# Patient Record
Sex: MALE | Race: WHITE | NOT HISPANIC OR LATINO | Employment: OTHER | URBAN - METROPOLITAN AREA
[De-identification: names, ages, dates, MRNs, and addresses within clinical notes are randomized per-mention and may not be internally consistent; named-entity substitution may affect disease eponyms.]

---

## 2017-01-13 ENCOUNTER — ALLSCRIPTS OFFICE VISIT (OUTPATIENT)
Dept: OTHER | Facility: OTHER | Age: 77
End: 2017-01-13

## 2017-05-31 ENCOUNTER — ALLSCRIPTS OFFICE VISIT (OUTPATIENT)
Dept: OTHER | Facility: OTHER | Age: 77
End: 2017-05-31

## 2017-06-05 ENCOUNTER — ALLSCRIPTS OFFICE VISIT (OUTPATIENT)
Dept: OTHER | Facility: OTHER | Age: 77
End: 2017-06-05

## 2017-07-29 ENCOUNTER — GENERIC CONVERSION - ENCOUNTER (OUTPATIENT)
Dept: OTHER | Facility: OTHER | Age: 77
End: 2017-07-29

## 2017-08-10 ENCOUNTER — APPOINTMENT (OUTPATIENT)
Dept: LAB | Age: 77
End: 2017-08-10
Payer: COMMERCIAL

## 2017-08-10 ENCOUNTER — TRANSCRIBE ORDERS (OUTPATIENT)
Dept: ADMINISTRATIVE | Age: 77
End: 2017-08-10

## 2017-08-10 DIAGNOSIS — G60.9 IDIOPATHIC PERIPHERAL NEUROPATHY: Primary | ICD-10-CM

## 2017-08-10 DIAGNOSIS — G60.9 IDIOPATHIC PERIPHERAL NEUROPATHY: ICD-10-CM

## 2017-08-10 LAB
EST. AVERAGE GLUCOSE BLD GHB EST-MCNC: 105 MG/DL
HBA1C MFR BLD: 5.3 % (ref 4.2–6.3)
T4 SERPL-MCNC: 7.8 UG/DL (ref 4.7–13.3)
TSH SERPL DL<=0.05 MIU/L-ACNC: 1.49 UIU/ML (ref 0.36–3.74)
VIT B12 SERPL-MCNC: 349 PG/ML (ref 100–900)

## 2017-08-10 PROCEDURE — 36415 COLL VENOUS BLD VENIPUNCTURE: CPT

## 2017-08-10 PROCEDURE — 83036 HEMOGLOBIN GLYCOSYLATED A1C: CPT

## 2017-08-10 PROCEDURE — 84443 ASSAY THYROID STIM HORMONE: CPT

## 2017-08-10 PROCEDURE — 82175 ASSAY OF ARSENIC: CPT

## 2017-08-10 PROCEDURE — 84436 ASSAY OF TOTAL THYROXINE: CPT

## 2017-08-10 PROCEDURE — 82607 VITAMIN B-12: CPT

## 2017-08-10 PROCEDURE — 83825 ASSAY OF MERCURY: CPT

## 2017-08-10 PROCEDURE — 83655 ASSAY OF LEAD: CPT

## 2017-08-10 PROCEDURE — 84165 PROTEIN E-PHORESIS SERUM: CPT

## 2017-08-12 LAB
ALBUMIN SERPL ELPH-MCNC: 3.96 G/DL (ref 3.5–5)
ALBUMIN SERPL ELPH-MCNC: 62.8 % (ref 52–65)
ALPHA1 GLOB SERPL ELPH-MCNC: 0.26 G/DL (ref 0.1–0.4)
ALPHA1 GLOB SERPL ELPH-MCNC: 4.1 % (ref 2.5–5)
ALPHA2 GLOB SERPL ELPH-MCNC: 0.54 G/DL (ref 0.4–1.2)
ALPHA2 GLOB SERPL ELPH-MCNC: 8.5 % (ref 7–13)
BETA GLOB ABNORMAL SERPL ELPH-MCNC: 0.36 G/DL (ref 0.4–0.8)
BETA1 GLOB SERPL ELPH-MCNC: 5.7 % (ref 5–13)
BETA2 GLOB SERPL ELPH-MCNC: 5.3 % (ref 2–8)
BETA2+GAMMA GLOB SERPL ELPH-MCNC: 0.33 G/DL (ref 0.2–0.5)
GAMMA GLOB ABNORMAL SERPL ELPH-MCNC: 0.86 G/DL (ref 0.5–1.6)
GAMMA GLOB SERPL ELPH-MCNC: 13.6 % (ref 12–22)
IGG/ALB SER: 1.69 {RATIO} (ref 1.1–1.8)
PROT PATTERN SERPL ELPH-IMP: ABNORMAL
PROT SERPL-MCNC: 6.3 G/DL (ref 6.4–8.2)

## 2017-08-15 LAB
ARSENIC BLD-MCNC: 11 UG/L (ref 2–23)
LEAD BLD-MCNC: 2 UG/DL (ref 0–19)
MERCURY BLD-MCNC: NORMAL UG/L (ref 0–14.9)

## 2017-09-13 ENCOUNTER — GENERIC CONVERSION - ENCOUNTER (OUTPATIENT)
Dept: OTHER | Facility: OTHER | Age: 77
End: 2017-09-13

## 2017-09-13 ENCOUNTER — ALLSCRIPTS OFFICE VISIT (OUTPATIENT)
Dept: OTHER | Facility: OTHER | Age: 77
End: 2017-09-13

## 2017-09-13 LAB
BILIRUB UR QL STRIP: ABNORMAL
CLARITY UR: ABNORMAL
COLOR UR: ABNORMAL
GLUCOSE (HISTORICAL): ABNORMAL
HGB UR QL STRIP.AUTO: ABNORMAL
KETONES UR STRIP-MCNC: ABNORMAL MG/DL
LEUKOCYTE ESTERASE UR QL STRIP: ABNORMAL
NITRITE UR QL STRIP: ABNORMAL
PH UR STRIP.AUTO: 6 [PH]
PROT UR STRIP-MCNC: ABNORMAL MG/DL
SP GR UR STRIP.AUTO: 1.02
UROBILINOGEN UR QL STRIP.AUTO: 4

## 2017-09-20 ENCOUNTER — TRANSCRIBE ORDERS (OUTPATIENT)
Dept: ADMINISTRATIVE | Age: 77
End: 2017-09-20

## 2017-09-20 ENCOUNTER — APPOINTMENT (OUTPATIENT)
Dept: LAB | Age: 77
End: 2017-09-20
Payer: COMMERCIAL

## 2017-09-20 DIAGNOSIS — N13.8 ENLARGED PROSTATE WITH URINARY OBSTRUCTION: Primary | ICD-10-CM

## 2017-09-20 DIAGNOSIS — N40.1 ENLARGED PROSTATE WITH URINARY OBSTRUCTION: Primary | ICD-10-CM

## 2017-09-20 DIAGNOSIS — G60.9 IDIOPATHIC PERIPHERAL NEUROPATHY: ICD-10-CM

## 2017-09-20 DIAGNOSIS — N13.8 ENLARGED PROSTATE WITH URINARY OBSTRUCTION: ICD-10-CM

## 2017-09-20 DIAGNOSIS — N40.1 ENLARGED PROSTATE WITH URINARY OBSTRUCTION: ICD-10-CM

## 2017-09-20 DIAGNOSIS — G60.9 IDIOPATHIC PERIPHERAL NEUROPATHY: Primary | ICD-10-CM

## 2017-09-20 LAB
ANION GAP SERPL CALCULATED.3IONS-SCNC: 7 MMOL/L (ref 4–13)
BASOPHILS # BLD AUTO: 0.03 THOUSANDS/ΜL (ref 0–0.1)
BASOPHILS NFR BLD AUTO: 1 % (ref 0–1)
BUN SERPL-MCNC: 16 MG/DL (ref 5–25)
CALCIUM SERPL-MCNC: 9 MG/DL (ref 8.3–10.1)
CHLORIDE SERPL-SCNC: 106 MMOL/L (ref 100–108)
CO2 SERPL-SCNC: 27 MMOL/L (ref 21–32)
CREAT SERPL-MCNC: 1.03 MG/DL (ref 0.6–1.3)
EOSINOPHIL # BLD AUTO: 0.06 THOUSAND/ΜL (ref 0–0.61)
EOSINOPHIL NFR BLD AUTO: 1 % (ref 0–6)
ERYTHROCYTE [DISTWIDTH] IN BLOOD BY AUTOMATED COUNT: 13.3 % (ref 11.6–15.1)
GFR SERPL CREATININE-BSD FRML MDRD: 70 ML/MIN/1.73SQ M
GLUCOSE P FAST SERPL-MCNC: 83 MG/DL (ref 65–99)
HCT VFR BLD AUTO: 49 % (ref 36.5–49.3)
HGB BLD-MCNC: 16.5 G/DL (ref 12–17)
LYMPHOCYTES # BLD AUTO: 1.16 THOUSANDS/ΜL (ref 0.6–4.47)
LYMPHOCYTES NFR BLD AUTO: 27 % (ref 14–44)
MCH RBC QN AUTO: 31 PG (ref 26.8–34.3)
MCHC RBC AUTO-ENTMCNC: 33.7 G/DL (ref 31.4–37.4)
MCV RBC AUTO: 92 FL (ref 82–98)
MONOCYTES # BLD AUTO: 0.28 THOUSAND/ΜL (ref 0.17–1.22)
MONOCYTES NFR BLD AUTO: 6 % (ref 4–12)
NEUTROPHILS # BLD AUTO: 2.78 THOUSANDS/ΜL (ref 1.85–7.62)
NEUTS SEG NFR BLD AUTO: 65 % (ref 43–75)
NRBC BLD AUTO-RTO: 0 /100 WBCS
PLATELET # BLD AUTO: 190 THOUSANDS/UL (ref 149–390)
PMV BLD AUTO: 10.7 FL (ref 8.9–12.7)
POTASSIUM SERPL-SCNC: 4.1 MMOL/L (ref 3.5–5.3)
PSA SERPL-MCNC: 1.8 NG/ML (ref 0–4)
RBC # BLD AUTO: 5.33 MILLION/UL (ref 3.88–5.62)
SODIUM SERPL-SCNC: 140 MMOL/L (ref 136–145)
WBC # BLD AUTO: 4.38 THOUSAND/UL (ref 4.31–10.16)

## 2017-09-20 PROCEDURE — 85025 COMPLETE CBC W/AUTO DIFF WBC: CPT

## 2017-09-20 PROCEDURE — 80048 BASIC METABOLIC PNL TOTAL CA: CPT

## 2017-09-20 PROCEDURE — 84153 ASSAY OF PSA TOTAL: CPT

## 2017-09-20 PROCEDURE — 36415 COLL VENOUS BLD VENIPUNCTURE: CPT

## 2017-10-03 ENCOUNTER — ALLSCRIPTS OFFICE VISIT (OUTPATIENT)
Dept: OTHER | Facility: OTHER | Age: 77
End: 2017-10-03

## 2017-10-03 DIAGNOSIS — N40.3 NODULAR PROSTATE WITH LOWER URINARY TRACT SYMPTOMS: ICD-10-CM

## 2017-10-03 LAB
BILIRUB UR QL STRIP: NORMAL
CLARITY UR: NORMAL
COLOR UR: YELLOW
GLUCOSE (HISTORICAL): NORMAL
HGB UR QL STRIP.AUTO: NORMAL
KETONES UR STRIP-MCNC: NORMAL MG/DL
LEUKOCYTE ESTERASE UR QL STRIP: NORMAL
NITRITE UR QL STRIP: NORMAL
PH UR STRIP.AUTO: 5 [PH]
PROT UR STRIP-MCNC: NORMAL MG/DL
SP GR UR STRIP.AUTO: 1.02
UROBILINOGEN UR QL STRIP.AUTO: 1

## 2017-10-03 PROCEDURE — G0416 PROSTATE BIOPSY, ANY MTHD: HCPCS | Performed by: UROLOGY

## 2017-10-03 PROCEDURE — 88344 IMHCHEM/IMCYTCHM EA MLT ANTB: CPT | Performed by: UROLOGY

## 2017-10-03 PROCEDURE — 88342 IMHCHEM/IMCYTCHM 1ST ANTB: CPT | Performed by: UROLOGY

## 2017-10-04 ENCOUNTER — GENERIC CONVERSION - ENCOUNTER (OUTPATIENT)
Dept: OTHER | Facility: OTHER | Age: 77
End: 2017-10-04

## 2017-10-04 ENCOUNTER — LAB REQUISITION (OUTPATIENT)
Dept: LAB | Facility: HOSPITAL | Age: 77
End: 2017-10-04
Payer: COMMERCIAL

## 2017-10-04 DIAGNOSIS — N40.3 NODULAR PROSTATE WITH LOWER URINARY TRACT SYMPTOMS: ICD-10-CM

## 2017-10-19 ENCOUNTER — GENERIC CONVERSION - ENCOUNTER (OUTPATIENT)
Dept: OTHER | Facility: OTHER | Age: 77
End: 2017-10-19

## 2017-11-01 ENCOUNTER — GENERIC CONVERSION - ENCOUNTER (OUTPATIENT)
Dept: OTHER | Facility: OTHER | Age: 77
End: 2017-11-01

## 2018-01-12 VITALS
WEIGHT: 221.5 LBS | HEART RATE: 72 BPM | BODY MASS INDEX: 35.6 KG/M2 | OXYGEN SATURATION: 96 % | HEIGHT: 66 IN | SYSTOLIC BLOOD PRESSURE: 118 MMHG | DIASTOLIC BLOOD PRESSURE: 84 MMHG | TEMPERATURE: 97.7 F

## 2018-01-13 VITALS
DIASTOLIC BLOOD PRESSURE: 80 MMHG | SYSTOLIC BLOOD PRESSURE: 132 MMHG | OXYGEN SATURATION: 96 % | HEART RATE: 76 BPM | WEIGHT: 212.13 LBS | HEIGHT: 66 IN | BODY MASS INDEX: 34.09 KG/M2 | TEMPERATURE: 98.1 F

## 2018-01-13 VITALS — BODY MASS INDEX: 32.8 KG/M2 | WEIGHT: 209 LBS | HEIGHT: 67 IN

## 2018-01-16 NOTE — PROGRESS NOTES
Assessment    1  Cramp of both lower extremities (729 82) (R25 2)   2  Seasonal allergic rhinitis due to pollen (477 0) (J30 1)   3  Gastroesophageal reflux disease without esophagitis (530 81) (K21 9)   4  Benign essential hypertension (401 1) (I10)    Plan  Benign essential hypertension    · (1) MAGNESIUM; Status:Active; Requested for:31May2017;   Benign essential hypertension, BPH with obstruction/lower urinary tract symptoms    · (1) URINALYSIS (will reflex a microscopy if leukocytes, occult blood, protein or nitrites are not within  normal limits); Status:Active; Requested for:31May2017;   Benign essential hypertension, GERD without esophagitis    · (1) CBC/PLT/DIFF; Status:Active; Requested for:31May2017;    · (1) COMPREHENSIVE METABOLIC PANEL; Status:Active; Requested for:31May2017;    · (1) LIPID PANEL, FASTING; Status:Active; Requested for:31May2017;    · (1) TSH; Status:Active; Requested for:31May2017;   GERD without esophagitis    · Omeprazole 40 MG Oral Capsule Delayed Release (PriLOSEC); TAKE 1 CAPSULE Daily at  8am  PMH: Arthralgia, Benign essential hypertension    · Doxazosin Mesylate 4 MG Oral Tablet; TAKE 1 TABLET DAILY  Seasonal allergic rhinitis due to pollen    · ZyrTEC Allergy 10 MG Oral Tablet; TAKE 1 TABLET AT BEDTIME    Chief Complaint  Chief Complaint Free Text Note Form: pt here for f/u of HTN  pt c/o nose bothering him not sure if its allergies      History of Present Illness  Benign Prostatic Hyperplasia (Brief): The patient is being seen for a routine clinic follow-up of benign prostatic hyperplasia  The patient is currently asymptomatic  Associated symptoms: This patient was a started on finasteride for BPH symptoms he is doing much better than before he was having some side effects off the Flomax and he did not feel comfortable with taking the Flomax and finasteride was added to was given  No associated symptoms are reported  Gastroesophageal Reflux Disease (Brief):  The patient is being seen for an initial evaluation of gastroesophageal reflux disease  Symptoms:  heartburn and acid regurgitation, but no epigastric pain, no abdominal pain, no nausea, no vomiting, no sore throat and no dysphagia  Associated symptoms:  cough, but no hoarseness  Hypertension (Follow-Up): The patient presents for follow-up of primary hypertension  He has no comorbid illnesses  He has no significant interval events  Symptoms: The patient is currently asymptomatic  Associated symptoms include no headache  Review of Systems  Complete-Male:   Constitutional: No fever or chills, feels well, no tiredness, no recent weight gain or weight loss, no fever and no chills  Eyes: No complaints of eye pain, no red eyes, no discharge from eyes, no itchy eyes  ENT: nasal discharge, nasal dryness, sneezing and nasal itching  Cardiovascular: No complaints of slow heart rate, no fast heart rate, no chest pain, no palpitations, no leg claudication, no lower extremity, no chest pain and no palpitations  Respiratory: cough and Intermittent cough, worse with allergies  , but no shortness of breath and no shortness of breath during exertion  Gastrointestinal: Diarrhea improved  No longer getting heartburn  Feeling better  , but as noted in HPI, no abdominal pain, no nausea, no vomiting and no diarrhea  Genitourinary: no dysuria and no nocturia  Musculoskeletal: No complaints of arthralgia, no myalgias, no joint swelling or stiffness, no limb pain or swelling  Neurological: tingling and Numbness Both feet  Psychiatric: Is not suicidal, no sleep disturbances, no anxiety or depression, no change in personality, no emotional problems  Endocrine: No complaints of proptosis, no hot flashes, no muscle weakness, no erectile dysfunction, no deepening of the voice, no feelings of weakness  Preventive Quality 65 Older:   Preventive Quality 65 and Older: Falls Risk: The patient fell 0 times in the past 12 months   The patient is currently asymptomatic Symptoms Include: The patient is currently experiencing urinary symptoms  Urinary Incontinence Symptoms includes: urinary incontinence, nocturia, weak stream and intermittent stream    PHQ-9 Depression Scale: Over the past 2 weeks, how often have you been bothered by the following problems? 1 ) Little interest or pleasure in doing things? Not at all    2 ) Feeling down, depressed or hopeless? Not at all    3 ) Trouble falling asleep or sleeping too much? Not at all    4 ) Feeling tired or having little energy? Several days  5 ) Poor appetite or overeating? Not at all    6 ) Feeling bad about yourself, or that you are a failure, or have let yourself or your family down? Not at all    7 ) Trouble concentrating on things, such as reading a newspaper or watching television? Not at all    8 ) Moving or speaking so slowly that other people could have noticed, or the opposite, moving or speaking faster than usual? Not at all  How difficult have these problems made it for you to do your work, take care of things at home, or get along with people? Somewhat difficult  Score 1      Active Problems    1  Truncal obesity (278 1) (E65)    Past Medical History    1  History of Abnormal electrocardiogram (794 31) (R94 31)   2  History of Acute bacterial conjunctivitis of left eye (372 03) (H10 32)   3  History of Acute deep vein thrombosis of lower limb, unspecified laterality   4  History of Acute LUQ pain (789 02,338 19) (R10 12)   5  History of Acute upper respiratory infection (465 9) (J06 9)   6  History of Allergic rhinitis due to pollen (477 0) (J30 1)   7  History of Allergic sinusitis (477 9) (J30 9)   8  History of Ankle pain, unspecified laterality   9  History of Arthralgia (719 40) (M25 50)   10  History of Benign essential hypertension (401 1) (I10)   11  History of BPH with obstruction/lower urinary tract symptoms (600 01,599 69) (N40 1,N13 8)   12   History of Colitis (558  9) (K52 9)   13  History of Colonoscopy (Fiberoptic) Screening   14  History of Costochondritis (733 6) (M94 0)   15  History of Cough (786 2) (R05)   16  History of Cramps of lower extremity (729 82) (R25 2)   17  History of Encounter for PPD test (V74 1) (Z11 1)   18  History of Encounter for screening colonoscopy (V76 51) (Z12 11)   19  History of Encounter for screening examination for impaired glucose regulation and diabetes mellitus    (V77 1) (Z13 1)   20  History of Encounter for screening for lipid disorder (V77 91) (Z13 220)   21  History of Encounter for screening for malignant neoplasm of colon (V76 51) (Z12 11)   22  History of Epigastric pain (789 06) (R10 13)   23  History of Generalized osteoarthritis (715 00) (M15 9)   24  History of Generalized osteoarthritis (715 00) (M15 9)   25  History of GERD without esophagitis (530 81) (K21 9)   26  History of Gout (274 9) (M10 9)   27  History of Head or neck swelling, mass, or lump (784 2) (R22 0,R22 1)   28  History of Hearing Loss (389 9)   29  History of acute sinusitis (V12 69) (Z87 09)   30  History of allergic rhinitis (V12 69) (Z87 09)   31  History of allergy (V15 09) (Z88 9)   32  History of allergy (V15 09) (Z88 9)   33  History of chest pain (V13 89) (Z87 898)   34  History of gastric ulcer (V12 79) (Z87 19)   35  History of gastroesophageal reflux (GERD) (V12 79) (Z87 19)   36  History of hiatal hernia (V12 79) (Z87 19)   37  History of hypertension (V12 59) (Z86 79)   38  History of low back pain (V13 59) (Z87 39)   39  History of seasonal allergies (V15 09) (Z88 9)   40  History of shortness of breath (V13 89) (Z87 898)   41  History of stomach ulcers (V12 79) (Z87 19)   42  History of syncope (V15 89) (Z87 898)   43  History of viral gastroenteritis (V12 09) (Z86 19)   44  History of Need for influenza vaccination (V04 81) (Z23)   45  History of Need for prophylactic vaccination and inoculation against influenza (V04 81) (Z23)   46  History of Nephrolithiasis (V13 01)   47  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)   48  History of Otitis externa of left ear (380 10) (H60 92)   49  History of Overweight (278 02) (E66 3)   50  History of Pulmonary Embolism (V12 55)   51  History of Salivary Gland Disorder (527 9)   52  History of Screening PSA (prostate specific antigen) (V76 44) (Z12 5)   53  History of Thrombocytopenia, idiopathic (287 31) (D69 3)   54  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)    Surgical History    1  History of Arthroscopy Knee Left   2  History of Cholecystectomy   3  History of Hemorrhoidectomy   4  History of Hernia Repair   5  History of Nasal Septal Deviation Repair   6  History of Reported Hx Of Shoulder Joint Replacement   7  History of Tonsillectomy    Family History  Mother    1  Family history of Stroke  Father    2  Family history of HTN (hypertension)   3  Family history of Stomach cancer  Family History    4  Family history of Hypertension (V17 49)   5  Family history of Stroke Syndrome (V17 1)    Social History    · Being A Social Drinker   · Denied: History of Drug Use   · Former smoker (V15 82) (F72 205)   · Marital History - Currently    · Occupation: Retired    Current Meds   1  Doxazosin Mesylate 4 MG Oral Tablet; TAKE 1 TABLET DAILY  Requested for: 62VHC7177; Last   Rx:22Nov2016; Status: ACTIVE - Renewal Denied Ordered   2  Finasteride 5 MG Oral Tablet; TAKE 1 TABLET DAILY; Therapy: 87IBH5872 to (Luis Morales)  Requested for: 69VCL5414; Last VQ:06LTM6667   Ordered   3  Matzim  MG Oral Tablet Extended Release 24 Hour; TAKE 1 TABLET DAILY  Requested for:   77KXU1176; Last Rx:55Fhq1137 Ordered   4  Nasacort Allergy 24HR AERO; take as directed; Therapy: (Juve Davenport) to Recorded   5  Ocean Nasal Spray 0 65 % Nasal Solution; USE AS DIRECTED; Therapy: 36ZBP4550 to Recorded   6   Omeprazole 40 MG Oral Capsule Delayed Release; TAKE 1 CAPSULE Daily at 8am;   Therapy: 53DKZ4779 to (Evaluate:51Bng5978)  Requested for: 25UAG4002; Last Rx:22Nov2016;   Status: ACTIVE - Renewal Denied Ordered   7  Tramadol-Acetaminophen 37 5-325 MG Oral Tablet; TAKE 1 TO 2 TABLETS BY MOUTH EVERY 6   HOURS AS NEEDED  Requested for: 22YIB6406; Last XW:52ZEC5452 Ordered  Medication List Reviewed: The medication list was reviewed and updated today  Allergies    1  ACE Inhibitors    2  Dust   3  Grass   4  Mold   5  Ragweed   6  Trees    Vitals  Vital Signs    Recorded: 17TLI7388 11:43AM   Temperature 98 6 F, Tympanic   Heart Rate 67   Systolic 021, LUE, Sitting   Diastolic 86, LUE, Sitting   Height 5 ft 6 in   Weight 212 lb 4 oz   BMI Calculated 34 26   BSA Calculated 2 05   O2 Saturation 97, RA     Physical Exam    Constitutional   General appearance: No acute distress, well appearing and well nourished  Alert, pleasant, cooperative, seaed in NAD  Eyes   Conjunctiva and lids: No swelling, erythema, or discharge  Pupils and irises: Equal, round and reactive to light  Ears, Nose, Mouth, and Throat   External inspection of ears and nose: Normal   Hearing aides in place  Oropharynx: Normal with no erythema, edema, exudate or lesions  MMM  Pulmonary   Respiratory effort: No increased work of breathing or signs of respiratory distress  Good air exchange, no resp distress  Auscultation of lungs: Clear to auscultation, equal breath sounds bilaterally, no wheezes, no rales, no rhonci  Cardiovascular   Auscultation of heart: Normal rate and rhythm, normal S1 and S2, without murmurs  RRR  Examination of extremities for edema and/or varicosities: Normal   + bilateral varicosities  No LE edema  Carotid pulses: Normal     Abdomen   Abdomen: Non-tender, no masses  (+ BS x4, no epigastric TTP  No sternal TTP  ) The abdomen was flat  There was tenderness not in the epigastric area  The abdomen was not rigid  No rebound tenderness  No guarding     Liver and spleen: No hepatomegaly or splenomegaly  + BS x4    Lymphatic   Palpation of lymph nodes in neck: No lymphadenopathy  Musculoskeletal   Gait and station: Normal     Digits and nails: Normal without clubbing or cyanosis  Inspection/palpation of joints, bones, and muscles: Normal     Skin   Skin and subcutaneous tissue: Normal without rashes or lesions  Neurologic   Cranial nerves: Cranial nerves 2-12 intact  Reflexes: 2+ and symmetric  Sensation: No sensory loss  Psychiatric   Orientation to person, place and time: Normal     Mood and affect: Normal          Health Management  History of Depression screening   *VB-Depression Screening; every 1 year; Last 10IQE5156; Next Due: 63KUZ5870; Overdue  History of Encounter for screening for malignant neoplasm of colon   COLONOSCOPY; every 5 years; Last 30TAT7170; Next Due: 44Krx9987; Active  History of Screening for genitourinary condition   *VB - Urinary Incontinence Screen (Dx Z13 89 Screen for UI); every 1 year; Last 88KAU7906; Next  Due: 86ZAE9708; Overdue  History of Screening for neurological condition   *VB - Fall Risk Assessment  (Dx Z13 89 Screen for Neurologic Disorder); every 1 year; Last  88SDV0025; Next Due: 38NCL1615; Overdue  Health Maintenance   Medicare Annual Wellness Visit; every 1 year; Next Due: 97HWP5566;  Overdue    Signatures   Electronically signed by : Vianey Cadena MD; May 31 2017 12:15PM EST                       (Author)

## 2018-01-22 VITALS
DIASTOLIC BLOOD PRESSURE: 86 MMHG | HEIGHT: 66 IN | WEIGHT: 212.25 LBS | OXYGEN SATURATION: 97 % | SYSTOLIC BLOOD PRESSURE: 122 MMHG | HEART RATE: 67 BPM | BODY MASS INDEX: 34.11 KG/M2 | TEMPERATURE: 98.6 F

## 2018-01-22 VITALS
HEIGHT: 67 IN | BODY MASS INDEX: 32.8 KG/M2 | SYSTOLIC BLOOD PRESSURE: 120 MMHG | WEIGHT: 209 LBS | DIASTOLIC BLOOD PRESSURE: 82 MMHG

## 2018-01-22 VITALS — WEIGHT: 209 LBS | BODY MASS INDEX: 32.8 KG/M2 | HEIGHT: 67 IN

## 2018-01-30 DIAGNOSIS — K21.9 GERD WITHOUT ESOPHAGITIS: Primary | ICD-10-CM

## 2018-01-30 RX ORDER — OMEPRAZOLE 40 MG/1
40 CAPSULE, DELAYED RELEASE ORAL DAILY
Qty: 90 CAPSULE | Refills: 1 | Status: SHIPPED | OUTPATIENT
Start: 2018-01-30 | End: 2019-03-26

## 2018-01-30 RX ORDER — OMEPRAZOLE 40 MG/1
CAPSULE, DELAYED RELEASE ORAL DAILY
COMMUNITY
Start: 2015-04-10 | End: 2018-01-30 | Stop reason: SDUPTHER

## 2018-02-08 DIAGNOSIS — N40.1 BENIGN PROSTATIC HYPERPLASIA WITH NOCTURIA: Primary | ICD-10-CM

## 2018-02-08 DIAGNOSIS — R35.1 BENIGN PROSTATIC HYPERPLASIA WITH NOCTURIA: Primary | ICD-10-CM

## 2018-02-08 RX ORDER — FINASTERIDE 5 MG/1
TABLET, FILM COATED ORAL
Qty: 30 TABLET | Refills: 2 | Status: SHIPPED | OUTPATIENT
Start: 2018-02-08 | End: 2018-06-11 | Stop reason: SDUPTHER

## 2018-02-17 DIAGNOSIS — I10 ESSENTIAL HYPERTENSION: Primary | ICD-10-CM

## 2018-02-19 RX ORDER — DILTIAZEM HYDROCHLORIDE 300 MG/1
TABLET, EXTENDED RELEASE ORAL
Qty: 90 TABLET | Refills: 0 | Status: SHIPPED | OUTPATIENT
Start: 2018-02-19 | End: 2018-05-21 | Stop reason: SDUPTHER

## 2018-02-22 ENCOUNTER — OFFICE VISIT (OUTPATIENT)
Dept: INTERNAL MEDICINE CLINIC | Facility: CLINIC | Age: 78
End: 2018-02-22
Payer: COMMERCIAL

## 2018-02-22 VITALS
HEIGHT: 67 IN | OXYGEN SATURATION: 97 % | SYSTOLIC BLOOD PRESSURE: 124 MMHG | HEART RATE: 73 BPM | TEMPERATURE: 98.7 F | WEIGHT: 217.6 LBS | DIASTOLIC BLOOD PRESSURE: 86 MMHG | BODY MASS INDEX: 34.15 KG/M2

## 2018-02-22 DIAGNOSIS — I10 BENIGN ESSENTIAL HYPERTENSION: Primary | ICD-10-CM

## 2018-02-22 DIAGNOSIS — R51.9 ACUTE NONINTRACTABLE HEADACHE, UNSPECIFIED HEADACHE TYPE: ICD-10-CM

## 2018-02-22 PROBLEM — E65 TRUNCAL OBESITY: Status: ACTIVE | Noted: 2017-01-13

## 2018-02-22 PROBLEM — N13.8 PROSTATE NODULE WITH URINARY OBSTRUCTION: Status: ACTIVE | Noted: 2017-09-13

## 2018-02-22 PROBLEM — K21.9 GASTROESOPHAGEAL REFLUX DISEASE WITHOUT ESOPHAGITIS: Status: ACTIVE | Noted: 2017-05-31

## 2018-02-22 PROBLEM — R39.16 BENIGN PROSTATIC HYPERPLASIA (BPH) WITH STRAINING ON URINATION: Status: ACTIVE | Noted: 2017-09-13

## 2018-02-22 PROBLEM — N40.3 PROSTATE NODULE WITH URINARY OBSTRUCTION: Status: ACTIVE | Noted: 2017-09-13

## 2018-02-22 PROBLEM — N40.1 BENIGN PROSTATIC HYPERPLASIA (BPH) WITH STRAINING ON URINATION: Status: ACTIVE | Noted: 2017-09-13

## 2018-02-22 PROBLEM — J30.1 SEASONAL ALLERGIC RHINITIS DUE TO POLLEN: Status: ACTIVE | Noted: 2017-05-31

## 2018-02-22 PROBLEM — C61 PROSTATE CANCER (HCC): Status: ACTIVE | Noted: 2017-11-01

## 2018-02-22 PROCEDURE — 99213 OFFICE O/P EST LOW 20 MIN: CPT | Performed by: NURSE PRACTITIONER

## 2018-02-22 RX ORDER — TRIAMCINOLONE ACETONIDE 55 UG/1
2 SPRAY, METERED NASAL AS NEEDED
COMMUNITY
End: 2019-06-19

## 2018-02-22 NOTE — PROGRESS NOTES
Assessment/Plan:    No problem-specific Assessment & Plan notes found for this encounter  Diagnoses and all orders for this visit:    Benign essential hypertension    Acute nonintractable headache, unspecified headache type    Other orders  -     Triamcinolone Acetonide (NASACORT ALLERGY 24HR) 55 MCG/ACT AERO; 2 sprays into each nostril daily  -     traMADol-acetaminophen (ULTRACET) 37 5-325 mg per tablet; Take 2 tablets by mouth every 6 (six) hours as needed For pain      Patient advised to take BP twice daily for the next week and keep it in a log  Also, monitor headaches and dizziness and also write these on the log  Bring this log to next appointment  Patient advised that BP is stable today, but log will be more helpful in determining if medications need to be adjusted  Reviewed red flag signs to call the office with or go to the Emergency Department with  Patient verbalizes understanding  Take tylenol for headaches as needed  Return in 1 week for follow-up  Subjective:      Patient ID: Rony Vasquez  is a 69 y o  male  Patient presents for an acute visit  He reports that he has had headaches for about 3 months  He also reports that he has dizziness when he gets up in the morning initially  He reports that this is the only time he gets dizzy  He had his nephew take his BP yesterday and the two readings were 132/110 and 130/110  He is on finasteride for his prostate and was told by Dr Soares Overall to no longer take his other BP medication because of how the finasteride works on his B P    He reports that he has pressure behind his eyes and a dull headache when he does have his headaches  He reports that the headache is relieved when he takes tylenol  Dizziness   This is a new problem  The current episode started in the past 7 days  The problem occurs daily  The problem has been unchanged  Associated symptoms include headaches   Pertinent negatives include no abdominal pain, arthralgias, change in bowel habit, chest pain, chills, congestion, coughing, diaphoresis, fatigue, fever, myalgias, nausea, numbness, rash, sore throat, swollen glands, vertigo, vomiting or weakness  The symptoms are aggravated by bending  He has tried nothing for the symptoms  Hypertension   This is a chronic problem  The current episode started more than 1 year ago  The problem has been waxing and waning since onset  The problem is controlled  Associated symptoms include headaches  Pertinent negatives include no anxiety, chest pain, malaise/fatigue, palpitations, peripheral edema or shortness of breath  There are no associated agents to hypertension  Risk factors for coronary artery disease include male gender, obesity and smoking/tobacco exposure  Past treatments include alpha 1 blockers  The current treatment provides mild improvement  There are no compliance problems  The following portions of the patient's history were reviewed and updated as appropriate: allergies, current medications, past family history, past medical history, past social history, past surgical history and problem list     Review of Systems   Constitutional: Negative for chills, diaphoresis, fatigue, fever and malaise/fatigue  HENT: Negative for congestion and sore throat  Eyes: Negative for pain  Respiratory: Negative for cough, chest tightness and shortness of breath  Cardiovascular: Negative for chest pain and palpitations  Gastrointestinal: Negative for abdominal pain, change in bowel habit, diarrhea, nausea and vomiting  Musculoskeletal: Negative for arthralgias and myalgias  Skin: Negative for rash  Neurological: Positive for dizziness and headaches  Negative for vertigo, weakness and numbness  Psychiatric/Behavioral: The patient is not nervous/anxious            Past Medical History:   Diagnosis Date    BPH (benign prostatic hyperplasia)          Current Outpatient Prescriptions:     finasteride (PROSCAR) 5 mg tablet, TAKE 1 TABLET DAILY  , Disp: 30 tablet, Rfl: 2    MATZIM  MG 24 hr tablet, TAKE 1 TABLET EVERY DAY, Disp: 90 tablet, Rfl: 0    omeprazole (PriLOSEC) 40 MG capsule, Take 1 capsule (40 mg total) by mouth daily, Disp: 90 capsule, Rfl: 1    traMADol-acetaminophen (ULTRACET) 37 5-325 mg per tablet, Take 2 tablets by mouth every 6 (six) hours as needed For pain, Disp: , Rfl:     Triamcinolone Acetonide (NASACORT ALLERGY 24HR) 55 MCG/ACT AERO, 2 sprays into each nostril daily, Disp: , Rfl:     Allergies   Allergen Reactions    Ace Inhibitors     Molds & Smuts     Other     Pollen Extract     Short Ragweed Pollen Ext     Tree Extract        Social History   Past Surgical History:   Procedure Laterality Date    BLADDER SURGERY      HEMORRHOID SURGERY      HERNIA REPAIR      KNEE CARTILAGE SURGERY      NASAL SEPTUM SURGERY      STOMACH SURGERY      TONSILLECTOMY AND ADENOIDECTOMY      TOTAL SHOULDER ARTHROPLASTY       Family History   Problem Relation Age of Onset    Hypertension Mother     Stomach cancer Father        Objective:  /86 (BP Location: Left arm, Patient Position: Sitting, Cuff Size: Adult)   Pulse 73   Temp 98 7 °F (37 1 °C) (Oral)   Ht 5' 7" (1 702 m)   Wt 98 7 kg (217 lb 9 6 oz)   SpO2 97%   BMI 34 08 kg/m²        Physical Exam   Constitutional: He is oriented to person, place, and time  He appears well-developed and well-nourished  No distress  HENT:   Head: Normocephalic and atraumatic  Right Ear: External ear normal    Left Ear: External ear normal    Mouth/Throat: Oropharynx is clear and moist    Eyes: Conjunctivae and EOM are normal  Pupils are equal, round, and reactive to light  No scleral icterus  Neck: Normal range of motion  Neck supple  No thyromegaly present  Cardiovascular: Normal rate, regular rhythm and normal heart sounds  Pulmonary/Chest: Effort normal and breath sounds normal  No respiratory distress  Abdominal: Soft   Bowel sounds are normal  He exhibits no distension  Musculoskeletal: Normal range of motion  He exhibits no edema  Neurological: He is alert and oriented to person, place, and time  He displays normal reflexes  No cranial nerve deficit  He exhibits normal muscle tone  Coordination normal    Skin: Skin is warm and dry  Psychiatric: He has a normal mood and affect  His behavior is normal  Judgment and thought content normal    Vitals reviewed

## 2018-02-22 NOTE — PATIENT INSTRUCTIONS
Patient advised to take BP twice daily for the next week and keep it in a log  Also, monitor headaches and dizziness and also write these on the log  Bring this log to next appointment  Patient advised that BP is stable today, but log will be more helpful in determining if medications need to be adjusted  Reviewed red flag signs to call the office with or go to the Emergency Department with  Patient verbalizes understanding  Take tylenol for headaches as needed  Return in 1 week for follow-up

## 2018-02-28 ENCOUNTER — OFFICE VISIT (OUTPATIENT)
Dept: INTERNAL MEDICINE CLINIC | Facility: CLINIC | Age: 78
End: 2018-02-28
Payer: COMMERCIAL

## 2018-02-28 VITALS
DIASTOLIC BLOOD PRESSURE: 100 MMHG | TEMPERATURE: 98.6 F | SYSTOLIC BLOOD PRESSURE: 160 MMHG | WEIGHT: 215.4 LBS | OXYGEN SATURATION: 97 % | HEART RATE: 76 BPM | HEIGHT: 67 IN | BODY MASS INDEX: 33.81 KG/M2

## 2018-02-28 DIAGNOSIS — R42 LIGHTHEADEDNESS: ICD-10-CM

## 2018-02-28 DIAGNOSIS — H81.13 BENIGN PAROXYSMAL POSITIONAL VERTIGO DUE TO BILATERAL VESTIBULAR DISORDER: ICD-10-CM

## 2018-02-28 DIAGNOSIS — I10 BENIGN ESSENTIAL HYPERTENSION: Primary | ICD-10-CM

## 2018-02-28 DIAGNOSIS — E66.09 CLASS 1 OBESITY DUE TO EXCESS CALORIES WITHOUT SERIOUS COMORBIDITY WITH BODY MASS INDEX (BMI) OF 33.0 TO 33.9 IN ADULT: ICD-10-CM

## 2018-02-28 PROBLEM — E66.9 CLASS 1 OBESITY IN ADULT: Status: ACTIVE | Noted: 2018-02-28

## 2018-02-28 PROBLEM — E66.811 CLASS 1 OBESITY IN ADULT: Status: ACTIVE | Noted: 2018-02-28

## 2018-02-28 PROCEDURE — 99214 OFFICE O/P EST MOD 30 MIN: CPT | Performed by: NURSE PRACTITIONER

## 2018-02-28 RX ORDER — HYDROCHLOROTHIAZIDE 25 MG/1
25 TABLET ORAL DAILY
Qty: 30 TABLET | Refills: 0 | Status: SHIPPED | OUTPATIENT
Start: 2018-02-28 | End: 2018-03-13 | Stop reason: SDUPTHER

## 2018-02-28 RX ORDER — ECHINACEA PURPUREA EXTRACT 125 MG
2 TABLET ORAL AS NEEDED
COMMUNITY
End: 2019-09-24

## 2018-02-28 NOTE — PROGRESS NOTES
Assessment/Plan:       Diagnoses and all orders for this visit:    Benign essential hypertension  -     hydrochlorothiazide (HYDRODIURIL) 25 mg tablet; Take 1 tablet (25 mg total) by mouth daily  -     CBC and differential; Future  -     Basic metabolic panel; Future  -     TSH, 3rd generation with T4 reflex; Future        -      Will add hydrochlorothiazide 25 mg to daily regimen  Advised patient to check his blood pressure morning and  night as he has been doing and recorded a log to bring with him to his follow-up with me in 1-2 weeks  Renal  function reviewed on most recent BMP from September and kidney function was normal   Will have him go for  routine lab work prior to his follow-up  Class 1 obesity due to excess calories without serious comorbidity with body mass index (BMI) of 33 0 to 33 9 in adult  -     Lipid Panel with Direct LDL reflex; Future    Benign 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        -       With history I highly suspect there is also BPPV going on  Will control his blood pressure and at his follow-up we  will reassess the symptoms and if they are persistent we will of recommend him to physical therapy  Other orders  -     sodium chloride (OCEAN) 0 65 % nasal spray; 2 sprays into each nostril daily    Lightheadedness     Discussed with patient that some of his medications can causing side effects with quick positional changes  Instructed him that it is important for him to sit at the side of the bed and change positions slowly        Subjective:      Patient ID: Hamzah Carcaom  is a 69 y o  male  HPI     Patient is here today for 1 week follow-up of headache and high blood pressure   He states that he does not have a headache at this time but kind of feels that he is in a "fog" He notes that when he changes positions quickly, gets up in the middle the night to go to the bathroom or 1st thing when he gets up out of bed in the morning he feels a little bit lightheaded  He notes a different sensation which he describes as a spinning sensation when he turns his head quickly or when he rolls over in bed  He has been checking his blood pressures at home and has brought a recording of them with him today  On average his systolic numbers are upper 130s to 160s and on average his diastolic numbers range from 90s to low 100s  The following portions of the patient's history were reviewed and updated as appropriate: allergies, current medications, past family history, past medical history, past social history, past surgical history and problem list     Review of Systems   Constitutional: Negative for chills, diaphoresis, fatigue and fever  HENT: Negative for ear discharge, ear pain and tinnitus  Respiratory: Negative for cough, chest tightness, shortness of breath and wheezing  Cardiovascular: Negative for chest pain, palpitations and leg swelling  Neurological: Positive for dizziness ("spinning sensation") and light-headedness  Negative for syncope, facial asymmetry, weakness, numbness and headaches  Past Medical History:   Diagnosis Date    Abnormal electrocardiogram     Last assessed: Oct 11, 2013    Allergic rhinitis     last assessed: Oct 11, 2013    Allergic rhinitis due to pollen     last assessed: Oct 10, 2013    Allergic sinusitis     Last assessed: May 11, 2015    Allergy     resolved: July 22, 2015    Benign essential hypertension     Last assessed/resolved: May 31, 2017    BPH (benign prostatic hyperplasia)     Colitis     Last assessed: May 24, 2016    Generalized osteoarthritis     Last assessed: Oct 11, 2013    GERD without esophagitis     Last assessed/resolved:  May 31, 2017    Hearing loss     Hiatal hernia     resolved: July 22, 2015    History of gastroesophageal reflux (GERD)     History of stomach ulcers     last assessed: May 11, 2015    Hypertension     Incomplete bladder emptying     Nontoxic single thyroid nodule     last assessed: April 16, 2014    Overweight     last assessed: Oct 31, 2013    Pulmonary embolism [facility])     Salivary gland disorder     last assessed: April 16, 2014    Seasonal allergies     last assessed: May 15, 2015    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, 2014         Current Outpatient Prescriptions:     finasteride (PROSCAR) 5 mg tablet, TAKE 1 TABLET DAILY  , Disp: 30 tablet, Rfl: 2    MATZIM  MG 24 hr tablet, TAKE 1 TABLET EVERY DAY, Disp: 90 tablet, Rfl: 0    omeprazole (PriLOSEC) 40 MG capsule, Take 1 capsule (40 mg total) by mouth daily, Disp: 90 capsule, Rfl: 1    sodium chloride (OCEAN) 0 65 % nasal spray, 2 sprays into each nostril daily, Disp: , Rfl:     traMADol-acetaminophen (ULTRACET) 37 5-325 mg per tablet, Take 2 tablets by mouth every 6 (six) hours as needed For pain, Disp: , Rfl:     Triamcinolone Acetonide (NASACORT ALLERGY 24HR) 55 MCG/ACT AERO, 2 sprays into each nostril daily, Disp: , Rfl:     hydrochlorothiazide (HYDRODIURIL) 25 mg tablet, Take 1 tablet (25 mg total) by mouth daily, Disp: 30 tablet, Rfl: 0    Allergies   Allergen Reactions    Ace Inhibitors     Molds & Smuts     Other     Pollen Extract     Short Ragweed Pollen Ext     Tree Extract        Social History   Past Surgical History:   Procedure Laterality Date    BLADDER SURGERY      CHOLECYSTECTOMY  2000    laparoscopic     HEMORRHOID SURGERY      pilionidal cyst removal     HERNIA REPAIR Left 01/17/2008    inguinal     KNEE ARTHROSCOPY Left 1974    KNEE CARTILAGE SURGERY      NASAL SEPTUM SURGERY      Deviation repair     STOMACH SURGERY      TONSILLECTOMY AND ADENOIDECTOMY      at age 37   Abby Plasencia R[address]el [address] 89 - right 01/04 0 left 01/95    VASECTOMY       Family History   Problem Relation Age of Onset    Hypertension Mother     Stroke Mother     Stomach cancer Father     Hypertension Father     Hypertension Family     Stroke Family      syndrome        Objective:  /100 (BP Location: Left arm, Patient Position: Sitting, Cuff Size: Standard)   Pulse 76   Temp 98 6 °F (37 °C) (Oral)   Ht 5' 7" (1 702 m)   Wt 97 7 kg (215 lb 6 4 oz)   SpO2 97%   BMI 33 74 kg/m²      Physical Exam   Constitutional: He is oriented to person, place, and time  He appears well-developed and well-nourished  No distress  Obese   HENT:   Head: Normocephalic and atraumatic  Right Ear: Tympanic membrane and external ear normal    Left Ear: Tympanic membrane and external ear normal    Nose: Nose normal    Mouth/Throat: Oropharynx is clear and moist  No oropharyngeal exudate, posterior oropharyngeal edema or posterior oropharyngeal erythema  Bilateral hearing aids  Removed for ear exam    Eyes: Conjunctivae and EOM are normal  Pupils are equal, round, and reactive to light  Neck: Normal range of motion  Neck supple  Carotid bruit is not present  No thyromegaly present  Cardiovascular: Normal rate, regular rhythm and normal heart sounds  No murmur heard  Pulmonary/Chest: Effort normal and breath sounds normal  No respiratory distress  He has no decreased breath sounds  He has no wheezes  He has no rhonchi  Musculoskeletal: He exhibits no edema  Lymphadenopathy:     He has no cervical adenopathy  Neurological: He is alert and oriented to person, place, and time  No cranial nerve deficit  He exhibits normal muscle tone  Coordination normal    Romberg negative  Finger to nose intact  Anthony intact   Skin: Skin is warm and dry  He is not diaphoretic  Psychiatric: He has a normal mood and affect  His behavior is normal    Vitals reviewed

## 2018-02-28 NOTE — PATIENT INSTRUCTIONS
High blood pressure-  Start taking hydrochlorothiazide 25 mg daily  Checked her blood pressure daily  Bring log with the next appointment  Go for blood work tomorrow and follow up in 2 weeks  lightheadedness-  This can be caused by a some of the medications that you are taking and as we discussed she needs to sit at the side of the bed for a few seconds before changing positions  spinning sensation-  As we talked about her symptoms sound like you have vertigo    When she blood pressure is controlled and we see you back we will give you a referral for physical therapy if your symptoms continue

## 2018-02-28 NOTE — ASSESSMENT & PLAN NOTE
Discussed with patient that some of his medications can causing side effects with quick positional changes    Instructed him that it is important for him to sit at the side of the bed and change positions slowly

## 2018-02-28 NOTE — ASSESSMENT & PLAN NOTE
Will add hydrochlorothiazide 25 mg to daily regimen  Advised patient to check his blood pressure morning and night as he has been doing and recorded a log to bring with him to his follow-up with me in 1-2 weeks  Renal function reviewed on most recent BMP from September and kidney function was normal   Will have him go for routine lab work prior to his follow-up

## 2018-02-28 NOTE — ASSESSMENT & PLAN NOTE
With history I highly suspect there is also BPPV going on  Will control his blood pressure and at his follow-up we will reassess the symptoms and if they are persistent we will of recommend him to physical therapy

## 2018-03-01 ENCOUNTER — CLINICAL SUPPORT (OUTPATIENT)
Dept: INTERNAL MEDICINE CLINIC | Facility: CLINIC | Age: 78
End: 2018-03-01
Payer: COMMERCIAL

## 2018-03-01 DIAGNOSIS — E66.09 CLASS 1 OBESITY DUE TO EXCESS CALORIES WITHOUT SERIOUS COMORBIDITY WITH BODY MASS INDEX (BMI) OF 33.0 TO 33.9 IN ADULT: ICD-10-CM

## 2018-03-01 DIAGNOSIS — I10 BENIGN ESSENTIAL HTN: Primary | ICD-10-CM

## 2018-03-01 PROCEDURE — 36415 COLL VENOUS BLD VENIPUNCTURE: CPT

## 2018-03-02 LAB
BASOPHILS # BLD AUTO: 41 CELLS/UL (ref 0–200)
BASOPHILS NFR BLD AUTO: 0.9 %
BUN SERPL-MCNC: 20 MG/DL (ref 7–25)
BUN/CREAT SERPL: NORMAL (CALC) (ref 6–22)
CALCIUM SERPL-MCNC: 9.2 MG/DL (ref 8.6–10.3)
CHLORIDE SERPL-SCNC: 106 MMOL/L (ref 98–110)
CHOLEST SERPL-MCNC: 160 MG/DL
CHOLEST/HDLC SERPL: 2.9 (CALC)
CO2 SERPL-SCNC: 25 MMOL/L (ref 20–31)
CREAT SERPL-MCNC: 1.12 MG/DL (ref 0.7–1.18)
EOSINOPHIL # BLD AUTO: 41 CELLS/UL (ref 15–500)
EOSINOPHIL NFR BLD AUTO: 0.9 %
ERYTHROCYTE [DISTWIDTH] IN BLOOD BY AUTOMATED COUNT: 12.8 % (ref 11–15)
GLUCOSE SERPL-MCNC: 87 MG/DL (ref 65–99)
HCT VFR BLD AUTO: 51.7 % (ref 38.5–50)
HDLC SERPL-MCNC: 56 MG/DL
HGB BLD-MCNC: 17.7 G/DL (ref 13.2–17.1)
LDLC SERPL CALC-MCNC: 88 MG/DL (CALC)
LYMPHOCYTES # BLD AUTO: 1305 CELLS/UL (ref 850–3900)
LYMPHOCYTES NFR BLD AUTO: 29 %
MCH RBC QN AUTO: 31.3 PG (ref 27–33)
MCHC RBC AUTO-ENTMCNC: 34.2 G/DL (ref 32–36)
MCV RBC AUTO: 91.5 FL (ref 80–100)
MONOCYTES # BLD AUTO: 320 CELLS/UL (ref 200–950)
MONOCYTES NFR BLD AUTO: 7.1 %
NEUTROPHILS # BLD AUTO: 2795 CELLS/UL (ref 1500–7800)
NEUTROPHILS NFR BLD AUTO: 62.1 %
NONHDLC SERPL-MCNC: 104 MG/DL (CALC)
PLATELET # BLD AUTO: 194 THOUSAND/UL (ref 140–400)
PMV BLD REES-ECKER: 10.9 FL (ref 7.5–12.5)
POTASSIUM SERPL-SCNC: 4.3 MMOL/L (ref 3.5–5.3)
RBC # BLD AUTO: 5.65 MILLION/UL (ref 4.2–5.8)
SL AMB EGFR AFRICAN AMERICAN: 74 ML/MIN/1.73M2
SL AMB EGFR NON AFRICAN AMERICAN: 63 ML/MIN/1.73M2
SODIUM SERPL-SCNC: 141 MMOL/L (ref 135–146)
TRIGL SERPL-MCNC: 74 MG/DL
TSH SERPL-ACNC: 1.54 MIU/L (ref 0.4–4.5)
WBC # BLD AUTO: 4.5 THOUSAND/UL (ref 3.8–10.8)

## 2018-03-13 ENCOUNTER — OFFICE VISIT (OUTPATIENT)
Dept: INTERNAL MEDICINE CLINIC | Facility: CLINIC | Age: 78
End: 2018-03-13
Payer: COMMERCIAL

## 2018-03-13 VITALS
HEIGHT: 67 IN | HEART RATE: 68 BPM | WEIGHT: 216.4 LBS | OXYGEN SATURATION: 97 % | TEMPERATURE: 97.9 F | DIASTOLIC BLOOD PRESSURE: 90 MMHG | BODY MASS INDEX: 33.97 KG/M2 | SYSTOLIC BLOOD PRESSURE: 142 MMHG

## 2018-03-13 DIAGNOSIS — I10 BENIGN ESSENTIAL HYPERTENSION: Primary | ICD-10-CM

## 2018-03-13 DIAGNOSIS — H81.13 BENIGN PAROXYSMAL POSITIONAL VERTIGO DUE TO BILATERAL VESTIBULAR DISORDER: ICD-10-CM

## 2018-03-13 PROCEDURE — 1101F PT FALLS ASSESS-DOCD LE1/YR: CPT | Performed by: NURSE PRACTITIONER

## 2018-03-13 PROCEDURE — 99213 OFFICE O/P EST LOW 20 MIN: CPT | Performed by: NURSE PRACTITIONER

## 2018-03-13 RX ORDER — HYDROCHLOROTHIAZIDE 50 MG/1
50 TABLET ORAL DAILY
Qty: 90 TABLET | Refills: 0 | Status: SHIPPED | OUTPATIENT
Start: 2018-03-13 | End: 2018-06-20 | Stop reason: SDUPTHER

## 2018-03-13 NOTE — PROGRESS NOTES
Assessment/Plan:     Diagnoses and all orders for this visit:    Benign essential hypertension  -     hydrochlorothiazide (HYDRODIURIL) 50 mg tablet; Take 1 tablet (50 mg total) by mouth daily        -     Continue to check BP daily at home    Benign 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  -     Ambulatory referral to Physical Therapy; Future     Case reviewed with Dr Angela Jha  Follow up 1-2 months  Subjective:      Patient ID: Mae Etienne  is a 69 y o  male  HPI  Patient is here today for 2 week follow up HTN  AT his last appointment we added on HCTZ 25 mg daily to his current regimen  He states he thinks his blood pressure has been "irradic" He has been checking his blood pressure at home twice a day  In the morning his recordings are about 120s-150s/80s-90s  In the evening his recordings are 120s-150s/75s-90s  Two weeks ago he was also having sensations of the room spinning and feeling dizzy, especially with positional changes  He states these symptoms are overall improving, but he continues to have the sensation that the room is spinning when he turns over in bed  He also states if he turns his head quickly he feels a sense of dizziness or if he bends over and stands up quickly  The following portions of the patient's history were reviewed and updated as appropriate: allergies, current medications, past family history, past medical history, past social history, past surgical history and problem list     Review of Systems   Constitutional: Negative for activity change, chills, diaphoresis, fatigue and fever  Respiratory: Negative for cough, chest tightness, shortness of breath and wheezing  Cardiovascular: Negative for chest pain, palpitations and leg swelling  Neurological: Positive for dizziness (with position changes), light-headedness (with quick position changes) and headaches (intermittent dull headaches)   Negative for syncope, facial asymmetry, speech difficulty and weakness  Past Medical History:   Diagnosis Date    Abnormal electrocardiogram     Last assessed: Oct 11, 2013    Allergic rhinitis     last assessed: Oct 11, 2013    Allergic rhinitis due to pollen     last assessed: Oct 10, 2013    Allergic sinusitis     Last assessed: May 11, 2015    Allergy     resolved: July 22, 2015    Benign essential hypertension     Last assessed/resolved: May 31, 2017    BPH (benign prostatic hyperplasia)     Colitis     Last assessed: May 24, 2016    Generalized osteoarthritis     Last assessed: Oct 11, 2013    GERD without esophagitis     Last assessed/resolved: May 31, 2017    Hearing loss     Hiatal hernia     resolved: July 22, 2015    History of gastroesophageal reflux (GERD)     History of stomach ulcers     last assessed: May 11, 2015    Hypertension     Incomplete bladder emptying     Nontoxic single thyroid nodule     last assessed: April 16, 2014    Overweight     last assessed: Oct 31, 2013    Pulmonary embolism [facility])     Salivary gland disorder     last assessed: April 16, 2014    Seasonal allergies     last assessed: May 15, 2015    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, 2014         Current Outpatient Prescriptions:     finasteride (PROSCAR) 5 mg tablet, TAKE 1 TABLET DAILY  , Disp: 30 tablet, Rfl: 2    hydrochlorothiazide (HYDRODIURIL) 25 mg tablet, Take 1 tablet (25 mg total) by mouth daily, Disp: 30 tablet, Rfl: 0    MATZIM  MG 24 hr tablet, TAKE 1 TABLET EVERY DAY, Disp: 90 tablet, Rfl: 0    omeprazole (PriLOSEC) 40 MG capsule, Take 1 capsule (40 mg total) by mouth daily, Disp: 90 capsule, Rfl: 1    sodium chloride (OCEAN) 0 65 % nasal spray, 2 sprays into each nostril daily, Disp: , Rfl:     traMADol-acetaminophen (ULTRACET) 37 5-325 mg per tablet, Take 2 tablets by mouth every 6 (six) hours as needed For pain, Disp: , Rfl:     Triamcinolone Acetonide (NASACORT ALLERGY 24HR) 55 MCG/ACT AERO, 2 sprays into each nostril daily, Disp: , Rfl:     Allergies   Allergen Reactions    Ace Inhibitors     Molds & Smuts     Other     Pollen Extract     Short Ragweed Pollen Ext     Tree Extract        Social History   Past Surgical History:   Procedure Laterality Date    BLADDER SURGERY      CHOLECYSTECTOMY  2000    laparoscopic     HEMORRHOID SURGERY      pilionidal cyst removal     HERNIA REPAIR Left 01/17/2008    inguinal     KNEE ARTHROSCOPY Left 1974    KNEE CARTILAGE SURGERY      NASAL SEPTUM SURGERY      Deviation repair     STOMACH SURGERY      TONSILLECTOMY AND ADENOIDECTOMY      at age 37   [employer] [address] 89 - right 01/04 0 left 01/95    VASECTOMY       Family History   Problem Relation Age of Onset    Hypertension Mother     Stroke Mother     Stomach cancer Father     Hypertension Father     Hypertension Family     Stroke Family      syndrome        Objective:  /90 (BP Location: Left arm, Patient Position: Sitting, Cuff Size: Large)   Pulse 68   Temp 97 9 °F (36 6 °C) (Oral)   Ht 5' 7" (1 702 m)   Wt 98 2 kg (216 lb 6 4 oz)   SpO2 97%   BMI 33 89 kg/m²      Physical Exam   Constitutional: He is oriented to person, place, and time  He appears well-developed and well-nourished  No distress  obese   HENT:   Head: Normocephalic and atraumatic  Right Ear: Tympanic membrane and external ear normal    Left Ear: Tympanic membrane and external ear normal    Nose: Nose normal    Mouth/Throat: Oropharynx is clear and moist  No oropharyngeal exudate, posterior oropharyngeal edema or posterior oropharyngeal erythema  Bilateral hearing aids   Eyes: Conjunctivae and EOM are normal  Pupils are equal, round, and reactive to light  Neck: Normal range of motion  Neck supple  Carotid bruit is not present  Cardiovascular: Normal rate, regular rhythm and normal heart sounds  No murmur heard    Pulmonary/Chest: Effort normal and breath sounds normal  No respiratory distress  He has no decreased breath sounds  He has no wheezes  He has no rhonchi  Musculoskeletal: He exhibits no edema  Lymphadenopathy:     He has no cervical adenopathy  Neurological: He is alert and oriented to person, place, and time  Skin: Skin is warm and dry  He is not diaphoretic  Psychiatric: He has a normal mood and affect  His behavior is normal    Vitals reviewed

## 2018-03-13 NOTE — PATIENT INSTRUCTIONS
Blood pressure - Improving  We will increase the hydrochlorothiazide to 50mg daily  Take Matzim 300mg and Hydrochlorothiazide 50mg in the morning  Take Finasteride 5mg at night  Check BP once daily  Follow up in 1-2 months  Vertigo- Start physical therapy

## 2018-04-02 DIAGNOSIS — M25.50 ARTHRALGIA, UNSPECIFIED JOINT: Primary | ICD-10-CM

## 2018-04-13 ENCOUNTER — TELEPHONE (OUTPATIENT)
Dept: UROLOGY | Facility: MEDICAL CENTER | Age: 78
End: 2018-04-13

## 2018-04-13 NOTE — TELEPHONE ENCOUNTER
Patient needs pain and antibiotic ordered for his 5/8/18 biopsy,  sent to him please  He said he never got them

## 2018-04-16 DIAGNOSIS — R97.20 ELEVATED PSA: Primary | ICD-10-CM

## 2018-04-16 RX ORDER — OXYCODONE HYDROCHLORIDE 5 MG/1
5 TABLET ORAL ONCE
Qty: 1 TABLET | Refills: 0 | Status: SHIPPED | OUTPATIENT
Start: 2018-04-16 | End: 2018-04-16

## 2018-04-16 RX ORDER — ALPRAZOLAM 0.25 MG/1
0.5 TABLET ORAL ONCE
Qty: 1 TABLET | Refills: 0 | Status: SHIPPED | OUTPATIENT
Start: 2018-04-16 | End: 2018-09-06

## 2018-04-16 RX ORDER — CIPROFLOXACIN 500 MG/1
500 TABLET, FILM COATED ORAL 2 TIMES DAILY
Qty: 4 TABLET | Refills: 0 | Status: SHIPPED | OUTPATIENT
Start: 2018-04-16 | End: 2018-04-18

## 2018-04-16 RX ORDER — CEPHALEXIN 500 MG/1
500 CAPSULE ORAL 2 TIMES DAILY
Qty: 4 CAPSULE | Refills: 0 | Status: SHIPPED | OUTPATIENT
Start: 2018-04-16 | End: 2018-04-18 | Stop reason: ALTCHOICE

## 2018-04-16 NOTE — TELEPHONE ENCOUNTER
Dr Melissa Fernandez, Please order medications for prostate biopsy for patient  I can mail the scripts to patient and his instructions     Thank you

## 2018-04-17 ENCOUNTER — TELEPHONE (OUTPATIENT)
Dept: UROLOGY | Facility: MEDICAL CENTER | Age: 78
End: 2018-04-17

## 2018-04-18 ENCOUNTER — TELEPHONE (OUTPATIENT)
Dept: UROLOGY | Facility: AMBULATORY SURGERY CENTER | Age: 78
End: 2018-04-18

## 2018-04-18 NOTE — TELEPHONE ENCOUNTER
Spoke with wife, Please call after 12pm tomorrow, patient has questions about medication for prostate biopsy  Please call her back  [phone]    Thank you

## 2018-04-23 ENCOUNTER — CLINICAL SUPPORT (OUTPATIENT)
Dept: INTERNAL MEDICINE CLINIC | Facility: CLINIC | Age: 78
End: 2018-04-23
Payer: COMMERCIAL

## 2018-04-23 DIAGNOSIS — C61 MALIGNANT NEOPLASM OF PROSTATE (HCC): Primary | ICD-10-CM

## 2018-04-23 PROCEDURE — 36415 COLL VENOUS BLD VENIPUNCTURE: CPT

## 2018-05-06 DIAGNOSIS — N40.1 BENIGN PROSTATIC HYPERPLASIA WITH NOCTURIA: ICD-10-CM

## 2018-05-06 DIAGNOSIS — R35.1 BENIGN PROSTATIC HYPERPLASIA WITH NOCTURIA: ICD-10-CM

## 2018-05-07 RX ORDER — FINASTERIDE 5 MG/1
TABLET, FILM COATED ORAL
Qty: 30 TABLET | Refills: 2 | OUTPATIENT
Start: 2018-05-07

## 2018-05-08 ENCOUNTER — PROCEDURE VISIT (OUTPATIENT)
Dept: UROLOGY | Facility: MEDICAL CENTER | Age: 78
End: 2018-05-08
Payer: COMMERCIAL

## 2018-05-08 VITALS — WEIGHT: 216 LBS | BODY MASS INDEX: 33.9 KG/M2 | HEIGHT: 67 IN

## 2018-05-08 DIAGNOSIS — N40.1 BENIGN PROSTATIC HYPERPLASIA WITH LOWER URINARY TRACT SYMPTOMS, SYMPTOM DETAILS UNSPECIFIED: ICD-10-CM

## 2018-05-08 DIAGNOSIS — C61 MALIGNANT NEOPLASM OF PROSTATE (HCC): Primary | ICD-10-CM

## 2018-05-08 PROCEDURE — G0416 PROSTATE BIOPSY, ANY MTHD: HCPCS | Performed by: PATHOLOGY

## 2018-05-08 PROCEDURE — 55700 PR BIOPSY OF PROSTATE,NEEDLE/PUNCH: CPT | Performed by: UROLOGY

## 2018-05-08 PROCEDURE — 76942 ECHO GUIDE FOR BIOPSY: CPT | Performed by: UROLOGY

## 2018-05-08 PROCEDURE — 99214 OFFICE O/P EST MOD 30 MIN: CPT | Performed by: UROLOGY

## 2018-05-08 PROCEDURE — 88344 IMHCHEM/IMCYTCHM EA MLT ANTB: CPT | Performed by: PATHOLOGY

## 2018-05-08 PROCEDURE — 76872 US TRANSRECTAL: CPT | Performed by: UROLOGY

## 2018-05-08 PROCEDURE — 88342 IMHCHEM/IMCYTCHM 1ST ANTB: CPT | Performed by: PATHOLOGY

## 2018-05-08 RX ORDER — CIPROFLOXACIN 500 MG/1
TABLET, FILM COATED ORAL
Refills: 0 | COMMUNITY
Start: 2018-04-28 | End: 2018-07-18 | Stop reason: ALTCHOICE

## 2018-05-08 RX ORDER — CEPHALEXIN 500 MG/1
CAPSULE ORAL
Refills: 0 | COMMUNITY
Start: 2018-04-28 | End: 2018-07-18 | Stop reason: ALTCHOICE

## 2018-05-08 NOTE — PROGRESS NOTES
Biopsy prostate     Date/Time 5/8/2018 4:06 PM     Performed by  Emily Rodríguez     Authorized by Emily Rodríguez       Risks and benefits: risks, benefits and alternatives were discussed     Procedure Details   Patient Transportation: confirmed  Patient tolerance: Patient tolerated the procedure well with no immediate complications          The patient took the necessary preprocedure antibiotic course and the usual prep  He was placed in the left decubitus position  Following digital exam, an ultrasound probe was placed in the rectum  Axial sagittal views of the prostate were recorded, and the approximate prostate volume was 44 cc   1% Xylocaine was used to infiltrate the neurovascular bundles bilaterally  Under ultrasound guidance, an 18 gauge biopsy gun was then utilized to obtain 8 specimen cores from the right lateral mid region the prostate  The ultrasound probe was then removed, and the patient tolerated procedure well  He was then instructed on post biopsy measures in protocol

## 2018-05-08 NOTE — PROGRESS NOTES
Assessment/Plan:      Diagnoses and all orders for this visit:    Malignant neoplasm of prostate ([address] )    Benign prostatic hyperplasia with lower urinary tract symptoms, symptom details unspecified    Other orders  -     cephalexin (KEFLEX) 500 mg capsule; TAKE ONE CAPSULE BY MOUTH TWICE A DAY FOR 2 DAYS  -     ciprofloxacin (CIPRO) 500 mg tablet; TAKE 1 TABLET BY MOUTH TWICE A DAY FOR 2 DAYS          Subjective:  Surveillance follow-up and biopsy for diagnosed prostate cancer     Patient ID: Henrry Fuentes  is a 69 y o  male  HPI  He was recently diagnosed with 1 core [place] score 6 cancer, about 6 months ago, he is here today for for his active surveillance follow-up prostate biopsy  He reports symptoms related to his enlarged prostate or prostate cancer, other than some urinary urgency       Review of Systems   Constitutional: Negative  HENT: Negative  Eyes: Negative  Respiratory: Negative  Cardiovascular: Negative  Gastrointestinal: Negative  Endocrine: Negative  Genitourinary: Positive for urgency  Musculoskeletal: Negative  Skin: Negative  Allergic/Immunologic: Negative  Neurological: Negative  Hematological: Negative  Psychiatric/Behavioral: Negative  Objective:     Physical Exam   Constitutional: He is oriented to person, place, and time  He appears well-developed and well-nourished  HENT:   Head: Normocephalic and atraumatic  Eyes: Conjunctivae are normal    Neck: Normal range of motion  Neck supple  Pulmonary/Chest: Effort normal and breath sounds normal    Abdominal: Soft  Bowel sounds are normal    Musculoskeletal: Normal range of motion  Neurological: He is alert and oriented to person, place, and time  Skin: Skin is warm and dry  Psychiatric: He has a normal mood and affect   His behavior is normal  Judgment and thought content normal          Refer to procedure note    10/03/2017                    Pathologist:           Hao Silveira MD            Received:            10/05/2017 0917               Specimens:   A) - Prostate, Left lateral base                                                                     B) - Prostate, Left base                                                                             C) - Prostate, Right base                                                                            D) - Prostate, Right lateral base                                                                    E) - Prostate, Left lateral mid                                                                      F) - Prostate, Left mid                                                                              G) - Prostate, Right mid                                                                             H) - Prostate, Right lateral mid                                                                     I) - Prostate, Left lateral apex                                                                     J) - Prostate, Left apex                                                                             K) - Prostate, Right apex                                                                            L) - Prostate, Right lateral apex                                                          Final Diagnosis   A  Prostate, Left lateral base needle biopsy:      -Benign prostatic tissue with mild chronic inflammation        B  Prostate, Left base needle biopsy:    -Benign prostatic tissue with mild acute inflammation        C  Prostate, Right base needle biopsy:      -Benign prostatic tissue         D  Prostate, Right lateral base needle biopsy:    -Benign prostatic tissue         E  Prostate, Left lateral mid needle biopsy:    -Benign prostatic tissue with moderate acute & chronic inflammation         F  Prostate, Left mid needle biopsy:    -Benign prostatic tissue         G  Prostate, Right mid needle biopsy:    -Benign prostatic tissue         H  Prostate, Right lateral mid ,needle biopsy:   -Small focus of adenocarcinoma of the prostate, confirmed by triple stain  See note  - [place] grade 3+3 = 6   -Tumor extent: single focus in one of one submitted core  -Tumor volume: tumor represents approximately 5% of biopsy volume   -Perineural invasion: not identified  Note:  The diagnosis of carcinoma is supported by the failure of immunoperoxidase staining for high molecular weight cytokeratin and P63 to demonstrate basal cells in the atypical glands  RACEMASE stain is negative  Intradepartmental consultation with more than one staff pathologist is in agreement          Prostate, Left lateral apex, needle biopsy:    -Benign prostatic tissue      J  Prostate, Left apex , needle biopsy:    -Benign prostatic tissue         K  Prostate, Right apex, needle biopsy:    -Benign prostatic tissue         L   Prostate, Right lateral apex needle biopsy:    -Benign prostatic tissue

## 2018-05-11 ENCOUNTER — TELEPHONE (OUTPATIENT)
Dept: UROLOGY | Facility: MEDICAL CENTER | Age: 78
End: 2018-05-11

## 2018-05-16 ENCOUNTER — TELEPHONE (OUTPATIENT)
Dept: UROLOGY | Facility: MEDICAL CENTER | Age: 78
End: 2018-05-16

## 2018-05-18 DIAGNOSIS — I10 ESSENTIAL HYPERTENSION: ICD-10-CM

## 2018-05-18 RX ORDER — DILTIAZEM HYDROCHLORIDE 300 MG/1
TABLET, EXTENDED RELEASE ORAL
Qty: 90 TABLET | Refills: 0 | OUTPATIENT
Start: 2018-05-18

## 2018-05-21 ENCOUNTER — TELEPHONE (OUTPATIENT)
Dept: INTERNAL MEDICINE CLINIC | Facility: CLINIC | Age: 78
End: 2018-05-21

## 2018-05-21 DIAGNOSIS — I10 ESSENTIAL HYPERTENSION: ICD-10-CM

## 2018-05-21 RX ORDER — DILTIAZEM HYDROCHLORIDE EXTENDED-RELEASE TABLETS 300 MG/1
300 TABLET, EXTENDED RELEASE ORAL DAILY
Qty: 90 TABLET | Refills: 0 | Status: SHIPPED | OUTPATIENT
Start: 2018-05-21 | End: 2018-05-24

## 2018-05-21 NOTE — TELEPHONE ENCOUNTER
Last appt, 3/13/18    Next appt, none pending    Last OV note indicates pt is to f/u in 2M  I see he had 2 appts, but they were canceled  I can't tell if it was our office or the pt  Will call and have him schedule f/u appt  for additional refills

## 2018-05-21 NOTE — TELEPHONE ENCOUNTER
Patient needs refill on matzim la 300 mg once a day  Uses [facility] in [place]  Only has meds until Wednesday

## 2018-05-23 DIAGNOSIS — I10 ESSENTIAL HYPERTENSION: ICD-10-CM

## 2018-05-24 ENCOUNTER — OFFICE VISIT (OUTPATIENT)
Dept: UROLOGY | Facility: MEDICAL CENTER | Age: 78
End: 2018-05-24
Payer: COMMERCIAL

## 2018-05-24 DIAGNOSIS — C61 MALIGNANT NEOPLASM OF PROSTATE (HCC): Primary | ICD-10-CM

## 2018-05-24 DIAGNOSIS — N13.8 BPH WITH OBSTRUCTION/LOWER URINARY TRACT SYMPTOMS: ICD-10-CM

## 2018-05-24 DIAGNOSIS — N40.1 BPH WITH OBSTRUCTION/LOWER URINARY TRACT SYMPTOMS: ICD-10-CM

## 2018-05-24 PROCEDURE — 99215 OFFICE O/P EST HI 40 MIN: CPT | Performed by: UROLOGY

## 2018-05-24 RX ORDER — TAMSULOSIN HYDROCHLORIDE 0.4 MG/1
0.4 CAPSULE ORAL
Qty: 30 CAPSULE | Refills: 11 | Status: SHIPPED | OUTPATIENT
Start: 2018-05-24 | End: 2019-03-03 | Stop reason: SDUPTHER

## 2018-05-24 RX ORDER — DILTIAZEM HYDROCHLORIDE 300 MG/1
TABLET, EXTENDED RELEASE ORAL
Qty: 90 TABLET | Refills: 0 | Status: SHIPPED | OUTPATIENT
Start: 2018-05-24 | End: 2018-11-19 | Stop reason: SDUPTHER

## 2018-05-24 NOTE — PROGRESS NOTES
Assessment/Plan:      Diagnoses and all orders for this visit:    Malignant neoplasm of prostate ([facility] [address] )  -     PSA Total, Diagnostic; Future    BPH with obstruction/lower urinary tract symptoms  -     PSA Total, Diagnostic; Future  -     tamsulosin (FLOMAX) 0 4 mg; Take 1 capsule (0 4 mg total) by mouth daily with dinner        Plan:  Patient is interested in continue with the active surveillance protocol as management for his prostate cancer  We will repeat a PSA in 6 months when he will come back for status report of his voiding, while I added a 0 4 mg Flomax dose for him in the evening to see if it would improve his voiding status  Subjective: Follow-up for his recent biopsy, BPH     Patient ID: Andria Pack  is a 69 y o  male  HPI  The patient is initial prostate biopsy showed 1 core of Tremaine score 6 cancer  Continuing the protocol for active surveillance, he recently underwent a surveillance re-biopsy  The findings were notable in that, though the grade of the cancer appeared to stay the same, the volume at this interval biopsy increased to involving all 8 cores the now demonstrating the low-grade prostate cancer  These recent pathology findings were confirmed by the [facility] pathologists  The patient also is being treated for BPH with finasteride  He occasionally has obstructive voiding symptoms and he is inquiring about possibly adding either a medication or possibly some nutritional supplement    Review of Systems   Constitutional: Negative  HENT: Negative  Eyes: Negative  Respiratory: Negative  Cardiovascular: Negative  Gastrointestinal: Negative  Endocrine: Negative  Genitourinary: Positive for difficulty urinating  Musculoskeletal: Negative  Skin: Negative  Allergic/Immunologic: Negative  Neurological: Negative  Hematological: Negative  Psychiatric/Behavioral: Negative            Objective:     Physical Exam   Constitutional: He is oriented to person, place, and time  He appears well-developed and well-nourished  No distress  HENT:   Head: Normocephalic and atraumatic  Nose: Nose normal    Mouth/Throat: Oropharynx is clear and moist    Eyes: Conjunctivae and EOM are normal  Pupils are equal, round, and reactive to light  No scleral icterus  Neck: Normal range of motion  Neck supple  Cardiovascular: Normal rate, regular rhythm, normal heart sounds and intact distal pulses  No murmur heard  Pulmonary/Chest: Effort normal and breath sounds normal  No respiratory distress  He has no wheezes  He has no rales  Abdominal: Soft  Bowel sounds are normal  He exhibits no distension and no mass  There is no tenderness  Musculoskeletal: Normal range of motion  He exhibits no edema or tenderness  Lymphadenopathy:     He has no cervical adenopathy  Neurological: He is alert and oriented to person, place, and time  No cranial nerve deficit  Skin: Skin is warm and dry  No rash noted  No erythema  No pallor  Psychiatric: He has a normal mood and affect  His behavior is normal  Judgment and thought content normal    Nursing note and vitals reviewed  05/08/2018 1615                                      [facility] [place]                                                             Pathologist:           Jesus Ahuja MD                                                               Specimens:   A) - Prostate, r lat base x4                                                                         B) - Prostate, r lat mid x4                                                                Final Diagnosis   A  Prostate, r lat base x4 needle biopsy:    - Prostate tissue with small focus of atypical glands, Highly suspicious for low grade adenocarcinoma  Due to atrophic features, A definitive diagnosis cannot be made  See note         B   Prostate, r lat mid x4 needle biopsy:    -Prostate tissue with small focus of atypical glands, suspicious for low grade adenocarcinoma  Findings are highly suspicious for carcinoma  Due to atrophic features, A definitive diagnosis cannot be made  See note      Electronically signed by Nenita Garcia MD on 5/15/2018 at  3:57 PM   Preliminary result electronically signed by Nenita Garcia MD on 5/11/2018 at 10:18 AM   Note   [address] [facility]  Interpretation and Diagnosis     Prostate:    - A  Prostate tissue with small focus of atypical glands, Highly suspicious for low grade adenocarcinoma  Due to atrophic features, A definitive diagnosis cannot be made  See note  Note: Although these findings are highly atypical and suspicious for adenocarcinoma, there is insufficient cytologic and/or architectural atypia to establish a definitive diagnosis  Follow up is warranted with serum or urine tests, imaging and, in some cases, repeat biopsy with relative increased sampling of the atypical site may be recommended  By itself, negative staining for high molecular weight cytokeratin and p63 in a small focus of glands, as seen in this case, is not diagnostic of cancer         - B  Prostate tissue with small focus of atypical glands, suspicious for low grade adenocarcinoma  Findings are highly suspicious for carcinoma  Due to atrophic features, A definitive diagnosis cannot be made  See note  Note: By itself, negative staining for high molecular weight cytokeratin and p63 in a small focus of glands, as seen in this case, is not diagnostic of cancer       Alvis Eisenmenger I Robyne Roman, MD

## 2018-05-24 NOTE — LETTER
May 24, 2018     [facility]aias P[facility], [address] [place] [address]    Patient: Karen James  YOB: 1940   Date of Visit: 5/24/2018       Dear Dr Alayna Nicole:    Thank you for referring Mayela Montes to me for evaluation  Below are my notes for this consultation  If you have questions, please do not hesitate to call me  I look forward to following your patient along with you  Sincerely,        Alfredo Guardado MD        CC: No Recipients  Alfredo Guardado MD  5/24/2018  4:27 PM  Sign at close encounter  Assessment/Plan:      Diagnoses and all orders for this visit:    Malignant neoplasm of prostate ([facility] [address] )  -     PSA Total, Diagnostic; Future    BPH with obstruction/lower urinary tract symptoms  -     PSA Total, Diagnostic; Future  -     tamsulosin (FLOMAX) 0 4 mg; Take 1 capsule (0 4 mg total) by mouth daily with dinner        Plan:  Patient is interested in continue with the active surveillance protocol as management for his prostate cancer  We will repeat a PSA in 6 months when he will come back for status report of his voiding, while I added a 0 4 mg Flomax dose for him in the evening to see if it would improve his voiding status  Subjective: Follow-up for his recent biopsy, BPH     Patient ID: Karen James  is a 69 y o  male  HPI  The patient is initial prostate biopsy showed 1 core of Tremaine score 6 cancer  Continuing the protocol for active surveillance, he recently underwent a surveillance re-biopsy  The findings were notable in that, though the grade of the cancer appeared to stay the same, the volume at this interval biopsy increased to involving all 8 cores the now demonstrating the low-grade prostate cancer  These recent pathology findings were confirmed by the [facility] pathologists  The patient also is being treated for BPH with finasteride    He occasionally has obstructive voiding symptoms and he is inquiring about possibly adding either a medication or possibly some nutritional supplement    Review of Systems   Constitutional: Negative  HENT: Negative  Eyes: Negative  Respiratory: Negative  Cardiovascular: Negative  Gastrointestinal: Negative  Endocrine: Negative  Genitourinary: Positive for difficulty urinating  Musculoskeletal: Negative  Skin: Negative  Allergic/Immunologic: Negative  Neurological: Negative  Hematological: Negative  Psychiatric/Behavioral: Negative  Objective:     Physical Exam   Constitutional: He is oriented to person, place, and time  He appears well-developed and well-nourished  No distress  HENT:   Head: Normocephalic and atraumatic  Nose: Nose normal    Mouth/Throat: Oropharynx is clear and moist    Eyes: Conjunctivae and EOM are normal  Pupils are equal, round, and reactive to light  No scleral icterus  Neck: Normal range of motion  Neck supple  Cardiovascular: Normal rate, regular rhythm, normal heart sounds and intact distal pulses  No murmur heard  Pulmonary/Chest: Effort normal and breath sounds normal  No respiratory distress  He has no wheezes  He has no rales  Abdominal: Soft  Bowel sounds are normal  He exhibits no distension and no mass  There is no tenderness  Musculoskeletal: Normal range of motion  He exhibits no edema or tenderness  Lymphadenopathy:     He has no cervical adenopathy  Neurological: He is alert and oriented to person, place, and time  No cranial nerve deficit  Skin: Skin is warm and dry  No rash noted  No erythema  No pallor  Psychiatric: He has a normal mood and affect  His behavior is normal  Judgment and thought content normal    Nursing note and vitals reviewed          05/08/2018 1615                                      [facility] [place]                                                             Pathologist:           Hao Silveira MD                                                               Specimens:   A) - Prostate, r lat base x4                                                                         B) - Prostate, r lat mid x4                                                                Final Diagnosis   A  Prostate, r lat base x4 needle biopsy:    - Prostate tissue with small focus of atypical glands, Highly suspicious for low grade adenocarcinoma  Due to atrophic features, A definitive diagnosis cannot be made  See note         B  Prostate, r lat mid x4 needle biopsy:    -Prostate tissue with small focus of atypical glands, suspicious for low grade adenocarcinoma  Findings are highly suspicious for carcinoma  Due to atrophic features, A definitive diagnosis cannot be made  See note      Electronically signed by Mark Mann MD on 5/15/2018 at  3:57 PM   Preliminary result electronically signed by Mark Mann MD on 5/11/2018 at 10:18 AM   Note   [facility]  Interpretation and Diagnosis     Prostate:    - A  Prostate tissue with small focus of atypical glands, Highly suspicious for low grade adenocarcinoma  Due to atrophic features, A definitive diagnosis cannot be made  See note  Note: Although these findings are highly atypical and suspicious for adenocarcinoma, there is insufficient cytologic and/or architectural atypia to establish a definitive diagnosis  Follow up is warranted with serum or urine tests, imaging and, in some cases, repeat biopsy with relative increased sampling of the atypical site may be recommended  By itself, negative staining for high molecular weight cytokeratin and p63 in a small focus of glands, as seen in this case, is not diagnostic of cancer         - B  Prostate tissue with small focus of atypical glands, suspicious for low grade adenocarcinoma  Findings are highly suspicious for carcinoma  Due to atrophic features, A definitive diagnosis cannot be made   See note  Note: By itself, negative staining for high molecular weight cytokeratin and p63 in a small focus of glands, as seen in this case, is not diagnostic of cancer       Marval [place] I   Madyson De Leon MD

## 2018-06-04 ENCOUNTER — TELEPHONE (OUTPATIENT)
Dept: UROLOGY | Facility: MEDICAL CENTER | Age: 78
End: 2018-06-04

## 2018-06-04 NOTE — TELEPHONE ENCOUNTER
Spoke with wife who has numerous questions  She would like to know if the pt's repeat prostate bx showed 8 positive cores  Also, she's wondering if Dr Kirstie Mansfield would recommend IMRT  If so, they would like to consult with Dr Bailee Silva at [facility] office

## 2018-06-05 NOTE — TELEPHONE ENCOUNTER
Tell her the pathology show small amount in two cores  He does not need treatment for this, and should follow up and see me as per previous scheduled

## 2018-06-11 DIAGNOSIS — N40.1 BENIGN PROSTATIC HYPERPLASIA WITH NOCTURIA: ICD-10-CM

## 2018-06-11 DIAGNOSIS — R35.1 BENIGN PROSTATIC HYPERPLASIA WITH NOCTURIA: ICD-10-CM

## 2018-06-11 DIAGNOSIS — I10 BENIGN ESSENTIAL HYPERTENSION: ICD-10-CM

## 2018-06-11 RX ORDER — HYDROCHLOROTHIAZIDE 50 MG/1
TABLET ORAL
Qty: 90 TABLET | Refills: 0 | OUTPATIENT
Start: 2018-06-11

## 2018-06-11 RX ORDER — FINASTERIDE 5 MG/1
TABLET, FILM COATED ORAL
Qty: 30 TABLET | Refills: 2 | Status: SHIPPED | OUTPATIENT
Start: 2018-06-11 | End: 2018-09-02 | Stop reason: SDUPTHER

## 2018-06-19 DIAGNOSIS — M25.50 ARTHRALGIA, UNSPECIFIED JOINT: ICD-10-CM

## 2018-06-19 NOTE — TELEPHONE ENCOUNTER
Med was filled and sent to the pharmacy  Please call pt and have him schedule f/u for additional refills  Thank you!

## 2018-06-19 NOTE — TELEPHONE ENCOUNTER
lmom for patient to call back to make an appointment for follow up on further refills on medications

## 2018-06-19 NOTE — TELEPHONE ENCOUNTER
Ok to fill, verified PDMP site    Last appt, 3/13/18    Next appt, none pending    Issue for why med is being prescribed hasn't been addressed since 10/28/13  Will have pt schedule f/u appt to address med refill

## 2018-06-19 NOTE — TELEPHONE ENCOUNTER
PT NEEDS REFILL ON TRAMADOL ACETOMINOPHINE 37 5-325 TAKE 2 TAB EVERY 6 HOUR AS NEEDED FOR PAIN   [facility]

## 2018-06-20 DIAGNOSIS — I10 BENIGN ESSENTIAL HYPERTENSION: ICD-10-CM

## 2018-06-20 RX ORDER — HYDROCHLOROTHIAZIDE 50 MG/1
50 TABLET ORAL DAILY
Qty: 90 TABLET | Refills: 1 | Status: SHIPPED | OUTPATIENT
Start: 2018-06-20 | End: 2018-09-06 | Stop reason: SDUPTHER

## 2018-07-18 ENCOUNTER — OFFICE VISIT (OUTPATIENT)
Dept: INTERNAL MEDICINE CLINIC | Facility: CLINIC | Age: 78
End: 2018-07-18
Payer: COMMERCIAL

## 2018-07-18 VITALS
OXYGEN SATURATION: 97 % | SYSTOLIC BLOOD PRESSURE: 120 MMHG | BODY MASS INDEX: 34.31 KG/M2 | HEART RATE: 73 BPM | HEIGHT: 67 IN | DIASTOLIC BLOOD PRESSURE: 90 MMHG | RESPIRATION RATE: 20 BRPM | WEIGHT: 218.6 LBS | TEMPERATURE: 97.7 F

## 2018-07-18 DIAGNOSIS — I10 BENIGN ESSENTIAL HYPERTENSION: Primary | ICD-10-CM

## 2018-07-18 PROCEDURE — 99213 OFFICE O/P EST LOW 20 MIN: CPT | Performed by: NURSE PRACTITIONER

## 2018-07-18 PROCEDURE — 3725F SCREEN DEPRESSION PERFORMED: CPT | Performed by: NURSE PRACTITIONER

## 2018-07-18 NOTE — PROGRESS NOTES
Assessment/Plan:     Diagnoses and all orders for this visit:    Benign essential hypertension  -     Lipid Panel with Direct LDL reflex; Future  -     Basic metabolic panel; Future        -      Blood pressure much improved  Continue same medications and continue to monitor blood pressure at home  Follow-up in 4 months with labs prior to appointment  Subjective:      Patient ID: Juan Francisco Villavicencio  is a 69 y o  male  HPI    Hypertension  Patient is here for follow-up of elevated blood pressure  He is exercising by doing yard work and being active all day and is adherent to a low-salt diet  He doeschecks his blood pressure at home  Blood pressure is well controlled  Improved  BP at home 120/80 or lower  Current cardiac symptoms: none  Patient denies chest pain, chest pressure/discomfort, dyspnea, exertional chest pressure/discomfort, fatigue, irregular heart beat, lower extremity edema, near-syncope, orthopnea, palpitations and syncope  Cardiovascular risk factors: advanced age (older than 54 for men, 72 for women), hypertension, male gender and obesity (BMI >= 30 kg/m2)  History of target organ damage: none  He is currently taking HCTZ 50mg daily and matzim 300mg daily   He is compliant with medication use  Dizziness  Resolved  No ongoing symptoms        The following portions of the patient's history were reviewed and updated as appropriate: allergies, current medications, past family history, past medical history, past social history, past surgical history and problem list     Review of Systems   Constitutional: Negative for chills and fever  Respiratory: Positive for cough (green phlegm)  Negative for chest tightness, shortness of breath and wheezing  Cardiovascular: Negative for chest pain, palpitations and leg swelling  Neurological: Negative for dizziness, syncope, light-headedness and headaches           Past Medical History:   Diagnosis Date    Abnormal electrocardiogram     Last assessed: Oct 11, 2013    Allergic rhinitis     last assessed: Oct 11, 2013    Allergic rhinitis due to pollen     last assessed: Oct 10, 2013    Allergic sinusitis     Last assessed: May 11, 2015    Allergy     resolved: July 22, 2015    Benign essential hypertension     Last assessed/resolved: May 31, 2017    BPH (benign prostatic hyperplasia)     Colitis     Last assessed: May 24, 2016    Generalized osteoarthritis     Last assessed: Oct 11, 2013    GERD without esophagitis     Last assessed/resolved: May 31, 2017    Hearing loss     Hiatal hernia     resolved: July 22, 2015    History of gastroesophageal reflux (GERD)     History of stomach ulcers     last assessed: May 11, 2015    Hypertension     Incomplete bladder emptying     Kidney stone     Nodular prostate with lower urinary tract symptoms     Nontoxic single thyroid nodule     last assessed: April 16, 2014    Orchalgia     Overweight     last assessed: Oct 31, 2013    Poor urinary stream     [facility])     Salivary gland disorder     last assessed: April 16, 2014    Seasonal allergies     last assessed: May 15, 2015    Spermatocele     Straining to void     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, 2014         Current Outpatient Prescriptions:     finasteride (PROSCAR) 5 mg tablet, TAKE 1 TABLET DAILY  , Disp: 30 tablet, Rfl: 2    hydrochlorothiazide (HYDRODIURIL) 50 mg tablet, Take 1 tablet (50 mg total) by mouth daily, Disp: 90 tablet, Rfl: 1    MATZIM  MG 24 hr tablet, TAKE 1 TABLET EVERY DAY, Disp: 90 tablet, Rfl: 0    omeprazole (PriLOSEC) 40 MG capsule, Take 1 capsule (40 mg total) by mouth daily, Disp: 90 capsule, Rfl: 1    sodium chloride (OCEAN) 0 65 % nasal spray, 2 sprays into each nostril as needed  , Disp: , Rfl:     tamsulosin (FLOMAX) 0 4 mg, Take 1 capsule (0 4 mg total) by mouth daily with dinner, Disp: 30 capsule, Rfl: 11    traMADol-acetaminophen (ULTRACET) 37 5-325 mg per tablet, Take 2 tablets by mouth every 6 (six) hours as needed for mild pain, Disp: 60 tablet, Rfl: 0    Triamcinolone Acetonide (NASACORT ALLERGY 24HR) 55 MCG/ACT AERO, 2 sprays into each nostril as needed  , Disp: , Rfl:     ALPRAZolam (XANAX) 0 25 mg tablet, Take 2 tablets (0 5 mg total) by mouth once for 1 dose, Disp: 1 tablet, Rfl: 0    Allergies   Allergen Reactions    Ace Inhibitors     Molds & Smuts     Other     Pollen Extract     Short Ragweed Pollen Ext     Tree Extract        Social History   Past Surgical History:   Procedure Laterality Date    BLADDER SURGERY      CHOLECYSTECTOMY  2000    laparoscopic     HEMORRHOID SURGERY      pilionidal cyst removal     HERNIA REPAIR Left 01/17/2008    inguinal     KNEE ARTHROSCOPY Left 1974    KNEE CARTILAGE SURGERY      NASAL SEPTUM SURGERY      Deviation repair     PROSTATE BIOPSY  2017    STOMACH SURGERY      TONSILLECTOMY AND ADENOIDECTOMY      at age 37   [address] [address] [address] - right 01/04 0 left 01/95    VASECTOMY       Family History   Problem Relation Age of Onset    Hypertension Mother     Stroke Mother     Stomach cancer Father     Hypertension Father     Hypertension Family     Stroke Family         syndrome        Objective:  /90 (BP Location: Left arm, Patient Position: Sitting, Cuff Size: Large)   Pulse 73   Temp 97 7 °F (36 5 °C) (Oral)   Resp 20   Ht 5' 7" (1 702 m) Comment: with shoes on  Wt 99 2 kg (218 lb 9 6 oz) Comment: with shoes on  SpO2 97% Comment: room air  BMI 34 24 kg/m²      Physical Exam   Constitutional: He is oriented to person, place, and time  He appears well-developed and well-nourished  No distress  obese   HENT:   Head: Normocephalic and atraumatic     Right Ear: Tympanic membrane and external ear normal    Left Ear: Tympanic membrane and external ear normal    Nose: Nose normal    Mouth/Throat: Oropharynx is clear and moist  No oropharyngeal exudate, posterior oropharyngeal edema or posterior oropharyngeal erythema  Bilateral hearing aids   Eyes: Conjunctivae and EOM are normal  Pupils are equal, round, and reactive to light  Neck: Normal range of motion  Neck supple  No thyromegaly present  Cardiovascular: Normal rate, regular rhythm and normal heart sounds  No murmur heard  LE varicose veins    Pulmonary/Chest: Effort normal and breath sounds normal  No respiratory distress  He has no decreased breath sounds  He has no wheezes  He has no rhonchi  Musculoskeletal: He exhibits no edema  Lymphadenopathy:     He has no cervical adenopathy  Neurological: He is alert and oriented to person, place, and time  Skin: Skin is warm and dry  He is not diaphoretic  Psychiatric: He has a normal mood and affect  His behavior is normal    Vitals reviewed

## 2018-07-25 DIAGNOSIS — K21.9 GERD WITHOUT ESOPHAGITIS: ICD-10-CM

## 2018-07-25 RX ORDER — OMEPRAZOLE 40 MG/1
CAPSULE, DELAYED RELEASE ORAL
Qty: 90 CAPSULE | Refills: 1 | OUTPATIENT
Start: 2018-07-25

## 2018-09-02 DIAGNOSIS — N40.1 BENIGN PROSTATIC HYPERPLASIA WITH NOCTURIA: ICD-10-CM

## 2018-09-02 DIAGNOSIS — R35.1 BENIGN PROSTATIC HYPERPLASIA WITH NOCTURIA: ICD-10-CM

## 2018-09-06 ENCOUNTER — OFFICE VISIT (OUTPATIENT)
Dept: INTERNAL MEDICINE CLINIC | Facility: CLINIC | Age: 78
End: 2018-09-06
Payer: COMMERCIAL

## 2018-09-06 VITALS
DIASTOLIC BLOOD PRESSURE: 78 MMHG | SYSTOLIC BLOOD PRESSURE: 112 MMHG | WEIGHT: 216 LBS | TEMPERATURE: 98 F | HEIGHT: 67 IN | OXYGEN SATURATION: 96 % | BODY MASS INDEX: 33.9 KG/M2 | HEART RATE: 66 BPM

## 2018-09-06 DIAGNOSIS — Z13.6 SCREENING FOR AAA (ABDOMINAL AORTIC ANEURYSM): ICD-10-CM

## 2018-09-06 DIAGNOSIS — Z23 NEED FOR VACCINATION AGAINST STREPTOCOCCUS PNEUMONIAE USING PNEUMOCOCCAL CONJUGATE VACCINE 13: Primary | ICD-10-CM

## 2018-09-06 DIAGNOSIS — I10 BENIGN ESSENTIAL HYPERTENSION: ICD-10-CM

## 2018-09-06 DIAGNOSIS — Z13.5 GLAUCOMA SCREENING: ICD-10-CM

## 2018-09-06 DIAGNOSIS — Z23 NEED FOR SHINGLES VACCINE: ICD-10-CM

## 2018-09-06 DIAGNOSIS — Z13.820 SCREENING FOR OSTEOPOROSIS: ICD-10-CM

## 2018-09-06 PROCEDURE — 1170F FXNL STATUS ASSESSED: CPT | Performed by: NURSE PRACTITIONER

## 2018-09-06 PROCEDURE — 1125F AMNT PAIN NOTED PAIN PRSNT: CPT | Performed by: NURSE PRACTITIONER

## 2018-09-06 PROCEDURE — 90670 PCV13 VACCINE IM: CPT

## 2018-09-06 PROCEDURE — G0009 ADMIN PNEUMOCOCCAL VACCINE: HCPCS | Performed by: NURSE PRACTITIONER

## 2018-09-06 PROCEDURE — G0438 PPPS, INITIAL VISIT: HCPCS | Performed by: NURSE PRACTITIONER

## 2018-09-06 RX ORDER — HYDROCHLOROTHIAZIDE 50 MG/1
50 TABLET ORAL DAILY
Qty: 90 TABLET | Refills: 1 | Status: SHIPPED | OUTPATIENT
Start: 2018-09-06 | End: 2019-03-26

## 2018-09-06 NOTE — PROGRESS NOTES
Assessment and Plan:    Problem List Items Addressed This Visit     Benign essential hypertension    Relevant Medications    hydrochlorothiazide (HYDRODIURIL) 50 mg tablet    Need for vaccination against Streptococcus pneumoniae using pneumococcal conjugate vaccine 13 - Primary    Relevant Orders    PNEUMOCOCCAL CONJUGATE VACCINE 13-VALENT GREATER THAN 6 MONTHS (Completed)    Need for shingles vaccine    Relevant Orders    Zoster Vaccine Recombinant IM    Glaucoma screening    Relevant Orders    Ambulatory referral to Ophthalmology    Screening for osteoporosis    Relevant Orders    DXA bone density spine hip and pelvis    Screening for AAA (abdominal aortic aneurysm)    Relevant Orders    US abdominal aorta screening aaa        Health Maintenance Due   Topic Date Due    SLP PLAN OF CARE  1940    DTaP,Tdap,and Td Vaccines (1 - Tdap) 04/27/1962    Pneumococcal PPSV23/PCV13 65+ Years / High and Highest Risk (2 of 2 - PCV13) 11/01/2007    INFLUENZA VACCINE  09/01/2018     Pt given prevnar 15 in office today  Script given for Shingrix  Will get influenza vaccine at next visit in October     HPI:  Cedrick Gonzalez  is a 69 y o  male here for his Initial Wellness Visit      Patient Active Problem List   Diagnosis    Benign essential hypertension    Benign prostatic hyperplasia (BPH) with straining on urination    Gastroesophageal reflux disease without esophagitis    Prostate cancer (HCC)    Prostate nodule with urinary obstruction    Seasonal allergic rhinitis due to pollen    Truncal obesity    KAYLI (acute kidney injury) ([facility] [address] )    Luetscher's syndrome    Benign 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    Class 1 obesity in adult    Lightheadedness    Need for vaccination against Streptococcus pneumoniae using pneumococcal conjugate vaccine 13    Need for shingles vaccine    Glaucoma screening    Screening for osteoporosis    Screening for AAA (abdominal aortic aneurysm) Past Medical History:   Diagnosis Date    Abnormal electrocardiogram     Last assessed: Oct 11, 2013    Allergic rhinitis     last assessed: Oct 11, 2013    Allergic rhinitis due to pollen     last assessed: Oct 10, 2013    Allergic sinusitis     Last assessed: May 11, 2015    Allergy     resolved: July 22, 2015    Benign essential hypertension     Last assessed/resolved: May 31, 2017    BPH (benign prostatic hyperplasia)     Colitis     Last assessed: May 24, 2016    Generalized osteoarthritis     Last assessed: Oct 11, 2013    GERD without esophagitis     Last assessed/resolved: May 31, 2017    Hearing loss     Hiatal hernia     resolved: July 22, 2015    History of gastroesophageal reflux (GERD)     History of stomach ulcers     last assessed: May 11, 2015    Hypertension     Incomplete bladder emptying     Kidney stone     Nodular prostate with lower urinary tract symptoms     Nontoxic single thyroid nodule     last assessed: April 16, 2014    Orchalgia     Overweight     last assessed: Oct 31, 2013    Poor urinary stream     Pulmonary embolism [facility])     Salivary gland disorder     last assessed: April 16, 2014    Seasonal allergies     last assessed: May 15, 2015    Spermatocele     Straining to void     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:  May 7, 2014     Past Surgical History:   Procedure Laterality Date    BLADDER SURGERY      CHOLECYSTECTOMY  2000    laparoscopic     HEMORRHOID SURGERY      pilionidal cyst removal     HERNIA REPAIR Left 01/17/2008    inguinal     KNEE ARTHROSCOPY Left 1974    KNEE CARTILAGE SURGERY      NASAL SEPTUM SURGERY      Deviation repair     PROSTATE BIOPSY  2017    STOMACH SURGERY      TONSILLECTOMY AND ADENOIDECTOMY      at age 37   [address] [id number] [address] - right 01/04 0 left 01/95    VASECTOMY       Family History   Problem Relation Age of Onset    Hypertension Mother     Stroke Mother     Stomach cancer Father     Hypertension Father     Hypertension Family     Stroke Family         syndrome      History   Smoking Status    Former Smoker    Packs/day: 1 00    Years: 25 00    Quit date: 1983   Smokeless Tobacco    Never Used     History   Alcohol Use    Yes     Comment: occasional use      History   Drug Use No       Current Outpatient Prescriptions   Medication Sig Dispense Refill    finasteride (PROSCAR) 5 mg tablet TAKE 1 TABLET DAILY  30 tablet 2    hydrochlorothiazide (HYDRODIURIL) 50 mg tablet Take 1 tablet (50 mg total) by mouth daily 90 tablet 1    MATZIM  MG 24 hr tablet TAKE 1 TABLET EVERY DAY 90 tablet 0    omeprazole (PriLOSEC) 40 MG capsule Take 1 capsule (40 mg total) by mouth daily 90 capsule 1    sodium chloride (OCEAN) 0 65 % nasal spray 2 sprays into each nostril as needed        tamsulosin (FLOMAX) 0 4 mg Take 1 capsule (0 4 mg total) by mouth daily with dinner 30 capsule 11    Triamcinolone Acetonide (NASACORT ALLERGY 24HR) 55 MCG/ACT AERO 2 sprays into each nostril as needed         No current facility-administered medications for this visit        Allergies   Allergen Reactions    Ace Inhibitors     Molds & Smuts     Other     Pollen Extract     Short Ragweed Pollen Ext     Tree Extract      Immunization History   Administered Date(s) Administered    Influenza 12/12/2005, 10/20/2006, 11/02/2007, 10/17/2008, 09/17/2009, 11/13/2012, 10/08/2014, 10/23/2015, 10/25/2016, 10/17/2017    Influenza Split High Dose Preservative Free IM 10/23/2015, 10/25/2016    Influenza TIV (IM) 09/27/2010, 10/05/2011, 10/01/2013, 10/08/2014    Pneumococcal Conjugate 13-Valent 09/06/2018    Pneumococcal Polysaccharide PPV23 11/01/2006    Tuberculin Skin Test-PPD Intradermal 07/28/2008, 06/03/2015    Zoster 10/07/2013       Patient Care Team:  Fransisco Rodríguez MD as PCP - General  Tanna Cockayne, MD    Medicare Screening Tests and Risk Assessments:  Sangeetha Watts is here for his Initial Wellness visit  Health Risk Assessment:  Patient rates overall health as good  Patient feels that their physical health rating is Same  Eyesight was rated as Same  Hearing was rated as Slightly worse  Patient feels that their emotional and mental health rating is Same  Pain experienced by patient in the last 7 days has been Some  Patient's pain rating has been 5/10  Patient states that he has experienced no weight loss or gain in last 6 months  Emotional/Mental Health:  Patient has been feeling nervous/anxious  PHQ-9 Depression Screening:    Frequency of the following problems over the past two weeks:      1  Little interest or pleasure in doing things: 0 - not at all      2  Feeling down, depressed, or hopeless: 0 - not at all  PHQ-2 Score: 0          Broken Bones/Falls: Fall Risk Assessment:    In the past year, patient has experienced: History of falling in past year     Number of falls: 1  Patient feels unsteady standing  Patient is not taking medication that can cause feelings of lightheadedness or tiredness  Patient often has no need to rush to the toilet  Bladder/Bowel:  Patient has not leaked urine accidently in the last six months  Patient reports no loss of bowel control  Immunizations:  Patient has had a flu vaccination within the last year  Patient has received a pneumonia shot  Patient has received a shingles shot  Home Safety:  Patient does not have trouble with stairs inside or outside of their home  Patient currently reports that there are no safety hazards present in home, working smoke alarms, no working carbon monoxide detectors  Preventative Screenings:   prostate cancer screen performed, no colon cancer screen completed, no cholesterol screen completed, no glaucoma eye exam completed    Nutrition:  Current diet: Regular and Limited junk food with servings of the following:    Medications:  Patient is currently taking over-the-counter supplements  Patient is able to manage medications  Lifestyle Choices:  Patient reports no tobacco use  Patient has smoked or used tobacco in the past   Patient has stopped his tobacco use  Tobacco use quit date: 35 years ago   Patient reports alcohol use  Alcohol use per week: 3 beers per week, part of the year when he plays darts   Patient drives a vehicle  Patient wears seat belt  Current level of exercise of physical activity described by patient as: Cut my grass, Trim shrubs, push mower, weed wack   Activities of Daily Living:  Can get out of bed by his or her self, able to dress self, able to make own meals, able to do own shopping, able to bathe self, can do own laundry/housekeeping, can manage own money, pay bills and track expenses    Previous Hospitalizations:  No hospitalization or ED visit in past 12 months        Advanced Directives:  Patient has decided on a power of   Patient has spoken to designated power of   Patient has completed advanced directive  Additional Comments: Patient thinks he has one, not in medical record  He is meeting with his  to discuss     Preventative Screening/Counseling:      Cardiovascular:      General: Risks and Benefits Discussed and Screening Current      Counseling: Healthy Diet and Healthy Weight          Diabetes:      General: Risks and Benefits Discussed and Screening Current      Counseling: Healthy Diet and Healthy Weight          Colorectal Cancer:      General: Risks and Benefits Discussed and Screening Current      Comments: Patient states his GI doctor advised him that he does not need another coloscopy at his age         Prostate Cancer:      General: Risks and Benefits Discussed and Screening Current      Comments: Patient follows with urology   Will have PSA checked prior to his next appointment         Osteoporosis:      General: Risks and Benefits Discussed      Counseling: Regular Weightbearing Exercise      Due for studies: DXA Axial          AAA:  Male patient with age over [de-identified] years  Patient has history of tobacco use  General: Risks and Benefits Discussed      Study: Screening US          Glaucoma:      General: Risks and Benefits Discussed      Referrals: Ophthalmology      Comments: Last eye exam 5 years ago         HIV:      General: Screening Not Indicated          Hepatitis C:      General: Screening Not Indicated        Advanced Directives:   Patient has living will for healthcare, Information on ACP and/or AD provided  End of life assessment reviewed with patient  Additional Comments: Patient needs to bring copy to office to scan into chart     Immunizations:      Influenza: Risks & Benefits Discussed      Pneumococcal: Pneumococcal Due Today and Risks & Benefits Discussed      Shingrix: Shingrix Vaccine Needed Today and Risks & Benefits Discussed      Zostavax: Zostavax Vaccine UTD      TDAP: Vaccine Status Unknown  Additional Comments: Pt had PPSV23 but needs Prevnar 13  He is due for flu this year  He is due for shingrix    Other Preventative Counseling (Non-Medicare): Fall Prevention, Nutrition Counseling and Car/seat belt/driving safety reviewed      Physical Exam   Constitutional: He is oriented to person, place, and time  He appears well-developed and well-nourished  No distress  HENT:   Head: Normocephalic and atraumatic  Right Ear: External ear normal    Left Ear: External ear normal    Nose: Nose normal    Mouth/Throat: Oropharynx is clear and moist    Bilateral hearing aids   Eyes: Conjunctivae and EOM are normal  Pupils are equal, round, and reactive to light  Right eye exhibits no discharge  Left eye exhibits no discharge  Neck: Normal range of motion  Neck supple  No thyromegaly present  Cardiovascular: Normal rate and regular rhythm  Pulmonary/Chest: Effort normal and breath sounds normal  No respiratory distress  He has no wheezes  Abdominal: Soft   Bowel sounds are normal  He exhibits no distension and no mass  There is no tenderness  Musculoskeletal: Normal range of motion  He exhibits no edema  Lymphadenopathy:     He has no cervical adenopathy  Neurological: He is alert and oriented to person, place, and time  He exhibits normal muscle tone  Coordination normal    Skin: Skin is warm and dry  No rash noted  He is not diaphoretic  Psychiatric: He has a normal mood and affect   His behavior is normal  Judgment and thought content normal      Review of Systems - General ROS: negative  Psychological ROS: negative  Ophthalmic ROS: negative - hasnt seen eye dr in over 5 years   ENT ROS: negative  Respiratory ROS: no cough, shortness of breath, or wheezing  Cardiovascular ROS: no chest pain or dyspnea on exertion  Gastrointestinal ROS: no abdominal pain, change in bowel habits, or black or bloody stools  Musculoskeletal ROS: negative  Neurological ROS: positive for - burning, tingling in feet and LE

## 2018-09-06 NOTE — PATIENT INSTRUCTIONS
Will give Prevnar 13 today  Will give flu shot at next visit  Go for Shingrix at pharmacy  Schedule eye  appt  Schedule ultrasound of abdomen  Schedule DEXA scan - may want to check with insurance first

## 2018-09-10 RX ORDER — FINASTERIDE 5 MG/1
TABLET, FILM COATED ORAL
Qty: 30 TABLET | Refills: 2 | Status: SHIPPED | OUTPATIENT
Start: 2018-09-10 | End: 2018-11-19 | Stop reason: SDUPTHER

## 2018-09-20 LAB
LEFT EYE DIABETIC RETINOPATHY: NORMAL
RIGHT EYE DIABETIC RETINOPATHY: NORMAL

## 2018-09-27 ENCOUNTER — TRANSCRIBE ORDERS (OUTPATIENT)
Dept: LAB | Facility: CLINIC | Age: 78
End: 2018-09-27

## 2018-09-28 ENCOUNTER — APPOINTMENT (OUTPATIENT)
Dept: LAB | Age: 78
End: 2018-09-28
Payer: COMMERCIAL

## 2018-09-28 ENCOUNTER — HOSPITAL ENCOUNTER (OUTPATIENT)
Dept: RADIOLOGY | Age: 78
Discharge: HOME | End: 2018-09-28
Payer: COMMERCIAL

## 2018-09-28 ENCOUNTER — CLINICAL SUPPORT (OUTPATIENT)
Dept: INTERNAL MEDICINE CLINIC | Facility: CLINIC | Age: 78
End: 2018-09-28
Payer: COMMERCIAL

## 2018-09-28 DIAGNOSIS — Z13.6 SCREENING FOR AAA (ABDOMINAL AORTIC ANEURYSM): ICD-10-CM

## 2018-09-28 DIAGNOSIS — Z23 NEEDS FLU SHOT: Primary | ICD-10-CM

## 2018-09-28 DIAGNOSIS — Z13.820 SCREENING FOR OSTEOPOROSIS: ICD-10-CM

## 2018-09-28 DIAGNOSIS — I10 BENIGN ESSENTIAL HYPERTENSION: ICD-10-CM

## 2018-09-28 LAB
ANION GAP SERPL CALCULATED.3IONS-SCNC: 5 MMOL/L (ref 4–13)
BUN SERPL-MCNC: 19 MG/DL (ref 5–25)
CALCIUM SERPL-MCNC: 8.7 MG/DL (ref 8.3–10.1)
CHLORIDE SERPL-SCNC: 103 MMOL/L (ref 100–108)
CHOLEST SERPL-MCNC: 163 MG/DL (ref 50–200)
CO2 SERPL-SCNC: 30 MMOL/L (ref 21–32)
CREAT SERPL-MCNC: 1.08 MG/DL (ref 0.6–1.3)
GFR SERPL CREATININE-BSD FRML MDRD: 66 ML/MIN/1.73SQ M
GLUCOSE P FAST SERPL-MCNC: 85 MG/DL (ref 65–99)
HDLC SERPL-MCNC: 55 MG/DL (ref 40–60)
LDLC SERPL CALC-MCNC: 92 MG/DL (ref 0–100)
POTASSIUM SERPL-SCNC: 3.6 MMOL/L (ref 3.5–5.3)
SODIUM SERPL-SCNC: 138 MMOL/L (ref 136–145)
TRIGL SERPL-MCNC: 80 MG/DL

## 2018-09-28 PROCEDURE — 77080 DXA BONE DENSITY AXIAL: CPT

## 2018-09-28 PROCEDURE — G0008 ADMIN INFLUENZA VIRUS VAC: HCPCS

## 2018-09-28 PROCEDURE — 80048 BASIC METABOLIC PNL TOTAL CA: CPT

## 2018-09-28 PROCEDURE — 36415 COLL VENOUS BLD VENIPUNCTURE: CPT

## 2018-09-28 PROCEDURE — 76706 US ABDL AORTA SCREEN AAA: CPT

## 2018-09-28 PROCEDURE — 90662 IIV NO PRSV INCREASED AG IM: CPT

## 2018-09-28 PROCEDURE — 80061 LIPID PANEL: CPT

## 2018-10-08 ENCOUNTER — OFFICE VISIT (OUTPATIENT)
Dept: INTERNAL MEDICINE CLINIC | Facility: CLINIC | Age: 78
End: 2018-10-08
Payer: COMMERCIAL

## 2018-10-08 VITALS
HEIGHT: 67 IN | SYSTOLIC BLOOD PRESSURE: 122 MMHG | BODY MASS INDEX: 34.15 KG/M2 | HEART RATE: 64 BPM | TEMPERATURE: 98.4 F | DIASTOLIC BLOOD PRESSURE: 82 MMHG | WEIGHT: 217.6 LBS | OXYGEN SATURATION: 97 %

## 2018-10-08 DIAGNOSIS — N64.4 NIPPLE TENDERNESS: Primary | ICD-10-CM

## 2018-10-08 DIAGNOSIS — K21.9 GASTROESOPHAGEAL REFLUX DISEASE WITHOUT ESOPHAGITIS: ICD-10-CM

## 2018-10-08 DIAGNOSIS — I10 BENIGN ESSENTIAL HYPERTENSION: ICD-10-CM

## 2018-10-08 PROCEDURE — 3074F SYST BP LT 130 MM HG: CPT | Performed by: NURSE PRACTITIONER

## 2018-10-08 PROCEDURE — 99214 OFFICE O/P EST MOD 30 MIN: CPT | Performed by: NURSE PRACTITIONER

## 2018-10-08 PROCEDURE — 3079F DIAST BP 80-89 MM HG: CPT | Performed by: NURSE PRACTITIONER

## 2018-10-08 RX ORDER — LIDOCAINE 40 MG/G
CREAM TOPICAL AS NEEDED
Qty: 30 G | Refills: 0 | Status: SHIPPED | OUTPATIENT
Start: 2018-10-08 | End: 2019-09-24

## 2018-10-08 NOTE — PATIENT INSTRUCTIONS
Start lidocaine cream and warm compresses on right nipple  If pain is not improved in 1 month, follow up for mammogram  Otherwise continue same medications  Follow up in 3 months  No labs

## 2018-10-08 NOTE — PROGRESS NOTES
Assessment/Plan:     Diagnoses and all orders for this visit:    Nipple tenderness  -     lidocaine (LMX) 4 % cream; Apply topically as needed for mild pain        -      Discussed with Dr Pedro Colindres  Will trial lidocaine 4% cream to the affected area as well as warm compresses about 3 to 4 times a day to the affected area  If patient has no improvement in the next month we will have him follow up and would recommend a mammogram at that time  Gastroesophageal reflux disease without esophagitis         -     Okay to switch to Nexium  Benign essential hypertension          -      Well controlled and asymptomatic  Continue current medications  Reviewed abdominal aortic aneurysm screening which showed no aneurysm but did show some dilation  Recommended repeat imaging in 4 years  DEXA scan was normal   Advised multivitamin with vitamin-D and calcium  Subjective:     Patient ID: Justyn Lovett  is a 69 y o  male  HPI     Hypertension  Patient is here for follow-up of elevated blood pressure  He is exercising by doing his yard work and walking and is adherent to a low-salt diet  He does notchecks his blood pressure at home  Blood pressure is well controlled  Current cardiac symptoms: fatigue  Patient denies chest pain, chest pressure/discomfort, exertional chest pressure/discomfort, irregular heart beat and lower extremity edema  Cardiovascular risk factors: advanced age (older than 54 for men, 72 for women), hypertension, male gender and obesity (BMI >= 30 kg/m2)  Current meds include Matzim LA 300mg and HCTZ 50mg    GERD  Patient is currently well controlled on Prilosec 40mg daily  He states he has been on this medication for many years  He has a PMH significant for ulcers for which he reports having surgery for  He states he was initially started on the PPI by our office  He would like to switch to nexium as they have more coupons for nexium and that is what his wife takes       Right Nipple tenderness   patient is complaining today of right nipple tenderness  Onset of symptoms was a few months ago  He states that the nipple always feels "hard"  It is significantly tender for him to touch  He states that when he moves to get up in the morning he feels a pulling through the right breast   He reports the symptoms have been unchanged over about the last 4 months  The following portions of the patient's history were reviewed and updated as appropriate: allergies, current medications, past family history, past medical history, past social history, past surgical history and problem list     Review of Systems   Constitutional: Negative for activity change, appetite change, chills, fever and unexpected weight change  Eyes: Negative for visual disturbance  Respiratory: Negative for cough, chest tightness, shortness of breath and wheezing  Cardiovascular: Negative for chest pain, palpitations and leg swelling  Gastrointestinal: Negative for diarrhea, nausea and vomiting  Neurological: Negative for dizziness, light-headedness and headaches  Past Medical History:   Diagnosis Date    Abnormal electrocardiogram     Last assessed: Oct 11, 2013    Allergic rhinitis     last assessed: Oct 11, 2013    Allergic rhinitis due to pollen     last assessed: Oct 10, 2013    Allergic sinusitis     Last assessed: May 11, 2015    Allergy     resolved: July 22, 2015    Benign essential hypertension     Last assessed/resolved: May 31, 2017    BPH (benign prostatic hyperplasia)     Colitis     Last assessed: May 24, 2016    Generalized osteoarthritis     Last assessed: Oct 11, 2013    GERD without esophagitis     Last assessed/resolved:  May 31, 2017    Hearing loss     Hiatal hernia     resolved: July 22, 2015    History of gastroesophageal reflux (GERD)     History of stomach ulcers     last assessed: May 11, 2015    Hypertension     Incomplete bladder emptying     Kidney stone     Nodular prostate with lower urinary tract symptoms     Nontoxic single thyroid nodule     last assessed: April 16, 2014    Orchalgia     Overweight     last assessed: Oct 31, 2013    Poor urinary stream     Pulmonary embolism [facility])     Salivary gland disorder     last assessed: April 16, 2014    Seasonal allergies     last assessed: May 15, 2015    Spermatocele     Straining to void     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, 2014         Current Outpatient Prescriptions:     finasteride (PROSCAR) 5 mg tablet, TAKE 1 TABLET DAILY  , Disp: 30 tablet, Rfl: 2    hydrochlorothiazide (HYDRODIURIL) 50 mg tablet, Take 1 tablet (50 mg total) by mouth daily, Disp: 90 tablet, Rfl: 1    MATZIM  MG 24 hr tablet, TAKE 1 TABLET EVERY DAY, Disp: 90 tablet, Rfl: 0    omeprazole (PriLOSEC) 40 MG capsule, Take 1 capsule (40 mg total) by mouth daily, Disp: 90 capsule, Rfl: 1    sodium chloride (OCEAN) 0 65 % nasal spray, 2 sprays into each nostril as needed  , Disp: , Rfl:     tamsulosin (FLOMAX) 0 4 mg, Take 1 capsule (0 4 mg total) by mouth daily with dinner, Disp: 30 capsule, Rfl: 11    Triamcinolone Acetonide (NASACORT ALLERGY 24HR) 55 MCG/ACT AERO, 2 sprays into each nostril as needed  , Disp: , Rfl:     Allergies   Allergen Reactions    Ace Inhibitors     Molds & Smuts     Other     Pollen Extract     Short Ragweed Pollen Ext     Tree Extract        Social History   Past Surgical History:   Procedure Laterality Date    BLADDER SURGERY      CHOLECYSTECTOMY  2000    laparoscopic     HEMORRHOID SURGERY      pilionidal cyst removal     HERNIA REPAIR Left 01/17/2008    inguinal     KNEE ARTHROSCOPY Left 1974    KNEE CARTILAGE SURGERY      NASAL SEPTUM SURGERY      Deviation repair     PROSTATE BIOPSY  2017    STOMACH SURGERY      TONSILLECTOMY AND ADENOIDECTOMY      at age 37   [address] [address] [address] - right 01/04 0 left 01/95    VASECTOMY       Family History Problem Relation Age of Onset    Hypertension Mother     Stroke Mother     Stomach cancer Father     Hypertension Father     Hypertension Family     Stroke Family         syndrome        Objective:  /82 (BP Location: Left arm, Patient Position: Sitting, Cuff Size: Large)   Pulse 64   Temp 98 4 °F (36 9 °C) (Oral)   Ht 5' 7 25" (1 708 m)   Wt 98 7 kg (217 lb 9 6 oz)   SpO2 97% Comment: room air  BMI 33 83 kg/m²      Physical Exam   Constitutional: He is oriented to person, place, and time  He appears well-developed and well-nourished  No distress  obese   HENT:   Head: Normocephalic and atraumatic  Right Ear: Tympanic membrane and external ear normal    Left Ear: Tympanic membrane and external ear normal    Nose: Nose normal    Mouth/Throat: Oropharynx is clear and moist  No oropharyngeal exudate, posterior oropharyngeal edema or posterior oropharyngeal erythema  Eyes: Pupils are equal, round, and reactive to light  Conjunctivae and EOM are normal    Neck: Normal range of motion  Neck supple  No thyromegaly present  Cardiovascular: Normal rate, regular rhythm and normal heart sounds  No murmur heard  Bilateral LE varicose veins  Pulmonary/Chest: Effort normal and breath sounds normal  No respiratory distress  He has no decreased breath sounds  He has no wheezes  He has no rhonchi  He exhibits no mass  Right breast exhibits tenderness  Right breast exhibits no inverted nipple, no mass, no nipple discharge and no skin change  Left breast exhibits no inverted nipple, no mass, no nipple discharge, no skin change and no tenderness  Musculoskeletal: He exhibits no edema  Lymphadenopathy:     He has no cervical adenopathy  Neurological: He is alert and oriented to person, place, and time  Skin: Skin is warm and dry  He is not diaphoretic  Psychiatric: He has a normal mood and affect  His behavior is normal    Vitals reviewed

## 2018-10-23 ENCOUNTER — TELEPHONE (OUTPATIENT)
Dept: UROLOGY | Facility: AMBULATORY SURGERY CENTER | Age: 78
End: 2018-10-23

## 2018-10-23 NOTE — TELEPHONE ENCOUNTER
His wife was informed that the Order is good for 1 year from 5/24/2018  She is going to a [facility] lab,so she does not need the paper work  done

## 2018-10-23 NOTE — TELEPHONE ENCOUNTER
Spoke with wife, she said that the date on the PSA is wrong and it's not expected until 11/24/18 and their appointment is before that  Can you please put a new  Script in and mail it to them? Please confirm with patient's wife  Thank you

## 2018-10-26 DIAGNOSIS — M25.50 ARTHRALGIA, UNSPECIFIED JOINT: ICD-10-CM

## 2018-10-26 NOTE — TELEPHONE ENCOUNTER
Patient needs the following medication refilled at [facility] office   Last filled by [facility] 06/19/18    Tramadol-37 5/325 Take 2 tablets every 6 hours for mild pain     Lst appt  10/8/18  Next appt          01/8/19    Thank you

## 2018-10-26 NOTE — TELEPHONE ENCOUNTER
PMED checked, no red flags identified; safe to proceed with prescription refills     Last appt           10/8/18  Next appt          01/8/19     Will need a controlled sub   Contract at next appointment

## 2018-11-05 ENCOUNTER — APPOINTMENT (OUTPATIENT)
Dept: LAB | Age: 78
End: 2018-11-05
Payer: COMMERCIAL

## 2018-11-05 DIAGNOSIS — N40.1 BPH WITH OBSTRUCTION/LOWER URINARY TRACT SYMPTOMS: ICD-10-CM

## 2018-11-05 DIAGNOSIS — N13.8 BPH WITH OBSTRUCTION/LOWER URINARY TRACT SYMPTOMS: ICD-10-CM

## 2018-11-05 DIAGNOSIS — C61 MALIGNANT NEOPLASM OF PROSTATE (HCC): ICD-10-CM

## 2018-11-05 LAB — PSA SERPL-MCNC: 0.6 NG/ML (ref 0–4)

## 2018-11-05 PROCEDURE — 84153 ASSAY OF PSA TOTAL: CPT

## 2018-11-08 ENCOUNTER — OFFICE VISIT (OUTPATIENT)
Dept: INTERNAL MEDICINE CLINIC | Age: 78
End: 2018-11-08

## 2018-11-08 VITALS
DIASTOLIC BLOOD PRESSURE: 64 MMHG | SYSTOLIC BLOOD PRESSURE: 106 MMHG | TEMPERATURE: 97.9 F | BODY MASS INDEX: 34.36 KG/M2 | WEIGHT: 221 LBS | OXYGEN SATURATION: 97 % | HEART RATE: 74 BPM

## 2018-11-08 DIAGNOSIS — L81.9 ATYPICAL PIGMENTED SKIN LESION: Primary | ICD-10-CM

## 2018-11-08 DIAGNOSIS — B07.8 OTHER VIRAL WARTS: ICD-10-CM

## 2018-11-08 PROCEDURE — 88305 TISSUE EXAM BY PATHOLOGIST: CPT | Performed by: PATHOLOGY

## 2018-11-08 NOTE — PROGRESS NOTES
Shave lesion  Date/Time: 11/8/2018 12:03 PM  Performed by: Marcell Eisenmenger  Authorized by: Marcell Eisenmenger     Number of Lesions: 1  Lesion 1:     Body area: lower extremity    Lower extremity location: L knee    Initial size (mm): 5    Final defect size (mm): 6    Malignancy: benign lesion      Destruction method: shave removal      Comments:  Cautery used  No complications, post care instructions given today   Sent for bx

## 2018-11-09 ENCOUNTER — TELEPHONE (OUTPATIENT)
Dept: INTERNAL MEDICINE CLINIC | Facility: CLINIC | Age: 78
End: 2018-11-09

## 2018-11-09 DIAGNOSIS — T75.3XXA MOTION SICKNESS, INITIAL ENCOUNTER: Primary | ICD-10-CM

## 2018-11-09 RX ORDER — SCOLOPAMINE TRANSDERMAL SYSTEM 1 MG/1
1 PATCH, EXTENDED RELEASE TRANSDERMAL
Qty: 4 PATCH | Refills: 0 | Status: SHIPPED | OUTPATIENT
Start: 2018-11-09 | End: 2019-06-19 | Stop reason: ALTCHOICE

## 2018-11-09 NOTE — TELEPHONE ENCOUNTER
I discussed with patient and he has been on the scopolamine patch in the past with no issues  I sent her prescription to pharmacy for him and advised him on how to use  I also advised him that if he has any dizziness or trouble passing his urine he does need to remove the patch immediately

## 2018-11-09 NOTE — TELEPHONE ENCOUNTER
Patient is going on a cruise (leaving tomorrow) and would like to know if he could have a script for a "sea sickness patch" and they are not sure if it is over the counter or it can be prescribed

## 2018-11-13 PROBLEM — B07.8 OTHER VIRAL WARTS: Status: ACTIVE | Noted: 2018-11-13

## 2018-11-16 ENCOUNTER — OFFICE VISIT (OUTPATIENT)
Dept: UROLOGY | Facility: MEDICAL CENTER | Age: 78
End: 2018-11-16

## 2018-11-16 VITALS
HEIGHT: 67 IN | DIASTOLIC BLOOD PRESSURE: 80 MMHG | WEIGHT: 225 LBS | BODY MASS INDEX: 35.31 KG/M2 | SYSTOLIC BLOOD PRESSURE: 126 MMHG

## 2018-11-16 DIAGNOSIS — C61 PROSTATE CANCER (HCC): Primary | ICD-10-CM

## 2018-11-16 LAB
SL AMB  POCT GLUCOSE, UA: NORMAL
SL AMB LEUKOCYTE ESTERASE,UA: NORMAL
SL AMB POCT BILIRUBIN,UA: NORMAL
SL AMB POCT BLOOD,UA: NORMAL
SL AMB POCT CLARITY,UA: CLEAR
SL AMB POCT COLOR,UA: YELLOW
SL AMB POCT KETONES,UA: NORMAL
SL AMB POCT NITRITE,UA: NORMAL
SL AMB POCT PH,UA: 6.5
SL AMB POCT SPECIFIC GRAVITY,UA: 1.01
SL AMB POCT URINE PROTEIN: NORMAL
SL AMB POCT UROBILINOGEN: 1

## 2018-11-16 PROCEDURE — 99214 OFFICE O/P EST MOD 30 MIN: CPT | Performed by: UROLOGY

## 2018-11-16 PROCEDURE — 81003 URINALYSIS AUTO W/O SCOPE: CPT | Performed by: UROLOGY

## 2018-11-16 PROCEDURE — 4040F PNEUMOC VAC/ADMIN/RCVD: CPT | Performed by: UROLOGY

## 2018-11-16 RX ORDER — CEPHALEXIN 500 MG/1
500 CAPSULE ORAL 2 TIMES DAILY
Qty: 4 CAPSULE | Refills: 0 | Status: SHIPPED | OUTPATIENT
Start: 2018-11-16 | End: 2018-11-18 | Stop reason: ALTCHOICE

## 2018-11-16 RX ORDER — AMOXICILLIN 500 MG/1
CAPSULE ORAL
Refills: 0 | COMMUNITY
Start: 2018-09-20 | End: 2019-03-26

## 2018-11-16 RX ORDER — OXYCODONE HYDROCHLORIDE 5 MG/1
5 TABLET ORAL ONCE
Qty: 1 TABLET | Refills: 0 | Status: SHIPPED | OUTPATIENT
Start: 2018-11-16 | End: 2018-11-16

## 2018-11-16 RX ORDER — ALPRAZOLAM 0.25 MG/1
0.5 TABLET ORAL ONCE
Qty: 1 TABLET | Refills: 0 | Status: SHIPPED | OUTPATIENT
Start: 2018-11-16 | End: 2019-06-19 | Stop reason: ALTCHOICE

## 2018-11-16 RX ORDER — CIPROFLOXACIN 500 MG/1
500 TABLET, FILM COATED ORAL 2 TIMES DAILY
Qty: 4 TABLET | Refills: 0 | Status: SHIPPED | OUTPATIENT
Start: 2018-11-16 | End: 2018-11-18

## 2018-11-16 NOTE — LETTER
November 16, 2018     Sisi Danielvandana, [address] [place] [address]    Patient: Viridiana Shane  YOB: 1940   Date of Visit: 11/16/2018       Dear Dr Maria Alejandra Martínez:    Thank you for referring Tomas Mary to me for evaluation  Below are my notes for this consultation  If you have questions, please do not hesitate to call me  I look forward to following your patient along with you  Sincerely,        Cliff Garcia MD        CC: No Recipients  Cliff Garcia MD  11/16/2018 11:54 AM  Sign at close encounter  Assessment/Plan:      Diagnoses and all orders for this visit:    Prostate cancer ([facility] [address] )  -     POCT urine dip auto non-scope  -     PSA Total, Diagnostic; Future  -     Biopsy prostate; Future  -     cephalexin (KEFLEX) 500 mg capsule; Take 1 capsule (500 mg total) by mouth 2 (two) times a day for 2 days  -     ciprofloxacin (CIPRO) 500 mg tablet; Take 1 tablet (500 mg total) by mouth 2 (two) times a day for 2 days  -     ALPRAZolam (XANAX) 0 25 mg tablet; Take 2 tablets (0 5 mg total) by mouth once for 1 dose  -     oxyCODONE (ROXICODONE) 5 mg immediate release tablet; Take 1 tablet (5 mg total) by mouth once for 1 dose Max Daily Amount: 5 mg    Other orders  -     amoxicillin (AMOXIL) 500 mg capsule; TAKE ONE CAPSULE BY MOUTH 4 TIMES A DAY UNTIL FINISHED        Plan:  Patient id beers to be clinically stable on active surveillance  Will schedule for annual surveillance biopsy as per routine, in 6 months  Subjective:  No complaints     Patient ID: Viridiana Shane  is a 69 y o  male  HPI  The patient is initial prostate biopsy showed 1 core of Tremaine score 6 cancer    Continuing the protocol for active surveillance, he underwent a confirmatory surveillance re-biopsy on 05/08/2018, and does findings were notable in that, though the grade of the cancer appeared to stay the same, the volume at this interval biopsy increased to involving all 8 cores the now demonstrating the low-grade prostate cancer  These recent pathology findings were confirmed by the [facility] pathologists      The patient also is being treated for BPH with finasteride  He occasionally has obstructive voiding symptoms and he is inquiring about possibly adding either a medication or possibly some nutritional supplement  Review of Systems   Constitutional: Negative  HENT: Negative  Eyes: Negative  Respiratory: Negative  Cardiovascular: Negative  Gastrointestinal: Negative  Endocrine: Negative  Genitourinary: Negative  Musculoskeletal: Positive for back pain  Skin: Negative  Allergic/Immunologic: Negative  Neurological: Negative  Hematological: Negative  Psychiatric/Behavioral: Negative  Objective:     Physical Exam   Constitutional: He is oriented to person, place, and time  He appears well-developed and well-nourished  No distress  HENT:   Head: Normocephalic and atraumatic  Nose: Nose normal    Mouth/Throat: Oropharynx is clear and moist    Eyes: Pupils are equal, round, and reactive to light  Conjunctivae and EOM are normal  No scleral icterus  Neck: Normal range of motion  Neck supple  Cardiovascular: Normal rate, regular rhythm, normal heart sounds and intact distal pulses  No murmur heard  Pulmonary/Chest: Effort normal and breath sounds normal  No respiratory distress  He has no wheezes  He has no rales  Abdominal: Soft  Bowel sounds are normal  He exhibits no distension and no mass  There is no tenderness  Musculoskeletal: Normal range of motion  He exhibits no edema or tenderness  Lymphadenopathy:     He has no cervical adenopathy  Neurological: He is alert and oriented to person, place, and time  No cranial nerve deficit  Skin: Skin is warm and dry  No rash noted  No erythema  No pallor  Psychiatric: He has a normal mood and affect   His behavior is normal  Judgment and thought content normal    Nursing note and vitals reviewed        PSA from 11/05/2018 is 0 6

## 2018-11-16 NOTE — PROGRESS NOTES
Assessment/Plan:      Diagnoses and all orders for this visit:    Prostate cancer ([facility] [address] )  -     POCT urine dip auto non-scope  -     PSA Total, Diagnostic; Future  -     Biopsy prostate; Future  -     cephalexin (KEFLEX) 500 mg capsule; Take 1 capsule (500 mg total) by mouth 2 (two) times a day for 2 days  -     ciprofloxacin (CIPRO) 500 mg tablet; Take 1 tablet (500 mg total) by mouth 2 (two) times a day for 2 days  -     ALPRAZolam (XANAX) 0 25 mg tablet; Take 2 tablets (0 5 mg total) by mouth once for 1 dose  -     oxyCODONE (ROXICODONE) 5 mg immediate release tablet; Take 1 tablet (5 mg total) by mouth once for 1 dose Max Daily Amount: 5 mg    Other orders  -     amoxicillin (AMOXIL) 500 mg capsule; TAKE ONE CAPSULE BY MOUTH 4 TIMES A DAY UNTIL FINISHED        Plan:  Patient id marivel to be clinically stable on active surveillance  Will schedule for annual surveillance biopsy as per routine, in 6 months  Subjective:  No complaints     Patient ID: Hola Deleon  is a 69 y o  male  HPI  The patient is initial prostate biopsy showed 1 core of Tremaine score 6 cancer  Continuing the protocol for active surveillance, he underwent a confirmatory surveillance re-biopsy on 05/08/2018, and does findings were notable in that, though the grade of the cancer appeared to stay the same, the volume at this interval biopsy increased to involving all 8 cores the now demonstrating the low-grade prostate cancer  These recent pathology findings were confirmed by the [facility] pathologists      The patient also is being treated for BPH with finasteride  He occasionally has obstructive voiding symptoms and he is inquiring about possibly adding either a medication or possibly some nutritional supplement  Review of Systems   Constitutional: Negative  HENT: Negative  Eyes: Negative  Respiratory: Negative  Cardiovascular: Negative  Gastrointestinal: Negative  Endocrine: Negative      Genitourinary: Negative  Musculoskeletal: Positive for back pain  Skin: Negative  Allergic/Immunologic: Negative  Neurological: Negative  Hematological: Negative  Psychiatric/Behavioral: Negative  Objective:     Physical Exam   Constitutional: He is oriented to person, place, and time  He appears well-developed and well-nourished  No distress  HENT:   Head: Normocephalic and atraumatic  Nose: Nose normal    Mouth/Throat: Oropharynx is clear and moist    Eyes: Pupils are equal, round, and reactive to light  Conjunctivae and EOM are normal  No scleral icterus  Neck: Normal range of motion  Neck supple  Cardiovascular: Normal rate, regular rhythm, normal heart sounds and intact distal pulses  No murmur heard  Pulmonary/Chest: Effort normal and breath sounds normal  No respiratory distress  He has no wheezes  He has no rales  Abdominal: Soft  Bowel sounds are normal  He exhibits no distension and no mass  There is no tenderness  Musculoskeletal: Normal range of motion  He exhibits no edema or tenderness  Lymphadenopathy:     He has no cervical adenopathy  Neurological: He is alert and oriented to person, place, and time  No cranial nerve deficit  Skin: Skin is warm and dry  No rash noted  No erythema  No pallor  Psychiatric: He has a normal mood and affect  His behavior is normal  Judgment and thought content normal    Nursing note and vitals reviewed        PSA from 11/05/2018 is 0 6

## 2018-11-19 DIAGNOSIS — I10 ESSENTIAL HYPERTENSION: ICD-10-CM

## 2018-11-19 DIAGNOSIS — R35.1 BENIGN PROSTATIC HYPERPLASIA WITH NOCTURIA: ICD-10-CM

## 2018-11-19 DIAGNOSIS — N40.1 BENIGN PROSTATIC HYPERPLASIA WITH NOCTURIA: ICD-10-CM

## 2018-11-19 RX ORDER — FINASTERIDE 5 MG/1
5 TABLET, FILM COATED ORAL DAILY
Qty: 90 TABLET | Refills: 1 | Status: SHIPPED | OUTPATIENT
Start: 2018-11-19 | End: 2019-05-20 | Stop reason: SDUPTHER

## 2018-11-19 RX ORDER — DILTIAZEM HYDROCHLORIDE EXTENDED-RELEASE TABLETS 300 MG/1
300 TABLET, EXTENDED RELEASE ORAL DAILY
Qty: 90 TABLET | Refills: 1 | Status: SHIPPED | OUTPATIENT
Start: 2018-11-19 | End: 2019-05-20 | Stop reason: SDUPTHER

## 2018-11-19 NOTE — TELEPHONE ENCOUNTER
Patient needs refill on Matzim  mg and Finasteride 5 mg, uses [facility]    Patient is completely out of the Matzim and needs that refilled today if possible

## 2019-01-08 ENCOUNTER — OFFICE VISIT (OUTPATIENT)
Dept: INTERNAL MEDICINE CLINIC | Facility: CLINIC | Age: 79
End: 2019-01-08
Payer: COMMERCIAL

## 2019-01-08 VITALS
SYSTOLIC BLOOD PRESSURE: 100 MMHG | HEIGHT: 67 IN | WEIGHT: 218 LBS | DIASTOLIC BLOOD PRESSURE: 80 MMHG | BODY MASS INDEX: 34.21 KG/M2 | TEMPERATURE: 98.5 F | HEART RATE: 82 BPM

## 2019-01-08 DIAGNOSIS — R25.2 LEG CRAMPS: Primary | ICD-10-CM

## 2019-01-08 PROCEDURE — 99214 OFFICE O/P EST MOD 30 MIN: CPT | Performed by: INTERNAL MEDICINE

## 2019-01-08 PROCEDURE — 36415 COLL VENOUS BLD VENIPUNCTURE: CPT

## 2019-01-08 RX ORDER — MONTELUKAST SODIUM 10 MG/1
10 TABLET ORAL
COMMUNITY
End: 2020-10-15 | Stop reason: ALTCHOICE

## 2019-01-08 NOTE — PROGRESS NOTES
Assessment/Plan:    No problem-specific Assessment & Plan notes found for this encounter  Diagnoses and all orders for this visit:    Leg cramps  Patient is complaining of increasing leg cramps sometimes in the fingers sometimes in the thigh sometime in the calf they are bothering him to the point that the when he wake up in the morning he feels very sore in his legs mostly the cramps are at night but it can happen during the daytime too no weakness of the leg, pain or shortness of breath no swelling of the leg he is on hydrochlorothiazide for hypertension, also he is taking diltiazem for blood pressure  I will get the  Complete metabolic profile  Serum magnesium  Serum phosphorus  I will stop the hydrochlorothiazide  Follow-up in 4 weeks for blood pressure checkup of the hydrochlorothiazide  Also am recommending him to take tonic water at night to help him with the leg cramps  Other orders  -     montelukast (SINGULAIR) 10 mg tablet; Take 10 mg by mouth daily at bedtime        Subjective:   Complaining of the leg cramps as given above   Patient ID: Alfred Becker  is a 69 y o  male  HPI    The following portions of the patient's history were reviewed and updated as appropriate: allergies, current medications, past family history, past medical history, past social history, past surgical history and problem list     Review of Systems   Constitutional: Negative for activity change, appetite change, chills, fever and unexpected weight change  Eyes: Negative for visual disturbance  Respiratory: Negative for cough, chest tightness, shortness of breath and wheezing  Cardiovascular: Negative for chest pain, palpitations and leg swelling  Gastrointestinal: Negative for diarrhea, nausea and vomiting  Neurological: Negative for dizziness, light-headedness and headaches  Past Medical History:   Diagnosis Date    Abnormal electrocardiogram     Last assessed:  Oct 11, 2013    Allergic rhinitis last assessed: Oct 11, 2013    Allergic rhinitis due to pollen     last assessed: Oct 10, 2013    Allergic sinusitis     Last assessed: May 11, 2015    Allergy     resolved: July 22, 2015    Benign essential hypertension     Last assessed/resolved: May 31, 2017    BPH (benign prostatic hyperplasia)     Colitis     Last assessed: May 24, 2016    Generalized osteoarthritis     Last assessed: Oct 11, 2013    GERD without esophagitis     Last assessed/resolved: May 31, 2017    Hearing loss     Hiatal hernia     resolved: July 22, 2015    History of gastroesophageal reflux (GERD)     History of stomach ulcers     last assessed: May 11, 2015    Hypertension     Incomplete bladder emptying     Kidney stone     Nodular prostate with lower urinary tract symptoms     Nontoxic single thyroid nodule     last assessed: April 16, 2014    Orchalgia     Overweight     last assessed: Oct 31, 2013    Poor urinary stream     Pulmonary embolism [facility])     Salivary gland disorder     last assessed: April 16, 2014    Seasonal allergies     last assessed: May 15, 2015    Spermatocele     Straining to void     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:  May 7, 2014         Current Outpatient Prescriptions:     diltiazem (MATZIM LA) 300 MG 24 hr tablet, Take 1 tablet (300 mg total) by mouth daily, Disp: 90 tablet, Rfl: 1    finasteride (PROSCAR) 5 mg tablet, Take 1 tablet (5 mg total) by mouth daily, Disp: 90 tablet, Rfl: 1    hydrochlorothiazide (HYDRODIURIL) 50 mg tablet, Take 1 tablet (50 mg total) by mouth daily, Disp: 90 tablet, Rfl: 1    montelukast (SINGULAIR) 10 mg tablet, Take 10 mg by mouth daily at bedtime, Disp: , Rfl:     omeprazole (PriLOSEC) 40 MG capsule, Take 1 capsule (40 mg total) by mouth daily, Disp: 90 capsule, Rfl: 1    sodium chloride (OCEAN) 0 65 % nasal spray, 2 sprays into each nostril as needed  , Disp: , Rfl:     Triamcinolone Acetonide (NASACORT ALLERGY 24HR) 55 MCG/ACT AERO, 2 sprays into each nostril as needed  , Disp: , Rfl:     ALPRAZolam (XANAX) 0 25 mg tablet, Take 2 tablets (0 5 mg total) by mouth once for 1 dose (Patient not taking: Reported on 1/8/2019 ), Disp: 1 tablet, Rfl: 0    amoxicillin (AMOXIL) 500 mg capsule, TAKE ONE CAPSULE BY MOUTH 4 TIMES A DAY UNTIL FINISHED, Disp: , Rfl: 0    lidocaine (LMX) 4 % cream, Apply topically as needed for mild pain (Patient not taking: Reported on 11/16/2018 ), Disp: 30 g, Rfl: 0    scopolamine (TRANSDERM-SCOP) 1 5 mg/3 days TD 72 hr patch, Place 1 patch on the skin every third day Place behind the ear every 3rd day (Patient not taking: Reported on 11/16/2018 ), Disp: 4 patch, Rfl: 0    tamsulosin (FLOMAX) 0 4 mg, Take 1 capsule (0 4 mg total) by mouth daily with dinner (Patient not taking: Reported on 1/8/2019 ), Disp: 30 capsule, Rfl: 11    Allergies   Allergen Reactions    Ace Inhibitors     Molds & Smuts     Other     Pollen Extract     Short Ragweed Pollen Ext     Tree Extract        Social History   Past Surgical History:   Procedure Laterality Date    BLADDER SURGERY      CHOLECYSTECTOMY  2000    laparoscopic     HEMORRHOID SURGERY      pilionidal cyst removal     HERNIA REPAIR Left 01/17/2008    inguinal     KNEE ARTHROSCOPY Left 1974    KNEE CARTILAGE SURGERY      NASAL SEPTUM SURGERY      Deviation repair     PROSTATE BIOPSY  2017    STOMACH SURGERY      TONSILLECTOMY AND ADENOIDECTOMY      at age 37   [facility] TOTAL SHOULDER ARTHROPLASTY      SHOULDER JOINT REPLACEMENT - right 01/04 0 left 01/95    VASECTOMY       Family History   Problem Relation Age of Onset    Hypertension Mother     Stroke Mother     Stomach cancer Father     Hypertension Father     Hypertension Family     Stroke Family         syndrome        Objective:  /80 (BP Location: Right arm, Patient Position: Sitting, Cuff Size: Large)   Pulse 82   Temp 98 5 °F (36 9 °C) (Oral)   Ht 5' 7" (1 702 m) Comment: shoes on  Wt 98 9 kg (218 lb) Comment: shoes on  BMI 34 14 kg/m²        Physical Exam   Constitutional: He is oriented to person, place, and time  He appears well-developed and well-nourished  No distress  obese   HENT:   Head: Normocephalic and atraumatic  Right Ear: Tympanic membrane and external ear normal    Left Ear: Tympanic membrane and external ear normal    Nose: Nose normal    Mouth/Throat: Oropharynx is clear and moist  No oropharyngeal exudate, posterior oropharyngeal edema or posterior oropharyngeal erythema  Eyes: Pupils are equal, round, and reactive to light  Conjunctivae and EOM are normal    Neck: Normal range of motion  Neck supple  No thyromegaly present  Cardiovascular: Normal rate, regular rhythm and normal heart sounds  No murmur heard  Bilateral LE varicose veins  Pulmonary/Chest: Effort normal and breath sounds normal  No respiratory distress  He has no decreased breath sounds  He has no wheezes  He has no rhonchi  He exhibits no mass  Right breast exhibits tenderness  Right breast exhibits no inverted nipple, no mass, no nipple discharge and no skin change  Left breast exhibits no inverted nipple, no mass, no nipple discharge, no skin change and no tenderness  Musculoskeletal: He exhibits no edema  Lymphadenopathy:     He has no cervical adenopathy  Neurological: He is alert and oriented to person, place, and time  Skin: Skin is warm and dry  He is not diaphoretic  Psychiatric: He has a normal mood and affect  His behavior is normal    Nursing note and vitals reviewed  No results found for this or any previous visit (from the past 672 hour(s))

## 2019-01-09 LAB
BUN SERPL-MCNC: 20 MG/DL (ref 7–25)
BUN/CREAT SERPL: ABNORMAL (CALC) (ref 6–22)
CALCIUM SERPL-MCNC: 9.1 MG/DL (ref 8.6–10.3)
CHLORIDE SERPL-SCNC: 100 MMOL/L (ref 98–110)
CO2 SERPL-SCNC: 26 MMOL/L (ref 20–32)
CREAT SERPL-MCNC: 1.18 MG/DL (ref 0.7–1.18)
FERRITIN SERPL-MCNC: 90 NG/ML (ref 20–380)
GLUCOSE SERPL-MCNC: 83 MG/DL (ref 65–99)
MAGNESIUM SERPL-MCNC: 2.1 MG/DL (ref 1.5–2.5)
PHOSPHATE SERPL-MCNC: 3.5 MG/DL (ref 2.1–4.3)
POTASSIUM SERPL-SCNC: 3.7 MMOL/L (ref 3.5–5.3)
SL AMB EGFR AFRICAN AMERICAN: 69 ML/MIN/1.73M2
SL AMB EGFR NON AFRICAN AMERICAN: 59 ML/MIN/1.73M2
SODIUM SERPL-SCNC: 139 MMOL/L (ref 135–146)

## 2019-02-04 DIAGNOSIS — M25.50 ARTHRALGIA, UNSPECIFIED JOINT: ICD-10-CM

## 2019-02-04 NOTE — TELEPHONE ENCOUNTER
Last O/V: 9/6/18  Next O/V:  4/3/19    Please advise tramadol was D/c'ed 11/8/18 is medication ok to be refilled?

## 2019-03-03 DIAGNOSIS — N40.1 BPH WITH OBSTRUCTION/LOWER URINARY TRACT SYMPTOMS: ICD-10-CM

## 2019-03-03 DIAGNOSIS — N13.8 BPH WITH OBSTRUCTION/LOWER URINARY TRACT SYMPTOMS: ICD-10-CM

## 2019-03-03 RX ORDER — TAMSULOSIN HYDROCHLORIDE 0.4 MG/1
CAPSULE ORAL
Qty: 30 CAPSULE | Refills: 10 | Status: SHIPPED | OUTPATIENT
Start: 2019-03-03 | End: 2019-03-04 | Stop reason: SDUPTHER

## 2019-03-04 DIAGNOSIS — N13.8 BPH WITH OBSTRUCTION/LOWER URINARY TRACT SYMPTOMS: ICD-10-CM

## 2019-03-04 DIAGNOSIS — N40.1 BPH WITH OBSTRUCTION/LOWER URINARY TRACT SYMPTOMS: ICD-10-CM

## 2019-03-04 RX ORDER — GABAPENTIN 100 MG/1
CAPSULE ORAL
COMMUNITY
End: 2019-03-26

## 2019-03-04 RX ORDER — AZELASTINE HCL 205.5 UG/1
2 SPRAY NASAL
COMMUNITY
Start: 2018-12-10 | End: 2019-09-24

## 2019-03-04 RX ORDER — HYDROCODONE BITARTRATE AND ACETAMINOPHEN 5; 325 MG/1; MG/1
1 TABLET ORAL EVERY 6 HOURS PRN
Refills: 0 | COMMUNITY
Start: 2019-01-07 | End: 2019-03-26

## 2019-03-04 RX ORDER — CIPROFLOXACIN 500 MG/1
TABLET, FILM COATED ORAL
COMMUNITY
End: 2019-03-26

## 2019-03-04 RX ORDER — FLUTICASONE PROPIONATE 50 MCG
SPRAY, SUSPENSION (ML) NASAL
COMMUNITY
End: 2019-09-24

## 2019-03-04 RX ORDER — DILTIAZEM HYDROCHLORIDE EXTENDED-RELEASE TABLETS 180 MG/1
300 TABLET, EXTENDED RELEASE ORAL
COMMUNITY
End: 2019-03-26

## 2019-03-04 RX ORDER — ECHINACEA PURPUREA EXTRACT 125 MG
TABLET ORAL
COMMUNITY
End: 2019-06-03 | Stop reason: SDUPTHER

## 2019-03-04 RX ORDER — CEPHALEXIN 500 MG/1
CAPSULE ORAL
COMMUNITY
End: 2019-03-26

## 2019-03-04 RX ORDER — HYDROCHLOROTHIAZIDE 25 MG/1
50 TABLET ORAL
COMMUNITY
End: 2019-03-26

## 2019-03-04 RX ORDER — METRONIDAZOLE 250 MG/1
TABLET ORAL
Refills: 0 | COMMUNITY
Start: 2019-01-07 | End: 2019-03-26

## 2019-03-04 RX ORDER — TAMSULOSIN HYDROCHLORIDE 0.4 MG/1
0.4 CAPSULE ORAL
Qty: 90 CAPSULE | Refills: 3 | Status: SHIPPED | OUTPATIENT
Start: 2019-03-04 | End: 2020-10-13 | Stop reason: SDUPTHER

## 2019-03-04 RX ORDER — OXYCODONE HYDROCHLORIDE AND ACETAMINOPHEN 5; 325 MG/1; MG/1
TABLET ORAL
COMMUNITY
End: 2019-03-26

## 2019-03-04 RX ORDER — PANTOPRAZOLE SODIUM 40 MG/1
TABLET, DELAYED RELEASE ORAL DAILY
COMMUNITY
End: 2019-09-24

## 2019-03-04 NOTE — TELEPHONE ENCOUNTER
An Auto-fax Refill Request for Tamsulosin 0 4mg was received from [facility]/pharmacy #[id number] Patient was last seen in November, 2018; continuation of the medication was approved at that time    Script for same, 90 day supply with 3 refills was queued and forwarded to CARLOS Aden for approval

## 2019-03-05 ENCOUNTER — OFFICE VISIT (OUTPATIENT)
Dept: INTERNAL MEDICINE CLINIC | Age: 79
End: 2019-03-05
Payer: COMMERCIAL

## 2019-03-05 ENCOUNTER — APPOINTMENT (OUTPATIENT)
Dept: RADIOLOGY | Age: 79
End: 2019-03-05
Payer: COMMERCIAL

## 2019-03-05 ENCOUNTER — TRANSCRIBE ORDERS (OUTPATIENT)
Dept: ADMINISTRATIVE | Age: 79
End: 2019-03-05

## 2019-03-05 VITALS
TEMPERATURE: 96.9 F | OXYGEN SATURATION: 97 % | WEIGHT: 223 LBS | SYSTOLIC BLOOD PRESSURE: 126 MMHG | DIASTOLIC BLOOD PRESSURE: 70 MMHG | HEART RATE: 60 BPM | BODY MASS INDEX: 34.93 KG/M2

## 2019-03-05 DIAGNOSIS — J30.1 SEASONAL ALLERGIC RHINITIS DUE TO POLLEN: ICD-10-CM

## 2019-03-05 DIAGNOSIS — I10 BENIGN ESSENTIAL HYPERTENSION: Primary | ICD-10-CM

## 2019-03-05 DIAGNOSIS — R25.2 LEG CRAMPS: ICD-10-CM

## 2019-03-05 DIAGNOSIS — R04.2 COUGHING UP BLOOD: Primary | ICD-10-CM

## 2019-03-05 DIAGNOSIS — R04.2 SPUTUM BLOODY: ICD-10-CM

## 2019-03-05 DIAGNOSIS — R04.2 COUGHING UP BLOOD: ICD-10-CM

## 2019-03-05 DIAGNOSIS — K21.9 GASTROESOPHAGEAL REFLUX DISEASE WITHOUT ESOPHAGITIS: ICD-10-CM

## 2019-03-05 PROCEDURE — 99214 OFFICE O/P EST MOD 30 MIN: CPT | Performed by: INTERNAL MEDICINE

## 2019-03-05 PROCEDURE — 3074F SYST BP LT 130 MM HG: CPT | Performed by: INTERNAL MEDICINE

## 2019-03-05 PROCEDURE — 1036F TOBACCO NON-USER: CPT | Performed by: INTERNAL MEDICINE

## 2019-03-05 PROCEDURE — 3078F DIAST BP <80 MM HG: CPT | Performed by: INTERNAL MEDICINE

## 2019-03-05 PROCEDURE — 71046 X-RAY EXAM CHEST 2 VIEWS: CPT

## 2019-03-05 PROCEDURE — 1160F RVW MEDS BY RX/DR IN RCRD: CPT | Performed by: INTERNAL MEDICINE

## 2019-03-05 NOTE — PROGRESS NOTES
Assessment/Plan:  Patient is complaining about the early morning when he tried to clear his throat he gets the sputum which is brownish in sometimes some blood in it he was seen by allergist who recommended a chest x-ray for further evaluation chest x-ray is normal patient is about 40 years smoking history he quit smoking about 35 years ago I think it is all postnasal drip and dry area of the mucosa which is causing some bloody postnasal drip but since he has a smoking history I would like to get a CT scan of the chest for further evaluation to rule out any lesion in the lung he really does not have any other symptoms he is not coughing regularly he has a problem with the allergies and sinus problems  No problem-specific Assessment & Plan notes found for this encounter  Diagnoses and all orders for this visit:    Benign essential hypertension  Hypertension is controlled  Gastroesophageal reflux disease without esophagitis  No symptoms of gastroesophageal reflux disease  Other orders  -     Influenza Virus Vaccine Split (AFLURIA IM); Inject 45 mcg into a muscle once  -     influenza vaccine, high-dose (FLUZONE HIGH-DOSE) 0 5 ML ARGELIA; Fluzone High-Dose 9189-0379 (PF) 180 mcg/0 5 mL intramuscular syringe  -     diltiazem (CARDIZEM LA) 180 MG 24 hr tablet; Take 300 mg by mouth  -     Azelastine HCl 0 15 % SOLN; 2 sprays into each nostril  -     ciprofloxacin (CIPRO) 500 mg tablet; ciprofloxacin 500 mg tablet   TAKE 1 TABLET TWICE A DAY  -     cephalexin (KEFLEX) 500 mg capsule; cephalexin 500 mg capsule   TAKE 1 CAPSULE TWICE A DAY  -     fluticasone (FLONASE) 50 mcg/act nasal spray; fluticasone propionate 50 mcg/actuation nasal spray,suspension  -     gabapentin (NEURONTIN) 100 mg capsule; Neurontin 100 mg capsule   Take 1 tablet at bedtime  -     HYDROcodone-acetaminophen (NORCO) 5-325 mg per tablet;  Take 1 tablet by mouth every 6 (six) hours as needed  -     metroNIDAZOLE (FLAGYL) 250 mg tablet; TAKE 1 TABLET BY MOUTH FOUR TIMES A DAY UNTIL FINISHED  -     oxyCODONE-acetaminophen (PERCOCET) 5-325 mg per tablet; oxycodone-acetaminophen 5 mg-325 mg tablet   TAKE 1 TABLET PRIOR TO PROCEDURE  -     pantoprazole (PROTONIX) 40 mg tablet; Daily  -     hydrochlorothiazide (HYDRODIURIL) 25 mg tablet; Take 50 mg by mouth  -     sodium chloride (AYR) 0 65 % nasal spray; Ayr Saline 0 65 % nasal spray aerosol   Take by nasal route  Subjective:   Patient is complaining of the bloody sputum   Patient ID: Carmenza Felder  is a 69 y o  male  Cough   Associated symptoms include postnasal drip  Pertinent negatives include no chest pain, chills, fever, headaches, shortness of breath or wheezing  The patient has been spitting up blood for the last 2-3 weeks  He gags and spits every morning for years, but has had blood in his sputum more recently  Normally the sputum is clear or green  Patient denies recent respiratory illness  He does not have pain with these episodes  He says this cough is unrelated to eating  The patient quit smoking 35-40 years ago  He smoked less than a pack a day for 25-30 years  He had a stomach ulcer about 40 years ago that was surgically corrected  He had massive hemoptysis during that time, but says this is much less severe and he does not have pain associated with those episodes  The following portions of the patient's history were reviewed and updated as appropriate: allergies, current medications, past family history, past medical history, past social history, past surgical history and problem list     Review of Systems   Constitutional: Negative for activity change, appetite change, chills, fatigue, fever and unexpected weight change  HENT: Positive for congestion, hearing loss and postnasal drip  Eyes: Negative for pain, discharge and visual disturbance  Respiratory: Positive for cough  Negative for choking, chest tightness, shortness of breath and wheezing      Cardiovascular: Negative for chest pain, palpitations and leg swelling  Gastrointestinal: Negative for diarrhea, nausea and vomiting  Neurological: Negative for dizziness, light-headedness and headaches  Objective:      /70 (BP Location: Left arm, Patient Position: Sitting, Cuff Size: Standard)   Pulse 60   Temp (!) 96 9 °F (36 1 °C) (Tympanic)   Wt 101 kg (223 lb) Comment: shoes on  SpO2 97%   BMI 34 93 kg/m²          Physical Exam   Constitutional: He is oriented to person, place, and time  He appears well-developed and well-nourished  No distress  obese   HENT:   Head: Normocephalic and atraumatic  Right Ear: Tympanic membrane and external ear normal    Left Ear: Tympanic membrane and external ear normal    Nose: Nose normal    Mouth/Throat: Oropharynx is clear and moist  No oropharyngeal exudate, posterior oropharyngeal edema or posterior oropharyngeal erythema  Eyes: Pupils are equal, round, and reactive to light  Conjunctivae and EOM are normal    Neck: Normal range of motion  Neck supple  No thyromegaly present  Cardiovascular: Normal rate, regular rhythm and normal heart sounds  No murmur heard  Bilateral LE varicose veins  Pulmonary/Chest: Effort normal and breath sounds normal  No respiratory distress  He has no decreased breath sounds  He has no wheezes  He has no rhonchi  He exhibits no mass  Right breast exhibits tenderness  Right breast exhibits no inverted nipple, no mass, no nipple discharge and no skin change  Left breast exhibits no inverted nipple, no mass, no nipple discharge, no skin change and no tenderness  Musculoskeletal: He exhibits no edema  Lymphadenopathy:     He has no cervical adenopathy  Neurological: He is alert and oriented to person, place, and time  Skin: Skin is warm and dry  He is not diaphoretic  Psychiatric: He has a normal mood and affect  His behavior is normal    Nursing note and vitals reviewed

## 2019-03-15 ENCOUNTER — HOSPITAL ENCOUNTER (OUTPATIENT)
Dept: RADIOLOGY | Age: 79
Discharge: HOME | End: 2019-03-15
Payer: COMMERCIAL

## 2019-03-15 DIAGNOSIS — R04.2 SPUTUM BLOODY: ICD-10-CM

## 2019-03-15 PROCEDURE — 71250 CT THORAX DX C-: CPT

## 2019-03-20 ENCOUNTER — TELEPHONE (OUTPATIENT)
Dept: INTERNAL MEDICINE CLINIC | Age: 79
End: 2019-03-20

## 2019-03-20 NOTE — TELEPHONE ENCOUNTER
Dr Fanny Solano with Esme Heller from [facility] Radiology department regarding the CT Scan of the chest he had done on 03/15/2019  There are significant findings on the report    Is this ok for you to discuss on his follow up appointment with him on 04/03/2019 or bring him in sooner

## 2019-03-21 NOTE — TELEPHONE ENCOUNTER
LMOM for patient to call back to schedule an appointment with Dr Navya Navarrete next week regarding the ct scan results

## 2019-03-26 ENCOUNTER — OFFICE VISIT (OUTPATIENT)
Dept: INTERNAL MEDICINE CLINIC | Facility: CLINIC | Age: 79
End: 2019-03-26
Payer: COMMERCIAL

## 2019-03-26 VITALS
BODY MASS INDEX: 33.8 KG/M2 | DIASTOLIC BLOOD PRESSURE: 80 MMHG | HEIGHT: 68 IN | OXYGEN SATURATION: 96 % | HEART RATE: 67 BPM | WEIGHT: 223 LBS | SYSTOLIC BLOOD PRESSURE: 126 MMHG | TEMPERATURE: 98 F

## 2019-03-26 DIAGNOSIS — R04.2 SPUTUM BLOODY: Primary | ICD-10-CM

## 2019-03-26 DIAGNOSIS — J47.9 BRONCHIECTASIS WITHOUT COMPLICATION (HCC): ICD-10-CM

## 2019-03-26 DIAGNOSIS — J84.9 INTERSTITIAL LUNG DISEASE (HCC): ICD-10-CM

## 2019-03-26 PROCEDURE — 1160F RVW MEDS BY RX/DR IN RCRD: CPT | Performed by: INTERNAL MEDICINE

## 2019-03-26 PROCEDURE — 99213 OFFICE O/P EST LOW 20 MIN: CPT | Performed by: INTERNAL MEDICINE

## 2019-03-26 PROCEDURE — 1036F TOBACCO NON-USER: CPT | Performed by: INTERNAL MEDICINE

## 2019-03-26 NOTE — PROGRESS NOTES
Assessment/Plan:    Sputum bloody  Patient is complaining about the early morning when he tried to clear his throat he gets the sputum which is brownish in sometimes some blood in it he was seen by allergist who recommended a chest x-ray for further evaluation chest x-ray is normal patient is about 40 years smoking history he quit smoking about 35 years ago  CT scan of the chest was done which shows bilateral basilar bronchiectasis and interstitial infiltrate infiltrate suggestive of interstitial lung disease  Patient is a chronic cough and was recently noticing some blood in it he does not have any fever or chills he does not have any signs of pneumonia or infection  Was treated before with antibiotics  I will the refer him to the Pulmonary for further evaluation  Patient get tired but really not complaining of increasing shortness of breath           Diagnoses and all orders for this visit:    Sputum bloody        Subjective:      Patient ID: Cally Ellsworth  is a 69 y o  male  HPI    The following portions of the patient's history were reviewed and updated as appropriate: allergies, current medications, past family history, past medical history, past social history, past surgical history and problem list     Review of Systems   Constitutional: Negative for activity change, appetite change, chills, fatigue, fever and unexpected weight change  HENT: Positive for congestion, hearing loss and postnasal drip  Eyes: Negative for pain, discharge and visual disturbance  Respiratory: Positive for cough  Negative for choking, chest tightness, shortness of breath and wheezing  Cardiovascular: Negative for chest pain, palpitations and leg swelling  Gastrointestinal: Negative for diarrhea, nausea and vomiting  Neurological: Negative for dizziness, light-headedness and headaches  Past Medical History:   Diagnosis Date    Abnormal electrocardiogram     Last assessed:  Oct 11, 2013    Allergic rhinitis     last assessed: Oct 11, 2013    Allergic rhinitis due to pollen     last assessed: Oct 10, 2013    Allergic sinusitis     Last assessed: May 11, 2015    Allergy     resolved: July 22, 2015    Benign essential hypertension     Last assessed/resolved: May 31, 2017    BPH (benign prostatic hyperplasia)     Colitis     Last assessed: May 24, 2016    Generalized osteoarthritis     Last assessed: Oct 11, 2013    GERD without esophagitis     Last assessed/resolved: May 31, 2017    Hearing loss     Hiatal hernia     resolved: July 22, 2015    History of gastroesophageal reflux (GERD)     History of stomach ulcers     last assessed: May 11, 2015    Hypertension     Incomplete bladder emptying     Kidney stone     Nodular prostate with lower urinary tract symptoms     Nontoxic single thyroid nodule     last assessed: April 16, 2014    Orchalgia     Overweight     last assessed: Oct 31, 2013    Poor urinary stream     Pulmonary embolism [facility])     Salivary gland disorder     last assessed: April 16, 2014    Seasonal allergies     last assessed: May 15, 2015    Spermatocele     Straining to void     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:  May 7, 2014         Current Outpatient Medications:     amoxicillin (AMOXIL) 500 mg capsule, TAKE ONE CAPSULE BY MOUTH 4 TIMES A DAY UNTIL FINISHED, Disp: , Rfl: 0    diltiazem (MATZIM LA) 300 MG 24 hr tablet, Take 1 tablet (300 mg total) by mouth daily, Disp: 90 tablet, Rfl: 1    finasteride (PROSCAR) 5 mg tablet, Take 1 tablet (5 mg total) by mouth daily, Disp: 90 tablet, Rfl: 1    fluticasone (FLONASE) 50 mcg/act nasal spray, fluticasone propionate 50 mcg/actuation nasal spray,suspension, Disp: , Rfl:     hydrochlorothiazide (HYDRODIURIL) 25 mg tablet, Take 50 mg by mouth, Disp: , Rfl:     montelukast (SINGULAIR) 10 mg tablet, Take 10 mg by mouth daily at bedtime, Disp: , Rfl:     omeprazole (PriLOSEC) 40 MG capsule, Take 1 capsule (40 mg total) by mouth daily, Disp: 90 capsule, Rfl: 1    tamsulosin (FLOMAX) 0 4 mg, Take 1 capsule (0 4 mg total) by mouth daily with dinner, Disp: 90 capsule, Rfl: 3    traMADol-acetaminophen (ULTRACET) 37 5-325 mg per tablet, Take 2 tablets by mouth every 6 (six) hours as needed for mild pain, Disp: 60 tablet, Rfl: 0    Triamcinolone Acetonide (NASACORT ALLERGY 24HR) 55 MCG/ACT AERO, 2 sprays into each nostril as needed  , Disp: , Rfl:     ALPRAZolam (XANAX) 0 25 mg tablet, Take 2 tablets (0 5 mg total) by mouth once for 1 dose (Patient not taking: Reported on 1/8/2019 ), Disp: 1 tablet, Rfl: 0    Azelastine HCl 0 15 % SOLN, 2 sprays into each nostril, Disp: , Rfl:     cephalexin (KEFLEX) 500 mg capsule, cephalexin 500 mg capsule  TAKE 1 CAPSULE TWICE A DAY, Disp: , Rfl:     ciprofloxacin (CIPRO) 500 mg tablet, ciprofloxacin 500 mg tablet  TAKE 1 TABLET TWICE A DAY, Disp: , Rfl:     diltiazem (CARDIZEM LA) 180 MG 24 hr tablet, Take 300 mg by mouth, Disp: , Rfl:     gabapentin (NEURONTIN) 100 mg capsule, Neurontin 100 mg capsule  Take 1 tablet at bedtime, Disp: , Rfl:     hydrochlorothiazide (HYDRODIURIL) 50 mg tablet, Take 1 tablet (50 mg total) by mouth daily (Patient not taking: Reported on 3/5/2019), Disp: 90 tablet, Rfl: 1    HYDROcodone-acetaminophen (NORCO) 5-325 mg per tablet, Take 1 tablet by mouth every 6 (six) hours as needed, Disp: , Rfl: 0    influenza vaccine, high-dose (FLUZONE HIGH-DOSE) 0 5 ML ARGELIA Fluzone High-Dose [id number] (PF) 180 mcg/0 5 mL intramuscular syringe, Disp: , Rfl:     Influenza Virus Vaccine Split (AFLURIA IM), Inject 45 mcg into a muscle once, Disp: , Rfl:     lidocaine (LMX) 4 % cream, Apply topically as needed for mild pain (Patient not taking: Reported on 11/16/2018 ), Disp: 30 g, Rfl: 0    metroNIDAZOLE (FLAGYL) 250 mg tablet, TAKE 1 TABLET BY MOUTH FOUR TIMES A DAY UNTIL FINISHED, Disp: , Rfl: 0    oxyCODONE-acetaminophen (PERCOCET) 5-325 mg per tablet, oxycodone-acetaminophen 5 mg-325 mg tablet  TAKE 1 TABLET PRIOR TO PROCEDURE , Disp: , Rfl:     pantoprazole (PROTONIX) 40 mg tablet, Daily, Disp: , Rfl:     scopolamine (TRANSDERM-SCOP) 1 5 mg/3 days TD 72 hr patch, Place 1 patch on the skin every third day Place behind the ear every 3rd day (Patient not taking: Reported on 11/16/2018 ), Disp: 4 patch, Rfl: 0    sodium chloride (AYR) 0 65 % nasal spray, [place] Saline 0 65 % nasal spray aerosol  Take by nasal route , Disp: , Rfl:     sodium chloride (OCEAN) 0 65 % nasal spray, 2 sprays into each nostril as needed  , Disp: , Rfl:     Allergies   Allergen Reactions    Ace Inhibitors     Molds & Smuts     Other     Pollen Extract     Short Ragweed Pollen Ext     Tree Extract        Social History   Past Surgical History:   Procedure Laterality Date    BLADDER SURGERY      CHOLECYSTECTOMY  2000    laparoscopic     HEMORRHOID SURGERY      pilionidal cyst removal     HERNIA REPAIR Left 01/17/2008    inguinal     KNEE ARTHROSCOPY Left 1974    KNEE CARTILAGE SURGERY      NASAL SEPTUM SURGERY      Deviation repair     PROSTATE BIOPSY  2017    STOMACH SURGERY      TONSILLECTOMY AND ADENOIDECTOMY      at age 37   [facility]OTAL SHOULDER [facility]ROPLASTY      SHOULDER JOINT REPLACEMENT - right 01/04 0 left 01/95    VASECTOMY       Family History   Problem Relation Age of Onset    Hypertension Mother     Stroke Mother     Stomach cancer Father     Hypertension Father     Hypertension Family     Stroke Family         syndrome        Objective:  /80 (BP Location: Left arm, Patient Position: Sitting, Cuff Size: Adult)   Pulse 67   Temp 98 °F (36 7 °C) (Oral)   Ht 5' 8" (1 727 m)   Wt 101 kg (223 lb)   SpO2 96%   BMI 33 91 kg/m²        Physical Exam   Constitutional: He is oriented to person, place, and time  He appears well-developed and well-nourished  No distress  obese   HENT:   Head: Normocephalic and atraumatic     Right Ear: Tympanic membrane and external ear normal    Left Ear: Tympanic membrane and external ear normal    Nose: Nose normal    Mouth/Throat: Oropharynx is clear and moist  No oropharyngeal exudate, posterior oropharyngeal edema or posterior oropharyngeal erythema  Eyes: Pupils are equal, round, and reactive to light  Conjunctivae and EOM are normal    Neck: Normal range of motion  Neck supple  No thyromegaly present  Cardiovascular: Normal rate, regular rhythm and normal heart sounds  No murmur heard  Bilateral LE varicose veins  Pulmonary/Chest: Effort normal and breath sounds normal  No respiratory distress  He has no decreased breath sounds  He has no wheezes  He has no rhonchi  He exhibits no mass  Right breast exhibits tenderness  Right breast exhibits no inverted nipple, no mass, no nipple discharge and no skin change  Left breast exhibits no inverted nipple, no mass, no nipple discharge, no skin change and no tenderness  Musculoskeletal: He exhibits no edema  Lymphadenopathy:     He has no cervical adenopathy  Neurological: He is alert and oriented to person, place, and time  Skin: Skin is warm and dry  He is not diaphoretic  Psychiatric: He has a normal mood and affect  His behavior is normal    Nursing note and vitals reviewed  No results found for this or any previous visit (from the past 672 hour(s))

## 2019-03-26 NOTE — ASSESSMENT & PLAN NOTE
Patient is complaining about the early morning when he tried to clear his throat he gets the sputum which is brownish in sometimes some blood in it he was seen by allergist who recommended a chest x-ray for further evaluation chest x-ray is normal patient is about 40 years smoking history he quit smoking about 35 years ago  CT scan of the chest was done which shows bilateral basilar bronchiectasis and interstitial infiltrate infiltrate suggestive of interstitial lung disease  Patient is a chronic cough and was recently noticing some blood in it he does not have any fever or chills he does not have any signs of pneumonia or infection  Was treated before with antibiotics  I will the refer him to the Pulmonary for further evaluation    Patient get tired but really not complaining of increasing shortness of breath

## 2019-04-11 ENCOUNTER — HOSPITAL ENCOUNTER (OUTPATIENT)
Dept: PULMONOLOGY | Facility: HOSPITAL | Age: 79
Discharge: HOME | End: 2019-04-11
Payer: COMMERCIAL

## 2019-04-11 DIAGNOSIS — J84.9 INTERSTITIAL LUNG DISEASE (HCC): ICD-10-CM

## 2019-04-11 PROCEDURE — 94010 BREATHING CAPACITY TEST: CPT

## 2019-04-11 PROCEDURE — 94010 BREATHING CAPACITY TEST: CPT | Performed by: INTERNAL MEDICINE

## 2019-04-11 PROCEDURE — 94760 N-INVAS EAR/PLS OXIMETRY 1: CPT

## 2019-05-15 DIAGNOSIS — I10 ESSENTIAL HYPERTENSION: ICD-10-CM

## 2019-05-15 RX ORDER — DILTIAZEM HYDROCHLORIDE 300 MG/1
TABLET, EXTENDED RELEASE ORAL
Qty: 90 TABLET | Refills: 1 | OUTPATIENT
Start: 2019-05-15

## 2019-05-16 DIAGNOSIS — M25.50 ARTHRALGIA, UNSPECIFIED JOINT: ICD-10-CM

## 2019-05-20 DIAGNOSIS — I10 ESSENTIAL HYPERTENSION: ICD-10-CM

## 2019-05-20 DIAGNOSIS — N40.1 BENIGN PROSTATIC HYPERPLASIA WITH NOCTURIA: ICD-10-CM

## 2019-05-20 DIAGNOSIS — R35.1 BENIGN PROSTATIC HYPERPLASIA WITH NOCTURIA: ICD-10-CM

## 2019-05-20 RX ORDER — FINASTERIDE 5 MG/1
5 TABLET, FILM COATED ORAL DAILY
Qty: 90 TABLET | Refills: 2 | Status: SHIPPED | OUTPATIENT
Start: 2019-05-20 | End: 2020-02-10

## 2019-05-20 RX ORDER — DILTIAZEM HYDROCHLORIDE EXTENDED-RELEASE TABLETS 300 MG/1
300 TABLET, EXTENDED RELEASE ORAL DAILY
Qty: 90 TABLET | Refills: 1 | Status: SHIPPED | OUTPATIENT
Start: 2019-05-20 | End: 2019-08-08

## 2019-06-03 ENCOUNTER — OFFICE VISIT (OUTPATIENT)
Dept: PULMONOLOGY | Facility: CLINIC | Age: 79
End: 2019-06-03
Payer: COMMERCIAL

## 2019-06-03 VITALS
HEART RATE: 67 BPM | HEIGHT: 68 IN | BODY MASS INDEX: 33.13 KG/M2 | TEMPERATURE: 97.1 F | SYSTOLIC BLOOD PRESSURE: 142 MMHG | DIASTOLIC BLOOD PRESSURE: 90 MMHG | WEIGHT: 218.6 LBS | OXYGEN SATURATION: 96 %

## 2019-06-03 DIAGNOSIS — J47.9 BRONCHIECTASIS WITHOUT COMPLICATION (HCC): ICD-10-CM

## 2019-06-03 DIAGNOSIS — J84.9 INTERSTITIAL LUNG DISEASE (HCC): ICD-10-CM

## 2019-06-03 DIAGNOSIS — R04.2 SPUTUM BLOODY: Primary | ICD-10-CM

## 2019-06-03 DIAGNOSIS — J30.1 SEASONAL ALLERGIC RHINITIS DUE TO POLLEN: ICD-10-CM

## 2019-06-03 PROBLEM — M70.60 TROCHANTERIC BURSITIS: Status: ACTIVE | Noted: 2019-06-03

## 2019-06-03 PROBLEM — IMO0002 CURRENT TEAR OF MEDIAL CARTILAGE OR MENISCUS OF KNEE: Status: ACTIVE | Noted: 2019-06-03

## 2019-06-03 PROBLEM — M94.20 CHONDROMALACIA: Status: ACTIVE | Noted: 2019-06-03

## 2019-06-03 PROBLEM — M19.039 LOCALIZED PRIMARY OSTEOARTHRITIS OF WRIST: Status: ACTIVE | Noted: 2019-06-03

## 2019-06-03 PROBLEM — M25.569 KNEE PAIN: Status: ACTIVE | Noted: 2019-06-03

## 2019-06-03 PROCEDURE — 99205 OFFICE O/P NEW HI 60 MIN: CPT | Performed by: INTERNAL MEDICINE

## 2019-06-07 ENCOUNTER — DOCUMENTATION (OUTPATIENT)
Dept: PULMONOLOGY | Facility: CLINIC | Age: 79
End: 2019-06-07

## 2019-06-17 ENCOUNTER — HOSPITAL ENCOUNTER (OUTPATIENT)
Dept: RADIOLOGY | Age: 79
Discharge: HOME | End: 2019-06-17
Payer: COMMERCIAL

## 2019-06-17 DIAGNOSIS — J47.9 BRONCHIECTASIS WITHOUT COMPLICATION (HCC): ICD-10-CM

## 2019-06-17 DIAGNOSIS — J84.9 INTERSTITIAL LUNG DISEASE (HCC): ICD-10-CM

## 2019-06-17 PROCEDURE — 71250 CT THORAX DX C-: CPT

## 2019-06-18 ENCOUNTER — APPOINTMENT (OUTPATIENT)
Dept: LAB | Age: 79
End: 2019-06-18
Payer: COMMERCIAL

## 2019-06-18 DIAGNOSIS — R97.20 ELEVATED PSA: Primary | ICD-10-CM

## 2019-06-18 DIAGNOSIS — C61 PROSTATE CANCER (HCC): ICD-10-CM

## 2019-06-18 LAB — PSA SERPL-MCNC: 0.5 NG/ML (ref 0–4)

## 2019-06-18 PROCEDURE — 84153 ASSAY OF PSA TOTAL: CPT

## 2019-06-19 ENCOUNTER — OFFICE VISIT (OUTPATIENT)
Dept: INTERNAL MEDICINE CLINIC | Facility: CLINIC | Age: 79
End: 2019-06-19
Payer: COMMERCIAL

## 2019-06-19 VITALS
OXYGEN SATURATION: 97 % | SYSTOLIC BLOOD PRESSURE: 136 MMHG | DIASTOLIC BLOOD PRESSURE: 80 MMHG | HEART RATE: 62 BPM | HEIGHT: 67 IN | BODY MASS INDEX: 34 KG/M2 | WEIGHT: 216.6 LBS | TEMPERATURE: 98.2 F

## 2019-06-19 DIAGNOSIS — I10 BENIGN ESSENTIAL HYPERTENSION: ICD-10-CM

## 2019-06-19 DIAGNOSIS — J84.9 INTERSTITIAL LUNG DISEASE (HCC): Primary | ICD-10-CM

## 2019-06-19 PROCEDURE — 99214 OFFICE O/P EST MOD 30 MIN: CPT | Performed by: INTERNAL MEDICINE

## 2019-06-19 PROCEDURE — 3079F DIAST BP 80-89 MM HG: CPT | Performed by: INTERNAL MEDICINE

## 2019-06-19 PROCEDURE — 3075F SYST BP GE 130 - 139MM HG: CPT | Performed by: INTERNAL MEDICINE

## 2019-06-19 PROCEDURE — 1160F RVW MEDS BY RX/DR IN RCRD: CPT | Performed by: INTERNAL MEDICINE

## 2019-06-27 ENCOUNTER — TELEPHONE (OUTPATIENT)
Dept: PULMONOLOGY | Facility: CLINIC | Age: 79
End: 2019-06-27

## 2019-06-28 RX ORDER — CEPHALEXIN 500 MG/1
500 CAPSULE ORAL EVERY 12 HOURS SCHEDULED
Qty: 4 CAPSULE | Refills: 0 | Status: SHIPPED | OUTPATIENT
Start: 2019-07-02 | End: 2019-09-24

## 2019-06-28 RX ORDER — ALPRAZOLAM 0.5 MG/1
TABLET ORAL
Qty: 1 TABLET | Refills: 0 | Status: SHIPPED | OUTPATIENT
Start: 2019-06-28 | End: 2019-07-02 | Stop reason: ALTCHOICE

## 2019-06-28 RX ORDER — OXYCODONE HYDROCHLORIDE 5 MG/1
TABLET ORAL
Qty: 1 TABLET | Refills: 0 | Status: SHIPPED | OUTPATIENT
Start: 2019-06-28 | End: 2019-07-02 | Stop reason: ALTCHOICE

## 2019-06-28 RX ORDER — CIPROFLOXACIN 500 MG/1
500 TABLET, FILM COATED ORAL EVERY 12 HOURS SCHEDULED
Qty: 4 TABLET | Refills: 0 | Status: SHIPPED | OUTPATIENT
Start: 2019-07-02 | End: 2019-07-04

## 2019-07-02 ENCOUNTER — PROCEDURE VISIT (OUTPATIENT)
Dept: UROLOGY | Facility: MEDICAL CENTER | Age: 79
End: 2019-07-02
Payer: COMMERCIAL

## 2019-07-02 VITALS — BODY MASS INDEX: 33.74 KG/M2 | WEIGHT: 215 LBS | HEIGHT: 67 IN

## 2019-07-02 DIAGNOSIS — C61 PROSTATE CANCER (HCC): Primary | ICD-10-CM

## 2019-07-02 DIAGNOSIS — N13.8 BPH WITH OBSTRUCTION/LOWER URINARY TRACT SYMPTOMS: ICD-10-CM

## 2019-07-02 DIAGNOSIS — Z08 ENCOUNTER FOR FOLLOW-UP SURVEILLANCE OF PROSTATE CANCER: ICD-10-CM

## 2019-07-02 DIAGNOSIS — N40.1 BPH WITH OBSTRUCTION/LOWER URINARY TRACT SYMPTOMS: ICD-10-CM

## 2019-07-02 DIAGNOSIS — Z85.46 ENCOUNTER FOR FOLLOW-UP SURVEILLANCE OF PROSTATE CANCER: ICD-10-CM

## 2019-07-02 PROCEDURE — 99214 OFFICE O/P EST MOD 30 MIN: CPT | Performed by: UROLOGY

## 2019-07-02 PROCEDURE — 55700 PR BIOPSY OF PROSTATE,NEEDLE/PUNCH: CPT | Performed by: UROLOGY

## 2019-07-02 PROCEDURE — 76942 ECHO GUIDE FOR BIOPSY: CPT | Performed by: UROLOGY

## 2019-07-02 PROCEDURE — 88344 IMHCHEM/IMCYTCHM EA MLT ANTB: CPT | Performed by: PATHOLOGY

## 2019-07-02 PROCEDURE — G0416 PROSTATE BIOPSY, ANY MTHD: HCPCS | Performed by: PATHOLOGY

## 2019-07-02 NOTE — PROGRESS NOTES
Assessment/Plan:      Diagnoses and all orders for this visit:    Prostate cancer [facility])  -     Biopsy prostate    BPH with obstruction/lower urinary tract symptoms    Encounter for follow-up surveillance of prostate cancer    Other orders  -     Cancel: Cystoscopy          Subjective:  No complaints     Patient ID: Inocencio Nissen  is a 67 y o  male  HPI  The patient is initial prostate biopsy showed 1 core of [place] score 6 cancer   Continuing the protocol for active surveillance, he underwent a confirmatory surveillance re-biopsy on 05/08/2018, and does findings were notable in that, though the grade of the cancer appeared to stay the same, the volume at this interval biopsy increased to involving all 8 cores demonstrating the low-grade prostate cancer  The pathology findings were confirmed by the [facility] pathologists  He is here today for his annual surveillance prostate biopsy     The patient also is being treated for BPH with finasteride  Debo Skinner occasionally has obstructive voiding symptoms and he is inquiring about possibly adding either a medication or possibly some nutritional supplement  Review of Systems   Constitutional: Negative  HENT: Negative  Eyes: Negative  Respiratory: Negative  Cardiovascular: Negative  Gastrointestinal: Negative  Endocrine: Negative  Genitourinary: Negative  Musculoskeletal: Positive for back pain  Skin: Negative  Allergic/Immunologic: Negative  Neurological: Negative  Hematological: Negative  Psychiatric/Behavioral: Negative  Objective:     Physical Exam   Constitutional: He is oriented to person, place, and time  He appears well-developed and well-nourished  No distress  HENT:   Head: Normocephalic and atraumatic  Nose: Nose normal    Mouth/Throat: Oropharynx is clear and moist    Eyes: Pupils are equal, round, and reactive to light  Conjunctivae and EOM are normal  No scleral icterus  Neck: Normal range of motion  Neck supple  Cardiovascular: Normal rate, regular rhythm, normal heart sounds and intact distal pulses  No murmur heard  Pulmonary/Chest: Effort normal and breath sounds normal  No respiratory distress  He has no wheezes  He has no rales  Abdominal: Soft  Bowel sounds are normal  He exhibits no distension and no mass  There is no tenderness  Musculoskeletal: Normal range of motion  He exhibits no edema or tenderness  Lymphadenopathy:     He has no cervical adenopathy  Neurological: He is alert and oriented to person, place, and time  No cranial nerve deficit  Skin: Skin is warm and dry  No rash noted  No erythema  No pallor  Psychiatric: He has a normal mood and affect  His behavior is normal  Judgment and thought content normal    Nursing note and vitals reviewed  Refer to prostate biopsy procedure note    05/08/2018 1615                                      Urology [place]                                                             Pathologist:           Dhara Taveras MD                                                               Specimens:   A) - Prostate, r lat base x4                                                                         B) - Prostate, r lat mid x4                                                                Final Diagnosis   A  Prostate, r lat base x4 needle biopsy:    - Prostate tissue with small focus of atypical glands, Highly suspicious for low grade adenocarcinoma  Due to atrophic features, A definitive diagnosis cannot be made  See note         B  Prostate, r lat mid x4 needle biopsy:    -Prostate tissue with small focus of atypical glands, suspicious for low grade adenocarcinoma  Findings are highly suspicious for carcinoma  Due to atrophic features, A definitive diagnosis cannot be made  See note        Comment:   This case  was sent out for confirmation to Dr: AIDA Ventura , an expert in prostate pathology at [facility] consultation Service  The above mentioned diagnosis is exactly his consultation report diagnosis  A copy of this consultation report is on file at [facility] department of pathology  Please see his full consultation report in the notes section  Dr: AIDA Calderon agree with our original diagnosis  Electronically signed by Jelly Orantes MD on 5/15/2018 at  3:57 PM   Preliminary result electronically signed by Jelly Orantes MD on 5/11/2018 at 10:18 AM   Note    [facility]  Interpretation and Diagnosis     Prostate:    - A  Prostate tissue with small focus of atypical glands, Highly suspicious for low grade adenocarcinoma  Due to atrophic features, A definitive diagnosis cannot be made  See note  Note: Although these findings are highly atypical and suspicious for adenocarcinoma, there is insufficient cytologic and/or architectural atypia to establish a definitive diagnosis  Follow up is warranted with serum or urine tests, imaging and, in some cases, repeat biopsy with relative increased sampling of the atypical site may be recommended  By itself, negative staining for high molecular weight cytokeratin and p63 in a small focus of glands, as seen in this case, is not diagnostic of cancer         - B  Prostate tissue with small focus of atypical glands, suspicious for low grade adenocarcinoma  Findings are highly suspicious for carcinoma  Due to atrophic features, A definitive diagnosis cannot be made  See note  Note: By itself, negative staining for high molecular weight cytokeratin and p63 in a small focus of glands, as seen in this case, is not diagnostic of cancer        PSA from 06/08/2019 was 0 5 (on finasteride)

## 2019-07-02 NOTE — PROGRESS NOTES
Biopsy prostate     Date/Time 7/2/2019 10:25 AM     Performed by  Panda Muñoz MD     Authorized by Panda Muñoz MD       Local anesthesia used: yes      Anesthesia: local infiltration     Anesthesia   Local anesthesia used: yes  Local Anesthetic: lidocaine 1% with epinephrine     Procedure Details   Patient Transportation: confirmed  Patient tolerance: Patient tolerated the procedure well with no immediate complications            The patient took the necessary preprocedure antibiotic course and the usual prep  He was placed in the left decubitus position  Following digital exam, an ultrasound probe was placed in the rectum  Axial sagittal views of the prostate were recorded, and the approximate prostate volume was 40 cc   1% Xylocaine was used to infiltrate the neurovascular bundles bilaterally  Under ultrasound guidance, a spring-loaded biopsy gun was then utilized to obtain 8 specimen cores from the right lateral mid region the prostate  Additional biopsies were taken from the right lobe, and 3 separate biopsies were taken from the left lobe  The ultrasound probe was then removed, and the patient tolerated procedure well  Specimens were sent for pathologic analysis  He was then instructed on post biopsy measures in protocol

## 2019-07-08 ENCOUNTER — TELEPHONE (OUTPATIENT)
Dept: UROLOGY | Facility: MEDICAL CENTER | Age: 79
End: 2019-07-08

## 2019-07-18 ENCOUNTER — OFFICE VISIT (OUTPATIENT)
Dept: UROLOGY | Facility: MEDICAL CENTER | Age: 79
End: 2019-07-18
Payer: COMMERCIAL

## 2019-07-18 VITALS — BODY MASS INDEX: 33.74 KG/M2 | HEIGHT: 67 IN | WEIGHT: 215 LBS

## 2019-07-18 DIAGNOSIS — Z08 ENCOUNTER FOR FOLLOW-UP SURVEILLANCE OF PROSTATE CANCER: ICD-10-CM

## 2019-07-18 DIAGNOSIS — N40.1 BPH WITH OBSTRUCTION/LOWER URINARY TRACT SYMPTOMS: ICD-10-CM

## 2019-07-18 DIAGNOSIS — Z85.46 ENCOUNTER FOR FOLLOW-UP SURVEILLANCE OF PROSTATE CANCER: ICD-10-CM

## 2019-07-18 DIAGNOSIS — C61 MALIGNANT NEOPLASM OF PROSTATE (HCC): Primary | ICD-10-CM

## 2019-07-18 DIAGNOSIS — N13.8 BPH WITH OBSTRUCTION/LOWER URINARY TRACT SYMPTOMS: ICD-10-CM

## 2019-07-18 PROCEDURE — 99214 OFFICE O/P EST MOD 30 MIN: CPT | Performed by: UROLOGY

## 2019-07-18 NOTE — PROGRESS NOTES
Assessment/Plan:      Diagnoses and all orders for this visit:    Malignant neoplasm of prostate ([facility] [address] )  -     PSA Total, Diagnostic; Future    Encounter for follow-up surveillance of prostate cancer  -     PSA Total, Diagnostic; Future    BPH with obstruction/lower urinary tract symptoms        Assessment/Plan:  Continue surveillance  Subjective:  No complaints     Patient ID: Karen James  is a 67 y o  male  HPI  The patient is initial prostate biopsy showed 1 core of [place] score 6 cancer   Continuing the protocol for active surveillance, he underwent his 1st confirmatory surveillance re-biopsy on 05/08/2018, with findings were notable in that, though the grade of the cancer appeared to stay the same, the volume at this interval biopsy increased to involving all 8 cores demonstrating the low-grade prostate cancer  And an additional biopsy 07/09/2019 which was better and showed again low volume Tremaine 6 disease  He is here today for his follow-up discussion after this recent re-biopsy       The patient also is being treated for BPH with finasteride  Ernestina Orona occasionally has obstructive voiding symptoms and he is inquiring about possibly adding either a medication or possibly some nutritional supplement      Review of Systems   Constitutional: Negative  HENT: Negative  Eyes: Negative  Respiratory: Negative  Cardiovascular: Negative  Gastrointestinal: Negative  Endocrine: Negative  Genitourinary: Negative  Musculoskeletal: Positive for back pain  Skin: Negative  Allergic/Immunologic: Negative  Neurological: Negative  Hematological: Negative  Psychiatric/Behavioral: Negative  Objective:     Physical Exam   Constitutional: He is oriented to person, place, and time  He appears well-developed and well-nourished  No distress  HENT:   Head: Normocephalic and atraumatic     Nose: Nose normal    Mouth/Throat: Oropharynx is clear and moist    Eyes: Pupils are equal, round, and reactive to light  Conjunctivae and EOM are normal  No scleral icterus  Neck: Normal range of motion  Neck supple  Cardiovascular: Normal rate, regular rhythm, normal heart sounds and intact distal pulses  No murmur heard  Pulmonary/Chest: Effort normal and breath sounds normal  No respiratory distress  He has no wheezes  He has no rales  Abdominal: Soft  Bowel sounds are normal  He exhibits no distension and no mass  There is no tenderness  Musculoskeletal: Normal range of motion  He exhibits no edema or tenderness  Lymphadenopathy:     He has no cervical adenopathy  Neurological: He is alert and oriented to person, place, and time  No cranial nerve deficit  Skin: Skin is warm and dry  No rash noted  No erythema  No pallor  Psychiatric: He has a normal mood and affect  His behavior is normal  Judgment and thought content normal    Nursing note and vitals reviewed  Linda Alyssa  [phone]  Page: 1 of 3 Printed: 7/5/2019 1:14 PM  D  Prostate right lateral base x 2:  - Benign prostate tissue  E  Prostate right lateral mid:  - Prostatic adenocarcinoma, [place] score 3 + 3, Prognostic Grade Group 1, continuously involving < 5% of one of two cores:  * periprostatic fat invasion: not identified  * lymph-vascular invasion: not identified  * perineural invasion: not identified   - Additional pathologic findings: N/A  Note: This specimens contains less than 0 5 mm of tumor; as such it would not suffice for additional studies (at least 0 5 mm of tumor  for Prolaris)  F  Prostate right lateral apex:  - Benign smooth muscle & fibrovascular tissue; no prostatic glandular tissue seen  G  Prostate right base x 2:  - Benign prostate tissue  H  Prostate, right mid x 2:  - Benign prostate tissue  I  Prostate right apex:  - Benign prostate tissue        PSA Total, Diagnostic   Order: [id number]   Status:  Final result   Visible to patient:  No (Inaccessible in MyChart) Next appt: 10/30/2019 at 11:00 AM in Internal Medicine Azalia Hartley MD) Dx:  Prostate cancer [facility])    Ref Range & Units 6/18/19  2:12 PM 11/5/18  1:28 PM   PSA, Diagnostic 0 0 - 4 0 ng/mL 0 5  0 6 CM

## 2019-07-23 ENCOUNTER — OFFICE VISIT (OUTPATIENT)
Dept: URGENT CARE | Age: 79
End: 2019-07-23
Payer: COMMERCIAL

## 2019-07-23 ENCOUNTER — APPOINTMENT (OUTPATIENT)
Dept: RADIOLOGY | Age: 79
End: 2019-07-23
Payer: COMMERCIAL

## 2019-07-23 VITALS
TEMPERATURE: 98.8 F | SYSTOLIC BLOOD PRESSURE: 151 MMHG | WEIGHT: 219 LBS | OXYGEN SATURATION: 96 % | RESPIRATION RATE: 18 BRPM | DIASTOLIC BLOOD PRESSURE: 79 MMHG | HEIGHT: 68 IN | HEART RATE: 60 BPM | BODY MASS INDEX: 33.19 KG/M2

## 2019-07-23 DIAGNOSIS — S69.91XA INJURY OF RIGHT HAND, INITIAL ENCOUNTER: ICD-10-CM

## 2019-07-23 DIAGNOSIS — S69.91XA INJURY OF RIGHT HAND, INITIAL ENCOUNTER: Primary | ICD-10-CM

## 2019-07-23 PROCEDURE — S9083 URGENT CARE CENTER GLOBAL: HCPCS | Performed by: PHYSICIAN ASSISTANT

## 2019-07-23 PROCEDURE — 73110 X-RAY EXAM OF WRIST: CPT

## 2019-07-23 PROCEDURE — 99203 OFFICE O/P NEW LOW 30 MIN: CPT | Performed by: PHYSICIAN ASSISTANT

## 2019-07-23 PROCEDURE — 73130 X-RAY EXAM OF HAND: CPT

## 2019-07-23 NOTE — PROGRESS NOTES
[facility]        NAME: Anthony Frias  is a 67 y o  male  : 1940    MRN: [id number]  DATE: 2019  TIME: 3:29 PM    Assessment and Plan   Injury of right hand, initial encounter [S69 [id number]]  1  Injury of right hand, initial encounter  XR hand 3+ vw right    XR wrist 3+ vw right     X-ray provider read  Significant chronic changes  No acute abnormality seen  Due to tenderness at base of thumb, patient placed in thumb spica and referred to Orthopedics  Patient states he will use away and does not want to go to [facility]  Patient understands and agrees to plan  Patient Instructions       Rest, ice, elevate the affected limb  Take over-the-counter pain medication for symptom relief  Follow up with Orthopedics this week  Call Bella Diaz to schedule an appointment: [phone]  Go to ER if new or worsening symptoms occur  Chief Complaint     Chief Complaint   Patient presents with    Hand Pain     Pt reports tripping and falling onto his walkway around 8:30am today, injuring his rigtht thumb, hand, and wrist  Pt iced it  No OTC meds taken  History of Present Illness       Hand Pain    Pertinent negatives include no chest pain or numbness  67 y o male presenting for right thumb/wrist pain since this morning  He states that he was out in his garden when he backed up and stepped onto a solar light that caused him to become off balance and fall forward with his arms extended into a brick wall  He denies any head injury or LOC  States he has a minor abrasion at the base of the R thumb  He has a throbbing 8/10 pain that is worsening and complains of decreased ROM and strength  He has iced the hand x 3 today without relief  No previous hx of injury or surgery  Review of Systems   Review of Systems   Constitutional: Negative  HENT: Negative  Eyes: Negative  Respiratory: Negative  Negative for chest tightness, shortness of breath and wheezing  Cardiovascular: Negative  Negative for chest pain and palpitations  Gastrointestinal: Negative  Negative for abdominal pain, diarrhea, nausea and vomiting  Endocrine: Negative  Genitourinary: Negative  Musculoskeletal: Positive for joint swelling  Negative for back pain, gait problem and neck pain  Skin: Negative for color change, rash and wound  Neurological: Negative for dizziness, weakness, light-headedness, numbness and headaches  Hematological: Negative  Psychiatric/Behavioral: Negative            Current Medications       Current Outpatient Medications:     Azelastine HCl 0 15 % SOLN, 2 sprays into each nostril, Disp: , Rfl:     cephalexin (KEFLEX) 500 mg capsule, Take 1 capsule (500 mg total) by mouth every 12 (twelve) hours for 2 days Begin taking the first dose the day of the procedure , Disp: 4 capsule, Rfl: 0    diltiazem (MATZIM LA) 300 MG 24 hr tablet, Take 1 tablet (300 mg total) by mouth daily, Disp: 90 tablet, Rfl: 1    finasteride (PROSCAR) 5 mg tablet, Take 1 tablet (5 mg total) by mouth daily, Disp: 90 tablet, Rfl: 2    fluticasone (FLONASE) 50 mcg/act nasal spray, fluticasone propionate 50 mcg/actuation nasal spray,suspension, Disp: , Rfl:     lidocaine (LMX) 4 % cream, Apply topically as needed for mild pain (Patient not taking: Reported on 7/2/2019), Disp: 30 g, Rfl: 0    montelukast (SINGULAIR) 10 mg tablet, Take 10 mg by mouth daily at bedtime, Disp: , Rfl:     pantoprazole (PROTONIX) 40 mg tablet, Daily, Disp: , Rfl:     sodium chloride (OCEAN) 0 65 % nasal spray, 2 sprays into each nostril as needed  , Disp: , Rfl:     tamsulosin (FLOMAX) 0 4 mg, Take 1 capsule (0 4 mg total) by mouth daily with dinner, Disp: 90 capsule, Rfl: 3    traMADol-acetaminophen (ULTRACET) 37 5-325 mg per tablet, Take 2 tablets by mouth every 6 (six) hours as needed for mild pain, Disp: 60 tablet, Rfl: 0    Current Allergies     Allergies as of 07/23/2019 - Reviewed 07/23/2019 Allergen Reaction Noted    Ace inhibitors  10/11/2013    Molds & smuts  04/10/2014    Other  04/10/2014    Pollen extract  04/10/2014    Short ragweed pollen ext  04/10/2014    Tree extract  04/10/2014            The following portions of the patient's history were reviewed and updated as appropriate: allergies, current medications, past family history, past medical history, past social history, past surgical history and problem list      Past Medical History:   Diagnosis Date    Abnormal electrocardiogram     Last assessed: Oct 11, 2013    Allergic rhinitis     last assessed: Oct 11, 2013    Allergic rhinitis due to pollen     last assessed: Oct 10, 2013    Allergic sinusitis     Last assessed: May 11, 2015    Allergy     resolved: July 22, 2015    Benign essential hypertension     Last assessed/resolved: May 31, 2017    BPH (benign prostatic hyperplasia)     Colitis     Last assessed: May 24, 2016    Generalized osteoarthritis     Last assessed: Oct 11, 2013    GERD without esophagitis     Last assessed/resolved: May 31, 2017    Hearing loss     Hiatal hernia     resolved: July 22, 2015    History of gastroesophageal reflux (GERD)     History of stomach ulcers     last assessed: May 11, 2015    Hypertension     Incomplete bladder emptying     Kidney stone     Nodular prostate with lower urinary tract symptoms     Nontoxic single thyroid nodule     last assessed: April 16, 2014    Orchalgia     Overweight     last assessed: Oct 31, 2013    Poor urinary stream     Pulmonary embolism [facility])     Salivary gland disorder     last assessed: April 16, 2014    Seasonal allergies     last assessed: May 15, 2015    Spermatocele     Straining to void     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:  May 7, 2014       Past Surgical History:   Procedure Laterality Date    BLADDER SURGERY      CHOLECYSTECTOMY  2000    laparoscopic     HEMORRHOID SURGERY      pilionidal cyst removal     HERNIA REPAIR Left 01/17/2008    inguinal     KNEE ARTHROSCOPY Left 1974    KNEE CARTILAGE SURGERY      NASAL SEPTUM SURGERY      Deviation repair     PROSTATE BIOPSY  2017    STOMACH SURGERY      TONSILLECTOMY AND ADENOIDECTOMY      at age 37   [address] - right 01/04 0 left 01/95    VASECTOMY         Family History   Problem Relation Age of Onset    Hypertension Mother     Stroke Mother     Stomach cancer Father     Hypertension Father     Hypertension Family     Stroke Family         syndrome          Medications have been verified  Objective   /79   Pulse 60   Temp 98 8 °F (37 1 °C)   Resp 18   Ht 5' 8" (1 727 m)   Wt 99 3 kg (219 lb)   SpO2 96%   BMI 33 30 kg/m²        Physical Exam     Physical Exam   Constitutional: He appears well-developed and well-nourished  No distress  HENT:   Head: Normocephalic and atraumatic  Cardiovascular: Normal rate, regular rhythm, normal heart sounds and intact distal pulses  Pulmonary/Chest: Effort normal and breath sounds normal  No respiratory distress  He has no wheezes  He has no rales  Musculoskeletal:        Right hand: He exhibits tenderness (Tenderness of base of right thumb)  He exhibits normal range of motion, normal capillary refill, no deformity and no swelling  Normal sensation noted  Normal strength noted  Hands:  Skin: Skin is warm  No rash noted  He is not diaphoretic  Nursing note and vitals reviewed

## 2019-07-23 NOTE — PATIENT INSTRUCTIONS
Rest, ice, elevate the affected limb  Take over-the-counter pain medication for symptom relief  Follow up with Orthopedics this week  Call Mireya Riches to schedule an appointment: [phone]  Go to ER if new or worsening symptoms occur  Hand Sprain, Ambulatory Care   GENERAL INFORMATION:   A hand sprain  is when a ligament in your hand is stretched or torn  Ligaments are the strong tissues that connect bones  A hand sprain is usually caused by a fall onto your outstretched arm  You may have bruising, pain, and swelling of your injured hand  Seek immediate care for the following symptoms:   · Cold or numbness below the injury, such as in your fingers    · Increased pain, even after taking pain medicine    · New or increased trouble moving and using your hand, fingers, or wrist  Treatment for a hand sprain  may include a support device, such as a brace or splint  These devices limit movement and protect your joint  Treatment may also include pain medicine  Care for a hand sprain:   · Rest  your hand for 1 to 2 days after your injury  This will help decrease the risk of more damage to your hand  Avoid activities that cause pain  Return to normal activities as directed  · Apply ice  on your hand for 15 to 20 minutes every hour or as directed  Use an ice pack, or put crushed ice in a plastic bag  Cover it with a towel  Ice helps prevent tissue damage and decreases swelling and pain  · Use an elastic bandage as directed  An elastic bandage supports your hand and decreases swelling so it can heal  The elastic bandage should be snug but not tight  · Elevate  your hand above the level of your heart as often as you can  This will help decrease swelling and pain  Prop your hand on pillows or blankets to keep it elevated comfortably  · Exercise  your hand as directed to decrease stiffness and improve strength   You may be directed to exercise once you are able to move your hand without pain   Follow up with your healthcare provider as directed:  Write down your questions so you remember to ask them during your visits  CARE AGREEMENT:   You have the right to help plan your care  Learn about your health condition and how it may be treated  Discuss treatment options with your caregivers to decide what care you want to receive  You always have the right to refuse treatment  The above information is an  only  It is not intended as medical advice for individual conditions or treatments  Talk to your doctor, nurse or pharmacist before following any medical regimen to see if it is safe and effective for you  © 2014 [address] is for End User's use only and may not be sold, redistributed or otherwise used for commercial purposes  All illustrations and images included in CareNotes® are the copyrighted property of A D A M , Inc  or [employer]

## 2019-08-08 ENCOUNTER — OFFICE VISIT (OUTPATIENT)
Dept: INTERNAL MEDICINE CLINIC | Facility: CLINIC | Age: 79
End: 2019-08-08
Payer: COMMERCIAL

## 2019-08-08 VITALS
SYSTOLIC BLOOD PRESSURE: 140 MMHG | WEIGHT: 220.2 LBS | DIASTOLIC BLOOD PRESSURE: 80 MMHG | HEART RATE: 50 BPM | HEIGHT: 68 IN | BODY MASS INDEX: 33.37 KG/M2 | TEMPERATURE: 97.5 F | OXYGEN SATURATION: 96 %

## 2019-08-08 DIAGNOSIS — R42 LIGHTHEADEDNESS: Primary | ICD-10-CM

## 2019-08-08 DIAGNOSIS — M79.641 PAIN OF RIGHT HAND: ICD-10-CM

## 2019-08-08 DIAGNOSIS — I10 ESSENTIAL HYPERTENSION: ICD-10-CM

## 2019-08-08 DIAGNOSIS — I95.1 ORTHOSTASIS: ICD-10-CM

## 2019-08-08 PROCEDURE — 99214 OFFICE O/P EST MOD 30 MIN: CPT | Performed by: INTERNAL MEDICINE

## 2019-08-08 RX ORDER — DILTIAZEM HYDROCHLORIDE 180 MG/1
180 CAPSULE, COATED, EXTENDED RELEASE ORAL DAILY
Qty: 30 CAPSULE | Refills: 3 | Status: SHIPPED | OUTPATIENT
Start: 2019-08-08 | End: 2019-10-30 | Stop reason: SDUPTHER

## 2019-08-08 NOTE — PROGRESS NOTES
Assessment/Plan:    Pain of right hand  Patient fell and hurt his hand right side, was seen in the urgent care, urgent cares told him to make an appointment with Orthopedics because there was no fracture but there was a big bruise  Patient the have seen the [facility] the vent to see the Orthopedics they put him on cast, subsequently he developed a significant swelling of his hand and also fingers so they removed the cast and put him on brace, and also put him on prednisone I do not know the reason but most likely the thought it was acute gout  While he was taking prednisone he felt better but soon after he finished the prednisone he got the symptoms again and he went to see the Orthopedics again the Orthopedics call me that patient is most likely having problems with the gout and I should see him and follow him up patient is here for the same reason    Lightheaded  Patient is complaining of lightheadedness almost passed out he has a history of dizziness before he does not have any chest pain or shortness of breath he does not have any nausea and vomiting he is basically lightheaded his pulse was in 50s today, when he felt lightheaded he was the sweating he was like frozen, with cloudiness of his mind  He is significantly orthostatic supine blood pressure was the 681 systolic and diastolic was 80, on standing his the systolic blood pressure was 364 and diastolic was 75 I think he is the lightheaded because of the orthostasis and bradycardia I will also decrease his Cardizem to 180       Diagnoses and all orders for this visit:    Lightheadedness  -     diltiazem (CARDIZEM CD) 180 mg 24 hr capsule; Take 1 capsule (180 mg total) by mouth daily    Pain of right hand  -     Uric acid; Future  -     Lyme Antibody Profile with reflex to WB; Future    Essential hypertension  -     diltiazem (CARDIZEM CD) 180 mg 24 hr capsule;  Take 1 capsule (180 mg total) by mouth daily        Subjective:      Patient ID: Anthoyn Frias  is a 67 y o  male  HPI    The following portions of the patient's history were reviewed and updated as appropriate: allergies, current medications, past family history, past medical history, past social history, past surgical history and problem list     Review of Systems   Constitutional: Negative for activity change, appetite change, chills, fatigue, fever and unexpected weight change  HENT: Positive for hearing loss  Negative for congestion and postnasal drip  Eyes: Negative for pain, discharge and visual disturbance  Respiratory: Negative for cough, choking, chest tightness, shortness of breath and wheezing  Cardiovascular: Negative for chest pain, palpitations and leg swelling  Gastrointestinal: Negative for diarrhea, nausea and vomiting  Musculoskeletal:        Pain in the left hand and the swelling as given above   Neurological: Positive for dizziness and headaches  Negative for light-headedness  Past Medical History:   Diagnosis Date    Abnormal electrocardiogram     Last assessed: Oct 11, 2013    Allergic rhinitis     last assessed: Oct 11, 2013    Allergic rhinitis due to pollen     last assessed: Oct 10, 2013    Allergic sinusitis     Last assessed: May 11, 2015    Allergy     resolved: July 22, 2015    Benign essential hypertension     Last assessed/resolved: May 31, 2017    BPH (benign prostatic hyperplasia)     Colitis     Last assessed: May 24, 2016    Generalized osteoarthritis     Last assessed: Oct 11, 2013    GERD without esophagitis     Last assessed/resolved:  May 31, 2017    Hearing loss     Hiatal hernia     resolved: July 22, 2015    History of gastroesophageal reflux (GERD)     History of stomach ulcers     last assessed: May 11, 2015    Hypertension     Incomplete bladder emptying     Kidney stone     Nodular prostate with lower urinary tract symptoms     Nontoxic single thyroid nodule     last assessed: April 16, 2014    Orchalgia     Overweight last assessed: Oct 31, 2013    Poor urinary stream     Pulmonary embolism [facility])     Salivary gland disorder     last assessed: April 16, 2014    Seasonal allergies     last assessed: May 15, 2015    Spermatocele     Straining to void     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:  May 7, 2014         Current Outpatient Medications:     Azelastine HCl 0 15 % SOLN, 2 sprays into each nostril, Disp: , Rfl:     finasteride (PROSCAR) 5 mg tablet, Take 1 tablet (5 mg total) by mouth daily, Disp: 90 tablet, Rfl: 2    montelukast (SINGULAIR) 10 mg tablet, Take 10 mg by mouth daily at bedtime, Disp: , Rfl:     pantoprazole (PROTONIX) 40 mg tablet, Daily, Disp: , Rfl:     sodium chloride (OCEAN) 0 65 % nasal spray, 2 sprays into each nostril as needed  , Disp: , Rfl:     tamsulosin (FLOMAX) 0 4 mg, Take 1 capsule (0 4 mg total) by mouth daily with dinner, Disp: 90 capsule, Rfl: 3    traMADol-acetaminophen (ULTRACET) 37 5-325 mg per tablet, Take 2 tablets by mouth every 6 (six) hours as needed for mild pain, Disp: 60 tablet, Rfl: 0    cephalexin (KEFLEX) 500 mg capsule, Take 1 capsule (500 mg total) by mouth every 12 (twelve) hours for 2 days Begin taking the first dose the day of the procedure , Disp: 4 capsule, Rfl: 0    diltiazem (CARDIZEM CD) 180 mg 24 hr capsule, Take 1 capsule (180 mg total) by mouth daily, Disp: 30 capsule, Rfl: 3    fluticasone (FLONASE) 50 mcg/act nasal spray, fluticasone propionate 50 mcg/actuation nasal spray,suspension, Disp: , Rfl:     lidocaine (LMX) 4 % cream, Apply topically as needed for mild pain (Patient not taking: Reported on 8/8/2019), Disp: 30 g, Rfl: 0    Allergies   Allergen Reactions    Ace Inhibitors     Molds & Smuts     Other     Pollen Extract     Short Ragweed Pollen Ext     Tree Extract        Social History   Past Surgical History:   Procedure Laterality Date    BLADDER SURGERY      CHOLECYSTECTOMY  2000    laparoscopic     HEMORRHOID SURGERY      pilionidal cyst removal     HERNIA REPAIR Left 01/17/2008    inguinal     KNEE ARTHROSCOPY Left 1974    KNEE CARTILAGE SURGERY      NASAL SEPTUM SURGERY      Deviation repair     PROSTATE BIOPSY  2017    STOMACH SURGERY      TONSILLECTOMY AND ADENOIDECTOMY      at age 37   [address] - right 01/04 0 left 01/95    VASECTOMY       Family History   Problem Relation Age of Onset    Hypertension Mother     Stroke Mother     Stomach cancer Father     Hypertension Father     Hypertension Family     Stroke Family         syndrome        Objective:  /80 (BP Location: Left arm, Patient Position: Sitting, Cuff Size: Large)   Pulse (!) 50   Temp 97 5 °F (36 4 °C) (Oral)   Ht 5' 8" (1 727 m)   Wt 99 9 kg (220 lb 3 2 oz)   SpO2 96%   BMI 33 48 kg/m²        Physical Exam   Constitutional: He is oriented to person, place, and time  He appears well-developed and well-nourished  No distress  obese   HENT:   Head: Normocephalic and atraumatic  Right Ear: Tympanic membrane and external ear normal    Left Ear: Tympanic membrane and external ear normal    Nose: Nose normal    Mouth/Throat: Oropharynx is clear and moist  No oropharyngeal exudate, posterior oropharyngeal edema or posterior oropharyngeal erythema  Eyes: Pupils are equal, round, and reactive to light  Conjunctivae and EOM are normal    Neck: Normal range of motion  Neck supple  No thyromegaly present  Cardiovascular: Normal rate, regular rhythm and normal heart sounds  No murmur heard  Bilateral LE varicose veins  Pulmonary/Chest: Effort normal and breath sounds normal  No respiratory distress  He has no decreased breath sounds  He has no wheezes  He has no rhonchi  He exhibits no mass  Right breast exhibits tenderness  Right breast exhibits no inverted nipple, no mass, no nipple discharge and no skin change   Left breast exhibits no inverted nipple, no mass, no nipple discharge, no skin change and no tenderness  Musculoskeletal: He exhibits no edema  Hand swelling specially the wrist also the left the base of the thumb tender concerned about the gout versus Lyme /osteoarthritis   Lymphadenopathy:     He has no cervical adenopathy  Neurological: He is alert and oriented to person, place, and time  Skin: Skin is warm and dry  He is not diaphoretic  Psychiatric: He has a normal mood and affect  His behavior is normal    Nursing note and vitals reviewed  No results found for this or any previous visit (from the past 672 hour(s))

## 2019-08-09 ENCOUNTER — CLINICAL SUPPORT (OUTPATIENT)
Dept: INTERNAL MEDICINE CLINIC | Facility: CLINIC | Age: 79
End: 2019-08-09
Payer: COMMERCIAL

## 2019-08-09 DIAGNOSIS — M79.641 PAIN OF RIGHT HAND: ICD-10-CM

## 2019-08-09 DIAGNOSIS — J84.9 INTERSTITIAL LUNG DISEASE (HCC): Primary | ICD-10-CM

## 2019-08-09 DIAGNOSIS — R42 LIGHTHEADED: ICD-10-CM

## 2019-08-09 DIAGNOSIS — I95.1 ORTHOSTASIS: ICD-10-CM

## 2019-08-09 DIAGNOSIS — I10 BENIGN ESSENTIAL HYPERTENSION: ICD-10-CM

## 2019-08-09 PROCEDURE — 36415 COLL VENOUS BLD VENIPUNCTURE: CPT

## 2019-08-12 LAB
B BURGDOR AB SER IA-ACNC: <0.9 INDEX
BASOPHILS # BLD AUTO: 51 CELLS/UL (ref 0–200)
BASOPHILS NFR BLD AUTO: 0.5 %
BUN SERPL-MCNC: 20 MG/DL (ref 7–25)
BUN/CREAT SERPL: NORMAL (CALC) (ref 6–22)
CALCIUM SERPL-MCNC: 8.7 MG/DL (ref 8.6–10.3)
CHLORIDE SERPL-SCNC: 104 MMOL/L (ref 98–110)
CO2 SERPL-SCNC: 26 MMOL/L (ref 20–32)
CREAT SERPL-MCNC: 1.02 MG/DL (ref 0.7–1.18)
EOSINOPHIL # BLD AUTO: 20 CELLS/UL (ref 15–500)
EOSINOPHIL NFR BLD AUTO: 0.2 %
ERYTHROCYTE [DISTWIDTH] IN BLOOD BY AUTOMATED COUNT: 13.4 % (ref 11–15)
GLUCOSE SERPL-MCNC: 97 MG/DL (ref 65–99)
HCT VFR BLD AUTO: 49.6 % (ref 38.5–50)
HGB BLD-MCNC: 16.4 G/DL (ref 13.2–17.1)
LYMPHOCYTES # BLD AUTO: 1418 CELLS/UL (ref 850–3900)
LYMPHOCYTES NFR BLD AUTO: 13.9 %
MCH RBC QN AUTO: 31.4 PG (ref 27–33)
MCHC RBC AUTO-ENTMCNC: 33.1 G/DL (ref 32–36)
MCV RBC AUTO: 94.8 FL (ref 80–100)
MONOCYTES # BLD AUTO: 561 CELLS/UL (ref 200–950)
MONOCYTES NFR BLD AUTO: 5.5 %
NEUTROPHILS # BLD AUTO: 8150 CELLS/UL (ref 1500–7800)
NEUTROPHILS NFR BLD AUTO: 79.9 %
PLATELET # BLD AUTO: 158 THOUSAND/UL (ref 140–400)
PMV BLD REES-ECKER: 10.5 FL (ref 7.5–12.5)
POTASSIUM SERPL-SCNC: 4.3 MMOL/L (ref 3.5–5.3)
RBC # BLD AUTO: 5.23 MILLION/UL (ref 4.2–5.8)
SL AMB EGFR AFRICAN AMERICAN: 81 ML/MIN/1.73M2
SL AMB EGFR NON AFRICAN AMERICAN: 70 ML/MIN/1.73M2
SODIUM SERPL-SCNC: 141 MMOL/L (ref 135–146)
URATE SERPL-MCNC: 5.3 MG/DL (ref 4–8)
WBC # BLD AUTO: 10.2 THOUSAND/UL (ref 3.8–10.8)

## 2019-08-15 ENCOUNTER — OFFICE VISIT (OUTPATIENT)
Dept: INTERNAL MEDICINE CLINIC | Facility: CLINIC | Age: 79
End: 2019-08-15
Payer: COMMERCIAL

## 2019-08-15 VITALS
TEMPERATURE: 98.3 F | OXYGEN SATURATION: 96 % | DIASTOLIC BLOOD PRESSURE: 80 MMHG | HEIGHT: 68 IN | BODY MASS INDEX: 33.28 KG/M2 | SYSTOLIC BLOOD PRESSURE: 112 MMHG | HEART RATE: 64 BPM | WEIGHT: 219.6 LBS

## 2019-08-15 DIAGNOSIS — R42 ORTHOSTATIC DIZZINESS: ICD-10-CM

## 2019-08-15 DIAGNOSIS — R42 LIGHTHEADED: Primary | ICD-10-CM

## 2019-08-15 DIAGNOSIS — R42 DIZZINESS: ICD-10-CM

## 2019-08-15 PROBLEM — J47.9 BRONCHIECTASIS WITHOUT COMPLICATION (HCC): Status: RESOLVED | Noted: 2019-03-26 | Resolved: 2019-08-15

## 2019-08-15 PROCEDURE — 99213 OFFICE O/P EST LOW 20 MIN: CPT | Performed by: INTERNAL MEDICINE

## 2019-08-15 RX ORDER — PREDNISONE 10 MG/1
TABLET ORAL
Refills: 0 | COMMUNITY
Start: 2019-08-08 | End: 2019-09-24

## 2019-08-15 NOTE — ASSESSMENT & PLAN NOTE
Pain in R hand is doing much better  [facility] gave him a cortisone injection and is now on prednisone at home  The pain is much improved

## 2019-08-15 NOTE — PROGRESS NOTES
Assessment/Plan:    Lightheaded  Patient is still complaining of lightheadedness, now has been occurring for a couple of months at least  Wakes up feeling like he is "hung over" but does not drink  He describes it as being in a fog  Patient describes dizziness as being off balance, especially when standing up from a bent over position, when making a sudden move, when getting out of bed or out of a chair  Not associate with any nausea or vomiting  Patient describes the dizziness as being constantly possible of bringing on an episode instead of being a constant dizziness  Pain of right hand  Pain in R hand is doing much better  [facility] gave him a cortisone injection and is now on prednisone at home  The pain is much improved  Problem List Items Addressed This Visit     None            Subjective:      Patient ID: Guido Schneider  is a 67 y o  male  HPI    The following portions of the patient's history were reviewed and updated as appropriate: allergies, current medications, past family history, past medical history, past social history, past surgical history and problem list     Review of Systems   Constitutional: Negative for chills, diaphoresis, fatigue and fever  HENT: Negative for congestion, ear pain, postnasal drip and sore throat  Eyes: Positive for pain (Patient says that he does feel like he has pressure building up behind his eyes  )  Negative for visual disturbance  Respiratory: Negative for cough, chest tightness and shortness of breath  Cardiovascular: Negative for chest pain, palpitations and leg swelling  Gastrointestinal: Negative for abdominal pain, constipation, diarrhea, nausea and vomiting  Genitourinary: Positive for difficulty urinating (Patient is on flomax due to difficulty urinating attributed to his prostate cancer  )  Negative for hematuria and urgency     Musculoskeletal: Positive for arthralgias (Patient has osteoarthritis  ) and joint swelling (R wrist and R ankle  )  Negative for myalgias  Skin: Negative for rash and wound  Neurological: Positive for dizziness, light-headedness and numbness (Patient states that his toes go numb  According to patient, [facility] said he might have peripheral neuropathy  )  Negative for weakness and headaches  Psychiatric/Behavioral: Negative for behavioral problems and confusion  Objective:      /80 (BP Location: Left arm, Patient Position: Sitting, Cuff Size: Adult)   Pulse 64   Temp 98 3 °F (36 8 °C) (Oral)   Ht 5' 8" (1 727 m)   Wt 99 6 kg (219 lb 9 6 oz)   SpO2 96%   BMI 33 39 kg/m²     Again in the luna is the there is some orthostasis he is lying down because blood pressure was 170 and standing systolic was 503 and apparently when he had a blood pressure done by the girls when he was in sitting position it was 112/80     Physical Exam   Constitutional: He is oriented to person, place, and time  He appears well-developed and well-nourished  HENT:   Head: Normocephalic and atraumatic  Eyes: Pupils are equal, round, and reactive to light  Conjunctivae are normal  Right eye exhibits nystagmus (Horizontal nystagmus at the limits of vision  )  Left eye exhibits nystagmus  Neck: Normal range of motion  Neck supple  Cardiovascular: Normal rate, regular rhythm, normal heart sounds and intact distal pulses  Pulmonary/Chest: Effort normal and breath sounds normal    Abdominal: Soft  Bowel sounds are normal    Musculoskeletal: Normal range of motion  Neurological: He is alert and oriented to person, place, and time  Skin: Skin is warm and dry  Psychiatric: He has a normal mood and affect  His behavior is normal  Judgment and thought content normal    Vitals reviewed

## 2019-08-15 NOTE — ASSESSMENT & PLAN NOTE
Patient is still complaining of lightheadedness, now has been occurring for a couple of months at least  Wakes up feeling like he is "hung over" but does not drink  He describes it as being in a fog  Patient describes dizziness as being off balance, especially when standing up from a bent over position, when making a sudden move, when getting out of bed or out of a chair  Not associate with any nausea or vomiting  Patient describes the dizziness as being constantly possible of bringing on an episode instead of being a constant dizziness

## 2019-08-20 ENCOUNTER — TELEPHONE (OUTPATIENT)
Dept: INTERNAL MEDICINE CLINIC | Facility: CLINIC | Age: 79
End: 2019-08-20

## 2019-08-20 NOTE — TELEPHONE ENCOUNTER
Mimi Toussaint was seen in the office on 8/15 by Dr Anat Castro and ordered a CT head  He is scheduled at [place] [address] for tomorrow 8/21  A prior authorization was submitted along with additional clinical information through [id number] and the case is still pending under clinical reviewer  A peer to peer would need to be completed for a response of approval or denial  Tracking #[id number]  The phone number to call for the peer to peer is [phone]  Please let me know the outcome of this, thank you!

## 2019-08-22 NOTE — TELEPHONE ENCOUNTER
Dr Angelique Espino did the peer to peer and got it approved      Authorization # [id number]    Will look onto the web site later to find out the approval dates are    Marked it already in the appt for tomorrow

## 2019-08-23 ENCOUNTER — HOSPITAL ENCOUNTER (OUTPATIENT)
Dept: RADIOLOGY | Age: 79
Discharge: HOME | End: 2019-08-23
Payer: COMMERCIAL

## 2019-08-23 DIAGNOSIS — R42 DIZZINESS: ICD-10-CM

## 2019-08-23 DIAGNOSIS — R42 LIGHTHEADED: ICD-10-CM

## 2019-08-23 PROCEDURE — 70470 CT HEAD/BRAIN W/O & W/DYE: CPT

## 2019-08-23 RX ADMIN — IOHEXOL 85 ML: 350 INJECTION, SOLUTION INTRAVENOUS at 12:42

## 2019-08-26 ENCOUNTER — OFFICE VISIT (OUTPATIENT)
Dept: INTERNAL MEDICINE CLINIC | Age: 79
End: 2019-08-26
Payer: COMMERCIAL

## 2019-08-26 VITALS
DIASTOLIC BLOOD PRESSURE: 84 MMHG | TEMPERATURE: 99.8 F | SYSTOLIC BLOOD PRESSURE: 140 MMHG | HEIGHT: 67 IN | OXYGEN SATURATION: 94 % | BODY MASS INDEX: 34.53 KG/M2 | WEIGHT: 220 LBS | HEART RATE: 76 BPM

## 2019-08-26 DIAGNOSIS — J84.9 INTERSTITIAL LUNG DISEASE (HCC): Primary | ICD-10-CM

## 2019-08-26 DIAGNOSIS — R42 LIGHTHEADED: ICD-10-CM

## 2019-08-26 DIAGNOSIS — H81.13 BENIGN PAROXYSMAL POSITIONAL VERTIGO DUE TO BILATERAL VESTIBULAR DISORDER: ICD-10-CM

## 2019-08-26 DIAGNOSIS — K11.8 PAROTID MASS: ICD-10-CM

## 2019-08-26 DIAGNOSIS — R42 ORTHOSTATIC DIZZINESS: ICD-10-CM

## 2019-08-26 PROBLEM — L81.9 ATYPICAL PIGMENTED SKIN LESION: Status: RESOLVED | Noted: 2018-11-08 | Resolved: 2019-08-26

## 2019-08-26 PROBLEM — M70.60 TROCHANTERIC BURSITIS: Status: RESOLVED | Noted: 2019-06-03 | Resolved: 2019-08-26

## 2019-08-26 PROCEDURE — 99213 OFFICE O/P EST LOW 20 MIN: CPT | Performed by: INTERNAL MEDICINE

## 2019-08-26 PROCEDURE — 1160F RVW MEDS BY RX/DR IN RCRD: CPT | Performed by: INTERNAL MEDICINE

## 2019-08-26 PROCEDURE — 1036F TOBACCO NON-USER: CPT | Performed by: INTERNAL MEDICINE

## 2019-08-26 NOTE — PROGRESS NOTES
Assessment/Plan:    Interstitial lung disease ([facility] [address] )  Is repeat CT scan with high-resolution was positive for interstitial lung disease he will be followed up by the Pulmonary as of right now he is not short of breath he does not have any chest pain and cough is better    Benign 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  Most likely a benign positional vertigo CT scan of the head was done with contrast which was negative will continue to observe    Lightheaded  Orthostatic hypotension, which is better will continue with the same medication no changes no episodes of fall, still lightheaded when he tried to get up from the bed or from the bending position  Diagnoses and all orders for this visit:    Interstitial lung disease ([facility] [address] )        Subjective:      Patient ID: Jennie Harman  is a 67 y o  male  HPI    The following portions of the patient's history were reviewed and updated as appropriate: allergies, current medications, past family history, past medical history, past social history, past surgical history and problem list   [de-identified] years young gentleman with history of hypertension BPH hypercholesterolemia presented initially with dizziness and lightheadedness history of combination of benign positional vertigo and orthostatic hypotension  He is doing well right now still having the symptoms of lightheadedness  His blood pressure is 140/84 which is sitting standing blood pressure last time was almost around 30 point slow  He will continue with his Cardizem lower does which I decreased his last time    Review of Systems   Constitutional: Positive for fatigue  Negative for appetite change and fever  HENT: Negative for congestion, ear pain, hearing loss, nosebleeds, sneezing, tinnitus and voice change  Eyes: Negative for pain, discharge and redness  Respiratory: Negative for cough, chest tightness and wheezing      Cardiovascular: Negative for chest pain, palpitations and leg swelling  Gastrointestinal: Negative for abdominal pain, blood in stool, constipation, diarrhea, nausea and vomiting  Genitourinary: Negative for difficulty urinating, dysuria, hematuria and urgency  Musculoskeletal: Negative for arthralgias, back pain, gait problem and joint swelling  Skin: Negative for rash and wound  Allergic/Immunologic: Negative for environmental allergies  Neurological: Positive for dizziness and light-headedness  Negative for tremors, seizures, weakness and numbness  Hematological: Negative for adenopathy  Does not bruise/bleed easily  Psychiatric/Behavioral: Negative for behavioral problems and confusion  The patient is not nervous/anxious  Past Medical History:   Diagnosis Date    Abnormal electrocardiogram     Last assessed: Oct 11, 2013    Allergic rhinitis     last assessed: Oct 11, 2013    Allergic rhinitis due to pollen     last assessed: Oct 10, 2013    Allergic sinusitis     Last assessed: May 11, 2015    Allergy     resolved: July 22, 2015    Benign essential hypertension     Last assessed/resolved: May 31, 2017    BPH (benign prostatic hyperplasia)     Colitis     Last assessed: May 24, 2016    Generalized osteoarthritis     Last assessed: Oct 11, 2013    GERD without esophagitis     Last assessed/resolved: May 31, 2017    Hearing loss     Hiatal hernia     resolved: July 22, 2015    History of gastroesophageal reflux (GERD)     History of stomach ulcers     last assessed: May 11, 2015    Hypertension     Incomplete bladder emptying     Kidney stone     Nodular prostate with lower urinary tract symptoms     Nontoxic single thyroid nodule     last assessed: April 16, 2014    Orchalgia     Overweight     last assessed:  Oct 31, 2013    Poor urinary stream     Pulmonary embolism [facility])     Salivary gland disorder     last assessed: April 16, 2014    Seasonal allergies     last assessed: May 15, 2015   Zo Adamson Spermatocele  Straining to void     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:  May 7, 2014         Current Outpatient Medications:     Azelastine HCl 0 15 % SOLN, 2 sprays into each nostril, Disp: , Rfl:     diltiazem (CARDIZEM CD) 180 mg 24 hr capsule, Take 1 capsule (180 mg total) by mouth daily, Disp: 30 capsule, Rfl: 3    finasteride (PROSCAR) 5 mg tablet, Take 1 tablet (5 mg total) by mouth daily, Disp: 90 tablet, Rfl: 2    fluticasone (FLONASE) 50 mcg/act nasal spray, fluticasone propionate 50 mcg/actuation nasal spray,suspension, Disp: , Rfl:     lidocaine (LMX) 4 % cream, Apply topically as needed for mild pain, Disp: 30 g, Rfl: 0    montelukast (SINGULAIR) 10 mg tablet, Take 10 mg by mouth daily at bedtime, Disp: , Rfl:     pantoprazole (PROTONIX) 40 mg tablet, Daily, Disp: , Rfl:     predniSONE 10 mg tablet, 8 TABS DAILY DAY 1, DECREASE BY 1 TABLET DAILY UNTIL FINISHED, Disp: , Rfl: 0    sodium chloride (OCEAN) 0 65 % nasal spray, 2 sprays into each nostril as needed  , Disp: , Rfl:     tamsulosin (FLOMAX) 0 4 mg, Take 1 capsule (0 4 mg total) by mouth daily with dinner, Disp: 90 capsule, Rfl: 3    traMADol-acetaminophen (ULTRACET) 37 5-325 mg per tablet, Take 2 tablets by mouth every 6 (six) hours as needed for mild pain, Disp: 60 tablet, Rfl: 0    cephalexin (KEFLEX) 500 mg capsule, Take 1 capsule (500 mg total) by mouth every 12 (twelve) hours for 2 days Begin taking the first dose the day of the procedure , Disp: 4 capsule, Rfl: 0    Allergies   Allergen Reactions    Ace Inhibitors     Molds & Smuts     Other     Pollen Extract     Short Ragweed Pollen Ext     Tree Extract        Social History   Past Surgical History:   Procedure Laterality Date    BLADDER SURGERY      CHOLECYSTECTOMY  2000    laparoscopic     HEMORRHOID SURGERY      pilionidal cyst removal     HERNIA REPAIR Left 01/17/2008    inguinal     KNEE ARTHROSCOPY Left 1974    KNEE CARTILAGE SURGERY      NASAL SEPTUM SURGERY Deviation repair     PROSTATE BIOPSY  2017    STOMACH SURGERY      TONSILLECTOMY AND ADENOIDECTOMY      at age 37   N[facility] TOTAL SHOULDER ARTHROPLASTY      SHOULDER JOINT REPLACEMENT - right 01/04 0 left 01/95    VASECTOMY       Family History   Problem Relation Age of Onset    Hypertension Mother     Stroke Mother     Stomach cancer Father     Hypertension Father     Hypertension Family     Stroke Family         syndrome        Objective:  /84 (BP Location: Left arm, Patient Position: Sitting, Cuff Size: Adult)   Pulse 76   Temp 99 8 °F (37 7 °C) (Tympanic)   Ht 5' 6 77" (1 696 m)   Wt 99 8 kg (220 lb)   SpO2 94%   BMI 34 69 kg/m²        Physical Exam   Constitutional: He is oriented to person, place, and time  He appears well-developed and well-nourished  HENT:   Right Ear: External ear normal    Mouth/Throat: Oropharynx is clear and moist    Eyes: Pupils are equal, round, and reactive to light  Conjunctivae and EOM are normal    Neck: Normal range of motion  No JVD present  No thyromegaly present  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  It is slightly tender form easily movable   Cardiovascular: Normal rate, regular rhythm, normal heart sounds and intact distal pulses  Varicose veins   Pulmonary/Chest: Breath sounds normal    Abdominal: Soft  Bowel sounds are normal    Musculoskeletal: Normal range of motion  Lymphadenopathy:     He has no cervical adenopathy  Neurological: He is alert and oriented to person, place, and time  He has normal reflexes  Skin: Skin is dry  Large lipoma on the left side of the lower back   Psychiatric: He has a normal mood and affect   His behavior is normal  Judgment and thought content normal          Recent Results (from the past 672 hour(s))   Basic metabolic panel    Collection Time: 08/09/19 10:48 AM   Result Value Ref Range    Glucose, Random 97 65 - 99 mg/dL    BUN 20 7 - 25 mg/dL    Creatinine 1 02 0 70 - 1 18 mg/dL    eGFR Non  70 > OR = 60 mL/min/1 73m2    eGFR  81 > OR = 60 mL/min/1 73m2    SL AMB BUN/CREATININE RATIO NOT APPLICABLE 6 - 22 (calc)    Sodium 141 135 - 146 mmol/L    Potassium 4 3 3 5 - 5 3 mmol/L    Chloride 104 98 - 110 mmol/L    CO2 26 20 - 32 mmol/L    SL AMB CALCIUM 8 7 8 6 - 10 3 mg/dL   Lyme Antibody Profile with reflex to WB    Collection Time: 08/09/19 10:48 AM   Result Value Ref Range    SL AMB LYME AB SCREEN <0 90 index   Uric acid    Collection Time: 08/09/19 10:48 AM   Result Value Ref Range    Uric Acid 5 3 4 0 - 8 0 mg/dL   CBC and differential    Collection Time: 08/09/19 10:48 AM   Result Value Ref Range    White Blood Cell Count 10 2 3 8 - 10 8 Thousand/uL    Red Blood Cell Count 5 23 4 20 - 5 80 Million/uL    Hemoglobin 16 4 13 2 - 17 1 g/dL    HCT 49 6 38 5 - 50 0 %    MCV 94 8 80 0 - 100 0 fL    MCH 31 4 27 0 - 33 0 pg    MCHC 33 1 32 0 - 36 0 g/dL    RDW 13 4 11 0 - 15 0 %    Platelet Count 214 296 - 400 Thousand/uL    SL AMB MPV 10 5 7 5 - 12 5 fL    Neutrophils (Absolute) 8,150 (H) 1,500 - 7,800 cells/uL    Lymphocytes (Absolute) 1,418 850 - 3,900 cells/uL    Monocytes (Absolute) 561 200 - 950 cells/uL    Eosinophils (Absolute) 20 15 - 500 cells/uL    Basophils ABS 51 0 - 200 cells/uL    Neutrophils 79 9 %    Lymphocytes 13 9 %    Monocytes 5 5 %    Eosinophils 0 2 %    Basophils PCT 0 5 %    RBC Morphology  NORMAL

## 2019-08-26 NOTE — ASSESSMENT & PLAN NOTE
Is repeat CT scan with high-resolution was positive for interstitial lung disease he will be followed up by the Pulmonary as of right now he is not short of breath he does not have any chest pain and cough is better

## 2019-08-26 NOTE — ASSESSMENT & PLAN NOTE
Most likely a benign positional vertigo CT scan of the head was done with contrast which was negative will continue to observe

## 2019-08-26 NOTE — ASSESSMENT & PLAN NOTE
Orthostatic hypotension, which is better will continue with the same medication no changes no episodes of fall, still lightheaded when he tried to get up from the bed or from the bending position

## 2019-08-29 ENCOUNTER — HOSPITAL ENCOUNTER (OUTPATIENT)
Dept: RADIOLOGY | Age: 79
Discharge: HOME | End: 2019-08-29
Payer: COMMERCIAL

## 2019-08-29 DIAGNOSIS — K11.8 PAROTID MASS: ICD-10-CM

## 2019-08-29 PROCEDURE — 76536 US EXAM OF HEAD AND NECK: CPT

## 2019-08-30 ENCOUNTER — TELEPHONE (OUTPATIENT)
Dept: INTERNAL MEDICINE CLINIC | Facility: CLINIC | Age: 79
End: 2019-08-30

## 2019-08-30 NOTE — TELEPHONE ENCOUNTER
I called and spoke with patient's wife and patient and explained the result of the ultrasound of the neck to them  I told them to come in and see Dr Elmer Saldaña as soon as possible so that he can put in an order for a biopsy of the parotid mass  They expressed their understanding and agreement with the plan    They will call and make an appointment as soon as possible

## 2019-09-03 ENCOUNTER — OFFICE VISIT (OUTPATIENT)
Dept: INTERNAL MEDICINE CLINIC | Age: 79
End: 2019-09-03
Payer: COMMERCIAL

## 2019-09-03 VITALS
TEMPERATURE: 99.7 F | SYSTOLIC BLOOD PRESSURE: 134 MMHG | OXYGEN SATURATION: 96 % | WEIGHT: 217.4 LBS | DIASTOLIC BLOOD PRESSURE: 84 MMHG | HEIGHT: 66 IN | HEART RATE: 86 BPM | BODY MASS INDEX: 34.94 KG/M2

## 2019-09-03 DIAGNOSIS — I83.812 VARICOSE VEINS OF LEFT LOWER EXTREMITY WITH PAIN: ICD-10-CM

## 2019-09-03 DIAGNOSIS — M79.89 LEFT LEG SWELLING: ICD-10-CM

## 2019-09-03 DIAGNOSIS — K11.8 PAROTID MASS: Primary | ICD-10-CM

## 2019-09-03 PROCEDURE — 99214 OFFICE O/P EST MOD 30 MIN: CPT | Performed by: INTERNAL MEDICINE

## 2019-09-03 NOTE — ASSESSMENT & PLAN NOTE
Bilateral parotid enlargement right parotid gland have mass which need to be biopsied I am little bit concerned and confuse because if it is a vascular mass it may increase the risk of bleeding but I will leave it on the radiologist to make the decision

## 2019-09-03 NOTE — PROGRESS NOTES
Assessment/Plan:    Parotid mass  Bilateral parotid enlargement right parotid gland have mass which need to be biopsied I am little bit concerned and confuse because if it is a vascular mass it may increase the risk of bleeding but I will leave it on the radiologist to make the decision       Diagnoses and all orders for this visit:    Parotid mass  -     IR consult; Future    Other orders  -     Triamcinolone Acetonide (NASACORT AQ NA); into each nostril 2 (two) times a day as needed        Subjective:   Parotid mass   Patient ID: Farnaz Mathew  is a 67 y o  male  HPI  Patient is here for the parotid mass and for the consultation by the interventional radiology he had a right parotid mass which is looks like vascular also he had left leg swelling and the pain with varicose vein suggestive of thrombophlebitis he will get the venous Doppler  The following portions of the patient's history were reviewed and updated as appropriate: allergies, current medications, past family history, past medical history, past social history, past surgical history and problem list     Review of Systems   Constitutional: Negative for appetite change, fatigue and fever  HENT: Negative for congestion, ear pain, hearing loss, nosebleeds, sneezing, tinnitus and voice change  Eyes: Negative for pain, discharge and redness  Respiratory: Negative for cough, chest tightness and wheezing  Cardiovascular: Negative for chest pain, palpitations and leg swelling  Swollen left leg with the varicose veins tender when suggestive of phlebitis we will need to do the vascular Doppler to rule out DVT patient has history of DVT many years   Gastrointestinal: Negative for abdominal pain, blood in stool, constipation, diarrhea, nausea and vomiting  Genitourinary: Negative for difficulty urinating, dysuria, hematuria and urgency  Musculoskeletal: Negative for arthralgias, back pain, gait problem and joint swelling     Skin: Negative for rash and wound  Left leg swelling with the chronic venous insufficiency with varicose veins   Allergic/Immunologic: Negative for environmental allergies  Neurological: Positive for dizziness  Negative for tremors, seizures, weakness, light-headedness and numbness  Hematological: Negative for adenopathy  Does not bruise/bleed easily  Psychiatric/Behavioral: Negative for behavioral problems and confusion  The patient is not nervous/anxious  Past Medical History:   Diagnosis Date    Abnormal electrocardiogram     Last assessed: Oct 11, 2013    Allergic rhinitis     last assessed: Oct 11, 2013    Allergic rhinitis due to pollen     last assessed: Oct 10, 2013    Allergic sinusitis     Last assessed: May 11, 2015    Allergy     resolved: July 22, 2015    Benign essential hypertension     Last assessed/resolved: May 31, 2017    BPH (benign prostatic hyperplasia)     Colitis     Last assessed: May 24, 2016    Generalized osteoarthritis     Last assessed: Oct 11, 2013    GERD without esophagitis     Last assessed/resolved: May 31, 2017    Hearing loss     Hiatal hernia     resolved: July 22, 2015    History of gastroesophageal reflux (GERD)     History of stomach ulcers     last assessed: May 11, 2015    Hypertension     Incomplete bladder emptying     Kidney stone     Nodular prostate with lower urinary tract symptoms     Nontoxic single thyroid nodule     last assessed: April 16, 2014    Orchalgia     Overweight     last assessed: Oct 31, 2013    Poor urinary stream     Pulmonary embolism [facility])     Salivary gland disorder     last assessed: April 16, 2014    Seasonal allergies     last assessed: May 15, 2015    Spermatocele     Straining to void     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:  May 7, 2014         Current Outpatient Medications:     diltiazem (CARDIZEM CD) 180 mg 24 hr capsule, Take 1 capsule (180 mg total) by mouth daily, Disp: 30 capsule, Rfl: 3    finasteride (PROSCAR) 5 mg tablet, Take 1 tablet (5 mg total) by mouth daily, Disp: 90 tablet, Rfl: 2    montelukast (SINGULAIR) 10 mg tablet, Take 10 mg by mouth daily at bedtime, Disp: , Rfl:     pantoprazole (PROTONIX) 40 mg tablet, Daily, Disp: , Rfl:     tamsulosin (FLOMAX) 0 4 mg, Take 1 capsule (0 4 mg total) by mouth daily with dinner, Disp: 90 capsule, Rfl: 3    traMADol-acetaminophen (ULTRACET) 37 5-325 mg per tablet, Take 2 tablets by mouth every 6 (six) hours as needed for mild pain, Disp: 60 tablet, Rfl: 0    Triamcinolone Acetonide (NASACORT AQ NA), into each nostril 2 (two) times a day as needed, Disp: , Rfl:     Azelastine HCl 0 15 % SOLN, 2 sprays into each nostril, Disp: , Rfl:     cephalexin (KEFLEX) 500 mg capsule, Take 1 capsule (500 mg total) by mouth every 12 (twelve) hours for 2 days Begin taking the first dose the day of the procedure , Disp: 4 capsule, Rfl: 0    fluticasone (FLONASE) 50 mcg/act nasal spray, fluticasone propionate 50 mcg/actuation nasal spray,suspension, Disp: , Rfl:     lidocaine (LMX) 4 % cream, Apply topically as needed for mild pain (Patient not taking: Reported on 9/3/2019), Disp: 30 g, Rfl: 0    predniSONE 10 mg tablet, 8 TABS DAILY DAY 1, DECREASE BY 1 TABLET DAILY UNTIL FINISHED, Disp: , Rfl: 0    sodium chloride (OCEAN) 0 65 % nasal spray, 2 sprays into each nostril as needed  , Disp: , Rfl:     Allergies   Allergen Reactions    Ace Inhibitors     Molds & Smuts     Other     Pollen Extract     Short Ragweed Pollen Ext     Tree Extract        Social History   Past Surgical History:   Procedure Laterality Date    BLADDER SURGERY      CHOLECYSTECTOMY  2000    laparoscopic     HEMORRHOID SURGERY      pilionidal cyst removal     HERNIA REPAIR Left 01/17/2008    inguinal     KNEE ARTHROSCOPY Left 1974    KNEE CARTILAGE SURGERY      NASAL SEPTUM SURGERY      Deviation repair     PROSTATE BIOPSY  2017    STOMACH SURGERY      TONSILLECTOMY AND ADENOIDECTOMY at age 37   [address] [id number] [address] - right 01/04 0 left 01/95    VASECTOMY       Family History   Problem Relation Age of Onset    Hypertension Mother     Stroke Mother     Stomach cancer Father     Hypertension Father     Hypertension Family     Stroke Family         syndrome        Objective:  /84 (BP Location: Left arm, Patient Position: Sitting, Cuff Size: Standard)   Pulse 86   Temp 99 7 °F (37 6 °C) (Tympanic)   Ht 5' 6 25" (1 683 m) Comment: shoes off  Wt 98 6 kg (217 lb 6 4 oz) Comment: shoes off  SpO2 96%   BMI 34 82 kg/m²        Physical Exam   Constitutional: He is oriented to person, place, and time  He appears well-developed and well-nourished  HENT:   Right Ear: External ear normal    Mouth/Throat: Oropharynx is clear and moist    Eyes: Pupils are equal, round, and reactive to light  Conjunctivae and EOM are normal    Neck: Normal range of motion  No JVD present  No thyromegaly present  Bilateral parotid enlargement right greater than left   Cardiovascular: Normal rate, regular rhythm, normal heart sounds and intact distal pulses  Tender varicosities near the popliteal area and surrounding no warmth no redness suggestive of thrombophlebitis we will get the venous Doppler to rule out DVT   Pulmonary/Chest: Breath sounds normal    Abdominal: Soft  Bowel sounds are normal    Musculoskeletal: Normal range of motion  Lymphadenopathy:     He has no cervical adenopathy  Neurological: He is alert and oriented to person, place, and time  He has normal reflexes  Skin: Skin is dry  Varicose vein and chronic venous insufficiency and hyperpigmentation   Psychiatric: He has a normal mood and affect   His behavior is normal  Judgment and thought content normal          Recent Results (from the past 672 hour(s))   Basic metabolic panel    Collection Time: 08/09/19 10:48 AM   Result Value Ref Range    Glucose, Random 97 65 - 99 mg/dL    BUN 20 7 - 25 mg/dL    Creatinine 1 02 0 70 - 1 18 mg/dL    eGFR Non  70 > OR = 60 mL/min/1 73m2    eGFR  81 > OR = 60 mL/min/1 73m2    SL AMB BUN/CREATININE RATIO NOT APPLICABLE 6 - 22 (calc)    Sodium 141 135 - 146 mmol/L    Potassium 4 3 3 5 - 5 3 mmol/L    Chloride 104 98 - 110 mmol/L    CO2 26 20 - 32 mmol/L    SL AMB CALCIUM 8 7 8 6 - 10 3 mg/dL   Lyme Antibody Profile with reflex to WB    Collection Time: 08/09/19 10:48 AM   Result Value Ref Range    SL AMB LYME AB SCREEN <0 90 index   Uric acid    Collection Time: 08/09/19 10:48 AM   Result Value Ref Range    Uric Acid 5 3 4 0 - 8 0 mg/dL   CBC and differential    Collection Time: 08/09/19 10:48 AM   Result Value Ref Range    White Blood Cell Count 10 2 3 8 - 10 8 Thousand/uL    Red Blood Cell Count 5 23 4 20 - 5 80 Million/uL    Hemoglobin 16 4 13 2 - 17 1 g/dL    HCT 49 6 38 5 - 50 0 %    MCV 94 8 80 0 - 100 0 fL    MCH 31 4 27 0 - 33 0 pg    MCHC 33 1 32 0 - 36 0 g/dL    RDW 13 4 11 0 - 15 0 %    Platelet Count 824 327 - 400 Thousand/uL    SL AMB MPV 10 5 7 5 - 12 5 fL    Neutrophils (Absolute) 8,150 (H) 1,500 - 7,800 cells/uL    Lymphocytes (Absolute) 1,418 850 - 3,900 cells/uL    Monocytes (Absolute) 561 200 - 950 cells/uL    Eosinophils (Absolute) 20 15 - 500 cells/uL    Basophils ABS 51 0 - 200 cells/uL    Neutrophils 79 9 %    Lymphocytes 13 9 %    Monocytes 5 5 %    Eosinophils 0 2 %    Basophils PCT 0 5 %    RBC Morphology  NORMAL

## 2019-09-04 ENCOUNTER — TELEPHONE (OUTPATIENT)
Dept: INTERNAL MEDICINE CLINIC | Facility: CLINIC | Age: 79
End: 2019-09-04

## 2019-09-04 ENCOUNTER — HOSPITAL ENCOUNTER (OUTPATIENT)
Dept: NON INVASIVE DIAGNOSTICS | Facility: CLINIC | Age: 79
Discharge: HOME | End: 2019-09-04
Payer: COMMERCIAL

## 2019-09-04 DIAGNOSIS — I83.812 VARICOSE VEINS OF LEFT LOWER EXTREMITY WITH PAIN: ICD-10-CM

## 2019-09-04 PROCEDURE — 93971 EXTREMITY STUDY: CPT

## 2019-09-04 PROCEDURE — 93971 EXTREMITY STUDY: CPT | Performed by: SURGERY

## 2019-09-04 NOTE — TELEPHONE ENCOUNTER
Patients wife called stating she is expecting a call from you  Its regarding his doppler,  she was told he did have a blood clot so now she  Is worried  Please call asap

## 2019-09-23 DIAGNOSIS — M25.50 ARTHRALGIA, UNSPECIFIED JOINT: ICD-10-CM

## 2019-09-24 ENCOUNTER — APPOINTMENT (OUTPATIENT)
Dept: LAB | Facility: HOSPITAL | Age: 79
End: 2019-09-24
Payer: COMMERCIAL

## 2019-09-24 PROCEDURE — G0416 PROSTATE BIOPSY, ANY MTHD: HCPCS

## 2019-09-24 PROCEDURE — 88173 CYTOPATH EVAL FNA REPORT: CPT

## 2019-09-25 ENCOUNTER — HOSPITAL ENCOUNTER (OUTPATIENT)
Dept: CT IMAGING | Facility: HOSPITAL | Age: 79
Discharge: HOME | End: 2019-09-25
Attending: INTERNAL MEDICINE
Payer: COMMERCIAL

## 2019-09-25 ENCOUNTER — OFFICE VISIT (OUTPATIENT)
Dept: INTERNAL MEDICINE CLINIC | Facility: CLINIC | Age: 79
End: 2019-09-25
Payer: COMMERCIAL

## 2019-09-25 ENCOUNTER — TELEPHONE (OUTPATIENT)
Dept: OTHER | Facility: OTHER | Age: 79
End: 2019-09-25

## 2019-09-25 VITALS
DIASTOLIC BLOOD PRESSURE: 92 MMHG | BODY MASS INDEX: 34.37 KG/M2 | SYSTOLIC BLOOD PRESSURE: 130 MMHG | HEIGHT: 67 IN | TEMPERATURE: 98.2 F | HEART RATE: 74 BPM | WEIGHT: 219 LBS | OXYGEN SATURATION: 97 %

## 2019-09-25 DIAGNOSIS — R10.32 LEFT LOWER QUADRANT PAIN: Primary | ICD-10-CM

## 2019-09-25 DIAGNOSIS — R10.32 LEFT LOWER QUADRANT PAIN: ICD-10-CM

## 2019-09-25 PROCEDURE — 3288F FALL RISK ASSESSMENT DOCD: CPT | Performed by: INTERNAL MEDICINE

## 2019-09-25 PROCEDURE — 1100F PTFALLS ASSESS-DOCD GE2>/YR: CPT | Performed by: INTERNAL MEDICINE

## 2019-09-25 PROCEDURE — 99214 OFFICE O/P EST MOD 30 MIN: CPT | Performed by: INTERNAL MEDICINE

## 2019-09-25 PROCEDURE — 74177 CT ABD & PELVIS W/CONTRAST: CPT

## 2019-09-25 RX ADMIN — IOHEXOL 100 ML: 350 INJECTION, SOLUTION INTRAVENOUS at 19:59

## 2019-09-25 RX ADMIN — IOHEXOL 50 ML: 240 INJECTION, SOLUTION INTRATHECAL; INTRAVASCULAR; INTRAVENOUS; ORAL at 19:59

## 2019-09-25 NOTE — PROGRESS NOTES
Assessment/Plan:  BMI Counseling: Body mass index is 34 82 kg/m²  The BMI is above normal  Nutrition recommendations include reducing portion sizes, decreasing overall calorie intake, 3-5 servings of fruits/vegetables daily, reducing fast food intake, consuming healthier snacks, decreasing soda and/or juice intake and moderation in carbohydrate intake  Exercise recommendations include exercising 3-5 times per week  Pharmacotherapy was ordered for patient to aid in weight loss  Patient referred to nutritionist due to patient being overweight  Patient referred to weight management due to patient being obese  No problem-specific Assessment & Plan notes found for this encounter  Diagnoses and all orders for this visit:    Left lower quadrant pain  -     CT abdomen pelvis w contrast; Future stat        Subjective:   Left lower quadrant and suprapubic abdominal pain of sudden onset   Patient ID: Diamante Adrian  is a 67 y o  male  HPI  PRESENTED WITH THE SUDDEN ONSET of the left lower quadrant pain last night when he was turning in the bed and he had a sudden onset of the left lower quadrant sharp pain he went to sleep but when he woke up he had a pain slowly the pain got worse during the day course of the day he was thinking that the pain will get better he presented with pain he is having lot of pain when he tried to get up from the bed from the chair on lying down and even having little bit of movement on that side he denying any fever or chills  He did not take any pain medications    The following portions of the patient's history were reviewed and updated as appropriate: allergies, current medications, past family history, past medical history, past social history, past surgical history and problem list     Review of Systems   Gastrointestinal: Positive for abdominal distention          Left lower quadrant pain         Past Medical History:   Diagnosis Date    Abnormal electrocardiogram     Last assessed: Oct 11, 2013    Allergic rhinitis     last assessed: Oct 11, 2013    Allergic rhinitis due to pollen     last assessed: Oct 10, 2013    Allergic sinusitis     Last assessed: May 11, 2015    Allergy     resolved: July 22, 2015    Benign essential hypertension     Last assessed/resolved: May 31, 2017    BPH (benign prostatic hyperplasia)     Colitis     Last assessed: May 24, 2016    Generalized osteoarthritis     Last assessed: Oct 11, 2013    GERD without esophagitis     Last assessed/resolved: May 31, 2017    Hearing loss     Hiatal hernia     resolved: July 22, 2015    History of gastroesophageal reflux (GERD)     History of stomach ulcers     last assessed: May 11, 2015    Hypertension     Incomplete bladder emptying     Kidney stone     Nodular prostate with lower urinary tract symptoms     Nontoxic single thyroid nodule     last assessed: April 16, 2014    Orchalgia     Overweight     last assessed: Oct 31, 2013    Poor urinary stream     [facility]monary [facility]ism [facility])     Salivary gland disorder     last assessed: April 16, 2014    Seasonal allergies     last assessed: May 15, 2015    Spermatocele     Straining to void     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:  May 7, 2014         Current Outpatient Medications:     aspirin 81 mg chewable tablet, Chew 81 mg daily, Disp: , Rfl:     diltiazem (CARDIZEM CD) 180 mg 24 hr capsule, Take 1 capsule (180 mg total) by mouth daily, Disp: 30 capsule, Rfl: 3    fexofenadine (ALLEGRA) 180 MG tablet, Take 180 mg by mouth daily, Disp: , Rfl:     finasteride (PROSCAR) 5 mg tablet, Take 1 tablet (5 mg total) by mouth daily, Disp: 90 tablet, Rfl: 2    montelukast (SINGULAIR) 10 mg tablet, Take 10 mg by mouth daily at bedtime, Disp: , Rfl:     tamsulosin (FLOMAX) 0 4 mg, Take 1 capsule (0 4 mg total) by mouth daily with dinner, Disp: 90 capsule, Rfl: 3    Triamcinolone Acetonide (NASACORT AQ NA), into each nostril 2 (two) times a day as needed, Disp: , Rfl:     Allergies   Allergen Reactions    Ace Inhibitors     Molds & Smuts     Other     Pollen Extract     Short Ragweed Pollen Ext     Tree Extract        Social History   Past Surgical History:   Procedure Laterality Date    BLADDER SURGERY      CHOLECYSTECTOMY  2000    laparoscopic     HEMORRHOID SURGERY      pilionidal cyst removal     HERNIA REPAIR Left 01/17/2008    inguinal     KNEE ARTHROSCOPY Left 1974    KNEE CARTILAGE SURGERY      NASAL SEPTUM SURGERY      Deviation repair     PROSTATE BIOPSY  2017    STOMACH SURGERY      TONSILLECTOMY AND ADENOIDECTOMY      at age 37   [facility] TOTAL SHOULDER ARTHROPLASTY      SHOULDER JOINT REPLACEMENT - right 01/04 0 left 01/95    VASECTOMY       Family History   Problem Relation Age of Onset    Hypertension Mother     Stroke Mother     Stomach cancer Father     Hypertension Father     Hypertension Family     Stroke Family         syndrome        Objective:  /92 (BP Location: Left arm, Patient Position: Sitting, Cuff Size: Adult)   Pulse 74   Temp 98 2 °F (36 8 °C) (Oral)   Ht 5' 6 5" (1 689 m)   Wt 99 3 kg (219 lb)   SpO2 97%   BMI 34 82 kg/m²        Physical Exam   Constitutional: He is oriented to person, place, and time  He appears well-developed and well-nourished  HENT:   Right Ear: External ear normal    Mouth/Throat: Oropharynx is clear and moist    Eyes: Pupils are equal, round, and reactive to light  Conjunctivae and EOM are normal    Neck: Normal range of motion  No JVD present  No thyromegaly present  Cardiovascular: Normal rate, regular rhythm, normal heart sounds and intact distal pulses  Pulmonary/Chest: Breath sounds normal    Abdominal: Soft   Bowel sounds are normal    Extremely tender left lower quadrant and suprapubic area he had an incisional hernia in the midline when he had a surgery for his the stomach ulcer many years ago bowel sounds are very sluggish I do not see any signs of strangulation but he also have rectus hernia  Also positive for rebound   Musculoskeletal: Normal range of motion  Lymphadenopathy:     He has no cervical adenopathy  Neurological: He is alert and oriented to person, place, and time  He has normal reflexes  Skin: Skin is dry  Psychiatric: He has a normal mood and affect  His behavior is normal  Judgment and thought content normal          No results found for this or any previous visit (from the past 672 hour(s))

## 2019-09-25 NOTE — ASSESSMENT & PLAN NOTE
PRESENTED WITH THE SUDDEN ONSET of the left lower quadrant pain last night when he was turning in the bed and he had a sudden onset of the left lower quadrant sharp pain he went to sleep but when he woke up he had a pain slowly the pain got worse during the day course of the day he was thinking that the pain will get better he presented with pain he is having lot of pain when he tried to get up from the bed from the chair on lying down and even having little bit of movement on that side he denying any fever or chills

## 2019-09-26 DIAGNOSIS — K57.92 ACUTE DIVERTICULITIS: Primary | ICD-10-CM

## 2019-09-26 RX ORDER — METRONIDAZOLE 500 MG/1
500 TABLET ORAL EVERY 8 HOURS SCHEDULED
Qty: 30 TABLET | Refills: 0 | Status: SHIPPED | OUTPATIENT
Start: 2019-09-26 | End: 2019-10-06

## 2019-09-26 RX ORDER — CIPROFLOXACIN 500 MG/1
500 TABLET, FILM COATED ORAL EVERY 12 HOURS SCHEDULED
Qty: 20 TABLET | Refills: 0 | Status: SHIPPED | OUTPATIENT
Start: 2019-09-26 | End: 2019-10-06

## 2019-10-22 ENCOUNTER — OFFICE VISIT (OUTPATIENT)
Dept: INTERNAL MEDICINE CLINIC | Facility: CLINIC | Age: 79
End: 2019-10-22
Payer: COMMERCIAL

## 2019-10-22 VITALS
HEIGHT: 67 IN | DIASTOLIC BLOOD PRESSURE: 88 MMHG | SYSTOLIC BLOOD PRESSURE: 136 MMHG | HEART RATE: 70 BPM | OXYGEN SATURATION: 97 % | WEIGHT: 220 LBS | BODY MASS INDEX: 34.53 KG/M2 | TEMPERATURE: 98.4 F

## 2019-10-22 DIAGNOSIS — G60.9 IDIOPATHIC PERIPHERAL NEUROPATHY: ICD-10-CM

## 2019-10-22 DIAGNOSIS — K21.9 GASTROESOPHAGEAL REFLUX DISEASE WITHOUT ESOPHAGITIS: Primary | ICD-10-CM

## 2019-10-22 DIAGNOSIS — I10 BENIGN ESSENTIAL HYPERTENSION: ICD-10-CM

## 2019-10-22 DIAGNOSIS — J84.9 INTERSTITIAL LUNG DISEASE (HCC): ICD-10-CM

## 2019-10-22 PROBLEM — R25.2 LEG CRAMPS: Status: RESOLVED | Noted: 2019-01-08 | Resolved: 2019-10-22

## 2019-10-22 PROBLEM — M25.569 KNEE PAIN: Status: RESOLVED | Noted: 2019-06-03 | Resolved: 2019-10-22

## 2019-10-22 PROBLEM — C61 PROSTATE CANCER (HCC): Status: RESOLVED | Noted: 2017-11-01 | Resolved: 2019-10-22

## 2019-10-22 PROBLEM — B07.8 OTHER VIRAL WARTS: Status: RESOLVED | Noted: 2018-11-13 | Resolved: 2019-10-22

## 2019-10-22 PROBLEM — M94.20 CHONDROMALACIA: Status: RESOLVED | Noted: 2019-06-03 | Resolved: 2019-10-22

## 2019-10-22 PROBLEM — K11.8 PAROTID MASS: Status: RESOLVED | Noted: 2019-09-03 | Resolved: 2019-10-22

## 2019-10-22 PROBLEM — N64.4 NIPPLE TENDERNESS: Status: RESOLVED | Noted: 2018-10-08 | Resolved: 2019-10-22

## 2019-10-22 PROBLEM — R04.2: Status: RESOLVED | Noted: 2019-03-05 | Resolved: 2019-10-22

## 2019-10-22 PROBLEM — T75.3XXA MOTION SICKNESS: Status: RESOLVED | Noted: 2018-11-09 | Resolved: 2019-10-22

## 2019-10-22 PROCEDURE — 1125F AMNT PAIN NOTED PAIN PRSNT: CPT | Performed by: INTERNAL MEDICINE

## 2019-10-22 PROCEDURE — 1170F FXNL STATUS ASSESSED: CPT | Performed by: INTERNAL MEDICINE

## 2019-10-22 PROCEDURE — 3075F SYST BP GE 130 - 139MM HG: CPT | Performed by: INTERNAL MEDICINE

## 2019-10-22 PROCEDURE — 99214 OFFICE O/P EST MOD 30 MIN: CPT | Performed by: INTERNAL MEDICINE

## 2019-10-22 PROCEDURE — 3079F DIAST BP 80-89 MM HG: CPT | Performed by: INTERNAL MEDICINE

## 2019-10-22 PROCEDURE — G0439 PPPS, SUBSEQ VISIT: HCPCS | Performed by: INTERNAL MEDICINE

## 2019-10-22 RX ORDER — GABAPENTIN 100 MG/1
100 CAPSULE ORAL 2 TIMES DAILY
Qty: 60 CAPSULE | Refills: 3 | Status: SHIPPED | OUTPATIENT
Start: 2019-10-22 | End: 2020-01-14

## 2019-10-22 NOTE — PROGRESS NOTES
Assessment and Plan:     Problem List Items Addressed This Visit     None           Preventive health issues were discussed with patient, and age appropriate screening tests were ordered as noted in patient's After Visit Summary  Personalized health advice and appropriate referrals for health education or preventive services given if needed, as noted in patient's After Visit Summary  History of Present Illness:     Patient presents for Medicare Annual Wellness visit    Patient Care Team:  Flori Blackmon MD as PCP - General  MD Jake Smith MD as Consulting Physician (Allergy)  Sarah Hahn MD as Consulting Physician (Pulmonary Disease)     Problem List:     Patient Active Problem List   Diagnosis    Benign essential hypertension    Benign prostatic hyperplasia (BPH) with straining on urination    Gastroesophageal reflux disease without esophagitis    Prostate cancer ([address] )    Prostate nodule with urinary obstruction    Seasonal allergic rhinitis due to pollen    Truncal obesity    Luetscher's syndrome    Benign 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    Class 1 obesity in adult    Lightheaded    Need for vaccination against Streptococcus pneumoniae using pneumococcal conjugate vaccine 13    Need for shingles vaccine    Glaucoma screening    Screening for osteoporosis    Screening for AAA (abdominal aortic aneurysm)    Nipple tenderness    Motion sickness    Other viral warts    Leg cramps    Sputum bloody    Interstitial lung disease ([address] )    Localized primary osteoarthritis of wrist    Knee pain    Current tear of medial cartilage or meniscus of knee    Chondromalacia    Pain of right hand    Orthostatic dizziness    Parotid mass    Left lower quadrant pain      Past Medical and Surgical History:     Past Medical History:   Diagnosis Date    Abnormal electrocardiogram     Last assessed:  Oct 11, 2013    Allergic rhinitis last assessed: Oct 11, 2013    Allergic rhinitis due to pollen     last assessed: Oct 10, 2013    Allergic sinusitis     Last assessed: May 11, 2015    Allergy     resolved: July 22, 2015    Benign essential hypertension     Last assessed/resolved: May 31, 2017    BPH (benign prostatic hyperplasia)     Colitis     Last assessed: May 24, 2016    Generalized osteoarthritis     Last assessed: Oct 11, 2013    GERD without esophagitis     Last assessed/resolved: May 31, 2017    Hearing loss     Hiatal hernia     resolved: July 22, 2015    History of gastroesophageal reflux (GERD)     History of stomach ulcers     last assessed: May 11, 2015    Hypertension     Incomplete bladder emptying     Kidney stone     Nodular prostate with lower urinary tract symptoms     Nontoxic single thyroid nodule     last assessed: April 16, 2014    Orchalgia     Overweight     last assessed: Oct 31, 2013    Poor urinary stream     Pulmonary embolism [facility])     Salivary gland disorder     last assessed: April 16, 2014    Seasonal allergies     last assessed: May 15, 2015    Spermatocele     Straining to void     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:  May 7, 2014     Past Surgical History:   Procedure Laterality Date    BLADDER SURGERY      CHOLECYSTECTOMY  2000    laparoscopic     HEMORRHOID SURGERY      pilionidal cyst removal     HERNIA REPAIR Left 01/17/2008    inguinal     KNEE ARTHROSCOPY Left 1974    KNEE CARTILAGE SURGERY      NASAL SEPTUM SURGERY      Deviation repair     PROSTATE BIOPSY  2017    STOMACH SURGERY      TONSILLECTOMY AND ADENOIDECTOMY      at age 37   [facility] TOTAL SHOULDER ARTHROPLASTY      SHOULDER JOINT REPLACEMENT - right 01/04 0 left 01/95    VASECTOMY        Family History:     Family History   Problem Relation Age of Onset    Hypertension Mother     Stroke Mother     Stomach cancer Father     Hypertension Father     Hypertension Family     Stroke Family         syndrome       Social History:     Social History     Socioeconomic History    Marital status: /Civil Union     Spouse name: None    Number of children: None    Years of education: None    Highest education level: None   Occupational History    Occupation: Retired    Social Needs    Financial resource strain: None    Food insecurity:     Worry: None     Inability: None    Transportation needs:     Medical: None     Non-medical: None   Tobacco Use    Smoking status: Former Smoker     Packs/day: 1 00     Years: 25 00     Pack years: 25 00     Types: Cigarettes     Last attempt to quit:      Years since quittin 8    Smokeless tobacco: Never Used   Substance and Sexual Activity    Alcohol use: Yes     Comment: rare    Drug use: No    Sexual activity: None   Lifestyle    Physical activity:     Days per week: None     Minutes per session: None    Stress: None   Relationships    Social connections:     Talks on phone: None     Gets together: None     Attends Islam service: None     Active member of club or organization: None     Attends meetings of clubs or organizations: None     Relationship status: None    Intimate partner violence:     Fear of current or ex partner: None     Emotionally abused: None     Physically abused: None     Forced sexual activity: None   Other Topics Concern    None   Social History Narrative    None       Medications and Allergies:     Current Outpatient Medications   Medication Sig Dispense Refill    aspirin 81 mg chewable tablet Chew 81 mg daily      diltiazem (CARDIZEM CD) 180 mg 24 hr capsule Take 1 capsule (180 mg total) by mouth daily 30 capsule 3    fexofenadine (ALLEGRA) 180 MG tablet Take 180 mg by mouth daily      finasteride (PROSCAR) 5 mg tablet Take 1 tablet (5 mg total) by mouth daily 90 tablet 2    montelukast (SINGULAIR) 10 mg tablet Take 10 mg by mouth daily at bedtime      tamsulosin (FLOMAX) 0 4 mg Take 1 capsule (0 4 mg total) by mouth daily with dinner 90 capsule 3    Triamcinolone Acetonide (NASACORT AQ NA) into each nostril 2 (two) times a day as needed       No current facility-administered medications for this visit  Allergies   Allergen Reactions    Ace Inhibitors     Molds & Smuts     Other     Pollen Extract     Short Ragweed Pollen Ext     Tree Extract       Immunizations:     Immunization History   Administered Date(s) Administered    INFLUENZA 12/12/2005, 10/20/2006, 11/02/2007, 10/17/2008, 09/17/2009, 11/13/2012, 10/08/2014, 10/23/2015, 10/25/2016, 10/17/2017    Influenza Split High Dose Preservative Free IM 10/23/2015, 10/25/2016    Influenza TIV (IM) 09/27/2010, 10/05/2011, 10/01/2013, 10/08/2014    Influenza, high dose seasonal 0 5 mL 09/28/2018    Pneumococcal Conjugate 13-Valent 09/06/2018    Pneumococcal Polysaccharide PPV23 11/01/2006    Tuberculin Skin Test-PPD Intradermal 07/28/2008, 06/03/2015    Zoster 10/07/2013      Health Maintenance:         Topic Date Due    CRC Screening: Colonoscopy  07/14/2020     There are no preventive care reminders to display for this patient  Medicare Health Risk Assessment:     /88 (BP Location: Left arm, Patient Position: Sitting, Cuff Size: Large)   Pulse 70   Temp 98 4 °F (36 9 °C) (Oral)   Ht 5' 6 5" (1 689 m)   Wt 99 8 kg (220 lb)   SpO2 97%   BMI 34 98 kg/m²      Fabiola Gray is here for his Subsequent Wellness visit  Health Risk Assessment:   Patient rates overall health as good  Patient feels that their physical health rating is same  Eyesight was rated as slightly worse  Hearing was rated as slightly worse  Pain experienced in the last 7 days has been some  Patient's pain rating has been 4/10  Patient states that he has experienced no weight loss or gain in last 6 months  Depression Screening:   PHQ-2 Score: 0      Fall Risk Screening:    In the past year, patient has experienced: history of falling in past year    Number of falls: 1  Injured during fall?: No Feels unsteady when standing or walking?: Yes    Worried about falling?: Yes      Home Safety:  Patient does not have trouble with stairs inside or outside of their home  Patient has working smoke alarms and has working carbon monoxide detector  Home safety hazards include: none  Nutrition:   Current diet is Regular  Medications:   Patient is not currently taking any over-the-counter supplements  Patient is able to manage medications  Activities of Daily Living (ADLs)/Instrumental Activities of Daily Living (IADLs):   Walk and transfer into and out of bed and chair?: Yes  Dress and groom yourself?: Yes    Bathe or shower yourself?: Yes    Feed yourself?  Yes  Do your laundry/housekeeping?: Yes  Manage your money, pay your bills and track your expenses?: Yes  Make your own meals?: Yes    Do your own shopping?: Yes    Previous Hospitalizations:   Any hospitalizations or ED visits within the last 12 months?: No      Advance Care Planning:   Living will: No    Durable POA for healthcare: No    Advanced directive: No    Advanced directive counseling given: Yes    End of Life Decisions reviewed with patient: Yes      Cognitive Screening:   Provider or family/friend/caregiver concerned regarding cognition?: No    PREVENTIVE SCREENINGS      Cardiovascular Screening:    General: Screening Current      Diabetes Screening:     General: Screening Current      Colorectal Cancer Screening:     General: Screening Current      Prostate Cancer Screening:    General: History Prostate Cancer and Screening Not Indicated      Abdominal Aortic Aneurysm (AAA) Screening:    Risk factors include: tobacco use        Lung Cancer Screening:     General: Screening Not Indicated      Hepatitis C Screening:    General: Screening Not Indicated      Jack Saucedo MD

## 2019-10-22 NOTE — PROGRESS NOTES
Assessment/Plan:  No problem-specific Assessment & Plan notes found for this encounter  Diagnoses and all orders for this visit:    Falls Plan of Care: balance, strength, and gait training instructions were provided  Medications that increase falls were reviewed  Home safety education provided  Gastroesophageal reflux disease without esophagitis  No gastroesophageal reflux problem right  Interstitial lung disease (HCC)  Shortness of breath is stable  Benign essential hypertension  Hypertension is controlled  Idiopathic peripheral neuropathy    neuropathies no change    Subjective:   Chief Complaint   Patient presents with    Medicare Wellness Visit    Follow-up    Numbness     in feet and toes, feels off balance    Hypertension        Patient ID: Dick Hinojosa  is a 67 y o  male  HPI  This is a very pleasant 67 years young gentleman with history of hypertension, DVT on the left leg chronic venous insufficiency secondary to varicose veins and swelling of both legs presented for the Medicare wellness visit and complaining of leg and the ankle swelling he denying any chest pain or shortness of breath his abdomen is little bit uncomfortable since he has the diverticulitis and also problems with the bowel movement more on the constipation than diarrhea side he had a colonoscopy done in 2015  Blood workup was reviewed also complaining of numbness and tingling in both legs I will give him some Neurontin 100 mg p o  B i d  And see how he does with that and follow up in 3 months  The following portions of the patient's history were reviewed and updated as appropriate: allergies, current medications, past family history, past medical history, past social history, past surgical history and problem list     Review of Systems   Constitutional: Negative for activity change, fatigue and fever  HENT: Negative for congestion, sinus pain and sore throat  Eyes: Negative for discharge     Respiratory: Negative for cough and shortness of breath  Cardiovascular: Positive for leg swelling  Negative for chest pain and palpitations  Edema of the both legs, left is greater than right and edema of the ankle, patient is a bilateral varicose vein left greater than right history of left DVT   Gastrointestinal: Positive for abdominal pain  Negative for constipation, diarrhea and nausea  Genitourinary: Negative for hematuria and urgency  Musculoskeletal: Negative for back pain and gait problem  Ankle pain   Neurological: Negative for dizziness, weakness and light-headedness  Psychiatric/Behavioral: Positive for sleep disturbance  The patient is not nervous/anxious  Past Medical History:   Diagnosis Date    Abnormal electrocardiogram     Last assessed: Oct 11, 2013    Allergic rhinitis     last assessed: Oct 11, 2013    Allergic rhinitis due to pollen     last assessed: Oct 10, 2013    Allergic sinusitis     Last assessed: May 11, 2015    Allergy     resolved: July 22, 2015    Benign essential hypertension     Last assessed/resolved: May 31, 2017    BPH (benign prostatic hyperplasia)     Colitis     Last assessed: May 24, 2016    Generalized osteoarthritis     Last assessed: Oct 11, 2013    GERD without esophagitis     Last assessed/resolved: May 31, 2017    Hearing loss     Hiatal hernia     resolved: July 22, 2015    History of gastroesophageal reflux (GERD)     History of stomach ulcers     last assessed: May 11, 2015    Hypertension     Incomplete bladder emptying     Kidney stone     Nodular prostate with lower urinary tract symptoms     Nontoxic single thyroid nodule     last assessed: April 16, 2014    Orchalgia     Overweight     last assessed:  Oct 31, 2013    Poor urinary stream     Pulmonary embolism [facility])     Salivary gland disorder     last assessed: April 16, 2014    Seasonal allergies     last assessed: May 15, 2015    Spermatocele     Straining to void    [facility] 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:  May 7, 2014         Current Outpatient Medications:     aspirin 81 mg chewable tablet, Chew 81 mg daily, Disp: , Rfl:     diltiazem (CARDIZEM CD) 180 mg 24 hr capsule, Take 1 capsule (180 mg total) by mouth daily, Disp: 30 capsule, Rfl: 3    fexofenadine (ALLEGRA) 180 MG tablet, Take 180 mg by mouth daily, Disp: , Rfl:     finasteride (PROSCAR) 5 mg tablet, Take 1 tablet (5 mg total) by mouth daily, Disp: 90 tablet, Rfl: 2    montelukast (SINGULAIR) 10 mg tablet, Take 10 mg by mouth daily at bedtime, Disp: , Rfl:     tamsulosin (FLOMAX) 0 4 mg, Take 1 capsule (0 4 mg total) by mouth daily with dinner, Disp: 90 capsule, Rfl: 3    Triamcinolone Acetonide (NASACORT AQ NA), into each nostril 2 (two) times a day as needed, Disp: , Rfl:     Allergies   Allergen Reactions    Ace Inhibitors     Molds & Smuts     Other     Pollen Extract     Short Ragweed Pollen Ext     Tree Extract        Social History   Past Surgical History:   Procedure Laterality Date    BLADDER SURGERY      CHOLECYSTECTOMY  2000    laparoscopic     HEMORRHOID SURGERY      pilionidal cyst removal     HERNIA REPAIR Left 01/17/2008    inguinal     KNEE ARTHROSCOPY Left 1974    KNEE CARTILAGE SURGERY      NASAL SEPTUM SURGERY      Deviation repair     PROSTATE BIOPSY  2017    STOMACH SURGERY      TONSILLECTOMY AND ADENOIDECTOMY      at age 37   [facility] TOTAL SHOULDER ARTHROPLASTY      SHOULDER JOINT REPLACEMENT - right 01/04 0 left 01/95    VASECTOMY       Family History   Problem Relation Age of Onset    Hypertension Mother     Stroke Mother     Stomach cancer Father     Hypertension Father     Hypertension Family     Stroke Family         syndrome        Objective:  /88 (BP Location: Left arm, Patient Position: Sitting, Cuff Size: Large)   Pulse 70   Temp 98 4 °F (36 9 °C) (Oral)   Ht 5' 6 5" (1 689 m)   Wt 99 8 kg (220 lb)   SpO2 97%   BMI 34 98 kg/m²        Physical Exam Constitutional: He is oriented to person, place, and time  He appears well-developed and well-nourished  HENT:   Right Ear: External ear normal    Mouth/Throat: Oropharynx is clear and moist    Eyes: Pupils are equal, round, and reactive to light  Conjunctivae and EOM are normal    Neck: Normal range of motion  No JVD present  No thyromegaly present  Cardiovascular: Normal rate, regular rhythm, normal heart sounds and intact distal pulses  Pulmonary/Chest: Breath sounds normal    Abdominal: Soft  Bowel sounds are normal    Musculoskeletal: Normal range of motion  He exhibits edema  Bilateral varicose veins and edema of the left leg greater than right   Lymphadenopathy:     He has no cervical adenopathy  Neurological: He is alert and oriented to person, place, and time  He has normal reflexes  Skin: Skin is dry  Psychiatric: He has a normal mood and affect  His behavior is normal  Judgment and thought content normal          No results found for this or any previous visit (from the past 672 hour(s))

## 2019-10-30 DIAGNOSIS — I10 ESSENTIAL HYPERTENSION: ICD-10-CM

## 2019-10-30 DIAGNOSIS — R42 LIGHTHEADEDNESS: ICD-10-CM

## 2019-10-31 RX ORDER — DILTIAZEM HYDROCHLORIDE 180 MG/1
CAPSULE, COATED, EXTENDED RELEASE ORAL
Qty: 90 CAPSULE | Refills: 1 | Status: SHIPPED | OUTPATIENT
Start: 2019-10-31 | End: 2020-04-27

## 2019-11-25 ENCOUNTER — OFFICE VISIT (OUTPATIENT)
Dept: INTERNAL MEDICINE CLINIC | Facility: CLINIC | Age: 79
End: 2019-11-25
Payer: COMMERCIAL

## 2019-11-25 VITALS
WEIGHT: 222.2 LBS | HEIGHT: 67 IN | DIASTOLIC BLOOD PRESSURE: 88 MMHG | TEMPERATURE: 98.6 F | BODY MASS INDEX: 34.88 KG/M2 | HEART RATE: 77 BPM | SYSTOLIC BLOOD PRESSURE: 122 MMHG | OXYGEN SATURATION: 96 %

## 2019-11-25 DIAGNOSIS — R05.8 PRODUCTIVE COUGH: Primary | ICD-10-CM

## 2019-11-25 PROCEDURE — 1160F RVW MEDS BY RX/DR IN RCRD: CPT | Performed by: INTERNAL MEDICINE

## 2019-11-25 PROCEDURE — 1036F TOBACCO NON-USER: CPT | Performed by: INTERNAL MEDICINE

## 2019-11-25 PROCEDURE — 99213 OFFICE O/P EST LOW 20 MIN: CPT | Performed by: INTERNAL MEDICINE

## 2019-11-25 RX ORDER — AZITHROMYCIN 250 MG/1
TABLET, FILM COATED ORAL
Qty: 6 TABLET | Refills: 0 | Status: SHIPPED | OUTPATIENT
Start: 2019-11-25 | End: 2019-11-29

## 2019-11-25 NOTE — PROGRESS NOTES
Assessment/Plan:    Productive cough  Continue Nasacort and Allegra  Recommend taking guaifenesin and will prescribe azithromycin  Tylenol as needed for costochondritis  He is to contact office for persistent or worsening symptoms  Diagnoses and all orders for this visit:    Productive cough  -     azithromycin (ZITHROMAX) 250 mg tablet; Take 2 tablets today then 1 tablet daily x 4 days                Subjective:      Patient ID: Will Hahn  is a 67 y o  male  67year old male is seen today for 2 weeks of productive cough  He has been having excess mucus (brown) production that is affecting his sleep and breathing  He has been experiencing SOB with coughing  Cough   This is a new problem  The current episode started 1 to 4 weeks ago  The problem has been unchanged  The problem occurs constantly  The cough is productive of brown sputum  Associated symptoms include shortness of breath  Pertinent negatives include no chest pain, chills, ear congestion, ear pain, fever, headaches, heartburn, hemoptysis, myalgias, nasal congestion, postnasal drip, rash, rhinorrhea, sore throat, sweats, weight loss or wheezing  Nothing aggravates the symptoms  Treatments tried: Yolette Ann  Chief Complaint   Patient presents with    Cough     Patient is here c/o cough with discharge for 2 weeks and its getting worse  The following portions of the patient's history were reviewed and updated as appropriate: allergies, current medications, past family history, past medical history, past social history, past surgical history and problem list     Review of Systems   Constitutional: Negative for activity change, appetite change, chills, diaphoresis, fatigue, fever and weight loss  HENT: Negative for congestion, ear pain, postnasal drip, rhinorrhea, sinus pressure, sinus pain, sneezing and sore throat  Eyes: Negative for visual disturbance  Respiratory: Positive for shortness of breath   Negative for apnea, cough, hemoptysis, choking, chest tightness and wheezing  Cardiovascular: Negative for chest pain, palpitations and leg swelling  Gastrointestinal: Negative for abdominal distention, abdominal pain, anal bleeding, blood in stool, constipation, diarrhea, heartburn, nausea and vomiting  Endocrine: Negative for cold intolerance and heat intolerance  Genitourinary: Negative for difficulty urinating, dysuria and hematuria  Musculoskeletal: Negative  Negative for myalgias  Skin: Negative  Negative for rash  Neurological: Negative for dizziness, weakness, light-headedness, numbness and headaches  Hematological: Negative for adenopathy  Psychiatric/Behavioral: Negative for agitation, sleep disturbance and suicidal ideas  All other systems reviewed and are negative  Past Medical History:   Diagnosis Date    Abnormal electrocardiogram     Last assessed: Oct 11, 2013    Allergic rhinitis     last assessed: Oct 11, 2013    Allergic rhinitis due to pollen     last assessed: Oct 10, 2013    Allergic sinusitis     Last assessed: May 11, 2015    Allergy     resolved: July 22, 2015    Benign essential hypertension     Last assessed/resolved: May 31, 2017    BPH (benign prostatic hyperplasia)     Colitis     Last assessed: May 24, 2016    Generalized osteoarthritis     Last assessed: Oct 11, 2013    GERD without esophagitis     Last assessed/resolved: May 31, 2017    Hearing loss     Hiatal hernia     resolved: July 22, 2015    History of gastroesophageal reflux (GERD)     History of stomach ulcers     last assessed: May 11, 2015    Hypertension     Incomplete bladder emptying     Kidney stone     Nodular prostate with lower urinary tract symptoms     Nontoxic single thyroid nodule     last assessed: April 16, 2014    Orchalgia     Overweight     last assessed:  Oct 31, 2013    Poor urinary stream     Pulmonary embolism (HCC)     Salivary gland disorder     last assessed: April 16, 2014    Seasonal allergies     last assessed: May 15, 2015    Spermatocele     Straining to void     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:  May 7, 2014         Current Outpatient Medications:     aspirin 81 mg chewable tablet, Chew 81 mg daily, Disp: , Rfl:     diltiazem (CARDIZEM CD) 180 mg 24 hr capsule, TAKE 1 CAPSULE BY MOUTH EVERY DAY, Disp: 90 capsule, Rfl: 1    fexofenadine (ALLEGRA) 180 MG tablet, Take 180 mg by mouth daily, Disp: , Rfl:     finasteride (PROSCAR) 5 mg tablet, Take 1 tablet (5 mg total) by mouth daily, Disp: 90 tablet, Rfl: 2    gabapentin (NEURONTIN) 100 mg capsule, Take 1 capsule (100 mg total) by mouth 2 (two) times a day, Disp: 60 capsule, Rfl: 3    montelukast (SINGULAIR) 10 mg tablet, Take 10 mg by mouth daily at bedtime, Disp: , Rfl:     tamsulosin (FLOMAX) 0 4 mg, Take 1 capsule (0 4 mg total) by mouth daily with dinner, Disp: 90 capsule, Rfl: 3    Triamcinolone Acetonide (NASACORT AQ NA), into each nostril 2 (two) times a day as needed, Disp: , Rfl:     azithromycin (ZITHROMAX) 250 mg tablet, Take 2 tablets today then 1 tablet daily x 4 days, Disp: 6 tablet, Rfl: 0    Allergies   Allergen Reactions    Ace Inhibitors     Molds & Smuts     Other     Pollen Extract     Short Ragweed Pollen Ext     Tree Extract        Social History   Past Surgical History:   Procedure Laterality Date    BLADDER SURGERY      CHOLECYSTECTOMY  2000    laparoscopic     HEMORRHOID SURGERY      pilionidal cyst removal     HERNIA REPAIR Left 01/17/2008    inguinal     KNEE ARTHROSCOPY Left 1974    KNEE CARTILAGE SURGERY      NASAL SEPTUM SURGERY      Deviation repair     PROSTATE BIOPSY  2017    STOMACH SURGERY      TONSILLECTOMY AND ADENOIDECTOMY      at age 37   [address] [address] [address] - right 01/04 0 left 01/95    VASECTOMY       Family History   Problem Relation Age of Onset    Hypertension Mother     Stroke Mother     Stomach cancer Father     Hypertension Father     Hypertension Family     Stroke Family         syndrome        Objective:  /88 (BP Location: Left arm, Patient Position: Sitting, Cuff Size: Large)   Pulse 77   Temp 98 6 °F (37 °C) (Oral)   Ht 5' 6 5" (1 689 m)   Wt 101 kg (222 lb 3 2 oz)   SpO2 96%   BMI 35 33 kg/m²     No results found for this or any previous visit (from the past 1344 hour(s))  Physical Exam   Constitutional: He is oriented to person, place, and time  He appears well-developed and well-nourished  No distress  HENT:   Head: Normocephalic and atraumatic  Eyes: Pupils are equal, round, and reactive to light  Conjunctivae and EOM are normal  Right eye exhibits no discharge  Left eye exhibits no discharge  No scleral icterus  Neck: Normal range of motion  Neck supple  No JVD present  No thyromegaly present  Cardiovascular: Normal rate, regular rhythm, normal heart sounds and intact distal pulses  Exam reveals no gallop and no friction rub  No murmur heard  Pulmonary/Chest: Effort normal and breath sounds normal  No respiratory distress  He has no wheezes  He has no rales  He exhibits no tenderness  Abdominal: Soft  Bowel sounds are normal  He exhibits no distension and no mass  There is no tenderness  There is no rebound and no guarding  Musculoskeletal: Normal range of motion  He exhibits no edema, tenderness or deformity  Lymphadenopathy:     He has no cervical adenopathy  Neurological: He is alert and oriented to person, place, and time  He has normal reflexes  No cranial nerve deficit  Coordination normal    Skin: Skin is warm and dry  No rash noted  He is not diaphoretic  No erythema  No pallor  Psychiatric: He has a normal mood and affect  His behavior is normal  Judgment and thought content normal    Nursing note and vitals reviewed

## 2019-12-04 ENCOUNTER — OFFICE VISIT (OUTPATIENT)
Dept: INTERNAL MEDICINE CLINIC | Age: 79
End: 2019-12-04
Payer: COMMERCIAL

## 2019-12-04 ENCOUNTER — APPOINTMENT (OUTPATIENT)
Dept: LAB | Age: 79
End: 2019-12-04
Payer: COMMERCIAL

## 2019-12-04 ENCOUNTER — APPOINTMENT (OUTPATIENT)
Dept: RADIOLOGY | Age: 79
End: 2019-12-04
Payer: COMMERCIAL

## 2019-12-04 VITALS
HEART RATE: 83 BPM | DIASTOLIC BLOOD PRESSURE: 70 MMHG | WEIGHT: 218 LBS | HEIGHT: 67 IN | BODY MASS INDEX: 34.21 KG/M2 | TEMPERATURE: 99.5 F | SYSTOLIC BLOOD PRESSURE: 110 MMHG | OXYGEN SATURATION: 91 %

## 2019-12-04 DIAGNOSIS — M25.471 PAIN AND SWELLING OF RIGHT ANKLE: ICD-10-CM

## 2019-12-04 DIAGNOSIS — M25.471 EDEMA OF RIGHT ANKLE: Primary | ICD-10-CM

## 2019-12-04 DIAGNOSIS — M25.571 PAIN AND SWELLING OF RIGHT ANKLE: ICD-10-CM

## 2019-12-04 DIAGNOSIS — M25.471 EDEMA OF RIGHT ANKLE: ICD-10-CM

## 2019-12-04 DIAGNOSIS — M25.50 POLYARTHRALGIA: ICD-10-CM

## 2019-12-04 LAB
BASOPHILS # BLD AUTO: 0.05 THOUSANDS/ΜL (ref 0–0.1)
BASOPHILS NFR BLD AUTO: 1 % (ref 0–1)
CREAT SERPL-MCNC: 1.21 MG/DL (ref 0.6–1.3)
EOSINOPHIL # BLD AUTO: 0.04 THOUSAND/ΜL (ref 0–0.61)
EOSINOPHIL NFR BLD AUTO: 1 % (ref 0–6)
ERYTHROCYTE [DISTWIDTH] IN BLOOD BY AUTOMATED COUNT: 13 % (ref 11.6–15.1)
ERYTHROCYTE [SEDIMENTATION RATE] IN BLOOD: 4 MM/HOUR (ref 0–10)
GFR SERPL CREATININE-BSD FRML MDRD: 57 ML/MIN/1.73SQ M
HCT VFR BLD AUTO: 50.8 % (ref 36.5–49.3)
HGB BLD-MCNC: 16.7 G/DL (ref 12–17)
IMM GRANULOCYTES # BLD AUTO: 0.09 THOUSAND/UL (ref 0–0.2)
IMM GRANULOCYTES NFR BLD AUTO: 1 % (ref 0–2)
LYMPHOCYTES # BLD AUTO: 1.23 THOUSANDS/ΜL (ref 0.6–4.47)
LYMPHOCYTES NFR BLD AUTO: 16 % (ref 14–44)
MCH RBC QN AUTO: 30.3 PG (ref 26.8–34.3)
MCHC RBC AUTO-ENTMCNC: 32.9 G/DL (ref 31.4–37.4)
MCV RBC AUTO: 92 FL (ref 82–98)
MONOCYTES # BLD AUTO: 0.61 THOUSAND/ΜL (ref 0.17–1.22)
MONOCYTES NFR BLD AUTO: 8 % (ref 4–12)
NEUTROPHILS # BLD AUTO: 5.62 THOUSANDS/ΜL (ref 1.85–7.62)
NEUTS SEG NFR BLD AUTO: 73 % (ref 43–75)
NRBC BLD AUTO-RTO: 0 /100 WBCS
PLATELET # BLD AUTO: 203 THOUSANDS/UL (ref 149–390)
PMV BLD AUTO: 10.2 FL (ref 8.9–12.7)
RBC # BLD AUTO: 5.51 MILLION/UL (ref 3.88–5.62)
URATE SERPL-MCNC: 5.8 MG/DL (ref 4.2–8)
WBC # BLD AUTO: 7.64 THOUSAND/UL (ref 4.31–10.16)

## 2019-12-04 PROCEDURE — 85652 RBC SED RATE AUTOMATED: CPT

## 2019-12-04 PROCEDURE — 99214 OFFICE O/P EST MOD 30 MIN: CPT | Performed by: PHYSICIAN ASSISTANT

## 2019-12-04 PROCEDURE — 36415 COLL VENOUS BLD VENIPUNCTURE: CPT

## 2019-12-04 PROCEDURE — 84550 ASSAY OF BLOOD/URIC ACID: CPT

## 2019-12-04 PROCEDURE — 85025 COMPLETE CBC W/AUTO DIFF WBC: CPT

## 2019-12-04 PROCEDURE — 73610 X-RAY EXAM OF ANKLE: CPT

## 2019-12-04 PROCEDURE — 82565 ASSAY OF CREATININE: CPT

## 2019-12-04 RX ORDER — PREDNISONE 10 MG/1
TABLET ORAL
Qty: 30 TABLET | Refills: 0 | Status: SHIPPED | OUTPATIENT
Start: 2019-12-04 | End: 2019-12-27

## 2019-12-04 NOTE — PROGRESS NOTES
Assessment/Plan:         Diagnoses and all orders for this visit:    Edema of right ankle  Comments:  ice to affected area - 10 min intervals   keep elevated as possible  go for xray and labs now   Orders:  -     XR ankle 3+ vw right; Future  -     CBC and differential; Future  -     Uric acid; Future  -     Creatine, Serum; Future  -     Sedimentation rate, automated; Future    Pain and swelling of right ankle  -     XR ankle 3+ vw right; Future  -     CBC and differential; Future  -     Uric acid; Future  -     Creatine, Serum; Future  -     Sedimentation rate, automated; Future  -     predniSONE 10 mg tablet; 4 tabs daily for 3 days then 3 tabs daily x 3 days then 2 tabs daily x 3 days then 1 tab daily for 3 days  Polyarthralgia  -     XR ankle 3+ vw right; Future  -     CBC and differential; Future  -     Uric acid; Future  -     Creatine, Serum; Future  -     Sedimentation rate, automated; Future          Subjective:      Patient ID: Una White  is a 67 y o  male  66 y/o male presents c/o R ankle pain and swelling x 2 days, progressively worsening   Pt states it painful to walk, using walker to help with ambulation   Feels warm to touch + edema present  Pt reports hx of possible gout, had trauma to the R wrist in august, saw ortho for this but developed more swelling and was followed up here to r/o gout, at that time pt had already been treated with pred taper and uric acid was 5 3  We discussed potential risks for gout, pt denies eating foods high in purine recently  He denies etoh use     Pt denies injury or recent overuse         The following portions of the patient's history were reviewed and updated as appropriate: allergies, current medications, past family history, past medical history, past social history, past surgical history and problem list     Review of Systems   Constitutional: Positive for activity change  Negative for appetite change, chills, fatigue and fever     HENT: Negative for congestion, ear pain and postnasal drip  Respiratory: Negative for cough and shortness of breath  Cardiovascular: Positive for leg swelling (ankle edema R)  Negative for chest pain  Gastrointestinal: Negative for abdominal pain, diarrhea, nausea and vomiting  Genitourinary: Negative for dysuria  Musculoskeletal: Positive for arthralgias, gait problem and joint swelling  Skin: Negative for rash and wound  Neurological: Negative for dizziness  Psychiatric/Behavioral: Negative for sleep disturbance  The patient is not nervous/anxious  Past Medical History:   Diagnosis Date    Abnormal electrocardiogram     Last assessed: Oct 11, 2013    Allergic rhinitis     last assessed: Oct 11, 2013    Allergic rhinitis due to pollen     last assessed: Oct 10, 2013    Allergic sinusitis     Last assessed: May 11, 2015    Allergy     resolved: July 22, 2015    Benign essential hypertension     Last assessed/resolved: May 31, 2017    BPH (benign prostatic hyperplasia)     Colitis     Last assessed: May 24, 2016    Generalized osteoarthritis     Last assessed: Oct 11, 2013    GERD without esophagitis     Last assessed/resolved: May 31, 2017    Hearing loss     Hiatal hernia     resolved: July 22, 2015    History of gastroesophageal reflux (GERD)     History of stomach ulcers     last assessed: May 11, 2015    Hypertension     Incomplete bladder emptying     Kidney stone     Nodular prostate with lower urinary tract symptoms     Nontoxic single thyroid nodule     last assessed: April 16, 2014    Orchalgia     Overweight     last assessed: Oct 31, 2013    Poor urinary stream     Pulmonary embolism [facility])     Salivary gland disorder     last assessed: April 16, 2014    Seasonal allergies     last assessed: May 15, 2015    Spermatocele     Straining to void     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:  May 7, 2014         Current Outpatient Medications:     aspirin 81 mg chewable tablet, Chew 81 mg daily, Disp: , Rfl:     diltiazem (CARDIZEM CD) 180 mg 24 hr capsule, TAKE 1 CAPSULE BY MOUTH EVERY DAY, Disp: 90 capsule, Rfl: 1    finasteride (PROSCAR) 5 mg tablet, Take 1 tablet (5 mg total) by mouth daily, Disp: 90 tablet, Rfl: 2    gabapentin (NEURONTIN) 100 mg capsule, Take 1 capsule (100 mg total) by mouth 2 (two) times a day, Disp: 60 capsule, Rfl: 3    montelukast (SINGULAIR) 10 mg tablet, Take 10 mg by mouth daily at bedtime, Disp: , Rfl:     tamsulosin (FLOMAX) 0 4 mg, Take 1 capsule (0 4 mg total) by mouth daily with dinner, Disp: 90 capsule, Rfl: 3    Triamcinolone Acetonide (NASACORT AQ NA), into each nostril 2 (two) times a day as needed, Disp: , Rfl:     predniSONE 10 mg tablet, 4 tabs daily for 3 days then 3 tabs daily x 3 days then 2 tabs daily x 3 days then 1 tab daily for 3 days  , Disp: 30 tablet, Rfl: 0    Allergies   Allergen Reactions    Ace Inhibitors     Molds & Smuts     Other     Pollen Extract     Short Ragweed Pollen Ext     Tree Extract        Social History   Past Surgical History:   Procedure Laterality Date    BLADDER SURGERY      CHOLECYSTECTOMY  2000    laparoscopic     HEMORRHOID SURGERY      pilionidal cyst removal     HERNIA REPAIR Left 01/17/2008    inguinal     KNEE ARTHROSCOPY Left 1974    KNEE CARTILAGE SURGERY      NASAL SEPTUM SURGERY      Deviation repair     PROSTATE BIOPSY  2017    STOMACH SURGERY      TONSILLECTOMY AND ADENOIDECTOMY      at age 37   [facility] TOTAL SHOULDER ARTHROPLASTY      SHOULDER JOINT REPLACEMENT - right 01/04 0 left 01/95    VASECTOMY       Family History   Problem Relation Age of Onset    Hypertension Mother     Stroke Mother     Stomach cancer Father     Hypertension Father     Hypertension Family     Stroke Family         syndrome        Objective:  /70 (BP Location: Left arm, Patient Position: Sitting, Cuff Size: Large)   Pulse 83   Temp 99 5 °F (37 5 °C) (Tympanic)   Ht 5' 6 5" (1 689 m)   Wt 98 9 kg (218 lb)   SpO2 91%   BMI 34 66 kg/m²        Physical Exam   Constitutional: He is oriented to person, place, and time  He appears well-developed and well-nourished  HENT:   Head: Normocephalic and atraumatic  Right Ear: External ear normal    Left Ear: External ear normal    Mouth/Throat: Oropharynx is clear and moist    Eyes: Pupils are equal, round, and reactive to light  Conjunctivae and EOM are normal  Right eye exhibits no discharge  Left eye exhibits no discharge  Neck: Normal range of motion  Neck supple  Cardiovascular: Normal rate and regular rhythm  Pulmonary/Chest: Effort normal and breath sounds normal  No respiratory distress  He has no wheezes  Musculoskeletal: He exhibits edema, tenderness and deformity  R ankle with edema overlying medial and lateral malleolus   No skin changes or tenderness  Mild erythema  No cellulitis or wound  Pain with dorsiflexion and plantar flexion of R ankle      Neurological: He is alert and oriented to person, place, and time  Skin: Skin is warm and dry  He is not diaphoretic  No erythema  Psychiatric: He has a normal mood and affect  His behavior is normal  Judgment and thought content normal    Vitals reviewed

## 2019-12-06 ENCOUNTER — OFFICE VISIT (OUTPATIENT)
Dept: INTERNAL MEDICINE CLINIC | Facility: CLINIC | Age: 79
End: 2019-12-06
Payer: COMMERCIAL

## 2019-12-06 VITALS
SYSTOLIC BLOOD PRESSURE: 140 MMHG | HEIGHT: 67 IN | BODY MASS INDEX: 33.43 KG/M2 | WEIGHT: 213 LBS | TEMPERATURE: 99 F | DIASTOLIC BLOOD PRESSURE: 100 MMHG | HEART RATE: 92 BPM

## 2019-12-06 DIAGNOSIS — R10.31 RIGHT LOWER QUADRANT ABDOMINAL PAIN: ICD-10-CM

## 2019-12-06 DIAGNOSIS — J02.9 PHARYNGITIS, UNSPECIFIED ETIOLOGY: Primary | ICD-10-CM

## 2019-12-06 PROCEDURE — 99213 OFFICE O/P EST LOW 20 MIN: CPT | Performed by: INTERNAL MEDICINE

## 2019-12-06 PROCEDURE — 1036F TOBACCO NON-USER: CPT | Performed by: INTERNAL MEDICINE

## 2019-12-06 PROCEDURE — 1160F RVW MEDS BY RX/DR IN RCRD: CPT | Performed by: INTERNAL MEDICINE

## 2019-12-06 RX ORDER — PROMETHAZINE HYDROCHLORIDE AND CODEINE PHOSPHATE 6.25; 1 MG/5ML; MG/5ML
5 SYRUP ORAL
Qty: 120 ML | Refills: 0 | Status: SHIPPED | OUTPATIENT
Start: 2019-12-06 | End: 2020-01-09 | Stop reason: ALTCHOICE

## 2019-12-06 RX ORDER — AMOXICILLIN AND CLAVULANATE POTASSIUM 875; 125 MG/1; MG/1
1 TABLET, FILM COATED ORAL EVERY 12 HOURS SCHEDULED
Qty: 14 TABLET | Refills: 0 | Status: SHIPPED | OUTPATIENT
Start: 2019-12-06 | End: 2019-12-13

## 2019-12-06 NOTE — PATIENT INSTRUCTIONS
- Drink plenty of fluids  - Get plenty of rest  - You may use salt water gargles for your sore throat  - You may use over the counter tylenol or Ibuprofen (motrin or Advil) for any headache, muscle aches and fever  - Call the office if your symptoms persist beyond a total of 7-10 days        Upper Respiratory Infection   AMBULATORY CARE:   An upper respiratory infection  is also called a common cold  It can affect your nose, throat, ears, and sinuses  Common signs and symptoms include the following:  Cold symptoms are usually worst for the first 3 to 5 days  You may have any of the following:  · Runny or stuffy nose    · Sneezing and coughing    · Sore throat or hoarseness    · Red, watery, and sore eyes    · Fatigue     · Chills and fever    · Headache, body aches, or sore muscles  Seek care immediately if:   · You have chest pain or trouble breathing  Contact your healthcare provider if:   · You have a fever over 102ºF (39°C)  · Your sore throat gets worse or you see white or yellow spots in your throat  · Your symptoms get worse after 3 to 5 days or your cold is not better in 14 days  · You have a rash anywhere on your skin  · You have large, tender lumps in your neck  · You have thick, green or yellow drainage from your nose  · You cough up thick yellow, green, or bloody mucus  · You have vomiting for more than 24 hours and cannot keep fluids down  · You have a bad earache  · You have questions or concerns about your condition or care  Treatment for a cold: There is no cure for the common cold  Colds are caused by viruses and do not get better with antibiotics  Most people get better in 7 to 14 days  You may continue to cough for 2 to 3 weeks  The following may help decrease your symptoms:  · Decongestants  help reduce nasal congestion and help you breathe more easily  If you take decongestant pills, they may make you feel restless or not able to sleep   Do not use decongestant sprays for more than a few days  · Cough suppressants  help reduce coughing  Ask your healthcare provider which type of cough medicine is best for you  · NSAIDs , such as ibuprofen, help decrease swelling, pain, and fever  NSAIDs can cause stomach bleeding or kidney problems in certain people  If you take blood thinner medicine, always ask your healthcare provider if NSAIDs are safe for you  Always read the medicine label and follow directions  · Acetaminophen  decreases pain and fever  It is available without a doctor's order  Ask how much to take and how often to take it  Follow directions  Read the labels of all other medicines you are using to see if they also contain acetaminophen, or ask your doctor or pharmacist  Acetaminophen can cause liver damage if not taken correctly  Do not use more than 4 grams (4,000 milligrams) total of acetaminophen in one day  Manage your cold:   · Rest as much as possible  Slowly start to do more each day  · Drink more liquids as directed  Liquids will help thin and loosen mucus so you can cough it up  Liquids will also help prevent dehydration  Liquids that help prevent dehydration include water, fruit juice, and broth  Do not drink liquids that contain caffeine  Caffeine can increase your risk for dehydration  Ask your healthcare provider how much liquid to drink each day  · Soothe a sore throat  Gargle with warm salt water  This helps your sore throat feel better  Make salt water by dissolving ¼ teaspoon salt in 1 cup warm water  You may also suck on hard candy or throat lozenges  You may use a sore throat spray  · Use a humidifier or vaporizer  Use a cool mist humidifier or a vaporizer to increase air moisture in your home  This may make it easier for you to breathe and help decrease your cough  · Use saline nasal drops as directed  These help relieve congestion  · Apply petroleum-based jelly around the outside of your nostrils    This can decrease irritation from blowing your nose  · Do not smoke  Nicotine and other chemicals in cigarettes and cigars can make your symptoms worse  They can also cause infections such as bronchitis or pneumonia  Ask your healthcare provider for information if you currently smoke and need help to quit  E-cigarettes or smokeless tobacco still contain nicotine  Talk to your healthcare provider before you use these products  Prevent spreading your cold to others:   · Try to stay away from other people during the first 2 to 3 days of your cold when it is more easily spread  · Do not share food or drinks  · Do not share hand towels with household members  · Wash your hands often, especially after you blow your nose  Turn away from other people and cover your mouth and nose with a tissue when you sneeze or cough  Follow up with your healthcare provider as directed:  Write down your questions so you remember to ask them during your visits  © 2017 [address] [employer] Information is for End User's use only and may not be sold, redistributed or otherwise used for commercial purposes  All illustrations and images included in CareNotes® are the copyrighted property of [employer]  or [employer]  The above information is an  only  It is not intended as medical advice for individual conditions or treatments  Talk to your doctor, nurse or pharmacist before following any medical regimen to see if it is safe and effective for you

## 2019-12-06 NOTE — PROGRESS NOTES
Assessment/Plan:    Upper respiratory tract infection with Pharyngitis  - will start patient on Augmentin 875-125 mg twice daily for 7 days  -he has been counseled to continue with Mucinex for his cough and we will start him on promethazine with codeine for insomnia secondary to cough  - the [facility] web site was interrogated and did not show misuse  -patient has been counseled to continue to use over-the-counter Flonase nasal spray for his rhinitis  -he should continue with Tylenol or ibuprofen with meals any aches and pains  -he was counseled to continue to wash his hands liberally, drink lots of fluids and get plenty of rest  - he should return to the office or go to the emergency department if his symptoms worsen or do not improve    Musculoskeletal right quadrant abdominal pain  - very likely musculoskeletal  -patient should use Tylenol as needed     Diagnoses and all orders for this visit:    Pharyngitis, unspecified etiology  -     amoxicillin-clavulanate (AUGMENTIN) 875-125 mg per tablet; Take 1 tablet by mouth every 12 (twelve) hours for 7 days  -     promethazine-codeine (PHENERGAN WITH CODEINE) 6 25-10 mg/5 mL syrup; Take 5 mL by mouth daily at bedtime as needed for cough    Right lower quadrant abdominal pain          Subjective:      Patient ID: Zaid Castaneda  is a 67 y o  male  HPI  Patient presents with complaints of cough which has been persistent for the past 2-3 weeks  He was seen in our office about 2 weeks ago and given some Mucinex with azithromycin and states that he has not improved at all  His cough used to be productive of brownish phlegm but the phlegm is now clear colored  He admits to shortness of breath when he coughs for long time as well as a small area of right lower abdominal pain that started after he coughed a lot time during the night  He admits to insomnia secondary to cough, chills last night and fatigue    Denies fever, night sweats, nausea, vomiting, diarrhea, but admits to constipation since he had a bout of diverticulitis some months ago  He does not smoke, he quit about over 30 years ago  The following portions of the patient's history were reviewed and updated as appropriate:   He  has a past medical history of Abnormal electrocardiogram, Allergic rhinitis, Allergic rhinitis due to pollen, Allergic sinusitis, Allergy, Benign essential hypertension, BPH (benign prostatic hyperplasia), Colitis, Generalized osteoarthritis, GERD without esophagitis, Hearing loss, Hiatal hernia, History of gastroesophageal reflux (GERD), History of stomach ulcers, Hypertension, Incomplete bladder emptying, Kidney stone, Nodular prostate with lower urinary tract symptoms, Nontoxic single thyroid nodule, Orchalgia, Overweight, Poor urinary stream, Pulmonary embolism ([address] ), Salivary gland disorder, Seasonal allergies, Spermatocele, Straining to void, and Warthin tumor    He   Patient Active Problem List    Diagnosis Date Noted    Pharyngitis 12/06/2019    Right lower quadrant abdominal pain 12/06/2019    Productive cough 11/25/2019    Idiopathic peripheral neuropathy 10/22/2019    Left lower quadrant pain 09/25/2019    Orthostatic dizziness 08/15/2019    Pain of right hand 08/08/2019    Localized primary osteoarthritis of wrist 06/03/2019    Current tear of medial cartilage or meniscus of knee 06/03/2019    Interstitial lung disease ([address] ) 03/26/2019    Need for vaccination against Streptococcus pneumoniae using pneumococcal conjugate vaccine 13 09/06/2018    Need for shingles vaccine 09/06/2018    Glaucoma screening 09/06/2018    Screening for osteoporosis 09/06/2018    Screening for AAA (abdominal aortic aneurysm) 09/06/2018    Benign 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 02/28/2018    Class 1 obesity in adult 02/28/2018    Lightheaded 02/28/2018    Benign prostatic hyperplasia (BPH) with straining on urination 09/13/2017    Benign essential hypertension 05/31/2017    Gastroesophageal reflux disease without esophagitis 05/31/2017    Seasonal allergic rhinitis due to pollen 05/31/2017    Truncal obesity 01/13/2017    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     He  has a past surgical history that includes Knee cartilage surgery; Hemorrhoid surgery; Hernia repair (Left, 01/17/2008); Bladder surgery; Stomach surgery; Tonsillectomy and adenoidectomy; Nasal septum surgery; Total shoulder arthroplasty; Knee arthroscopy (Left, 1974); Cholecystectomy (2000); Vasectomy; and Prostate biopsy (2017)  His family history includes Hypertension in his family, father, and mother; Stomach cancer in his father; Stroke in his family and mother  He  reports that he quit smoking about 36 years ago  His smoking use included cigarettes  He has a 25 00 pack-year smoking history  He has never used smokeless tobacco  He reports that he drinks alcohol  He reports that he does not use drugs  Current Outpatient Medications   Medication Sig Dispense Refill    aspirin 81 mg chewable tablet Chew 81 mg daily      diltiazem (CARDIZEM CD) 180 mg 24 hr capsule TAKE 1 CAPSULE BY MOUTH EVERY DAY 90 capsule 1    finasteride (PROSCAR) 5 mg tablet Take 1 tablet (5 mg total) by mouth daily 90 tablet 2    gabapentin (NEURONTIN) 100 mg capsule Take 1 capsule (100 mg total) by mouth 2 (two) times a day 60 capsule 3    montelukast (SINGULAIR) 10 mg tablet Take 10 mg by mouth daily at bedtime      predniSONE 10 mg tablet 4 tabs daily for 3 days then 3 tabs daily x 3 days then 2 tabs daily x 3 days then 1 tab daily for 3 days   30 tablet 0    tamsulosin (FLOMAX) 0 4 mg Take 1 capsule (0 4 mg total) by mouth daily with dinner 90 capsule 3    Triamcinolone Acetonide (NASACORT AQ NA) into each nostril 2 (two) times a day as needed      amoxicillin-clavulanate (AUGMENTIN) 875-125 mg per tablet Take 1 tablet by mouth every 12 (twelve) hours for 7 days 14 tablet 0    promethazine-codeine (PHENERGAN WITH CODEINE) 6 25-10 mg/5 mL syrup Take 5 mL by mouth daily at bedtime as needed for cough 120 mL 0     No current facility-administered medications for this visit  Current Outpatient Medications on File Prior to Visit   Medication Sig    aspirin 81 mg chewable tablet Chew 81 mg daily    diltiazem (CARDIZEM CD) 180 mg 24 hr capsule TAKE 1 CAPSULE BY MOUTH EVERY DAY    finasteride (PROSCAR) 5 mg tablet Take 1 tablet (5 mg total) by mouth daily    gabapentin (NEURONTIN) 100 mg capsule Take 1 capsule (100 mg total) by mouth 2 (two) times a day    montelukast (SINGULAIR) 10 mg tablet Take 10 mg by mouth daily at bedtime    predniSONE 10 mg tablet 4 tabs daily for 3 days then 3 tabs daily x 3 days then 2 tabs daily x 3 days then 1 tab daily for 3 days   tamsulosin (FLOMAX) 0 4 mg Take 1 capsule (0 4 mg total) by mouth daily with dinner    Triamcinolone Acetonide (NASACORT AQ NA) into each nostril 2 (two) times a day as needed     No current facility-administered medications on file prior to visit  He is allergic to ace inhibitors; molds & smuts; other; pollen extract; short ragweed pollen ext; and tree extract       Review of Systems   Constitutional: Positive for chills and fatigue  Negative for activity change, fever and unexpected weight change  HENT: Negative for ear pain, postnasal drip, rhinorrhea, sinus pressure and sore throat  Eyes: Negative for pain  Respiratory: Positive for cough (productive of clear colored phlegm iitially was brown) and shortness of breath  Negative for choking, chest tightness and wheezing  Cardiovascular: Negative for chest pain, palpitations and leg swelling  Gastrointestinal: Positive for abdominal pain  Negative for constipation, diarrhea, nausea and vomiting  Genitourinary: Negative for dysuria and hematuria  Musculoskeletal: Negative for arthralgias, back pain, gait problem, joint swelling, myalgias and neck stiffness     Skin: Negative for pallor and rash  Neurological: Negative for dizziness, tremors, seizures, syncope, light-headedness and headaches  Hematological: Negative for adenopathy  Psychiatric/Behavioral: Positive for sleep disturbance (secondary to coughing)  Negative for behavioral problems  Objective:      /100 (BP Location: Left arm, Patient Position: Sitting, Cuff Size: Large)   Pulse 92   Temp 99 °F (37 2 °C) (Oral)   Ht 5' 6 5" (1 689 m)   Wt 96 6 kg (213 lb)   BMI 33 86 kg/m²          Physical Exam   Constitutional: He is oriented to person, place, and time  He appears well-developed and well-nourished  No distress  HENT:   Head: Normocephalic and atraumatic  Right Ear: External ear normal  Decreased hearing ( patient has bilateral hearing aids with cerumen and some erythema in external auditory canal bilaterally) is noted  Left Ear: External ear normal  Decreased hearing is noted  Nose: Mucosal edema (Nasal mucosa erythema and edema) present  Mouth/Throat: Posterior oropharyngeal edema and posterior oropharyngeal erythema present  No oropharyngeal exudate  Eyes: Pupils are equal, round, and reactive to light  Conjunctivae and EOM are normal  Right eye exhibits no discharge  Left eye exhibits no discharge  No scleral icterus  Neck: Normal range of motion  Neck supple  No JVD present  No tracheal deviation present  No thyromegaly present  Cardiovascular: Normal rate, regular rhythm, normal heart sounds and intact distal pulses  Exam reveals no gallop and no friction rub  No murmur heard  Pulmonary/Chest: Effort normal and breath sounds normal  No respiratory distress  He has no wheezes  He has no rales  He exhibits no tenderness  Abdominal: Soft  Bowel sounds are normal  He exhibits no distension and no mass  There is no tenderness  There is no rebound and no guarding  Musculoskeletal: Normal range of motion   He exhibits edema ( 1+ pitting pedal edema in bilateral lower extremities with chronic venous stasis changes)  He exhibits no tenderness or deformity  Lymphadenopathy:     He has no cervical adenopathy  Neurological: He is alert and oriented to person, place, and time  He has normal reflexes  No cranial nerve deficit  He exhibits normal muscle tone  Coordination normal    Skin: Skin is warm and dry  No rash noted  He is not diaphoretic  No erythema  No pallor  Psychiatric: He has a normal mood and affect   His behavior is normal          Appointment on 12/04/2019   Component Date Value Ref Range Status    WBC 12/04/2019 7 64  4 31 - 10 16 Thousand/uL Final    RBC 12/04/2019 5 51  3 88 - 5 62 Million/uL Final    Hemoglobin 12/04/2019 16 7  12 0 - 17 0 g/dL Final    Hematocrit 12/04/2019 50 8* 36 5 - 49 3 % Final    MCV 12/04/2019 92  82 - 98 fL Final    MCH 12/04/2019 30 3  26 8 - 34 3 pg Final    MCHC 12/04/2019 32 9  31 4 - 37 4 g/dL Final    RDW 12/04/2019 13 0  11 6 - 15 1 % Final    MPV 12/04/2019 10 2  8 9 - 12 7 fL Final    Platelets 16/51/4823 203  149 - 390 Thousands/uL Final    nRBC 12/04/2019 0  /100 WBCs Final    Neutrophils Relative 12/04/2019 73  43 - 75 % Final    Immat GRANS % 12/04/2019 1  0 - 2 % Final    Lymphocytes Relative 12/04/2019 16  14 - 44 % Final    Monocytes Relative 12/04/2019 8  4 - 12 % Final    Eosinophils Relative 12/04/2019 1  0 - 6 % Final    Basophils Relative 12/04/2019 1  0 - 1 % Final    Neutrophils Absolute 12/04/2019 5 62  1 85 - 7 62 Thousands/µL Final    Immature Grans Absolute 12/04/2019 0 09  0 00 - 0 20 Thousand/uL Final    Lymphocytes Absolute 12/04/2019 1 23  0 60 - 4 47 Thousands/µL Final    Monocytes Absolute 12/04/2019 0 61  0 17 - 1 22 Thousand/µL Final    Eosinophils Absolute 12/04/2019 0 04  0 00 - 0 61 Thousand/µL Final    Basophils Absolute 12/04/2019 0 05  0 00 - 0 10 Thousands/µL Final    Uric Acid 12/04/2019 5 8  4 2 - 8 0 mg/dL Final    Specimen collection should occur prior to Metamizole administration due to the potential for falsely depressed results   Sed Rate 12/04/2019 4  0 - 10 mm/hour Final    Creatinine 12/04/2019 1 21  0 60 - 1 30 mg/dL Final    Standardized to IDMS reference method    eGFR 12/04/2019 57  ml/min/1 73sq m Final   Office Visit on 09/24/2019   Component Date Value Ref Range Status    Case Report 09/24/2019    Final                    Value:Non-gynecologic Cytology                          Case: [id number]                                  Authorizing Provider:  Gallo Heredia MD          Collected:           09/24/2019 1521              Ordering Location:     [address] [place] [address] Dr      Received:            09/24/2019 1521                                     Throat                                                                       Pathologist:           Saad Licona MD                                                         Specimens:   A) - Parotid gland, Right                                                                           B) - Parotid gland, Right                                                                           C) - Parotid gland, Right, cell block                                                      Final Diagnosis 09/24/2019    Final                    Value: This result contains rich text formatting which cannot be displayed here   Note 09/24/2019    Final                    Value: This result contains rich text formatting which cannot be displayed here  Parker Lawson Description 09/24/2019    Final                    Value: This result contains rich text formatting which cannot be displayed here   Additional Information 09/24/2019    Final                    Value: This result contains rich text formatting which cannot be displayed here     Clinical Support on 08/09/2019   Component Date Value Ref Range Status    Glucose, Random 08/09/2019 97  65 - 99 mg/dL Final    Comment:               Fasting reference interval         BUN 08/09/2019 20  7 - 25 mg/dL Final    Creatinine 08/09/2019 1 02  0 70 - 1 18 mg/dL Final    Comment: For patients >52years of age, the reference limit  for Creatinine is approximately 13% higher for people  identified as -American   eGFR Non  08/09/2019 70  > OR = 60 mL/min/1 73m2 Final    eGFR  08/09/2019 81  > OR = 60 mL/min/1 73m2 Final    SL AMB BUN/CREATININE RATIO 37/47/0722 NOT APPLICABLE  6 - 22 (calc) Final    Sodium 08/09/2019 141  135 - 146 mmol/L Final    Potassium 08/09/2019 4 3  3 5 - 5 3 mmol/L Final    Chloride 08/09/2019 104  98 - 110 mmol/L Final    CO2 08/09/2019 26  20 - 32 mmol/L Final    SL AMB CALCIUM 08/09/2019 8 7  8 6 - 10 3 mg/dL Final    SL AMB LYME AB SCREEN 08/09/2019 <0 90  index Final    Comment:                    Index                Interpretation                     -----                --------------                     < 0 90               Negative                     0  90-1 09            Equivocal                     > 1 09               Positive      As recommended by the Food and Drug Administration   (FDA), all samples with positive or equivocal   results in a Borrelia burgdorferi antibody screen  will be tested using a blot method  Positive or   equivocal screening test results should not be   interpreted as truly positive until verified as such   using a supplemental assay (e g , B  burgdorferi blot)  The screening test and/or blot for B  burgdorferi   antibodies may be falsely negative in early stages  of Lyme disease, including the period when erythema   migrans is apparent           Uric Acid 08/09/2019 5 3  4 0 - 8 0 mg/dL Final    Comment: Therapeutic target for gout patients: <6 0 mg/dL          White Blood Cell Count 08/09/2019 10 2  3 8 - 10 8 Thousand/uL Final    Red Blood Cell Count 08/09/2019 5 23  4 20 - 5 80 Million/uL Final    Hemoglobin 08/09/2019 16 4  13 2 - 17 1 g/dL Final    HCT 08/09/2019 49 6  38 5 - 50 0 % Final    MCV 08/09/2019 94 8  80 0 - 100 0 fL Final    MCH 08/09/2019 31 4  27 0 - 33 0 pg Final    MCHC 08/09/2019 33 1  32 0 - 36 0 g/dL Final    RDW 08/09/2019 13 4  11 0 - 15 0 % Final    Platelet Count 94/96/1119 158  140 - 400 Thousand/uL Final    SL AMB MPV 08/09/2019 10 5  7 5 - 12 5 fL Final    Neutrophils (Absolute) 08/09/2019 8,150* 1,500 - 7,800 cells/uL Final    Lymphocytes (Absolute) 08/09/2019 1,418  850 - 3,900 cells/uL Final    Monocytes (Absolute) 08/09/2019 561  200 - 950 cells/uL Final    Eosinophils (Absolute) 08/09/2019 20  15 - 500 cells/uL Final    Basophils ABS 08/09/2019 51  0 - 200 cells/uL Final    Neutrophils 08/09/2019 79 9  % Final    Lymphocytes 08/09/2019 13 9  % Final    Monocytes 08/09/2019 5 5  % Final    Eosinophils 08/09/2019 0 2  % Final    Basophils PCT 08/09/2019 0 5  % Final    RBC Morphology 08/09/2019   NORMAL Final    Comment:    Red cell morphology appears unremarkable        Procedure visit on 07/02/2019   Component Date Value Ref Range Status    Case Report 07/02/2019    Final                    Value:Surgical Pathology Report                         Case: [id number]                                   Authorizing Provider:  Razia Mazariegos MD        Collected:           07/02/2019 1031              Ordering Location:     [address] [place] [address] For        Received:            07/02/2019 1031                                     [facility]                                                            Pathologist:           Marisela Iglesias MD                                                         Specimens:   A) - Prostate, l lat base                                                                           B) - Prostate, l lat mid                                                                            C) - Prostate, l lat apex                                                                           D) - Prostate, r lat base x2 E) - Prostate, r lat mid x2                                                                                                   F) - Prostate, r lat apex                                                                           G) - Prostate, r base x2                                                                            H) - Prostate, r mid x2                                                                             I) - Prostate, r apex                                                                      Final Diagnosis 07/02/2019    Final                    Value: This result contains rich text formatting which cannot be displayed here   Note 07/02/2019    Final                    Value: This result contains rich text formatting which cannot be displayed here   Additional Information 07/02/2019    Final                    Value: This result contains rich text formatting which cannot be displayed here  Bryon Castellano Description 07/02/2019    Final                    Value: This result contains rich text formatting which cannot be displayed here  Appointment on 06/18/2019   Component Date Value Ref Range Status    PSA, Diagnostic 06/18/2019 0 5  0 0 - 4 0 ng/mL Final      American Urological Association Guidelines define biochemical recurrence of prostate cancer as a detectable or rising PSA value post-radical prostatectomy that is greater than or equal to 0 2 ng/mL with a second confirmatory level of greater than or equal to 0 2 ng/mL     Office Visit on 01/08/2019   Component Date Value Ref Range Status    Glucose, Random 01/08/2019 83  65 - 99 mg/dL Final    Comment:               Fasting reference interval         BUN 01/08/2019 20  7 - 25 mg/dL Final    Creatinine 01/08/2019 1 18  0 70 - 1 18 mg/dL Final    Comment: For patients >52years of age, the reference limit  for Creatinine is approximately 13% higher for people  identified as -American           eGFR Non  01/08/2019 59* > OR = 60 mL/min/1 73m2 Final    eGFR  01/08/2019 69  > OR = 60 mL/min/1 73m2 Final    SL AMB BUN/CREATININE RATIO 49/59/5063 NOT APPLICABLE  6 - 22 (calc) Final    Sodium 01/08/2019 139  135 - 146 mmol/L Final    Potassium 01/08/2019 3 7  3 5 - 5 3 mmol/L Final    Chloride 01/08/2019 100  98 - 110 mmol/L Final    CO2 01/08/2019 26  20 - 32 mmol/L Final    SL AMB CALCIUM 01/08/2019 9 1  8 6 - 10 3 mg/dL Final    Magnesium, Serum 01/08/2019 2 1  1 5 - 2 5 mg/dL Final    Phosphorus, Serum 01/08/2019 3 5  2 1 - 4 3 mg/dL Final    Ferritin 01/08/2019 90  20 - 380 ng/mL Final

## 2019-12-12 ENCOUNTER — TELEPHONE (OUTPATIENT)
Dept: INTERNAL MEDICINE CLINIC | Facility: CLINIC | Age: 79
End: 2019-12-12

## 2019-12-12 NOTE — TELEPHONE ENCOUNTER
Patient's wife called stating that when they were in to see Dr Ute Steven last week she prescribed a cough medicine that is on back order and the pharmacy hasn't called to let them know its available  She states when they called the office to let us know the doctor stated that there was nothing else that could be used in its place  Pts wife informed me that she found a cough medicine at home that Dr Job Barrera prescribed for her in August and the medication is still "good"  She was inquiring if it was the same one ordered for her  because if it was she was going to give him her's to take  After further inquiry the patient was unable to give me the strength of the Phenergan with Codiene  I instructed the patient that she should not give her  the medication if it was prescribed for her and especially if she was not able to confirm the specific dose  She then stated "well he needs something that's why we came in to the office  So cant you just look in my chart and tell me if it is the same medication"  Wife stated her  and when I informed her that it was the same medication she very quickly said "ok, thanks that's all I wanted to know " and disconnected the call before I could again advise that she still should not give her  the medication that she was prescribed

## 2019-12-27 ENCOUNTER — OFFICE VISIT (OUTPATIENT)
Dept: INTERNAL MEDICINE CLINIC | Age: 79
End: 2019-12-27
Payer: COMMERCIAL

## 2019-12-27 VITALS
HEIGHT: 67 IN | HEART RATE: 64 BPM | DIASTOLIC BLOOD PRESSURE: 84 MMHG | TEMPERATURE: 98.2 F | OXYGEN SATURATION: 97 % | SYSTOLIC BLOOD PRESSURE: 150 MMHG | WEIGHT: 211 LBS | BODY MASS INDEX: 33.12 KG/M2

## 2019-12-27 DIAGNOSIS — M79.89 SWELLING OF RIGHT THUMB: Primary | ICD-10-CM

## 2019-12-27 PROBLEM — M25.521 RIGHT ELBOW PAIN: Status: ACTIVE | Noted: 2019-12-27

## 2019-12-27 PROCEDURE — 1036F TOBACCO NON-USER: CPT | Performed by: INTERNAL MEDICINE

## 2019-12-27 PROCEDURE — 1160F RVW MEDS BY RX/DR IN RCRD: CPT | Performed by: INTERNAL MEDICINE

## 2019-12-27 PROCEDURE — 99214 OFFICE O/P EST MOD 30 MIN: CPT | Performed by: INTERNAL MEDICINE

## 2019-12-27 RX ORDER — MELOXICAM 15 MG/1
15 TABLET ORAL DAILY
Qty: 15 TABLET | Refills: 0 | Status: SHIPPED | OUTPATIENT
Start: 2019-12-27 | End: 2020-02-12 | Stop reason: ALTCHOICE

## 2019-12-27 NOTE — PROGRESS NOTES
Assessment/Plan:     Diagnoses and all orders for this visit:    Swelling of right thumb  -     Lyme Antibody Profile with reflex to WB; Future  -     CT hand right wo contrast; Future  -     meloxicam (MOBIC) 15 mg tablet; Take 1 tablet (15 mg total) by mouth daily  -     Sedimentation rate, automated; Future  -     C-reactive protein; Future  -     Sjogren's Antibodies; Future  -     ANDRAE Screen w/ Reflex to Titer/Pattern; Future  -     RF Screen w/ Reflex to Titer; Future             Subjective:   Chief Complaint   Patient presents with    Hand Swelling     Pt reports beginning Wednesday he woke up with pain and swelling in his right thumb that would shoot up his arm to his elbow  Pt states he tried to ice it several times, but it did not help  Pt reports a history of gout in his right thumb in the past        Patient ID: Dick Hinojosa  is a 67 y o  male  HPI  This is a very pleasant 67 years young gentleman who is here today for the pain in his right elbow and right the thumb at the base of the thumb he has also chronic deformity of the right wrist x-ray was done which shows the deformity and chronic changes and recommendations were MRI or the CT scan I will go for the CT scan 1st before I go for the MRI    He has so much pain in the arm that he have to hold the arm he is not on any an anti-inflammatory medications he was given about the 3 weeks ago prednisone for similar kind of problem in his wrist and was diagnosed with possible gout although his uric acid levels are normal uric acid level could be normal in acute gout but at this point I think I will try to find it out if there is any other reason beside gout so will get the CT scan and the blood workup I will see him back in next week after the CT scan and the blood workup and will go from there  The following portions of the patient's history were reviewed and updated as appropriate: allergies, current medications, past family history, past medical history, past social history, past surgical history and problem list     Review of Systems   Constitutional: Negative for appetite change, fatigue and fever  HENT: Negative for congestion, ear pain, hearing loss, nosebleeds, sneezing, tinnitus and voice change  Eyes: Negative for pain, discharge and redness  Respiratory: Negative for cough, chest tightness and wheezing  Cardiovascular: Negative for chest pain, palpitations and leg swelling  Gastrointestinal: Negative for abdominal pain, blood in stool, constipation, diarrhea, nausea and vomiting  Genitourinary: Negative for difficulty urinating, dysuria, hematuria and urgency  Musculoskeletal: Negative for arthralgias, back pain, gait problem and joint swelling  Please see HPI for detailed right thumb and right elbow pain   Skin: Negative for rash and wound  Allergic/Immunologic: Negative for environmental allergies  Neurological: Negative for dizziness, tremors, seizures, weakness, light-headedness and numbness  Hematological: Negative for adenopathy  Does not bruise/bleed easily  Psychiatric/Behavioral: Negative for behavioral problems and confusion  The patient is not nervous/anxious  Past Medical History:   Diagnosis Date    Abnormal electrocardiogram     Last assessed: Oct 11, 2013    Allergic rhinitis     last assessed: Oct 11, 2013    Allergic rhinitis due to pollen     last assessed: Oct 10, 2013    Allergic sinusitis     Last assessed: May 11, 2015    Allergy     resolved: July 22, 2015    Benign essential hypertension     Last assessed/resolved: May 31, 2017    BPH (benign prostatic hyperplasia)     Colitis     Last assessed: May 24, 2016    Generalized osteoarthritis     Last assessed: Oct 11, 2013    GERD without esophagitis     Last assessed/resolved:  May 31, 2017    Hearing loss     Hiatal hernia     resolved: July 22, 2015    History of gastroesophageal reflux (GERD)     History of stomach ulcers last assessed: May 11, 2015    Hypertension     Incomplete bladder emptying     Kidney stone     Nodular prostate with lower urinary tract symptoms     Nontoxic single thyroid nodule     last assessed: April 16, 2014    Orchalgia     Overweight     last assessed: Oct 31, 2013    Poor urinary stream     [facility])     Salivary gland disorder     last assessed: April 16, 2014    Seasonal allergies     last assessed: May 15, 2015    Spermatocele     Straining to void     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:  May 7, 2014         Current Outpatient Medications:     aspirin 81 mg chewable tablet, Chew 81 mg daily, Disp: , Rfl:     diltiazem (CARDIZEM CD) 180 mg 24 hr capsule, TAKE 1 CAPSULE BY MOUTH EVERY DAY, Disp: 90 capsule, Rfl: 1    finasteride (PROSCAR) 5 mg tablet, Take 1 tablet (5 mg total) by mouth daily, Disp: 90 tablet, Rfl: 2    gabapentin (NEURONTIN) 100 mg capsule, Take 1 capsule (100 mg total) by mouth 2 (two) times a day, Disp: 60 capsule, Rfl: 3    montelukast (SINGULAIR) 10 mg tablet, Take 10 mg by mouth daily at bedtime, Disp: , Rfl:     promethazine-codeine (PHENERGAN WITH CODEINE) 6 25-10 mg/5 mL syrup, Take 5 mL by mouth daily at bedtime as needed for cough, Disp: 120 mL, Rfl: 0    tamsulosin (FLOMAX) 0 4 mg, Take 1 capsule (0 4 mg total) by mouth daily with dinner, Disp: 90 capsule, Rfl: 3    Triamcinolone Acetonide (NASACORT AQ NA), into each nostril 2 (two) times a day as needed, Disp: , Rfl:     meloxicam (MOBIC) 15 mg tablet, Take 1 tablet (15 mg total) by mouth daily, Disp: 15 tablet, Rfl: 0    Allergies   Allergen Reactions    Ace Inhibitors     Molds & Smuts     Other     Pollen Extract     Short Ragweed Pollen Ext     Tree Extract        Social History   Past Surgical History:   Procedure Laterality Date    BLADDER SURGERY      CHOLECYSTECTOMY  2000    laparoscopic     HEMORRHOID SURGERY      pilionidal cyst removal     HERNIA REPAIR Left 01/17/2008    inguinal     KNEE ARTHROSCOPY Left 1974    KNEE CARTILAGE SURGERY      NASAL SEPTUM SURGERY      Deviation repair     PROSTATE BIOPSY  2017    STOMACH SURGERY      TONSILLECTOMY AND ADENOIDECTOMY      at age 37   [address] - right 01/04 0 left 01/95    VASECTOMY       Family History   Problem Relation Age of Onset    Hypertension Mother     Stroke Mother     Stomach cancer Father     Hypertension Father     Hypertension Family     Stroke Family         syndrome        Objective:  /84 (BP Location: Left arm, Patient Position: Sitting, Cuff Size: Standard)   Pulse 64   Temp 98 2 °F (36 8 °C) (Tympanic)   Ht 5' 6 5" (1 689 m)   Wt 95 7 kg (211 lb)   SpO2 97%   BMI 33 55 kg/m²        Physical Exam   Constitutional: He is oriented to person, place, and time  He appears well-developed and well-nourished  HENT:   Right Ear: External ear normal    Mouth/Throat: Oropharynx is clear and moist    Eyes: Pupils are equal, round, and reactive to light  Conjunctivae and EOM are normal    Neck: Normal range of motion  No JVD present  No thyromegaly present  Cardiovascular: Normal rate, regular rhythm, normal heart sounds and intact distal pulses  Pulmonary/Chest: Breath sounds normal    Abdominal: Soft  Bowel sounds are normal    Musculoskeletal: Normal range of motion  Right thumb at the bases swollen warm and tender and limited and painful movement same as on the elbow although the thumb is much more than the elbow   Lymphadenopathy:     He has no cervical adenopathy  Neurological: He is alert and oriented to person, place, and time  He has normal reflexes  Skin: Skin is dry  Psychiatric: He has a normal mood and affect   His behavior is normal  Judgment and thought content normal

## 2020-01-02 ENCOUNTER — OFFICE VISIT (OUTPATIENT)
Dept: INTERNAL MEDICINE CLINIC | Age: 80
End: 2020-01-02

## 2020-01-02 ENCOUNTER — OFFICE VISIT (OUTPATIENT)
Dept: INTERNAL MEDICINE CLINIC | Age: 80
End: 2020-01-02
Payer: COMMERCIAL

## 2020-01-02 VITALS — BODY MASS INDEX: 35.23 KG/M2 | WEIGHT: 221.6 LBS | DIASTOLIC BLOOD PRESSURE: 70 MMHG | SYSTOLIC BLOOD PRESSURE: 140 MMHG

## 2020-01-02 DIAGNOSIS — J84.9 INTERSTITIAL LUNG DISEASE (HCC): Primary | ICD-10-CM

## 2020-01-02 DIAGNOSIS — J84.9 INTERSTITIAL LUNG DISEASE (HCC): ICD-10-CM

## 2020-01-02 DIAGNOSIS — J30.1 SEASONAL ALLERGIC RHINITIS DUE TO POLLEN: ICD-10-CM

## 2020-01-02 DIAGNOSIS — J00 COMMON COLD: Primary | ICD-10-CM

## 2020-01-02 DIAGNOSIS — J00 COMMON COLD: ICD-10-CM

## 2020-01-02 DIAGNOSIS — R05.8 PRODUCTIVE COUGH: ICD-10-CM

## 2020-01-02 PROBLEM — R42 LIGHTHEADED: Status: RESOLVED | Noted: 2018-02-28 | Resolved: 2020-01-02

## 2020-01-02 PROBLEM — R42 ORTHOSTATIC DIZZINESS: Status: RESOLVED | Noted: 2019-08-15 | Resolved: 2020-01-02

## 2020-01-02 PROBLEM — R10.31 RIGHT LOWER QUADRANT ABDOMINAL PAIN: Status: RESOLVED | Noted: 2019-12-06 | Resolved: 2020-01-02

## 2020-01-02 PROBLEM — J02.9 PHARYNGITIS: Status: RESOLVED | Noted: 2019-12-06 | Resolved: 2020-01-02

## 2020-01-02 PROBLEM — R10.32 LEFT LOWER QUADRANT PAIN: Status: RESOLVED | Noted: 2019-09-25 | Resolved: 2020-01-02

## 2020-01-02 PROBLEM — M79.641 PAIN OF RIGHT HAND: Status: RESOLVED | Noted: 2019-08-08 | Resolved: 2020-01-02

## 2020-01-02 PROCEDURE — 99213 OFFICE O/P EST LOW 20 MIN: CPT | Performed by: INTERNAL MEDICINE

## 2020-01-02 RX ORDER — FLUTICASONE FUROATE AND VILANTEROL 200; 25 UG/1; UG/1
1 POWDER RESPIRATORY (INHALATION) DAILY
Qty: 1 INHALER | Refills: 5 | Status: SHIPPED | OUTPATIENT
Start: 2020-01-02 | End: 2020-02-12 | Stop reason: ALTCHOICE

## 2020-01-02 NOTE — PROGRESS NOTES
Assessment/Plan:    1  Common cold    2  Interstitial lung disease ([address] )    3  Seasonal allergic rhinitis due to pollen    4  Productive cough    call if not better, no need for anbx today  Will RX if symptoms do not improve  Ok to take OTC antihistamine for the next few days  Cont with singulair  Check labs and KNA  I sent a prescription for Breo to the pharmacy      BMI Counseling: There is no height or weight on file to calculate BMI  The BMI is above normal  Nutrition recommendations include moderation in carbohydrate intake  Exercise recommendations include exercising 3-5 times per week  No pharmacotherapy was ordered  There are no Patient Instructions on file for this visit  Return for Next scheduled follow up  Subjective:      Patient ID: Mer Lincoln  is a 67 y o  male  No chief complaint on file  HPI       started a few days ago, no flu contacts, wife is sick  Hx of ILD, saw pulm  A few years ago  Hx of allergies with injections in he past  Was on breo and it helped a lot  Was not able to f/up with pulm bc their schedule  Feeling well now  Not using any meds OTC for symptoms  Does not feel short of breath and is not wheezing          The following portions of the patient's history were reviewed and updated as appropriate: allergies, current medications, past family history, past medical history, past social history, past surgical history and problem list     Review of Systems        Constitutional:  Denies fever or chills   Eyes:  Denies double or blurry vision  HENT:  Denies nasal congestion or sore throat   Respiratory:  Denies shortness of breath or wheezing, + cough  Cardiovascular:  Denies palpitations or chest pain  GI:  Denies abdominal pain, nausea, or vomiting  Integument:  Denies rash , no open areas  Neurologic:  Denies headache or focal weakness        Current Outpatient Medications   Medication Sig Dispense Refill    aspirin 81 mg chewable tablet Chew 81 mg daily  diltiazem (CARDIZEM CD) 180 mg 24 hr capsule TAKE 1 CAPSULE BY MOUTH EVERY DAY 90 capsule 1    finasteride (PROSCAR) 5 mg tablet Take 1 tablet (5 mg total) by mouth daily 90 tablet 2    fluticasone-vilanterol (BREO ELLIPTA) 200-25 MCG/INH inhaler Inhale 1 puff daily Rinse mouth after use  1 Inhaler 5    gabapentin (NEURONTIN) 100 mg capsule Take 1 capsule (100 mg total) by mouth 2 (two) times a day 60 capsule 3    meloxicam (MOBIC) 15 mg tablet Take 1 tablet (15 mg total) by mouth daily 15 tablet 0    montelukast (SINGULAIR) 10 mg tablet Take 10 mg by mouth daily at bedtime      promethazine-codeine (PHENERGAN WITH CODEINE) 6 25-10 mg/5 mL syrup Take 5 mL by mouth daily at bedtime as needed for cough 120 mL 0    tamsulosin (FLOMAX) 0 4 mg Take 1 capsule (0 4 mg total) by mouth daily with dinner 90 capsule 3    Triamcinolone Acetonide (NASACORT AQ NA) into each nostril 2 (two) times a day as needed       No current facility-administered medications for this visit  Objective: There were no vitals taken for this visit  Physical Exam       Constitutional:  Well developed, well nourished, no acute distress, non-toxic appearance   Eyes:  PERRL, conjunctiva normal , non icteric sclera  HENT:  Atraumatic, oropharynx moist  Erythematous posterior pharynx , Neck-  supple , no sinus TTP, ears without air fluid levels, + PND    Respiratory:  CTA b/l, normal breath sounds, no rales, no wheezing   Cardiovascular:  RRR, no murmurs, no LE edema b/l  GI:  Soft, nondistended, normal bowel sounds x 4, nontender, no organomegaly, no mass, no rebound, no guarding   Neurologic:  no focal deficits noted   Psychiatric:  Speech and behavior appropriate , AAO x 3          Rey Rico, DO

## 2020-01-02 NOTE — PROGRESS NOTES
Assessment/Plan:    1  Interstitial lung disease ([address] )  -     [facility] Allergy Panel, Adult; Future  -     fluticasone-vilanterol (BREO ELLIPTA) 200-25 MCG/INH inhaler; Inhale 1 puff daily Rinse mouth after use  2  Seasonal allergic rhinitis due to pollen  [facility] Allergy Panel, Adult; Future    3  Productive cough  -     [facility] Allergy Panel, Adult; Future    4  Common cold    call if not better, no need for anbx today  Will RX if symptoms do not improve  Ok to take OTC antihistamine for the next few days  Cont with singulair  Check labs and KNA  I sent a prescription for Breo to the pharmacy      BMI Counseling: Body mass index is 35 23 kg/m²  The BMI is above normal  Nutrition recommendations include moderation in carbohydrate intake  Exercise recommendations include exercising 3-5 times per week  No pharmacotherapy was ordered  There are no Patient Instructions on file for this visit  No follow-ups on file  Subjective:      Patient ID: Massimo Watkins  is a 67 y o  male  Chief Complaint   Patient presents with    Cough       HPI       started a few days ago, no flu contacts, wife is sick  Hx of ILD, saw pulm  A few years ago  Hx of allergies with injections in he past  Was on breo and it helped a lot  Was not able to f/up with pulm bc their schedule  Feeling well now  Not using any meds OTC for symptoms  Does not feel short of breath and is not wheezing          The following portions of the patient's history were reviewed and updated as appropriate: allergies, current medications, past family history, past medical history, past social history, past surgical history and problem list     Review of Systems        Constitutional:  Denies fever or chills   Eyes:  Denies double or blurry vision  HENT:  Denies nasal congestion or sore throat   Respiratory:  Denies shortness of breath or wheezing, + cough  Cardiovascular:  Denies palpitations or chest pain  GI:  Denies abdominal pain, nausea, or vomiting  Integument:  Denies rash , no open areas  Neurologic:  Denies headache or focal weakness        Current Outpatient Medications   Medication Sig Dispense Refill    aspirin 81 mg chewable tablet Chew 81 mg daily      diltiazem (CARDIZEM CD) 180 mg 24 hr capsule TAKE 1 CAPSULE BY MOUTH EVERY DAY 90 capsule 1    finasteride (PROSCAR) 5 mg tablet Take 1 tablet (5 mg total) by mouth daily 90 tablet 2    fluticasone-vilanterol (BREO ELLIPTA) 200-25 MCG/INH inhaler Inhale 1 puff daily Rinse mouth after use  1 Inhaler 5    gabapentin (NEURONTIN) 100 mg capsule Take 1 capsule (100 mg total) by mouth 2 (two) times a day 60 capsule 3    meloxicam (MOBIC) 15 mg tablet Take 1 tablet (15 mg total) by mouth daily 15 tablet 0    montelukast (SINGULAIR) 10 mg tablet Take 10 mg by mouth daily at bedtime      promethazine-codeine (PHENERGAN WITH CODEINE) 6 25-10 mg/5 mL syrup Take 5 mL by mouth daily at bedtime as needed for cough 120 mL 0    tamsulosin (FLOMAX) 0 4 mg Take 1 capsule (0 4 mg total) by mouth daily with dinner 90 capsule 3    Triamcinolone Acetonide (NASACORT AQ NA) into each nostril 2 (two) times a day as needed       No current facility-administered medications for this visit  Objective:    /70   Wt 101 kg (221 lb 9 6 oz)   BMI 35 23 kg/m²        Physical Exam       Constitutional:  Well developed, well nourished, no acute distress, non-toxic appearance   Eyes:  PERRL, conjunctiva normal , non icteric sclera  HENT:  Atraumatic, oropharynx moist  Erythematous posterior pharynx , Neck-  supple , no sinus TTP, ears without air fluid levels, + PND    Respiratory:  CTA b/l, normal breath sounds, no rales, no wheezing   Cardiovascular:  RRR, no murmurs, no LE edema b/l  GI:  Soft, nondistended, normal bowel sounds x 4, nontender, no organomegaly, no mass, no rebound, no guarding   Neurologic:  no focal deficits noted   Psychiatric:  Speech and behavior appropriate , AAO x 1100 [facility], DO

## 2020-01-03 ENCOUNTER — HOSPITAL ENCOUNTER (OUTPATIENT)
Dept: RADIOLOGY | Age: 80
End: 2020-01-03
Payer: COMMERCIAL

## 2020-01-03 ENCOUNTER — APPOINTMENT (OUTPATIENT)
Dept: LAB | Age: 80
End: 2020-01-03
Payer: COMMERCIAL

## 2020-01-03 ENCOUNTER — TRANSCRIBE ORDERS (OUTPATIENT)
Dept: ADMINISTRATIVE | Age: 80
End: 2020-01-03

## 2020-01-03 DIAGNOSIS — J84.9 INTERSTITIAL LUNG DISEASE (HCC): ICD-10-CM

## 2020-01-03 DIAGNOSIS — R05.8 PRODUCTIVE COUGH: ICD-10-CM

## 2020-01-03 DIAGNOSIS — J30.1 SEASONAL ALLERGIC RHINITIS DUE TO POLLEN: ICD-10-CM

## 2020-01-03 DIAGNOSIS — M79.89 SWELLING OF RIGHT THUMB: ICD-10-CM

## 2020-01-03 LAB
CRP SERPL QL: 10.1 MG/L
ERYTHROCYTE [SEDIMENTATION RATE] IN BLOOD: 4 MM/HOUR (ref 0–10)

## 2020-01-03 PROCEDURE — 36415 COLL VENOUS BLD VENIPUNCTURE: CPT

## 2020-01-03 PROCEDURE — 86618 LYME DISEASE ANTIBODY: CPT

## 2020-01-03 PROCEDURE — 82785 ASSAY OF IGE: CPT

## 2020-01-03 PROCEDURE — 86038 ANTINUCLEAR ANTIBODIES: CPT

## 2020-01-03 PROCEDURE — 86430 RHEUMATOID FACTOR TEST QUAL: CPT

## 2020-01-03 PROCEDURE — 86235 NUCLEAR ANTIGEN ANTIBODY: CPT

## 2020-01-03 PROCEDURE — 86003 ALLG SPEC IGE CRUDE XTRC EA: CPT

## 2020-01-03 PROCEDURE — 86140 C-REACTIVE PROTEIN: CPT

## 2020-01-03 PROCEDURE — 85652 RBC SED RATE AUTOMATED: CPT

## 2020-01-04 LAB
B BURGDOR IGG+IGM SER-ACNC: <0.91 ISR (ref 0–0.9)
ENA SS-A AB SER-ACNC: <0.2 AI (ref 0–0.9)
ENA SS-B AB SER-ACNC: <0.2 AI (ref 0–0.9)

## 2020-01-06 LAB
RHEUMATOID FACT SER QL LA: NEGATIVE
RYE IGE QN: NEGATIVE

## 2020-01-07 DIAGNOSIS — J84.9 INTERSTITIAL LUNG DISEASE (HCC): Primary | ICD-10-CM

## 2020-01-07 PROCEDURE — 87185 SC STD ENZYME DETCJ PER NZM: CPT | Performed by: FAMILY MEDICINE

## 2020-01-07 PROCEDURE — 87184 SC STD DISK METHOD PER PLATE: CPT | Performed by: FAMILY MEDICINE

## 2020-01-07 PROCEDURE — 87205 SMEAR GRAM STAIN: CPT | Performed by: FAMILY MEDICINE

## 2020-01-07 PROCEDURE — 87077 CULTURE AEROBIC IDENTIFY: CPT | Performed by: FAMILY MEDICINE

## 2020-01-07 PROCEDURE — 87070 CULTURE OTHR SPECIMN AEROBIC: CPT | Performed by: FAMILY MEDICINE

## 2020-01-08 ENCOUNTER — HOSPITAL ENCOUNTER (OUTPATIENT)
Dept: RADIOLOGY | Age: 80
Discharge: HOME | End: 2020-01-08
Payer: COMMERCIAL

## 2020-01-08 DIAGNOSIS — M79.89 SWELLING OF RIGHT THUMB: ICD-10-CM

## 2020-01-08 LAB
A ALTERNATA IGE QN: <0.1 KUA/I
A FUMIGATUS IGE QN: <0.1 KUA/I
ALLERGEN COMMENT: ABNORMAL
BERMUDA GRASS IGE QN: <0.1 KUA/I
BOXELDER IGE QN: <0.1 KUA/I
C HERBARUM IGE QN: <0.1 KUA/I
CAT DANDER IGE QN: <0.1 KUA/I
CMN PIGWEED IGE QN: <0.1 KUA/I
COMMON RAGWEED IGE QN: 0.1 KUA/I
COTTONWOOD IGE QN: <0.1 KUA/I
D FARINAE IGE QN: <0.1 KUA/I
D PTERONYSS IGE QN: <0.1 KUA/I
DOG DANDER IGE QN: <0.1 KUA/I
LONDON PLANE IGE QN: <0.1 KUA/I
MOUSE URINE PROT IGE QN: <0.1 KUA/I
MT JUNIPER IGE QN: <0.1 KUA/I
MUGWORT IGE QN: <0.1 KUA/I
P NOTATUM IGE QN: <0.1 KUA/I
ROACH IGE QN: <0.1 KUA/I
SHEEP SORREL IGE QN: <0.1 KUA/I
SILVER BIRCH IGE QN: <0.1 KUA/I
TIMOTHY IGE QN: <0.1 KUA/I
TOTAL IGE SMQN RAST: 11.9 KU/L (ref 0–113)
WALNUT IGE QN: <0.1 KUA/I
WHITE ASH IGE QN: 0.2 KUA/I
WHITE ELM IGE QN: <0.1 KUA/I
WHITE MULBERRY IGE QN: <0.1 KUA/I
WHITE OAK IGE QN: <0.1 KUA/I

## 2020-01-08 PROCEDURE — 73200 CT UPPER EXTREMITY W/O DYE: CPT

## 2020-01-09 ENCOUNTER — TELEPHONE (OUTPATIENT)
Dept: INTERNAL MEDICINE CLINIC | Age: 80
End: 2020-01-09

## 2020-01-09 ENCOUNTER — OFFICE VISIT (OUTPATIENT)
Dept: INTERNAL MEDICINE CLINIC | Age: 80
End: 2020-01-09
Payer: COMMERCIAL

## 2020-01-09 VITALS
DIASTOLIC BLOOD PRESSURE: 76 MMHG | TEMPERATURE: 98.5 F | BODY MASS INDEX: 34.25 KG/M2 | HEART RATE: 75 BPM | SYSTOLIC BLOOD PRESSURE: 138 MMHG | OXYGEN SATURATION: 96 % | HEIGHT: 67 IN | WEIGHT: 218.2 LBS

## 2020-01-09 DIAGNOSIS — M25.439 WRIST SWELLING: ICD-10-CM

## 2020-01-09 DIAGNOSIS — M25.521 RIGHT ELBOW PAIN: Primary | ICD-10-CM

## 2020-01-09 PROCEDURE — 99214 OFFICE O/P EST MOD 30 MIN: CPT | Performed by: INTERNAL MEDICINE

## 2020-01-09 PROCEDURE — 1160F RVW MEDS BY RX/DR IN RCRD: CPT | Performed by: INTERNAL MEDICINE

## 2020-01-09 NOTE — PROGRESS NOTES
Assessment/Plan:    No problem-specific Assessment & Plan notes found for this encounter  Diagnoses and all orders for this visit:    Right elbow pain    Wrist swelling  -     Ambulatory referral to Rheumatology; Future        Subjective:   Chief Complaint   Patient presents with    Follow-up     Interstitial lung disease and hand pain- Sputum test results in process, lab results, and CT completed on 1/8/2020        Patient ID: Sherie Davis  is a 67 y o  male  Mr Priya Marinelli presents today for follow up in regards to his hands  He complains of cough, nose running, sore throat / hoarseness  He has been doing salt water gargles and throat losenges, with some minor relief  He has been taking Zyrtec for 3 days but feels it did not help  He still complains of the postnasal drip as well  He complained of producing dark brown sputum which was subsequently sent for culture which is currently pending  He denied R wrist pain after taking a steroid for 3 days, and has not had any complaints since  He had a CT scan recently performed yesterday 1/8/2020  Patient continued to have this upper respiratory tract symptoms specially sore throat and cough he was recommended to take the Zyrtec also a sputum for Gram stain and CNS was the recommended he was treated with antibiotic before I will wait for the results of the sputum for Gram stain and C/S and then will decide at the appropriate antibiotic for him  I reviewed the CT scan of the wrist with the radiologist there is some fluid although there is no signs or symptoms of osteomyelitis or wrist infection    The workup for rheumatoid factor lupus Sjogren sed rate and Lyme serology was completely negative, uric acid levels were normal   But he responded very well to the steroids I will recommend a rheumatology consultation for further evaluation of his the joint pain especially in the wrist       The following portions of the patient's history were reviewed and updated as appropriate: allergies, current medications, past family history, past medical history, past social history, past surgical history and problem list     Review of Systems   Constitutional: Negative for chills, fatigue, fever and unexpected weight change  HENT: Positive for congestion, postnasal drip, sinus pressure, sore throat and voice change  Eyes: Negative for pain  Respiratory: Positive for cough and shortness of breath  Negative for wheezing  Cardiovascular: Negative for chest pain and palpitations  Gastrointestinal: Negative  Genitourinary: Negative  Neurological: Negative for light-headedness and numbness  Objective:      /76 (BP Location: Left arm, Patient Position: Sitting, Cuff Size: Standard)   Pulse 75   Temp 98 5 °F (36 9 °C) (Tympanic)   Ht 5' 6 5" (1 689 m)   Wt 99 kg (218 lb 3 2 oz)   SpO2 96%   BMI 34 69 kg/m²          Physical Exam   Constitutional: He is oriented to person, place, and time  He appears well-developed and well-nourished  HENT:   Head: Normocephalic and atraumatic  Right Ear: External ear normal    Left Ear: External ear normal    Rhinorrhea present, Nasal turbinates erythematous bilaterally  Oropharynx erythematous  Eyes: Pupils are equal, round, and reactive to light  EOM are normal    Neck: Normal range of motion  Neck supple  Cardiovascular: Normal rate, regular rhythm, normal heart sounds and intact distal pulses  Pulmonary/Chest: Effort normal  He has wheezes  Diffuse expiratory wheezing bilaterally   Abdominal: Soft  Bowel sounds are normal    Musculoskeletal: Normal range of motion  Neurological: He is alert and oriented to person, place, and time  Skin: Skin is warm and dry  Capillary refill takes less than 2 seconds

## 2020-01-10 ENCOUNTER — TELEPHONE (OUTPATIENT)
Dept: INTERNAL MEDICINE CLINIC | Age: 80
End: 2020-01-10

## 2020-01-10 DIAGNOSIS — J00 COMMON COLD: Primary | ICD-10-CM

## 2020-01-10 LAB
BACTERIA SPT RESP CULT: ABNORMAL
BACTERIA SPT RESP CULT: ABNORMAL
GRAM STN SPEC: ABNORMAL

## 2020-01-10 RX ORDER — CEPHALEXIN 500 MG/1
500 CAPSULE ORAL
Qty: 21 CAPSULE | Refills: 0 | Status: SHIPPED | OUTPATIENT
Start: 2020-01-10 | End: 2020-01-17

## 2020-01-10 NOTE — TELEPHONE ENCOUNTER
Per Dr Adams Listen to call the patient and lmom stating that we got the results back of the sputum and that she sent over medication for him to take for 7 days and 3 times a day  I lmom with the information that Dr Adams Listen wanted me to say for the patient

## 2020-01-13 ENCOUNTER — TELEPHONE (OUTPATIENT)
Dept: INTERNAL MEDICINE CLINIC | Age: 80
End: 2020-01-13

## 2020-01-13 NOTE — TELEPHONE ENCOUNTER
Rheumatology  called to say that the first appointment is not until April 16, 2020  She spoke to Dr Morgan Thomas regarding the possible fluid in patient's wrist and the doctor advised the patient be referred to Orthopedics as they will be able to evaluate him much sooner  Please place an order for Ortho  Thank you

## 2020-01-14 DIAGNOSIS — G60.9 IDIOPATHIC PERIPHERAL NEUROPATHY: ICD-10-CM

## 2020-01-14 RX ORDER — GABAPENTIN 100 MG/1
CAPSULE ORAL
Qty: 180 CAPSULE | Refills: 0 | Status: SHIPPED | OUTPATIENT
Start: 2020-01-14 | End: 2020-02-12

## 2020-01-15 ENCOUNTER — APPOINTMENT (OUTPATIENT)
Dept: LAB | Age: 80
End: 2020-01-15
Payer: COMMERCIAL

## 2020-01-15 DIAGNOSIS — Z85.46 ENCOUNTER FOR FOLLOW-UP SURVEILLANCE OF PROSTATE CANCER: ICD-10-CM

## 2020-01-15 DIAGNOSIS — Z08 ENCOUNTER FOR FOLLOW-UP SURVEILLANCE OF PROSTATE CANCER: ICD-10-CM

## 2020-01-15 DIAGNOSIS — C61 MALIGNANT NEOPLASM OF PROSTATE (HCC): ICD-10-CM

## 2020-01-15 LAB — PSA SERPL-MCNC: 0.4 NG/ML (ref 0–4)

## 2020-01-15 PROCEDURE — 84153 ASSAY OF PSA TOTAL: CPT

## 2020-01-16 DIAGNOSIS — M25.439 WRIST SWELLING: Primary | ICD-10-CM

## 2020-01-16 NOTE — PROGRESS NOTES
Hand Surgery referral placed, since they can possibly see patient sooner than Rheumatology to evaluate for synovial fluid aspiration and send for synovial fluid analysis

## 2020-01-20 DIAGNOSIS — G60.9 IDIOPATHIC PERIPHERAL NEUROPATHY: Primary | ICD-10-CM

## 2020-01-22 ENCOUNTER — CLINICAL SUPPORT (OUTPATIENT)
Dept: INTERNAL MEDICINE CLINIC | Facility: CLINIC | Age: 80
End: 2020-01-22

## 2020-01-22 ENCOUNTER — TELEPHONE (OUTPATIENT)
Dept: INTERNAL MEDICINE CLINIC | Facility: CLINIC | Age: 80
End: 2020-01-22

## 2020-01-22 DIAGNOSIS — R05.8 PRODUCTIVE COUGH: Primary | ICD-10-CM

## 2020-01-22 DIAGNOSIS — A49.2 HAEMOPHILUS INFLUENZAE INFECTION: ICD-10-CM

## 2020-01-22 DIAGNOSIS — J84.9 INTERSTITIAL LUNG DISEASE (HCC): Primary | ICD-10-CM

## 2020-01-22 NOTE — TELEPHONE ENCOUNTER
Patient has an koko Next Wednesday! The patient only wants to see Dr Sudhakar Abdi or Dr Enzo Perez so he will call if any symptoms develop or if he feels he needs to be sooner

## 2020-01-22 NOTE — TELEPHONE ENCOUNTER
LMOM for patient to let him now I d/w Dr Yamile Hong about the continued mucus productive even after abx treatment  Per Dr Yamile Hong it is best for patient to come in and be re-evaluated and make sure lungs are clear and for poss  Imaging of chest to be ordered

## 2020-01-24 ENCOUNTER — OFFICE VISIT (OUTPATIENT)
Dept: UROLOGY | Facility: MEDICAL CENTER | Age: 80
End: 2020-01-24
Payer: COMMERCIAL

## 2020-01-24 VITALS
DIASTOLIC BLOOD PRESSURE: 90 MMHG | WEIGHT: 218 LBS | HEART RATE: 79 BPM | HEIGHT: 67 IN | SYSTOLIC BLOOD PRESSURE: 170 MMHG | BODY MASS INDEX: 34.21 KG/M2

## 2020-01-24 DIAGNOSIS — C61 MALIGNANT NEOPLASM OF PROSTATE (HCC): Primary | ICD-10-CM

## 2020-01-24 LAB
SL AMB  POCT GLUCOSE, UA: NORMAL
SL AMB LEUKOCYTE ESTERASE,UA: NORMAL
SL AMB POCT BILIRUBIN,UA: NORMAL
SL AMB POCT BLOOD,UA: NORMAL
SL AMB POCT CLARITY,UA: CLEAR
SL AMB POCT COLOR,UA: YELLOW
SL AMB POCT KETONES,UA: NORMAL
SL AMB POCT NITRITE,UA: NORMAL
SL AMB POCT PH,UA: 6
SL AMB POCT SPECIFIC GRAVITY,UA: 1.02
SL AMB POCT URINE PROTEIN: NORMAL
SL AMB POCT UROBILINOGEN: 1

## 2020-01-24 PROCEDURE — 1160F RVW MEDS BY RX/DR IN RCRD: CPT | Performed by: UROLOGY

## 2020-01-24 PROCEDURE — 99214 OFFICE O/P EST MOD 30 MIN: CPT | Performed by: UROLOGY

## 2020-01-24 PROCEDURE — 81003 URINALYSIS AUTO W/O SCOPE: CPT | Performed by: UROLOGY

## 2020-01-24 RX ORDER — OMEPRAZOLE 40 MG/1
40 CAPSULE, DELAYED RELEASE ORAL DAILY
COMMUNITY
End: 2020-03-24 | Stop reason: ALTCHOICE

## 2020-01-24 NOTE — PROGRESS NOTES
Assessment/Plan:      Diagnoses and all orders for this visit:    Malignant neoplasm of prostate ([facility] [address] )  -     POCT urine dip auto non-scope  -     PSA, total and free; Future    Other orders  -     HYDROCHLOROTHIAZIDE PO; Take by mouth  -     omeprazole (PriLOSEC) 40 MG capsule; Take 40 mg by mouth daily         Encounter for follow-up surveillance of prostate cancer  -     PSA Total, Diagnostic; Future     BPH with obstruction/lower urinary tract symptoms       Assessment/Plan:  Continue surveillance          Subjective:  No complaints     Patient ID: Dick Hinojosa  is a 67 y o  male  HPI  The patient is initial prostate biopsy showed 1 core of [place] score 6 cancer   Continuing the protocol for active surveillance, he underwent his 1st confirmatory surveillance re-biopsy on 05/08/2018, with findings were notable in that, though the grade of the cancer appeared to stay the same, the volume at this interval biopsy increased to involving all 8 cores demonstrating the low-grade prostate cancer  And an additional biopsy 07/09/2019 which was better and showed again low volume [place] 6 disease   He is here today for his follow-up discussion after this recent re-biopsy       The patient also is being treated for BPH with finasteride  Anthony Azevedo occasionally has obstructive voiding symptoms and he is inquiring about possibly adding either a medication or possibly some nutritional supplement  Review of Systems   Constitutional: Negative  HENT: Negative  Eyes: Negative  Respiratory: Negative  Cardiovascular: Negative  Gastrointestinal: Negative  Endocrine: Negative  Genitourinary: Negative  Musculoskeletal: Positive for back pain  Skin: Negative  Allergic/Immunologic: Negative  Neurological: Negative  Hematological: Negative  Psychiatric/Behavioral: Negative  Objective:     Physical Exam   Constitutional: He is oriented to person, place, and time   He appears well-developed and well-nourished  No distress  HENT:   Head: Normocephalic and atraumatic  Nose: Nose normal    Mouth/Throat: Oropharynx is clear and moist    Eyes: Pupils are equal, round, and reactive to light  Conjunctivae and EOM are normal  No scleral icterus  Neck: Normal range of motion  Neck supple  Cardiovascular: Normal rate, regular rhythm, normal heart sounds and intact distal pulses  No murmur heard  Pulmonary/Chest: Effort normal and breath sounds normal  No respiratory distress  He has no wheezes  He has no rales  Abdominal: Soft  Bowel sounds are normal  He exhibits no distension and no mass  There is no tenderness  Musculoskeletal: Normal range of motion  He exhibits no edema or tenderness  Lymphadenopathy:     He has no cervical adenopathy  Neurological: He is alert and oriented to person, place, and time  No cranial nerve deficit  Skin: Skin is warm and dry  No rash noted  No erythema  No pallor  Psychiatric: He has a normal mood and affect  His behavior is normal  Judgment and thought content normal    Nursing note and vitals reviewed            PSA Total, Diagnostic (on finasteride)   Ref Range & Units 1/15/20 11:39 AM 6/18/19  2:12 PM 11/5/18  1:28 PM   PSA, Diagnostic 0 0 - 4 0 ng/mL 0 4  0 5 CM 0 6 CM

## 2020-02-09 DIAGNOSIS — R35.1 BENIGN PROSTATIC HYPERPLASIA WITH NOCTURIA: ICD-10-CM

## 2020-02-09 DIAGNOSIS — N40.1 BENIGN PROSTATIC HYPERPLASIA WITH NOCTURIA: ICD-10-CM

## 2020-02-10 RX ORDER — FINASTERIDE 5 MG/1
TABLET, FILM COATED ORAL
Qty: 90 TABLET | Refills: 2 | Status: SHIPPED | OUTPATIENT
Start: 2020-02-10 | End: 2020-10-12

## 2020-02-12 ENCOUNTER — TELEPHONE (OUTPATIENT)
Dept: PULMONOLOGY | Facility: CLINIC | Age: 80
End: 2020-02-12

## 2020-02-12 ENCOUNTER — OFFICE VISIT (OUTPATIENT)
Dept: INTERNAL MEDICINE CLINIC | Age: 80
End: 2020-02-12
Payer: COMMERCIAL

## 2020-02-12 VITALS
OXYGEN SATURATION: 96 % | TEMPERATURE: 98 F | WEIGHT: 219 LBS | BODY MASS INDEX: 34.37 KG/M2 | HEIGHT: 67 IN | HEART RATE: 90 BPM | DIASTOLIC BLOOD PRESSURE: 74 MMHG | SYSTOLIC BLOOD PRESSURE: 128 MMHG

## 2020-02-12 DIAGNOSIS — R04.2 HEMOPTYSIS: ICD-10-CM

## 2020-02-12 DIAGNOSIS — G60.9 IDIOPATHIC PERIPHERAL NEUROPATHY: ICD-10-CM

## 2020-02-12 DIAGNOSIS — J84.9 INTERSTITIAL LUNG DISEASE (HCC): Primary | ICD-10-CM

## 2020-02-12 PROCEDURE — 3008F BODY MASS INDEX DOCD: CPT | Performed by: INTERNAL MEDICINE

## 2020-02-12 PROCEDURE — 1036F TOBACCO NON-USER: CPT | Performed by: INTERNAL MEDICINE

## 2020-02-12 PROCEDURE — 1160F RVW MEDS BY RX/DR IN RCRD: CPT | Performed by: INTERNAL MEDICINE

## 2020-02-12 PROCEDURE — 3074F SYST BP LT 130 MM HG: CPT | Performed by: INTERNAL MEDICINE

## 2020-02-12 PROCEDURE — 99214 OFFICE O/P EST MOD 30 MIN: CPT | Performed by: INTERNAL MEDICINE

## 2020-02-12 PROCEDURE — 4040F PNEUMOC VAC/ADMIN/RCVD: CPT | Performed by: INTERNAL MEDICINE

## 2020-02-12 PROCEDURE — 3078F DIAST BP <80 MM HG: CPT | Performed by: INTERNAL MEDICINE

## 2020-02-12 RX ORDER — GABAPENTIN 300 MG/1
300 CAPSULE ORAL 2 TIMES DAILY
Qty: 60 CAPSULE | Refills: 3 | Status: ON HOLD | OUTPATIENT
Start: 2020-02-12 | End: 2020-03-14 | Stop reason: SDUPTHER

## 2020-02-12 NOTE — PROGRESS NOTES
Assessment/Plan:     Diagnoses and all orders for this visit:    Interstitial lung disease ([address] )  -     CT chest high resolution; Future  -     Ambulatory referral to Pulmonology; Future    Hemoptysis  -     CT chest high resolution; Future  -     Sputum culture and Gram stain; Future  -     Ambulatory referral to Pulmonology; Future    Idiopathic peripheral neuropathy  -     gabapentin (NEURONTIN) 300 mg capsule; Take 1 capsule (300 mg total) by mouth 2 (two) times a day          Falls Plan of Care: balance, strength, and gait training instructions were provided  Recommended assistive device to help with gait and balance  Medications that increase falls were reviewed  Vitamin D supplementation was recommended  Home safety education provided  Subjective:   Chief Complaint   Patient presents with    Follow-up     Elbow pain- Pt reports significant improvement of his elbow pain  Patient ID: Ovidio Kramer  is a 67 y o  male  HPI  This is a very pleasant 67 years young gentleman who was seen by the Pulmonary before for shortness of breath and hemoptysis CT scan shows bronchiectasis he continued to have the cough and also continued to have the hemoptysis he does not have any fever or chills his sputum was checked last time which was the basically contaminant  He was also diagnosed with interstitial lung disease he need to be followed up by the Pulmonary  Swelling of his the wrist is much better pain is much better no new recommendations  Also complaining of numbness and tingling and burning in the leg sometimes he does not feel his feet at all  The following portions of the patient's history were reviewed and updated as appropriate: allergies, current medications, past family history, past medical history, past social history, past surgical history and problem list     Review of Systems   Constitutional: Positive for fatigue  Negative for appetite change and fever     HENT: Negative for congestion, ear pain, hearing loss, nosebleeds, sneezing, tinnitus and voice change  Eyes: Negative for pain, discharge and redness  Respiratory: Positive for cough and shortness of breath  Negative for chest tightness and wheezing  Hemoptysis   Cardiovascular: Negative for chest pain, palpitations and leg swelling  Gastrointestinal: Negative for abdominal pain, blood in stool, constipation, diarrhea, nausea and vomiting  Genitourinary: Negative for difficulty urinating, dysuria, hematuria and urgency  Musculoskeletal: Negative for arthralgias, back pain, gait problem and joint swelling  Skin: Negative for rash and wound  Allergic/Immunologic: Negative for environmental allergies  Neurological: Negative for dizziness, tremors, seizures, weakness, light-headedness and numbness  Numbness and tingling and burning in the leg during the day and nighttime did not respond at all to the gabapentin 100 mg 2 times a day I will increase it to 300 mg 2 times a day and see how he does and I will see him back in about 4-6 week   Hematological: Negative for adenopathy  Does not bruise/bleed easily  Psychiatric/Behavioral: Negative for behavioral problems and confusion  The patient is not nervous/anxious  Past Medical History:   Diagnosis Date    Abnormal electrocardiogram     Last assessed: Oct 11, 2013    Allergic rhinitis     last assessed: Oct 11, 2013    Allergic rhinitis due to pollen     last assessed: Oct 10, 2013    Allergic sinusitis     Last assessed: May 11, 2015    Allergy     resolved: July 22, 2015    Benign essential hypertension     Last assessed/resolved: May 31, 2017    BPH (benign prostatic hyperplasia)     Colitis     Last assessed: May 24, 2016    Generalized osteoarthritis     Last assessed: Oct 11, 2013    GERD without esophagitis     Last assessed/resolved:  May 31, 2017    Hearing loss     Hiatal hernia     resolved: July 22, 2015    History of gastroesophageal reflux (GERD)     History of stomach ulcers     last assessed: May 11, 2015    Hypertension     Incomplete bladder emptying     Kidney stone     Nodular prostate with lower urinary tract symptoms     Nontoxic single thyroid nodule     last assessed: April 16, 2014    Orchalgia     Overweight     last assessed: Oct 31, 2013    Poor urinary stream     Pulmonary embolism [facility])     Salivary gland disorder     last assessed: April 16, 2014    Seasonal allergies     last assessed: May 15, 2015    Spermatocele     Straining to void     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:  May 7, 2014         Current Outpatient Medications:     aspirin 81 mg chewable tablet, Chew 81 mg daily, Disp: , Rfl:     diltiazem (CARDIZEM CD) 180 mg 24 hr capsule, TAKE 1 CAPSULE BY MOUTH EVERY DAY, Disp: 90 capsule, Rfl: 1    finasteride (PROSCAR) 5 mg tablet, TAKE 1 TABLET BY MOUTH EVERY DAY, Disp: 90 tablet, Rfl: 2    gabapentin (NEURONTIN) 100 mg capsule, TAKE 1 CAPSULE BY MOUTH TWICE A DAY, Disp: 180 capsule, Rfl: 0    HYDROCHLOROTHIAZIDE PO, Take by mouth, Disp: , Rfl:     montelukast (SINGULAIR) 10 mg tablet, Take 10 mg by mouth daily at bedtime, Disp: , Rfl:     omeprazole (PriLOSEC) 40 MG capsule, Take 40 mg by mouth daily, Disp: , Rfl:     tamsulosin (FLOMAX) 0 4 mg, Take 1 capsule (0 4 mg total) by mouth daily with dinner, Disp: 90 capsule, Rfl: 3    Triamcinolone Acetonide (NASACORT AQ NA), into each nostril 2 (two) times a day as needed, Disp: , Rfl:     Allergies   Allergen Reactions    Ace Inhibitors     Molds & Smuts     Other     Pollen Extract     Short Ragweed Pollen Ext     Tree Extract        Social History   Past Surgical History:   Procedure Laterality Date    BLADDER SURGERY      CHOLECYSTECTOMY  2000    laparoscopic     HEMORRHOID SURGERY      pilionidal cyst removal     HERNIA REPAIR Left 01/17/2008    inguinal     KNEE ARTHROSCOPY Left 1974    KNEE CARTILAGE SURGERY      NASAL SEPTUM SURGERY      Deviation repair     PROSTATE BIOPSY  2017    STOMACH SURGERY      TONSILLECTOMY AND ADENOIDECTOMY      at age 37   [address] - right 01/04 0 left 01/95    VASECTOMY       Family History   Problem Relation Age of Onset    Hypertension Mother     Stroke Mother     Stomach cancer Father     Hypertension Father     Hypertension Family     Stroke Family         syndrome        Objective:  /74 (BP Location: Left arm, Patient Position: Sitting, Cuff Size: Standard)   Pulse 90   Temp 98 °F (36 7 °C) (Tympanic)   Ht 5' 6 5" (1 689 m)   Wt 99 3 kg (219 lb)   SpO2 96%   BMI 34 82 kg/m²        Physical Exam   Constitutional: He is oriented to person, place, and time  He appears well-developed and well-nourished  HENT:   Right Ear: External ear normal    Mouth/Throat: Oropharynx is clear and moist    Eyes: Pupils are equal, round, and reactive to light  Conjunctivae and EOM are normal    Neck: Normal range of motion  No JVD present  No thyromegaly present  Cardiovascular: Normal rate, regular rhythm, normal heart sounds and intact distal pulses  Pulmonary/Chest: Breath sounds normal    Crackles at the right base expiratory rhonchi decreased breath sounds   Abdominal: Soft  Bowel sounds are normal    Musculoskeletal: Normal range of motion  Abnormal wrist with swelling but no redness no tenderness   Lymphadenopathy:     He has no cervical adenopathy  Neurological: He is alert and oriented to person, place, and time  He has normal reflexes  Skin: Skin is dry  Psychiatric: He has a normal mood and affect   His behavior is normal  Judgment and thought content normal

## 2020-02-12 NOTE — TELEPHONE ENCOUNTER
PCP's office LM to get patient an appt  We have seen him before  Called to schedule a follow up with Dr Jordon Mena  LM for patient to call us back

## 2020-02-18 ENCOUNTER — TELEPHONE (OUTPATIENT)
Dept: INTERNAL MEDICINE CLINIC | Facility: CLINIC | Age: 80
End: 2020-02-18

## 2020-02-18 NOTE — TELEPHONE ENCOUNTER
Received a call from Becky Schreiber 15 from [place] on behalf of [facility] is pending Denial and wanted to see if a peer to peer should be scheduled - REF# [id number] // [phone] is the call back number

## 2020-02-18 NOTE — TELEPHONE ENCOUNTER
Can someone look into doing a peer to peer Dr Shanell Veras not available  Patient is scheduled tomorrow for a CT of chest it was denied: base on medicare national coverage determination   The reason this test cannot be approved is because 1  Guidelines support chest ct with or without contrast or chest CTA for the evaluation of hemoptysis after one or more of the following  An abnormal chest xray, massive hemoptysis of greater than or equal to 30cc per episode or unable to protect airway-High risk for malignancy with a patietn greater than 36 yeas of age and a greater than 30 pack history-yearr smoking history  Persistant/recurrent hemoptysis with greater than 36years of age or greater than 30 pack year smoking history  The clinical information provided does not meet these criteria and therefore the request is not indicated at this time       informatino needs to be provided by 2/21/20 please call [phone] select option 4 And reference # [id number]    TEST is scheduled for tomorrow 2/19/20

## 2020-02-18 NOTE — TELEPHONE ENCOUNTER
Patient has persistent hemoptysis and is over age 37 ( Dr Hattie Carnes notes "continued hemoptysis"  This meets criteria, right?

## 2020-02-22 ENCOUNTER — APPOINTMENT (OUTPATIENT)
Dept: LAB | Age: 80
End: 2020-02-22
Payer: COMMERCIAL

## 2020-02-22 DIAGNOSIS — R04.2 HEMOPTYSIS: ICD-10-CM

## 2020-02-22 PROCEDURE — 87205 SMEAR GRAM STAIN: CPT

## 2020-02-22 PROCEDURE — 87070 CULTURE OTHR SPECIMN AEROBIC: CPT

## 2020-02-26 LAB
BACTERIA SPT RESP CULT: ABNORMAL
BACTERIA SPT RESP CULT: ABNORMAL
GRAM STN SPEC: ABNORMAL

## 2020-03-02 ENCOUNTER — HOSPITAL ENCOUNTER (EMERGENCY)
Facility: HOSPITAL | Age: 80
Discharge: HOME | End: 2020-03-02
Attending: EMERGENCY MEDICINE | Admitting: EMERGENCY MEDICINE
Payer: COMMERCIAL

## 2020-03-02 ENCOUNTER — TELEPHONE (OUTPATIENT)
Dept: INTERNAL MEDICINE CLINIC | Facility: CLINIC | Age: 80
End: 2020-03-02

## 2020-03-02 VITALS
TEMPERATURE: 98.7 F | SYSTOLIC BLOOD PRESSURE: 170 MMHG | OXYGEN SATURATION: 96 % | RESPIRATION RATE: 18 BRPM | HEART RATE: 87 BPM | DIASTOLIC BLOOD PRESSURE: 86 MMHG

## 2020-03-02 DIAGNOSIS — R04.0 EPISTAXIS: Primary | ICD-10-CM

## 2020-03-02 PROCEDURE — 99283 EMERGENCY DEPT VISIT LOW MDM: CPT

## 2020-03-02 PROCEDURE — 99284 EMERGENCY DEPT VISIT MOD MDM: CPT | Performed by: PHYSICIAN ASSISTANT

## 2020-03-02 RX ORDER — TRANEXAMIC ACID 100 MG/ML
500 INJECTION, SOLUTION INTRAVENOUS ONCE
Status: COMPLETED | OUTPATIENT
Start: 2020-03-02 | End: 2020-03-02

## 2020-03-02 RX ORDER — OXYMETAZOLINE HYDROCHLORIDE 0.05 G/100ML
2 SPRAY NASAL ONCE
Status: COMPLETED | OUTPATIENT
Start: 2020-03-02 | End: 2020-03-02

## 2020-03-02 RX ORDER — AMOXICILLIN AND CLAVULANATE POTASSIUM 875; 125 MG/1; MG/1
1 TABLET, FILM COATED ORAL EVERY 12 HOURS
Qty: 10 TABLET | Refills: 0 | Status: SHIPPED | OUTPATIENT
Start: 2020-03-02 | End: 2020-03-08 | Stop reason: HOSPADM

## 2020-03-02 RX ORDER — LIDOCAINE HYDROCHLORIDE AND EPINEPHRINE 10; 10 MG/ML; UG/ML
10 INJECTION, SOLUTION INFILTRATION; PERINEURAL ONCE
Status: COMPLETED | OUTPATIENT
Start: 2020-03-02 | End: 2020-03-02

## 2020-03-02 RX ADMIN — TRANEXAMIC ACID 500 MG: 1 INJECTION, SOLUTION INTRAVENOUS at 01:13

## 2020-03-02 RX ADMIN — SILVER NITRATE APPLICATORS 1 APPLICATOR: 25; 75 STICK TOPICAL at 01:14

## 2020-03-02 RX ADMIN — OXYMETAZOLINE HYDROCHLORIDE 2 SPRAY: 0.05 SPRAY NASAL at 01:14

## 2020-03-02 RX ADMIN — LIDOCAINE HYDROCHLORIDE,EPINEPHRINE BITARTRATE 10 ML: 10; .01 INJECTION, SOLUTION INFILTRATION; PERINEURAL at 01:14

## 2020-03-02 NOTE — TELEPHONE ENCOUNTER
Pt was in er his septum from nose was bleeding from both sides it was pouring out  They did call ambulance, and took him and he has it packed  He was told to see ENT and told to contact primary  he will be seeing ENT on wednseday  I offered koko with Luis Enrique Sour he refused he only wanted to wait for sorathia  I advised him sorathia was not in office  His question was should he continue aspirin since he was having the bleeding  Right now its not bleeding but if it stats he was instructed to call ENT right away

## 2020-03-02 NOTE — TELEPHONE ENCOUNTER
Spoke to [place]  [facility] patients wife and made koko for Monday 3/9/20 and told her to hold his aspirin till them

## 2020-03-06 ENCOUNTER — NURSE TRIAGE (OUTPATIENT)
Dept: OTHER | Facility: OTHER | Age: 80
End: 2020-03-06

## 2020-03-07 ENCOUNTER — APPOINTMENT (EMERGENCY)
Dept: CT IMAGING | Facility: HOSPITAL | Age: 80
DRG: 551 | End: 2020-03-07
Attending: EMERGENCY MEDICINE
Payer: COMMERCIAL

## 2020-03-07 ENCOUNTER — APPOINTMENT (EMERGENCY)
Dept: RADIOLOGY | Facility: HOSPITAL | Age: 80
DRG: 551 | End: 2020-03-07
Payer: COMMERCIAL

## 2020-03-07 ENCOUNTER — HOSPITAL ENCOUNTER (INPATIENT)
Facility: HOSPITAL | Age: 80
LOS: 1 days | Discharge: HOME | DRG: 551 | End: 2020-03-08
Attending: EMERGENCY MEDICINE | Admitting: INTERNAL MEDICINE
Payer: COMMERCIAL

## 2020-03-07 DIAGNOSIS — J18.9 BILATERAL PNEUMONIA: Primary | ICD-10-CM

## 2020-03-07 DIAGNOSIS — K57.90 DIVERTICULOSIS: ICD-10-CM

## 2020-03-07 DIAGNOSIS — M54.50 LOW BACK PAIN: ICD-10-CM

## 2020-03-07 DIAGNOSIS — R07.9 CHEST PAIN: ICD-10-CM

## 2020-03-07 DIAGNOSIS — R10.9 ABDOMINAL PAIN: ICD-10-CM

## 2020-03-07 PROBLEM — R07.81 PLEURITIC PAIN: Status: ACTIVE | Noted: 2020-03-07

## 2020-03-07 PROBLEM — M54.9 BACK PAIN: Status: ACTIVE | Noted: 2020-03-07

## 2020-03-07 LAB
ALBUMIN SERPL BCP-MCNC: 3.3 G/DL (ref 3.5–5)
ALBUMIN SERPL BCP-MCNC: 3.4 G/DL (ref 3.5–5)
ALP SERPL-CCNC: 72 U/L (ref 46–116)
ALP SERPL-CCNC: 76 U/L (ref 46–116)
ALT SERPL W P-5'-P-CCNC: 14 U/L (ref 12–78)
ALT SERPL W P-5'-P-CCNC: 15 U/L (ref 12–78)
ANION GAP SERPL CALCULATED.3IONS-SCNC: 10 MMOL/L (ref 4–13)
ANION GAP SERPL CALCULATED.3IONS-SCNC: 8 MMOL/L (ref 4–13)
APTT PPP: 28 SECONDS (ref 23–37)
AST SERPL W P-5'-P-CCNC: 20 U/L (ref 5–45)
AST SERPL W P-5'-P-CCNC: 22 U/L (ref 5–45)
BASOPHILS # BLD AUTO: 0.03 THOUSANDS/ΜL (ref 0–0.1)
BASOPHILS # BLD AUTO: 0.03 THOUSANDS/ΜL (ref 0–0.1)
BASOPHILS NFR BLD AUTO: 1 % (ref 0–1)
BASOPHILS NFR BLD AUTO: 1 % (ref 0–1)
BILIRUB SERPL-MCNC: 1.03 MG/DL (ref 0.2–1)
BILIRUB SERPL-MCNC: 1.11 MG/DL (ref 0.2–1)
BUN SERPL-MCNC: 20 MG/DL (ref 5–25)
BUN SERPL-MCNC: 22 MG/DL (ref 5–25)
CALCIUM SERPL-MCNC: 8.7 MG/DL (ref 8.3–10.1)
CALCIUM SERPL-MCNC: 8.7 MG/DL (ref 8.3–10.1)
CHLORIDE SERPL-SCNC: 101 MMOL/L (ref 100–108)
CHLORIDE SERPL-SCNC: 102 MMOL/L (ref 100–108)
CK SERPL-CCNC: 106 U/L (ref 39–308)
CO2 SERPL-SCNC: 26 MMOL/L (ref 21–32)
CO2 SERPL-SCNC: 27 MMOL/L (ref 21–32)
CREAT SERPL-MCNC: 1.13 MG/DL (ref 0.6–1.3)
CREAT SERPL-MCNC: 1.14 MG/DL (ref 0.6–1.3)
CRP SERPL QL: >90 MG/L
D DIMER PPP FEU-MCNC: 0.63 UG/ML FEU
EOSINOPHIL # BLD AUTO: 0.02 THOUSAND/ΜL (ref 0–0.61)
EOSINOPHIL # BLD AUTO: 0.02 THOUSAND/ΜL (ref 0–0.61)
EOSINOPHIL NFR BLD AUTO: 0 % (ref 0–6)
EOSINOPHIL NFR BLD AUTO: 0 % (ref 0–6)
ERYTHROCYTE [DISTWIDTH] IN BLOOD BY AUTOMATED COUNT: 13.5 % (ref 11.6–15.1)
ERYTHROCYTE [DISTWIDTH] IN BLOOD BY AUTOMATED COUNT: 13.6 % (ref 11.6–15.1)
ERYTHROCYTE [SEDIMENTATION RATE] IN BLOOD: 8 MM/HOUR (ref 0–10)
FLUAV RNA NPH QL NAA+PROBE: NORMAL
FLUBV RNA NPH QL NAA+PROBE: NORMAL
GFR SERPL CREATININE-BSD FRML MDRD: 61 ML/MIN/1.73SQ M
GFR SERPL CREATININE-BSD FRML MDRD: 62 ML/MIN/1.73SQ M
GLUCOSE SERPL-MCNC: 111 MG/DL (ref 65–140)
GLUCOSE SERPL-MCNC: 96 MG/DL (ref 65–140)
HCT VFR BLD AUTO: 45.8 % (ref 36.5–49.3)
HCT VFR BLD AUTO: 47.9 % (ref 36.5–49.3)
HGB BLD-MCNC: 15.4 G/DL (ref 12–17)
HGB BLD-MCNC: 16 G/DL (ref 12–17)
IMM GRANULOCYTES # BLD AUTO: 0.09 THOUSAND/UL (ref 0–0.2)
IMM GRANULOCYTES # BLD AUTO: 0.12 THOUSAND/UL (ref 0–0.2)
IMM GRANULOCYTES NFR BLD AUTO: 2 % (ref 0–2)
IMM GRANULOCYTES NFR BLD AUTO: 2 % (ref 0–2)
INR PPP: 1.15 (ref 0.84–1.19)
L PNEUMO1 AG UR QL IA.RAPID: NEGATIVE
LACTATE SERPL-SCNC: 1 MMOL/L (ref 0.5–2)
LIPASE SERPL-CCNC: 87 U/L (ref 73–393)
LYMPHOCYTES # BLD AUTO: 0.87 THOUSANDS/ΜL (ref 0.6–4.47)
LYMPHOCYTES # BLD AUTO: 1.12 THOUSANDS/ΜL (ref 0.6–4.47)
LYMPHOCYTES NFR BLD AUTO: 14 % (ref 14–44)
LYMPHOCYTES NFR BLD AUTO: 20 % (ref 14–44)
MCH RBC QN AUTO: 30.6 PG (ref 26.8–34.3)
MCH RBC QN AUTO: 30.7 PG (ref 26.8–34.3)
MCHC RBC AUTO-ENTMCNC: 33.4 G/DL (ref 31.4–37.4)
MCHC RBC AUTO-ENTMCNC: 33.6 G/DL (ref 31.4–37.4)
MCV RBC AUTO: 91 FL (ref 82–98)
MCV RBC AUTO: 92 FL (ref 82–98)
MONOCYTES # BLD AUTO: 0.49 THOUSAND/ΜL (ref 0.17–1.22)
MONOCYTES # BLD AUTO: 0.5 THOUSAND/ΜL (ref 0.17–1.22)
MONOCYTES NFR BLD AUTO: 8 % (ref 4–12)
MONOCYTES NFR BLD AUTO: 9 % (ref 4–12)
NEUTROPHILS # BLD AUTO: 3.9 THOUSANDS/ΜL (ref 1.85–7.62)
NEUTROPHILS # BLD AUTO: 4.69 THOUSANDS/ΜL (ref 1.85–7.62)
NEUTS SEG NFR BLD AUTO: 68 % (ref 43–75)
NEUTS SEG NFR BLD AUTO: 75 % (ref 43–75)
NRBC BLD AUTO-RTO: 0 /100 WBCS
NRBC BLD AUTO-RTO: 0 /100 WBCS
NT-PROBNP SERPL-MCNC: 161 PG/ML
PLATELET # BLD AUTO: 151 THOUSANDS/UL (ref 149–390)
PLATELET # BLD AUTO: 174 THOUSANDS/UL (ref 149–390)
PMV BLD AUTO: 10.5 FL (ref 8.9–12.7)
PMV BLD AUTO: 10.7 FL (ref 8.9–12.7)
POTASSIUM SERPL-SCNC: 4.2 MMOL/L (ref 3.5–5.3)
POTASSIUM SERPL-SCNC: 4.3 MMOL/L (ref 3.5–5.3)
PROCALCITONIN SERPL-MCNC: <0.05 NG/ML
PROT SERPL-MCNC: 6.8 G/DL (ref 6.4–8.2)
PROT SERPL-MCNC: 6.9 G/DL (ref 6.4–8.2)
PROTHROMBIN TIME: 14.1 SECONDS (ref 11.6–14.5)
RBC # BLD AUTO: 5.01 MILLION/UL (ref 3.88–5.62)
RBC # BLD AUTO: 5.23 MILLION/UL (ref 3.88–5.62)
RSV RNA NPH QL NAA+PROBE: NORMAL
S PNEUM AG UR QL: NEGATIVE
SODIUM SERPL-SCNC: 136 MMOL/L (ref 136–145)
SODIUM SERPL-SCNC: 138 MMOL/L (ref 136–145)
TROPONIN I SERPL-MCNC: <0.02 NG/ML
WBC # BLD AUTO: 5.66 THOUSAND/UL (ref 4.31–10.16)
WBC # BLD AUTO: 6.22 THOUSAND/UL (ref 4.31–10.16)

## 2020-03-07 PROCEDURE — 36415 COLL VENOUS BLD VENIPUNCTURE: CPT | Performed by: EMERGENCY MEDICINE

## 2020-03-07 PROCEDURE — 85025 COMPLETE CBC W/AUTO DIFF WBC: CPT | Performed by: EMERGENCY MEDICINE

## 2020-03-07 PROCEDURE — 96376 TX/PRO/DX INJ SAME DRUG ADON: CPT

## 2020-03-07 PROCEDURE — 96375 TX/PRO/DX INJ NEW DRUG ADDON: CPT

## 2020-03-07 PROCEDURE — 82550 ASSAY OF CK (CPK): CPT | Performed by: EMERGENCY MEDICINE

## 2020-03-07 PROCEDURE — 84484 ASSAY OF TROPONIN QUANT: CPT | Performed by: EMERGENCY MEDICINE

## 2020-03-07 PROCEDURE — 74177 CT ABD & PELVIS W/CONTRAST: CPT

## 2020-03-07 PROCEDURE — 83605 ASSAY OF LACTIC ACID: CPT | Performed by: EMERGENCY MEDICINE

## 2020-03-07 PROCEDURE — 94760 N-INVAS EAR/PLS OXIMETRY 1: CPT

## 2020-03-07 PROCEDURE — 96374 THER/PROPH/DIAG INJ IV PUSH: CPT

## 2020-03-07 PROCEDURE — 93005 ELECTROCARDIOGRAM TRACING: CPT

## 2020-03-07 PROCEDURE — 85652 RBC SED RATE AUTOMATED: CPT | Performed by: NURSE PRACTITIONER

## 2020-03-07 PROCEDURE — 99285 EMERGENCY DEPT VISIT HI MDM: CPT

## 2020-03-07 PROCEDURE — 83690 ASSAY OF LIPASE: CPT | Performed by: EMERGENCY MEDICINE

## 2020-03-07 PROCEDURE — 87631 RESP VIRUS 3-5 TARGETS: CPT | Performed by: NURSE PRACTITIONER

## 2020-03-07 PROCEDURE — 84145 PROCALCITONIN (PCT): CPT | Performed by: NURSE PRACTITIONER

## 2020-03-07 PROCEDURE — 94664 DEMO&/EVAL PT USE INHALER: CPT

## 2020-03-07 PROCEDURE — 99285 EMERGENCY DEPT VISIT HI MDM: CPT | Performed by: EMERGENCY MEDICINE

## 2020-03-07 PROCEDURE — 71045 X-RAY EXAM CHEST 1 VIEW: CPT

## 2020-03-07 PROCEDURE — 80053 COMPREHEN METABOLIC PANEL: CPT | Performed by: EMERGENCY MEDICINE

## 2020-03-07 PROCEDURE — 86140 C-REACTIVE PROTEIN: CPT | Performed by: NURSE PRACTITIONER

## 2020-03-07 PROCEDURE — 80053 COMPREHEN METABOLIC PANEL: CPT | Performed by: NURSE PRACTITIONER

## 2020-03-07 PROCEDURE — 87040 BLOOD CULTURE FOR BACTERIA: CPT | Performed by: EMERGENCY MEDICINE

## 2020-03-07 PROCEDURE — 85025 COMPLETE CBC W/AUTO DIFF WBC: CPT | Performed by: NURSE PRACTITIONER

## 2020-03-07 PROCEDURE — 71275 CT ANGIOGRAPHY CHEST: CPT

## 2020-03-07 PROCEDURE — 85610 PROTHROMBIN TIME: CPT | Performed by: EMERGENCY MEDICINE

## 2020-03-07 PROCEDURE — 99223 1ST HOSP IP/OBS HIGH 75: CPT | Performed by: INTERNAL MEDICINE

## 2020-03-07 PROCEDURE — 87449 NOS EACH ORGANISM AG IA: CPT | Performed by: NURSE PRACTITIONER

## 2020-03-07 PROCEDURE — 85730 THROMBOPLASTIN TIME PARTIAL: CPT | Performed by: EMERGENCY MEDICINE

## 2020-03-07 PROCEDURE — 85379 FIBRIN DEGRADATION QUANT: CPT | Performed by: EMERGENCY MEDICINE

## 2020-03-07 PROCEDURE — 83880 ASSAY OF NATRIURETIC PEPTIDE: CPT | Performed by: EMERGENCY MEDICINE

## 2020-03-07 RX ORDER — GABAPENTIN 300 MG/1
300 CAPSULE ORAL 2 TIMES DAILY
Status: DISCONTINUED | OUTPATIENT
Start: 2020-03-07 | End: 2020-03-08 | Stop reason: HOSPADM

## 2020-03-07 RX ORDER — METRONIDAZOLE 500 MG/1
500 TABLET ORAL EVERY 8 HOURS SCHEDULED
Status: DISCONTINUED | OUTPATIENT
Start: 2020-03-07 | End: 2020-03-07

## 2020-03-07 RX ORDER — AZITHROMYCIN 250 MG/1
500 TABLET, FILM COATED ORAL ONCE
Status: COMPLETED | OUTPATIENT
Start: 2020-03-07 | End: 2020-03-07

## 2020-03-07 RX ORDER — BACITRACIN, NEOMYCIN, POLYMYXIN B 400; 3.5; 5 [USP'U]/G; MG/G; [USP'U]/G
1 OINTMENT TOPICAL 2 TIMES DAILY
COMMUNITY
End: 2020-03-14 | Stop reason: HOSPADM

## 2020-03-07 RX ORDER — TAMSULOSIN HYDROCHLORIDE 0.4 MG/1
0.4 CAPSULE ORAL
Status: DISCONTINUED | OUTPATIENT
Start: 2020-03-07 | End: 2020-03-08 | Stop reason: HOSPADM

## 2020-03-07 RX ORDER — ONDANSETRON 2 MG/ML
4 INJECTION INTRAMUSCULAR; INTRAVENOUS EVERY 6 HOURS PRN
Status: DISCONTINUED | OUTPATIENT
Start: 2020-03-07 | End: 2020-03-08 | Stop reason: HOSPADM

## 2020-03-07 RX ORDER — KETOROLAC TROMETHAMINE 30 MG/ML
15 INJECTION, SOLUTION INTRAMUSCULAR; INTRAVENOUS ONCE
Status: DISCONTINUED | OUTPATIENT
Start: 2020-03-07 | End: 2020-03-07

## 2020-03-07 RX ORDER — LIDOCAINE 50 MG/G
2 PATCH TOPICAL DAILY
Status: DISCONTINUED | OUTPATIENT
Start: 2020-03-07 | End: 2020-03-08 | Stop reason: HOSPADM

## 2020-03-07 RX ORDER — AZITHROMYCIN 250 MG/1
500 TABLET, FILM COATED ORAL EVERY 24 HOURS
Status: DISCONTINUED | OUTPATIENT
Start: 2020-03-08 | End: 2020-03-08 | Stop reason: HOSPADM

## 2020-03-07 RX ORDER — TRIAMCINOLONE ACETONIDE 0.1 %
PASTE (GRAM) DENTAL 2 TIMES DAILY
Status: DISCONTINUED | OUTPATIENT
Start: 2020-03-07 | End: 2020-03-08 | Stop reason: HOSPADM

## 2020-03-07 RX ORDER — FLUTICASONE PROPIONATE 50 MCG
1 SPRAY, SUSPENSION (ML) NASAL DAILY PRN
Status: DISCONTINUED | OUTPATIENT
Start: 2020-03-07 | End: 2020-03-08 | Stop reason: HOSPADM

## 2020-03-07 RX ORDER — BACITRACIN, NEOMYCIN, POLYMYXIN B 400; 3.5; 5 [USP'U]/G; MG/G; [USP'U]/G
1 OINTMENT TOPICAL 2 TIMES DAILY
Status: DISCONTINUED | OUTPATIENT
Start: 2020-03-07 | End: 2020-03-08 | Stop reason: HOSPADM

## 2020-03-07 RX ORDER — POLYETHYLENE GLYCOL 3350 17 G/17G
17 POWDER, FOR SOLUTION ORAL DAILY PRN
Status: DISCONTINUED | OUTPATIENT
Start: 2020-03-07 | End: 2020-03-08 | Stop reason: HOSPADM

## 2020-03-07 RX ORDER — ACETAMINOPHEN 325 MG/1
975 TABLET ORAL EVERY 8 HOURS SCHEDULED
Status: DISCONTINUED | OUTPATIENT
Start: 2020-03-07 | End: 2020-03-08 | Stop reason: HOSPADM

## 2020-03-07 RX ORDER — FINASTERIDE 5 MG/1
5 TABLET, FILM COATED ORAL DAILY
Status: DISCONTINUED | OUTPATIENT
Start: 2020-03-07 | End: 2020-03-08 | Stop reason: HOSPADM

## 2020-03-07 RX ORDER — ASPIRIN 81 MG/1
81 TABLET, CHEWABLE ORAL DAILY
Status: DISCONTINUED | OUTPATIENT
Start: 2020-03-07 | End: 2020-03-08 | Stop reason: HOSPADM

## 2020-03-07 RX ORDER — OXYCODONE HYDROCHLORIDE 5 MG/1
2.5 TABLET ORAL EVERY 4 HOURS PRN
Status: DISCONTINUED | OUTPATIENT
Start: 2020-03-07 | End: 2020-03-08 | Stop reason: HOSPADM

## 2020-03-07 RX ORDER — DILTIAZEM HYDROCHLORIDE 180 MG/1
180 CAPSULE, COATED, EXTENDED RELEASE ORAL DAILY
Status: DISCONTINUED | OUTPATIENT
Start: 2020-03-07 | End: 2020-03-08 | Stop reason: HOSPADM

## 2020-03-07 RX ORDER — METHOCARBAMOL 500 MG/1
500 TABLET, FILM COATED ORAL EVERY 6 HOURS PRN
Status: DISCONTINUED | OUTPATIENT
Start: 2020-03-07 | End: 2020-03-08 | Stop reason: HOSPADM

## 2020-03-07 RX ORDER — BENZONATATE 100 MG/1
100 CAPSULE ORAL 3 TIMES DAILY PRN
Status: DISCONTINUED | OUTPATIENT
Start: 2020-03-07 | End: 2020-03-08 | Stop reason: HOSPADM

## 2020-03-07 RX ORDER — MONTELUKAST SODIUM 10 MG/1
10 TABLET ORAL
Status: DISCONTINUED | OUTPATIENT
Start: 2020-03-07 | End: 2020-03-08 | Stop reason: HOSPADM

## 2020-03-07 RX ORDER — HYDROMORPHONE HCL/PF 1 MG/ML
0.2 SYRINGE (ML) INJECTION ONCE
Status: COMPLETED | OUTPATIENT
Start: 2020-03-07 | End: 2020-03-07

## 2020-03-07 RX ORDER — BACITRACIN ZINC, NEOMYCIN SULFATE, AND POLYMYXIN B SULFATE 400; 3.5; 5 [IU]/G; MG/G; [IU]/G
1 OINTMENT TOPICAL 2 TIMES DAILY
Status: DISCONTINUED | OUTPATIENT
Start: 2020-03-07 | End: 2020-03-07

## 2020-03-07 RX ORDER — GUAIFENESIN 600 MG
1200 TABLET, EXTENDED RELEASE 12 HR ORAL EVERY 12 HOURS SCHEDULED
Status: DISCONTINUED | OUTPATIENT
Start: 2020-03-07 | End: 2020-03-08 | Stop reason: HOSPADM

## 2020-03-07 RX ORDER — ONDANSETRON 2 MG/ML
4 INJECTION INTRAMUSCULAR; INTRAVENOUS ONCE
Status: COMPLETED | OUTPATIENT
Start: 2020-03-07 | End: 2020-03-07

## 2020-03-07 RX ORDER — PANTOPRAZOLE SODIUM 40 MG/1
40 TABLET, DELAYED RELEASE ORAL
Status: DISCONTINUED | OUTPATIENT
Start: 2020-03-07 | End: 2020-03-08 | Stop reason: HOSPADM

## 2020-03-07 RX ORDER — HYDROMORPHONE HCL/PF 1 MG/ML
0.5 SYRINGE (ML) INJECTION ONCE
Status: COMPLETED | OUTPATIENT
Start: 2020-03-07 | End: 2020-03-07

## 2020-03-07 RX ORDER — ACETAMINOPHEN 325 MG/1
650 TABLET ORAL EVERY 6 HOURS PRN
Status: DISCONTINUED | OUTPATIENT
Start: 2020-03-07 | End: 2020-03-07

## 2020-03-07 RX ADMIN — METRONIDAZOLE 500 MG: 500 TABLET ORAL at 06:26

## 2020-03-07 RX ADMIN — OXYCODONE HYDROCHLORIDE 2.5 MG: 5 TABLET ORAL at 17:28

## 2020-03-07 RX ADMIN — TAMSULOSIN HYDROCHLORIDE 0.4 MG: 0.4 CAPSULE ORAL at 17:27

## 2020-03-07 RX ADMIN — DICLOFENAC SODIUM 2 G: 10 GEL TOPICAL at 17:27

## 2020-03-07 RX ADMIN — OXYCODONE HYDROCHLORIDE 2.5 MG: 5 TABLET ORAL at 08:12

## 2020-03-07 RX ADMIN — BACITRACIN, NEOMYCIN, POLYMYXIN B 1 SMALL APPLICATION: 400; 3.5; 5 OINTMENT TOPICAL at 17:27

## 2020-03-07 RX ADMIN — CEFTRIAXONE SODIUM 1000 MG: 10 INJECTION, POWDER, FOR SOLUTION INTRAVENOUS at 08:13

## 2020-03-07 RX ADMIN — DICLOFENAC SODIUM 2 G: 10 GEL TOPICAL at 21:36

## 2020-03-07 RX ADMIN — BACITRACIN, NEOMYCIN, POLYMYXIN B 1 SMALL APPLICATION: 400; 3.5; 5 OINTMENT TOPICAL at 08:13

## 2020-03-07 RX ADMIN — FINASTERIDE 5 MG: 5 TABLET, FILM COATED ORAL at 08:12

## 2020-03-07 RX ADMIN — MONTELUKAST SODIUM 10 MG: 10 TABLET, FILM COATED ORAL at 21:36

## 2020-03-07 RX ADMIN — TRIAMCINOLONE ACETONIDE: 1 PASTE DENTAL at 08:24

## 2020-03-07 RX ADMIN — ASPIRIN 81 MG 81 MG: 81 TABLET ORAL at 08:12

## 2020-03-07 RX ADMIN — GABAPENTIN 300 MG: 300 CAPSULE ORAL at 17:27

## 2020-03-07 RX ADMIN — GUAIFENESIN 1200 MG: 600 TABLET, EXTENDED RELEASE ORAL at 21:36

## 2020-03-07 RX ADMIN — HYDROMORPHONE HYDROCHLORIDE 0.2 MG: 1 INJECTION, SOLUTION INTRAMUSCULAR; INTRAVENOUS; SUBCUTANEOUS at 02:31

## 2020-03-07 RX ADMIN — ACETAMINOPHEN 975 MG: 325 TABLET, FILM COATED ORAL at 21:36

## 2020-03-07 RX ADMIN — AZITHROMYCIN 500 MG: 250 TABLET, FILM COATED ORAL at 06:26

## 2020-03-07 RX ADMIN — ACETAMINOPHEN 975 MG: 325 TABLET, FILM COATED ORAL at 06:26

## 2020-03-07 RX ADMIN — IOHEXOL 100 ML: 350 INJECTION, SOLUTION INTRAVENOUS at 03:28

## 2020-03-07 RX ADMIN — PANTOPRAZOLE SODIUM 40 MG: 40 TABLET, DELAYED RELEASE ORAL at 06:34

## 2020-03-07 RX ADMIN — TRIAMCINOLONE ACETONIDE 1 APPLICATION: 1 PASTE DENTAL at 17:27

## 2020-03-07 RX ADMIN — ENOXAPARIN SODIUM 40 MG: 40 INJECTION SUBCUTANEOUS at 08:12

## 2020-03-07 RX ADMIN — GUAIFENESIN 1200 MG: 600 TABLET, EXTENDED RELEASE ORAL at 08:12

## 2020-03-07 RX ADMIN — GABAPENTIN 300 MG: 300 CAPSULE ORAL at 08:12

## 2020-03-07 RX ADMIN — ONDANSETRON 4 MG: 2 INJECTION INTRAMUSCULAR; INTRAVENOUS at 02:31

## 2020-03-07 RX ADMIN — DILTIAZEM HYDROCHLORIDE 180 MG: 180 CAPSULE, COATED, EXTENDED RELEASE ORAL at 08:12

## 2020-03-07 RX ADMIN — LIDOCAINE 2 PATCH: 50 PATCH TOPICAL at 08:13

## 2020-03-07 RX ADMIN — HYDROMORPHONE HYDROCHLORIDE 0.5 MG: 1 INJECTION, SOLUTION INTRAMUSCULAR; INTRAVENOUS; SUBCUTANEOUS at 03:49

## 2020-03-07 NOTE — TELEPHONE ENCOUNTER
Regarding:  Body Aches/Pain when taking deep breaths   ----- Message from Divina Mayer sent at 3/6/2020 11:01 PM EST -----  "My  is having severe body aches,Hurts to take a deep breaths "

## 2020-03-07 NOTE — TELEPHONE ENCOUNTER
Reason for Disposition   [1] SEVERE back pain (e g , excruciating, unable to do any normal activities) AND [2] not improved 2 hours after pain medicine    Answer Assessment - Initial Assessment Questions  1  ONSET: Wednesday pm  2  LOCATION: Mid back and it hurts his chest to take a deep breath  3  SEVERITY: 8-9 / 10  4  PATTERN: The pain is constant  It hurts with almost all movement  5  RADIATION: The pain goes from his back into the chest when breathing  6  CAUSE: Unknown  7  BACK OVERUSE: Unsure  8  MEDICATIONS:Discussed taking Ibuprofen 400 mg every 6 hours  9  NEUROLOGIC SYMPTOMS: There are no signs of deficit  10  OTHER SYMPTOMS: Denies any respiratory symptoms      Protocols used: BACK PAIN-ADULT-AH

## 2020-03-08 VITALS
DIASTOLIC BLOOD PRESSURE: 86 MMHG | OXYGEN SATURATION: 94 % | BODY MASS INDEX: 33.4 KG/M2 | HEART RATE: 97 BPM | TEMPERATURE: 98.6 F | WEIGHT: 210.1 LBS | SYSTOLIC BLOOD PRESSURE: 153 MMHG | RESPIRATION RATE: 19 BRPM

## 2020-03-08 LAB
ANION GAP SERPL CALCULATED.3IONS-SCNC: 9 MMOL/L (ref 4–13)
BASOPHILS # BLD MANUAL: 0 THOUSAND/UL (ref 0–0.1)
BASOPHILS NFR MAR MANUAL: 0 % (ref 0–1)
BUN SERPL-MCNC: 18 MG/DL (ref 5–25)
CALCIUM SERPL-MCNC: 8.5 MG/DL (ref 8.3–10.1)
CHLORIDE SERPL-SCNC: 103 MMOL/L (ref 100–108)
CO2 SERPL-SCNC: 27 MMOL/L (ref 21–32)
CREAT SERPL-MCNC: 1.26 MG/DL (ref 0.6–1.3)
EOSINOPHIL # BLD MANUAL: 0 THOUSAND/UL (ref 0–0.4)
EOSINOPHIL NFR BLD MANUAL: 0 % (ref 0–6)
ERYTHROCYTE [DISTWIDTH] IN BLOOD BY AUTOMATED COUNT: 13.2 % (ref 11.6–15.1)
GFR SERPL CREATININE-BSD FRML MDRD: 54 ML/MIN/1.73SQ M
GLUCOSE SERPL-MCNC: 100 MG/DL (ref 65–140)
HCT VFR BLD AUTO: 43.6 % (ref 36.5–49.3)
HGB BLD-MCNC: 14.7 G/DL (ref 12–17)
LYMPHOCYTES # BLD AUTO: 0.49 THOUSAND/UL (ref 0.6–4.47)
LYMPHOCYTES # BLD AUTO: 8 % (ref 14–44)
MCH RBC QN AUTO: 30.6 PG (ref 26.8–34.3)
MCHC RBC AUTO-ENTMCNC: 33.7 G/DL (ref 31.4–37.4)
MCV RBC AUTO: 91 FL (ref 82–98)
MONOCYTES # BLD AUTO: 0.43 THOUSAND/UL (ref 0–1.22)
MONOCYTES NFR BLD: 7 % (ref 4–12)
NEUTROPHILS # BLD MANUAL: 5.23 THOUSAND/UL (ref 1.85–7.62)
NEUTS BAND NFR BLD MANUAL: 1 % (ref 0–8)
NEUTS SEG NFR BLD AUTO: 84 % (ref 43–75)
NRBC BLD AUTO-RTO: 0 /100 WBCS
PLATELET # BLD AUTO: 137 THOUSANDS/UL (ref 149–390)
PLATELET BLD QL SMEAR: ABNORMAL
PMV BLD AUTO: 10.2 FL (ref 8.9–12.7)
POTASSIUM SERPL-SCNC: 4 MMOL/L (ref 3.5–5.3)
PROCALCITONIN SERPL-MCNC: 0.05 NG/ML
RBC # BLD AUTO: 4.8 MILLION/UL (ref 3.88–5.62)
RBC MORPH BLD: NORMAL
SODIUM SERPL-SCNC: 139 MMOL/L (ref 136–145)
TOTAL CELLS COUNTED SPEC: 100
WBC # BLD AUTO: 6.15 THOUSAND/UL (ref 4.31–10.16)

## 2020-03-08 PROCEDURE — 85007 BL SMEAR W/DIFF WBC COUNT: CPT | Performed by: INTERNAL MEDICINE

## 2020-03-08 PROCEDURE — 85027 COMPLETE CBC AUTOMATED: CPT | Performed by: INTERNAL MEDICINE

## 2020-03-08 PROCEDURE — 80048 BASIC METABOLIC PNL TOTAL CA: CPT | Performed by: INTERNAL MEDICINE

## 2020-03-08 PROCEDURE — 84145 PROCALCITONIN (PCT): CPT | Performed by: NURSE PRACTITIONER

## 2020-03-08 PROCEDURE — 99239 HOSP IP/OBS DSCHRG MGMT >30: CPT | Performed by: INTERNAL MEDICINE

## 2020-03-08 RX ORDER — BENZONATATE 100 MG/1
100 CAPSULE ORAL 3 TIMES DAILY PRN
Qty: 9 CAPSULE | Refills: 0 | Status: SHIPPED | OUTPATIENT
Start: 2020-03-08 | End: 2020-03-14 | Stop reason: HOSPADM

## 2020-03-08 RX ORDER — AZITHROMYCIN 500 MG/1
500 TABLET, FILM COATED ORAL EVERY 24 HOURS
Qty: 3 TABLET | Refills: 0 | Status: SHIPPED | OUTPATIENT
Start: 2020-03-09 | End: 2020-03-14 | Stop reason: HOSPADM

## 2020-03-08 RX ORDER — FLUTICASONE PROPIONATE 50 MCG
1 SPRAY, SUSPENSION (ML) NASAL DAILY PRN
Qty: 1 BOTTLE | Refills: 0 | Status: SHIPPED | OUTPATIENT
Start: 2020-03-08 | End: 2020-03-25 | Stop reason: ALTCHOICE

## 2020-03-08 RX ORDER — AMOXICILLIN 500 MG/1
1000 CAPSULE ORAL EVERY 8 HOURS SCHEDULED
Qty: 18 CAPSULE | Refills: 0 | Status: SHIPPED | OUTPATIENT
Start: 2020-03-09 | End: 2020-03-14 | Stop reason: HOSPADM

## 2020-03-08 RX ADMIN — DICLOFENAC SODIUM 2 G: 10 GEL TOPICAL at 08:22

## 2020-03-08 RX ADMIN — ENOXAPARIN SODIUM 40 MG: 40 INJECTION SUBCUTANEOUS at 08:21

## 2020-03-08 RX ADMIN — ASPIRIN 81 MG 81 MG: 81 TABLET ORAL at 08:20

## 2020-03-08 RX ADMIN — ACETAMINOPHEN 975 MG: 325 TABLET, FILM COATED ORAL at 05:31

## 2020-03-08 RX ADMIN — PANTOPRAZOLE SODIUM 40 MG: 40 TABLET, DELAYED RELEASE ORAL at 05:31

## 2020-03-08 RX ADMIN — BACITRACIN, NEOMYCIN, POLYMYXIN B 1 SMALL APPLICATION: 400; 3.5; 5 OINTMENT TOPICAL at 08:21

## 2020-03-08 RX ADMIN — ONDANSETRON 4 MG: 2 INJECTION INTRAMUSCULAR; INTRAVENOUS at 07:13

## 2020-03-08 RX ADMIN — DILTIAZEM HYDROCHLORIDE 180 MG: 180 CAPSULE, COATED, EXTENDED RELEASE ORAL at 08:20

## 2020-03-08 RX ADMIN — AZITHROMYCIN 500 MG: 250 TABLET, FILM COATED ORAL at 05:31

## 2020-03-08 RX ADMIN — FINASTERIDE 5 MG: 5 TABLET, FILM COATED ORAL at 08:21

## 2020-03-08 RX ADMIN — GUAIFENESIN 1200 MG: 600 TABLET, EXTENDED RELEASE ORAL at 08:20

## 2020-03-08 RX ADMIN — GABAPENTIN 300 MG: 300 CAPSULE ORAL at 08:19

## 2020-03-08 RX ADMIN — CEFTRIAXONE SODIUM 1000 MG: 10 INJECTION, POWDER, FOR SOLUTION INTRAVENOUS at 08:22

## 2020-03-08 RX ADMIN — LIDOCAINE 2 PATCH: 50 PATCH TOPICAL at 08:21

## 2020-03-08 RX ADMIN — DICLOFENAC SODIUM 2 G: 10 GEL TOPICAL at 12:05

## 2020-03-08 RX ADMIN — TRIAMCINOLONE ACETONIDE: 1 PASTE DENTAL at 08:22

## 2020-03-09 ENCOUNTER — APPOINTMENT (INPATIENT)
Dept: ULTRASOUND IMAGING | Facility: HOSPITAL | Age: 80
DRG: 871 | End: 2020-03-09
Payer: COMMERCIAL

## 2020-03-09 ENCOUNTER — APPOINTMENT (EMERGENCY)
Dept: RADIOLOGY | Facility: HOSPITAL | Age: 80
DRG: 871 | End: 2020-03-09
Payer: COMMERCIAL

## 2020-03-09 ENCOUNTER — APPOINTMENT (INPATIENT)
Dept: MRI IMAGING | Facility: HOSPITAL | Age: 80
DRG: 871 | End: 2020-03-09
Payer: COMMERCIAL

## 2020-03-09 ENCOUNTER — HOSPITAL ENCOUNTER (INPATIENT)
Facility: HOSPITAL | Age: 80
LOS: 5 days | Discharge: NURSING, REHAB OR LONG-TERM CARE | DRG: 871 | End: 2020-03-14
Attending: EMERGENCY MEDICINE | Admitting: INTERNAL MEDICINE
Payer: COMMERCIAL

## 2020-03-09 DIAGNOSIS — A41.9 SEPSIS WITHOUT ACUTE ORGAN DYSFUNCTION, DUE TO UNSPECIFIED ORGANISM (HCC): ICD-10-CM

## 2020-03-09 DIAGNOSIS — J18.9 PNEUMONIA: Primary | ICD-10-CM

## 2020-03-09 DIAGNOSIS — J96.01 ACUTE RESPIRATORY FAILURE WITH HYPOXIA (HCC): ICD-10-CM

## 2020-03-09 DIAGNOSIS — G60.9 IDIOPATHIC PERIPHERAL NEUROPATHY: ICD-10-CM

## 2020-03-09 DIAGNOSIS — Z78.9 FAILURE OF OUTPATIENT TREATMENT: ICD-10-CM

## 2020-03-09 DIAGNOSIS — R68.89 RIGORS: ICD-10-CM

## 2020-03-09 DIAGNOSIS — J18.9 PNEUMONIA OF BOTH LUNGS DUE TO INFECTIOUS ORGANISM, UNSPECIFIED PART OF LUNG: ICD-10-CM

## 2020-03-09 DIAGNOSIS — R09.02 HYPOXIA: ICD-10-CM

## 2020-03-09 DIAGNOSIS — J30.1 SEASONAL ALLERGIC RHINITIS DUE TO POLLEN: ICD-10-CM

## 2020-03-09 PROBLEM — M79.604 PAIN IN BOTH LOWER EXTREMITIES: Status: ACTIVE | Noted: 2020-03-09

## 2020-03-09 PROBLEM — M79.605 PAIN IN BOTH LOWER EXTREMITIES: Status: ACTIVE | Noted: 2020-03-09

## 2020-03-09 LAB
ALBUMIN SERPL BCP-MCNC: 2.9 G/DL (ref 3.5–5)
ALP SERPL-CCNC: 61 U/L (ref 46–116)
ALT SERPL W P-5'-P-CCNC: 16 U/L (ref 12–78)
ANION GAP SERPL CALCULATED.3IONS-SCNC: 6 MMOL/L (ref 4–13)
ANION GAP SERPL CALCULATED.3IONS-SCNC: 7 MMOL/L (ref 4–13)
APTT PPP: 33 SECONDS (ref 23–37)
AST SERPL W P-5'-P-CCNC: 21 U/L (ref 5–45)
ATRIAL RATE: 81 BPM
ATRIAL RATE: 99 BPM
BACTERIA UR QL AUTO: ABNORMAL /HPF
BASOPHILS # BLD AUTO: 0.02 THOUSANDS/ΜL (ref 0–0.1)
BASOPHILS NFR BLD AUTO: 0 % (ref 0–1)
BILIRUB SERPL-MCNC: 0.64 MG/DL (ref 0.2–1)
BILIRUB UR QL STRIP: NEGATIVE
BUN SERPL-MCNC: 23 MG/DL (ref 5–25)
BUN SERPL-MCNC: 24 MG/DL (ref 5–25)
CALCIUM SERPL-MCNC: 8.3 MG/DL (ref 8.3–10.1)
CALCIUM SERPL-MCNC: 8.5 MG/DL (ref 8.3–10.1)
CHLORIDE SERPL-SCNC: 101 MMOL/L (ref 100–108)
CHLORIDE SERPL-SCNC: 104 MMOL/L (ref 100–108)
CHOLEST SERPL-MCNC: 117 MG/DL (ref 50–200)
CLARITY UR: CLEAR
CO2 SERPL-SCNC: 27 MMOL/L (ref 21–32)
CO2 SERPL-SCNC: 29 MMOL/L (ref 21–32)
COLOR UR: YELLOW
CREAT SERPL-MCNC: 1.35 MG/DL (ref 0.6–1.3)
CREAT SERPL-MCNC: 1.45 MG/DL (ref 0.6–1.3)
EOSINOPHIL # BLD AUTO: 0 THOUSAND/ΜL (ref 0–0.61)
EOSINOPHIL NFR BLD AUTO: 0 % (ref 0–6)
ERYTHROCYTE [DISTWIDTH] IN BLOOD BY AUTOMATED COUNT: 13.6 % (ref 11.6–15.1)
GFR SERPL CREATININE-BSD FRML MDRD: 46 ML/MIN/1.73SQ M
GFR SERPL CREATININE-BSD FRML MDRD: 50 ML/MIN/1.73SQ M
GLUCOSE SERPL-MCNC: 116 MG/DL (ref 65–140)
GLUCOSE SERPL-MCNC: 119 MG/DL (ref 65–140)
GLUCOSE UR STRIP-MCNC: NEGATIVE MG/DL
HCT VFR BLD AUTO: 43.6 % (ref 36.5–49.3)
HDLC SERPL-MCNC: 48 MG/DL
HGB BLD-MCNC: 14.8 G/DL (ref 12–17)
HGB UR QL STRIP.AUTO: ABNORMAL
IMM GRANULOCYTES # BLD AUTO: 0.12 THOUSAND/UL (ref 0–0.2)
IMM GRANULOCYTES NFR BLD AUTO: 2 % (ref 0–2)
INR PPP: 1.19 (ref 0.84–1.19)
KETONES UR STRIP-MCNC: NEGATIVE MG/DL
LACTATE SERPL-SCNC: 1.1 MMOL/L (ref 0.5–2)
LDLC SERPL CALC-MCNC: 62 MG/DL (ref 0–100)
LEUKOCYTE ESTERASE UR QL STRIP: NEGATIVE
LYMPHOCYTES # BLD AUTO: 0.22 THOUSANDS/ΜL (ref 0.6–4.47)
LYMPHOCYTES NFR BLD AUTO: 4 % (ref 14–44)
MCH RBC QN AUTO: 30.7 PG (ref 26.8–34.3)
MCHC RBC AUTO-ENTMCNC: 33.9 G/DL (ref 31.4–37.4)
MCV RBC AUTO: 91 FL (ref 82–98)
MONOCYTES # BLD AUTO: 0.26 THOUSAND/ΜL (ref 0.17–1.22)
MONOCYTES NFR BLD AUTO: 4 % (ref 4–12)
NEUTROPHILS # BLD AUTO: 5.53 THOUSANDS/ΜL (ref 1.85–7.62)
NEUTS SEG NFR BLD AUTO: 90 % (ref 43–75)
NITRITE UR QL STRIP: NEGATIVE
NON-SQ EPI CELLS URNS QL MICRO: ABNORMAL /HPF
NONHDLC SERPL-MCNC: 69 MG/DL
NRBC BLD AUTO-RTO: 0 /100 WBCS
NT-PROBNP SERPL-MCNC: 610 PG/ML
P AXIS: 29 DEGREES
P AXIS: 60 DEGREES
PH UR STRIP.AUTO: 5.5 [PH]
PLATELET # BLD AUTO: 140 THOUSANDS/UL (ref 149–390)
PMV BLD AUTO: 10.6 FL (ref 8.9–12.7)
POTASSIUM SERPL-SCNC: 3.9 MMOL/L (ref 3.5–5.3)
POTASSIUM SERPL-SCNC: 4.1 MMOL/L (ref 3.5–5.3)
PR INTERVAL: 140 MS
PR INTERVAL: 156 MS
PROCALCITONIN SERPL-MCNC: 1.03 NG/ML
PROT SERPL-MCNC: 6.4 G/DL (ref 6.4–8.2)
PROT UR STRIP-MCNC: NEGATIVE MG/DL
PROTHROMBIN TIME: 14.5 SECONDS (ref 11.6–14.5)
QRS AXIS: -3 DEGREES
QRS AXIS: -4 DEGREES
QRSD INTERVAL: 76 MS
QRSD INTERVAL: 88 MS
QT INTERVAL: 314 MS
QT INTERVAL: 346 MS
QTC INTERVAL: 401 MS
QTC INTERVAL: 402 MS
RBC # BLD AUTO: 4.82 MILLION/UL (ref 3.88–5.62)
RBC #/AREA URNS AUTO: ABNORMAL /HPF
SODIUM SERPL-SCNC: 137 MMOL/L (ref 136–145)
SODIUM SERPL-SCNC: 137 MMOL/L (ref 136–145)
SP GR UR STRIP.AUTO: 1.01 (ref 1–1.03)
T WAVE AXIS: 18 DEGREES
T WAVE AXIS: 21 DEGREES
TRIGL SERPL-MCNC: 35 MG/DL
UROBILINOGEN UR QL STRIP.AUTO: 0.2 E.U./DL
VENTRICULAR RATE: 81 BPM
VENTRICULAR RATE: 99 BPM
WBC # BLD AUTO: 6.15 THOUSAND/UL (ref 4.31–10.16)
WBC #/AREA URNS AUTO: ABNORMAL /HPF

## 2020-03-09 PROCEDURE — 93971 EXTREMITY STUDY: CPT

## 2020-03-09 PROCEDURE — 99223 1ST HOSP IP/OBS HIGH 75: CPT | Performed by: INTERNAL MEDICINE

## 2020-03-09 PROCEDURE — 81001 URINALYSIS AUTO W/SCOPE: CPT | Performed by: NURSE PRACTITIONER

## 2020-03-09 PROCEDURE — 94760 N-INVAS EAR/PLS OXIMETRY 1: CPT

## 2020-03-09 PROCEDURE — 93005 ELECTROCARDIOGRAM TRACING: CPT

## 2020-03-09 PROCEDURE — 72158 MRI LUMBAR SPINE W/O & W/DYE: CPT

## 2020-03-09 PROCEDURE — 87040 BLOOD CULTURE FOR BACTERIA: CPT | Performed by: EMERGENCY MEDICINE

## 2020-03-09 PROCEDURE — 99222 1ST HOSP IP/OBS MODERATE 55: CPT | Performed by: INTERNAL MEDICINE

## 2020-03-09 PROCEDURE — 85610 PROTHROMBIN TIME: CPT | Performed by: EMERGENCY MEDICINE

## 2020-03-09 PROCEDURE — 36415 COLL VENOUS BLD VENIPUNCTURE: CPT | Performed by: EMERGENCY MEDICINE

## 2020-03-09 PROCEDURE — 87081 CULTURE SCREEN ONLY: CPT | Performed by: NURSE PRACTITIONER

## 2020-03-09 PROCEDURE — 83880 ASSAY OF NATRIURETIC PEPTIDE: CPT | Performed by: NURSE PRACTITIONER

## 2020-03-09 PROCEDURE — 94640 AIRWAY INHALATION TREATMENT: CPT

## 2020-03-09 PROCEDURE — 85730 THROMBOPLASTIN TIME PARTIAL: CPT | Performed by: EMERGENCY MEDICINE

## 2020-03-09 PROCEDURE — 83605 ASSAY OF LACTIC ACID: CPT | Performed by: EMERGENCY MEDICINE

## 2020-03-09 PROCEDURE — 87486 CHLMYD PNEUM DNA AMP PROBE: CPT | Performed by: INTERNAL MEDICINE

## 2020-03-09 PROCEDURE — 99223 1ST HOSP IP/OBS HIGH 75: CPT | Performed by: NURSE PRACTITIONER

## 2020-03-09 PROCEDURE — 93010 ELECTROCARDIOGRAM REPORT: CPT | Performed by: INTERNAL MEDICINE

## 2020-03-09 PROCEDURE — 87798 DETECT AGENT NOS DNA AMP: CPT | Performed by: INTERNAL MEDICINE

## 2020-03-09 PROCEDURE — 87633 RESP VIRUS 12-25 TARGETS: CPT | Performed by: INTERNAL MEDICINE

## 2020-03-09 PROCEDURE — 80048 BASIC METABOLIC PNL TOTAL CA: CPT | Performed by: NURSE PRACTITIONER

## 2020-03-09 PROCEDURE — 80053 COMPREHEN METABOLIC PANEL: CPT | Performed by: EMERGENCY MEDICINE

## 2020-03-09 PROCEDURE — 85025 COMPLETE CBC W/AUTO DIFF WBC: CPT | Performed by: EMERGENCY MEDICINE

## 2020-03-09 PROCEDURE — 80061 LIPID PANEL: CPT | Performed by: NURSE PRACTITIONER

## 2020-03-09 PROCEDURE — 99285 EMERGENCY DEPT VISIT HI MDM: CPT | Performed by: EMERGENCY MEDICINE

## 2020-03-09 PROCEDURE — 99285 EMERGENCY DEPT VISIT HI MDM: CPT

## 2020-03-09 PROCEDURE — A9585 GADOBUTROL INJECTION: HCPCS | Performed by: INTERNAL MEDICINE

## 2020-03-09 PROCEDURE — 71045 X-RAY EXAM CHEST 1 VIEW: CPT

## 2020-03-09 PROCEDURE — 84145 PROCALCITONIN (PCT): CPT | Performed by: NURSE PRACTITIONER

## 2020-03-09 PROCEDURE — 87581 M.PNEUMON DNA AMP PROBE: CPT | Performed by: INTERNAL MEDICINE

## 2020-03-09 PROCEDURE — 96374 THER/PROPH/DIAG INJ IV PUSH: CPT

## 2020-03-09 RX ORDER — PANTOPRAZOLE SODIUM 40 MG/1
40 TABLET, DELAYED RELEASE ORAL
Status: DISCONTINUED | OUTPATIENT
Start: 2020-03-09 | End: 2020-03-14 | Stop reason: HOSPADM

## 2020-03-09 RX ORDER — ACETAMINOPHEN 325 MG/1
975 TABLET ORAL EVERY 8 HOURS SCHEDULED
Status: DISCONTINUED | OUTPATIENT
Start: 2020-03-09 | End: 2020-03-14 | Stop reason: HOSPADM

## 2020-03-09 RX ORDER — FINASTERIDE 5 MG/1
5 TABLET, FILM COATED ORAL DAILY
Status: DISCONTINUED | OUTPATIENT
Start: 2020-03-09 | End: 2020-03-14 | Stop reason: HOSPADM

## 2020-03-09 RX ORDER — GUAIFENESIN 600 MG
1200 TABLET, EXTENDED RELEASE 12 HR ORAL 2 TIMES DAILY
Status: DISCONTINUED | OUTPATIENT
Start: 2020-03-09 | End: 2020-03-14 | Stop reason: HOSPADM

## 2020-03-09 RX ORDER — TAMSULOSIN HYDROCHLORIDE 0.4 MG/1
0.4 CAPSULE ORAL
Status: DISCONTINUED | OUTPATIENT
Start: 2020-03-09 | End: 2020-03-14 | Stop reason: HOSPADM

## 2020-03-09 RX ORDER — MONTELUKAST SODIUM 10 MG/1
10 TABLET ORAL
Status: DISCONTINUED | OUTPATIENT
Start: 2020-03-09 | End: 2020-03-14 | Stop reason: HOSPADM

## 2020-03-09 RX ORDER — ONDANSETRON 2 MG/ML
4 INJECTION INTRAMUSCULAR; INTRAVENOUS EVERY 6 HOURS PRN
Status: DISCONTINUED | OUTPATIENT
Start: 2020-03-09 | End: 2020-03-14 | Stop reason: HOSPADM

## 2020-03-09 RX ORDER — LEVALBUTEROL 1.25 MG/.5ML
1.25 SOLUTION, CONCENTRATE RESPIRATORY (INHALATION)
Status: DISCONTINUED | OUTPATIENT
Start: 2020-03-09 | End: 2020-03-14 | Stop reason: HOSPADM

## 2020-03-09 RX ORDER — OXYCODONE HYDROCHLORIDE 5 MG/1
2.5 TABLET ORAL ONCE
Status: COMPLETED | OUTPATIENT
Start: 2020-03-09 | End: 2020-03-10

## 2020-03-09 RX ORDER — DOCUSATE SODIUM 100 MG/1
100 CAPSULE, LIQUID FILLED ORAL 2 TIMES DAILY PRN
Status: DISCONTINUED | OUTPATIENT
Start: 2020-03-09 | End: 2020-03-11

## 2020-03-09 RX ORDER — LIDOCAINE 50 MG/G
2 PATCH TOPICAL DAILY
Status: DISCONTINUED | OUTPATIENT
Start: 2020-03-09 | End: 2020-03-14 | Stop reason: HOSPADM

## 2020-03-09 RX ORDER — ACETAMINOPHEN 325 MG/1
650 TABLET ORAL ONCE
Status: COMPLETED | OUTPATIENT
Start: 2020-03-09 | End: 2020-03-09

## 2020-03-09 RX ORDER — MAGNESIUM HYDROXIDE/ALUMINUM HYDROXICE/SIMETHICONE 120; 1200; 1200 MG/30ML; MG/30ML; MG/30ML
30 SUSPENSION ORAL EVERY 6 HOURS PRN
Status: DISCONTINUED | OUTPATIENT
Start: 2020-03-09 | End: 2020-03-14 | Stop reason: HOSPADM

## 2020-03-09 RX ORDER — BACITRACIN, NEOMYCIN, POLYMYXIN B 400; 3.5; 5 [USP'U]/G; MG/G; [USP'U]/G
1 OINTMENT TOPICAL 2 TIMES DAILY
Status: DISCONTINUED | OUTPATIENT
Start: 2020-03-09 | End: 2020-03-09

## 2020-03-09 RX ORDER — DILTIAZEM HYDROCHLORIDE 180 MG/1
180 CAPSULE, COATED, EXTENDED RELEASE ORAL DAILY
Status: DISCONTINUED | OUTPATIENT
Start: 2020-03-09 | End: 2020-03-14 | Stop reason: HOSPADM

## 2020-03-09 RX ORDER — FLUTICASONE PROPIONATE 50 MCG
1 SPRAY, SUSPENSION (ML) NASAL DAILY PRN
Status: DISCONTINUED | OUTPATIENT
Start: 2020-03-09 | End: 2020-03-14 | Stop reason: HOSPADM

## 2020-03-09 RX ORDER — ACETAMINOPHEN 325 MG/1
650 TABLET ORAL EVERY 6 HOURS PRN
Status: DISCONTINUED | OUTPATIENT
Start: 2020-03-09 | End: 2020-03-09

## 2020-03-09 RX ORDER — TRIAMCINOLONE ACETONIDE 1 MG/G
CREAM TOPICAL 2 TIMES DAILY
Status: DISCONTINUED | OUTPATIENT
Start: 2020-03-09 | End: 2020-03-14 | Stop reason: HOSPADM

## 2020-03-09 RX ORDER — HEPARIN SODIUM 5000 [USP'U]/ML
5000 INJECTION, SOLUTION INTRAVENOUS; SUBCUTANEOUS EVERY 8 HOURS SCHEDULED
Status: DISCONTINUED | OUTPATIENT
Start: 2020-03-09 | End: 2020-03-14 | Stop reason: HOSPADM

## 2020-03-09 RX ORDER — ASPIRIN 81 MG/1
81 TABLET, CHEWABLE ORAL DAILY
Status: DISCONTINUED | OUTPATIENT
Start: 2020-03-09 | End: 2020-03-14 | Stop reason: HOSPADM

## 2020-03-09 RX ORDER — SODIUM CHLORIDE, SODIUM LACTATE, POTASSIUM CHLORIDE, AND CALCIUM CHLORIDE .6; .31; .03; .02 G/100ML; G/100ML; G/100ML; G/100ML
125 INJECTION, SOLUTION INTRAVENOUS ONCE
Status: COMPLETED | OUTPATIENT
Start: 2020-03-09 | End: 2020-03-09

## 2020-03-09 RX ORDER — GABAPENTIN 100 MG/1
200 CAPSULE ORAL 2 TIMES DAILY
Status: DISCONTINUED | OUTPATIENT
Start: 2020-03-09 | End: 2020-03-10

## 2020-03-09 RX ORDER — BENZONATATE 100 MG/1
100 CAPSULE ORAL 3 TIMES DAILY PRN
Status: ACTIVE | OUTPATIENT
Start: 2020-03-09 | End: 2020-03-12

## 2020-03-09 RX ADMIN — ASPIRIN 81 MG 81 MG: 81 TABLET ORAL at 08:22

## 2020-03-09 RX ADMIN — GABAPENTIN 200 MG: 100 CAPSULE ORAL at 18:48

## 2020-03-09 RX ADMIN — LEVALBUTEROL HYDROCHLORIDE 1.25 MG: 1.25 SOLUTION, CONCENTRATE RESPIRATORY (INHALATION) at 15:05

## 2020-03-09 RX ADMIN — AZITHROMYCIN MONOHYDRATE 500 MG: 500 INJECTION, POWDER, LYOPHILIZED, FOR SOLUTION INTRAVENOUS at 03:14

## 2020-03-09 RX ADMIN — CEFEPIME HYDROCHLORIDE 2000 MG: 2 INJECTION, POWDER, FOR SOLUTION INTRAVENOUS at 02:50

## 2020-03-09 RX ADMIN — GUAIFENESIN 1200 MG: 600 TABLET, EXTENDED RELEASE ORAL at 08:22

## 2020-03-09 RX ADMIN — FINASTERIDE 5 MG: 5 TABLET, FILM COATED ORAL at 08:22

## 2020-03-09 RX ADMIN — ACETAMINOPHEN 975 MG: 325 TABLET, FILM COATED ORAL at 21:51

## 2020-03-09 RX ADMIN — ACETAMINOPHEN 975 MG: 325 TABLET, FILM COATED ORAL at 06:18

## 2020-03-09 RX ADMIN — LEVALBUTEROL HYDROCHLORIDE 1.25 MG: 1.25 SOLUTION, CONCENTRATE RESPIRATORY (INHALATION) at 19:45

## 2020-03-09 RX ADMIN — IPRATROPIUM BROMIDE 0.5 MG: 0.5 SOLUTION RESPIRATORY (INHALATION) at 19:45

## 2020-03-09 RX ADMIN — PANTOPRAZOLE SODIUM 40 MG: 40 TABLET, DELAYED RELEASE ORAL at 06:18

## 2020-03-09 RX ADMIN — SODIUM CHLORIDE, SODIUM LACTATE, POTASSIUM CHLORIDE, AND CALCIUM CHLORIDE 125 ML/HR: .6; .31; .03; .02 INJECTION, SOLUTION INTRAVENOUS at 04:19

## 2020-03-09 RX ADMIN — SODIUM CHLORIDE 1000 ML: 0.9 INJECTION, SOLUTION INTRAVENOUS at 02:45

## 2020-03-09 RX ADMIN — ACETAMINOPHEN 650 MG: 325 TABLET, FILM COATED ORAL at 02:42

## 2020-03-09 RX ADMIN — CEFEPIME HYDROCHLORIDE 2000 MG: 2 INJECTION, POWDER, FOR SOLUTION INTRAVENOUS at 14:02

## 2020-03-09 RX ADMIN — VANCOMYCIN HYDROCHLORIDE 1500 MG: 1 INJECTION, POWDER, LYOPHILIZED, FOR SOLUTION INTRAVENOUS at 04:20

## 2020-03-09 RX ADMIN — TRIAMCINOLONE ACETONIDE: 1 CREAM TOPICAL at 18:50

## 2020-03-09 RX ADMIN — GABAPENTIN 200 MG: 100 CAPSULE ORAL at 08:22

## 2020-03-09 RX ADMIN — HEPARIN SODIUM 5000 UNITS: 5000 INJECTION INTRAVENOUS; SUBCUTANEOUS at 21:51

## 2020-03-09 RX ADMIN — GADOBUTROL 9 ML: 604.72 INJECTION INTRAVENOUS at 17:30

## 2020-03-09 RX ADMIN — HEPARIN SODIUM 5000 UNITS: 5000 INJECTION INTRAVENOUS; SUBCUTANEOUS at 06:17

## 2020-03-09 RX ADMIN — ACETAMINOPHEN 975 MG: 325 TABLET, FILM COATED ORAL at 14:03

## 2020-03-09 RX ADMIN — GUAIFENESIN 1200 MG: 600 TABLET, EXTENDED RELEASE ORAL at 18:47

## 2020-03-09 RX ADMIN — MONTELUKAST 10 MG: 10 TABLET, FILM COATED ORAL at 21:51

## 2020-03-09 RX ADMIN — LIDOCAINE 2 PATCH: 50 PATCH TOPICAL at 08:22

## 2020-03-09 RX ADMIN — VANCOMYCIN HYDROCHLORIDE 750 MG: 750 INJECTION, SOLUTION INTRAVENOUS at 15:08

## 2020-03-09 RX ADMIN — HEPARIN SODIUM 5000 UNITS: 5000 INJECTION INTRAVENOUS; SUBCUTANEOUS at 14:02

## 2020-03-09 RX ADMIN — TAMSULOSIN HYDROCHLORIDE 0.4 MG: 0.4 CAPSULE ORAL at 18:48

## 2020-03-09 RX ADMIN — IPRATROPIUM BROMIDE 0.5 MG: 0.5 SOLUTION RESPIRATORY (INHALATION) at 15:05

## 2020-03-09 NOTE — PROGRESS NOTES
Vancomycin Assessment    Teofilo Machado  is a 67 y o  male who is currently receiving vancomycin vancomycon 1500mg q24h for Pneumonia     Relevant clinical data and objective history reviewed:  Creatinine   Date Value Ref Range Status   03/09/2020 1 45 (H) 0 60 - 1 30 mg/dL Final     Comment:     Standardized to IDMS reference method   03/08/2020 1 26 0 60 - 1 30 mg/dL Final     Comment:     Standardized to IDMS reference method   03/07/2020 1 14 0 60 - 1 30 mg/dL Final     Comment:     Standardized to IDMS reference method     /70 (BP Location: Right arm)   Pulse 82   Temp 99 °F (37 2 °C) (Oral)   Resp 20   SpO2 93%   No intake/output data recorded  Lab Results   Component Value Date/Time    BUN 24 03/09/2020 01:46 AM    BUN 20 08/09/2019 10:48 AM    WBC 6 15 03/09/2020 01:46 AM    HGB 14 8 03/09/2020 01:46 AM    HCT 43 6 03/09/2020 01:46 AM    MCV 91 03/09/2020 01:46 AM     (L) 03/09/2020 01:46 AM     Temp Readings from Last 3 Encounters:   03/09/20 99 °F (37 2 °C) (Oral)   03/08/20 98 6 °F (37 °C)   03/02/20 98 7 °F (37 1 °C) (Oral)     Vancomycin Days of Therapy: 1    Assessment/Plan  The patient is currently on vancomycin utilizing scheduled dosing based on adjusted body weight (due to obesity)  Baseline risks associated with therapy include: pre-existing renal impairment, advanced age and dehydration  The patient is currently receiving vancomycon 1500mg q24h and after clinical evaluation will be changed to vancomycin 750mg q12h  Pharmacy will also follow closely for s/sx of nephrotoxicity, infusion reactions and appropriateness of therapy  BMP and CBC will be ordered per protocol  Plan for trough as patient approaches steady state, prior to the 4th  dose at approximately 1430 3/10  Due to infection severity, will target a trough of 15-20 (appropriate for most indications)   Pharmacy will continue to follow the patients culture results and clinical progress daily      Yonas Matson Naomie Moser, Pharmacist

## 2020-03-09 NOTE — H&P
H&P- Everardo Flores  1940, 67 y o  male MRN: [id number]    Unit/Bed#: S -01 Encounter: [id number]    Primary Care Provider: Marlin Fleischer, MD   Date and time admitted to hospital: 3/9/2020  1:28 AM        Bilateral pneumonia  Assessment & Plan  · Patients wife called ED due to rigors and "heavy breathing"   · Presents to ED with fever 102, oxygen saturation 70% at home when EMS arrived  · Was discharged from hospital yesterday on amoxicillin and zithromax to treat for community acquired pneumonia  · Currently requiring 2 liters nasal cannula   · Received cefepime, vanco, zithro in ED - will continue with cefepime and vanco  Obtain MRSA swab  · (3/7/2020) BC NGTD  · (3/7/2020) Urine antigen negative  · (3/7/2020) Flu RSV negative  · Repeat BC pending  · Monitor procalcitonins   · Check sputum     Sepsis ([facility] [address] )  Assessment & Plan  Present on admission as evidenced by tachycardia and fever with B/l pneumonia on CXR   BC pending   Lactic acid normal   UA TBC   MRSA culture pending       KAYLI (acute kidney injury) ([facility] [address] )  Assessment & Plan   Creatinine upon admission: 1 45  o Baseline: 1 14 (48 hours ago)    Secondary to decreased oral intake   Treatment: IV fluids    Monitor BMP  Back pain  Assessment & Plan  · Improving   · Lidocaine patches  · Tylenol     Pain in both lower extremities  Assessment & Plan  · Reports bilateral leg pain that has been ongoing for many months  · Reports pain is worse while lying flat  · Improves with standing   · Denies intermittent claudication or wounds  · +1 pedal pulses noted  Varicosities noted   Skin is shiny with decreased hair growth  · Lower extremities are warm    · Check lipid panel   · Continue ASA   · Consider imaging to evaluate for PAD --inpatient vs outpatient    Benign prostatic hyperplasia (BPH) with straining on urination  Assessment & Plan  Continue proscar and finasteride  Check PVR given KAYLI   Reports no urinary retention or symptoms    Benign essential hypertension  Assessment & Plan  Continue diltiazem  Parameter set for SBP >130 given KAYLI   BP currently stable      VTE Prophylaxis: Heparin  / sequential compression device   Code Status: Level 1  POLST: POLST is not applicable to this patient  Discussion with family: patient and wife at bedside     Anticipated Length of Stay:  Patient will be admitted on an Inpatient basis with an anticipated length of stay of  Greater than 2 midnights  Justification for Hospital Stay: IV abx     Total Time for Visit, including Counseling / Coordination of Care: 45 minutes  Greater than 50% of this total time spent on direct patient counseling and coordination of care  Chief Complaint:   Shortness of breath, fever, hypoxia     History of Present Illness:    Star Gu  is a 67 y o  male who presents with shortness of breath, hypoxia, fever  Patient was discharged from hospital on 3/8  He was discharged with augmentin and zithromax for 3 days to treat for community acquired pneumonia  On day of discharge, patient was stable, afebrile, and well oxygenated on room air with no respiratory distress  His wife called ER earlier this evening when she reported him having "shakes" and "breathing loud "  The ED advised to call EMS  EMS noted saturations in the 70s on room air  Also reported temperature of 102  Past medical history is HTN, GERD, BPH, vertigo, remote hx of DVT - one after orthopedic surgery and one unprovoked in 2013 for which he was anticoagulated briefly on coumadin  Patient met sepsis criteria as evidenced by fever, tachycardia  He was started on cefepime, vanco, zithromax in the ED  Will admit as inpatient for IV abx  Review of Systems:    Review of Systems   Constitutional: Positive for chills and fever  HENT: Negative  Eyes: Negative  Respiratory: Positive for chest tightness and shortness of breath  Negative for cough and wheezing  Cardiovascular: Negative  Gastrointestinal: Negative  Genitourinary: Negative  Musculoskeletal: Negative  Neurological: Negative  Hematological: Negative  Psychiatric/Behavioral: Negative  Past Medical and Surgical History:     Past Medical History:   Diagnosis Date    Abnormal electrocardiogram     Last assessed: Oct 11, 2013    Allergic rhinitis     last assessed: Oct 11, 2013    Allergic rhinitis due to pollen     last assessed: Oct 10, 2013    Allergic sinusitis     Last assessed: May 11, 2015    Allergy     resolved: July 22, 2015    Benign essential hypertension     Last assessed/resolved: May 31, 2017    BPH (benign prostatic hyperplasia)     Colitis     Last assessed: May 24, 2016    Generalized osteoarthritis     Last assessed: Oct 11, 2013    GERD without esophagitis     Last assessed/resolved: May 31, 2017    Hearing loss     Hiatal hernia     resolved: July 22, 2015    History of gastroesophageal reflux (GERD)     History of stomach ulcers     last assessed: May 11, 2015    Hypertension     Incomplete bladder emptying     Kidney stone     Nodular prostate with lower urinary tract symptoms     Nontoxic single thyroid nodule     last assessed: April 16, 2014    Orchalgia     Overweight     last assessed: Oct 31, 2013    Poor urinary stream     [facility])     Salivary gland disorder     last assessed: April 16, 2014    Seasonal allergies     last assessed: May 15, 2015    Spermatocele     Straining to void     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:  May 7, 2014       Past Surgical History:   Procedure Laterality Date    BLADDER SURGERY      CHOLECYSTECTOMY  2000    laparoscopic     HEMORRHOID SURGERY      pilionidal cyst removal     HERNIA REPAIR Left 01/17/2008    inguinal     KNEE ARTHROSCOPY Left 1974    KNEE CARTILAGE SURGERY      NASAL SEPTUM SURGERY      Deviation repair     PROSTATE BIOPSY  2017    STOMACH SURGERY      TONSILLECTOMY AND ADENOIDECTOMY      at age 37   [address] [address] [address] - right 01/04 0 left 01/95    VASECTOMY         Meds/Allergies:    Prior to Admission medications    Medication Sig Start Date End Date Taking?  Authorizing Provider   amoxicillin (AMOXIL) 500 mg capsule Take 2 capsules (1,000 mg total) by mouth every 8 (eight) hours for 3 days 3/9/20 3/12/20  Selena Rubio MD   aspirin 81 mg chewable tablet Chew 81 mg daily    Historical Provider, MD   azithromycin (ZITHROMAX) 500 MG tablet Take 1 tablet (500 mg total) by mouth every 24 hours for 3 days 3/9/20 3/12/20  Selena Rubio MD   benzonatate (TESSALON PERLES) 100 mg capsule Take 1 capsule (100 mg total) by mouth 3 (three) times a day as needed for cough for up to 3 days 3/8/20 3/11/20  Selena Rubio MD   diltiazem (CARDIZEM CD) 180 mg 24 hr capsule TAKE 1 CAPSULE BY MOUTH EVERY DAY 10/31/19   Jessica Chris MD   finasteride (PROSCAR) 5 mg tablet TAKE 1 TABLET BY MOUTH EVERY DAY 2/10/20   CARLOS Traylor   fluticasone (FLONASE) 50 mcg/act nasal spray 1 spray into each nostril daily as needed for rhinitis or allergies 3/8/20   Selena Rubio MD   gabapentin (NEURONTIN) 300 mg capsule Take 1 capsule (300 mg total) by mouth 2 (two) times a day  Patient taking differently: Take 200 mg by mouth 2 (two) times a day  2/12/20   Jessica Chris MD   HYDROCHLOROTHIAZIDE PO Take by mouth    Historical Provider, MD   montelukast (SINGULAIR) 10 mg tablet Take 10 mg by mouth daily at bedtime    Historical Provider, MD   neomycin-bacitracin-polymyxin b (NEOSPORIN) ointment Apply 1 application topically 2 (two) times a day    Historical Provider, MD   omeprazole (PriLOSEC) 40 MG capsule Take 40 mg by mouth daily    Historical Provider, MD   tamsulosin (FLOMAX) 0 4 mg Take 1 capsule (0 4 mg total) by mouth daily with dinner 3/4/19   CARLOS Traylor   triamcinolone (KENALOG) 0 1 % oral topical paste 1 application to area on lip bid x 7 days 3/4/20   Delano Fengal VINAY Ballard     I have reviewed home medications with patient personally  Allergies: Allergies   Allergen Reactions    Ace Inhibitors     Molds & Smuts     Other     Pollen Extract     Short Ragweed Pollen Ext     Tree Extract        Social History:     Marital Status: /Civil Union     Substance Use History:   Social History     Substance and Sexual Activity   Alcohol Use Yes    Comment: rare     Social History     Tobacco Use   Smoking Status Former Smoker    Packs/day: 1 00    Years: 25 00    Pack years: 25 00    Types: Cigarettes    Last attempt to quit: Satnam Mratin Years since quittin 2   Smokeless Tobacco Never Used     Social History     Substance and Sexual Activity   Drug Use No       Family History:    non-contributory    Physical Exam:     Vitals:   Blood Pressure: 116/70 (20)  Pulse: 82 (20)  Temperature: 99 °F (37 2 °C) (20)  Temp Source: Oral (20)  Respirations: 20 (20)  SpO2: 93 % (20)    Physical Exam   Constitutional: He is oriented to person, place, and time  He appears well-developed and well-nourished  No distress  HENT:   Head: Normocephalic and atraumatic  Eyes: Pupils are equal, round, and reactive to light  EOM are normal  No scleral icterus  Neck: Normal range of motion  Neck supple  Cardiovascular: Normal rate, regular rhythm, normal heart sounds and intact distal pulses  Exam reveals no friction rub  No murmur heard  Pulmonary/Chest: Effort normal and breath sounds normal  No respiratory distress  He has no wheezes  Rales RLL  On 2 liters nasal cannula   Abdominal: Soft  Bowel sounds are normal  He exhibits no distension  There is no tenderness  Musculoskeletal: Normal range of motion  He exhibits no edema or tenderness  Lower extremitys +1 pedal pulses, extremities warm  Skin shiny  Decreased hair production  Varicosities noted      Neurological: He is alert and oriented to person, place, and time  Skin: Skin is warm  Capillary refill takes less than 2 seconds  Psychiatric: He has a normal mood and affect  Additional Data:     Lab Results: I have personally reviewed pertinent reports  Results from last 7 days   Lab Units 03/09/20  0146 03/08/20  0454   WBC Thousand/uL 6 15 6 15   HEMOGLOBIN g/dL 14 8 14 7   HEMATOCRIT % 43 6 43 6   PLATELETS Thousands/uL 140* 137*   BANDS PCT %  --  1   NEUTROS PCT % 90*  --    LYMPHS PCT % 4*  --    LYMPHO PCT %  --  8*   MONOS PCT % 4  --    MONO PCT %  --  7   EOS PCT % 0 0     Results from last 7 days   Lab Units 03/09/20  0146   SODIUM mmol/L 137   POTASSIUM mmol/L 4 1   CHLORIDE mmol/L 101   CO2 mmol/L 29   BUN mg/dL 24   CREATININE mg/dL 1 45*   ANION GAP mmol/L 7   CALCIUM mg/dL 8 5   ALBUMIN g/dL 2 9*   TOTAL BILIRUBIN mg/dL 0 64   ALK PHOS U/L 61   ALT U/L 16   AST U/L 21   GLUCOSE RANDOM mg/dL 116     Results from last 7 days   Lab Units 03/09/20  0146   INR  1 19             Results from last 7 days   Lab Units 03/09/20  0146 03/08/20  0454 03/07/20  0658 03/07/20  0228   LACTIC ACID mmol/L 1 1  --   --  1 0   PROCALCITONIN ng/ml  --  0 05 <0 05  --        Imaging: I have personally reviewed pertinent reports  XR chest 1 view portable   ED Interpretation by Flaquito Willis MD (03/09 3737)   Unchanged from 3/7/20  EKG, Pathology, and Other Studies Reviewed on Admission:   · EKG: NSR HR 99    Allscripts / Epic Records Reviewed: Yes     ** Please Note: This note has been constructed using a voice recognition system   **

## 2020-03-09 NOTE — ASSESSMENT & PLAN NOTE
 Creatinine upon admission: 1 45  o Baseline: 1 14 (48 hours ago)    Secondary to decreased oral intake   Treatment: IV fluids    Monitor BMP

## 2020-03-09 NOTE — CONSULTS
Consultation - Pulmonary Medicine   Dick Hinojosa  67 y o  male MRN: [id number]  Unit/Bed#: S -01 Encounter: [id number]      Assessment/Plan:    1  Acute hypoxic respiratory failure likely multifaceted as listed below        -    POA-  70s-  Currently on room air 93%-  Patient does not wear home O2        -   Will continue to maintain saturations greater than 89%        -     Initiate pulmonary toileting:  Deep breathing cough, OOB as tolerate, IS Q 1 hr    2  Abnormal chest x-ray        -  R > L lower lobe  Opacity,  Given recent hospitalization concerning for HCAP        -   T-max 102 7 degree F,  procalcitonin 1 03,  WBC unremarkable        -   Will need to continued coverage with hospital-acquired:  Cefepime,  Vancomycin,  Azithromycin until further microbiology is resulted        -   Continue to trend fever curve and procalcitonin        -   Please collect sputum        -  ID following        -  noted some bilateral rhonchi and rales will order Xopenex/Atrovent t i d     3    ILD without flare        -  patient or trialed on maintenance inhalers in which he stated he felt no benefit        -  no indication to initiate any inhalers at this time        -  will continue surveillance therapy per outpatient imaging        -  patient denies any exposures to: birds, open fires, coal, or family history of rheumatoid arthritis or lupus        -  patient does report being diagnosed with osteoarthritis    4  Suspected volume overload       -  patients abdomen significantly distended along with will some noted lower extremity       -  Will order proBNP with possible echo to follow    4    KAYLI       -   Baseline 1 14       -   Creatinine currently 1 35,   Managed per primary team    - outpatient follow-up per discharge instructions    History of Present Illness   Physician Requesting Consult: Neela Quinterso MD  Reason for Consult / Principal Problem:  Acute hypoxia  Hx and PE limited by:  Nothing  Chief Complaint: "I feel little bit better since I came in"  HPI: Florinda Grijalva  is a 67 y o   male who presented to [place] [address] with complaints of  Shortness of breath  Patient has past medical history allergic rhinitis, ILD, BPH, GERD, hypertension, and PE currently not on anticoagulation  Patient was recently discharged on 3/8/2020 for community-acquired pneumonia in which he had 3 days left on his Augmentin and Zithromax  Upon arriving back to their house his wife called the emergency room reporting that he was shaking and breathing loud and she was advised to call EMS  Upon EMS arrival patient was noted to be hypoxic in the 70s with a T-max of a 102 7  Degree F degree fever  Patient was administered cefepime and azithromycin with is 1 L fluid bolus  Pulmonary was consulted for acute hypoxia  Patient reports that he overall feels mildly improved since admission however he does report occasional chills and sweats  Patient does report some shortness of breath especially upon exertion  Patient did report no benefit from maintenance therapy  Will initiate nebulizer treatments given acute illness in the setting of chronic lung disease  From a pulmonary standpoint,  Patient follows with Dr Pierre Moore    For his Suspected ILD  Patient has been following with serial imaging  Patient reports an approximate 1-2 pack a day 25 year smoking history  Patient does report he quit smoking in 1983  Patient reports he was trialed on Breo however he had no significant benefit from maintenance therapy and never renewed his prescription  Patient reports significant allergic rhinitis in which he is maintained on singular and follows with Dr Kerry Nuñez  Patient had previously been on allergy injections however,  He has been off shots for approximately 1 year in which his symptoms have been managed with oral medications  He also reports a long-standing history of GERD in which he is maintained on Prilosec  Patient does report significant occupational exposures as he worked as a  at [employer]  Patient denies having any animals  Patient reports exposures to eather and paint thinners  Patient denies having any birds  Patient denies having any issues sleeping which include snoring or daytime sleepiness  Patient denies any symptoms of dysphagia or postnasal drip  Patient denies any acute exposures to dust, mold, asbestos, or silica  Inpatient consult to Pulmonology  Consult performed by: CARLOS Jefferson  Consult ordered by: Marquis Eid MD          Review of Systems   Constitutional: Negative for activity change and appetite change  HENT: Negative for congestion and postnasal drip  Respiratory: Positive for shortness of breath  Negative for apnea, cough, choking, chest tightness, wheezing and stridor  Cardiovascular: Negative for chest pain and leg swelling  Gastrointestinal: Negative for abdominal distention and abdominal pain  Genitourinary: Negative for difficulty urinating and dysuria  Musculoskeletal: Negative for arthralgias and back pain  Skin: Negative for color change and pallor  Neurological: Negative for dizziness and facial asymmetry  Psychiatric/Behavioral: Negative for agitation and behavioral problems  Historical Information   Past Medical History:   Diagnosis Date    Abnormal electrocardiogram     Last assessed: Oct 11, 2013    Allergic rhinitis     last assessed: Oct 11, 2013    Allergic rhinitis due to pollen     last assessed: Oct 10, 2013    Allergic sinusitis     Last assessed: May 11, 2015    Allergy     resolved: July 22, 2015    Benign essential hypertension     Last assessed/resolved: May 31, 2017    BPH (benign prostatic hyperplasia)     Colitis     Last assessed: May 24, 2016    Generalized osteoarthritis     Last assessed: Oct 11, 2013    GERD without esophagitis     Last assessed/resolved:  May 31, 2017    Hearing loss     Hiatal hernia     resolved: 2015    History of gastroesophageal reflux (GERD)     History of stomach ulcers     last assessed: May 11, 2015    Hypertension     Incomplete bladder emptying     Kidney stone     Nodular prostate with lower urinary tract symptoms     Nontoxic single thyroid nodule     last assessed: 2014    Orchalgia     Overweight     last assessed: Oct 31, 2013    Poor urinary stream     Pulmonary embolism [facility])     Salivary gland disorder     last assessed: 2014    Seasonal allergies     last assessed: May 15, 2015    Spermatocele     Straining to void     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:  May 7, 2014     Past Surgical History:   Procedure Laterality Date    BLADDER SURGERY      CHOLECYSTECTOMY      laparoscopic     HEMORRHOID SURGERY      pilionidal cyst removal     HERNIA REPAIR Left 2008    inguinal     KNEE ARTHROSCOPY Left     KNEE CARTILAGE SURGERY      NASAL SEPTUM SURGERY      Deviation repair     PROSTATE BIOPSY  2017    STOMACH SURGERY      TONSILLECTOMY AND ADENOIDECTOMY      at age 37   [address] - [address]  0 left     VASECTOMY       Social History   Social History     Substance and Sexual Activity   Alcohol Use Yes    Comment: rare     Social History     Substance and Sexual Activity   Drug Use No     Social History     Tobacco Use   Smoking Status Former Smoker    Packs/day: 1 00    Years: 25 00    Pack years: 25 00    Types: Cigarettes    Last attempt to quit:     Years since quittin 2   Smokeless Tobacco Never Used     E-Cigarette/Vaping     E-Cigarette/Vaping Substances     Occupational History:  Retired     Family History:   Family History   Problem Relation Age of Onset    Hypertension Mother     Stroke Mother     Stomach cancer Father     Hypertension Father     Hypertension Family     Stroke Family         syndrome Meds/Allergies   pertinent pulmonary meds have been reviewed    Allergies   Allergen Reactions    Ace Inhibitors     Molds & Smuts     Other     Pollen Extract     Short Ragweed Pollen Ext     Tree Extract        Objective   Vitals: Blood pressure 109/71, pulse 73, temperature 98 °F (36 7 °C), temperature source Oral, resp  rate 18, weight 92 kg (202 lb 13 2 oz), SpO2 93 %  RA,Body mass index is 32 25 kg/m²  Intake/Output Summary (Last 24 hours) at 3/9/2020 1325  Last data filed at 3/9/2020 1101  Gross per 24 hour   Intake    Output 250 ml   Net -250 ml     Invasive Devices     Peripheral Intravenous Line            Peripheral IV 03/09/20 Left Wrist less than 1 day    Peripheral IV 03/09/20 Right Forearm less than 1 day                Physical Exam   Constitutional: He is oriented to person, place, and time  He appears well-developed and well-nourished  No distress  HENT:   Head: Normocephalic and atraumatic  Neck: Normal range of motion  Neck supple  No tracheal deviation present  No thyromegaly present  Cardiovascular: Normal rate, regular rhythm and normal heart sounds  Exam reveals no friction rub  No murmur heard  Pulmonary/Chest: No accessory muscle usage or stridor  No tachypnea  No respiratory distress  He has decreased breath sounds  He has no wheezes  He has no rhonchi  He has rales  He exhibits no tenderness  Bilateral crackles   Abdominal: Soft  Bowel sounds are normal  He exhibits distension  There is no tenderness  Musculoskeletal: Normal range of motion  He exhibits no edema or deformity  Neurological: He is alert and oriented to person, place, and time  Skin: Skin is warm and dry  He is not diaphoretic  Psychiatric: He has a normal mood and affect   His behavior is normal        Lab Results:   I have personally reviewed pertinent lab results CBC:   Lab Results   Component Value Date    WBC 6 15 03/09/2020    HGB 14 8 03/09/2020    HCT 43 6 03/09/2020    MCV 91 03/09/2020     (L) 03/09/2020    MCH 30 7 03/09/2020    MCHC 33 9 03/09/2020    RDW 13 6 03/09/2020    MPV 10 6 03/09/2020    NRBC 0 03/09/2020   , CMP:   Lab Results   Component Value Date    SODIUM 137 03/09/2020    K 3 9 03/09/2020     03/09/2020    CO2 27 03/09/2020    BUN 23 03/09/2020    CREATININE 1 35 (H) 03/09/2020    CALCIUM 8 3 03/09/2020    AST 21 03/09/2020    ALT 16 03/09/2020    ALKPHOS 61 03/09/2020    EGFR 50 03/09/2020   , PT/INR:   Lab Results   Component Value Date    INR 1 19 03/09/2020     Imaging Studies: I have personally reviewed pertinent films in PACS      CTA chest 03/07/2020 patchy opacity R > L  lobes     EKG, Pathology, and Other Studies: I have personally reviewed pertinent films in PACS      EKG-  Normal sinus rhythm     Pulmonary Results (PFTs, PSG): I have personally reviewed pertinent films in PACS     Results: 3/6/2019  FEV1/FVC Ratio: 76 %  Forced Vital Capacity: 3 21 L    84 % predicted  FEV1: 2 44 L     89 % predicted     Interpretation:     · No obstructive airflow defect      · Normal Spirometry     VTE Prophylaxis: Sequential compression device (Venodyne)     Code Status: Level 1 - Full Code    None    Portions of the record may have been created with voice recognition software  Occasional wrong word or "sound a like" substitutions may have occurred due to the inherent limitations of voice recognition software  Read the chart carefully and recognize, using context, where substitutions have occurred

## 2020-03-09 NOTE — PLAN OF CARE
Problem: Potential for Falls  Goal: Patient will remain free of falls  Description  INTERVENTIONS:  - Assess patient frequently for physical needs  -  Identify cognitive and physical deficits and behaviors that affect risk of falls    -  [place] fall precautions as indicated by assessment   - Educate patient/family on patient safety including physical limitations  - Instruct patient to call for assistance with activity based on assessment  - Modify environment to reduce risk of injury  - Consider OT/PT consult to assist with strengthening/mobility  Outcome: Progressing     Problem: PAIN - ADULT  Goal: Verbalizes/displays adequate comfort level or baseline comfort level  Description  Interventions:  - Encourage patient to monitor pain and request assistance  - Assess pain using appropriate pain scale  - Administer analgesics based on type and severity of pain and evaluate response  - Implement non-pharmacological measures as appropriate and evaluate response  - Consider cultural and social influences on pain and pain management  - Notify physician/advanced practitioner if interventions unsuccessful or patient reports new pain  Outcome: Progressing     Problem: INFECTION - ADULT  Goal: Absence or prevention of progression during hospitalization  Description  INTERVENTIONS:  - Assess and monitor for signs and symptoms of infection  - Monitor lab/diagnostic results  - Monitor all insertion sites, i e  indwelling lines, tubes, and drains  - Monitor endotracheal if appropriate and nasal secretions for changes in amount and color  - [place] appropriate cooling/warming therapies per order  - Administer medications as ordered  - Instruct and encourage patient and family to use good hand hygiene technique  - Identify and instruct in appropriate isolation precautions for identified infection/condition  Outcome: Progressing     Problem: SAFETY ADULT  Goal: Patient will remain free of falls  Description  INTERVENTIONS:  - Assess patient frequently for physical needs  -  Identify cognitive and physical deficits and behaviors that affect risk of falls    -  [place] fall precautions as indicated by assessment   - Educate patient/family on patient safety including physical limitations  - Instruct patient to call for assistance with activity based on assessment  - Modify environment to reduce risk of injury  - Consider OT/PT consult to assist with strengthening/mobility  Outcome: Progressing  Goal: Maintain or return to baseline ADL function  Description  INTERVENTIONS:  -  Assess patient's ability to carry out ADLs; assess patient's baseline for ADL function and identify physical deficits which impact ability to perform ADLs (bathing, care of mouth/teeth, toileting, grooming, dressing, etc )  - Assess/evaluate cause of self-care deficits   - Assess range of motion  - Assess patient's mobility; develop plan if impaired  - Assess patient's need for assistive devices and provide as appropriate  - Encourage maximum independence but intervene and supervise when necessary  - Involve family in performance of ADLs  - Assess for home care needs following discharge   - Consider OT consult to assist with ADL evaluation and planning for discharge  - Provide patient education as appropriate  Outcome: Progressing  Goal: Maintain or return mobility status to optimal level  Description  INTERVENTIONS:  - Assess patient's baseline mobility status (ambulation, transfers, stairs, etc )    - Identify cognitive and physical deficits and behaviors that affect mobility  - Identify mobility aids required to assist with transfers and/or ambulation (gait belt, sit-to-stand, lift, walker, cane, etc )  - [place] fall precautions as indicated by assessment  - Record patient progress and toleration of activity level on Mobility SBAR; progress patient to next Phase/Stage  - Instruct patient to call for assistance with activity based on assessment  - Consider rehabilitation consult to assist with strengthening/weightbearing, etc   Outcome: Progressing     Problem: DISCHARGE PLANNING  Goal: Discharge to home or other facility with appropriate resources  Description  INTERVENTIONS:  - Identify barriers to discharge w/patient and caregiver  - Arrange for needed discharge resources and transportation as appropriate  - Identify discharge learning needs (meds, wound care, etc )  - Arrange for interpretive services to assist at discharge as needed  - Refer to Case Management Department for coordinating discharge planning if the patient needs post-hospital services based on physician/advanced practitioner order or complex needs related to functional status, cognitive ability, or social support system  Outcome: Progressing     Problem: Knowledge Deficit  Goal: Patient/family/caregiver demonstrates understanding of disease process, treatment plan, medications, and discharge instructions  Description  Complete learning assessment and assess knowledge base    Interventions:  - Provide teaching at level of understanding  - Provide teaching via preferred learning methods  Outcome: Progressing     Problem: RESPIRATORY - ADULT  Goal: Achieves optimal ventilation and oxygenation  Description  INTERVENTIONS:  - Assess for changes in respiratory status  - Assess for changes in mentation and behavior  - Position to facilitate oxygenation and minimize respiratory effort  - Oxygen administered by appropriate delivery if ordered  - Initiate smoking cessation education as indicated  - Encourage broncho-pulmonary hygiene including cough, deep breathe, Incentive Spirometry  - Assess the need for suctioning and aspirate as needed  - Assess and instruct to report SOB or any respiratory difficulty  - Respiratory Therapy support as indicated  Outcome: Progressing

## 2020-03-09 NOTE — CONSULTS
Consultation - Infectious Disease   Zeynep Whyte  67 y o  male MRN: [id number]  Unit/Bed#: S -01 Encounter: [id number]      IMPRESSION & RECOMMENDATIONS:     1  Sepsis  POA  Fever and tachycardia  Unclear source  CT scan showing infiltrate in lower lobes and portable chest x-ray concerning for possible right multifocal pneumonia  Patient also status post severe epistaxis with packing placement and removal on 03/04  Consider sinus source  Patient now status post 7 days of antibiotic including Augmentin ceftriaxone and azithromycin  Rec:  · Continue vancomycin and cefepime empirically  · Monitor temperature and hemodynamics  · Pharmacy on consult for guidance with Vancomycin dosing  · Serial exam  · Repeat CBC in a m  · Repeat procalcitonin level in am  · Consider this CT scan of sinuses  · Consider ENT consult    2  Multifocal pneumonia  In patient with hypoxia, CT scan showing bilateral infiltrate and serial x-ray concerning for multifocal pneumonia  Patient now status post 7 days of antibiotic including Augmentin ceftriaxone and azithromycin with continued rigors and fever  Rec:  · Continue vancomycin and cefepime as above  · Pulmonary evaluation appreciated  · Serial exam  · Monitor respiratory symptoms  · Repeat procalcitonin level in a m  3   Recent epistaxis requiring packing  Patient with continual fever despite 1 week of antibiotics  Consider drug fever at this point  Consider evaluation of sinuses  Rec:  · Continue antibiotics as above   · Consider CT scan of sinuses  · Consider ENT consultation    4  Acute kidney injury  Creatinine 1 45 up from 1 1  Possibly secondary to sepsis/pre renal  Rec:  · Continue antibiotics as above  · Renal dose adjust antibiotics as needed  · Monitor creatinine    5  Chronic back pain with suspected sciatica    MRI of spine showing severe degenerative joint disease L4-S1 without osteomyelitis or diskitis evident    Supportive care per primary care team      Antibiotics:  Vancomycin/Cefepime D1  Total IV antibiotic D3    Detailed review of prior medical records  Above impression and plan discussed in detail with patient, RN, Daniel Lima NP, and Dr Leeanne Brittle  Thank you for this consultation  We will follow along with you  HISTORY OF PRESENT ILLNESS:  Reason for Consult:  1  Pneumonia 2  Failure of outpatient treatment 3  Hypoxia 4  Rigors    HPI: Juli Reyes  is a 67 y o  male with chronic low back pain, hypertension, GERD, BPH, remote DVT history not on anticoagulant currently and status post recent severe epistaxis requiring packing placement in the ER on 03/02/2020 and removal of packing in ENT office on 03/04/2020  Patient had been prescribed a 5 day course of Augmentin on 03/02 with packing in place  Patient then presented to the ER on 03/06/2020 with severe back pain when patient was taking deep breaths  Chest x-ray showed patchy opacities in the right lower and left lower lung zones  Patient was started on ceftriaxone and azithromycin  and discharged from hospital on 3/8  He was discharged with augmentin and zithromax for 3 days to treat for community acquired pneumonia  On day of discharge, patient was stable, afebrile, and well oxygenated on room air with no respiratory distress  However, his wife called ER yesterday evening when she found him having "shakes" and "breathing loud "  The ED advised to call EMS  EMS noted saturations in the 70s on room air and temperature of 102  Patient arrived to the ER with a fever of 102 7° F and tachycardia  Blood cultures were obtained again, patient admitted and the patient antibiotics were broadened to vancomycin and cefepime  Infectious Disease now being consulted regarding pneumonia in a patient status post CAP treatment  with hypoxia and active rigors    Patient is having rigors at time of interview and exam now and subsequent dyspnea on exertion with short statements    He denies any cough or sputum production  He denies any runny nose since the epistaxis episode as above  He denies any nasal pain, headache, neck stiffness, nausea, vomiting, diarrhea, dysuria  He has known cysts in his groin area right worse than left and has spasmodic type pain down his legs for the last 6-8 months  He has chronic low back pain as well and does not feel this is new or worsened  He has little appetite  He denies any mouth sores  REVIEW OF SYSTEMS:  As above in HPI as well as  A complete system-based review of systems is otherwise negative  PAST MEDICAL HISTORY:  Past Medical History:   Diagnosis Date    Abnormal electrocardiogram     Last assessed: Oct 11, 2013    Allergic rhinitis     last assessed: Oct 11, 2013    Allergic rhinitis due to pollen     last assessed: Oct 10, 2013    Allergic sinusitis     Last assessed: May 11, 2015    Allergy     resolved: July 22, 2015    Benign essential hypertension     Last assessed/resolved: May 31, 2017    BPH (benign prostatic hyperplasia)     Colitis     Last assessed: May 24, 2016    Generalized osteoarthritis     Last assessed: Oct 11, 2013    GERD without esophagitis     Last assessed/resolved: May 31, 2017    Hearing loss     Hiatal hernia     resolved: July 22, 2015    History of gastroesophageal reflux (GERD)     History of stomach ulcers     last assessed: May 11, 2015    Hypertension     Incomplete bladder emptying     Kidney stone     Nodular prostate with lower urinary tract symptoms     Nontoxic single thyroid nodule     last assessed: April 16, 2014    Orchalgia     Overweight     last assessed: Oct 31, 2013    Poor urinary stream     Pulmonary embolism [facility])     Salivary gland disorder     last assessed: April 16, 2014    Seasonal allergies     last assessed: May 15, 2015    Spermatocele     Straining to void     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:  May 7, 2014     Past Surgical History:   Procedure Laterality Date    BLADDER SURGERY      CHOLECYSTECTOMY      laparoscopic     HEMORRHOID SURGERY      pilionidal cyst removal     HERNIA REPAIR Left 2008    inguinal     KNEE ARTHROSCOPY Left     KNEE CARTILAGE SURGERY      NASAL SEPTUM SURGERY      Deviation repair     PROSTATE BIOPSY  2017    STOMACH SURGERY      TONSILLECTOMY AND ADENOIDECTOMY      at age 37   [address] - right  0 left     VASECTOMY         FAMILY HISTORY:  Non-contributory    SOCIAL HISTORY:  Social History     Substance and Sexual Activity   Alcohol Use Yes    Comment: rare     Social History     Substance and Sexual Activity   Drug Use No     Social History     Tobacco Use   Smoking Status Former Smoker    Packs/day: 1     Years: 25     Pack years: 25     Types: Cigarettes    Last attempt to quit:     Years since quittin 2   Smokeless Tobacco Never Used       ALLERGIES:  Allergies   Allergen Reactions    Ace Inhibitors     Molds & Smuts     Other     Pollen Extract     Short Ragweed Pollen Ext     Tree Extract        MEDICATIONS:  All current active medications have been reviewed  PHYSICAL EXAM:  Vitals:  Temp:  [98 °F (36 7 °C)-102 9 °F (39 4 °C)] 102 9 °F (39 4 °C)  HR:  [] 90  Resp:  [18-20] 20  BP: (109-130)/(69-77) 130/69  SpO2:  [91 %-96 %] 92 %  Temp (24hrs), Av 7 °F (38 2 °C), Min:98 °F (36 7 °C), Max:102 9 °F (39 4 °C)  Current: Temperature: (!) 102 9 °F (39 4 °C)     Physical Exam:  General:  76-year-old man appearing acutely ill with active rigors and conversational dyspnea during rigors  No active cough on exam and nasal cannula in place  Eyes:  Conjunctive clear with no hemorrhages or effusions  Nose:  No active bleeding, no obvious discomfort on palpation or malodor    Nasal cannula O2 in place without discomfort  Oropharynx:  No ulcers, no lesions visible  Neck:  Supple, no lymphadenopathy  Lungs:  Decreased breath sounds posterior, positive work of breathing with rigors, no active cough  Cardiac:  Regular rate and rhythm, no murmurs  Abdomen:  Soft, non-tender, protuberant, large pannus, positive bowel sounds  Extremities:  No peripheral cyanosis, clubbing, or edema, positive lower extremity venous staining of skin, no active ulcers, mild shotty lymphadenopathy of right groin greater than left without erythema warmth or point tenderness  :  No Coates catheter in place, no suprapubic tenderness, no balanitis, no urethral drainage  Skin:  No rashes, no ulcers  Neurological:  Alert and oriented x3, Moves all four extremities with rigors in bed, sensation grossly intact      LABS, IMAGING, & OTHER STUDIES:  Lab Results:  I have personally reviewed pertinent labs  Results from last 7 days   Lab Units 03/09/20  0605 03/09/20  0146 03/08/20  0454 03/07/20  0658 03/07/20  0228   POTASSIUM mmol/L 3 9 4 1 4 0 4 2 4 3   CHLORIDE mmol/L 104 101 103 101 102   CO2 mmol/L 27 29 27 27 26   BUN mg/dL 23 24 18 20 22   CREATININE mg/dL 1 35* 1 45* 1 26 1 14 1 13   EGFR ml/min/1 73sq m 50 46 54 61 62   CALCIUM mg/dL 8 3 8 5 8 5 8 7 8 7   AST U/L  --  21  --  20 22   ALT U/L  --  16  --  15 14   ALK PHOS U/L  --  61  --  76 72     Results from last 7 days   Lab Units 03/09/20  0146 03/08/20  0454 03/07/20  0658   WBC Thousand/uL 6 15 6 15 5 66   HEMOGLOBIN g/dL 14 8 14 7 16 0   PLATELETS Thousands/uL 140* 137* 174     Results from last 7 days   Lab Units 03/09/20  0158 03/09/20  0146 03/07/20  1028 03/07/20  0659   BLOOD CULTURE  Received in Microbiology Lab  Culture in Progress  Received in Microbiology Lab  Culture in Progress  --  No Growth at 48 hrs  No Growth at 48 hrs     LEGIONELLA URINARY ANTIGEN   --   --  Negative  --      Results from last 7 days   Lab Units 03/09/20  0605 03/08/20  0454 03/07/20  0658   PROCALCITONIN ng/ml 1 03* 0 05 <0 05       Imaging Studies:   I have personally reviewed pertinent imaging study reports and images in PACS   03/09/2020 MRI spine without diskitis or osteomyelitis  Multiple levels of degenerative changes of the lumbar spine noted  03/07/2020 PE study with CT scan abdomen pelvis patchy opacities in the right greater than left lower lobes  EKG, Pathology, and Other Studies:   I have personally reviewed pertinent reports

## 2020-03-09 NOTE — ASSESSMENT & PLAN NOTE
· Reports bilateral leg pain that has been ongoing for many months  · Reports pain is worse while lying flat  · Improves with standing   · Denies intermittent claudication or wounds  · +1 pedal pulses noted  Varicosities noted   Skin is shiny with decreased hair growth  · Lower extremities are warm    · Check lipid panel   · Continue ASA   · Consider imaging to evaluate for PAD --inpatient vs outpatient

## 2020-03-09 NOTE — CONSULTS
Consultation - [address] [place] [address] Endocrinology    Patient Information: Uday Martinez  67 y o  male MRN: [id number]  Unit/Bed#: S -01 Encounter: [id number]  Admitting Physician: Adán Tovar MD  PCP: Travis Linda MD  Date of Consultation:  03/09/20    ASSESSMENT:  25-year-old male admitted with suspected pneumonia was found to have a right-sided adrenal incidentaloma  PLAN:    1  Right adrenal incidentaloma  He does not have any signs or symptoms suggestive of a functional nodule  He will need to follow-up with endocrinology as outpatient for his workup which would include, a 24 hr urinary free cortisol, serum renin and aldosterone as well as 24 hour urinary fractionated catecholamines and metanephrines  Presently, his blood pressure is stable  His potassium is 4 1 without supplementation  In the hospital however, we recommend to check orthostatic blood pressures  Will also request plasma free metanephrines  If they are unremarkable, patient can follow with endocrinology or PCP once he is baseline  Will check thyroid functions for completion  2  Fever with chills and right-sided patchy infiltrates on CT chest   Primary service and Pulmonary are managing  Reason for consultation:   Right-sided incidental adrenal nodule    History of Present Illness:    Uday Martinez  is a 67 y o  male who presents with fever with chills and rigors  He was recently seen in the ER with epistaxis a few days ago, he was then admitted with some back pain and suspected pneumonia, was discharged on Sunday and then readmitted the same evening with fevers and chills  Endocrinology is consulted for a right-sided 1 1 cm adrenal incidentaloma  Patient reports no palpitations, no presyncope, no hyperhidrosis and no headaches  He is not aware of any symptoms of suggestive of orthostasis  He denies any significant weight changes in the recent past   He denies any symptoms of proximal myopathy    He denies any striae on his torso  He has known hypertension and has a blood pressure monitor at home  He denies any persistent high blood pressures  He also denies any known low potassium levels  He reports a known history of prostate cancer for which she is under followed by urology and on Proscar and finasteride  Review of Systems:    Review of Systems   Constitutional: Positive for activity change, appetite change and fatigue  HENT: Negative  Eyes: Negative  Respiratory: Negative  Cardiovascular: Negative for chest pain  Gastrointestinal: Negative  Endocrine: Negative  Genitourinary: Negative  Musculoskeletal: Negative  Skin: Negative  Allergic/Immunologic: Negative  Neurological: Negative  Hematological: Negative  Psychiatric/Behavioral: Negative  All other systems reviewed and are negative  Past Medical and Surgical History:     Past Medical History:   Diagnosis Date    Abnormal electrocardiogram     Last assessed: Oct 11, 2013    Allergic rhinitis     last assessed: Oct 11, 2013    Allergic rhinitis due to pollen     last assessed: Oct 10, 2013    Allergic sinusitis     Last assessed: May 11, 2015    Allergy     resolved: July 22, 2015    Benign essential hypertension     Last assessed/resolved: May 31, 2017    BPH (benign prostatic hyperplasia)     Colitis     Last assessed: May 24, 2016    Generalized osteoarthritis     Last assessed: Oct 11, 2013    GERD without esophagitis     Last assessed/resolved: May 31, 2017    Hearing loss     Hiatal hernia     resolved: July 22, 2015    History of gastroesophageal reflux (GERD)     History of stomach ulcers     last assessed: May 11, 2015    Hypertension     Incomplete bladder emptying     Kidney stone     Nodular prostate with lower urinary tract symptoms     Nontoxic single thyroid nodule     last assessed: April 16, 2014    Orchalgia     Overweight     last assessed:  Oct 31, 2013    Poor urinary stream     Pulmonary embolism (HCC)     Salivary gland disorder     last assessed: 2014    Seasonal allergies     last assessed: May 15, 2015    Spermatocele     Straining to void     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, 2014       Past Surgical History:   Procedure Laterality Date    BLADDER SURGERY      CHOLECYSTECTOMY      laparoscopic     HEMORRHOID SURGERY      pilionidal cyst removal     HERNIA REPAIR Left 2008    inguinal     KNEE ARTHROSCOPY Left 1974    KNEE CARTILAGE SURGERY      NASAL SEPTUM SURGERY      Deviation repair     PROSTATE BIOPSY  2017    STOMACH SURGERY      TONSILLECTOMY AND ADENOIDECTOMY      at age 37   [address] [facility] - right  0 left     VASECTOMY         Meds/Allergies:    PTA meds:   Prior to Admission Medications   Prescriptions Last Dose Informant Patient Reported? Taking?    HYDROCHLOROTHIAZIDE PO  Self Yes No   Sig: Take by mouth   amoxicillin (AMOXIL) 500 mg capsule   No No   Sig: Take 2 capsules (1,000 mg total) by mouth every 8 (eight) hours for 3 days   aspirin 81 mg chewable tablet  Self Yes No   Sig: Chew 81 mg daily   azithromycin (ZITHROMAX) 500 MG tablet   No No   Sig: Take 1 tablet (500 mg total) by mouth every 24 hours for 3 days   benzonatate (TESSALON PERLES) 100 mg capsule   No No   Sig: Take 1 capsule (100 mg total) by mouth 3 (three) times a day as needed for cough for up to 3 days   diltiazem (CARDIZEM CD) 180 mg 24 hr capsule  Self No No   Sig: TAKE 1 CAPSULE BY MOUTH EVERY DAY   finasteride (PROSCAR) 5 mg tablet   No No   Sig: TAKE 1 TABLET BY MOUTH EVERY DAY   fluticasone (FLONASE) 50 mcg/act nasal spray   No No   Si spray into each nostril daily as needed for rhinitis or allergies   gabapentin (NEURONTIN) 300 mg capsule   No No   Sig: Take 1 capsule (300 mg total) by mouth 2 (two) times a day   Patient taking differently: Take 200 mg by mouth 2 (two) times a day montelukast (SINGULAIR) 10 mg tablet  Self Yes No   Sig: Take 10 mg by mouth daily at bedtime   neomycin-bacitracin-polymyxin b (NEOSPORIN) ointment   Yes No   Sig: Apply 1 application topically 2 (two) times a day   omeprazole (PriLOSEC) 40 MG capsule  Self Yes No   Sig: Take 40 mg by mouth daily   tamsulosin (FLOMAX) 0 4 mg  Self No No   Sig: Take 1 capsule (0 4 mg total) by mouth daily with dinner   triamcinolone (KENALOG) 0 1 % oral topical paste   No No   Si application to area on lip bid x 7 days      Facility-Administered Medications: None       Allergies: Allergies   Allergen Reactions    Ace Inhibitors     Molds & Smuts     Other     Pollen Extract     Short Ragweed Pollen Ext     Tree Extract      History:     Marital Status: /Civil Union   Occupation:   Substance Use History:   Social History     Substance and Sexual Activity   Alcohol Use Yes    Comment: rare     Social History     Tobacco Use   Smoking Status Former Smoker    Packs/day: 1 00    Years: 25 00    Pack years: 25 00    Types: Cigarettes    Last attempt to quit:     Years since quittin 2   Smokeless Tobacco Never Used     Social History     Substance and Sexual Activity   Drug Use No       Family History:    Family History   Problem Relation Age of Onset    Hypertension Mother     Stroke Mother     Stomach cancer Father     Hypertension Father     Hypertension Family     Stroke Family         syndrome    Reports no known family history of adrenal cancer or other cancers  Physical Exam:     Vitals:   Blood Pressure: 130/69 (20 1514)  Pulse: 90 (20 1514)  Temperature: (!) 102 9 °F (39 4 °C) (20 1603)  Temp Source: Oral (20 1603)  Respirations: 20 (20 1514)  Weight - Scale: 92 kg (202 lb 13 2 oz) (20 0813)  SpO2: 92 % (20 1514)    Physical Exam   Constitutional: He is oriented to person, place, and time  He appears well-developed     Central obesity   HENT: Head: Normocephalic  Mouth/Throat: Oropharynx is clear and moist    Eyes: Pupils are equal, round, and reactive to light  Neck: Normal range of motion  No thyromegaly present  Short thick neck   Cardiovascular: Normal rate and normal heart sounds  Pulmonary/Chest: Effort normal  He has rales  Abdominal: Soft  Bowel sounds are normal    Musculoskeletal: He exhibits no edema or deformity  Neurological: He is alert and oriented to person, place, and time  Skin: Capillary refill takes less than 2 seconds  No rash noted  No pallor  Psychiatric: He has a normal mood and affect  Vitals reviewed  Lab and Imaging Results: I have personally reviewed pertinent films in PACS    Results from last 7 days   Lab Units 03/09/20  0146   WBC Thousand/uL 6 15   HEMOGLOBIN g/dL 14 8   HEMATOCRIT % 43 6   PLATELETS Thousands/uL 140*   NEUTROS PCT % 90*   LYMPHS PCT % 4*   MONOS PCT % 4   EOS PCT % 0     Results from last 7 days   Lab Units 03/09/20  0605 03/09/20  0146   POTASSIUM mmol/L 3 9 4 1   CHLORIDE mmol/L 104 101   CO2 mmol/L 27 29   BUN mg/dL 23 24   CREATININE mg/dL 1 35* 1 45*   CALCIUM mg/dL 8 3 8 5   ALK PHOS U/L  --  61   ALT U/L  --  16   AST U/L  --  21     Results from last 7 days   Lab Units 03/09/20  0146   INR  1 19     No results found for: POCGLU      ** Please Note: Dragon 360 Dictation voice to text software may have been used in the creation of this document   **

## 2020-03-09 NOTE — ASSESSMENT & PLAN NOTE
· Patients wife called ED due to rigors and "heavy breathing"   · Presents to ED with fever 102, oxygen saturation 70% at home when EMS arrived  · Was discharged from hospital yesterday on amoxicillin and zithromax to treat for community acquired pneumonia  · Currently requiring 2 liters nasal cannula   · Received cefepime, vanco, zithro in ED - will continue with cefepime and vanco  Obtain MRSA swab     · (3/7/2020) BC NGTD  · (3/7/2020) Urine antigen negative  · (3/7/2020) Flu RSV negative  · Repeat BC pending  · Monitor procalcitonins   · Check sputum

## 2020-03-09 NOTE — ASSESSMENT & PLAN NOTE
Present on admission as evidenced by tachycardia and fever with B/l pneumonia on CXR   BC pending   Lactic acid normal   UA TBC   MRSA culture pending

## 2020-03-09 NOTE — ED PROVIDER NOTES
History  Chief Complaint   Patient presents with    Shortness of Breath     Pt presents to the ED with c/o SOB that started earlier today  Pt was discharged from here on saturday after being treated for pneumonia       History provided by:  Patient, spouse and medical records   used: No    78-year-old male discharged from hospital yesterday with bilateral pneumonia on oral antibiotic returned short of breath, hypoxic  Improved on 4 liters nasal cannula with EMS  Some coarse breath sounds on exam   No peripheral edema  Febrile on arrival   He reports having shaking chills at home since discharge  Will repeat x-ray, check EKG, labs and plan readmission  Prior to Admission Medications   Prescriptions Last Dose Informant Patient Reported? Taking?    HYDROCHLOROTHIAZIDE PO  Self Yes No   Sig: Take by mouth   amoxicillin (AMOXIL) 500 mg capsule   No No   Sig: Take 2 capsules (1,000 mg total) by mouth every 8 (eight) hours for 3 days   aspirin 81 mg chewable tablet  Self Yes No   Sig: Chew 81 mg daily   azithromycin (ZITHROMAX) 500 MG tablet   No No   Sig: Take 1 tablet (500 mg total) by mouth every 24 hours for 3 days   benzonatate (TESSALON PERLES) 100 mg capsule   No No   Sig: Take 1 capsule (100 mg total) by mouth 3 (three) times a day as needed for cough for up to 3 days   diltiazem (CARDIZEM CD) 180 mg 24 hr capsule  Self No No   Sig: TAKE 1 CAPSULE BY MOUTH EVERY DAY   finasteride (PROSCAR) 5 mg tablet   No No   Sig: TAKE 1 TABLET BY MOUTH EVERY DAY   fluticasone (FLONASE) 50 mcg/act nasal spray   No No   Si spray into each nostril daily as needed for rhinitis or allergies   gabapentin (NEURONTIN) 300 mg capsule   No No   Sig: Take 1 capsule (300 mg total) by mouth 2 (two) times a day   Patient taking differently: Take 200 mg by mouth 2 (two) times a day    montelukast (SINGULAIR) 10 mg tablet  Self Yes No   Sig: Take 10 mg by mouth daily at bedtime   neomycin-bacitracin-polymyxin b (NEOSPORIN) ointment   Yes No   Sig: Apply 1 application topically 2 (two) times a day   omeprazole (PriLOSEC) 40 MG capsule  Self Yes No   Sig: Take 40 mg by mouth daily   tamsulosin (FLOMAX) 0 4 mg  Self No No   Sig: Take 1 capsule (0 4 mg total) by mouth daily with dinner   triamcinolone (KENALOG) 0 1 % oral topical paste   No No   Si application to area on lip bid x 7 days      Facility-Administered Medications: None       Past Medical History:   Diagnosis Date    Abnormal electrocardiogram     Last assessed: Oct 11, 2013    Allergic rhinitis     last assessed: Oct 11, 2013    Allergic rhinitis due to pollen     last assessed: Oct 10, 2013    Allergic sinusitis     Last assessed: May 11, 2015    Allergy     resolved: 2015    Benign essential hypertension     Last assessed/resolved: May 31, 2017    BPH (benign prostatic hyperplasia)     Colitis     Last assessed: May 24, 2016    Generalized osteoarthritis     Last assessed: Oct 11, 2013    GERD without esophagitis     Last assessed/resolved: May 31, 2017    Hearing loss     Hiatal hernia     resolved: 2015    History of gastroesophageal reflux (GERD)     History of stomach ulcers     last assessed: May 11, 2015    Hypertension     Incomplete bladder emptying     Kidney stone     Nodular prostate with lower urinary tract symptoms     Nontoxic single thyroid nodule     last assessed: 2014    Orchalgia     Overweight     last assessed: Oct 31, 2013    Poor urinary stream     [facility]monary [facility]ism [facility])     Salivary gland disorder     last assessed: 2014    Seasonal allergies     last assessed: May 15, 2015    Spermatocele     Straining to void     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:  May 7, 2014       Past Surgical History:   Procedure Laterality Date    BLADDER SURGERY      CHOLECYSTECTOMY  2000    laparoscopic     HEMORRHOID SURGERY      pilionidal cyst removal     HERNIA REPAIR Left 2008 inguinal     KNEE ARTHROSCOPY Left     KNEE CARTILAGE SURGERY      NASAL SEPTUM SURGERY      Deviation repair     PROSTATE BIOPSY  2017    STOMACH SURGERY      TONSILLECTOMY AND ADENOIDECTOMY      at age 37   [address] - right  0 left     VASECTOMY         Family History   Problem Relation Age of Onset    Hypertension Mother     Stroke Mother     Stomach cancer Father     Hypertension Father     Hypertension Family     Stroke Family         syndrome      I have reviewed and agree with the history as documented  E-Cigarette/Vaping     E-Cigarette/Vaping Substances     Social History     Tobacco Use    Smoking status: Former Smoker     Packs/day:      Years:      Pack years:      Types: Cigarettes     Last attempt to quit:      Years since quittin 2    Smokeless tobacco: Never Used   Substance Use Topics    Alcohol use: Yes     Comment: rare    Drug use: No       Review of Systems   Constitutional: Positive for chills, diaphoresis and fever  Negative for activity change and appetite change  Respiratory: Positive for cough and shortness of breath  Negative for chest tightness  Gastrointestinal: Negative for abdominal pain, nausea and vomiting  Musculoskeletal: Negative for back pain and neck pain  Skin: Negative for color change and rash  Neurological: Negative for dizziness and weakness  All other systems reviewed and are negative  Physical Exam  Physical Exam   Constitutional: He is oriented to person, place, and time  He appears well-developed and well-nourished  He does not appear ill  HENT:   Head: Normocephalic  Neck: Normal range of motion  No JVD present  Cardiovascular: Normal rate and normal heart sounds  Pulmonary/Chest:   Mild tachypnea  Coarse breath sounds  Musculoskeletal:        Right lower leg: Normal  He exhibits no edema          Left lower leg: Normal  He exhibits no edema  Neurological: He is alert and oriented to person, place, and time  Skin: Skin is warm and dry  No rash noted  Psychiatric: He has a normal mood and affect  His behavior is normal    Nursing note and vitals reviewed        Vital Signs  ED Triage Vitals   Temperature Pulse Respirations Blood Pressure SpO2   03/09/20 0134 03/09/20 0133 03/09/20 0133 03/09/20 0133 03/09/20 0133   (!) 102 7 °F (39 3 °C) 103 20 127/73 93 %      Temp Source Heart Rate Source Patient Position - Orthostatic VS BP Location FiO2 (%)   03/09/20 0134 03/09/20 0133 03/09/20 0133 03/09/20 0133 --   Oral Monitor Sitting Right arm       Pain Score       03/09/20 0133       5           Vitals:    03/09/20 0133 03/09/20 0250 03/09/20 0403 03/09/20 0717   BP: 127/73 126/77 116/70 109/71   Pulse: 103 87 82 73   Patient Position - Orthostatic VS: Sitting Sitting Sitting Lying         Visual Acuity      ED Medications  Medications   aspirin chewable tablet 81 mg (has no administration in time range)   benzonatate (TESSALON PERLES) capsule 100 mg (has no administration in time range)   diltiazem (CARDIZEM CD) 24 hr capsule 180 mg (has no administration in time range)   finasteride (PROSCAR) tablet 5 mg (has no administration in time range)   fluticasone (FLONASE) 50 mcg/act nasal spray 1 spray (has no administration in time range)   gabapentin (NEURONTIN) capsule 200 mg (has no administration in time range)   montelukast (SINGULAIR) tablet 10 mg (has no administration in time range)   neomycin-bacitracin-polymyxin b (NEOSPORIN) ointment 1 small application (has no administration in time range)   pantoprazole (PROTONIX) EC tablet 40 mg (40 mg Oral Given 3/9/20 0618)   tamsulosin (FLOMAX) capsule 0 4 mg (has no administration in time range)   triamcinolone (KENALOG) 0 1 % cream (has no administration in time range)   docusate sodium (COLACE) capsule 100 mg (has no administration in time range)   ondansetron (ZOFRAN) injection 4 mg (has no administration in time range)   aluminum-magnesium hydroxide-simethicone (MYLANTA) 200-200-20 mg/5 mL oral suspension 30 mL (has no administration in time range)   guaiFENesin (MUCINEX) 12 hr tablet 1,200 mg (has no administration in time range)   heparin (porcine) subcutaneous injection 5,000 Units (5,000 Units Subcutaneous Given 3/9/20 0617)   cefepime (MAXIPIME) 2,000 mg in dextrose 5 % 50 mL IVPB (has no administration in time range)   acetaminophen (TYLENOL) tablet 975 mg (975 mg Oral Given 3/9/20 0618)   lidocaine (LIDODERM) 5 % patch 2 patch (has no administration in time range)   vancomycin (VANCOCIN) IVPB (premix) 750 mg (has no administration in time range)   acetaminophen (TYLENOL) tablet 650 mg (650 mg Oral Given 3/9/20 0242)   cefepime (MAXIPIME) 2 g/50 mL dextrose IVPB (0 mg Intravenous Stopped 3/9/20 0406)   azithromycin (ZITHROMAX) 500 mg in sodium chloride 0 9% 250mL IVPB 500 mg (500 mg Intravenous New Bag 3/9/20 0314)   vancomycin (VANCOCIN) 1,500 mg in sodium chloride 0 9 % 250 mL IVPB (1,500 mg Intravenous New Bag 3/9/20 0420)   sodium chloride 0 9 % bolus 1,000 mL (1,000 mL Intravenous New Bag 3/9/20 0245)   lactated ringers infusion (125 mL/hr Intravenous Given 3/9/20 0419)       Diagnostic Studies  Results Reviewed     Procedure Component Value Units Date/Time    BMP AM Draw X 3 days starting day 2 [[id number]]  (Abnormal) Collected:  03/09/20 0605    Lab Status:  Final result Specimen:  Blood from Hand, Left Updated:  03/09/20 0648     Sodium 137 mmol/L      Potassium 3 9 mmol/L      Chloride 104 mmol/L      CO2 27 mmol/L      ANION GAP 6 mmol/L      BUN 23 mg/dL      Creatinine 1 35 mg/dL      Glucose 119 mg/dL      Calcium 8 3 mg/dL      eGFR 50 ml/min/1 73sq m     Narrative:       [place] guidelines for Chronic Kidney Disease (CKD):     Stage 1 with normal or high GFR (GFR > 90 mL/min/1 73 square meters)    Stage 2 Mild CKD (GFR = 60-89 mL/min/1 73 square meters)    Stage 3A Moderate CKD (GFR = 45-59 mL/min/1 73 square meters)    Stage 3B Moderate CKD (GFR = 30-44 mL/min/1 73 square meters)    Stage 4 Severe CKD (GFR = 15-29 mL/min/1 73 square meters)    Stage 5 End Stage CKD (GFR <15 mL/min/1 73 square meters)  Note: GFR calculation is accurate only with a steady state creatinine    Procalcitonin [[id number]] Collected:  03/09/20 0605    Lab Status: In process Specimen:  Blood from Hand, Left Updated:  03/09/20 0610    Lipid panel [[id number]] Collected:  03/09/20 0146    Lab Status:  Final result Specimen:  Blood from Arm, Left Updated:  03/09/20 0520     Cholesterol 117 mg/dL      Triglycerides 35 mg/dL      HDL, Direct 48 mg/dL      LDL Calculated 62 mg/dL      Non-HDL-Chol (CHOL-HDL) 69 mg/dl     Urinalysis with microscopic [[id number]]     Lab Status:  No result Specimen:  Urine, Clean Catch     Lactic acid, plasma x2 [[id number]]  (Normal) Collected:  03/09/20 0146    Lab Status:  Final result Specimen:  Blood from Arm, Left Updated:  03/09/20 0220     LACTIC ACID 1 1 mmol/L     Narrative:       Result may be elevated if tourniquet was used during collection      CBC and differential [[id number]]  (Abnormal) Collected:  03/09/20 0146    Lab Status:  Final result Specimen:  Blood from Arm, Left Updated:  03/09/20 0219     WBC 6 15 Thousand/uL      RBC 4 82 Million/uL      Hemoglobin 14 8 g/dL      Hematocrit 43 6 %      MCV 91 fL      MCH 30 7 pg      MCHC 33 9 g/dL      RDW 13 6 %      MPV 10 6 fL      Platelets 655 Thousands/uL      nRBC 0 /100 WBCs      Neutrophils Relative 90 %      Immat GRANS % 2 %      Lymphocytes Relative 4 %      Monocytes Relative 4 %      Eosinophils Relative 0 %      Basophils Relative 0 %      Neutrophils Absolute 5 53 Thousands/µL      Immature Grans Absolute 0 12 Thousand/uL      Lymphocytes Absolute 0 22 Thousands/µL      Monocytes Absolute 0 26 Thousand/µL      Eosinophils Absolute 0 00 Thousand/µL      Basophils Absolute 0 02 Thousands/µL     Comprehensive metabolic panel [[id number]]  (Abnormal) Collected:  03/09/20 0146    Lab Status:  Final result Specimen:  Blood from Arm, Left Updated:  03/09/20 0210     Sodium 137 mmol/L      Potassium 4 1 mmol/L      Chloride 101 mmol/L      CO2 29 mmol/L      ANION GAP 7 mmol/L      BUN 24 mg/dL      Creatinine 1 45 mg/dL      Glucose 116 mg/dL      Calcium 8 5 mg/dL      AST 21 U/L      ALT 16 U/L      Alkaline Phosphatase 61 U/L      Total Protein 6 4 g/dL      Albumin 2 9 g/dL      Total Bilirubin 0 64 mg/dL      eGFR 46 ml/min/1 73sq m     Narrative:       [place] guidelines for Chronic Kidney Disease (CKD):     Stage 1 with normal or high GFR (GFR > 90 mL/min/1 73 square meters)    Stage 2 Mild CKD (GFR = 60-89 mL/min/1 73 square meters)    Stage 3A Moderate CKD (GFR = 45-59 mL/min/1 73 square meters)    Stage 3B Moderate CKD (GFR = 30-44 mL/min/1 73 square meters)    Stage 4 Severe CKD (GFR = 15-29 mL/min/1 73 square meters)    Stage 5 End Stage CKD (GFR <15 mL/min/1 73 square meters)  Note: GFR calculation is accurate only with a steady state creatinine    Protime-INR [[id number]]  (Normal) Collected:  03/09/20 0146    Lab Status:  Final result Specimen:  Blood from Arm, Left Updated:  03/09/20 0205     Protime 14 5 seconds      INR 1 19    APTT [[id number]]  (Normal) Collected:  03/09/20 0146    Lab Status:  Final result Specimen:  Blood from Arm, Left Updated:  03/09/20 0205     PTT 33 seconds     Blood culture #1 [[id number]] Collected:  03/09/20 0158    Lab Status: In process Specimen:  Blood from Arm, Right Updated:  03/09/20 0205    Blood culture #2 [[id number]] Collected:  03/09/20 0146    Lab Status:   In process Specimen:  Blood from Arm, Left Updated:  03/09/20 0153    Lactic acid, plasma x2 [[id number]]     Lab Status:  No result Specimen:  Blood                  XR chest 1 view portable   ED Interpretation by Zurdo Hannon MD (03/09 6924) Unchanged from 3/7/20  Procedures  ECG 12 Lead Documentation Only  Date/Time: 3/9/2020 2:38 AM  Performed by: Herman Reyes MD  Authorized by: Herman Reyes MD     Indications / Diagnosis:  Dyspnea  ECG reviewed by me, the ED Provider: yes    Patient location:  ED  Previous ECG:     Previous ECG:  Compared to current    Comparison ECG info:  3/7/20    Similarity:  No change  Rate:     ECG rate:  99  Rhythm:     Rhythm: sinus rhythm    Ectopy:     Ectopy: none    QRS:     QRS axis:  Left  Conduction:     Conduction: normal    ST segments:     ST segments:  Normal  T waves:     T waves: normal               ED Course                   Initial Sepsis Screening     Row Name 03/09/20 0250                Is the patient's history suggestive of a new or worsening infection? (!) Yes (Proceed)  -BT        Suspected source of infection  pneumonia  -BT        Are two or more of the following signs & symptoms of infection both present and new to the patient? No  -BT        Indicate SIRS criteria  Hyperthemia > 38 3C (100 9F)  -BT        If the answer is yes to both questions, suspicion of sepsis is present          If severe sepsis is present AND tissue hypoperfusion perists in the hour after fluid resuscitation or lactate > 4, the patient meets criteria for SEPTIC SHOCK          Are any of the following organ dysfunction criteria present within 6 hours of suspected infection and SIRS criteria that are NOT considered to be chronic conditions?         Organ dysfunction          Date of presentation of severe sepsis          Time of presentation of severe sepsis          Tissue hypoperfusion persists in the hour after crystalloid fluid administration, evidenced, by either:          Was hypotension present within one hour of the conclusion of crystalloid fluid administration?           Date of presentation of septic shock          Time of presentation of septic shock            User Key  (r) = Recorded By, (t) = Taken By, (c) = Cosigned By    [address] Name Provider Giuliana Peck MD Physician                  MDM  Number of Diagnoses or Management Options  Failure of outpatient treatment: new and requires workup  Hypoxia: new and requires workup  Pneumonia: new and requires workup  Diagnosis management comments: 50-year-old male discharged yesterday for pneumonia re-presented with worsening shortness of breath, hypoxia  No changes on chest x-ray but requiring nasal cannula O2  Admitted for continued IV antibiotics and supplemental oxygen  Amount and/or Complexity of Data Reviewed  Clinical lab tests: ordered and reviewed  Tests in the radiology section of CPT®: ordered and reviewed  Independent visualization of images, tracings, or specimens: yes    Patient Progress  Patient progress: improved        Disposition  Final diagnoses:   Pneumonia   Failure of outpatient treatment   Hypoxia     Time reflects when diagnosis was documented in both MDM as applicable and the Disposition within this note     Time User Action Codes Description Comment    3/9/2020  2:53 AM Frances [place] Add [J18 9] Pneumonia     3/9/2020  2:53 AM Ridge Reed Add [Z78 9] Failure of outpatient treatment     3/9/2020  2:53 AM Altorrey Sine Add [R09 02] Hypoxia       ED Disposition     ED Disposition Condition Date/Time Comment    Admit Stable Mon Mar 9, 2020  2:55 AM Case was discussed with Carlyn Duron and the patient's admission status was agreed to be Admission Status: inpatient status to the service of Dr Cielo Nunez          Follow-up Information    None         Current Discharge Medication List      CONTINUE these medications which have NOT CHANGED    Details   amoxicillin (AMOXIL) 500 mg capsule Take 2 capsules (1,000 mg total) by mouth every 8 (eight) hours for 3 days  Qty: 18 capsule, Refills: 0    Associated Diagnoses: Bilateral pneumonia      aspirin 81 mg chewable tablet Chew 81 mg daily azithromycin (ZITHROMAX) 500 MG tablet Take 1 tablet (500 mg total) by mouth every 24 hours for 3 days  Qty: 3 tablet, Refills: 0    Associated Diagnoses: Bilateral pneumonia      benzonatate (TESSALON PERLES) 100 mg capsule Take 1 capsule (100 mg total) by mouth 3 (three) times a day as needed for cough for up to 3 days  Qty: 9 capsule, Refills: 0    Associated Diagnoses: Bilateral pneumonia      diltiazem (CARDIZEM CD) 180 mg 24 hr capsule TAKE 1 CAPSULE BY MOUTH EVERY DAY  Qty: 90 capsule, Refills: 1    Associated Diagnoses: Lightheadedness; Essential hypertension      finasteride (PROSCAR) 5 mg tablet TAKE 1 TABLET BY MOUTH EVERY DAY  Qty: 90 tablet, Refills: 2    Associated Diagnoses: Benign prostatic hyperplasia with nocturia      fluticasone (FLONASE) 50 mcg/act nasal spray 1 spray into each nostril daily as needed for rhinitis or allergies  Qty: 1 Bottle, Refills: 0    Associated Diagnoses: Bilateral pneumonia      gabapentin (NEURONTIN) 300 mg capsule Take 1 capsule (300 mg total) by mouth 2 (two) times a day  Qty: 60 capsule, Refills: 3    Associated Diagnoses: Idiopathic peripheral neuropathy      HYDROCHLOROTHIAZIDE PO Take by mouth      montelukast (SINGULAIR) 10 mg tablet Take 10 mg by mouth daily at bedtime      neomycin-bacitracin-polymyxin b (NEOSPORIN) ointment Apply 1 application topically 2 (two) times a day      omeprazole (PriLOSEC) 40 MG capsule Take 40 mg by mouth daily      tamsulosin (FLOMAX) 0 4 mg Take 1 capsule (0 4 mg total) by mouth daily with dinner  Qty: 90 capsule, Refills: 3    Associated Diagnoses: BPH with obstruction/lower urinary tract symptoms      triamcinolone (KENALOG) 0 1 % oral topical paste 1 application to area on lip bid x 7 days  Qty: 5 g, Refills: 0    Associated Diagnoses: Lip ulcer           No discharge procedures on file      PDMP Review       Value Time User    PDMP Reviewed  Yes 3/7/2020  1:43 AM Allyson Sylvester MD          ED Provider  Electronically Signed by           Herman Reyes MD  03/09/20 1670

## 2020-03-10 ENCOUNTER — APPOINTMENT (INPATIENT)
Dept: CT IMAGING | Facility: HOSPITAL | Age: 80
DRG: 871 | End: 2020-03-10
Payer: COMMERCIAL

## 2020-03-10 PROBLEM — M54.10 RADICULOPATHY OF LEG: Status: ACTIVE | Noted: 2020-03-10

## 2020-03-10 LAB
ALBUMIN SERPL BCP-MCNC: 2.4 G/DL (ref 3.5–5)
ALP SERPL-CCNC: 43 U/L (ref 46–116)
ALT SERPL W P-5'-P-CCNC: 22 U/L (ref 12–78)
ANION GAP SERPL CALCULATED.3IONS-SCNC: 7 MMOL/L (ref 4–13)
AST SERPL W P-5'-P-CCNC: 54 U/L (ref 5–45)
B PARAP IS1001 DNA NPH QL NAA+NON-PROBE: NOT DETECTED
B PERT.PT PRMT NPH QL NAA+NON-PROBE: NOT DETECTED
BASOPHILS # BLD AUTO: 0.01 THOUSANDS/ΜL (ref 0–0.1)
BASOPHILS NFR BLD AUTO: 0 % (ref 0–1)
BILIRUB SERPL-MCNC: 0.63 MG/DL (ref 0.2–1)
BUN SERPL-MCNC: 18 MG/DL (ref 5–25)
C PNEUM DNA NPH QL NAA+NON-PROBE: NOT DETECTED
CALCIUM SERPL-MCNC: 8.3 MG/DL (ref 8.3–10.1)
CHLORIDE SERPL-SCNC: 104 MMOL/L (ref 100–108)
CO2 SERPL-SCNC: 26 MMOL/L (ref 21–32)
CREAT SERPL-MCNC: 1.16 MG/DL (ref 0.6–1.3)
EOSINOPHIL # BLD AUTO: 0 THOUSAND/ΜL (ref 0–0.61)
EOSINOPHIL NFR BLD AUTO: 0 % (ref 0–6)
ERYTHROCYTE [DISTWIDTH] IN BLOOD BY AUTOMATED COUNT: 13.9 % (ref 11.6–15.1)
FLUAV RNA NPH QL NAA+NON-PROBE: NOT DETECTED
FLUBV RNA NPH QL NAA+NON-PROBE: NOT DETECTED
GFR SERPL CREATININE-BSD FRML MDRD: 60 ML/MIN/1.73SQ M
GLUCOSE SERPL-MCNC: 113 MG/DL (ref 65–140)
HADV DNA NPH QL NAA+NON-PROBE: NOT DETECTED
HCT VFR BLD AUTO: 38.1 % (ref 36.5–49.3)
HGB BLD-MCNC: 12.6 G/DL (ref 12–17)
HMPV RNA NPH QL NAA+NON-PROBE: NOT DETECTED
HPIV 1+2+3+4 RNA NPH QL NAA+NON-PROBE: NOT DETECTED
HPIV 1+2+3+4 RNA NPH QL NAA+NON-PROBE: NOT DETECTED
IMM GRANULOCYTES # BLD AUTO: 0.06 THOUSAND/UL (ref 0–0.2)
IMM GRANULOCYTES NFR BLD AUTO: 2 % (ref 0–2)
LYMPHOCYTES # BLD AUTO: 0.37 THOUSANDS/ΜL (ref 0.6–4.47)
LYMPHOCYTES NFR BLD AUTO: 10 % (ref 14–44)
M PNEUMO DNA NPH QL NAA+NON-PROBE: NOT DETECTED
MCH RBC QN AUTO: 30.3 PG (ref 26.8–34.3)
MCHC RBC AUTO-ENTMCNC: 33.1 G/DL (ref 31.4–37.4)
MCV RBC AUTO: 92 FL (ref 82–98)
MONOCYTES # BLD AUTO: 0.14 THOUSAND/ΜL (ref 0.17–1.22)
MONOCYTES NFR BLD AUTO: 4 % (ref 4–12)
MRSA NOSE QL CULT: NORMAL
NEUTROPHILS # BLD AUTO: 3.03 THOUSANDS/ΜL (ref 1.85–7.62)
NEUTS SEG NFR BLD AUTO: 84 % (ref 43–75)
NRBC BLD AUTO-RTO: 0 /100 WBCS
PLATELET # BLD AUTO: 129 THOUSANDS/UL (ref 149–390)
PMV BLD AUTO: 10.7 FL (ref 8.9–12.7)
POTASSIUM SERPL-SCNC: 3.8 MMOL/L (ref 3.5–5.3)
PROCALCITONIN SERPL-MCNC: 1.21 NG/ML
PROT SERPL-MCNC: 5.6 G/DL (ref 6.4–8.2)
RBC # BLD AUTO: 4.16 MILLION/UL (ref 3.88–5.62)
RSV RNA NPH QL NAA+NON-PROBE: NOT DETECTED
RV+EV RNA NPH QL NAA+NON-PROBE: NOT DETECTED
SODIUM SERPL-SCNC: 137 MMOL/L (ref 136–145)
T4 FREE SERPL-MCNC: 1.18 NG/DL (ref 0.76–1.46)
TSH SERPL DL<=0.05 MIU/L-ACNC: 1.34 UIU/ML (ref 0.36–3.74)
WBC # BLD AUTO: 3.61 THOUSAND/UL (ref 4.31–10.16)

## 2020-03-10 PROCEDURE — 87070 CULTURE OTHR SPECIMN AEROBIC: CPT | Performed by: NURSE PRACTITIONER

## 2020-03-10 PROCEDURE — 80053 COMPREHEN METABOLIC PANEL: CPT | Performed by: INTERNAL MEDICINE

## 2020-03-10 PROCEDURE — 87205 SMEAR GRAM STAIN: CPT | Performed by: NURSE PRACTITIONER

## 2020-03-10 PROCEDURE — 85025 COMPLETE CBC W/AUTO DIFF WBC: CPT | Performed by: NURSE PRACTITIONER

## 2020-03-10 PROCEDURE — 0CJS8ZZ INSPECTION OF LARYNX, VIA NATURAL OR ARTIFICIAL OPENING ENDOSCOPIC: ICD-10-PCS | Performed by: OTOLARYNGOLOGY

## 2020-03-10 PROCEDURE — 84145 PROCALCITONIN (PCT): CPT | Performed by: NURSE PRACTITIONER

## 2020-03-10 PROCEDURE — 94664 DEMO&/EVAL PT USE INHALER: CPT

## 2020-03-10 PROCEDURE — 84443 ASSAY THYROID STIM HORMONE: CPT | Performed by: INTERNAL MEDICINE

## 2020-03-10 PROCEDURE — 99233 SBSQ HOSP IP/OBS HIGH 50: CPT | Performed by: INTERNAL MEDICINE

## 2020-03-10 PROCEDURE — 94640 AIRWAY INHALATION TREATMENT: CPT

## 2020-03-10 PROCEDURE — 99232 SBSQ HOSP IP/OBS MODERATE 35: CPT | Performed by: PHYSICIAN ASSISTANT

## 2020-03-10 PROCEDURE — 97116 GAIT TRAINING THERAPY: CPT

## 2020-03-10 PROCEDURE — 97163 PT EVAL HIGH COMPLEX 45 MIN: CPT

## 2020-03-10 PROCEDURE — 99232 SBSQ HOSP IP/OBS MODERATE 35: CPT | Performed by: INTERNAL MEDICINE

## 2020-03-10 PROCEDURE — 99221 1ST HOSP IP/OBS SF/LOW 40: CPT | Performed by: PHYSICIAN ASSISTANT

## 2020-03-10 PROCEDURE — 84439 ASSAY OF FREE THYROXINE: CPT | Performed by: INTERNAL MEDICINE

## 2020-03-10 PROCEDURE — 83835 ASSAY OF METANEPHRINES: CPT | Performed by: INTERNAL MEDICINE

## 2020-03-10 PROCEDURE — 70486 CT MAXILLOFACIAL W/O DYE: CPT

## 2020-03-10 PROCEDURE — 94760 N-INVAS EAR/PLS OXIMETRY 1: CPT

## 2020-03-10 PROCEDURE — 93971 EXTREMITY STUDY: CPT | Performed by: SURGERY

## 2020-03-10 PROCEDURE — 82384 ASSAY THREE CATECHOLAMINES: CPT | Performed by: INTERNAL MEDICINE

## 2020-03-10 RX ORDER — HYDROMORPHONE HCL/PF 1 MG/ML
0.5 SYRINGE (ML) INJECTION EVERY 4 HOURS PRN
Status: DISCONTINUED | OUTPATIENT
Start: 2020-03-10 | End: 2020-03-14 | Stop reason: HOSPADM

## 2020-03-10 RX ORDER — MUSCLE RUB CREAM 100; 150 MG/G; MG/G
CREAM TOPICAL 3 TIMES DAILY
Status: DISCONTINUED | OUTPATIENT
Start: 2020-03-10 | End: 2020-03-14 | Stop reason: HOSPADM

## 2020-03-10 RX ORDER — TRAMADOL HYDROCHLORIDE 50 MG/1
50 TABLET ORAL EVERY 6 HOURS PRN
Status: DISCONTINUED | OUTPATIENT
Start: 2020-03-10 | End: 2020-03-14 | Stop reason: HOSPADM

## 2020-03-10 RX ORDER — GABAPENTIN 300 MG/1
300 CAPSULE ORAL 2 TIMES DAILY
Status: DISCONTINUED | OUTPATIENT
Start: 2020-03-10 | End: 2020-03-14 | Stop reason: HOSPADM

## 2020-03-10 RX ORDER — VANCOMYCIN HYDROCHLORIDE 1 G/200ML
12.5 INJECTION, SOLUTION INTRAVENOUS EVERY 12 HOURS
Status: DISCONTINUED | OUTPATIENT
Start: 2020-03-10 | End: 2020-03-10

## 2020-03-10 RX ORDER — LANOLIN ALCOHOL/MO/W.PET/CERES
3 CREAM (GRAM) TOPICAL ONCE
Status: COMPLETED | OUTPATIENT
Start: 2020-03-11 | End: 2020-03-11

## 2020-03-10 RX ORDER — FUROSEMIDE 10 MG/ML
20 INJECTION INTRAMUSCULAR; INTRAVENOUS ONCE
Status: COMPLETED | OUTPATIENT
Start: 2020-03-10 | End: 2020-03-10

## 2020-03-10 RX ORDER — METHYLPREDNISOLONE SODIUM SUCCINATE 40 MG/ML
40 INJECTION, POWDER, LYOPHILIZED, FOR SOLUTION INTRAMUSCULAR; INTRAVENOUS DAILY
Status: DISCONTINUED | OUTPATIENT
Start: 2020-03-10 | End: 2020-03-12

## 2020-03-10 RX ADMIN — IPRATROPIUM BROMIDE 0.5 MG: 0.5 SOLUTION RESPIRATORY (INHALATION) at 07:56

## 2020-03-10 RX ADMIN — HEPARIN SODIUM 5000 UNITS: 5000 INJECTION INTRAVENOUS; SUBCUTANEOUS at 05:15

## 2020-03-10 RX ADMIN — ACETAMINOPHEN 975 MG: 325 TABLET, FILM COATED ORAL at 21:53

## 2020-03-10 RX ADMIN — CEFEPIME HYDROCHLORIDE 2000 MG: 2 INJECTION, POWDER, FOR SOLUTION INTRAVENOUS at 14:29

## 2020-03-10 RX ADMIN — LIDOCAINE 2 PATCH: 50 PATCH TOPICAL at 09:04

## 2020-03-10 RX ADMIN — TRAMADOL HYDROCHLORIDE 50 MG: 50 TABLET, FILM COATED ORAL at 21:53

## 2020-03-10 RX ADMIN — HEPARIN SODIUM 5000 UNITS: 5000 INJECTION INTRAVENOUS; SUBCUTANEOUS at 21:53

## 2020-03-10 RX ADMIN — GUAIFENESIN 1200 MG: 600 TABLET, EXTENDED RELEASE ORAL at 17:20

## 2020-03-10 RX ADMIN — GUAIFENESIN 1200 MG: 600 TABLET, EXTENDED RELEASE ORAL at 09:03

## 2020-03-10 RX ADMIN — FUROSEMIDE 20 MG: 10 INJECTION, SOLUTION INTRAMUSCULAR; INTRAVENOUS at 13:32

## 2020-03-10 RX ADMIN — LEVALBUTEROL HYDROCHLORIDE 1.25 MG: 1.25 SOLUTION, CONCENTRATE RESPIRATORY (INHALATION) at 13:57

## 2020-03-10 RX ADMIN — HEPARIN SODIUM 5000 UNITS: 5000 INJECTION INTRAVENOUS; SUBCUTANEOUS at 13:32

## 2020-03-10 RX ADMIN — METHYLPREDNISOLONE SODIUM SUCCINATE 40 MG: 40 INJECTION, POWDER, FOR SOLUTION INTRAMUSCULAR; INTRAVENOUS at 13:31

## 2020-03-10 RX ADMIN — GABAPENTIN 300 MG: 300 CAPSULE ORAL at 17:20

## 2020-03-10 RX ADMIN — LEVALBUTEROL HYDROCHLORIDE 1.25 MG: 1.25 SOLUTION, CONCENTRATE RESPIRATORY (INHALATION) at 19:48

## 2020-03-10 RX ADMIN — LEVALBUTEROL HYDROCHLORIDE 1.25 MG: 1.25 SOLUTION, CONCENTRATE RESPIRATORY (INHALATION) at 07:56

## 2020-03-10 RX ADMIN — HYDROMORPHONE HYDROCHLORIDE 0.5 MG: 1 INJECTION, SOLUTION INTRAMUSCULAR; INTRAVENOUS; SUBCUTANEOUS at 13:32

## 2020-03-10 RX ADMIN — DILTIAZEM HYDROCHLORIDE 180 MG: 180 CAPSULE, COATED, EXTENDED RELEASE ORAL at 09:03

## 2020-03-10 RX ADMIN — TAMSULOSIN HYDROCHLORIDE 0.4 MG: 0.4 CAPSULE ORAL at 16:29

## 2020-03-10 RX ADMIN — IPRATROPIUM BROMIDE 0.5 MG: 0.5 SOLUTION RESPIRATORY (INHALATION) at 13:57

## 2020-03-10 RX ADMIN — ACETAMINOPHEN 975 MG: 325 TABLET, FILM COATED ORAL at 05:15

## 2020-03-10 RX ADMIN — IPRATROPIUM BROMIDE 0.5 MG: 0.5 SOLUTION RESPIRATORY (INHALATION) at 19:48

## 2020-03-10 RX ADMIN — VANCOMYCIN HYDROCHLORIDE 750 MG: 750 INJECTION, SOLUTION INTRAVENOUS at 03:08

## 2020-03-10 RX ADMIN — PANTOPRAZOLE SODIUM 40 MG: 40 TABLET, DELAYED RELEASE ORAL at 05:15

## 2020-03-10 RX ADMIN — MONTELUKAST 10 MG: 10 TABLET, FILM COATED ORAL at 21:53

## 2020-03-10 RX ADMIN — MENTHOL, METHYL SALICYLATE: 10; 15 CREAM TOPICAL at 17:20

## 2020-03-10 RX ADMIN — CEFEPIME HYDROCHLORIDE 2000 MG: 2 INJECTION, POWDER, FOR SOLUTION INTRAVENOUS at 02:07

## 2020-03-10 RX ADMIN — ASPIRIN 81 MG 81 MG: 81 TABLET ORAL at 09:05

## 2020-03-10 RX ADMIN — GABAPENTIN 200 MG: 100 CAPSULE ORAL at 09:03

## 2020-03-10 RX ADMIN — OXYCODONE HYDROCHLORIDE 2.5 MG: 5 TABLET ORAL at 00:25

## 2020-03-10 RX ADMIN — FINASTERIDE 5 MG: 5 TABLET, FILM COATED ORAL at 09:04

## 2020-03-10 NOTE — ASSESSMENT & PLAN NOTE
· Patients wife called ED due to rigors and "heavy breathing"   · Presents to ED with fever 102, oxygen saturation 70% at home when EMS arrived  · Was discharged from hospital the day prior to admission on amoxicillin and zithromax to treat for community acquired pneumonia  · Currently requiring 2 liters nasal cannula, not on any oxygen prior to arrival   · Appreciate input from Infectious Disease for antibiotic management  · Appreciate input from pulmonology given underlying interstitial lung disease

## 2020-03-10 NOTE — PROGRESS NOTES
Progress Note - Harmony Reyez 1940, 67 y o  male MRN: [id number]    Unit/Bed#: S -01 Encounter: [id number]    Primary Care Provider: Anamaria Kline MD   Date and time admitted to hospital: 3/9/2020  1:28 AM  * Sepsis [facility])  Assessment & Plan  · Present on admission as evidenced by tachycardia and fever, source is pneumonia, also with hypoxia--clinically improving since yesterday  · BCx in process, negative thus far   · Procalcitonin >1 and rising, continue to trend  Lactic acid negative  · CT chest with bilateral infiltrates  · CT sinus pending  · Patient was just on a course of antibiotics prior to arrival   Upon admission he was seen by infectious disease and is on broad-spectrum antibiotics with IV cefepime and vancomycin  · Respiratory 2 panel also pending, concern for possible viral process; patient reports he has not left the [facility] in the past 2 weeks and denies any ill contacts  Bilateral pneumonia  Assessment & Plan  · Patients wife called ED due to rigors and "heavy breathing"   · Presents to ED with fever 102, oxygen saturation 70% at home when EMS arrived  · Was discharged from hospital the day prior to admission on amoxicillin and zithromax to treat for community acquired pneumonia  · Currently requiring 2 liters nasal cannula, not on any oxygen prior to arrival   · Appreciate input from Infectious Disease for antibiotic management  · Appreciate input from pulmonology given underlying interstitial lung disease    Radiculopathy of leg  Assessment & Plan  · Patient reports severe left proximal lower extremity pain, associated with ambulatory dysfunction  · MRI of the lumbar sacral spine "No definite MR evidence for discitis osteomyelitis as clinically questioned  Multilevel degenerative changes of the lumbar spine, as described above  Severe left foraminal narrowing at L4-L5 and L5-S1 results in impingement of the exiting nerve roots    Left lateral recess stenosis with contact of the respective descending of L5 and S1 nerve roots is also present at these levels "  · Strongly suspect radiculopathy is the cause for patient's pain  Will treat with the following measures for now:  Physical therapy, Around the clock Tylenol, topical menthol, increased dose of gabapentin, p r n  Tramadol for moderate pain, p r n  IV Dilaudid for severe pain  Will also provide steroids, start with IV Solu-Medrol 40 mg daily for now and taper  Recommend consultation to spine surgery if no improvement with conservative measures    KAYLI (acute kidney injury) ([facility] [address] )  Assessment & Plan   Creatinine upon admission: 1 45  o Improved to 1 16   Secondary to decreased oral intake   Treatment: IV fluids    Monitor BMP  Interstitial lung disease ([facility] [address] )  Assessment & Plan  · Appreciate pulmonology involvement  Continue respiratory protocol and nebulizer regimen    VTE Pharmacologic Prophylaxis:   Pharmacologic: Heparin  Mechanical VTE Prophylaxis in Place: No    Patient Centered Rounds: I have performed bedside rounds with nursing staff today  Discussions with Specialists or Other Care Team Provider:  Infectious Disease, my attending    Education and Discussions with Family / Patient:  1:51 p m  addendum called patient's wife and left a voicemail    Time Spent for Care: 30 minutes  More than 50% of total time spent on counseling and coordination of care as described above  Current Length of Stay: 1 day(s)    Current Patient Status: Inpatient   Certification Statement: The patient will continue to require additional inpatient hospital stay due to Severe uncontrolled back pain  Sepsis with workup in progress    Discharge Plan:  Not medically stable  Also requested physical therapy to see the patient    Code Status: Level 1 - Full Code    Subjective:   Patient reports he had a horrible night last night because of severe pain in his left low back into his upper leg to the knee    He states he did not get any relief with the low-dose oxycodone and it did not sleep at all  He reports pain is okay if he does not move at all but is significantly worse with any movement including walking  He reports that his breathing is not an issue to him at all  He had very minimal cough last night but even that slight amount of cough did trigger worsening of his pain  He is not on any oxygen at home and denies any shortness of breath  Objective:     Vitals:   Temp (24hrs), Av °F (37 8 °C), Min:98 °F (36 7 °C), Max:102 9 °F (39 4 °C)    Temp:  [98 °F (36 7 °C)-102 9 °F (39 4 °C)] 98 °F (36 7 °C)  HR:  [75-91] 75  Resp:  [18-20] 18  BP: (130-168)/(69-92) 168/83  SpO2:  [90 %-93 %] 92 %  Body mass index is 35 51 kg/m²  Input and Output Summary (last 24 hours): Intake/Output Summary (Last 24 hours) at 3/10/2020 1319  Last data filed at 3/10/2020 0530  Gross per 24 hour   Intake    Output 350 ml   Net -350 ml       Physical Exam:     Physical Exam   Constitutional: He appears well-developed and well-nourished  No distress  HENT:   Head: Normocephalic and atraumatic  Eyes: Conjunctivae are normal  Right eye exhibits no discharge  Left eye exhibits no discharge  No scleral icterus  Cardiovascular: Normal rate, regular rhythm and normal heart sounds  No murmur heard  Pulmonary/Chest: Effort normal  No stridor  No respiratory distress  He has no wheezes  No dyspnea or distress noted  No cough or wheezing  Patient does have some coarse rhonchi   Abdominal: Soft  Bowel sounds are normal  He exhibits no distension  There is no guarding  Soft obese   Musculoskeletal: He exhibits no edema  Neurological: He is alert  Awake alert interactive; pain with elevated left leg   Skin: Skin is warm and dry  No rash noted  He is not diaphoretic  No erythema  No pallor  Psychiatric: He has a normal mood and affect  His behavior is normal    Vitals reviewed      Additional Data:     Labs:    Results from last 7 days   Lab Units 03/10/20  0519  03/08/20  0454   WBC Thousand/uL 3 61*   < > 6 15   HEMOGLOBIN g/dL 12 6   < > 14 7   HEMATOCRIT % 38 1   < > 43 6   PLATELETS Thousands/uL 129*   < > 137*   BANDS PCT %  --   --  1   NEUTROS PCT % 84*   < >  --    LYMPHS PCT % 10*   < >  --    LYMPHO PCT %  --   --  8*   MONOS PCT % 4   < >  --    MONO PCT %  --   --  7   EOS PCT % 0   < > 0    < > = values in this interval not displayed  Results from last 7 days   Lab Units 03/10/20  0519   SODIUM mmol/L 137   POTASSIUM mmol/L 3 8   CHLORIDE mmol/L 104   CO2 mmol/L 26   BUN mg/dL 18   CREATININE mg/dL 1 16   ANION GAP mmol/L 7   CALCIUM mg/dL 8 3   ALBUMIN g/dL 2 4*   TOTAL BILIRUBIN mg/dL 0 63   ALK PHOS U/L 43*   ALT U/L 22   AST U/L 54*   GLUCOSE RANDOM mg/dL 113     Results from last 7 days   Lab Units 03/09/20  0146   INR  1 19             Results from last 7 days   Lab Units 03/10/20  0519 03/09/20  0605 03/09/20  0146 03/08/20  0454 03/07/20  0658 03/07/20  0228   LACTIC ACID mmol/L  --   --  1 1  --   --  1 0   PROCALCITONIN ng/ml 1 21* 1 03*  --  0 05 <0 05  --        * I Have Reviewed All Lab Data Listed Above  * Additional Pertinent Lab Tests Reviewed: [facility]    Imaging:    Imaging Reports Reviewed Today Include:  MRI, CT scan  Imaging Personally Reviewed by Myself Includes:      Recent Cultures (last 7 days):     Results from last 7 days   Lab Units 03/09/20  0158 03/09/20  0146 03/07/20  1028 03/07/20  0659   BLOOD CULTURE  No Growth at 24 hrs  No Growth at 24 hrs   --  No Growth at 48 hrs  No Growth at 48 hrs     LEGIONELLA URINARY ANTIGEN   --   --  Negative  --        Last 24 Hours Medication List:     Current Facility-Administered Medications:  acetaminophen 975 mg Oral [facility] CARLOS Vila    aluminum-magnesium hydroxide-simethicone 30 mL Oral Q6H PRN CARLOS Vila    aspirin 81 mg Oral Daily CARLOS Vila    benzonatate 100 mg Oral TID PRN Matthew Longoria PA-C cefepime 2,000 mg Intravenous Q12H Alli Tovar, CRNP Last Rate: 2,000 mg (03/10/20 0207)   diltiazem 180 mg Oral Daily Alli Tovar, CRANAYELI    docusate sodium 100 mg Oral BID PRN Alli Tovar, CRNP    finasteride 5 mg Oral Daily Alli Tovar, CRNP    fluticasone 1 spray Nasal Daily PRN Alli Tovar, CRNP    furosemide 20 mg Intravenous Once Jaciel Ruby, CARLOS    gabapentin 300 mg Oral BID Stacy Rios PA-C    guaiFENesin 1,200 mg Oral BID Alli Tovar, CRNP    heparin (porcine) 5,000 Units Subcutaneous [facility] Alli Tovar, CRANAYELI    HYDROmorphone 0 5 mg Intravenous Q4H PRN Stacy Rios PA-C    ipratropium 0 5 mg Nebulization TID Jaciel Ruby, CARLOS    levalbuterol 1 25 mg Nebulization TID Jaciel Ruby, CARLOS    lidocaine 2 patch Topical Daily Alli Tovar, CRNP    menthol-methyl salicylate  Apply externally TID Lia Saenz PA-C    methylPREDNISolone sodium succinate 40 mg Intravenous Daily Stacy Rios PA-C    montelukast 10 mg Oral HS Alli Tovar, CARLOS    ondansetron 4 mg Intravenous Q6H PRN Alli Tovar, CRANAYELI    pantoprazole 40 mg Oral Early Morning Alli Tovar, CRNP    tamsulosin 0 4 mg Oral Daily With Altavozels, CRNP    traMADol 50 mg Oral Q6H PRN Lia Saenz PA-C    triamcinolone  Topical BID Alli Tovar, ACRLOS    vancomycin 12 5 mg/kg (Adjusted) Intravenous Q12H Wade Cheema MD         Today, Patient Was Seen By: Lia Saenz PA-C    ** Please Note: Dictation voice to text software may have been used in the creation of this document   **

## 2020-03-10 NOTE — UTILIZATION REVIEW
Initial Clinical Review    Admission: Date/Time/Statement: Admission Orders (From admission, onward)     Ordered        03/09/20 0255  Inpatient Admission (expected length of stay for this patient Order details is greater than two midnights)  Once                   Orders Placed This Encounter   Procedures    Inpatient Admission (expected length of stay for this patient Order details is greater than two midnights)     Standing Status:   Standing     Number of Occurrences:   1     Order Specific Question:   Admitting Physician     Answer:   Harman Lazo [[id number]]     Order Specific Question:   Level of Care     Answer:   Med Surg [[id number]]     Order Specific Question:   Estimated length of stay     Answer:   More than 2 Midnights     Order Specific Question:   Certification     Answer:   I certify that inpatient services are medically necessary for this patient for a duration of greater than two midnights  See H&P and MD Progress Notes for additional information about the patient's course of treatment  ED Arrival Information     Expected Arrival Acuity Means of Arrival Escorted By Service Admission Type    - 3/9/2020 01:27 [facility]gent [facility] EMS Hospitalist Emergency    Arrival Complaint    -        Chief Complaint   Patient presents with    Shortness of Breath     Pt presents to the ED with c/o SOB that started earlier today  Pt was discharged from here on saturday after being treated for pneumonia     Assessment/Plan: 66 yo male to ED by EMS admitted as Inpatient due to Sepsis with bilateral Pneumonia with Hypoxia  Recent discharge from hospital on 3/8/2020  Patient was [address] with Augmentin and Zithromax for 3 days to treat Community acq PNA  On day of DC he was stable, afebrile, & well oxygenated  Wife called ED with symptoms of pt having "shakes" with loud breathing  ED advised EMS assistance  EMS noted saturations 70s in room air- begin 4 L NC (@ baseline no O2 use)    Temp reported 102  IN ED: obtained blood cultures, CXR with bilateral Pneumonia  Patient received IV antibiotics, with IV fluids, cont IV antibiotics  3/7 Blood culture=NGTD, urine antigen negative, FLU RSV negative  Follow blood culture, monitor pro calcitonin & check sputum, MRSA culture  Inpatient labs with KAYLI, received fluids & monitor labs  Complains of pain in bilateral lower extremities- worse with lying flat & impr with standing, cont ASA, consider imaging for PAD-obtain MRI      3/9 Pulmonology:  Acute Hypoxia requiring supplemental O2- no O2 use at baseline- cont to maintain sats>89%  Abn CXR: R>L lower lobe, opacity  Given recent hospitalization concern for HCAP  Will need continued coverage with Cef & Vanco  ID following, on exam bilateral rhonchi & rales begin NEBS  Hx of ILD without flare- cotn surveillance ; suspected volume overload: follow pro BNP, monitor renal function  3/9 Infectious Disease:  Sepsis presenting on admission & unclear etiology  Multifocal PNA: with Hypoxia, serial pro calcitonin normal now 1  Follow FLU PCR, pneumococcal, Legionella antigens NEGATIVE> Recent Epistaxis: packing removed, no symptoms  Cont Vancomycin & Cefepime for now  Recheck labs, follow temps  3/9 Endocrinology:  Incidental right adrenal nodule: indeterminate on CT 9/2019- unlikely cause current symptoms  Check plasma catecholamines, metanephrines while at the hospital, if within normal limits, does not need repeat testing as an outpatient in the short-term     ED Triage Vitals   Temperature Pulse Respirations Blood Pressure SpO2   03/09/20 0134 03/09/20 0133 03/09/20 0133 03/09/20 0133 03/09/20 0133   (!) 102 7 °F (39 3 °C) 103 20 127/73 93 %      Temp Source Heart Rate Source Patient Position - Orthostatic VS BP Location FiO2 (%)   03/09/20 0134 03/09/20 0133 03/09/20 0133 03/09/20 0133 --   Oral Monitor Sitting Right arm       Pain Score       03/09/20 0133       5        Wt Readings from Last 1 Encounters:   03/10/20 101 kg (223 lb 5 2 oz)     Additional Vital Signs:   Date/Time  Temp  Pulse  Resp  BP  MAP (mmHg)  SpO2  O2 Device  Patient Position - Orthostatic VS   03/10/20 0700  98 °F (36 7 °C)  75  18  168/83    92 %  None (Room air)  Lying   03/09/20 2230  98 5 °F (36 9 °C)  91  19  155/92  117  90 %  Nasal cannula  Lying   03/09/20 2014  99 6 °F (37 6 °C)                 03/09/20 1948            93 %       03/09/20 1715  99 8 °F (37 7 °C)                 03/09/20 1603  102 9 °F (39 4 °C)Abnormal                  03/09/20 1514  101 °F (38 3 °C)Abnormal   90  20  130/69  97  92 %  None (Room air)  Lying   03/09/20 1505            91 %  None (Room air)     03/09/20 0834            93 %  None (Room air)     03/09/20 0717  98 °F (36 7 °C)  73  18  109/71  82  96 %  Nasal cannula  Lying   03/09/20 0500              Nasal cannula     03/09/20 0403  99 °F (37 2 °C)  82  20  116/70    93 %  Nasal cannula  Sitting   03/09/20 0250    87  20  126/77    94 %  Nasal cannula  Sitting   03/09/20 0213              Nasal cannula     03/09/20 0134  102 7 °F (39 3 °C)Abnormal                  03/09/20 0133    103  20  127/73    93 %  Nasal cannula  Sitting      Weights (last 14 days)     Date/Time  Weight  Weight Method   03/10/20 0533  101 kg (223 lb 5 2 oz)   Bed scale   Weight: pt refused to stand at 03/10/20 0533   03/09/20 0813  92 kg (202 lb 13 2 oz)  Standing scale       Pertinent Labs/Diagnostic Test Results:   Results from last 7 days   Lab Units 03/10/20  0519 03/09/20  0146 03/08/20  0454 03/07/20  0658 03/07/20  0228   WBC Thousand/uL 3 61* 6 15 6 15 5 66 6 22   HEMOGLOBIN g/dL 12 6 14 8 14 7 16 0 15 4   HEMATOCRIT % 38 1 43 6 43 6 47 9 45 8   PLATELETS Thousands/uL 129* 140* 137* 174 151   NEUTROS ABS Thousands/µL 3 03 5 53  --  3 90 4 69   BANDS PCT %  --   --  1  --   --          Results from last 7 days   Lab Units 03/10/20  0519 03/09/20  0605 03/09/20  0146 03/08/20  0454 03/07/20  0658   SODIUM mmol/L 137 137 137 139 136   POTASSIUM mmol/L 3 8 3 9 4 1 4 0 4 2   CHLORIDE mmol/L 104 104 101 103 101   CO2 mmol/L 26 27 29 27 27   ANION GAP mmol/L 7 6 7 9 8   BUN mg/dL 18 23 24 18 20   CREATININE mg/dL 1 16 1 35* 1 45* 1 26 1 14   EGFR ml/min/1 73sq m 60 50 46 54 61   CALCIUM mg/dL 8 3 8 3 8 5 8 5 8 7     Results from last 7 days   Lab Units 03/10/20  0519 03/09/20  0146 03/07/20  0658 03/07/20  0228   AST U/L 54* 21 20 22   ALT U/L 22 16 15 14   ALK PHOS U/L 43* 61 76 72   TOTAL PROTEIN g/dL 5 6* 6 4 6 9 6 8   ALBUMIN g/dL 2 4* 2 9* 3 4* 3 3*   TOTAL BILIRUBIN mg/dL 0 63 0 64 1 11* 1 03*         Results from last 7 days   Lab Units 03/10/20  0519 03/09/20  0605 03/09/20  0146 03/08/20  0454 03/07/20  0658 03/07/20  0228   GLUCOSE RANDOM mg/dL 113 119 116 100 96 111             No results found for: BETA-HYDROXYBUTYRATE               Results from last 7 days   Lab Units 03/07/20  0228   CK TOTAL U/L 106     Results from last 7 days   Lab Units 03/07/20  0228   TROPONIN I ng/mL <0 02     Results from last 7 days   Lab Units 03/07/20  0228   D-DIMER QUANTITATIVE ug/ml FEU 0 63*     Results from last 7 days   Lab Units 03/09/20  0146 03/07/20  0228   PROTIME seconds 14 5 14 1   INR  1 19 1 15   PTT seconds 33 28     Results from last 7 days   Lab Units 03/10/20  0519   TSH 3RD GENERATON uIU/mL 1 345     Results from last 7 days   Lab Units 03/09/20  0605 03/08/20  0454 03/07/20  0658   PROCALCITONIN ng/ml 1 03* 0 05 <0 05     Results from last 7 days   Lab Units 03/09/20  0146 03/07/20  0228   LACTIC ACID mmol/L 1 1 1 0             Results from last 7 days   Lab Units 03/09/20  0605 03/07/20  0228   NT-PRO BNP pg/mL 610* 161             Results from last 7 days   Lab Units 03/07/20  0228   LIPASE u/L 87     Results from last 7 days   Lab Units 03/07/20  0658   CRP mg/L >90 0*   SED RATE mm/hour 8         Results from last 7 days   Lab Units 03/09/20  1108   CLARITY UA  Clear COLOR UA  Yellow   SPEC GRAV UA  1 015   PH UA  5 5   GLUCOSE UA mg/dl Negative   KETONES UA mg/dl Negative   BLOOD UA  Trace-Intact*   PROTEIN UA mg/dl Negative   NITRITE UA  Negative   BILIRUBIN UA  Negative   UROBILINOGEN UA E U /dl 0 2   LEUKOCYTES UA  Negative   WBC UA /hpf None Seen   RBC UA /hpf None Seen   BACTERIA UA /hpf None Seen   EPITHELIAL CELLS WET PREP /hpf Occasional     Results from last 7 days   Lab Units 03/07/20  1028 03/07/20  0825   STREP PNEUMONIAE ANTIGEN, URINE  Negative  --    LEGIONELLA URINARY ANTIGEN  Negative  --    INFLUENZA A PCR   --  None Detected   INFLUENZA B PCR   --  None Detected   RSV PCR   --  None Detected       Results from last 7 days   Lab Units 03/09/20  0158 03/09/20  0146 03/07/20  0659   BLOOD CULTURE  No Growth at 24 hrs  No Growth at 24 hrs  No Growth at 48 hrs  No Growth at 48 hrs  Results from last 7 days   Lab Units 03/08/20  0454   TOTAL COUNTED  100     3/9  · EKG: NSR   3/9 XR chest:  Faint right lung opacities may represent multifocal pneumonia  3/9  MRI w wo  No definite MR evidence for discitis osteomyelitis as clinically questioned  Multilevel degenerative changes of the lumbar spine, as described above  Severe left foraminal narrowing at L4-L5 and L5-S1 results in impingement of the exiting nerve roots   Left lateral recess stenosis with contact of the respective descending of L5 and S1 nerve roots is also present at these levels      ED Treatment:   Medication Administration from 03/09/2020 0127 to 03/09/2020 0340       Date/Time Order Dose Route Action     03/09/2020 0242 acetaminophen (TYLENOL) tablet 650 mg 650 mg Oral Given     03/09/2020 0250 cefepime (MAXIPIME) 2 g/50 mL dextrose IVPB 2,000 mg Intravenous New Bag     03/09/2020 0314 azithromycin (ZITHROMAX) 500 mg in sodium chloride 0 9% 250mL IVPB 500 mg 500 mg Intravenous New Bag     03/09/2020 0245 sodium chloride 0 9 % bolus 1,000 mL 1,000 mL Intravenous New Bag        Past Medical History:   Diagnosis Date    Abnormal electrocardiogram     Last assessed: Oct 11, 2013    Allergic rhinitis     last assessed: Oct 11, 2013    Allergic rhinitis due to pollen     last assessed: Oct 10, 2013    Allergic sinusitis     Last assessed: May 11, 2015    Allergy     resolved: July 22, 2015    Benign essential hypertension     Last assessed/resolved: May 31, 2017    BPH (benign prostatic hyperplasia)     Colitis     Last assessed: May 24, 2016    Generalized osteoarthritis     Last assessed: Oct 11, 2013    GERD without esophagitis     Last assessed/resolved: May 31, 2017    Hearing loss     Hiatal hernia     resolved: July 22, 2015    History of gastroesophageal reflux (GERD)     History of stomach ulcers     last assessed: May 11, 2015    Hypertension     Incomplete bladder emptying     Kidney stone     Nodular prostate with lower urinary tract symptoms     Nontoxic single thyroid nodule     last assessed: April 16, 2014    Orchalgia     Overweight     last assessed: Oct 31, 2013    Poor urinary stream     Pulmonary [facility]ism [facility])     Salivary gland disorder     last assessed: April 16, 2014    Seasonal allergies     last assessed: May 15, 2015    Spermatocele     Straining to void     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:  May 7, 2014     Present on Admission:   Bilateral pneumonia   Benign essential hypertension   Benign prostatic hyperplasia (BPH) with straining on urination   Back pain      Admitting Diagnosis: Pneumonia [J18 9]  Respiratory distress [R06 03]  Hypoxia [R09 02]  Failure of outpatient treatment [Z78 9]  Age/Sex: 67 y o  male  Admission Orders:  Scheduled Medications:  Medications:  acetaminophen 975 mg Oral Q8H [address]   aspirin 81 mg Oral Daily   cefepime 2,000 mg Intravenous Q12H   diltiazem 180 mg Oral Daily   finasteride 5 mg Oral Daily   gabapentin 200 mg Oral BID   guaiFENesin 1,200 mg Oral BID   heparin (porcine) 5,000 Units Subcutaneous [facility] [address] ipratropium 0 5 mg Nebulization TID   levalbuterol 1 25 mg Nebulization TID   lidocaine 2 patch Topical Daily   montelukast 10 mg Oral HS   pantoprazole 40 mg Oral Early Morning   tamsulosin 0 4 mg Oral Daily With Dinner   triamcinolone  Topical BID   vancomycin 12 5 mg/kg (Adjusted) Intravenous Q12H     Continuous IV Infusions:     PRN Meds:    aluminum-magnesium hydroxide-simethicone 30 mL Oral Q6H PRN   benzonatate 100 mg Oral TID PRN   docusate sodium 100 mg Oral BID PRN   fluticasone 1 spray Nasal Daily PRN   ondansetron 4 mg Intravenous Q6H PRN     IP CONSULT TO PHARMACY  IP CONSULT TO INFECTIOUS DISEASES  IP CONSULT TO PULMONOLOGY  IP CONSULT TO ENDOCRINOLOGY  IP CONSULT TO ENT  Daily wt  I&O  Up w assist  scd    Network Utilization Review Department  [email or web address]  ATTENTION: Please call with any questions or concerns to [phone] and carefully listen to the prompts so that you are directed to the right person  All voicemails are confidential   Lencho Aden all requests for admission clinical reviews, approved or denied determinations and any other requests to dedicated fax number below belonging to the campus where the patient is receiving treatment   List of dedicated fax numbers for the Facilities:  [address] [place] [address] DENIALS (Administrative/Medical Necessity) [phone]   [address] (Maternity/NICU/Pediatrics) [phone]   Jose Manuel Joshi [phone]   [facility] [phone]   Hernando Banda [phone]   Tiarra Augustine [phone]   [address] [facility] [address] [place] [address] [phone]   [facility]  [phone]   [address] [facility]  [address] [facility] [address] [facility] [phone]

## 2020-03-10 NOTE — CONSULTS
Consultation - ENT   Brandi Robert  67 y o  male MRN: [id number]  Unit/Bed#: S -01 Encounter: [id number]      Assessment/Plan     Assessment:  Unremarkable CT scan of sinuses  No apparent nasal bleeding  However, the patient has evidence of LPR on flexible fiberoptic exam, as well as trace amount of blood in larynx in conjunction with cough  Wears hearing aids  ENT exam otherwise grossly normal       Plan:  Continue to humidify supplemental oxygen  Already on Protonix 40mg q  AM   As per other services  Will see PRN  History of Present Illness   Physician Requesting Consult: Pearl Gutierrez MD  Reason for Consult / Principal Problem: Request for ENT evaluation  HPI: Brandi Robert  is a 67y o  year old male admitted yesterday for SOB, hypoxia, pneumonia, sepsis  He admits to an intermittent productive cough, occasionally with streaks of blood  He also has a significant history of HTN and GERD, with remote history of DVT  Has bilateral SNHL, wears hearing aids  Additionally, the patient had recent epistaxis, initially sought care at [place] [address] on 3/02, with follow-up at [facility] on 3/04  He denies nasal bleeding since that time  ID recommended CT scan of sinuses, with consideration for ENT evaluation  CT scan of sinuses was performed this morning, and is unremarkable  Consults    Review of Systems    Historical Information   Past Medical History:   Diagnosis Date    Abnormal electrocardiogram     Last assessed: Oct 11, 2013    Allergic rhinitis     last assessed: Oct 11, 2013    Allergic rhinitis due to pollen     last assessed: Oct 10, 2013    Allergic sinusitis     Last assessed: May 11, 2015    Allergy     resolved: July 22, 2015    Benign essential hypertension     Last assessed/resolved: May 31, 2017    BPH (benign prostatic hyperplasia)     Colitis     Last assessed: May 24, 2016    Generalized osteoarthritis     Last assessed:  Oct 11, 2013    GERD without esophagitis     Last assessed/resolved: May 31, 2017    Hearing loss     Hiatal hernia     resolved: 2015    History of gastroesophageal reflux (GERD)     History of stomach ulcers     last assessed: May 11, 2015    Hypertension     Incomplete bladder emptying     Kidney stone     Nodular prostate with lower urinary tract symptoms     Nontoxic single thyroid nodule     last assessed: 2014    Orchalgia     Overweight     last assessed: Oct 31, 2013    Poor urinary stream     Pulmonary embolism [facility])     Salivary gland disorder     last assessed: 2014    Seasonal allergies     last assessed: May 15, 2015    Spermatocele     Straining to void     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:  May 7, 2014     Past Surgical History:   Procedure Laterality Date    BLADDER SURGERY      CHOLECYSTECTOMY      laparoscopic     HEMORRHOID SURGERY      pilionidal cyst removal     HERNIA REPAIR Left 2008    inguinal     KNEE ARTHROSCOPY Left     KNEE CARTILAGE SURGERY      NASAL SEPTUM SURGERY      Deviation repair     PROSTATE BIOPSY  2017    STOMACH SURGERY      TONSILLECTOMY AND ADENOIDECTOMY      at age 37   [address] [address] [address] - right  0 left     VASECTOMY       Social History   Social History     Substance and Sexual Activity   Alcohol Use Yes    Comment: rare     Social History     Substance and Sexual Activity   Drug Use No     Social History     Tobacco Use   Smoking Status Former Smoker    Packs/day:     Years: 25 00    Pack years: 25 00    Types: Cigarettes    Last attempt to quit:     Years since quittin 2   Smokeless Tobacco Never Used     Family History: non-contributory    Meds/Allergies   all current active meds have been reviewed    Allergies   Allergen Reactions    Ace Inhibitors     Molds & Smuts     Other     Pollen Extract     Short Ragweed Pollen Ext     Tree Extract Objective       Intake/Output Summary (Last 24 hours) at 3/10/2020 1228  Last data filed at 3/10/2020 0530  Gross per 24 hour   Intake    Output 350 ml   Net -350 ml       Invasive Devices     Peripheral Intravenous Line            Peripheral IV 03/09/20 Left Wrist 1 day    Peripheral IV 03/09/20 Right Forearm 1 day                Physical Exam     OOB in chair, in NAD  Appears tired  No stridor, but has occasional cough  Humidified supplemental oxygen via nasal cannula  The patient wears hearing aids  EAC's patent, TM's intact without apparent middle ear effusions  No mastoid edema, erythema, or tenderness to palpation  Nares patent bilaterally  No apparent nasal bleeding  Oropharynx clear, tonsils absent  Neck supple  Trachea grossly midline  Flexible Fiberoptic Nasolaryngoscopy:  Verbal consent was obtained from the patient  The patient was placed in the upright seated position, a few drops of 50/50 blend of oxymetazoline and lidocaine 4% plain were placed into each nasal cavity  A flexible 0-degree endoscope was then passed through the nasal cavity, into the larynx  Findings: No active nasal bleeding, or dried blood within nasal cavity  No purulence, polyps, or intranasal lesions or masses  Nasopharynx unremarkable  [place] tonsils absent  Normal lingual tonsils  Epiglottis normal   TVC's mobile and symmetric  A trace amount of blood is noted posterior to the arytenoids, following cough  No apparent laryngeal lesions or masses  Mild post-cricoid edema  The patient tolerated this well  No complications  Lab Results: I have personally reviewed pertinent lab results      Imaging Studies: I have personally reviewed pertinent films in PACS  EKG, Pathology, and Other Studies:

## 2020-03-10 NOTE — PHYSICAL THERAPY NOTE
Physical Therapy Evaluation and Treatment    Patient's Name: Star Gu  Admitting Diagnosis  Pneumonia [J18 9]  Respiratory distress [R06 03]  Hypoxia [R09 02]  Failure of outpatient treatment [Z78 9]    Problem List  Patient Active Problem List   Diagnosis    Benign essential hypertension    Benign prostatic hyperplasia (BPH) with straining on urination    Gastroesophageal reflux disease without esophagitis    Seasonal allergic rhinitis due to pollen    Truncal obesity    [facility]) [facility] [address] [facility]    Luetscher's syndrome    Benign 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    Class 1 obesity in adult    Need for vaccination against Streptococcus pneumoniae using pneumococcal conjugate vaccine 13    Need for shingles vaccine    Glaucoma screening    Screening for osteoporosis    Screening for AAA (abdominal aortic aneurysm)    Interstitial lung disease ([facility] [address] [facility]    Localized primary osteoarthritis of wrist    Current tear of medial cartilage or meniscus of knee    Idiopathic peripheral neuropathy    Productive cough    Swelling of right thumb    Right elbow pain    Common cold    Bilateral pneumonia    Pleuritic pain    Back pain    Pain in both lower extremities    Sepsis ([facility] [address] )    Radiculopathy of leg       Past Medical History  Past Medical History:   Diagnosis Date    Abnormal electrocardiogram     Last assessed: Oct 11, 2013    Allergic rhinitis     last assessed: Oct 11, 2013    Allergic rhinitis due to pollen     last assessed: Oct 10, 2013    Allergic sinusitis     Last assessed: May 11, 2015    Allergy     resolved: July 22, 2015    Benign essential hypertension     Last assessed/resolved: May 31, 2017    BPH (benign prostatic hyperplasia)     Colitis     Last assessed: May 24, 2016    Generalized osteoarthritis     Last assessed: Oct 11, 2013    GERD without esophagitis     Last assessed/resolved:  May 31, 2017    Hearing loss    [employer] Hiatal hernia     resolved: July 22, 2015    History of gastroesophageal reflux (GERD)     History of stomach ulcers     last assessed: May 11, 2015    Hypertension     Incomplete bladder emptying     Kidney stone     Nodular prostate with lower urinary tract symptoms     Nontoxic single thyroid nodule     last assessed: April 16, 2014    Orchalgia     Overweight     last assessed: Oct 31, 2013    Poor urinary stream     Pulmonary embolism [facility])     Salivary gland disorder     last assessed: April 16, 2014    Seasonal allergies     last assessed: May 15, 2015    Spermatocele     Straining to void     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, 2014       Past Surgical History  Past Surgical History:   Procedure Laterality Date    BLADDER SURGERY      CHOLECYSTECTOMY  2000    laparoscopic     HEMORRHOID SURGERY      pilionidal cyst removal     HERNIA REPAIR Left 01/17/2008    inguinal     KNEE ARTHROSCOPY Left 1974    KNEE CARTILAGE SURGERY      NASAL SEPTUM SURGERY      Deviation repair     PROSTATE BIOPSY  2017    STOMACH SURGERY      TONSILLECTOMY AND ADENOIDECTOMY      at age 37   [address] [address] [address] - right 01/04 0 left 01/95    VASECTOMY          03/10/20 1356   Note Type   Note type Eval/Treat   Pain Assessment   Pain Assessment 0-10   Pain Score 8   Pain Type Acute pain   Pain Location Back;Leg   Pain Orientation Left  (LEG)   Effect of Pain on Daily Activities REDUCED PARTICIPATION IN MOBILITY    Hospital Pain Intervention(s) Ambulation/increased activity;Repositioned;Medication (See MAR)  (COORDINATED MOBILITY WITH ADMINSTRATION OF PAIN MEDS BY RN )   Response to Interventions IMPROVED WITH FLEXION; WORSE WITH STANDING/EXTENSION    Home Living   Type of [address] [place] [address] One level; Work area in basement;Stairs to enter with rails  (2 VICENTE)   Home Equipment Cane; Other (Comment)  (ROLLATOR)   Additional Comments  PER PATIENT, PRIOR TO THIS ADMISSION HE RESIDED WITH HIS SPOUSE (WORKS FULL TIME) IN A RANCH HOME (2 VICENTE, 15 STEPS DOWN TO BASEMENT WHICH HE ACCESSES FOR 2ND FRIDGE)  AT BASELINE HE IS VERY INDEPENDENT (NO AD FOR MOBILITY) AND CONTINUES TO DRIVE AND WORK  Prior Function   Level of [place] Independent with ADLs and functional mobility   Lives With Spouse   Receives Help From Family   ADL Assistance Independent   IADLs Independent   Falls in the last 6 months 0   Vocational Part time employment   Restrictions/Precautions   Weight Bearing Precautions Per Order No   Braces or Orthoses   (NONE)   Other Precautions Pain; Fall Risk;O2;Telemetry;Multiple lines; Chair Alarm; Bed Alarm  (CHAIR ALARM ACTIVE POST PT EVAL/TREAT)   General   Family/Caregiver Present No   Cognition   Overall Cognitive Status WFL   Orientation Level Oriented X4   RUE Assessment   RUE Assessment WFL  (R FULL TSA)   LUE Assessment   LUE Assessment WFL  (L PARTIAL TSA)   RLE Assessment   RLE Assessment X  (KNEE EXTENSION/HIP FLEXION LIMITED BY PAIN)   LLE Assessment   LLE Assessment X  (KNEE EXTENSION/HIP FLEXION LIMITED BY PAIN)   Bed Mobility   Supine to Sit Unable to assess   Sit to Supine Unable to assess   Transfers   Sit to Stand 4  Minimal assistance   Additional items Assist x 1; Increased time required;Verbal cues   Stand to Sit 4  Minimal assistance   Additional items Assist x 1; Increased time required;Verbal cues   Ambulation/Elevation   Gait pattern Excessively slow; Step to;Short stride; Foward flexed;Decreased foot clearance   Gait Assistance 4  Minimal assist   Additional items Assist x 1   Assistive Device Rolling walker   Distance 5 FEET   Stair Management Assistance Not tested   Balance   Static Sitting Good   Static Standing Fair   Ambulatory Fair -   Endurance Deficit   Endurance Deficit Yes   Endurance Deficit Description ACUTE BACK PAIN; USE OF SUPPLEMENTAL O2 (2 L) --> O2 SATS 93% PRE MOBILITY    Activity Tolerance   Activity Tolerance Patient limited by pain   Nurse Made Aware CORRIE TO SEE PER RN RADHA    Assessment   Prognosis Good   Problem List Decreased strength; Impaired balance;Decreased mobility;Pain   Assessment PT COMPLETED EVALUATION OF 67YEAR OLD MALE (SELF REPORTED PREFERRED NAME "JULIO CÉSAR") ADMITTED TO [place] [address] ON 3/9/2020 WITH SOB, FEVER, AND HYPOXIA  OF NOTE, PATIENT WAS D/C FROM HOSPITAL ON 3/8/2020  CURRENT DIAGNOSES INCLUDE SEPSIS, B/L PNA, RADICULOPATHY OF LEFT LE (MRI IDENTIFIED "SEVERE LEFT FORAMINAL NARROWING AT L4-L5 AND L5-S1 RESULTS IN IMPINGEMENT OF THE EXITING NERVE ROOTS"; PER PHYSICIAN "RECOMMEND CONSULTATION TO SPINE SURGERY IF NO IMPROVEMENT WITH CONSERVATIVE MEASURES")  CURRENT STATUS INSTABILITIES INCLUDE PAIN, USE OF SUPPLEMENTAL O2, FALLS RISK, BED/CHAIR ALARMS, AND A REGRESSION IN FUNCTIONAL STATUS FROM BASELINE  PMH IS SIGNIFICANT FOR HTN, VERTIGO, HEARING LOSS, PE, B/L SHOULDER SURGERY, AND H/O DVT  PER PATIENT, PRIOR TO THIS ADMISSION HE RESIDED WITH HIS SPOUSE (WORKS FULL TIME) IN A RANCH HOME (2 VICENTE, 15 STEPS DOWN TO BASEMENT WHICH HE ACCESSES FOR 2ND FRIDGE)  AT BASELINE HE IS VERY INDEPENDENT (NO AD FOR MOBILITY) AND CONTINUES TO DRIVE AND WORK  CURRENT IMPAIRMENTS INCLUDE PAIN, DECREASED LE STRENGTH (LIMITED BY PAIN), DECREASED ACTIVITY TOLERANCE, AND GAIT DEVIATIONS  DURING PT EVALUATION, PATIENT REPORTING THAT THIS BACK PAIN BEGAN YESTERDAY FOR HIM WHEN HE COUGHED AND FELT LIKE HE "TWEAKED" HIS BACK  HE REPORTS THE PAIN TO BE 0 WHEN HE IS SITTING AT REST  REPORTS PAIN IS WORSE WHEN STANDING/AMBULATING  DENIES NUMBNESS IN B/L LE  B/L DF/PF NORMAL  KNEE EXTENSION AND HIP FLEXION LIMITED SECONDARY TO PAIN IN B/L ANTERIOR THIGHS  PATIENT REQUIRED MIN-AX1 FOR SIT-->STAND TRANSFER AND FOR AMBULATION  HE AMBULATED 5 FEET WITH USE OF RW PRESENTING WITH SIGNIFICANTLY REDUCED LE CLEARANCE (L>R) AND GAIT SPEED      BEING THAT THIS PATIENT IS A RECENT RE-ADMISSION AND NOW PRESENTING WITH ACUTE BACK PAIN THAT LIMITS BOTH HIS ABILITY TO PERFORM SELF CARE AND TO MOBILIZE, PT D/C RECOMMENDATION AT THIS POINT IN TIME IS FOR SHORT TERM REHAB  PATIENT REPORTS THE INABILITY TO GET OUT OF BED OR BATH/DRESS B/L LE WITHOUT ASSISTANCE  HIS WIFE IS IN GOOD HEALTH HOWEVER WORKS FULL TIME  HE IS NORMALLY VERY INDEPENDENT  HE DOES NOT USE DME AND CONTINUES TO BE EMPLOYED ( AT [employer] WHERE HE IS ON HIS FEET OFTEN)  HE IS VERY DISCOURAGED/EMOTIONAL ABOUT HIS CURRENT STATE EXPRESSES DESIRE TO HAVE HELP AND GET BETTER  PT WILL CONTINUE TO PROVIDE SERVICES AND MONITOR FOR PROGRESS TO D/C HOME WITH EITHER HHPT OR OUTPATIENT PT PENDING PATIENT'S PROGRESS  PATIENT WILL BENEFIT FROM CONTINUED SKILLED PT THIS ADMISSION TO ACHIEVE MAXIMAL FUNCTION AND SAFETY  Goals   Patient Goals "TO DO THINGS WITHOUT MY WIFE  HELPING ME"   LTG Expiration Date 03/24/20   Long Term Goal #1 1) PERFORM BED MOBILITY MOD-I TO PARTICIPATE IN FREQUENT REPOSITIONING AND IMPROVE SKIN INTEGRITY; 2) PERFORM SIT<-->STAND TRANSFER MOD-I TO PROMOTE I WITH TOILETING AND OOB MOBILITY; 3) AMBULATE 200 FEET MOD-I W/ LEAST RESTRICTIVE DEVICE TO PARTICIPATE IN HOUSEHOLD AND COMMUNITY LEVEL AMBULATION; 4) IMPROVE B/L LE STRENGTH BY 1/2 GRADE TO IMPROVE EFFICIENCY OF TRANSFERS; 5) IMPROVE BALANCE BY 1 GRADE TO REDUCE RISK FOR FALLS; 6) NAVIGATE 15 STEPS MOD-I IN ORDER TO SAFELY NAVIGATE MULTIPLE FLOORS AT HOME   PT Treatment Day 0   Plan   Treatment/Interventions Functional transfer training;LE strengthening/ROM; Therapeutic exercise; Endurance training;Cognitive reorientation;Equipment eval/education; Bed mobility;Gait training;OT;Spoke to nursing; Patient/family training;Elevations   PT Frequency 5x/wk   Recommendation   Recommendation Short-term skilled PT  (AT THIS TIME; WILL CONT   TO MONITOR FOR PROGRESS)   Equipment Recommended Walker  (RW)   PT - OK to Discharge No  (YES TO STR; NO TO HOME (IMPROVE MOBILITY) )   Barthel Index   Feeding 10   Bathing 0   Grooming Score 5 Dressing Score 5   Bladder Score 10   Bowels Score 10   Toilet Use Score 5   Transfers (Bed/Chair) Score 10   Mobility (Level Surface) Score 0   Stairs Score 0   Barthel Index Score 55     ADDITIONAL TREATMENT SESSION PERFORMED TODAY BELOW:    SUBJECTIVE: "JUST THAT ONE LITTLE TIP HELPS A LOT"    OBJECTIVE: DURING GAIT TRAINING, PT RE-SIZED RW TO BE SLIGHTLY SMALLER THAN PATIENT'S HEIGHT IN ORDER TO FOR  HIM TO MAINTAIN SLIGHTLY FLEXED POSTURE TO IMPROVE PAIN  HE UTILIZED RW TO AMBULATE 5 FEET X 2 WITH MIN-AX1  GAIT SPEED CONTINUES TO BE REDUCED WITH DECREASED LE CLEARANCE  UNABLE TO TOLERATE FURTHER MOBILITY SECONDARY TO PAIN  PATIENT WAS EDUCATED TO PERFORM LOG ROLL TECHNIQUE SUPINE<-->SIT TO ASSIST WITH REDUCING PAIN DURING BED MOBILITY (AS HE WAS EXPRESSING CONCERNS ABOUT GETTING BACK INTO BED AND LAYING FLAT)  ALSO EDUCATED ON SLIGHTLY FLEXED KNEES WILL PILLOW WHEN SUPINE IN BED  ASSESSMENT: THIS PATIENT'S FUNCTIONAL MOBILITY CONTINUES TO BE LIMITED BY DECREASED ACTIVITY TOLERANCE (USE OF SUPPLEMENTAL O2) AND ONGOING ACUTE BACK PAIN  HE CONTINUES TO REQUIRE ASSISTANCE TO PARTICIPATE IN STANDING AND SHORT DISTANCE AMBULATION (10 FEET)  THIS PATIENT CONTINUES TO FUNCTION BELOW THEIR BASELINE AND REMAINS APPROPRIATE FOR PT D/C RECOMMENDATION FOR SHORT TERM REHAB  HE WILL BENEFIT FROM CONTINUED SKILLED PT THIS ADMISSION TO ACHIEVE MAXIMAL FUNCTION AND SAFETY  PLAN: NEXT SESSION PLAN TO PROGRESS AMBULATORY DISTANCE AND INDEPENDENCE OF TRANSFERS AND OVERALL FUNCTIONAL MOBILITY  AS TOLERATED AND APPROPRIATE               Shivani Jha, PT, DPT

## 2020-03-10 NOTE — ASSESSMENT & PLAN NOTE
· Patient reports severe left proximal lower extremity pain, associated with ambulatory dysfunction  · MRI of the lumbar sacral spine "No definite MR evidence for discitis osteomyelitis as clinically questioned  Multilevel degenerative changes of the lumbar spine, as described above  Severe left foraminal narrowing at L4-L5 and L5-S1 results in impingement of the exiting nerve roots  Left lateral recess stenosis with contact of the respective descending of L5 and S1 nerve roots is also present at these levels "  · Strongly suspect radiculopathy is the cause for patient's pain  Will treat with the following measures for now:  Physical therapy, Around the clock Tylenol, topical menthol, increased dose of gabapentin, p r n  Tramadol for moderate pain, p r n  IV Dilaudid for severe pain  Will also provide steroids, start with IV Solu-Medrol 40 mg daily for now and taper    Recommend consultation to spine surgery if no improvement with conservative measures

## 2020-03-10 NOTE — PROGRESS NOTES
Vancomycin IV Pharmacy-to-Dose Consultation    Ovidio Kramer  is a 67 y o  male who is currently receiving Vancomycin IV with management by the Pharmacy Consult service for the treatment of Pneumonia  Assessment/Plan:    The patient's chart was reviewed  Renal function is stable  There are no signs or symptoms of nephrotoxicity and/or infusion reactions documented  Based on today's assessment, since renal function has improved, will change vancomycin (Day # 2) dosing to 1000mg IV q12h, with a plan for trough to be drawn at 1430 on 3/12 (prior to the 5th dose of the new dosing regimen)  We will continue to follow the patient's culture results and clinical progress daily        Soni Crowell, PharmD   Pharmacist

## 2020-03-10 NOTE — INCIDENTAL FINDINGS
The following findings require follow up:  Radiographic finding   Finding:  Adrenal nodule   Follow up required:   Follow-up imaging   Follow up should be done within 6-12 months with your family doctor

## 2020-03-10 NOTE — PLAN OF CARE
Problem: PHYSICAL THERAPY ADULT  Goal: Performs mobility at highest level of function for planned discharge setting  See evaluation for individualized goals  Description  Treatment/Interventions: Functional transfer training, LE strengthening/ROM, Therapeutic exercise, Endurance training, Cognitive reorientation, Equipment eval/education, Bed mobility, Gait training, OT, Spoke to nursing, Patient/family training, Elevations  Equipment Recommended: Walker(RW)       See flowsheet documentation for full assessment, interventions and recommendations  Note:   Prognosis: Good  Problem List: Decreased strength, Impaired balance, Decreased mobility, Pain  Assessment: PT COMPLETED EVALUATION OF 67YEAR OLD MALE (SELF REPORTED PREFERRED NAME "JULIO CÉSAR") ADMITTED TO [facility] ON 3/9/2020 WITH SOB, FEVER, AND HYPOXIA  OF NOTE, PATIENT WAS D/C FROM HOSPITAL ON 3/8/2020  CURRENT DIAGNOSES INCLUDE SEPSIS, B/L PNA, RADICULOPATHY OF LEFT LE (MRI IDENTIFIED "SEVERE LEFT FORAMINAL NARROWING AT L4-L5 AND L5-S1 RESULTS IN IMPINGEMENT OF THE EXITING NERVE ROOTS"; PER PHYSICIAN "RECOMMEND CONSULTATION TO SPINE SURGERY IF NO IMPROVEMENT WITH CONSERVATIVE MEASURES")  CURRENT STATUS INSTABILITIES INCLUDE PAIN, USE OF SUPPLEMENTAL O2, FALLS RISK, BED/CHAIR ALARMS, AND A REGRESSION IN FUNCTIONAL STATUS FROM BASELINE  PMH IS SIGNIFICANT FOR HTN, VERTIGO, HEARING LOSS, PE, B/L SHOULDER SURGERY, AND H/O DVT  PER PATIENT, PRIOR TO THIS ADMISSION HE RESIDED WITH HIS SPOUSE (WORKS FULL TIME) IN A RANCH HOME (2 VICENTE, 15 STEPS DOWN TO BASEMENT WHICH HE ACCESSES FOR 2ND FRIDGE)  AT BASELINE HE IS VERY INDEPENDENT (NO AD FOR MOBILITY) AND CONTINUES TO DRIVE AND WORK  CURRENT IMPAIRMENTS INCLUDE PAIN, DECREASED LE STRENGTH (LIMITED BY PAIN), DECREASED ACTIVITY TOLERANCE, AND GAIT DEVIATIONS  DURING PT EVALUATION, PATIENT REPORTING THAT THIS BACK PAIN BEGAN YESTERDAY FOR HIM WHEN HE COUGHED AND FELT LIKE HE "TWEAKED" HIS BACK   HE REPORTS THE PAIN TO BE 0 WHEN HE IS SITTING AT REST  REPORTS PAIN IS WORSE WHEN STANDING/AMBULATING  DENIES NUMBNESS IN B/L LE  B/L DF/PF NORMAL  KNEE EXTENSION AND HIP FLEXION LIMITED SECONDARY TO PAIN IN B/L ANTERIOR THIGHS  PATIENT REQUIRED MIN-AX1 FOR SIT-->STAND TRANSFER AND FOR AMBULATION  HE AMBULATED 5 FEET WITH USE OF RW PRESENTING WITH SIGNIFICANTLY REDUCED LE CLEARANCE (L>R) AND GAIT SPEED  BEING THAT THIS PATIENT IS A RECENT RE-ADMISSION AND NOW PRESENTING WITH ACUTE BACK PAIN THAT LIMITS BOTH HIS ABILITY TO PERFORM SELF CARE AND TO MOBILIZE, PT D/C RECOMMENDATION AT THIS POINT IN TIME IS FOR SHORT TERM REHAB  PATIENT REPORTS THE INABILITY TO GET OUT OF BED OR BATH/DRESS B/L LE WITHOUT ASSISTANCE  HIS WIFE IS IN GOOD HEALTH HOWEVER WORKS FULL TIME  HE IS NORMALLY VERY INDEPENDENT  HE DOES NOT USE DME AND CONTINUES TO BE EMPLOYED ( AT [employer] WHERE HE IS ON HIS FEET OFTEN)  HE IS VERY DISCOURAGED/EMOTIONAL ABOUT HIS CURRENT STATE EXPRESSES DESIRE TO HAVE HELP AND GET BETTER  PT WILL CONTINUE TO PROVIDE SERVICES AND MONITOR FOR PROGRESS TO D/C HOME WITH EITHER HHPT OR OUTPATIENT PT PENDING PATIENT'S PROGRESS  PATIENT WILL BENEFIT FROM CONTINUED SKILLED PT THIS ADMISSION TO ACHIEVE M        Recommendation: Short-term skilled PT(AT THIS TIME; WILL CONT  TO MONITOR FOR PROGRESS)     PT - OK to Discharge: No(YES TO STR; NO TO HOME (Neisha Espinoza) )    See flowsheet documentation for full assessment

## 2020-03-10 NOTE — ASSESSMENT & PLAN NOTE
 Creatinine upon admission: 1 45  o Improved to 1 16   Secondary to decreased oral intake   Treatment: IV fluids    Monitor BMP

## 2020-03-10 NOTE — CONSULTS
Vancomycin IV Pharmacy-to-Dose Consultation    Sherie Davis  is a 67 y o  male who was receiving Vancomycin IV with management by the Pharmacy Consult service for treatment of Pneumonia  The patient's Vancomycin therapy has been completed / discontinued  Thank you for allowing us to take part in this patient's care  Pharmacy will sign-off now; please call or re-consult if there are any questions          Mitul Reynolds, PharmD  Pharmacist

## 2020-03-10 NOTE — PROGRESS NOTES
Progress Note - Infectious Disease   Jef Aggarwal  67 y o  male MRN: [id number]  Unit/Bed#: S -01 Encounter: [id number]      Impression/Plan:  1  Sepsis  POA  Fever and tachycardia  Unclear source  Patient presented with hypoxia with CT scan showing infiltrate in lower lobes and portable chest x-ray concerning for possible right multifocal pneumonia  Patient now status post 7 days+ of antibiotic including Augmentin ceftriaxone and azithromycin and the current regimen  Patient clinically more comfortable today without rigors but complaining of severe left hip to knee pain  Admission blood cultures are negative at 24 hours  Consider viral syndrome  White cell count down to 3 61, RP2 result negative  Rec:  · Will discontinue vancomycin and cefepime   · Observe off additional antibiotics hereafter  · Monitor temperature and hemodynamics  · Serial exam  · Repeat CBC in a m  · Follow-up ENT consult     2   Multifocal pneumonia  In patient with hypoxia, CT scan showing bilateral infiltrate and serial x-ray concerning for multifocal pneumonia  Patient status post 7 days of antibiotic including Augmentin ceftriaxone and azithromycin with continued rigors and fever  Procal 1 21 clinically improving, RP2 negative  Rec:  · Observe off additional antibiotics  · Pulmonary follow-up ongoing  · Serial exam  · Monitor respiratory symptoms     3  Recent epistaxis requiring packing  Patient with continual fever despite 1 week of antibiotics  CT sinuses negative  Rec:  · Discontinue antibiotics as above  · Follow-up ENT evaluation     4  Acute kidney injury  Admission Creatinine 1 45 up from 1 1  Possibly secondary to sepsis/pre renal  Rec:  · Renal dose adjust antibiotics as needed  · Monitor creatinine     5  Leukopenia  Consider viral syndrome despite RP2 negative  White cell count down to 3 61   No exposure to Level 3 COV19 locations/persons  Rec:  · Recheck CBC in a m      6  Chronic back pain with suspected sciatica flare of left upper leg  MRI of spine showing severe degenerative joint disease L4-S1 without osteomyelitis or diskitis evident      Supportive care per primary care team     Antibiotics:  Vancomycin/Cefepime D2  Total IV antibiotic D4     Above impression and plan discussed in detail with patient, RN, Jeremy De La Cruz NP, and LOU Oropeza  Subjective:  Patient sitting out of bed to chair and recliner  His left hip to left knee pain as a 20/10  He states the oxycodone is not working  He mentions that he has tramadol at home as needed for arthritic pain which she has not taken lightly  He denies any shortness of breath and minimal cough  He does report that he coughed last night and he has had left hip to knee pain constant since then  He had 1 small amount of bloody sputum that was sent for culture this morning  ROS: Patient had fever to 103 at 4:00 p m  Last night and nothing since then  He denies any rigors today  no nausea, vomiting, diarrhea  Objective:  Vitals:  Temp:  [98 °F (36 7 °C)-102 9 °F (39 4 °C)] 98 °F (36 7 °C)  HR:  [75-91] 75  Resp:  [18-20] 18  BP: (130-168)/(69-92) 168/83  SpO2:  [90 %-93 %] 92 %  Temp (24hrs), Av °F (37 8 °C), Min:98 °F (36 7 °C), Max:102 9 °F (39 4 °C)  Current: Temperature: 98 °F (36 7 °C)    General Appearance:  Awake, alert, cooperative, resting in in chair, no acute respiratory distress on 4 L nasal cannula O2   Throat: Oropharynx moist without lesions  No nasal drainage  Lungs:   Decreased breath sounds at bases right more than left; no cough on deep inspiration; respirations unlabored on nasal cannula O2 with conversation   Heart:  RRR; S1-S2 heard, no murmur   Abdomen:   Soft, non-tender, protuberant, positive bowel sounds  Extremities: No edema, lidoderm patches to left hip and thigh in place  Patient does not have palpable point tenderness of left upper leg  Supple calves  Bilateral lower extremity venous staining    arm IV site nontender   Skin: No rashes      Labs, Imaging, & Other studies:   All pertinent labs and imaging studies were personally reviewed  Results from last 7 days   Lab Units 03/10/20  0519 03/09/20  0146 03/08/20  0454   WBC Thousand/uL 3 61* 6 15 6 15   HEMOGLOBIN g/dL 12 6 14 8 14 7   PLATELETS Thousands/uL 129* 140* 137*     Results from last 7 days   Lab Units 03/10/20  0519 03/09/20  0146  03/07/20  0658   POTASSIUM mmol/L 3 8   < > 4 1   < > 4 2   CHLORIDE mmol/L 104   < > 101   < > 101   CO2 mmol/L 26   < > 29   < > 27   BUN mg/dL 18   < > 24   < > 20   CREATININE mg/dL 1 16   < > 1 45*   < > 1 14   EGFR ml/min/1 73sq m 60   < > 46   < > 61   CALCIUM mg/dL 8 3   < > 8 5   < > 8 7   AST U/L 54*  --  21  --  20   ALT U/L 22  --  16  --  15   ALK PHOS U/L 43*  --  61  --  76    < > = values in this interval not displayed  Results from last 7 days   Lab Units 03/10/20  0519 03/09/20  0605 03/08/20  0454 03/07/20  0658   PROCALCITONIN ng/ml 1 21* 1 03* 0 05 <0 05     Results from last 7 days   Lab Units 03/09/20  0433 03/09/20  0158 03/09/20  0146 03/07/20  1028 03/07/20  0659   BLOOD CULTURE   --  No Growth at 24 hrs  No Growth at 24 hrs   --  No Growth at 48 hrs  No Growth at 48 hrs     MRSA CULTURE ONLY  No Methicillin Resistant Staphlyococcus aureus (MRSA) isolated  --   --   --   --    LEGIONELLA URINARY ANTIGEN   --   --   --  Negative  --

## 2020-03-10 NOTE — ASSESSMENT & PLAN NOTE
· Present on admission as evidenced by tachycardia and fever, source is pneumonia, also with hypoxia--clinically improving since yesterday  · BCx in process, negative thus far   · Procalcitonin >1 and rising, continue to trend  Lactic acid negative  · CT chest with bilateral infiltrates  · CT sinus pending  · Patient was just on a course of antibiotics prior to arrival   Upon admission he was seen by infectious disease and is on broad-spectrum antibiotics with IV cefepime and vancomycin  · Respiratory 2 panel also pending, concern for possible viral process; patient reports he has not left the [facility] in the past 2 weeks and denies any ill contacts

## 2020-03-10 NOTE — PROGRESS NOTES
Progress Note - Pulmonary   Gato Collado  67 y o  male MRN: [id number]  Unit/Bed#: S -01 Encounter: [id number]    Assessment/Plan:    1  Acute hypoxic respiratory failure likely multifaceted as listed below        -  currently on 2 L-  92%-patient does not wear home O2        -  will continue to maintain saturations greater than 89%        -  continue pulmonary toileting:  Deep breathing cough, OOB as tolerated, IS Q 1hr    2  Abnormal chest x-ray       -  R > L lower lobe  Opacity,  Given recent hospitalization concerning for HCAP       -  Afebrile > 24 hrs, procalcitonin 1 03, repeat pending, RP2 panel pending, sputum pending       -  continue Xopenex/Atrovent t i d        -  Negative:  Flu/RSV/urine antigens       -  ID following Day #2 cefepime and vancomycin       -  if patient does not improve with antibiotics and microbiology is unremarkable would recommend bronchoscopy    3  ILD without flare        -  patient denies any improvement with home maintenance therapies        -  no indication for inhaled therapies at this time        -  will continue surveillance therapy outpatient    4  Suspected Acute volume over load       -  proBNP 610        will order echo       -  patient appears mildly volume overloaded       -  will order IV Lasix 20 mg x 1    5  KAYLI (resolved)       -  Baseline 1 14    current creatinine 1 16       -  will continue to trend          -  outpatient follow-up per discharge instructions    Chief Complaint:    "My hip hurts"    Subjective:    Elfego was comfortably sitting on the side of bed  He reports that he had a difficult time sleeping the previous evening as he began developing significant left hip pain  Patient reports this is a new onset  He reports his respiratory status is improving  He currently denies any fever, chills, hemoptysis, headaches, night sweats, pleuritic chest pain, or palpitations      Objective:    Vitals: Blood pressure 168/83, pulse 75, temperature 98 °F (36 7 °C), temperature source Oral, resp  rate 18, weight 101 kg (223 lb 5 2 oz), SpO2 92 %  2L,Body mass index is 35 51 kg/m²  Intake/Output Summary (Last 24 hours) at 3/10/2020 1125  Last data filed at 3/10/2020 0530  Gross per 24 hour   Intake    Output 350 ml   Net -350 ml       Invasive Devices     Peripheral Intravenous Line            Peripheral IV 03/09/20 Left Wrist 1 day    Peripheral IV 03/09/20 Right Forearm 1 day                Physical Exam:     Physical Exam   Constitutional: He is oriented to person, place, and time  He appears well-developed and well-nourished  Non-toxic appearance  He does not appear ill  HENT:   Head: Normocephalic and atraumatic  Neck: Normal range of motion  Neck supple  No tracheal deviation present  No thyromegaly present  Cardiovascular: Normal rate, regular rhythm and normal heart sounds  Exam reveals no friction rub  No murmur heard  Pulmonary/Chest: Effort normal  No accessory muscle usage or stridor  No tachypnea and no bradypnea  No respiratory distress  He has decreased breath sounds in the right middle field, the right lower field, the left middle field and the left lower field  He has no wheezes  He has no rhonchi  He has rales in the right middle field, the right lower field, the left middle field and the left lower field  He exhibits no mass and no tenderness  Abdominal: Soft  Bowel sounds are normal  He exhibits no distension  There is no tenderness  Musculoskeletal: Normal range of motion  Right lower leg: Normal  He exhibits no edema  Left lower leg: Normal  He exhibits tenderness  He exhibits no edema  Neurological: He is alert and oriented to person, place, and time  He is not disoriented  Skin: Skin is warm and dry  No rash noted  No erythema  Psychiatric: He has a normal mood and affect  His behavior is normal  His mood appears not anxious  He is not agitated  Labs:    I have personally reviewed pertinent lab results CBC:   Lab Results   Component Value Date    WBC 3 61 (L) 03/10/2020    HGB 12 6 03/10/2020    HCT 38 1 03/10/2020    MCV 92 03/10/2020     (L) 03/10/2020    MCH 30 3 03/10/2020    MCHC 33 1 03/10/2020    RDW 13 9 03/10/2020    MPV 10 7 03/10/2020    NRBC 0 03/10/2020   , CMP:   Lab Results   Component Value Date    SODIUM 137 03/10/2020    K 3 8 03/10/2020     03/10/2020    CO2 26 03/10/2020    BUN 18 03/10/2020    CREATININE 1 16 03/10/2020    CALCIUM 8 3 03/10/2020    AST 54 (H) 03/10/2020    ALT 22 03/10/2020    ALKPHOS 43 (L) 03/10/2020    EGFR 60 03/10/2020       Imaging and other studies: I have personally reviewed pertinent films in PACS     CTA chest 03/07/2020 patchy opacity R > L  lobes

## 2020-03-11 ENCOUNTER — TRANSITIONAL CARE MANAGEMENT (OUTPATIENT)
Dept: INTERNAL MEDICINE CLINIC | Facility: CLINIC | Age: 80
End: 2020-03-11

## 2020-03-11 ENCOUNTER — APPOINTMENT (INPATIENT)
Dept: NON INVASIVE DIAGNOSTICS | Facility: HOSPITAL | Age: 80
DRG: 871 | End: 2020-03-11
Payer: COMMERCIAL

## 2020-03-11 PROBLEM — J96.01 ACUTE RESPIRATORY FAILURE WITH HYPOXIA (HCC): Status: ACTIVE | Noted: 2020-03-11

## 2020-03-11 LAB
BASOPHILS # BLD MANUAL: 0 THOUSAND/UL (ref 0–0.1)
BASOPHILS NFR MAR MANUAL: 0 % (ref 0–1)
EOSINOPHIL # BLD MANUAL: 0 THOUSAND/UL (ref 0–0.4)
EOSINOPHIL NFR BLD MANUAL: 0 % (ref 0–6)
ERYTHROCYTE [DISTWIDTH] IN BLOOD BY AUTOMATED COUNT: 13.6 % (ref 11.6–15.1)
HCT VFR BLD AUTO: 40.6 % (ref 36.5–49.3)
HGB BLD-MCNC: 13.5 G/DL (ref 12–17)
LYMPHOCYTES # BLD AUTO: 0.2 THOUSAND/UL (ref 0.6–4.47)
LYMPHOCYTES # BLD AUTO: 7 % (ref 14–44)
MCH RBC QN AUTO: 30 PG (ref 26.8–34.3)
MCHC RBC AUTO-ENTMCNC: 33.3 G/DL (ref 31.4–37.4)
MCV RBC AUTO: 90 FL (ref 82–98)
MONOCYTES # BLD AUTO: 0.12 THOUSAND/UL (ref 0–1.22)
MONOCYTES NFR BLD: 4 % (ref 4–12)
NEUTROPHILS # BLD MANUAL: 2.5 THOUSAND/UL (ref 1.85–7.62)
NEUTS BAND NFR BLD MANUAL: 2 % (ref 0–8)
NEUTS SEG NFR BLD AUTO: 84 % (ref 43–75)
NRBC BLD AUTO-RTO: 0 /100 WBCS
PLATELET # BLD AUTO: 141 THOUSANDS/UL (ref 149–390)
PLATELET BLD QL SMEAR: ADEQUATE
PMV BLD AUTO: 10.9 FL (ref 8.9–12.7)
PROCALCITONIN SERPL-MCNC: 0.81 NG/ML
RBC # BLD AUTO: 4.5 MILLION/UL (ref 3.88–5.62)
RBC MORPH BLD: NORMAL
TOTAL CELLS COUNTED SPEC: 100
VARIANT LYMPHS # BLD AUTO: 3 %
WBC # BLD AUTO: 2.91 THOUSAND/UL (ref 4.31–10.16)

## 2020-03-11 PROCEDURE — 85027 COMPLETE CBC AUTOMATED: CPT | Performed by: PHYSICIAN ASSISTANT

## 2020-03-11 PROCEDURE — 93306 TTE W/DOPPLER COMPLETE: CPT

## 2020-03-11 PROCEDURE — 94640 AIRWAY INHALATION TREATMENT: CPT

## 2020-03-11 PROCEDURE — 99232 SBSQ HOSP IP/OBS MODERATE 35: CPT | Performed by: INTERNAL MEDICINE

## 2020-03-11 PROCEDURE — 85007 BL SMEAR W/DIFF WBC COUNT: CPT | Performed by: PHYSICIAN ASSISTANT

## 2020-03-11 PROCEDURE — 94760 N-INVAS EAR/PLS OXIMETRY 1: CPT

## 2020-03-11 PROCEDURE — 93306 TTE W/DOPPLER COMPLETE: CPT | Performed by: INTERNAL MEDICINE

## 2020-03-11 PROCEDURE — 99233 SBSQ HOSP IP/OBS HIGH 50: CPT | Performed by: INTERNAL MEDICINE

## 2020-03-11 PROCEDURE — 84145 PROCALCITONIN (PCT): CPT | Performed by: PHYSICIAN ASSISTANT

## 2020-03-11 RX ORDER — DOCUSATE SODIUM 100 MG/1
100 CAPSULE, LIQUID FILLED ORAL 2 TIMES DAILY
Status: DISCONTINUED | OUTPATIENT
Start: 2020-03-11 | End: 2020-03-14 | Stop reason: HOSPADM

## 2020-03-11 RX ORDER — POLYETHYLENE GLYCOL 3350 17 G/17G
17 POWDER, FOR SOLUTION ORAL DAILY
Status: DISCONTINUED | OUTPATIENT
Start: 2020-03-11 | End: 2020-03-14 | Stop reason: HOSPADM

## 2020-03-11 RX ADMIN — DOCUSATE SODIUM 100 MG: 100 CAPSULE, LIQUID FILLED ORAL at 17:22

## 2020-03-11 RX ADMIN — PANTOPRAZOLE SODIUM 40 MG: 40 TABLET, DELAYED RELEASE ORAL at 05:09

## 2020-03-11 RX ADMIN — FINASTERIDE 5 MG: 5 TABLET, FILM COATED ORAL at 08:54

## 2020-03-11 RX ADMIN — MENTHOL, METHYL SALICYLATE: 10; 15 CREAM TOPICAL at 08:55

## 2020-03-11 RX ADMIN — METHYLPREDNISOLONE SODIUM SUCCINATE 40 MG: 40 INJECTION, POWDER, FOR SOLUTION INTRAMUSCULAR; INTRAVENOUS at 09:01

## 2020-03-11 RX ADMIN — LEVALBUTEROL HYDROCHLORIDE 1.25 MG: 1.25 SOLUTION, CONCENTRATE RESPIRATORY (INHALATION) at 13:57

## 2020-03-11 RX ADMIN — DOCUSATE SODIUM 100 MG: 100 CAPSULE, LIQUID FILLED ORAL at 08:53

## 2020-03-11 RX ADMIN — LEVALBUTEROL HYDROCHLORIDE 1.25 MG: 1.25 SOLUTION, CONCENTRATE RESPIRATORY (INHALATION) at 07:59

## 2020-03-11 RX ADMIN — MENTHOL, METHYL SALICYLATE: 10; 15 CREAM TOPICAL at 17:22

## 2020-03-11 RX ADMIN — HEPARIN SODIUM 5000 UNITS: 5000 INJECTION INTRAVENOUS; SUBCUTANEOUS at 15:12

## 2020-03-11 RX ADMIN — HYDROMORPHONE HYDROCHLORIDE 0.5 MG: 1 INJECTION, SOLUTION INTRAMUSCULAR; INTRAVENOUS; SUBCUTANEOUS at 05:11

## 2020-03-11 RX ADMIN — TAMSULOSIN HYDROCHLORIDE 0.4 MG: 0.4 CAPSULE ORAL at 17:21

## 2020-03-11 RX ADMIN — ACETAMINOPHEN 975 MG: 325 TABLET, FILM COATED ORAL at 14:46

## 2020-03-11 RX ADMIN — MENTHOL, METHYL SALICYLATE 1 APPLICATION: 10; 15 CREAM TOPICAL at 21:33

## 2020-03-11 RX ADMIN — IPRATROPIUM BROMIDE 0.5 MG: 0.5 SOLUTION RESPIRATORY (INHALATION) at 07:59

## 2020-03-11 RX ADMIN — ASPIRIN 81 MG 81 MG: 81 TABLET ORAL at 08:54

## 2020-03-11 RX ADMIN — MONTELUKAST 10 MG: 10 TABLET, FILM COATED ORAL at 21:29

## 2020-03-11 RX ADMIN — IPRATROPIUM BROMIDE 0.5 MG: 0.5 SOLUTION RESPIRATORY (INHALATION) at 20:56

## 2020-03-11 RX ADMIN — POLYETHYLENE GLYCOL 3350 17 G: 17 POWDER, FOR SOLUTION ORAL at 08:55

## 2020-03-11 RX ADMIN — GABAPENTIN 300 MG: 300 CAPSULE ORAL at 17:21

## 2020-03-11 RX ADMIN — HEPARIN SODIUM 5000 UNITS: 5000 INJECTION INTRAVENOUS; SUBCUTANEOUS at 05:10

## 2020-03-11 RX ADMIN — TRAMADOL HYDROCHLORIDE 50 MG: 50 TABLET, FILM COATED ORAL at 08:59

## 2020-03-11 RX ADMIN — IPRATROPIUM BROMIDE 0.5 MG: 0.5 SOLUTION RESPIRATORY (INHALATION) at 13:57

## 2020-03-11 RX ADMIN — HEPARIN SODIUM 5000 UNITS: 5000 INJECTION INTRAVENOUS; SUBCUTANEOUS at 21:29

## 2020-03-11 RX ADMIN — LIDOCAINE 2 PATCH: 50 PATCH TOPICAL at 08:55

## 2020-03-11 RX ADMIN — GABAPENTIN 300 MG: 300 CAPSULE ORAL at 08:53

## 2020-03-11 RX ADMIN — GUAIFENESIN 1200 MG: 600 TABLET, EXTENDED RELEASE ORAL at 17:21

## 2020-03-11 RX ADMIN — ACETAMINOPHEN 975 MG: 325 TABLET, FILM COATED ORAL at 05:53

## 2020-03-11 RX ADMIN — GUAIFENESIN 1200 MG: 600 TABLET, EXTENDED RELEASE ORAL at 08:54

## 2020-03-11 RX ADMIN — ACETAMINOPHEN 975 MG: 325 TABLET, FILM COATED ORAL at 21:29

## 2020-03-11 RX ADMIN — MELATONIN 3 MG: at 01:00

## 2020-03-11 RX ADMIN — LEVALBUTEROL HYDROCHLORIDE 1.25 MG: 1.25 SOLUTION, CONCENTRATE RESPIRATORY (INHALATION) at 20:56

## 2020-03-11 NOTE — PROGRESS NOTES
Progress Note - Infectious Disease   [place] Hum  67 y o  male MRN: [id number]  Unit/Bed#: S -01 Encounter: [id number]      Impression/Plan:  1   Sepsis   POA   Fever and tachycardia  Sonia Amaral source   Patient presented with hypoxia with CT scan showing infiltrate in lower lobes and portable chest x-ray concerning for possible right multifocal pneumonia   Patient now status post 7 days+ of antibiotic including Augmentin ceftriaxone and azithromycin, cefepime and vancomycin  Patient clinically more comfortable today without rigors but complaining of severe left hip to knee pain  Admission blood cultures are negative at 48 hours  Consider viral syndrome  White cell count down to 2 91, with atypical lymphs on smear and mild increase in AST  RP2 result negative  Rec:  · Observing off additional antibiotics hereafter  · Monitor temperature and hemodynamics  · Serial exam     2   Multifocal pneumonia   In patient with hypoxia, CT scan showing bilateral infiltrate and serial x-ray concerning for multifocal pneumonia  Patient status post 7 days of antibiotic including Augmentin ceftriaxone and azithromycin with continued rigors and fever  Procal down to 0 81 and clinically improving, RP2 negative  Rec:  · Observe off additional antibiotics  · Serial exam  · Monitor respiratory symptoms     3   Recent epistaxis requiring packing   Patient with continual fever despite 1 week of antibiotics  CT sinuses negative  Rec:  · Observing off antibiotics     4  Acute kidney injury    Admission Creatinine 1 45 up from 1 1   Possibly secondary to sepsis/pre renal  Rec:  · Renal dose adjust antibiotics as needed  · Monitor creatinine     5  Bicytopenia    mild leukopenia and thrombocytopenia  Consider viral syndrome despite RP2 negative  White cell count down to 2 91, with atypical lymphs on smear and mild increase in AST  No exposure to Level 3 COV19 locations/persons  Rec:  · Monitoring CBC in a m    · Supportive measures     6  Chronic back pain with suspected sciatica flare of left upper leg   MRI of spine showing severe degenerative joint disease L4-S1 without osteomyelitis or diskitis evident  Patient's back and left leg pain well controlled today      Supportive care per primary care team     Antibiotics:  None     Above impression and plan discussed in detail with patient, wife at bedside, and RN      Subjective:  Patient sitting out of bed to chair in recliner  He had IV pain medication this morning and his left leg pain is much more tolerable, and he is able to move more in chair  He denies any shortness of breath and minimal cough with some bloody sputum yet  ROS: Patient has no fever, chills, sweats over night, no nausea, vomiting, diarrhea; no increased cough, shortness of breath or chest pain  No new symptoms  Objective:  Vitals:  Temp:  [98 3 °F (36 8 °C)-98 6 °F (37 °C)] 98 6 °F (37 °C)  HR:  [61-83] 61  Resp:  [18-19] 19  BP: (114-145)/(64-88) 114/64  SpO2:  [91 %-96 %] 92 %  Temp (24hrs), Av 5 °F (36 9 °C), Min:98 3 °F (36 8 °C), Max:98 6 °F (37 °C)  Current: Temperature: 98 6 °F (37 °C)    General Appearance:  Awake, alert, cooperative, resting much more comfortably in chair today visiting with wife at bedside, no acute distress  Throat: Oropharynx moist without lesions  Lungs:   Clear anterior with crackles more on right base than left posteriorly; respirations unlabored on room air   Heart:  RRR; S1-S2 heard, no murmur   Abdomen:   Soft, non-tender, non-distended, positive bowel sounds  Back: No palpable tenderness of spine or deformity visible   Extremities: No edema, Lidoderm patches to left hip and upper thigh    arm IV site nontender   Skin: No rashes      Labs, Imaging, & Other studies:   All pertinent labs and imaging studies were personally reviewed  Results from last 7 days   Lab Units 20  0549 03/10/20  0519 20  0146   WBC Thousand/uL 2 91* 3 61* 6 15 HEMOGLOBIN g/dL 13 5 12 6 14 8   PLATELETS Thousands/uL 141* 129* 140*     Results from last 7 days   Lab Units 03/10/20  0519  03/09/20  0146  03/07/20  0658   POTASSIUM mmol/L 3 8   < > 4 1   < > 4 2   CHLORIDE mmol/L 104   < > 101   < > 101   CO2 mmol/L 26   < > 29   < > 27   BUN mg/dL 18   < > 24   < > 20   CREATININE mg/dL 1 16   < > 1 45*   < > 1 14   EGFR ml/min/1 73sq m 60   < > 46   < > 61   CALCIUM mg/dL 8 3   < > 8 5   < > 8 7   AST U/L 54*  --  21  --  20   ALT U/L 22  --  16  --  15   ALK PHOS U/L 43*  --  61  --  76    < > = values in this interval not displayed  Results from last 7 days   Lab Units 03/11/20  0441 03/10/20  0519 03/09/20  0605 03/08/20  0454 03/07/20  0658   PROCALCITONIN ng/ml 0 81* 1 21* 1 03* 0 05 <0 05     Results from last 7 days   Lab Units 03/10/20  0746 03/09/20  0433 03/09/20  0158 03/09/20  0146 03/07/20  1028 03/07/20  0659   BLOOD CULTURE   --   --  No Growth at 48 hrs  No Growth at 48 hrs  --  No Growth at 72 hrs  No Growth at 72 hrs     SPUTUM CULTURE  Culture too young- will reincubate  --   --   --   --   --    GRAM STAIN RESULT  1+ Epithelial cells per low power field*  Rare Polys*  Rare Gram negative rods*  Rare Gram positive cocci in pairs*  --   --   --   --   --    MRSA CULTURE ONLY   --  No Methicillin Resistant Staphlyococcus aureus (MRSA) isolated  --   --   --   --    LEGIONELLA URINARY ANTIGEN   --   --   --   --  Negative  --

## 2020-03-11 NOTE — PROGRESS NOTES
Progress Note - Pulmonary   Verba Junes  67 y o  male MRN: [id number]  Unit/Bed#: S -01 Encounter: [id number]    Assessment/Plan: 1    Acute hypoxic respiratory failure likely multifaceted as listed below        -  currently on room air-91%- patient does not wear home O2        -  will continue to maintain saturations greater than 88%        -  continue pulmonary toileting:  Deep breathing cough, OOB as tolerated, IS Q 1 hr        -  will need ambulatory pulse ox prior to discharge    2   Abnormal chest x-ray       -  R > L lower lobe  Opacity,  Given recent hospitalization concerning for HCAP       -  Afebrile> 48 hours, procalcitonin trending down 0 81, RP2 panel unremarkable       -  ID  Following- Day #3 cefepime/vancomycin    3  Hemoptysis       -  patient reports approximately 1 episode of dark blood tinged sputum approximately every other day for 1 month        -  will order bronchoscopy for tomorrow a m , NPO after midnight    4    ILD without flare        -  patient denies any improvement with home maintenance therapy        -  no indication for inhaled therapies or steroids at this time        -  continue surveillance therapy per outpatient    5  Acute volume over load in the setting of grade 1 diastolic dysfunction with moderate to severe pulmonary hypertension       -  proBNP 610               EF 60%    moderate to severe pulmonary hypertension       -  patient received 1 x 20mg IV lasix  overall output -1080       -  will benefit from Cardiology follow-up outpatient    6  KAYLI      -  baseline 1 14    current creatinine 1 16      -  will continue to trend    - outpatient follow-up per discharge instructions    Chief Complaint:    "My hip really hurts"    Subjective:    Elfego was comfortably sitting in bed  He reports that his right hip is still causing some pain  He reports his respiratory status is close to baseline  He does report occasional hemoptysis every day to every other day    No significant overnight events reported  Patient currently denies any fever, chills, hemoptysis, headaches night sweats, pleuritic chest pain, or palpitations  Objective:    Vitals: Blood pressure 114/64, pulse 61, temperature 98 6 °F (37 °C), temperature source Oral, resp  rate 19, weight 98 9 kg (218 lb 0 6 oz), SpO2 91 %  RA,Body mass index is 34 66 kg/m²  Intake/Output Summary (Last 24 hours) at 3/11/2020 1310  Last data filed at 3/11/2020 0900  Gross per 24 hour   Intake 820 ml   Output 1300 ml   Net -480 ml       Invasive Devices     Peripheral Intravenous Line            Peripheral IV 03/09/20 Left Wrist 2 days                Physical Exam:     Physical Exam   Constitutional: He is oriented to person, place, and time  He appears well-developed and well-nourished  Non-toxic appearance  He does not appear ill  HENT:   Head: Normocephalic and atraumatic  Neck: Normal range of motion  Neck supple  No tracheal deviation present  No thyromegaly present  Cardiovascular: Normal rate, regular rhythm and normal heart sounds  Exam reveals no gallop and no friction rub  No murmur heard  Pulmonary/Chest: Effort normal  No accessory muscle usage or stridor  No tachypnea and no bradypnea  No respiratory distress  He has decreased breath sounds  He has no wheezes  He has no rhonchi  He has no rales  He exhibits no mass and no tenderness  Diminished breath sounds at bases   Abdominal: Soft  Bowel sounds are normal  He exhibits no distension  There is no tenderness  Musculoskeletal: Normal range of motion  Right lower leg: Normal  He exhibits no edema  Left lower leg: Normal  He exhibits no edema  Neurological: He is alert and oriented to person, place, and time  He is not disoriented  Skin: Skin is warm and dry  No rash noted  No erythema  Psychiatric: He has a normal mood and affect  His behavior is normal  His mood appears not anxious  He is not agitated  Labs:    I have personally reviewed pertinent lab results   CBC:   Lab Results   Component Value Date    WBC 2 91 (L) 03/11/2020    HGB 13 5 03/11/2020    HCT 40 6 03/11/2020    MCV 90 03/11/2020     (L) 03/11/2020    MCH 30 0 03/11/2020    MCHC 33 3 03/11/2020    RDW 13 6 03/11/2020    MPV 10 9 03/11/2020    NRBC 0 03/11/2020       Imaging and other studies: I have personally reviewed pertinent films in PACS     CTA chest 03/07/2020 patchy opacity R > L  lobes

## 2020-03-11 NOTE — ASSESSMENT & PLAN NOTE
· Patient reports severe left proximal lower extremity pain, associated with ambulatory dysfunction  · MRI of the lumbar sacral spine "No definite MR evidence for discitis osteomyelitis as clinically questioned  Multilevel degenerative changes of the lumbar spine, as described above  Severe left foraminal narrowing at L4-L5 and L5-S1 results in impingement of the exiting nerve roots  Left lateral recess stenosis with contact of the respective descending of L5 and S1 nerve roots is also present at these levels "  · Strongly suspect radiculopathy is the cause for patient's pain  Will continue to  treat with the following measures for now:  Physical therapy, Around the clock Tylenol, topical menthol, increased dose of gabapentin, p r n  Tramadol for moderate pain, p r n  IV Dilaudid for severe pain  Recommend consultation to spine surgery if no improvement with conservative measures; consider epidural steroid injection outpatient  · Pt reports to improved pain today    D/c IV solu-medrol, change to prednisone 40 mg qd with taper   · Increased bowel regimen with use of narcotics  · PT and OT have recommended rehab

## 2020-03-11 NOTE — ASSESSMENT & PLAN NOTE
· Patients wife called ED due to rigors and "heavy breathing"   · Presents to ED with fever 102, oxygen saturation 70% at home when EMS arrived  · Was discharged from hospital the day prior to admission on amoxicillin and zithromax to treat for community acquired pneumonia  · Currently requiring 2 liters nasal cannula, not on any oxygen prior to arrival --wean down as able  · Appreciate input from Infectious Disease, see management above   · Appreciate input from pulmonology given underlying interstitial lung disease  · Having hemoptysis  This could however be due to recent epistaxis?   · Defer to pulmonology regarding possible need for bronchoscopy

## 2020-03-11 NOTE — ASSESSMENT & PLAN NOTE
· Present on admission as evidenced by tachycardia and fever, also with neutropenia   · Source is pneumonia, also with hypoxia--clinically improving   · BCx in process, negative thus far at 4 days  · Procalcitonin elevated  Lactic acid negative  · CT chest with bilateral infiltrates  · CT sinus = no significant abnormality  · Patient was just on a course of antibiotics prior to arrival   Upon admission he was seen by infectious disease and was put on broad-spectrum antibiotics with IV cefepime and vancomycin; Infectious Disease now believes this is viral pneumonia and discontinued antibiotics on March 10th  · Respiratory 2 panel negative  patient reports he has not left the [facility] in the past 2 weeks and denies any ill contacts    · Pulm following - for bronchoscopy today

## 2020-03-11 NOTE — ASSESSMENT & PLAN NOTE
· Present on admission as evidenced by tachycardia and fever, also with neutropenia which has worsened overnight   · source is pneumonia, also with hypoxia--clinically improving   · BCx in process, negative thus far   · Procalcitonin >1 and rising, continue to trend--await results from last check  Lactic acid negative  · CT chest with bilateral infiltrates  · CT sinus = no significant abnormality  · Patient was just on a course of antibiotics prior to arrival   Upon admission he was seen by infectious disease and was put on broad-spectrum antibiotics with IV cefepime and vancomycin; Infectious Disease now believes this is viral pneumonia and discontinued antibiotics on March 10th  · Respiratory 2 panel negative  patient reports he has not left the [place] in the past 2 weeks and denies any ill contacts

## 2020-03-11 NOTE — ASSESSMENT & PLAN NOTE
· Due to pneumonia and sepsis with underlying interstitial lung disease    Wean off oxygen as able - currently on room air at rest

## 2020-03-11 NOTE — ASSESSMENT & PLAN NOTE
· Patients wife called ED due to rigors and "heavy breathing"   · Presents to ED with fever 102, oxygen saturation 70% at home when EMS arrived  · Was discharged from hospital the day prior to admission on amoxicillin and zithromax to treat for community acquired pneumonia  · Currently requiring 2 liters nasal cannula, not on any oxygen prior to arrival --wean down as able  · Appreciate input from Infectious Disease, see management above   · Appreciate input from pulmonology given underlying interstitial lung disease  · Having hemoptysis  This could however be due to recent epistaxis?   · Pulmonology following, for bronchoscopy today

## 2020-03-11 NOTE — ASSESSMENT & PLAN NOTE
· Patient reports severe left proximal lower extremity pain, associated with ambulatory dysfunction  · MRI of the lumbar sacral spine "No definite MR evidence for discitis osteomyelitis as clinically questioned  Multilevel degenerative changes of the lumbar spine, as described above  Severe left foraminal narrowing at L4-L5 and L5-S1 results in impingement of the exiting nerve roots  Left lateral recess stenosis with contact of the respective descending of L5 and S1 nerve roots is also present at these levels "  · Strongly suspect radiculopathy is the cause for patient's pain  Will continue to  treat with the following measures for now:  Physical therapy, Around the clock Tylenol, topical menthol, increased dose of gabapentin, p r n  Tramadol for moderate pain, p r n  IV Dilaudid for severe pain  IV Solu-Medrol 40 mg daily for now and taper    Recommend consultation to spine surgery if no improvement with conservative measures; consider epidural steroid injection outpatient  · Increased bowel regimen with use of narcotics  · PT and OT have recommended rehab

## 2020-03-11 NOTE — PROGRESS NOTES
Progress Note - Idalia Prom 1940, 67 y o  male MRN: [id number]    Unit/Bed#: S -01 Encounter: [id number]    Primary Care Provider: Patti Wayne MD   Date and time admitted to hospital: 3/9/2020  1:28 AM  * Sepsis [facility])  Assessment & Plan  · Present on admission as evidenced by tachycardia and fever, also with neutropenia which has worsened overnight   · source is pneumonia, also with hypoxia--clinically improving   · BCx in process, negative thus far   · Procalcitonin >1 and rising, continue to trend--await results from last check  Lactic acid negative  · CT chest with bilateral infiltrates  · CT sinus = no significant abnormality  · Patient was just on a course of antibiotics prior to arrival   Upon admission he was seen by infectious disease and was put on broad-spectrum antibiotics with IV cefepime and vancomycin; Infectious Disease now believes this is viral pneumonia and discontinued antibiotics on March 10th  · Respiratory 2 panel negative  patient reports he has not left the [facility] in the past 2 weeks and denies any ill contacts  Acute respiratory failure with hypoxia (HCC)  Assessment & Plan  · Due to pneumonia and sepsis with underlying interstitial lung disease  Wean off oxygen as able  Bilateral pneumonia  Assessment & Plan  · Patients wife called ED due to rigors and "heavy breathing"   · Presents to ED with fever 102, oxygen saturation 70% at home when EMS arrived  · Was discharged from hospital the day prior to admission on amoxicillin and zithromax to treat for community acquired pneumonia  · Currently requiring 2 liters nasal cannula, not on any oxygen prior to arrival --wean down as able  · Appreciate input from Infectious Disease, see management above   · Appreciate input from pulmonology given underlying interstitial lung disease  · Having hemoptysis  This could however be due to recent epistaxis?   · Defer to pulmonology regarding possible need for bronchoscopy    Radiculopathy of leg  Assessment & Plan  · Patient reports severe left proximal lower extremity pain, associated with ambulatory dysfunction  · MRI of the lumbar sacral spine "No definite MR evidence for discitis osteomyelitis as clinically questioned  Multilevel degenerative changes of the lumbar spine, as described above  Severe left foraminal narrowing at L4-L5 and L5-S1 results in impingement of the exiting nerve roots  Left lateral recess stenosis with contact of the respective descending of L5 and S1 nerve roots is also present at these levels "  · Strongly suspect radiculopathy is the cause for patient's pain  Will continue to  treat with the following measures for now:  Physical therapy, Around the clock Tylenol, topical menthol, increased dose of gabapentin, p r n  Tramadol for moderate pain, p r n  IV Dilaudid for severe pain  IV Solu-Medrol 40 mg daily for now and taper  Recommend consultation to spine surgery if no improvement with conservative measures; consider epidural steroid injection outpatient  · Increased bowel regimen with use of narcotics  · PT and OT have recommended rehab    KAYLI (acute kidney injury) (HCC)resolved as of 3/11/2020  Assessment & Plan   Creatinine upon admission: 1 45  o Improved to 1 16   Secondary to decreased oral intake   Treatment: IV fluids    Monitor BMP  VTE Pharmacologic Prophylaxis:   Pharmacologic: Heparin--consider holding this if hemoptysis worsens  Mechanical VTE Prophylaxis in Place: No    Patient Centered Rounds: I have performed bedside rounds with nursing staff today  Discussions with Specialists or Other Care Team Provider:  Case Management, pulmonology    Education and Discussions with Family / Patient: 1:47 pm called wife to give update but did not reach her  Left voicemail    Time Spent for Care: 30 minutes  More than 50% of total time spent on counseling and coordination of care as described above      Addendum received notification from nursing that patient is doing better and pain is going down    Current Length of Stay: 2 day(s)    Current Patient Status: Inpatient   Certification Statement: The patient will continue to require additional inpatient hospital stay due to Ongoing hypoxia, unclear source for viral pneumonia  Intractable pain  Need for rehab    Discharge Plan:  Short-term rehab when cleared by Infectious Disease and pulmonology    Code Status: Level 1 - Full Code    Subjective:   Patient reports that he is okay with his back pain right now because he got the dose of IV Dilaudid which works very well and very quickly  He states that sometimes he moves and the pain comes on so severe and so quickly which is why he wanted the IV med  He is willing to take po option now  He has not had a bowel movement yet but is not reporting constipation  Is noting increased cough and had some blood in the sputum which he expectorated into a tissue  Has not yet been given a flutter valve or any incentive spirometer  He was not reported to have any fever overnight, chest pain, or worsening shortness of breath  He was doing and nebulizer when I entered this morning  Objective:     Vitals:   Temp (24hrs), Av 5 °F (36 9 °C), Min:98 3 °F (36 8 °C), Max:98 6 °F (37 °C)    Temp:  [98 3 °F (36 8 °C)-98 6 °F (37 °C)] 98 6 °F (37 °C)  HR:  [61-83] 61  Resp:  [18-19] 19  BP: (114-145)/(64-88) 114/64  SpO2:  [91 %-96 %] 91 %  Body mass index is 34 66 kg/m²  Input and Output Summary (last 24 hours): Intake/Output Summary (Last 24 hours) at 3/11/2020 1057  Last data filed at 3/11/2020 0900  Gross per 24 hour   Intake 820 ml   Output 1300 ml   Net -480 ml       Physical Exam:     Physical Exam   Constitutional: He appears well-developed and well-nourished  No distress  HENT:   Head: Normocephalic and atraumatic  Eyes: Conjunctivae are normal  Right eye exhibits no discharge  Left eye exhibits no discharge   No scleral icterus  Cardiovascular: Normal rate, regular rhythm and normal heart sounds  No murmur heard  Pulmonary/Chest: Effort normal  No stridor  No respiratory distress  He has no wheezes  No cough or wheezing noted by me  No tachypnea or dyspnea  Mild basilar rhonchi noted  No distress  Abdominal: Soft  Bowel sounds are normal  He exhibits no distension  There is no tenderness  There is no guarding  Musculoskeletal: He exhibits no edema or deformity  Neurological: He is alert  Awake alert interactive pleasant and cooperative, no confusion   Skin: Skin is warm and dry  He is not diaphoretic  No erythema  No pallor  Psychiatric: He has a normal mood and affect  His behavior is normal  Thought content normal    Vitals reviewed  Additional Data:     Labs:    Results from last 7 days   Lab Units 03/11/20  0549 03/10/20  0519   WBC Thousand/uL 2 91* 3 61*   HEMOGLOBIN g/dL 13 5 12 6   HEMATOCRIT % 40 6 38 1   PLATELETS Thousands/uL 141* 129*   BANDS PCT % 2  --    NEUTROS PCT %  --  84*   LYMPHS PCT %  --  10*   LYMPHO PCT % 7*  --    MONOS PCT %  --  4   MONO PCT % 4  --    EOS PCT % 0 0     Results from last 7 days   Lab Units 03/10/20  0519   SODIUM mmol/L 137   POTASSIUM mmol/L 3 8   CHLORIDE mmol/L 104   CO2 mmol/L 26   BUN mg/dL 18   CREATININE mg/dL 1 16   ANION GAP mmol/L 7   CALCIUM mg/dL 8 3   ALBUMIN g/dL 2 4*   TOTAL BILIRUBIN mg/dL 0 63   ALK PHOS U/L 43*   ALT U/L 22   AST U/L 54*   GLUCOSE RANDOM mg/dL 113     Results from last 7 days   Lab Units 03/09/20  0146   INR  1 19             Results from last 7 days   Lab Units 03/10/20  0519 03/09/20  0605 03/09/20  0146 03/08/20  0454 03/07/20  0658 03/07/20  0228   LACTIC ACID mmol/L  --   --  1 1  --   --  1 0   PROCALCITONIN ng/ml 1 21* 1 03*  --  0 05 <0 05  --            * I Have Reviewed All Lab Data Listed Above  * Additional Pertinent Lab Tests Reviewed:  All Labs Within Last 24 Hours Reviewed    Imaging:    Imaging Reports Reviewed Today Include:   Imaging Personally Reviewed by Myself Includes:      Recent Cultures (last 7 days):     Results from last 7 days   Lab Units 03/10/20  0746 03/09/20  0158 03/09/20  0146 03/07/20  1028 03/07/20  0659   BLOOD CULTURE   --  No Growth at 48 hrs  No Growth at 48 hrs  --  No Growth at 72 hrs  No Growth at 72 hrs     GRAM STAIN RESULT  1+ Epithelial cells per low power field*  Rare Polys*  Rare Gram negative rods*  Rare Gram positive cocci in pairs*  --   --   --   --    LEGIONELLA URINARY ANTIGEN   --   --   --  Negative  --        Last 24 Hours Medication List:     Current Facility-Administered Medications:  acetaminophen 975 mg Oral [facility] Lily Estevan, CARLOS   aluminum-magnesium hydroxide-simethicone 30 mL Oral Q6H PRN Lily Estevan, CRNP   aspirin 81 mg Oral Daily Lily Estevan, CRNP   benzonatate 100 mg Oral TID PRN Yuri Wallace PA-C   diltiazem 180 mg Oral Daily [place] Estevan, CARLOS   docusate sodium 100 mg Oral BID Stacy Rios PA-C   finasteride 5 mg Oral Daily Lily Estevan, CARLOS   fluticasone 1 spray Nasal Daily PRN Lily Estevan, CARLOS   gabapentin 300 mg Oral BID Stacy Rios PA-C   guaiFENesin 1,200 mg Oral BID Lily Estevan, CARLOS   heparin (porcine) 5,000 Units Subcutaneous [facility] Lily Estevan, CARLOS   HYDROmorphone 0 5 mg Intravenous Q4H PRN Stacy Rios PA-C   ipratropium 0 5 mg Nebulization TID CARLOS [place]   levalbuterol 1 25 mg Nebulization TID CARLOS [place]   lidocaine 2 patch Topical Daily Lily Estevan, CARLOS   menthol-methyl salicylate  Apply externally TID Yuri Wallace PA-C   methylPREDNISolone sodium succinate 40 mg Intravenous Daily Stacy Rios PA-C   montelukast 10 mg Oral HS Lily Estevan, CARLOS   ondansetron 4 mg Intravenous Q6H PRN [place] Estevan, CRNP   pantoprazole 40 mg Oral Early Morning Lily Estevan, CARLOS   polyethylene glycol 17 g Oral Daily Stacy Rios PA-C   tamsulosin 0 4 mg Oral Daily With [facility], CARLOS   traMADol 50 mg Oral Q6H PRN Yuri Wallace VINAY   triamcinolone  Topical BID CARLOS Sandoval        Today, Patient Was Seen By: America Amaya PA-C    ** Please Note: Dictation voice to text software may have been used in the creation of this document   **

## 2020-03-12 ENCOUNTER — ANESTHESIA (INPATIENT)
Dept: GASTROENTEROLOGY | Facility: HOSPITAL | Age: 80
DRG: 871 | End: 2020-03-12
Payer: COMMERCIAL

## 2020-03-12 ENCOUNTER — APPOINTMENT (INPATIENT)
Dept: GASTROENTEROLOGY | Facility: HOSPITAL | Age: 80
DRG: 871 | End: 2020-03-12
Payer: COMMERCIAL

## 2020-03-12 ENCOUNTER — ANESTHESIA EVENT (INPATIENT)
Dept: GASTROENTEROLOGY | Facility: HOSPITAL | Age: 80
DRG: 871 | End: 2020-03-12
Payer: COMMERCIAL

## 2020-03-12 LAB
BACTERIA BLD CULT: NORMAL
BACTERIA BLD CULT: NORMAL
BACTERIA SPT RESP CULT: ABNORMAL
BACTERIA SPT RESP CULT: ABNORMAL
GRAM STN SPEC: ABNORMAL

## 2020-03-12 PROCEDURE — 0BD68ZX EXTRACTION OF RIGHT LOWER LOBE BRONCHUS, VIA NATURAL OR ARTIFICIAL OPENING ENDOSCOPIC, DIAGNOSTIC: ICD-10-PCS | Performed by: INTERNAL MEDICINE

## 2020-03-12 PROCEDURE — 97530 THERAPEUTIC ACTIVITIES: CPT

## 2020-03-12 PROCEDURE — 89051 BODY FLUID CELL COUNT: CPT | Performed by: PATHOLOGY

## 2020-03-12 PROCEDURE — 88112 CYTOPATH CELL ENHANCE TECH: CPT | Performed by: PATHOLOGY

## 2020-03-12 PROCEDURE — 94640 AIRWAY INHALATION TREATMENT: CPT

## 2020-03-12 PROCEDURE — 88305 TISSUE EXAM BY PATHOLOGIST: CPT | Performed by: PATHOLOGY

## 2020-03-12 PROCEDURE — 99232 SBSQ HOSP IP/OBS MODERATE 35: CPT | Performed by: INTERNAL MEDICINE

## 2020-03-12 PROCEDURE — 0B9F8ZX DRAINAGE OF RIGHT LOWER LUNG LOBE, VIA NATURAL OR ARTIFICIAL OPENING ENDOSCOPIC, DIAGNOSTIC: ICD-10-PCS | Performed by: INTERNAL MEDICINE

## 2020-03-12 PROCEDURE — 87070 CULTURE OTHR SPECIMN AEROBIC: CPT | Performed by: INTERNAL MEDICINE

## 2020-03-12 PROCEDURE — 97166 OT EVAL MOD COMPLEX 45 MIN: CPT

## 2020-03-12 PROCEDURE — 97535 SELF CARE MNGMENT TRAINING: CPT

## 2020-03-12 PROCEDURE — 0BC38ZZ EXTIRPATION OF MATTER FROM RIGHT MAIN BRONCHUS, VIA NATURAL OR ARTIFICIAL OPENING ENDOSCOPIC: ICD-10-PCS | Performed by: INTERNAL MEDICINE

## 2020-03-12 PROCEDURE — 31624 DX BRONCHOSCOPE/LAVAGE: CPT | Performed by: INTERNAL MEDICINE

## 2020-03-12 PROCEDURE — 99232 SBSQ HOSP IP/OBS MODERATE 35: CPT | Performed by: HOSPITALIST

## 2020-03-12 PROCEDURE — 94760 N-INVAS EAR/PLS OXIMETRY 1: CPT

## 2020-03-12 PROCEDURE — 87252 VIRUS INOCULATION TISSUE: CPT | Performed by: INTERNAL MEDICINE

## 2020-03-12 PROCEDURE — 87102 FUNGUS ISOLATION CULTURE: CPT | Performed by: INTERNAL MEDICINE

## 2020-03-12 RX ORDER — MIDAZOLAM HYDROCHLORIDE 2 MG/2ML
INJECTION, SOLUTION INTRAMUSCULAR; INTRAVENOUS AS NEEDED
Status: DISCONTINUED | OUTPATIENT
Start: 2020-03-12 | End: 2020-03-12 | Stop reason: SURG

## 2020-03-12 RX ORDER — GLYCOPYRROLATE 0.2 MG/ML
INJECTION INTRAMUSCULAR; INTRAVENOUS AS NEEDED
Status: DISCONTINUED | OUTPATIENT
Start: 2020-03-12 | End: 2020-03-12 | Stop reason: SURG

## 2020-03-12 RX ORDER — LIDOCAINE HYDROCHLORIDE 10 MG/ML
INJECTION, SOLUTION EPIDURAL; INFILTRATION; INTRACAUDAL; PERINEURAL AS NEEDED
Status: DISCONTINUED | OUTPATIENT
Start: 2020-03-12 | End: 2020-03-12 | Stop reason: SURG

## 2020-03-12 RX ORDER — PREDNISONE 20 MG/1
40 TABLET ORAL DAILY
Status: DISCONTINUED | OUTPATIENT
Start: 2020-03-13 | End: 2020-03-14 | Stop reason: HOSPADM

## 2020-03-12 RX ORDER — SODIUM CHLORIDE, SODIUM LACTATE, POTASSIUM CHLORIDE, CALCIUM CHLORIDE 600; 310; 30; 20 MG/100ML; MG/100ML; MG/100ML; MG/100ML
INJECTION, SOLUTION INTRAVENOUS CONTINUOUS PRN
Status: DISCONTINUED | OUTPATIENT
Start: 2020-03-12 | End: 2020-03-12 | Stop reason: SURG

## 2020-03-12 RX ORDER — LIDOCAINE WITH 8.4% SOD BICARB 0.9%(10ML)
SYRINGE (ML) INJECTION CODE/TRAUMA/SEDATION MEDICATION
Status: COMPLETED | OUTPATIENT
Start: 2020-03-12 | End: 2020-03-12

## 2020-03-12 RX ORDER — FENTANYL CITRATE 50 UG/ML
INJECTION, SOLUTION INTRAMUSCULAR; INTRAVENOUS AS NEEDED
Status: DISCONTINUED | OUTPATIENT
Start: 2020-03-12 | End: 2020-03-12 | Stop reason: SURG

## 2020-03-12 RX ORDER — KETAMINE HYDROCHLORIDE 50 MG/ML
INJECTION, SOLUTION, CONCENTRATE INTRAMUSCULAR; INTRAVENOUS AS NEEDED
Status: DISCONTINUED | OUTPATIENT
Start: 2020-03-12 | End: 2020-03-12 | Stop reason: SURG

## 2020-03-12 RX ORDER — PROPOFOL 10 MG/ML
INJECTION, EMULSION INTRAVENOUS CONTINUOUS PRN
Status: DISCONTINUED | OUTPATIENT
Start: 2020-03-12 | End: 2020-03-12 | Stop reason: SURG

## 2020-03-12 RX ORDER — PROPOFOL 10 MG/ML
INJECTION, EMULSION INTRAVENOUS AS NEEDED
Status: DISCONTINUED | OUTPATIENT
Start: 2020-03-12 | End: 2020-03-12 | Stop reason: SURG

## 2020-03-12 RX ADMIN — IPRATROPIUM BROMIDE 0.5 MG: 0.5 SOLUTION RESPIRATORY (INHALATION) at 08:04

## 2020-03-12 RX ADMIN — IPRATROPIUM BROMIDE 0.5 MG: 0.5 SOLUTION RESPIRATORY (INHALATION) at 13:14

## 2020-03-12 RX ADMIN — LEVALBUTEROL HYDROCHLORIDE 1.25 MG: 1.25 SOLUTION, CONCENTRATE RESPIRATORY (INHALATION) at 20:05

## 2020-03-12 RX ADMIN — IPRATROPIUM BROMIDE 0.5 MG: 0.5 SOLUTION RESPIRATORY (INHALATION) at 20:05

## 2020-03-12 RX ADMIN — SODIUM CHLORIDE, SODIUM LACTATE, POTASSIUM CHLORIDE, AND CALCIUM CHLORIDE: .6; .31; .03; .02 INJECTION, SOLUTION INTRAVENOUS at 14:50

## 2020-03-12 RX ADMIN — Medication 5 ML: at 15:21

## 2020-03-12 RX ADMIN — HEPARIN SODIUM 5000 UNITS: 5000 INJECTION INTRAVENOUS; SUBCUTANEOUS at 05:34

## 2020-03-12 RX ADMIN — LIDOCAINE 2 PATCH: 50 PATCH TOPICAL at 08:51

## 2020-03-12 RX ADMIN — MENTHOL, METHYL SALICYLATE: 10; 15 CREAM TOPICAL at 21:21

## 2020-03-12 RX ADMIN — PROPOFOL 100 MCG/KG/MIN: 10 INJECTION, EMULSION INTRAVENOUS at 14:58

## 2020-03-12 RX ADMIN — KETAMINE HYDROCHLORIDE 30 MG: 50 INJECTION INTRAMUSCULAR; INTRAVENOUS at 14:58

## 2020-03-12 RX ADMIN — Medication 20 ML: at 15:22

## 2020-03-12 RX ADMIN — FENTANYL CITRATE 25 MCG: 50 INJECTION INTRAMUSCULAR; INTRAVENOUS at 15:14

## 2020-03-12 RX ADMIN — GLYCOPYRROLATE 0.2 MG: 0.2 INJECTION, SOLUTION INTRAMUSCULAR; INTRAVENOUS at 14:58

## 2020-03-12 RX ADMIN — MONTELUKAST 10 MG: 10 TABLET, FILM COATED ORAL at 21:21

## 2020-03-12 RX ADMIN — LEVALBUTEROL HYDROCHLORIDE 1.25 MG: 1.25 SOLUTION, CONCENTRATE RESPIRATORY (INHALATION) at 08:04

## 2020-03-12 RX ADMIN — LEVALBUTEROL HYDROCHLORIDE 1.25 MG: 1.25 SOLUTION, CONCENTRATE RESPIRATORY (INHALATION) at 13:14

## 2020-03-12 RX ADMIN — LIDOCAINE HYDROCHLORIDE 30 MG: 10 INJECTION, SOLUTION EPIDURAL; INFILTRATION; INTRACAUDAL; PERINEURAL at 14:58

## 2020-03-12 RX ADMIN — GABAPENTIN 300 MG: 300 CAPSULE ORAL at 17:56

## 2020-03-12 RX ADMIN — ACETAMINOPHEN 975 MG: 325 TABLET, FILM COATED ORAL at 21:21

## 2020-03-12 RX ADMIN — PROPOFOL 30 MG: 10 INJECTION, EMULSION INTRAVENOUS at 14:58

## 2020-03-12 RX ADMIN — GUAIFENESIN 1200 MG: 600 TABLET, EXTENDED RELEASE ORAL at 17:56

## 2020-03-12 RX ADMIN — HEPARIN SODIUM 5000 UNITS: 5000 INJECTION INTRAVENOUS; SUBCUTANEOUS at 21:21

## 2020-03-12 RX ADMIN — DOCUSATE SODIUM 100 MG: 100 CAPSULE, LIQUID FILLED ORAL at 17:56

## 2020-03-12 RX ADMIN — MENTHOL, METHYL SALICYLATE: 10; 15 CREAM TOPICAL at 08:53

## 2020-03-12 RX ADMIN — KETAMINE HYDROCHLORIDE 20 MG: 50 INJECTION INTRAMUSCULAR; INTRAVENOUS at 15:15

## 2020-03-12 RX ADMIN — MIDAZOLAM HYDROCHLORIDE 2 MG: 1 INJECTION, SOLUTION INTRAMUSCULAR; INTRAVENOUS at 14:58

## 2020-03-12 RX ADMIN — METHYLPREDNISOLONE SODIUM SUCCINATE 40 MG: 40 INJECTION, POWDER, FOR SOLUTION INTRAMUSCULAR; INTRAVENOUS at 08:51

## 2020-03-12 NOTE — PROGRESS NOTES
[facility]  Progress Note - Buddy Torre 1940, 67 y o  male MRN: [id number]    Unit/Bed#: S -01 Encounter: [id number]    Primary Care Provider: Marlin Fleischer, MD   Date and time admitted to hospital: 3/9/2020  1:28 AM      * Sepsis [facility])  Assessment & Plan  · Present on admission as evidenced by tachycardia and fever, also with neutropenia   · Source is pneumonia, also with hypoxia--clinically improving   · BCx in process, negative thus far at 4 days  · Procalcitonin elevated  Lactic acid negative  · CT chest with bilateral infiltrates  · CT sinus = no significant abnormality  · Patient was just on a course of antibiotics prior to arrival   Upon admission he was seen by infectious disease and was put on broad-spectrum antibiotics with IV cefepime and vancomycin; Infectious Disease now believes this is viral pneumonia and discontinued antibiotics on March 10th  · Respiratory 2 panel negative  patient reports he has not left the [facility] in the past 2 weeks and denies any ill contacts  · Pulm following - for bronchoscopy today     Acute respiratory failure with hypoxia ([address] )  Assessment & Plan  · Due to pneumonia and sepsis with underlying interstitial lung disease  Wean off oxygen as able - currently on room air at rest    Radiculopathy of leg  Assessment & Plan  · Patient reports severe left proximal lower extremity pain, associated with ambulatory dysfunction  · MRI of the lumbar sacral spine "No definite MR evidence for discitis osteomyelitis as clinically questioned  Multilevel degenerative changes of the lumbar spine, as described above  Severe left foraminal narrowing at L4-L5 and L5-S1 results in impingement of the exiting nerve roots  Left lateral recess stenosis with contact of the respective descending of L5 and S1 nerve roots is also present at these levels "  · Strongly suspect radiculopathy is the cause for patient's pain    Will continue to  treat with the following measures for now:  Physical therapy, Around the clock Tylenol, topical menthol, increased dose of gabapentin, p r n  Tramadol for moderate pain, p r n  IV Dilaudid for severe pain  Recommend consultation to spine surgery if no improvement with conservative measures; consider epidural steroid injection outpatient  · Pt reports to improved pain today  D/c IV solu-medrol, change to prednisone 40 mg qd with taper   · Increased bowel regimen with use of narcotics  · PT and OT have recommended rehab    Bilateral pneumonia  Assessment & Plan  · Patients wife called ED due to rigors and "heavy breathing"   · Presents to ED with fever 102, oxygen saturation 70% at home when EMS arrived  · Was discharged from hospital the day prior to admission on amoxicillin and zithromax to treat for community acquired pneumonia  · Currently requiring 2 liters nasal cannula, not on any oxygen prior to arrival --wean down as able  · Appreciate input from Infectious Disease, see management above   · Appreciate input from pulmonology given underlying interstitial lung disease  · Having hemoptysis  This could however be due to recent epistaxis? · Pulmonology following, for bronchoscopy today     Interstitial lung disease [facility])  Assessment & Plan  · Appreciate pulmonology involvement  Continue respiratory protocol and nebulizer regimen    KAYLI (acute kidney injury) (HCC)resolved as of 3/11/2020  Assessment & Plan   Creatinine upon admission: 1 45  o Improved to 1 16   Secondary to decreased oral intake   Treatment: IV fluids    Monitor BMP  VTE Pharmacologic Prophylaxis:   Pharmacologic: Heparin  Mechanical VTE Prophylaxis in Place: Yes    Patient Centered Rounds: I have performed bedside rounds with nursing staff today  Discussions with Specialists or Other Care Team Provider: RNJOIE     Education and Discussions with Family / Patient: patient  Time Spent for Care: 20 minutes    More than 50% of total time spent on counseling and coordination of care as described above  Current Length of Stay: 3 day(s)    Current Patient Status: Inpatient   Certification Statement: The patient will continue to require additional inpatient hospital stay due to pulm eval with bronchoscopy    Discharge Plan: pending pulmonary clearance    Code Status: Level 1 - Full Code      Subjective:   Patient has no acute complaints today  Pain is well controlled  Had episode of hemoptysis yesterday  Denies SOB today  Waiting for bronch    Objective:     Vitals:   Temp (24hrs), Av 5 °F (36 4 °C), Min:97 4 °F (36 3 °C), Max:97 6 °F (36 4 °C)    Temp:  [97 4 °F (36 3 °C)-97 6 °F (36 4 °C)] 97 4 °F (36 3 °C)  HR:  [56-79] 56  Resp:  [18] 18  BP: (129-164)/(76-98) 164/98  SpO2:  [94 %-97 %] 97 %  Body mass index is 34 79 kg/m²  Input and Output Summary (last 24 hours): Intake/Output Summary (Last 24 hours) at 3/12/2020 1419  Last data filed at 3/11/2020 2236  Gross per 24 hour   Intake 560 ml   Output 300 ml   Net 260 ml       Physical Exam:     Physical Exam   Constitutional: He is oriented to person, place, and time  No distress  HENT:   Head: Normocephalic and atraumatic  Eyes: EOM are normal  No scleral icterus  Neck: Normal range of motion  Neck supple  Cardiovascular: Normal rate and regular rhythm  Pulmonary/Chest: Effort normal and breath sounds normal  No respiratory distress  He has no wheezes  Abdominal: Soft  Bowel sounds are normal  He exhibits no distension  There is no tenderness  Musculoskeletal: Normal range of motion  He exhibits no edema  Neurological: He is alert and oriented to person, place, and time  No cranial nerve deficit  Skin: Skin is warm and dry  He is not diaphoretic  Psychiatric: He has a normal mood and affect  His behavior is normal    Vitals reviewed        Additional Data:     Labs:    Results from last 7 days   Lab Units 20  0549 03/10/20  0519   WBC Thousand/uL 2 91* 3 61* HEMOGLOBIN g/dL 13 5 12 6   HEMATOCRIT % 40 6 38 1   PLATELETS Thousands/uL 141* 129*   BANDS PCT % 2  --    NEUTROS PCT %  --  84*   LYMPHS PCT %  --  10*   LYMPHO PCT % 7*  --    MONOS PCT %  --  4   MONO PCT % 4  --    EOS PCT % 0 0     Results from last 7 days   Lab Units 03/10/20  0519   SODIUM mmol/L 137   POTASSIUM mmol/L 3 8   CHLORIDE mmol/L 104   CO2 mmol/L 26   BUN mg/dL 18   CREATININE mg/dL 1 16   ANION GAP mmol/L 7   CALCIUM mg/dL 8 3   ALBUMIN g/dL 2 4*   TOTAL BILIRUBIN mg/dL 0 63   ALK PHOS U/L 43*   ALT U/L 22   AST U/L 54*   GLUCOSE RANDOM mg/dL 113     Results from last 7 days   Lab Units 03/09/20  0146   INR  1 19             Results from last 7 days   Lab Units 03/11/20  0441 03/10/20  0519 03/09/20  0605 03/09/20  0146 03/08/20  0454 03/07/20  0658 03/07/20  0228   LACTIC ACID mmol/L  --   --   --  1 1  --   --  1 0   PROCALCITONIN ng/ml 0 81* 1 21* 1 03*  --  0 05 <0 05  --            * I Have Reviewed All Lab Data Listed Above  * Additional Pertinent Lab Tests Reviewed: All Labs Within Last 24 Hours Reviewed    Imaging:    Imaging Reports Reviewed Today Include: none  Imaging Personally Reviewed by Myself Includes:  none    Recent Cultures (last 7 days):     Results from last 7 days   Lab Units 03/10/20  0746 03/09/20  0158 03/09/20  0146 03/07/20  1028 03/07/20  0659   BLOOD CULTURE   --  No Growth at 72 hrs  No Growth at 72 hrs   --  No Growth After 4 Days  No Growth After 4 Days  SPUTUM CULTURE  Culture results to follow    --   --   --   --    GRAM STAIN RESULT  1+ Epithelial cells per low power field*  Rare Polys*  Rare Gram negative rods*  Rare Gram positive cocci in pairs*  --   --   --   --    LEGIONELLA URINARY ANTIGEN   --   --   --  Negative  --        Last 24 Hours Medication List:     Current Facility-Administered Medications:  acetaminophen 975 mg Oral [facility] CARLOS George   aluminum-magnesium hydroxide-simethicone 30 mL Oral Q6H PRN CARLOS George   aspirin 81 mg Oral Daily Robb Perch, CRNP   diltiazem 180 mg Oral Daily Robb Perch, CRNP   docusate sodium 100 mg Oral BID Neal LinkVINAY   finasteride 5 mg Oral Daily Robb Perch, CRNP   fluticasone 1 spray Nasal Daily PRN Robb Perch, CRNP   gabapentin 300 mg Oral BID Stacy Rios PA-C   guaiFENesin 1,200 mg Oral BID Robb Perch, CARLOS   heparin (porcine) 5,000 Units Subcutaneous [facility] Robb Perch, CRNP   HYDROmorphone 0 5 mg Intravenous Q4H PRN Stacy Rios PA-C   ipratropium 0 5 mg Nebulization TID Davis Mishra, CARLOS   levalbuterol 1 25 mg Nebulization TID Davis Tad, CARLOS   lidocaine 2 patch Topical Daily Robb Perch, CRNP   menthol-methyl salicylate  Apply externally TID Neal LinkVINAY   montelukast 10 mg Oral HS Rbob Perch, CRANAYELI   ondansetron 4 mg Intravenous Q6H PRN Robb Perch, CRNP   pantoprazole 40 mg Oral Early Morning Robb Perch, CRNP   polyethylene glycol 17 g Oral Daily Stacy Rios PA-C   [START ON 3/13/2020] predniSONE 40 mg Oral Daily Radha Noyola PA-C   tamsulosin 0 4 mg Oral Daily With [facility], CARLOS   traMADol 50 mg Oral Q6H PRN Neal Link, VINAY   triamcinolone  Topical BID Cezar Mims, CARLOS        Today, Patient Was Seen By: Talita La PA-C    ** Please Note: Dictation voice to text software may have been used in the creation of this document   **

## 2020-03-12 NOTE — ANESTHESIA PREPROCEDURE EVALUATION
Review of Systems/Medical History      No history of anesthetic complications     Cardiovascular  Hypertension controlled,    Pulmonary  Pneumonia,   Comment: Off O2 presently, interstitial lung disease     GI/Hepatic    GERD ,  Hiatal hernia,        Kidney stones,        Endo/Other  History of thyroid disease ,      GYN       Hematology  Negative hematology ROS      Musculoskeletal  Negative musculoskeletal ROS   Arthritis     Neurology  Negative neurology ROS      Psychology   Negative psychology ROS              Physical Exam    Airway    Mallampati score: II  TM Distance: >3 FB  Neck ROM: full     Dental   No notable dental hx     Cardiovascular      Pulmonary      Other Findings        Anesthesia Plan  ASA Score- 3     Anesthesia Type- IV sedation with anesthesia with ASA Monitors  Additional Monitors:   Airway Plan:         Plan Factors-    Induction- intravenous  Postoperative Plan-     Informed Consent- Anesthetic plan and risks discussed with patient

## 2020-03-12 NOTE — PROGRESS NOTES
Progress Note - Infectious Disease   Maribel Reyes  67 y o  male MRN: [id number]  Unit/Bed#: S -01 Encounter: [id number]      Impression/Plan:  1   Sepsis   POA   Fever and tachycardia  Salma Roman source   Patient presented with hypoxia with CT scan showing infiltrate in lower lobes and portable chest x-ray concerning for possible right multifocal pneumonia   Patient now status post 7 days+ of antibiotic including Augmentin ceftriaxone and azithromycin, cefepime and vancomycin   Patient clinically more comfortable today without rigors but complaining of severe left hip to knee pain   Admission blood cultures are negative at 48 hours   Consider viral syndrome   White cell count down to 2 91, with atypical lymphs on smear and mild increase in AST   RP2 result negative  Rec:  · Observing off additional antibiotics hereafter  · Monitor temperature and hemodynamics  · Serial exam     2   Multifocal pneumonia   Viral suspected  In patient with hypoxia, CT scan showing bilateral infiltrate and serial x-ray concerning for multifocal pneumonia  Patient status post 7 days of antibiotic including Augmentin ceftriaxone and azithromycin with continued rigors and fever  Procal down to 0 81 and clinically improving, RP2 negative  Patient woke with SOB episodes in sleep and had hemoptysis yesterday  Rec:  · Observe off additional antibiotics  · For Bronchoscopy today, f/u results  · Monitor respiratory symptoms     3   Recent epistaxis requiring packing   Patient with continual fever despite 1 week of antibiotics  CT sinuses negative  Hemoptysis may be related to recent major epistaxis  Rec:  · Observing off antibiotics     4  Acute kidney injury    Admission Creatinine 1 45 up from 1 1   Possibly secondary to sepsis/pre renal  Rec:  · Renal dose adjust antibiotics as needed  · Monitor creatinine     5   Bicytopenia    mild leukopenia and thrombocytopenia   Consider viral syndrome despite RP2 negative   White cell count down to 2 91, with atypical lymphs on smear and mild increase in AST  No exposure to Level 3 COV19 locations/persons  Rec:  · Supportive measures     6  Chronic back pain with suspected sciatica flare of left upper leg   MRI of spine showing severe degenerative joint disease L4-S1 without osteomyelitis or diskitis evident  Patient's back and left leg pain well controlled today      Supportive care per primary care team     Antibiotics:  None     Above impression and plan discussed in detail with patient and RN      Subjective:  Patient sitting out of bed to chair in recliner    He reports shortness of breath episodes waking him from sleepy last night and yesterday cough with blood    ROS: Patient has no fever, chills, sweats; no nausea, vomiting, diarrhea; no chest pain  Leg pain better controlled  Objective:  Vitals:  Temp:  [97 4 °F (36 3 °C)-97 6 °F (36 4 °C)] 97 4 °F (36 3 °C)  HR:  [56-79] 56  Resp:  [18] 18  BP: (129-164)/(76-98) 164/98  SpO2:  [94 %-97 %] 97 %  Temp (24hrs), Av 5 °F (36 4 °C), Min:97 4 °F (36 3 °C), Max:97 6 °F (36 4 °C)  Current: Temperature: (!) 97 4 °F (36 3 °C)    General Appearance:  Awake, alert, cooperative, resting in chair, no acute distress  Throat: Oropharynx moist without lesions  Lungs:   Decreased breath sounds posterior with few crackles; respirations unlabored without cough on exam   Heart:  RRR; S1-S2 heard, no murmur   Abdomen:   Soft, non-tender, protuberant, positive bowel sounds       Extremities: No edema, arm IV site nontender   Skin: No rashes      Labs, Imaging, & Other studies:   All pertinent labs and imaging studies were personally reviewed  Results from last 7 days   Lab Units 20  0549 03/10/20  0520  0146   WBC Thousand/uL 2 91* 3 61* 6 15   HEMOGLOBIN g/dL 13 5 12 6 14 8   PLATELETS Thousands/uL 141* 129* 140*     Results from last 7 days   Lab Units 03/10/20  0519  20  0146  20  0658   POTASSIUM mmol/L 3 8   < > 4 1   < > 4 2   CHLORIDE mmol/L 104   < > 101   < > 101   CO2 mmol/L 26   < > 29   < > 27   BUN mg/dL 18   < > 24   < > 20   CREATININE mg/dL 1 16   < > 1 45*   < > 1 14   EGFR ml/min/1 73sq m 60   < > 46   < > 61   CALCIUM mg/dL 8 3   < > 8 5   < > 8 7   AST U/L 54*  --  21  --  20   ALT U/L 22  --  16  --  15   ALK PHOS U/L 43*  --  61  --  76    < > = values in this interval not displayed  Results from last 7 days   Lab Units 03/11/20  0441 03/10/20  0519 03/09/20  0605 03/08/20  0454 03/07/20  0658   PROCALCITONIN ng/ml 0 81* 1 21* 1 03* 0 05 <0 05     Results from last 7 days   Lab Units 03/10/20  0746 03/09/20  0433 03/09/20  0158 03/09/20  0146 03/07/20  1028 03/07/20  0659   BLOOD CULTURE   --   --  No Growth at 72 hrs  No Growth at 72 hrs   --  No Growth After 4 Days  No Growth After 4 Days  SPUTUM CULTURE  Culture results to follow    --   --   --   --   --    GRAM STAIN RESULT  1+ Epithelial cells per low power field*  Rare Polys*  Rare Gram negative rods*  Rare Gram positive cocci in pairs*  --   --   --   --   --    MRSA CULTURE ONLY   --  No Methicillin Resistant Staphlyococcus aureus (MRSA) isolated  --   --   --   --    LEGIONELLA URINARY ANTIGEN   --   --   --   --  Negative  --

## 2020-03-12 NOTE — OCCUPATIONAL THERAPY NOTE
Occupational Therapy Evaluation      Paul Cargo     3/12/2020    Principal Problem:    Sepsis ([facility] [address] )  Active Problems:    Interstitial lung disease ([facility] [address] )    Bilateral pneumonia    Radiculopathy of leg    Acute respiratory failure with hypoxia [facility])      Past Medical History:   Diagnosis Date    Abnormal electrocardiogram     Last assessed: Oct 11, 2013    Allergic rhinitis     last assessed: Oct 11, 2013    Allergic rhinitis due to pollen     last assessed: Oct 10, 2013    Allergic sinusitis     Last assessed: May 11, 2015    Allergy     resolved: July 22, 2015    Benign essential hypertension     Last assessed/resolved: May 31, 2017    BPH (benign prostatic hyperplasia)     Colitis     Last assessed: May 24, 2016    Generalized osteoarthritis     Last assessed: Oct 11, 2013    GERD without esophagitis     Last assessed/resolved: May 31, 2017    Hearing loss     Hiatal hernia     resolved: July 22, 2015    History of gastroesophageal reflux (GERD)     History of stomach ulcers     last assessed: May 11, 2015    Hypertension     Incomplete bladder emptying     Kidney stone     Nodular prostate with lower urinary tract symptoms     Nontoxic single thyroid nodule     last assessed: April 16, 2014    Orchalgia     Overweight     last assessed: Oct 31, 2013    Poor urinary stream     Pulmonary embolism [facility])     Salivary gland disorder     last assessed: April 16, 2014    Seasonal allergies     last assessed: May 15, 2015    Spermatocele     Straining to void     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:  May 7, 2014       Past Surgical History:   Procedure Laterality Date    BLADDER SURGERY      CHOLECYSTECTOMY  2000    laparoscopic     HEMORRHOID SURGERY      pilionidal cyst removal     HERNIA REPAIR Left 01/17/2008    inguinal     KNEE ARTHROSCOPY Left 1974    KNEE CARTILAGE SURGERY      NASAL SEPTUM SURGERY      Deviation repair     PROSTATE BIOPSY  2017    STOMACH SURGERY      TONSILLECTOMY AND ADENOIDECTOMY      at age 37   [address] [facility] [address] - right 01/04 0 left 01/95    VASECTOMY          03/12/20 1055   Note Type   Note type Eval/Treat   Restrictions/Precautions   Weight Bearing Precautions Per Order No   Other Precautions Pain; Fall Risk; Chair Alarm; Bed Alarm   Pain Assessment   Pain Assessment Tool 0-10   Pain Score No Pain   Pain Location/Orientation Location: Hip  ("sometimes I have a little pain in my hip")   Home Living   Type of [address] [place] [address] One level; Work area in basement;Stairs to enter with rails  (2STE)   Bathroom Shower/Tub Tub/shower unit   Bathroom Toilet Raised   Bathroom Equipment Grab bars in shower  (suction cup)   Home Equipment Cane  (rollator)   Prior Function   Level of [place] Independent with ADLs and functional mobility   Lives With Kristi Help From Family   ADL Assistance Independent   IADLs Independent   Falls in the last 6 months 0   Vocational 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)   Lifestyle   Autonomy Pt reports being completely (I) PTA with all self cares and functional mobility  Pt reports (+) , caring for lawn (riding mower) and trimming shrubs  Reciprocal Relationships Pt reports supportive spouse, whom works full time and is also (I)  Intrinsic Gratification watching TV and caring for lawn   Psychosocial   Psychosocial (WDL) WDL   Subjective   Subjective "My hip is feeling much better compared to a few days ago"   ADL   Where Assessed Edge of bed   Eating Assistance 7  Independent   Grooming Assistance 5  Supervision/Setup   Grooming Deficit Supervision/safety;Steadying   UB Bathing Assistance 5  Supervision/Setup   UB Bathing Deficit Supervision/safety   LB Bathing Assistance 4  Minimal Assistance   LB Bathing Deficit Increased time to complete;Right lower leg including foot; Left lower leg including foot   UB Dressing Assistance 5  Supervision/Setup   LB Dressing Assistance 4  Minimal Assistance   LB Dressing Deficit Thread RLE into pants; Thread LLE into pants; Thread RLE into underwear; Thread LLE into underwear   Toileting Assistance  5  Supervision/Setup   Bed Mobility   Supine to Sit 5  Supervision   Sit to Supine 5  Supervision   Transfers   Sit to Stand 5  Supervision   Stand to Sit 5  Supervision   Functional Mobility   Functional Mobility 5  Supervision   Additional Comments Ambulated within room using a RW   Balance   Static Sitting Good   Dynamic Sitting Good   Static Standing Fair +   Dynamic Standing Fair  (observed bed making; no LOB leaning & adjusting bed linens)   Activity Tolerance   Activity Tolerance Patient limited by fatigue   Nurse Made Aware RN cleared pt for OT evaluation  At the end of session, pt left seated on EOB, needs/call bell in reach   RUE Assessment   RUE Assessment WFL  (hx of full TSA)   LUE Assessment   LUE Assessment WFL  (history of partial TSA)   Hand Function   Gross Motor Coordination Functional   Fine Motor Coordination Functional   Sensation   Light Touch No apparent deficits   Additional Comments Pt wears (B) hearing aides but states, "I don't think they work too good because I still have problems hearing with them"   Vision - Complex Assessment   Acuity 20/30 both eyes  (per patient)   Cognition   Overall Cognitive Status WFL   Arousal/Participation Alert   Attention Within functional limits   Orientation Level Oriented X4   Memory Within functional limits   Following Commands Follows all commands and directions without difficulty   Comments Pt verified identity by stating full name and    Assessment   Limitation Decreased ADL status; Decreased endurance;Decreased self-care trans;Decreased high-level ADLs  (dec'd high-level IADLs)   Prognosis Good   Assessment Pt is a 67 y o  male seen for OT evaluation s/p admit to THE [facility] AT [facility] on 3/9/2020 w/ Sepsis ([facility] [address] )    Comorbidities affecting pt's functional performance at time of assessment are listed above  Personal factors affecting pt at time of IE include:steps to enter environment, limited home support, difficulty performing ADLS, difficulty performing IADLS , environment and difficulty performing functions of part time job  Prior to admission, pt was completely (I) with all self cares and functional mobility  Pt was also working part time for [employer] and states functions include "a decent amount of walking or standing" and 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  Upon evaluation: Pt requires min (A) for LB ADL and (S) for UB ADL  Pt also at a (S) level for sit<>stand transfers as well as functional mobility within room while using a RW  Pt demonstrates dec'd activity tolerance due to "sciatic pain" as well as pulmonary status  Pt below baseline level of independence 2* the following deficits impacting occupational performance: weakness, decreased ROM (impacted by pain in hips when attempting to "tailor sit" for LB dressing), decreased strength, decreased balance, decreased tolerance, impaired problem solving and increased pain  Pt to benefit from continued skilled OT tx while in the hospital to address deficits as defined above and maximize level of functional independence w ADL's and functional mobility  Occupational Performance areas to address include: grooming, bathing/shower, dressing, socialization, functional mobility, community mobility, clothing management, household maintenance, job performance/volunteering and social participation  Pt with score of 55/100 on the Barthel Index  Based on OT evaluation, assessment(s), performance deficits listed, and current level of function, pt identified as a Moderate complexity evaluation  From OT standpoint, recommendation at time of d/c would be inpatient rehab versus home with services pending progress during length of hospital stay      Goals Patient Goals "To go back to doing the things I could do before"   LTG Time Frame 7-10   Long Term Goal #1 see below for list of goals   Plan   Treatment Interventions ADL retraining;Functional transfer training;UE strengthening/ROM; Endurance training;Patient/family training;Equipment evaluation/education; Compensatory technique education; Energy conservation   Goal Expiration Date 03/22/20   OT Treatment Day 1   OT Frequency 3-5x/wk   Additional Treatment Session   Start Time 1110   End Time 5521   Treatment Assessment Pt seen at bedside for OT evaluation and tx session  Tx session consisted of extensive education re: proper body mechanics to utilize during ADL/puropseful tasks to decrease risk of pain/injury stemming from back  OT also discussed proper positions for sleep and explained to pt the importance of maintaining proper spinal alignment when body is in a state of "rest"  Pt verbalized understanding  OT also discussed AE as it pertains to donning socks or aiding in threading pants  Pt without challenges PTA but with new onset hip/back pain "from a coughing episode", pt with difficulty  Pt would benefit from more extensive intstruction with AE and trial of use  Pt demonstrated ability to transfer however with poor hand placement and therefore instruction provided for proper technique  Pt wuold benefit from continued OT to address deficits listed in evaluation  Will continue to follow      Recommendation   OT Discharge Recommendation Short Term Rehab  (vs  home w/OT pending progress)   Equipment Recommended Tub seat with back  (AE for LB dressing)   Barthel Index   Feeding 10   Bathing 0   Grooming Score 5   Dressing Score 5   Bladder Score 10   Bowels Score 10   Toilet Use Score 5   Transfers (Bed/Chair) Score 10   Mobility (Level Surface) Score 0   Stairs Score 0   Barthel Index Score 55   GOALS:    Pt will achieve the following within specified time frame:  *Perform ADL transfers with mod (I) for inc'd independence with ADLs/purposeful tasks    *Perform UB ADL (I), including (I) with item retrieval, for inc'd independence with self cares    *Perform LB ADL mod (I) using AE prn for inc'd independence with self cares    *Increase dynamic seated balance to Good for inc'd safety with seated purposeful tasks       *Increase static stand balance to Good and dyn stand balance to Fair+ for inc'd safety with standing purposeful tasks    *Increase stand tolerance x5-7 m for inc'd tolerance with standing purposeful tasks and no c/o hip/back pain    *Perform clothing management/hygiene for toileting (I) for inc'd independence with self cares    *Participate in 10m UE therex to increase overall stamina/activity tolerance for purposeful tasks    *Perform tub/shower transfer using most appropriate technique (step in vs  Tub seat/transfer bench) mod (I) for inc'd safety and independence with bathing    *Perform sinkside G&H (I), with good safety and Good balance for inc'd independence with self cares    Bambi Pickens, OT

## 2020-03-12 NOTE — ANESTHESIA POSTPROCEDURE EVALUATION
Post-Op Assessment Note    CV Status:  Stable    Pain management: adequate     Mental Status:  Alert and awake   Hydration Status:  Euvolemic   PONV Controlled:  Controlled   Airway Patency:  Patent   Post Op Vitals Reviewed: Yes      Staff: CRNA   Comments: vss report rn          BP      Temp      Pulse     Resp      SpO2

## 2020-03-12 NOTE — PLAN OF CARE
Problem: OCCUPATIONAL THERAPY ADULT  Goal: Performs self-care activities at highest level of function for planned discharge setting  See evaluation for individualized goals  Description  Treatment Interventions: ADL retraining, Functional transfer training, UE strengthening/ROM, Endurance training, Patient/family training, Equipment evaluation/education, Compensatory technique education, Energy conservation  Equipment Recommended: Tub seat with back(AE for LB dressing)       See flowsheet documentation for full assessment, interventions and recommendations  Outcome: Progressing  Note:   Limitation: Decreased ADL status, Decreased endurance, Decreased self-care trans, Decreased high-level ADLs(dec'd high-level IADLs)  Prognosis: Good  Assessment: Pt is a 67 y o  male seen for OT evaluation s/p admit to THE [facility] AT [facility] on 3/9/2020 w/ Sepsis ([address] )  Comorbidities affecting pt's functional performance at time of assessment are listed above  Personal factors affecting pt at time of IE include:steps to enter environment, limited home support, difficulty performing ADLS, difficulty performing IADLS , environment and difficulty performing functions of part time job  Prior to admission, pt was completely (I) with all self cares and functional mobility  Pt was also working part time for [employer] and states functions include "a decent amount of walking or standing" and 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  Upon evaluation: Pt requires min (A) for LB ADL and (S) for UB ADL  Pt also at a (S) level for sit<>stand transfers as well as functional mobility within room while using a RW  Pt demonstrates dec'd activity tolerance due to "sciatic pain" as well as pulmonary status   Pt below baseline level of independence 2* the following deficits impacting occupational performance: weakness, decreased ROM (impacted by pain in hips when attempting to "tailor sit" for LB dressing), decreased strength, decreased balance, decreased tolerance, impaired problem solving and increased pain  Pt to benefit from continued skilled OT tx while in the hospital to address deficits as defined above and maximize level of functional independence w ADL's and functional mobility  Occupational Performance areas to address include: grooming, bathing/shower, dressing, socialization, functional mobility, community mobility, clothing management, household maintenance, job performance/volunteering and social participation  Pt with score of 55/100 on the Barthel Index  Based on OT evaluation, assessment(s), performance deficits listed, and current level of function, pt identified as a Moderate complexity evaluation  From OT standpoint, recommendation at time of d/c would be inpatient rehab versus home with services pending progress during length of hospital stay        OT Discharge Recommendation: Short Term Rehab(vs  home w/OT pending progress)

## 2020-03-12 NOTE — SOCIAL WORK
LOS: 3 DAYS  PATIENT IS NOT A BUNDLE  PATIENT IS A READMISSION  CM reviewed 30 day readmission  Patient discharged home on 3/7 and returned on 3/9 with increased SOB and required supplemental O2 which is not his baseline  Patient obtained his medications from his home pharmacy  CM met with patient at bedside, patient alert and oriented and seated at EOB  Patient accompanied by his spouse, Ingrid Schilder  Patient resides with his spouse in a 2 story home in [place]  There are 2 VICENTE and patient was able to navigate his home without concern  Patient has a RW which he utilizes only sporadically and was independent with all ADLs  Patient denies history of VNA and has history of rehab at [facility] in [facility] over 15 years ago  Patient utilizes [facility] [address] in [place], has Rx plan and is able to afford all copays  Patient denies history of MH/substance use  Patient identifies his son Yoandy Casper as his POA and has AD as well  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 post acute providers are designated  Patient and spouse agree with recommendation for SNF, especially as spouse works from 7 am to 3 pm and they'd feel more comfortable if patient was more independent prior to return home  Patient and spouse will review SNF list tonight and will let CM know which facilities they would like referrals sent to  Patient's spouse to provide transport upon discharge  CM will continue to assess for needs and will follow through discharge      CM reviewed discharge planning process including the following: identifying caregivers at home, preference for d/c planning needs, availability of treatment team to discuss questions or concerns patient and/or family may have regarding diagnosis, plan of care, old or new medications and discharge planning   CM will continue to follow for care coordination and update assessment as appropriate

## 2020-03-13 PROBLEM — R39.16 BENIGN PROSTATIC HYPERPLASIA (BPH) WITH STRAINING ON URINATION: Chronic | Status: ACTIVE | Noted: 2017-09-13

## 2020-03-13 PROBLEM — I10 BENIGN ESSENTIAL HYPERTENSION: Chronic | Status: ACTIVE | Noted: 2017-05-31

## 2020-03-13 PROBLEM — K21.9 GASTROESOPHAGEAL REFLUX DISEASE WITHOUT ESOPHAGITIS: Chronic | Status: ACTIVE | Noted: 2017-05-31

## 2020-03-13 PROBLEM — N40.1 BENIGN PROSTATIC HYPERPLASIA (BPH) WITH STRAINING ON URINATION: Chronic | Status: ACTIVE | Noted: 2017-09-13

## 2020-03-13 PROBLEM — J00 COMMON COLD: Status: RESOLVED | Noted: 2020-01-02 | Resolved: 2020-03-13

## 2020-03-13 LAB
DOPAMINE 24H UR-MRATE: <30 PG/ML (ref 0–48)
EPINEPH PLAS-MCNC: 27 PG/ML (ref 0–62)
NOREPINEPH PLAS-MCNC: 339 PG/ML (ref 0–874)

## 2020-03-13 PROCEDURE — 97530 THERAPEUTIC ACTIVITIES: CPT

## 2020-03-13 PROCEDURE — 94760 N-INVAS EAR/PLS OXIMETRY 1: CPT

## 2020-03-13 PROCEDURE — 94640 AIRWAY INHALATION TREATMENT: CPT

## 2020-03-13 PROCEDURE — 97110 THERAPEUTIC EXERCISES: CPT

## 2020-03-13 PROCEDURE — 99232 SBSQ HOSP IP/OBS MODERATE 35: CPT | Performed by: INTERNAL MEDICINE

## 2020-03-13 PROCEDURE — 97116 GAIT TRAINING THERAPY: CPT

## 2020-03-13 PROCEDURE — 99232 SBSQ HOSP IP/OBS MODERATE 35: CPT | Performed by: PHYSICIAN ASSISTANT

## 2020-03-13 RX ORDER — TRAMADOL HYDROCHLORIDE 50 MG/1
50 TABLET ORAL EVERY 6 HOURS PRN
Qty: 30 TABLET | Refills: 0 | Status: CANCELLED | OUTPATIENT
Start: 2020-03-13 | End: 2020-03-23

## 2020-03-13 RX ADMIN — GUAIFENESIN 1200 MG: 600 TABLET, EXTENDED RELEASE ORAL at 09:28

## 2020-03-13 RX ADMIN — GABAPENTIN 300 MG: 300 CAPSULE ORAL at 17:50

## 2020-03-13 RX ADMIN — MONTELUKAST 10 MG: 10 TABLET, FILM COATED ORAL at 21:08

## 2020-03-13 RX ADMIN — LEVALBUTEROL HYDROCHLORIDE 1.25 MG: 1.25 SOLUTION, CONCENTRATE RESPIRATORY (INHALATION) at 19:34

## 2020-03-13 RX ADMIN — HEPARIN SODIUM 5000 UNITS: 5000 INJECTION INTRAVENOUS; SUBCUTANEOUS at 14:00

## 2020-03-13 RX ADMIN — POLYETHYLENE GLYCOL 3350 17 G: 17 POWDER, FOR SOLUTION ORAL at 09:28

## 2020-03-13 RX ADMIN — FINASTERIDE 5 MG: 5 TABLET, FILM COATED ORAL at 09:28

## 2020-03-13 RX ADMIN — MENTHOL, METHYL SALICYLATE: 10; 15 CREAM TOPICAL at 17:51

## 2020-03-13 RX ADMIN — LEVALBUTEROL HYDROCHLORIDE 1.25 MG: 1.25 SOLUTION, CONCENTRATE RESPIRATORY (INHALATION) at 13:07

## 2020-03-13 RX ADMIN — IPRATROPIUM BROMIDE 0.5 MG: 0.5 SOLUTION RESPIRATORY (INHALATION) at 07:30

## 2020-03-13 RX ADMIN — ACETAMINOPHEN 975 MG: 325 TABLET, FILM COATED ORAL at 05:44

## 2020-03-13 RX ADMIN — HEPARIN SODIUM 5000 UNITS: 5000 INJECTION INTRAVENOUS; SUBCUTANEOUS at 21:08

## 2020-03-13 RX ADMIN — IPRATROPIUM BROMIDE 0.5 MG: 0.5 SOLUTION RESPIRATORY (INHALATION) at 19:34

## 2020-03-13 RX ADMIN — IPRATROPIUM BROMIDE 0.5 MG: 0.5 SOLUTION RESPIRATORY (INHALATION) at 13:07

## 2020-03-13 RX ADMIN — ACETAMINOPHEN 975 MG: 325 TABLET, FILM COATED ORAL at 21:08

## 2020-03-13 RX ADMIN — MENTHOL, METHYL SALICYLATE 1 APPLICATION: 10; 15 CREAM TOPICAL at 21:08

## 2020-03-13 RX ADMIN — DOCUSATE SODIUM 100 MG: 100 CAPSULE, LIQUID FILLED ORAL at 17:49

## 2020-03-13 RX ADMIN — GABAPENTIN 300 MG: 300 CAPSULE ORAL at 09:27

## 2020-03-13 RX ADMIN — LEVALBUTEROL HYDROCHLORIDE 1.25 MG: 1.25 SOLUTION, CONCENTRATE RESPIRATORY (INHALATION) at 07:30

## 2020-03-13 RX ADMIN — LIDOCAINE 2 PATCH: 50 PATCH TOPICAL at 09:28

## 2020-03-13 RX ADMIN — DOCUSATE SODIUM 100 MG: 100 CAPSULE, LIQUID FILLED ORAL at 09:27

## 2020-03-13 RX ADMIN — TAMSULOSIN HYDROCHLORIDE 0.4 MG: 0.4 CAPSULE ORAL at 17:49

## 2020-03-13 RX ADMIN — TRIAMCINOLONE ACETONIDE: 1 CREAM TOPICAL at 09:30

## 2020-03-13 RX ADMIN — ACETAMINOPHEN 975 MG: 325 TABLET, FILM COATED ORAL at 14:00

## 2020-03-13 RX ADMIN — PANTOPRAZOLE SODIUM 40 MG: 40 TABLET, DELAYED RELEASE ORAL at 05:45

## 2020-03-13 RX ADMIN — TRIAMCINOLONE ACETONIDE: 1 CREAM TOPICAL at 17:52

## 2020-03-13 RX ADMIN — ASPIRIN 81 MG 81 MG: 81 TABLET ORAL at 09:27

## 2020-03-13 RX ADMIN — GUAIFENESIN 1200 MG: 600 TABLET, EXTENDED RELEASE ORAL at 17:50

## 2020-03-13 RX ADMIN — PREDNISONE 40 MG: 20 TABLET ORAL at 09:27

## 2020-03-13 RX ADMIN — MENTHOL, METHYL SALICYLATE: 10; 15 CREAM TOPICAL at 09:30

## 2020-03-13 RX ADMIN — HEPARIN SODIUM 5000 UNITS: 5000 INJECTION INTRAVENOUS; SUBCUTANEOUS at 05:45

## 2020-03-13 RX ADMIN — DILTIAZEM HYDROCHLORIDE 180 MG: 180 CAPSULE, COATED, EXTENDED RELEASE ORAL at 09:27

## 2020-03-13 NOTE — DISCHARGE SUMMARY
Discharge- Juan So 1940, 67 y o  male MRN: [id number]  Unit/Bed#: S -01 Encounter: [id number]  DOS: 3/14/2020  Primary Care Provider: Jack Saenz MD   Date and time admitted to hospital: 3/9/2020  1:28 AM    * Sepsis [facility])  Assessment & Plan  · Present on admission as evidenced by tachycardia and fever and also with neutropenia   · Source is pneumonia   · BCx in process, negative thus far at 5 days  · Procalcitonin elevated  Lactic acid negative  · CT chest with bilateral infiltrates  · CT sinus = no significant abnormality  · Patient was just on a course of antibiotics prior to arrival   Upon admission he was seen by infectious disease and was put on broad-spectrum antibiotics with IV cefepime and vancomycin; Infectious Disease now believes this is viral pneumonia and discontinued antibiotics on March 10th  · Respiratory 2 panel negative  patient reports he has not left the [facility] in the past 2 weeks and denies any ill contacts  · Pulm following - s/p bronchoscopy follow up cultures    Acute respiratory failure with hypoxia ([address] )  Assessment & Plan  · POA - Due to pneumonia and sepsis with underlying interstitial lung disease  · Wean off oxygen as able - currently on room air at rest    Bilateral pneumonia  Assessment & Plan  · Patients wife called ED due to rigors and "heavy breathing"   · Presents to ED with fever 102, oxygen saturation 70% at home when EMS arrived  · Was discharged from hospital the day prior to admission on amoxicillin and zithromax to treat for community acquired pneumonia  · Currently off oxygen  · Appreciate input from Infectious Disease, see management above   · Appreciate input from pulmonology given underlying interstitial lung disease  · Having hemoptysis  This could however be due to recent epistaxis?   · Pulmonology following, s/p bronchoscopy - follow up cultues    Radiculopathy of leg  Assessment & Plan  · Patient reports severe left proximal lower extremity pain, associated with ambulatory dysfunction  · MRI of the lumbar sacral spine "No definite MR evidence for discitis osteomyelitis as clinically questioned  Multilevel degenerative changes of the lumbar spine, as described above  Severe left foraminal narrowing at L4-L5 and L5-S1 results in impingement of the exiting nerve roots  Left lateral recess stenosis with contact of the respective descending of L5 and S1 nerve roots is also present at these levels "  · Strongly suspect radiculopathy is the cause for patient's pain  Will continue to  treat with the following measures for now:  Physical therapy, Around the clock Tylenol, topical menthol, increased dose of gabapentin, p r n  Tramadol for moderate pain, p r n  IV Dilaudid for severe pain  Recommend consultation to spine surgery if no improvement with conservative measures; consider epidural steroid injection outpatient  · Pt reports to improved pain and IV solu-medrol was changed to prednisone 40 mg qd with taper   · Increased bowel regimen with use of narcotics  · PT and OT have recommended rehab when medically stable    Interstitial lung disease ([facility] [address] )  Assessment & Plan  · Appreciate pulmonology involvement  Continue respiratory protocol and nebulizer regimen       Gastroesophageal reflux disease without esophagitis  Assessment & Plan  · Continue Protonix    Benign prostatic hyperplasia (BPH) with straining on urination  Assessment & Plan  · Continue proscar and finasteride  · Reports no urinary retention or symptoms      Discharging Physician / Practitioner: Alma Damian PA-C  PCP: Rain Riggins MD  Admission Date:   Admission Orders (From admission, onward)     Ordered        03/09/20 0255  Inpatient Admission (expected length of stay for this patient Order details is greater than two midnights)  Once                   Discharge Date: 03/14/20    Resolved Problems  Date Reviewed: 3/14/2020          Resolved    KAYLI (acute kidney injury) ([facility] [address] ) 3/11/2020     Resolved by  Calli Simmons PA-C        Condition at Discharge: stable     Discharge Day Visit / Exam:     Subjective:  Pt feesl well today  No new complaints  Waiting for rehab  No SOB  Still has cough  Vitals: Blood Pressure: 141/75 (03/14/20 0729)  Pulse: (!) 41 (03/14/20 0729)  Temperature: 97 6 °F (36 4 °C) (03/14/20 0729)  Temp Source: Oral (03/14/20 0729)  Respirations: 18 (03/14/20 0729)  Height: 5' 8" (172 7 cm) (03/12/20 1416)  Weight - Scale: 103 kg (227 lb 1 2 oz) (03/14/20 0540)  SpO2: 96 % (03/14/20 1325)    Exam:   Physical Exam   Constitutional: He is oriented to person, place, and time  He appears well-developed and well-nourished  No distress  HENT:   Head: Normocephalic and atraumatic  Eyes: EOM are normal  No scleral icterus  Neck: Normal range of motion  Neck supple  Cardiovascular: Normal rate, regular rhythm and normal heart sounds  Pulmonary/Chest: Effort normal and breath sounds normal    On room air    Abdominal: Soft  Bowel sounds are normal    Musculoskeletal: Normal range of motion  He exhibits no edema  Neurological: He is alert and oriented to person, place, and time  Skin: Skin is warm and dry  Psychiatric: He has a normal mood and affect  Consultations During Hospital Stay:  · ENT regarding epistaxis  · Endocrinology regarding incidental right adrenal nodule  · Infectious disease regarding the pneumonia  · Pulmonary regarding pneumonia and hypoxia    Procedures Performed:   · Bronchoscopy on 03/12    Significant Findings / Test Results:   · CT sinus:  Trace sinus mucosal disease  Primary ostia the sinuses are patent  · Venous duplex showing no acute DVT  · MRI lumbar spine with no definite MRI evidence of diskitis osteomyelitis  There is multilevel degenerative changes of the lumbar spine  Severe left foraminal narrowing at L4-L5 and L5-S1 resultant impingement of the exiting nerve roots    Left lateral recess stenosis will contact of the respective descending L5 and S1 nerve roots  · CXR: Faint right lung opacities may represent multifocal pneumonia  Recommend continued follow-up  Findings were present on prior CT from 2 days ago  Incidental Findings:   · None     Test Results Pending at Discharge (will require follow up):   · Cultures from bronchoscopy     Outpatient Tests Requested:  · None    Complications:  None    Reason for Admission:  Shortness of breath, fever, hypoxia    Hospital Course:     Massimo Watkins  is a 67 y o  male patient who originally presented to the hospital on 3/9/2020 due to shortness breath, fever, hypoxia  Patient has past medical history of hypertension, GERD, BPH, vertigo, and recently hospitalized and diagnosed with pneumonia returns with continued shortness of breath fever and hypoxia  He had reported shakes and breathing loud and EMS was called by his wife  EMS noted oxygen levels in the 70s and temp as as high as 102°  Patient was seen by infectious disease he he was initially on IV antibiotics labs were monitored and procalcitonin was negative and antibiotics were discontinued and has been stable and afebrile  Patient was seen by Pulmonary regarding the hypoxia pneumonia  Patient had a bronchoscopy on 03/12/2020  Cultures are all pending at discharge  Patient had evaluation by Endocrinology regarding incidental finding of adrenal nodule and can follow-up as an outpatient  Patient was evaluated by Physical and Occupational therapy was recommended for short-term rehab  Case management has made referrals at this time is waiting insurance authorization  Patient is off oxygen  He reports no back pain currently  And is stable awaiting placement    Please see above list of diagnoses and related plan for additional information       Condition at Discharge: stable     Discharge Day Visit / Exam:     * Please refer to separate progress note for these details *    Discharge instructions/Information to patient and family:   See after visit summary for information provided to patient and family  Provisions for Follow-Up Care:  See after visit summary for information related to follow-up care and any pertinent home health orders  Disposition:  Other [address]  [address] SNF:   · Not Applicable to this Patient - Not Applicable to this Patient    Planned Readmission: None     Discharge Statement:  I spent 48 minutes discharging the patient  This time was spent on the day of discharge  I had direct contact with the patient on the day of discharge  Greater than 50% of the total time was spent examining patient, answering all patient questions, arranging and discussing plan of care with patient as well as directly providing post-discharge instructions  Additional time then spent on discharge activities  Discharge Medications:  See after visit summary for reconciled discharge medications provided to patient and family        ** Please Note: This note has been constructed using a voice recognition system **

## 2020-03-13 NOTE — PHYSICAL THERAPY NOTE
Physical Therapy Progress Note        03/13/20 1204   Pain Assessment   Pain Assessment Tool 0-10   Pain Score 2   Pain Location/Orientation Location: Leg   Hospital Pain Intervention(s) Ambulation/increased activity;Repositioned   Restrictions/Precautions   Weight Bearing Precautions Per Order No   Other Precautions Pain; Fall Risk   General   Chart Reviewed Yes   Response to Previous Treatment Patient with no complaints from previous session  Family/Caregiver Present No   Cognition   Overall Cognitive Status WFL   Arousal/Participation Alert; Cooperative   Comments Patient is pleasant and cooperative throughout session   Transfers   Sit to Stand 5  Supervision   Additional items Assist x 1; Armrests; Increased time required;Verbal cues   Stand to Sit 5  Supervision   Additional items Assist x 1; Armrests; Increased time required;Verbal cues   Ambulation/Elevation   Gait pattern Excessively slow; Step to;Short stride;Decreased foot clearance; Forward Flexion   Gait Assistance 4  Minimal assist   Additional items Assist x 1   Assistive Device Rolling walker   Distance 60 feet x 2   Balance   Static Sitting Good   Dynamic Sitting Good   Static Standing Fair +   Dynamic Standing Fair   Endurance Deficit   Endurance Deficit Yes   Activity Tolerance   Activity Tolerance Patient limited by fatigue   Nurse Made Aware Appropriate to see per RN   Exercises   Hip Flexion Sitting;10 reps;AROM; Bilateral  (pain on L)   Hip Abduction Sitting;10 reps;AROM; Bilateral   Knee AROM Long Arc Quad Sitting;10 reps;AROM; Bilateral   Ankle Pumps Sitting;10 reps;AROM; Bilateral   Assessment   Prognosis Good   Problem List Decreased strength;Decreased endurance; Impaired balance;Decreased mobility;Pain   Assessment Patient seated in bed side recliner, agreeable to participate in therapy  He was able to stand with S in RW and ambulate 60 feet x2 with one standing rest break   He has improved gait, however still has pain in LLE and c/o pain throughout ambulation  He was able to perform seated TE with difficulty on LLE for hip flexion  He would continue to benefit from skilled PT to maximize functional independence  Goals   Patient Goals To get better   LTG Expiration Date 03/24/20   PT Treatment Day 1   Plan   Treatment/Interventions Functional transfer training;LE strengthening/ROM; Therapeutic exercise; Endurance training;Gait training;Spoke to nursing   Progress Progressing toward goals   PT Frequency 5x/wk   Recommendation   Recommendation Short-term skilled PT   Equipment Recommended Walker  (RW)   PT - OK to Discharge Yes  (to rehab when medically stable)     Jose Francisco Johnston, PTA

## 2020-03-13 NOTE — ASSESSMENT & PLAN NOTE
· POA - Due to pneumonia and sepsis with underlying interstitial lung disease      · Wean off oxygen as able - currently on room air at rest

## 2020-03-13 NOTE — ASSESSMENT & PLAN NOTE
· Present on admission as evidenced by tachycardia and fever and also with neutropenia   · Source is pneumonia   · BCx in process, negative thus far at 5 days  · Procalcitonin elevated  Lactic acid negative  · CT chest with bilateral infiltrates  · CT sinus = no significant abnormality  · Patient was just on a course of antibiotics prior to arrival   Upon admission he was seen by infectious disease and was put on broad-spectrum antibiotics with IV cefepime and vancomycin; Infectious Disease now believes this is viral pneumonia and discontinued antibiotics on March 10th  · Respiratory 2 panel negative  patient reports he has not left the [facility] in the past 2 weeks and denies any ill contacts    · Pulm following - s/p bronchoscopy follow up cultures

## 2020-03-13 NOTE — PROGRESS NOTES
Progress Note - Juan So 1940, 67 y o  male MRN: [id number]    Unit/Bed#: S -01 Encounter: [id number]    Primary Care Provider: Jack Saenz MD   Date and time admitted to hospital: 3/9/2020  1:28 AM        * Sepsis [facility])  Assessment & Plan  · Present on admission as evidenced by tachycardia and fever and also with neutropenia   · Source is pneumonia   · BCx in process, negative thus far at 5 days  · Procalcitonin elevated  Lactic acid negative  · CT chest with bilateral infiltrates  · CT sinus = no significant abnormality  · Patient was just on a course of antibiotics prior to arrival   Upon admission he was seen by infectious disease and was put on broad-spectrum antibiotics with IV cefepime and vancomycin; Infectious Disease now believes this is viral pneumonia and discontinued antibiotics on March 10th  · Respiratory 2 panel negative  patient reports he has not left the [facility] in the past 2 weeks and denies any ill contacts  · Pulm following - s/p bronchoscopy follow up cultures    Acute respiratory failure with hypoxia ([facility] [address] )  Assessment & Plan  · POA - Due to pneumonia and sepsis with underlying interstitial lung disease  · Wean off oxygen as able - currently on room air at rest    Bilateral pneumonia  Assessment & Plan  · Patients wife called ED due to rigors and "heavy breathing"   · Presents to ED with fever 102, oxygen saturation 70% at home when EMS arrived  · Was discharged from hospital the day prior to admission on amoxicillin and zithromax to treat for community acquired pneumonia  · Currently off oxygen  · Appreciate input from Infectious Disease, see management above   · Appreciate input from pulmonology given underlying interstitial lung disease  · Having hemoptysis  This could however be due to recent epistaxis?   · Pulmonology following, s/p bronchoscopy - follow up cultues    Radiculopathy of leg  Assessment & Plan  · Patient reports severe left proximal lower extremity pain, associated with ambulatory dysfunction  · MRI of the lumbar sacral spine "No definite MR evidence for discitis osteomyelitis as clinically questioned  Multilevel degenerative changes of the lumbar spine, as described above  Severe left foraminal narrowing at L4-L5 and L5-S1 results in impingement of the exiting nerve roots  Left lateral recess stenosis with contact of the respective descending of L5 and S1 nerve roots is also present at these levels "  · Strongly suspect radiculopathy is the cause for patient's pain  Will continue to  treat with the following measures for now:  Physical therapy, Around the clock Tylenol, topical menthol, increased dose of gabapentin, p r n  Tramadol for moderate pain, p r n  IV Dilaudid for severe pain  Recommend consultation to spine surgery if no improvement with conservative measures; consider epidural steroid injection outpatient  · Pt reports to improved pain and IV solu-medrol was changed to prednisone 40 mg qd with taper   · Increased bowel regimen with use of narcotics  · PT and OT have recommended rehab when medically stable    Interstitial lung disease ([facility] [address] )  Assessment & Plan  · Appreciate pulmonology involvement  Continue respiratory protocol and nebulizer regimen    Gastroesophageal reflux disease without esophagitis  Assessment & Plan  · Continue Protonix    Benign prostatic hyperplasia (BPH) with straining on urination  Assessment & Plan  · Continue proscar and finasteride  · Reports no urinary retention or symptoms    Benign essential hypertension  Assessment & Plan  · Continue diltiazem  Parameter set for SBP >130 given KAYLI   · BP currently stable      VTE Pharmacologic Prophylaxis: Pharmacologic: Heparin Drip    Patient Centered Rounds: I have performed bedside rounds with nursing staff today      Discussions with Specialists or Other Care Team Provider: Case Management, Pulmonary CRNP via tiger text  Education and Discussions with Family / Patient: patient    Current Length of Stay: 4 day(s)    Current Patient Status: Inpatient   Certification Statement: The patient will continue to require additional inpatient hospital stay due to Follow-up cultures    Discharge Plan:  When bed available with CM and insurance authorization complete    Code Status: Level 1 - Full Code    Subjective:   Patient seen bedside, he reports feeling ok  No cp/sob  He had a bowel movement yesterday  He tolerated diet  He lost his IV access    Objective:     Vitals:   Temp (24hrs), Av 1 °F (36 7 °C), Min:97 8 °F (36 6 °C), Max:98 6 °F (37 °C)    Temp:  [97 8 °F (36 6 °C)-98 6 °F (37 °C)] 97 8 °F (36 6 °C)  HR:  [] 55  Resp:  [16-18] 18  BP: (126-157)/(80-97) 131/81  SpO2:  [92 %-97 %] 95 %  Body mass index is 32 66 kg/m²  Input and Output Summary (last 24 hours): Intake/Output Summary (Last 24 hours) at 3/13/2020 0801  Last data filed at 3/12/2020 1525  Gross per 24 hour   Intake 150 ml   Output    Net 150 ml       Physical Exam:     Physical Exam   Constitutional: He is oriented to person, place, and time  He appears well-developed and well-nourished  HENT:   Head: Normocephalic and atraumatic  Eyes: Conjunctivae and EOM are normal  Right eye exhibits no discharge  Left eye exhibits no discharge  Neck: Normal range of motion  No tracheal deviation present  Cardiovascular: Normal rate, regular rhythm and normal heart sounds  Exam reveals no gallop and no friction rub  No murmur heard  Pulmonary/Chest: Effort normal and breath sounds normal  No respiratory distress  He has no wheezes  He has no rales  Coarse right base   Abdominal: Soft  Bowel sounds are normal  He exhibits no distension and no mass  There is no tenderness  There is no guarding  Musculoskeletal: Normal range of motion  He exhibits no edema, tenderness or deformity  Neurological: He is alert and oriented to person, place, and time  Skin: Skin is warm and dry   No rash noted  No erythema  No pallor  Psychiatric: He has a normal mood and affect  His behavior is normal  Judgment and thought content normal    Nursing note and vitals reviewed  Additional Data:     Labs:    Results from last 7 days   Lab Units 03/11/20  0549 03/10/20  0519   WBC Thousand/uL 2 91* 3 61*   HEMOGLOBIN g/dL 13 5 12 6   HEMATOCRIT % 40 6 38 1   PLATELETS Thousands/uL 141* 129*   NEUTROS PCT %  --  84*   LYMPHS PCT %  --  10*   LYMPHO PCT % 7*  --    MONOS PCT %  --  4   MONO PCT % 4  --    EOS PCT % 0 0     Results from last 7 days   Lab Units 03/10/20  0519   POTASSIUM mmol/L 3 8   CHLORIDE mmol/L 104   CO2 mmol/L 26   BUN mg/dL 18   CREATININE mg/dL 1 16   CALCIUM mg/dL 8 3   ALK PHOS U/L 43*   ALT U/L 22   AST U/L 54*     Results from last 7 days   Lab Units 03/09/20  0146   INR  1 19       * I Have Reviewed All Lab Data Listed Above  * Additional Pertinent Lab Tests Reviewed: [address] 67 Admission  Reviewed    Imaging:  Imaging Reports Reviewed Today Include:  CT sinuses, lower extremity duplex, MRI lumbar spine    Recent Cultures (last 7 days):     Results from last 7 days   Lab Units 03/10/20  0746 03/09/20  0158 03/09/20  0146 03/07/20  1028 03/07/20  0659   BLOOD CULTURE   --  No Growth After 4 Days  No Growth After 4 Days  --  No Growth After 5 Days  No Growth After 5 Days  SPUTUM CULTURE  4+ Growth of   Commensal respiratory khang only; No significant growth of Staph aureus/MRSA or Pseudomonas aeruginosa    --   --   --   --    GRAM STAIN RESULT  1+ Epithelial cells per low power field*  Rare Polys*  Rare Gram negative rods*  Rare Gram positive cocci in pairs*  --   --   --   --    LEGIONELLA URINARY ANTIGEN   --   --   --  Negative  --        Last 24 Hours Medication List:     Current Facility-Administered Medications:  acetaminophen 975 mg Oral [facility] CARLOS Mccann   aluminum-magnesium hydroxide-simethicone 30 mL Oral Q6H PRN CARLOS Mccann   aspirin 81 mg Oral Daily Freda Officer, CRNP   diltiazem 180 mg Oral Daily Freda Officer, CRNP   docusate sodium 100 mg Oral BID Nola Barreto PA-C   finasteride 5 mg Oral Daily Freda Officer, CRNP   fluticasone 1 spray Nasal Daily PRN Freda Officer, CRNP   gabapentin 300 mg Oral BID Stacy Rios PA-C   guaiFENesin 1,200 mg Oral BID Freda Officer, CRNP   heparin (porcine) 5,000 Units Subcutaneous [facility] Freda Officer, CRNP   HYDROmorphone 0 5 mg Intravenous Q4H PRN Stacy Rios PA-C   ipratropium 0 5 mg Nebulization TID Krystyna Gourd, CRNP   levalbuterol 1 25 mg Nebulization TID Krystyna Gourd, CRNP   lidocaine 2 patch Topical Daily Freda Officer, CRNP   menthol-methyl salicylate  Apply externally TID Nola Barreto PA-C   montelukast 10 mg Oral HS Freda Officer, CRNP   ondansetron 4 mg Intravenous Q6H PRN Freda Officer, CRNP   pantoprazole 40 mg Oral Early Morning Freda Officer, CRNP   polyethylene glycol 17 g Oral Daily Stacy Rios PA-C   predniSONE 40 mg Oral Daily Radha Noyola PA-C   tamsulosin 0 4 mg Oral Daily With [facility] [employer], CRNP   traMADol 50 mg Oral Q6H PRN Nola Barreto PA-C   triamcinolone  Topical BID Freda Officer, CARLOS        Today, Patient Was Seen By: Hortencia Hunter PA-C    ** Please Note: Dictation voice to text software may have been used in the creation of this document   **

## 2020-03-13 NOTE — ASSESSMENT & PLAN NOTE
· Present on admission as evidenced by tachycardia and fever and also with neutropenia   · Source is pneumonia   · BCx in process, negative thus far at 5 days  · Procalcitonin elevated  Lactic acid negative  · CT chest with bilateral infiltrates  · CT sinus = no significant abnormality  · Patient was just on a course of antibiotics prior to arrival   Upon admission he was seen by infectious disease and was put on broad-spectrum antibiotics with IV cefepime and vancomycin; Infectious Disease now believes this is viral pneumonia and discontinued antibiotics on March 10th  · Respiratory 2 panel negative  patient reports he has not left the [facility] in the past 2 weeks and denies any ill contacts    · Pulm following - s/p bronchoscopy follow up cultures

## 2020-03-13 NOTE — PLAN OF CARE
Problem: PHYSICAL THERAPY ADULT  Goal: Performs mobility at highest level of function for planned discharge setting  See evaluation for individualized goals  Description  Treatment/Interventions: Functional transfer training, LE strengthening/ROM, Therapeutic exercise, Endurance training, Cognitive reorientation, Equipment eval/education, Bed mobility, Gait training, OT, Spoke to nursing, Patient/family training, Elevations  Equipment Recommended: Walker(RW)       See flowsheet documentation for full assessment, interventions and recommendations  Outcome: Progressing  Note:   Prognosis: Good  Problem List: Decreased strength, Decreased endurance, Impaired balance, Decreased mobility, Pain  Assessment: Patient seated in bed side recliner, agreeable to participate in therapy  He was able to stand with S in RW and ambulate 60 feet x2 with one standing rest break  He has improved gait, however still has pain in LLE and c/o pain throughout ambulation  He was able to perform seated TE with difficulty on LLE for hip flexion  He would continue to benefit from skilled PT to maximize functional independence  Recommendation: Short-term skilled PT     PT - OK to Discharge: Yes(to rehab when medically stable)    See flowsheet documentation for full assessment

## 2020-03-13 NOTE — ASSESSMENT & PLAN NOTE
· Patients wife called ED due to rigors and "heavy breathing"   · Presents to ED with fever 102, oxygen saturation 70% at home when EMS arrived  · Was discharged from hospital the day prior to admission on amoxicillin and zithromax to treat for community acquired pneumonia  · Currently off oxygen  · Appreciate input from Infectious Disease, see management above   · Appreciate input from pulmonology given underlying interstitial lung disease  · Having hemoptysis  This could however be due to recent epistaxis?   · Pulmonology following, s/p bronchoscopy - follow up cultues

## 2020-03-13 NOTE — PROGRESS NOTES
Progress Note - Infectious Disease   Juli Reyes  67 y o  male MRN: [id number]  Unit/Bed#: S -01 Encounter: [id number]      Impression/Plan:  1   Sepsis   POA   Fever and tachycardia  Maame Jeanette source   Patient presented with hypoxia with CT scan showing infiltrate in lower lobes and portable chest x-ray concerning for possible right multifocal pneumonia   Patient now status post 7 days+ of antibiotic including Augmentin ceftriaxone and azithromycin, cefepime and vancomycin   Patient clinically more comfortable today without rigors but complaining of severe left hip to knee pain   Admission blood cultures are negative at 48 hours   Consider viral syndrome   White cell count down to 2 91, with atypical lymphs on smear and mild increase in AST   RP2 result negative  Rec:  · Observing off additional antibiotics hereafter  · Monitor temperature and hemodynamics  · Serial exam     2   Multifocal pneumonia   Viral suspected  In patient with hypoxia, CT scan showing bilateral infiltrate and serial x-ray concerning for multifocal pneumonia  Patient status post 7 days of antibiotic including Augmentin ceftriaxone and azithromycin with continued rigors and fever  Procal down to 0 81 and clinically improving, RP2 negative  patient status post bronchoscopy yesterday with mucous plugs removed in the right main stem bronchus  BAL cultures pending but preliminarily looks like mixed respiratory khang  Rec:  · Observe off additional antibiotics  · Will follow up final bronchoscopy cultures  · Monitor respiratory symptoms  · Disposition planning can continue in meantime      3   Recent epistaxis requiring packing   Patient with continual fever despite 1 week of antibiotics  CT sinuses negative   Hemoptysis may be related to recent major epistaxis  Rec:  · Observing off antibiotics     4  Acute kidney injury    Admission Creatinine 1 45 up from 1 1   Possibly secondary to sepsis/pre renal  Improved     5   Bicytopenia    mild leukopenia and thrombocytopenia  Consider viral syndrome despite RP2 negative   White cell count down to 2 91, with atypical lymphs on smear and mild increase in AST  No exposure to Level 3 COV19 locations/persons  Rec:  · Supportive measures     6  Chronic back pain with suspected sciatica flare of left upper leg   MRI of spine showing severe degenerative joint disease L4-S1 without osteomyelitis or diskitis evident   Patient's back and left leg pain well controlled today      Supportive care per primary care team     Antibiotics:  None     Above impression and plan discussed in detail with patient, RN, Narinder Carpenter NP, and SERINA Aguilar      Subjective:  Patient sitting out of bed to chair in recliner    He reports mild non productive cough status post bronchoscopy  No further bleeding or hemoptysis  Lower back and left sciatica pain seems to be well-controlled now  ROS: Patient has no fever, chills, sweats; no nausea, vomiting, diarrhea; no shortness of breath; no chest pain  No new symptoms  Objective:  Vitals:  Temp:  [97 8 °F (36 6 °C)-98 6 °F (37 °C)] 97 8 °F (36 6 °C)  HR:  [] 55  Resp:  [16-18] 18  BP: (126-157)/(80-97) 131/81  SpO2:  [92 %-95 %] 95 %  Temp (24hrs), Av 1 °F (36 7 °C), Min:97 8 °F (36 6 °C), Max:98 6 °F (37 °C)  Current: Temperature: 97 8 °F (36 6 °C)    General Appearance:  Awake, alert, cooperative, resting in chair, no acute distress  Throat: Oropharynx moist without lesions  Lungs:   Clear to auscultation bilaterally; no wheezes, rhonchi or rales; respirations unlabored, no active cough on exam   Heart:  RRR; S1-S2 heard, no murmur   Abdomen:   Soft, non-tender, non-distended, positive bowel sounds       Back: Increased movement in chair, no palpable abnormality   Extremities: No edema, arm IV removed   Skin: No rashes      Labs, Imaging, & Other studies:   All pertinent labs and imaging studies were personally reviewed  Results from last 7 days   Lab Units 03/11/20  0549 03/10/20  0519 03/09/20  0146   WBC Thousand/uL 2 91* 3 61* 6 15   HEMOGLOBIN g/dL 13 5 12 6 14 8   PLATELETS Thousands/uL 141* 129* 140*     Results from last 7 days   Lab Units 03/10/20  0519  03/09/20  0146  03/07/20  0658   POTASSIUM mmol/L 3 8   < > 4 1   < > 4 2   CHLORIDE mmol/L 104   < > 101   < > 101   CO2 mmol/L 26   < > 29   < > 27   BUN mg/dL 18   < > 24   < > 20   CREATININE mg/dL 1 16   < > 1 45*   < > 1 14   EGFR ml/min/1 73sq m 60   < > 46   < > 61   CALCIUM mg/dL 8 3   < > 8 5   < > 8 7   AST U/L 54*  --  21  --  20   ALT U/L 22  --  16  --  15   ALK PHOS U/L 43*  --  61  --  76    < > = values in this interval not displayed  Results from last 7 days   Lab Units 03/11/20  0441 03/10/20  0519 03/09/20  0605 03/08/20  0454 03/07/20  0658   PROCALCITONIN ng/ml 0 81* 1 21* 1 03* 0 05 <0 05     Results from last 7 days   Lab Units 03/12/20  1544 03/10/20  0746 03/09/20  0433 03/09/20  0158 03/09/20  0146 03/07/20  1028 03/07/20  0659   BLOOD CULTURE   --   --   --  No Growth After 4 Days  No Growth After 4 Days  --  No Growth After 5 Days  No Growth After 5 Days  SPUTUM CULTURE   --  4+ Growth of   Commensal respiratory khang only; No significant growth of Staph aureus/MRSA or Pseudomonas aeruginosa    --   --   --   --   --    GRAM STAIN RESULT  2+ RBC's*  1+ Polys*  Rare Gram positive cocci in pairs* 1+ Epithelial cells per low power field*  Rare Polys*  Rare Gram negative rods*  Rare Gram positive cocci in pairs*  --   --   --   --   --    MRSA CULTURE ONLY   --   --  No Methicillin Resistant Staphlyococcus aureus (MRSA) isolated  --   --   --   --    LEGIONELLA URINARY ANTIGEN   --   --   --   --   --  Negative  --

## 2020-03-13 NOTE — ASSESSMENT & PLAN NOTE
· Patient reports severe left proximal lower extremity pain, associated with ambulatory dysfunction  · MRI of the lumbar sacral spine "No definite MR evidence for discitis osteomyelitis as clinically questioned  Multilevel degenerative changes of the lumbar spine, as described above  Severe left foraminal narrowing at L4-L5 and L5-S1 results in impingement of the exiting nerve roots  Left lateral recess stenosis with contact of the respective descending of L5 and S1 nerve roots is also present at these levels "  · Strongly suspect radiculopathy is the cause for patient's pain  Will continue to  treat with the following measures for now:  Physical therapy, Around the clock Tylenol, topical menthol, increased dose of gabapentin, p r n  Tramadol for moderate pain, p r n  IV Dilaudid for severe pain    Recommend consultation to spine surgery if no improvement with conservative measures; consider epidural steroid injection outpatient  · Pt reports to improved pain and IV solu-medrol was changed to prednisone 40 mg qd with taper   · Increased bowel regimen with use of narcotics  · PT and OT have recommended rehab when medically stable

## 2020-03-13 NOTE — SOCIAL WORK
CM received VM from patient's spouse Isabel Roque this morning asking for CM to send referrals to SNFs in the following preference order: [facility], [facility], [facility], [facility], [facility]  CM sent referrals via [employer] as requested and [facility] and [facility] are both able to accept today  [facility] does not have bed availability and [facility] and [facility] are still reviewing  CM called and reviewed with patient's spouse Isabel Roque and she would like for CM to submit for authorization  For admission to [facility]  CM contacted [facility] at [phone] and spoke with Jelani lEler  Pending auth [id number]  CM instructed to fax clinical to: [phone]  CM faxed clinical as requested and will wait to hear from [facility]  CM uploaded negative PASRR to [employer] at their request via [address] [place] [address] Drive

## 2020-03-14 VITALS
DIASTOLIC BLOOD PRESSURE: 75 MMHG | RESPIRATION RATE: 18 BRPM | HEART RATE: 41 BPM | BODY MASS INDEX: 34.41 KG/M2 | OXYGEN SATURATION: 96 % | SYSTOLIC BLOOD PRESSURE: 141 MMHG | WEIGHT: 227.07 LBS | HEIGHT: 68 IN | TEMPERATURE: 97.6 F

## 2020-03-14 LAB
ANION GAP SERPL CALCULATED.3IONS-SCNC: 7 MMOL/L (ref 4–13)
BACTERIA BLD CULT: NORMAL
BACTERIA BLD CULT: NORMAL
BACTERIA BRONCH AEROBE CULT: ABNORMAL
BACTERIA BRONCH AEROBE CULT: NORMAL
BACTERIA BRONCH AEROBE CULT: NORMAL
BUN SERPL-MCNC: 26 MG/DL (ref 5–25)
CALCIUM SERPL-MCNC: 8.7 MG/DL (ref 8.3–10.1)
CHLORIDE SERPL-SCNC: 106 MMOL/L (ref 100–108)
CO2 SERPL-SCNC: 27 MMOL/L (ref 21–32)
CREAT SERPL-MCNC: 1.08 MG/DL (ref 0.6–1.3)
ERYTHROCYTE [DISTWIDTH] IN BLOOD BY AUTOMATED COUNT: 13.8 % (ref 11.6–15.1)
GFR SERPL CREATININE-BSD FRML MDRD: 65 ML/MIN/1.73SQ M
GLUCOSE SERPL-MCNC: 100 MG/DL (ref 65–140)
GRAM STN SPEC: ABNORMAL
GRAM STN SPEC: NORMAL
GRAM STN SPEC: NORMAL
HCT VFR BLD AUTO: 41.8 % (ref 36.5–49.3)
HGB BLD-MCNC: 13.7 G/DL (ref 12–17)
MCH RBC QN AUTO: 30 PG (ref 26.8–34.3)
MCHC RBC AUTO-ENTMCNC: 32.8 G/DL (ref 31.4–37.4)
MCV RBC AUTO: 92 FL (ref 82–98)
METANEPH FREE SERPL-MCNC: 17 PG/ML (ref 0–62)
NORMETANEPHRINE SERPL-MCNC: 44 PG/ML (ref 0–145)
PLATELET # BLD AUTO: 183 THOUSANDS/UL (ref 149–390)
PMV BLD AUTO: 10.3 FL (ref 8.9–12.7)
POTASSIUM SERPL-SCNC: 3.9 MMOL/L (ref 3.5–5.3)
PROCALCITONIN SERPL-MCNC: 0.13 NG/ML
RBC # BLD AUTO: 4.56 MILLION/UL (ref 3.88–5.62)
SODIUM SERPL-SCNC: 140 MMOL/L (ref 136–145)
WBC # BLD AUTO: 4.45 THOUSAND/UL (ref 4.31–10.16)

## 2020-03-14 PROCEDURE — 94640 AIRWAY INHALATION TREATMENT: CPT

## 2020-03-14 PROCEDURE — 84145 PROCALCITONIN (PCT): CPT | Performed by: PHYSICIAN ASSISTANT

## 2020-03-14 PROCEDURE — 85027 COMPLETE CBC AUTOMATED: CPT | Performed by: PHYSICIAN ASSISTANT

## 2020-03-14 PROCEDURE — 99239 HOSP IP/OBS DSCHRG MGMT >30: CPT | Performed by: PHYSICIAN ASSISTANT

## 2020-03-14 PROCEDURE — 94760 N-INVAS EAR/PLS OXIMETRY 1: CPT

## 2020-03-14 PROCEDURE — 80048 BASIC METABOLIC PNL TOTAL CA: CPT | Performed by: PHYSICIAN ASSISTANT

## 2020-03-14 RX ORDER — PREDNISONE 20 MG/1
TABLET ORAL
Refills: 0
Start: 2020-03-14 | End: 2020-03-25 | Stop reason: ALTCHOICE

## 2020-03-14 RX ORDER — LANOLIN ALCOHOL/MO/W.PET/CERES
3 CREAM (GRAM) TOPICAL ONCE
Status: COMPLETED | OUTPATIENT
Start: 2020-03-14 | End: 2020-03-14

## 2020-03-14 RX ORDER — ACETAMINOPHEN 325 MG/1
975 TABLET ORAL EVERY 8 HOURS SCHEDULED
Qty: 30 TABLET | Refills: 0
Start: 2020-03-14 | End: 2020-03-24 | Stop reason: ALTCHOICE

## 2020-03-14 RX ORDER — LEVALBUTEROL 1.25 MG/.5ML
1.25 SOLUTION, CONCENTRATE RESPIRATORY (INHALATION)
Refills: 0
Start: 2020-03-14 | End: 2020-03-25 | Stop reason: ALTCHOICE

## 2020-03-14 RX ORDER — GUAIFENESIN 1200 MG/1
1200 TABLET, EXTENDED RELEASE ORAL 2 TIMES DAILY
Refills: 0
Start: 2020-03-14 | End: 2020-03-21

## 2020-03-14 RX ORDER — POLYETHYLENE GLYCOL 3350 17 G/17G
17 POWDER, FOR SOLUTION ORAL DAILY
Qty: 14 EACH | Refills: 0
Start: 2020-03-14 | End: 2020-03-24 | Stop reason: ALTCHOICE

## 2020-03-14 RX ORDER — GABAPENTIN 100 MG/1
200 CAPSULE ORAL 2 TIMES DAILY
Start: 2020-03-14 | End: 2020-08-26

## 2020-03-14 RX ORDER — DOCUSATE SODIUM 100 MG/1
100 CAPSULE, LIQUID FILLED ORAL 2 TIMES DAILY
Qty: 10 CAPSULE | Refills: 0
Start: 2020-03-14 | End: 2020-03-24 | Stop reason: ALTCHOICE

## 2020-03-14 RX ADMIN — GABAPENTIN 300 MG: 300 CAPSULE ORAL at 09:12

## 2020-03-14 RX ADMIN — IPRATROPIUM BROMIDE 0.5 MG: 0.5 SOLUTION RESPIRATORY (INHALATION) at 13:24

## 2020-03-14 RX ADMIN — DILTIAZEM HYDROCHLORIDE 180 MG: 180 CAPSULE, COATED, EXTENDED RELEASE ORAL at 09:13

## 2020-03-14 RX ADMIN — POLYETHYLENE GLYCOL 3350 17 G: 17 POWDER, FOR SOLUTION ORAL at 09:12

## 2020-03-14 RX ADMIN — HEPARIN SODIUM 5000 UNITS: 5000 INJECTION INTRAVENOUS; SUBCUTANEOUS at 05:43

## 2020-03-14 RX ADMIN — MENTHOL, METHYL SALICYLATE: 10; 15 CREAM TOPICAL at 09:14

## 2020-03-14 RX ADMIN — IPRATROPIUM BROMIDE 0.5 MG: 0.5 SOLUTION RESPIRATORY (INHALATION) at 07:28

## 2020-03-14 RX ADMIN — LIDOCAINE 2 PATCH: 50 PATCH TOPICAL at 09:13

## 2020-03-14 RX ADMIN — LEVALBUTEROL HYDROCHLORIDE 1.25 MG: 1.25 SOLUTION, CONCENTRATE RESPIRATORY (INHALATION) at 07:29

## 2020-03-14 RX ADMIN — ACETAMINOPHEN 975 MG: 325 TABLET, FILM COATED ORAL at 05:43

## 2020-03-14 RX ADMIN — DOCUSATE SODIUM 100 MG: 100 CAPSULE, LIQUID FILLED ORAL at 09:13

## 2020-03-14 RX ADMIN — TRIAMCINOLONE ACETONIDE: 1 CREAM TOPICAL at 09:14

## 2020-03-14 RX ADMIN — PANTOPRAZOLE SODIUM 40 MG: 40 TABLET, DELAYED RELEASE ORAL at 05:43

## 2020-03-14 RX ADMIN — GUAIFENESIN 1200 MG: 600 TABLET, EXTENDED RELEASE ORAL at 09:13

## 2020-03-14 RX ADMIN — FINASTERIDE 5 MG: 5 TABLET, FILM COATED ORAL at 09:12

## 2020-03-14 RX ADMIN — PREDNISONE 40 MG: 20 TABLET ORAL at 09:12

## 2020-03-14 RX ADMIN — ASPIRIN 81 MG 81 MG: 81 TABLET ORAL at 09:13

## 2020-03-14 RX ADMIN — LEVALBUTEROL HYDROCHLORIDE 1.25 MG: 1.25 SOLUTION, CONCENTRATE RESPIRATORY (INHALATION) at 13:24

## 2020-03-14 RX ADMIN — MELATONIN 3 MG: at 02:10

## 2020-03-14 NOTE — SOCIAL WORK
CM received call from Sang Bowie at [place] ([phone]) notifying that patient is approved for STR at [facility] with auth# [id number] from today through 3/18; NRD 3/19 to Jeanna Boothe at [phone], fax [phone]  CM was notified by [facility] that patient is medically cleared for discharge to STR  CM contacted [facility] via [employer] and also spoke to Maria Teresa at [facility] ([phone]) who states that patient is accepted and bed available today  Facility contacts updated  [facility] Tel for report is [phone] fax [phone]  JOIE spoke to wife Gayathri Benjamin at [phone] (she is here at bedside) to verify discharge plan to [facility]   Wife to transport around 3-4 PM today  CM notified [facility] via [employer] of transport time

## 2020-03-14 NOTE — PLAN OF CARE
Problem: Potential for Falls  Goal: Patient will remain free of falls  Description  INTERVENTIONS:  - Assess patient frequently for physical needs  -  Identify cognitive and physical deficits and behaviors that affect risk of falls    -  [place] fall precautions as indicated by assessment   - Educate patient/family on patient safety including physical limitations  - Instruct patient to call for assistance with activity based on assessment  - Modify environment to reduce risk of injury  - Consider OT/PT consult to assist with strengthening/mobility  Outcome: Progressing     Problem: PAIN - ADULT  Goal: Verbalizes/displays adequate comfort level or baseline comfort level  Description  Interventions:  - Encourage patient to monitor pain and request assistance  - Assess pain using appropriate pain scale  - Administer analgesics based on type and severity of pain and evaluate response  - Implement non-pharmacological measures as appropriate and evaluate response  - Consider cultural and social influences on pain and pain management  - Notify physician/advanced practitioner if interventions unsuccessful or patient reports new pain  Outcome: Progressing     Problem: INFECTION - ADULT  Goal: Absence or prevention of progression during hospitalization  Description  INTERVENTIONS:  - Assess and monitor for signs and symptoms of infection  - Monitor lab/diagnostic results  - Monitor all insertion sites, i e  indwelling lines, tubes, and drains  - Monitor endotracheal if appropriate and nasal secretions for changes in amount and color  - [place] appropriate cooling/warming therapies per order  - Administer medications as ordered  - Instruct and encourage patient and family to use good hand hygiene technique  - Identify and instruct in appropriate isolation precautions for identified infection/condition  Outcome: Progressing     Problem: SAFETY ADULT  Goal: Patient will remain free of falls  Description  INTERVENTIONS:  - Assess patient frequently for physical needs  -  Identify cognitive and physical deficits and behaviors that affect risk of falls    -  [place] fall precautions as indicated by assessment   - Educate patient/family on patient safety including physical limitations  - Instruct patient to call for assistance with activity based on assessment  - Modify environment to reduce risk of injury  - Consider OT/PT consult to assist with strengthening/mobility  Outcome: Progressing  Goal: Maintain or return to baseline ADL function  Description  INTERVENTIONS:  -  Assess patient's ability to carry out ADLs; assess patient's baseline for ADL function and identify physical deficits which impact ability to perform ADLs (bathing, care of mouth/teeth, toileting, grooming, dressing, etc )  - Assess/evaluate cause of self-care deficits   - Assess range of motion  - Assess patient's mobility; develop plan if impaired  - Assess patient's need for assistive devices and provide as appropriate  - Encourage maximum independence but intervene and supervise when necessary  - Involve family in performance of ADLs  - Assess for home care needs following discharge   - Consider OT consult to assist with ADL evaluation and planning for discharge  - Provide patient education as appropriate  Outcome: Progressing  Goal: Maintain or return mobility status to optimal level  Description  INTERVENTIONS:  - Assess patient's baseline mobility status (ambulation, transfers, stairs, etc )    - Identify cognitive and physical deficits and behaviors that affect mobility  - Identify mobility aids required to assist with transfers and/or ambulation (gait belt, sit-to-stand, lift, walker, cane, etc )  - [place] fall precautions as indicated by assessment  - Record patient progress and toleration of activity level on Mobility SBAR; progress patient to next Phase/Stage  - Instruct patient to call for assistance with activity based on assessment  - Consider rehabilitation consult to assist with strengthening/weightbearing, etc   Outcome: Progressing     Problem: DISCHARGE PLANNING  Goal: Discharge to home or other facility with appropriate resources  Description  INTERVENTIONS:  - Identify barriers to discharge w/patient and caregiver  - Arrange for needed discharge resources and transportation as appropriate  - Identify discharge learning needs (meds, wound care, etc )  - Arrange for interpretive services to assist at discharge as needed  - Refer to Case Management Department for coordinating discharge planning if the patient needs post-hospital services based on physician/advanced practitioner order or complex needs related to functional status, cognitive ability, or social support system  Outcome: Progressing     Problem: Knowledge Deficit  Goal: Patient/family/caregiver demonstrates understanding of disease process, treatment plan, medications, and discharge instructions  Description  Complete learning assessment and assess knowledge base    Interventions:  - Provide teaching at level of understanding  - Provide teaching via preferred learning methods  Outcome: Progressing     Problem: RESPIRATORY - ADULT  Goal: Achieves optimal ventilation and oxygenation  Description  INTERVENTIONS:  - Assess for changes in respiratory status  - Assess for changes in mentation and behavior  - Position to facilitate oxygenation and minimize respiratory effort  - Oxygen administered by appropriate delivery if ordered  - Initiate smoking cessation education as indicated  - Encourage broncho-pulmonary hygiene including cough, deep breathe, Incentive Spirometry  - Assess the need for suctioning and aspirate as needed  - Assess and instruct to report SOB or any respiratory difficulty  - Respiratory Therapy support as indicated  Outcome: Progressing     Problem: Prexisting or High Potential for Compromised Skin Integrity  Goal: Skin integrity is maintained or improved  Description  INTERVENTIONS:  - Identify patients at risk for skin breakdown  - Assess and monitor skin integrity  - Assess and monitor nutrition and hydration status  - Monitor labs   - Assess for incontinence   - Turn and reposition patient  - Assist with mobility/ambulation  - Relieve pressure over bony prominences  - Avoid friction and shearing  - Provide appropriate hygiene as needed including keeping skin clean and dry  - Evaluate need for skin moisturizer/barrier cream  - Collaborate with interdisciplinary team   - Patient/family teaching  - Consider wound care consult   Outcome: Progressing

## 2020-03-16 ENCOUNTER — TRANSITIONAL CARE MANAGEMENT (OUTPATIENT)
Dept: INTERNAL MEDICINE CLINIC | Facility: CLINIC | Age: 80
End: 2020-03-16

## 2020-03-16 ENCOUNTER — PATIENT OUTREACH (OUTPATIENT)
Dept: CASE MANAGEMENT | Facility: OTHER | Age: 80
End: 2020-03-16

## 2020-03-16 DIAGNOSIS — Z71.89 COMPLEX CARE COORDINATION: Primary | ICD-10-CM

## 2020-03-20 ENCOUNTER — TELEPHONE (OUTPATIENT)
Dept: INTERNAL MEDICINE CLINIC | Facility: CLINIC | Age: 80
End: 2020-03-20

## 2020-03-23 RX ORDER — LEVALBUTEROL INHALATION SOLUTION 1.25 MG/3ML
SOLUTION RESPIRATORY (INHALATION)
COMMUNITY
Start: 2020-03-19 | End: 2020-03-24 | Stop reason: ALTCHOICE

## 2020-03-23 NOTE — UTILIZATION REVIEW
Notification of Discharge  This is a Notification of Discharge from our facility [address]  Please be advised that this patient has been discharge from our facility  Below you will find the admission and discharge date and time including the patients disposition  PRESENTATION DATE: 3/9/2020  1:28 AM  OBS ADMISSION DATE:   IP ADMISSION DATE: 3/9/20 0256   DISCHARGE DATE: 3/14/2020  4:10 PM  DISPOSITION: Non [facility] SNF/TCU/SNU Non [facility] SNF/TCU/SNU   Admission Orders listed below:  Admission Orders (From admission, onward)     Ordered        03/09/20 0255  Inpatient Admission (expected length of stay for this patient Order details is greater than two midnights)  Once                   Please contact the UR Department if additional information is required to close this patient's authorization/case  [address] [facility] Utilization Review Department  Main: [phone] x carefully listen to the prompts  All voicemails are confidential   [email or web address]  Send all requests for admission clinical reviews, approved or denied determinations and any other requests to dedicated fax number below belonging to the campus where the patient is receiving treatment   List of dedicated fax numbers:  [address] DENIALS (Administrative/Medical Necessity) [phone]   [address] (Maternity/NICU/Pediatrics) [phone]   Aj Pugh [phone]   True Constant [phone]   Lee Standing [phone]   [address] [phone]   [facility]  [phone]   [address] [facility]  [address] [facility] 1000 [facility] [phone]

## 2020-03-24 ENCOUNTER — TELEMEDICINE (OUTPATIENT)
Dept: INTERNAL MEDICINE CLINIC | Facility: CLINIC | Age: 80
End: 2020-03-24
Payer: COMMERCIAL

## 2020-03-24 ENCOUNTER — DOCUMENTATION (OUTPATIENT)
Dept: URGENT CARE | Age: 80
End: 2020-03-24

## 2020-03-24 ENCOUNTER — OFFICE VISIT (OUTPATIENT)
Dept: INTERNAL MEDICINE CLINIC | Age: 80
End: 2020-03-24
Payer: COMMERCIAL

## 2020-03-24 ENCOUNTER — PATIENT OUTREACH (OUTPATIENT)
Dept: INTERNAL MEDICINE CLINIC | Age: 80
End: 2020-03-24

## 2020-03-24 VITALS
TEMPERATURE: 99.8 F | HEIGHT: 68 IN | HEART RATE: 79 BPM | DIASTOLIC BLOOD PRESSURE: 72 MMHG | SYSTOLIC BLOOD PRESSURE: 126 MMHG | OXYGEN SATURATION: 96 % | WEIGHT: 227 LBS | BODY MASS INDEX: 34.4 KG/M2

## 2020-03-24 DIAGNOSIS — J96.01 ACUTE RESPIRATORY FAILURE WITH HYPOXIA (HCC): ICD-10-CM

## 2020-03-24 DIAGNOSIS — N17.9 AKI (ACUTE KIDNEY INJURY) (HCC): Primary | ICD-10-CM

## 2020-03-24 DIAGNOSIS — A41.9 SEPSIS WITHOUT ACUTE ORGAN DYSFUNCTION, DUE TO UNSPECIFIED ORGANISM (HCC): ICD-10-CM

## 2020-03-24 DIAGNOSIS — R07.81 PLEURITIC PAIN: Primary | ICD-10-CM

## 2020-03-24 DIAGNOSIS — Z20.828 EXPOSURE TO SARS-ASSOCIATED CORONAVIRUS: Primary | ICD-10-CM

## 2020-03-24 DIAGNOSIS — Z20.828 EXPOSURE TO SARS-ASSOCIATED CORONAVIRUS: ICD-10-CM

## 2020-03-24 DIAGNOSIS — J18.9 PNEUMONIA OF BOTH LUNGS DUE TO INFECTIOUS ORGANISM, UNSPECIFIED PART OF LUNG: ICD-10-CM

## 2020-03-24 PROCEDURE — 87635 SARS-COV-2 COVID-19 AMP PRB: CPT

## 2020-03-24 PROCEDURE — 99496 TRANSJ CARE MGMT HIGH F2F 7D: CPT | Performed by: INTERNAL MEDICINE

## 2020-03-24 PROCEDURE — 1111F DSCHRG MED/CURRENT MED MERGE: CPT | Performed by: INTERNAL MEDICINE

## 2020-03-24 PROCEDURE — G2012 BRIEF CHECK IN BY MD/QHP: HCPCS | Performed by: NURSE PRACTITIONER

## 2020-03-24 NOTE — PROGRESS NOTES
Assessment/Plan:     There are no diagnoses linked to this encounter  Bilateral pneumonia with infiltrates and initial left pleural effusion    pleuritic type of chest pain negative venous Doppler negative CT a history of hemoptysis bronchoscopy results noted  Sepsis secondary to pneumonia with negative blood cultures resolved  Acute respiratory failure which is resolved    Subjective:   Chief Complaint   Patient presents with    Transition of Care Management     D/C from [address] [place] [address] on 3/14/2020 for Neumonia and Sepsis  D/C from [facility] on 3/20/2020        Patient ID: Ovidio Kramer  is a 67 y o  male  HPI  Patient was followed up in my office for hemoptysis and shortness of breath I ordered the CT scan to rule out any lung lesion or possibility of bronchiectasis or tuberculosis or interstitial lung disease the insurance company refused the CT scan subsequently patient was seen in the emergency room several time with the epistaxis and hemoptysis  Finally he was seen in the emergency room with fever chills and septic picture chest x-ray and chest CT was done which shows bilateral lung infiltrates also initial chest x-ray shows and small left pleural effusion left pleural effusion pleural   He was seen by the Pulmonary and also by the Infectious Disease broad spectrum antibiotics was used and then it was DC did as it looks like it was from the viral illness his the sputum cultures were negative his bronchial washings were negative  BNP was slightly elevated  Patient also having some recurrent episodes of acute gout or pseudogout not documented because the knee joint was not aspirated  Patient was admitted to the skilled nursing facility now he is back home after the rehab he is doing well except for the right sided pleuritic type of chest pain he does not have any complaints he does not have any fever or chills    He has an appointment with the pulmonary specialist tomorrow    The following portions of the patient's history were reviewed and updated as appropriate: allergies, current medications, past family history, past medical history, past social history, past surgical history and problem list     Review of Systems   Constitutional: Positive for fatigue  Negative for appetite change and fever  HENT: Negative for congestion, ear pain, hearing loss, nosebleeds, sneezing, tinnitus and voice change  Eyes: Negative for pain, discharge and redness  Respiratory: Positive for shortness of breath  Negative for cough, chest tightness and wheezing  Cardiovascular: Negative for chest pain, palpitations and leg swelling  Gastrointestinal: Negative for abdominal pain, blood in stool, constipation, diarrhea, nausea and vomiting  Genitourinary: Negative for difficulty urinating, dysuria, hematuria and urgency  Musculoskeletal: Negative for arthralgias, back pain, gait problem and joint swelling  Skin: Negative for rash and wound  Allergic/Immunologic: Negative for environmental allergies  Neurological: Positive for weakness  Negative for dizziness, tremors, seizures, light-headedness and numbness  Poor balance   Hematological: Negative for adenopathy  Does not bruise/bleed easily  Psychiatric/Behavioral: Negative for behavioral problems and confusion  The patient is not nervous/anxious  Past Medical History:   Diagnosis Date    Abnormal electrocardiogram     Last assessed: Oct 11, 2013    Allergic rhinitis     last assessed: Oct 11, 2013    Allergic rhinitis due to pollen     last assessed: Oct 10, 2013    Allergic sinusitis     Last assessed: May 11, 2015    Allergy     resolved: July 22, 2015    Benign essential hypertension     Last assessed/resolved: May 31, 2017    BPH (benign prostatic hyperplasia)     Colitis     Last assessed: May 24, 2016    Generalized osteoarthritis     Last assessed: Oct 11, 2013    GERD without esophagitis     Last assessed/resolved:  May 31, 2017    Hearing loss     Hiatal hernia     resolved: July 22, 2015    History of gastroesophageal reflux (GERD)     History of stomach ulcers     last assessed: May 11, 2015    Hypertension     Incomplete bladder emptying     Kidney stone     Nodular prostate with lower urinary tract symptoms     Nontoxic single thyroid nodule     last assessed: April 16, 2014    Orchalgia     Overweight     last assessed: Oct 31, 2013    Poor urinary stream     Pulmonary embolism [facility])     Salivary gland disorder     last assessed: April 16, 2014    Seasonal allergies     last assessed: May 15, 2015    Spermatocele     Straining to void     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:  May 7, 2014         Current Outpatient Medications:     aspirin 81 mg chewable tablet, Chew 81 mg daily, Disp: , Rfl:     diltiazem (CARDIZEM CD) 180 mg 24 hr capsule, TAKE 1 CAPSULE BY MOUTH EVERY DAY, Disp: 90 capsule, Rfl: 1    finasteride (PROSCAR) 5 mg tablet, TAKE 1 TABLET BY MOUTH EVERY DAY, Disp: 90 tablet, Rfl: 2    fluticasone (FLONASE) 50 mcg/act nasal spray, 1 spray into each nostril daily as needed for rhinitis or allergies, Disp: 1 Bottle, Rfl: 0    gabapentin (NEURONTIN) 100 mg capsule, Take 2 capsules (200 mg total) by mouth 2 (two) times a day, Disp: , Rfl:     ipratropium (ATROVENT) 0 02 % nebulizer solution, Take 1 vial (0 5 mg total) by nebulization 3 (three) times a day, Disp: , Rfl: 0    levalbuterol (XOPENEX) 1 25 mg/0 5 mL nebulizer solution, Take 0 5 mL (1 25 mg total) by nebulization 3 (three) times a day, Disp: , Rfl: 0    montelukast (SINGULAIR) 10 mg tablet, Take 10 mg by mouth daily at bedtime, Disp: , Rfl:     tamsulosin (FLOMAX) 0 4 mg, Take 1 capsule (0 4 mg total) by mouth daily with dinner, Disp: 90 capsule, Rfl: 3    triamcinolone (KENALOG) 0 1 % oral topical paste, 1 application to area on lip bid x 7 days, Disp: 5 g, Rfl: 0    predniSONE 20 mg tablet, For 2 days, then 30mg PO for 3 days, then 20mg PO for 3 days, then 10mg PO for 3 days and stop, Disp: , Rfl: 0    Allergies   Allergen Reactions    Ace Inhibitors Hyperactivity    Molds & Smuts Sneezing    Other     Pollen Extract Sneezing    Short Ragweed Pollen Ext Hives    Tree Extract        Social History   Past Surgical History:   Procedure Laterality Date    BLADDER SURGERY      CHOLECYSTECTOMY  2000    laparoscopic     HEMORRHOID SURGERY      pilionidal cyst removal     HERNIA REPAIR Left 01/17/2008    inguinal     KNEE ARTHROSCOPY Left 1974    KNEE CARTILAGE SURGERY      NASAL SEPTUM SURGERY      Deviation repair     PROSTATE BIOPSY  2017    STOMACH SURGERY      TONSILLECTOMY AND ADENOIDECTOMY      at age 37   [facility]ones TOTAL SHOULDER ARTHROPLASTY      SHOULDER JOINT REPLACEMENT - right 01/04 0 left 01/95    VASECTOMY       Family History   Problem Relation Age of Onset    Hypertension Mother     Stroke Mother     Stomach cancer Father     Hypertension Father     Hypertension Family     Stroke Family         syndrome        Objective:  /72 (BP Location: Left arm, Patient Position: Sitting, Cuff Size: Standard)   Pulse 79   Temp 99 8 °F (37 7 °C) (Tympanic)   Ht 5' 8" (1 727 m)   Wt 103 kg (227 lb)   SpO2 96%   BMI 34 52 kg/m²        Physical Exam   Constitutional: He is oriented to person, place, and time  He appears well-developed and well-nourished  HENT:   Right Ear: Tympanic membrane is not bulging  Left Ear: Tympanic membrane is not bulging  Nose: No mucosal edema  Mouth/Throat: Mucous membranes are not dry  No posterior oropharyngeal edema or posterior oropharyngeal erythema  Tonsils are 0 on the right  Tonsils are 0 on the left  Eyes: Pupils are equal, round, and reactive to light  Conjunctivae and EOM are normal    Neck: Normal range of motion  No JVD present  No thyromegaly present  Cardiovascular: Normal rate, regular rhythm, normal heart sounds and intact distal pulses     Pulmonary/Chest: Effort normal and breath sounds normal    Decreased breath sounds at left base otherwise bilateral basal crackles   Abdominal: Soft  Bowel sounds are normal    Musculoskeletal: Normal range of motion  He exhibits edema  Lymphadenopathy:     He has no cervical adenopathy  Neurological: He is alert and oriented to person, place, and time  He has normal reflexes  Skin: Skin is dry  Psychiatric: He has a normal mood and affect   His behavior is normal  Judgment and thought content normal

## 2020-03-24 NOTE — PROGRESS NOTES
Outpatient Care Management Note:  RE: Spoke with pt and wife in depth regarding KAYLI  and his interest  Also called [facility] and spoke with Medical Records 2x regarding pt labs prior to discharge  He is declining  involvement due to having to go for ordered lab work that PCP did not order, understandably, with fear of virus exposure  Pt had originally refused [facility] once released from [address] [place] [address] as he was there for 3 days  Jurgen Barrios is no longer using a walker or a cane  His appetite has been good  Encouraged an slight increase in water, or fluid intake as he has been drinking under 40 ounces a day  Also, discussed having pt use incentive spirometer intermittently throughout the day  He denies any cough or shortness of breath  Has Pulm f/u appt tomorrow  Will close

## 2020-03-24 NOTE — PROGRESS NOTES
COVID-19 Virtual Visit     This virtual check-in was done via telephone  Encounter provider Liyah Bradley    Provider located at [address] [address]    Recent Visits  Date Type Provider Dept   03/20/20 Telephone Marlin Fleischer, MD [facility]   Showing recent visits within past 7 days and meeting all other requirements     Today's Visits  Date Type Provider Dept   03/24/20 Office Visit Marlin Fleischer, MD [facility]   Showing today's visits and meeting all other requirements     Future Appointments  No visits were found meeting these conditions  Showing future appointments within next 150 days and meeting all other requirements        Patient agrees to participate in a virtual check in via telephone or video visit instead of presenting to the office to address urgent/immediate medical needs  Patient is aware this is a billable service  After connecting through telephone, the patient was identified by name and date of birth  Everardo Flores  was informed that this was a telemedicine visit and that the exam was being conducted confidentially over secure lines  My office door was closed  No one else was in the room  Everardo Flores  acknowledged consent and understanding of privacy and security of the telemedicine visit  I informed the patient that I have reviewed his record in Epic and presented the opportunity for him to ask any questions regarding the visit today  The patient agreed to participate  Everardo Flores  is a 67 y o  male who is concerned about COVID-19  Received after hour call from  pt called office with temp of 100 1, shortness of breath and back pain    Reviewed chart  Pt had a virtual visit with Dr Bryon Spence today for TCM follow-up    Pt was admitted for bilateral pneumonia, L pleural effusion, and acute respiratory failure  Was started on antibiotics, but was discontinued as appeared viral as his sputum cultures were negative and bronchial washing was negative    Was seen by infectious disease  Respiratory panel negative  No COVID testing was completed as pt had not left the [place] [address] in the past 2 weeks and no ill contacts  At the time of hospitalization COVID was not thought to be community spread at that time  Was discharged from hospital on 3/14 with limited COVID cases at that time    Now pt with a temp of 100 7, back pain, shortness of breath  Denies cough or resp distress  He reports fever and shortness of breath  He has not traveled outside the [place]  within the last 14 days    He has not had contact with a person who is under investigation for or who is positive for COVID-19 within the last 14 days  He has been hospitalized recently for fever and/or lower respiratory symptoms  Past Medical History:   Diagnosis Date    Abnormal electrocardiogram     Last assessed: Oct 11, 2013    Allergic rhinitis     last assessed: Oct 11, 2013    Allergic rhinitis due to pollen     last assessed: Oct 10, 2013    Allergic sinusitis     Last assessed: May 11, 2015    Allergy     resolved: July 22, 2015    Benign essential hypertension     Last assessed/resolved: May 31, 2017    BPH (benign prostatic hyperplasia)     Colitis     Last assessed: May 24, 2016    Generalized osteoarthritis     Last assessed: Oct 11, 2013    GERD without esophagitis     Last assessed/resolved:  May 31, 2017    Hearing loss     Hiatal hernia     resolved: July 22, 2015    History of gastroesophageal reflux (GERD)     History of stomach ulcers     last assessed: May 11, 2015    Hypertension     Incomplete bladder emptying     Kidney stone     Nodular prostate with lower urinary tract symptoms     Nontoxic single thyroid nodule     last assessed: April 16, 2014    Orchalgia     Overweight     last assessed: Oct 31, 2013    Poor urinary stream     Pulmonary embolism [facility])     Salivary gland disorder     last assessed: April 16, 2014    Seasonal allergies     last assessed: May 15, 2015    Spermatocele     Straining to void     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:  May 7, 2014       Past Surgical History:   Procedure Laterality Date    BLADDER SURGERY      CHOLECYSTECTOMY  2000    laparoscopic     HEMORRHOID SURGERY      pilionidal cyst removal     HERNIA REPAIR Left 01/17/2008    inguinal     KNEE ARTHROSCOPY Left 1974    KNEE CARTILAGE SURGERY      NASAL SEPTUM SURGERY      Deviation repair     PROSTATE BIOPSY  2017    STOMACH SURGERY      TONSILLECTOMY AND ADENOIDECTOMY      at age 37   [address] [address] [address] - right 01/04 0 left 01/95    VASECTOMY         Current Outpatient Medications   Medication Sig Dispense Refill    aspirin 81 mg chewable tablet Chew 81 mg daily      diltiazem (CARDIZEM CD) 180 mg 24 hr capsule TAKE 1 CAPSULE BY MOUTH EVERY DAY 90 capsule 1    finasteride (PROSCAR) 5 mg tablet TAKE 1 TABLET BY MOUTH EVERY DAY 90 tablet 2    fluticasone (FLONASE) 50 mcg/act nasal spray 1 spray into each nostril daily as needed for rhinitis or allergies 1 Bottle 0    gabapentin (NEURONTIN) 100 mg capsule Take 2 capsules (200 mg total) by mouth 2 (two) times a day      ipratropium (ATROVENT) 0 02 % nebulizer solution Take 1 vial (0 5 mg total) by nebulization 3 (three) times a day  0    levalbuterol (XOPENEX) 1 25 mg/0 5 mL nebulizer solution Take 0 5 mL (1 25 mg total) by nebulization 3 (three) times a day  0    montelukast (SINGULAIR) 10 mg tablet Take 10 mg by mouth daily at bedtime      predniSONE 20 mg tablet For 2 days, then 30mg PO for 3 days, then 20mg PO for 3 days, then 10mg PO for 3 days and stop  0    tamsulosin (FLOMAX) 0 4 mg Take 1 capsule (0 4 mg total) by mouth daily with dinner 90 capsule 3    triamcinolone (KENALOG) 0 1 % oral topical paste 1 application to area on lip bid x 7 days 5 g 0     No current facility-administered medications for this visit  Allergies   Allergen Reactions    Ace Inhibitors Hyperactivity    Molds & Smuts Sneezing    Other     Pollen Extract Sneezing    Short Ragweed Pollen Ext Hives    Tree Extract        Video Exam    No exam - telephone visit only     Disposition:      [facility]  Called and spoke with RN Zahira Jones @ [address]   D/w patient self quarantine  Pt has appt with pulm tomorrow for follow-up from hospitalization  attempted to call pulm office, no answer and no answering service response    Directly tiger texted Dr Liyah Manuel and updated and d/w r/o COVID    Updated and d/w Dr Janny Morrow as well    D/W  Kita Naidu to follow-up with pt tomorrow to ensure he is clear on self quarantine and needs to be tele visit for pulm    I spent 15 minutes with the patient during this virtual check-in visit and 15 minutes coordinating care and discussing case with Dr Janny Morrow and Dr Liyah Manuel

## 2020-03-25 ENCOUNTER — TELEMEDICINE (OUTPATIENT)
Dept: PULMONOLOGY | Facility: CLINIC | Age: 80
End: 2020-03-25
Payer: COMMERCIAL

## 2020-03-25 ENCOUNTER — TELEPHONE (OUTPATIENT)
Dept: PULMONOLOGY | Facility: CLINIC | Age: 80
End: 2020-03-25

## 2020-03-25 DIAGNOSIS — R07.81 PLEURITIC PAIN: ICD-10-CM

## 2020-03-25 DIAGNOSIS — J84.9 INTERSTITIAL LUNG DISEASE (HCC): ICD-10-CM

## 2020-03-25 DIAGNOSIS — J18.9 PNEUMONIA OF BOTH LOWER LOBES DUE TO INFECTIOUS ORGANISM: Primary | ICD-10-CM

## 2020-03-25 DIAGNOSIS — J18.9 PNEUMONIA OF BOTH LOWER LOBES DUE TO INFECTIOUS ORGANISM: ICD-10-CM

## 2020-03-25 PROBLEM — R05.8 PRODUCTIVE COUGH: Status: RESOLVED | Noted: 2019-11-25 | Resolved: 2020-03-25

## 2020-03-25 PROCEDURE — G2012 BRIEF CHECK IN BY MD/QHP: HCPCS | Performed by: INTERNAL MEDICINE

## 2020-03-25 RX ORDER — TRIAMCINOLONE ACETONIDE 55 UG/1
1 SPRAY, METERED NASAL DAILY
COMMUNITY
End: 2020-06-29 | Stop reason: ALTCHOICE

## 2020-03-25 NOTE — PROGRESS NOTES
Virtual Regular Visit    Reason for visit is follow-up bronchoscopy and pneumonia    This virtual check-in was done via telephone  Encounter provider Isabel Jennings MD    Provider located at [place] [address] [address]      Recent Visits  Date Type Provider Dept   03/24/20 Office Visit Dayron Mckenna MD [facility]   03/20/20 Telephone Dayron Mckenna MD [facility]   Showing recent visits within past 7 days and meeting all other requirements     Today's Visits  Date Type Provider Dept   03/25/20 Telephone [facility]   03/25/20 Sharonda Mcgill MD  [facility]   Showing today's visits and meeting all other requirements     Future Appointments  Date Type Provider Dept   03/25/20 Telemedicine Isabel Jennings MD  [facility]   03/25/20 Telephone [facility]   Showing future appointments within next 150 days and meeting all other requirements        Patient agrees to participate in a virtual check in via telephone or video visit instead of presenting to the office to address urgent/immediate medical needs  Patient is aware this is a billable service  After connecting through telephone, the patient was identified by name and date of birth  Jef Aggarwal  was informed that this was a telemedicine visit and that the exam was being conducted confidentially over secure lines  My office door was closed  No one else was in the room  He acknowledged consent and understanding of privacy and security of the virtual check-in visit  I informed the patient that I have reviewed his record in Epic and presented the opportunity for him to ask any questions regarding the visit today  The patient initiated communication and agreed to participate        Progress note - Pulmonary Medicine   Teofilo Machado  67 y o  male MRN: [id number]       Impression & Plan:     Pneumonia of both lungs due to infectious organism  · At this point I have recommended follow-up imaging in mid May  · I would not at this point recommended additional diagnostic studies unless his symptoms change or persist  · COVID-19 testing is pending but I suspect this will be negative  · He is currently on quarantine and would recommend that he maintain that    Interstitial lung disease ([address] )  · ILD findings currently are fairly minimal  · Will need continued follow-up with CT imaging  Pleuritic pain  · Markedly improved to almost resolved today  · Would not pursue pulmonary embolism imaging study  · Continue Tylenol for likely musculoskeletal etiology  · If symptoms worsen, may need to reconsider diagnostic evaluation for pulmonary embolism    He will follow up with me in late May, after the follow-up CT scan      Virtual visit time component:  Total visit time for chart and diagnostic data review, telephonic or video conference communication with the patient, and documentation: 48    I have personally spent 15 minutes reviewing the chart and imaging prior to the visit  I have personally spent 26 minutes on the phone with the patient  I have personally spent 15 minutes reviewing imaging, laboratory results and other testing results with the patient     ______________________________________________________________________    HPI:    Teofilo Machado  is being evaluated by virtual visit for follow-up of hospitalization for hemoptysis and pneumonia  He was hospitalized at the [facility] after having a severe nose bleed  He ultimately developed significant cough and phlegm production and had fever  CT scan of the chest showed infiltrates at the bases of the lung any ultimately underwent bronchoscopy    His bronchoscopy was negative for bacterial infection, viral studies were negative, malignancy studies were negative  Transbronchial biopsies were not performed  All of the studies were for bronchioloalveolar lavage  Since hospital discharge, he did go to rehabilitation at [facility] for 7 days and has subsequently been discharged home  He was seen by his primary care physician yesterday in complaining of some chest discomfort which was somewhat pleuritic and in his mid scapular area  He has recently had Dopplers of his lower extremity and otherwise has no evidence of hypoxia  He did have a low-grade temperature  Because of his recent healthcare associated visits, he was referred for COVID-19 testing on a precautionary basis although he is likely low risk  He had this testing yesterday  Today he reports he does not have the back pain significantly  He was taking Tylenol  He has no cough or phlegm production  He does not have a fever  He otherwise feels well today  We did discuss his medications at the time of discharge  He was discharged from [facility] with nebulizer medications but does not own a nebulizer  He is not wheezing  He does not have a productive cough and he does not have symptoms of COPD  He was also discharged with Mucinex which she has not found helpful  He currently is not taking aspirin or his nasal steroid due to the recent nosebleeds  He is taking Singulair for his allergy symptoms  He reports no chest pain  No leg swelling  No cardiac symptoms otherwise  He has been taking his normal cardiac medications  No lightheadedness or dizziness  No abdominal pain  No GERD symptoms  He denies arthralgias or myalgias      Current Medications:    Current Outpatient Medications:     diltiazem (CARDIZEM CD) 180 mg 24 hr capsule, TAKE 1 CAPSULE BY MOUTH EVERY DAY, Disp: 90 capsule, Rfl: 1    finasteride (PROSCAR) 5 mg tablet, TAKE 1 TABLET BY MOUTH EVERY DAY, Disp: 90 tablet, Rfl: 2    gabapentin (NEURONTIN) 100 mg capsule, Take 2 capsules (200 mg total) by mouth 2 (two) times a day (Patient taking differently: Take 300 mg by mouth 2 (two) times a day ), Disp: , Rfl:     montelukast (SINGULAIR) 10 mg tablet, Take 10 mg by mouth daily at bedtime, Disp: , Rfl:     tamsulosin (FLOMAX) 0 4 mg, Take 1 capsule (0 4 mg total) by mouth daily with dinner, Disp: 90 capsule, Rfl: 3    triamcinolone (KENALOG) 0 1 % oral topical paste, 1 application to area on lip bid x 7 days, Disp: 5 g, Rfl: 0    Triamcinolone Acetonide 55 MCG/ACT AERO, 1 Dose into each nostril daily, Disp: , Rfl:     Review of Systems:  Aside from what is mentioned in the HPI, the review of systems is otherwise negative    Past medical history, surgical history, and family history were reviewed and updated as appropriate    Social history updates:  Social History     Tobacco Use   Smoking Status Former Smoker    Packs/day: 1 00    Years: 25 00    Pack years: 25 00    Types: Cigarettes    Last attempt to quit:     Years since quittin 2   Smokeless Tobacco Never Used       PhysicalExamination:  Patient was evaluated telephonically, no physical examination could be performed    Diagnostic Data:  Labs: I personally reviewed the most recent laboratory data pertinent to today's visit    Lab Results   Component Value Date    WBC 4 45 2020    HGB 13 7 2020    HCT 41 8 2020    MCV 92 2020     2020     Lab Results   Component Value Date    SODIUM 140 2020    K 3 9 2020    CO2 27 2020     2020    BUN 26 (H) 2020    CREATININE 1 08 2020    CALCIUM 8 7 2020     COVID-19 testing from yesterday has been submitted and is pending from my perspective however he is low risk and I anticipate a negative result    Bronchoscopy results:  Viral culture negative  Bacterial culture normal khang  Pulmonary cytology negative    RP2 panel  Submitted during the hospital stay did not show any positive results    Negative for Bordetella, negative for mycoplasma, negative for influenza  Negative for multiple corona serotype  Negative for parainfluenza serotype      Imaging:  I personally reviewed the images on the [facility] system pertinent to today's visit  CT scan of the chest did show right greater than left basilar consolidation  There was very tiny rim of pleural effusion on the right    No other consolidations or masses    Carmina Figueredo MD

## 2020-03-25 NOTE — TELEPHONE ENCOUNTER
Due to the current pandemic of COVID-19 Patient was given the option to par take in a video/ telephone call which was accepted by the patient  Patients preferred phone number to contact:     Video option -Patients preferred e-mail:    [email or web address]     Patient informed that they will receive an e-mail 15 minutes before their scheduled time as a reminder         Any further questions patients may have they are informed to call [phone]

## 2020-03-25 NOTE — ASSESSMENT & PLAN NOTE
· At this point I have recommended follow-up imaging in mid May    · I would not at this point recommended additional diagnostic studies unless his symptoms change or persist  · COVID-19 testing is pending but I suspect this will be negative  · He is currently on quarantine and would recommend that he maintain that

## 2020-03-25 NOTE — LETTER
March 25, 2020     Raj Monsalve MD  [address] [place] [address]    Patient: Massimo Watkins  YOB: 1940   Date of Visit: 3/25/2020       Dear Dr Ruffus Hammans:    Thank you for referring Geronimofaizan Vasquez to me for evaluation  Below are my notes for this consultation  If you have questions, please do not hesitate to call me  I look forward to following your patient along with you  Sincerely,        Elton Naranjo MD        CC: Shabbir Wilkes, [address] [facility], [place]  3/25/2020  2:52 PM  Sign at close encounter  Virtual Regular Visit    Reason for visit is follow-up bronchoscopy and pneumonia    This virtual check-in was done via telephone  Encounter provider Elton Naranjo MD    Provider located at [place] [address] [facility]      Recent Visits  Date Type Provider Dept   03/24/20 Office Visit Raj Monsalve MD [facility]   03/20/20 Telephone Raj Monsalve MD [facility]   Showing recent visits within past 7 days and meeting all other requirements     Today's Visits  Date Type Provider Dept   03/25/20 Telephone [facility]   03/25/20 Sharonda Mcgill MD [facility]   Showing today's visits and meeting all other requirements     Future Appointments  Date Type Provider Dept   03/25/20 Telemedicine Elton Naranjo MD [facility]   03/25/20 Telephone [facility]   Showing future appointments within next 150 days and meeting all other requirements        Patient agrees to participate in a virtual check in via telephone or video visit instead of presenting to the office to address urgent/immediate medical needs  Patient is aware this is a billable service        After connecting through telephone, the patient was identified by name and date of birth  Kye Brown  was informed that this was a telemedicine visit and that the exam was being conducted confidentially over secure lines  My office door was closed  No one else was in the room  He acknowledged consent and understanding of privacy and security of the virtual check-in visit  I informed the patient that I have reviewed his record in Epic and presented the opportunity for him to ask any questions regarding the visit today  The patient initiated communication and agreed to participate  Progress note - Pulmonary Medicine   Kye Brown  67 y o  male MRN: [id number]       Impression & Plan:     Pneumonia of both lungs due to infectious organism  · At this point I have recommended follow-up imaging in mid May  · I would not at this point recommended additional diagnostic studies unless his symptoms change or persist  · COVID-19 testing is pending but I suspect this will be negative  · He is currently on quarantine and would recommend that he maintain that    Interstitial lung disease [address] [address] )  · ILD findings currently are fairly minimal  · Will need continued follow-up with CT imaging  Pleuritic pain  · Markedly improved to almost resolved today  · Would not pursue pulmonary embolism imaging study  · Continue Tylenol for likely musculoskeletal etiology  · If symptoms worsen, may need to reconsider diagnostic evaluation for pulmonary embolism    He will follow up with me in late May, after the follow-up CT scan      Virtual visit time component:  Total visit time for chart and diagnostic data review, telephonic or video conference communication with the patient, and documentation: 48    I have personally spent 15 minutes reviewing the chart and imaging prior to the visit  I have personally spent 26 minutes on the phone with the patient  I have personally spent 15 minutes reviewing imaging, laboratory results and other testing results with the patient  ______________________________________________________________________    HPI:    Beverly Smyth  is being evaluated by virtual visit for follow-up of hospitalization for hemoptysis and pneumonia  He was hospitalized at the [facility] after having a severe nose bleed  He ultimately developed significant cough and phlegm production and had fever  CT scan of the chest showed infiltrates at the bases of the lung any ultimately underwent bronchoscopy  His bronchoscopy was negative for bacterial infection, viral studies were negative, malignancy studies were negative  Transbronchial biopsies were not performed  All of the studies were for bronchioloalveolar lavage  Since hospital discharge, he did go to rehabilitation at [facility] for 7 days and has subsequently been discharged home  He was seen by his primary care physician yesterday in complaining of some chest discomfort which was somewhat pleuritic and in his mid scapular area  He has recently had Dopplers of his lower extremity and otherwise has no evidence of hypoxia  He did have a low-grade temperature  Because of his recent healthcare associated visits, he was referred for COVID-19 testing on a precautionary basis although he is likely low risk  He had this testing yesterday  Today he reports he does not have the back pain significantly  He was taking Tylenol  He has no cough or phlegm production  He does not have a fever  He otherwise feels well today  We did discuss his medications at the time of discharge  He was discharged from [facility] with nebulizer medications but does not own a nebulizer  He is not wheezing  He does not have a productive cough and he does not have symptoms of COPD  He was also discharged with Mucinex which she has not found helpful  He currently is not taking aspirin or his nasal steroid due to the recent nosebleeds  He is taking Singulair for his allergy symptoms  He reports no chest pain  No leg swelling  No cardiac symptoms otherwise  He has been taking his normal cardiac medications  No lightheadedness or dizziness  No abdominal pain  No GERD symptoms  He denies arthralgias or myalgias  Current Medications:    Current Outpatient Medications:     diltiazem (CARDIZEM CD) 180 mg 24 hr capsule, TAKE 1 CAPSULE BY MOUTH EVERY DAY, Disp: 90 capsule, Rfl: 1    finasteride (PROSCAR) 5 mg tablet, TAKE 1 TABLET BY MOUTH EVERY DAY, Disp: 90 tablet, Rfl: 2    gabapentin (NEURONTIN) 100 mg capsule, Take 2 capsules (200 mg total) by mouth 2 (two) times a day (Patient taking differently: Take 300 mg by mouth 2 (two) times a day ), Disp: , Rfl:     montelukast (SINGULAIR) 10 mg tablet, Take 10 mg by mouth daily at bedtime, Disp: , Rfl:     tamsulosin (FLOMAX) 0 4 mg, Take 1 capsule (0 4 mg total) by mouth daily with dinner, Disp: 90 capsule, Rfl: 3    triamcinolone (KENALOG) 0 1 % oral topical paste, 1 application to area on lip bid x 7 days, Disp: 5 g, Rfl: 0    Triamcinolone Acetonide 55 MCG/ACT AERO, 1 Dose into each nostril daily, Disp: , Rfl:     Review of Systems:  Aside from what is mentioned in the HPI, the review of systems is otherwise negative    Past medical history, surgical history, and family history were reviewed and updated as appropriate    Social history updates:  Social History     Tobacco Use   Smoking Status Former Smoker    Packs/day: 1 00    Years: 25 00    Pack years: 25 00    Types: Cigarettes    Last attempt to quit: 1983    Years since quittin 2   Smokeless Tobacco Never Used       PhysicalExamination:  Patient was evaluated telephonically, no physical examination could be performed    Diagnostic Data:  Labs:   I personally reviewed the most recent laboratory data pertinent to today's visit    Lab Results   Component Value Date    WBC 4 45 2020    HGB 13 7 2020    HCT 41 8 2020    MCV 92 2020     2020     Lab Results Component Value Date    SODIUM 140 03/14/2020    K 3 9 03/14/2020    CO2 27 03/14/2020     03/14/2020    BUN 26 (H) 03/14/2020    CREATININE 1 08 03/14/2020    CALCIUM 8 7 03/14/2020     COVID-19 testing from yesterday has been submitted and is pending from my perspective however he is low risk and I anticipate a negative result    Bronchoscopy results:  Viral culture negative  Bacterial culture normal khang  Pulmonary cytology negative    RP2 panel  Submitted during the hospital stay did not show any positive results  Negative for Bordetella, negative for mycoplasma, negative for influenza  Negative for multiple corona serotype  Negative for parainfluenza serotype      Imaging:  I personally reviewed the images on the [facility] system pertinent to today's visit  CT scan of the chest did show right greater than left basilar consolidation  There was very tiny rim of pleural effusion on the right    No other consolidations or masses    Freddy Hashimoto, MD

## 2020-03-25 NOTE — ASSESSMENT & PLAN NOTE
· Markedly improved to almost resolved today  · Would not pursue pulmonary embolism imaging study  · Continue Tylenol for likely musculoskeletal etiology  · If symptoms worsen, may need to reconsider diagnostic evaluation for pulmonary embolism

## 2020-03-30 ENCOUNTER — TELEPHONE (OUTPATIENT)
Dept: INTERNAL MEDICINE CLINIC | Facility: CLINIC | Age: 80
End: 2020-03-30

## 2020-03-30 NOTE — TELEPHONE ENCOUNTER
Noted  We are experiencing 7 day results  All patients should be made aware of this  We will call when we have results, positive or negative    Do not need to clog telephone calls

## 2020-03-30 NOTE — TELEPHONE ENCOUNTER
Patient's wife is calling to get the results of the test for the CORVID-19  The results are still pending  She wants to know the results [facility] due to them needing to get stuff for the house    I did say that they may come in later today or tomorrow     Please call home her at the home number

## 2020-03-30 NOTE — TELEPHONE ENCOUNTER
Testing is taking seven days, to the day at this point time  I will be following the results and I will notify her as soon as they become available  Valve please notify patient that we are aware aware following all COVID testing

## 2020-03-30 NOTE — TELEPHONE ENCOUNTER
I called them and she was told by them it will take 5 days till the get the results    She is aware and will wait for the call them

## 2020-03-31 LAB — SARS-COV-2 RNA SPEC QL NAA+PROBE: NOT DETECTED

## 2020-04-13 LAB — FUNGUS SPEC CULT: NORMAL

## 2020-04-26 DIAGNOSIS — I10 ESSENTIAL HYPERTENSION: ICD-10-CM

## 2020-04-26 DIAGNOSIS — R42 LIGHTHEADEDNESS: ICD-10-CM

## 2020-04-27 RX ORDER — DILTIAZEM HYDROCHLORIDE 180 MG/1
CAPSULE, COATED, EXTENDED RELEASE ORAL
Qty: 90 CAPSULE | Refills: 1 | Status: SHIPPED | OUTPATIENT
Start: 2020-04-27 | End: 2020-10-15 | Stop reason: ALTCHOICE

## 2020-05-08 DIAGNOSIS — G60.9 IDIOPATHIC PERIPHERAL NEUROPATHY: ICD-10-CM

## 2020-05-11 ENCOUNTER — OFFICE VISIT (OUTPATIENT)
Dept: INTERNAL MEDICINE CLINIC | Age: 80
End: 2020-05-11
Payer: COMMERCIAL

## 2020-05-11 ENCOUNTER — HOSPITAL ENCOUNTER (OUTPATIENT)
Dept: ULTRASOUND IMAGING | Facility: HOSPITAL | Age: 80
Discharge: HOME | End: 2020-05-11
Payer: COMMERCIAL

## 2020-05-11 ENCOUNTER — TELEPHONE (OUTPATIENT)
Dept: INTERNAL MEDICINE CLINIC | Age: 80
End: 2020-05-11

## 2020-05-11 VITALS
DIASTOLIC BLOOD PRESSURE: 90 MMHG | WEIGHT: 222.6 LBS | BODY MASS INDEX: 33.74 KG/M2 | HEART RATE: 84 BPM | OXYGEN SATURATION: 97 % | SYSTOLIC BLOOD PRESSURE: 134 MMHG | TEMPERATURE: 97.8 F | HEIGHT: 68 IN

## 2020-05-11 DIAGNOSIS — M79.89 LEG SWELLING: ICD-10-CM

## 2020-05-11 DIAGNOSIS — M79.89 LEG SWELLING: Primary | ICD-10-CM

## 2020-05-11 PROCEDURE — 4040F PNEUMOC VAC/ADMIN/RCVD: CPT | Performed by: INTERNAL MEDICINE

## 2020-05-11 PROCEDURE — 3075F SYST BP GE 130 - 139MM HG: CPT | Performed by: INTERNAL MEDICINE

## 2020-05-11 PROCEDURE — 93970 EXTREMITY STUDY: CPT | Performed by: SURGERY

## 2020-05-11 PROCEDURE — 3008F BODY MASS INDEX DOCD: CPT | Performed by: INTERNAL MEDICINE

## 2020-05-11 PROCEDURE — 1160F RVW MEDS BY RX/DR IN RCRD: CPT | Performed by: INTERNAL MEDICINE

## 2020-05-11 PROCEDURE — 99214 OFFICE O/P EST MOD 30 MIN: CPT | Performed by: INTERNAL MEDICINE

## 2020-05-11 PROCEDURE — 3080F DIAST BP >= 90 MM HG: CPT | Performed by: INTERNAL MEDICINE

## 2020-05-11 PROCEDURE — 1036F TOBACCO NON-USER: CPT | Performed by: INTERNAL MEDICINE

## 2020-05-11 PROCEDURE — 93970 EXTREMITY STUDY: CPT

## 2020-05-11 PROCEDURE — 1111F DSCHRG MED/CURRENT MED MERGE: CPT | Performed by: INTERNAL MEDICINE

## 2020-05-11 RX ORDER — HYDROCHLOROTHIAZIDE 25 MG/1
25 TABLET ORAL DAILY
Qty: 90 TABLET | Refills: 5 | Status: SHIPPED | OUTPATIENT
Start: 2020-05-11 | End: 2020-06-29

## 2020-05-11 RX ORDER — GABAPENTIN 100 MG/1
100 CAPSULE ORAL 2 TIMES DAILY
Qty: 60 CAPSULE | Refills: 4 | Status: SHIPPED | OUTPATIENT
Start: 2020-05-11 | End: 2020-08-26

## 2020-05-18 ENCOUNTER — HOSPITAL ENCOUNTER (OUTPATIENT)
Dept: RADIOLOGY | Facility: IMAGING CENTER | Age: 80
Discharge: HOME | End: 2020-05-18
Payer: COMMERCIAL

## 2020-05-18 DIAGNOSIS — J84.9 INTERSTITIAL LUNG DISEASE (HCC): ICD-10-CM

## 2020-05-18 DIAGNOSIS — J18.9 PNEUMONIA OF BOTH LOWER LOBES DUE TO INFECTIOUS ORGANISM: ICD-10-CM

## 2020-05-18 PROCEDURE — 71250 CT THORAX DX C-: CPT

## 2020-06-11 PROBLEM — J30.0 VASOMOTOR RHINITIS: Status: ACTIVE | Noted: 2020-06-11

## 2020-06-11 PROBLEM — R09.82 POST-NASAL DRIP: Status: ACTIVE | Noted: 2020-06-11

## 2020-06-18 ENCOUNTER — TELEPHONE (OUTPATIENT)
Dept: INTERNAL MEDICINE CLINIC | Facility: CLINIC | Age: 80
End: 2020-06-18

## 2020-06-18 DIAGNOSIS — M54.10 RADICULOPATHY OF LEG: Primary | ICD-10-CM

## 2020-06-29 ENCOUNTER — OFFICE VISIT (OUTPATIENT)
Dept: INTERNAL MEDICINE CLINIC | Age: 80
End: 2020-06-29
Payer: COMMERCIAL

## 2020-06-29 ENCOUNTER — APPOINTMENT (OUTPATIENT)
Dept: RADIOLOGY | Age: 80
End: 2020-06-29
Payer: COMMERCIAL

## 2020-06-29 ENCOUNTER — APPOINTMENT (OUTPATIENT)
Dept: LAB | Age: 80
End: 2020-06-29
Payer: COMMERCIAL

## 2020-06-29 VITALS
OXYGEN SATURATION: 97 % | SYSTOLIC BLOOD PRESSURE: 122 MMHG | WEIGHT: 221.4 LBS | TEMPERATURE: 97.9 F | BODY MASS INDEX: 33.56 KG/M2 | HEIGHT: 68 IN | HEART RATE: 70 BPM | DIASTOLIC BLOOD PRESSURE: 84 MMHG

## 2020-06-29 DIAGNOSIS — M25.531 RIGHT WRIST PAIN: ICD-10-CM

## 2020-06-29 DIAGNOSIS — C61 MALIGNANT NEOPLASM OF PROSTATE (HCC): ICD-10-CM

## 2020-06-29 DIAGNOSIS — N17.9 AKI (ACUTE KIDNEY INJURY) (HCC): ICD-10-CM

## 2020-06-29 DIAGNOSIS — M25.531 RIGHT WRIST PAIN: Primary | ICD-10-CM

## 2020-06-29 DIAGNOSIS — M25.431 SWELLING OF JOINT OF RIGHT WRIST: ICD-10-CM

## 2020-06-29 LAB
ANION GAP SERPL CALCULATED.3IONS-SCNC: 4 MMOL/L (ref 4–13)
BASOPHILS # BLD AUTO: 0.04 THOUSANDS/ΜL (ref 0–0.1)
BASOPHILS NFR BLD AUTO: 1 % (ref 0–1)
BUN SERPL-MCNC: 20 MG/DL (ref 5–25)
CALCIUM SERPL-MCNC: 9.3 MG/DL (ref 8.3–10.1)
CHLORIDE SERPL-SCNC: 99 MMOL/L (ref 100–108)
CO2 SERPL-SCNC: 34 MMOL/L (ref 21–32)
CREAT SERPL-MCNC: 1.21 MG/DL (ref 0.6–1.3)
CRP SERPL QL: 124 MG/L
EOSINOPHIL # BLD AUTO: 0.01 THOUSAND/ΜL (ref 0–0.61)
EOSINOPHIL NFR BLD AUTO: 0 % (ref 0–6)
ERYTHROCYTE [DISTWIDTH] IN BLOOD BY AUTOMATED COUNT: 13.2 % (ref 11.6–15.1)
GFR SERPL CREATININE-BSD FRML MDRD: 57 ML/MIN/1.73SQ M
GLUCOSE SERPL-MCNC: 115 MG/DL (ref 65–140)
HCT VFR BLD AUTO: 46.6 % (ref 36.5–49.3)
HGB BLD-MCNC: 15.3 G/DL (ref 12–17)
IMM GRANULOCYTES # BLD AUTO: 0.12 THOUSAND/UL (ref 0–0.2)
IMM GRANULOCYTES NFR BLD AUTO: 2 % (ref 0–2)
LYMPHOCYTES # BLD AUTO: 0.77 THOUSANDS/ΜL (ref 0.6–4.47)
LYMPHOCYTES NFR BLD AUTO: 12 % (ref 14–44)
MCH RBC QN AUTO: 30.1 PG (ref 26.8–34.3)
MCHC RBC AUTO-ENTMCNC: 32.8 G/DL (ref 31.4–37.4)
MCV RBC AUTO: 92 FL (ref 82–98)
MONOCYTES # BLD AUTO: 0.43 THOUSAND/ΜL (ref 0.17–1.22)
MONOCYTES NFR BLD AUTO: 7 % (ref 4–12)
NEUTROPHILS # BLD AUTO: 4.82 THOUSANDS/ΜL (ref 1.85–7.62)
NEUTS SEG NFR BLD AUTO: 78 % (ref 43–75)
NRBC BLD AUTO-RTO: 0 /100 WBCS
PLATELET # BLD AUTO: 204 THOUSANDS/UL (ref 149–390)
PMV BLD AUTO: 10.5 FL (ref 8.9–12.7)
POTASSIUM SERPL-SCNC: 4 MMOL/L (ref 3.5–5.3)
RBC # BLD AUTO: 5.09 MILLION/UL (ref 3.88–5.62)
SODIUM SERPL-SCNC: 137 MMOL/L (ref 136–145)
URATE SERPL-MCNC: 6.7 MG/DL (ref 4.2–8)
WBC # BLD AUTO: 6.19 THOUSAND/UL (ref 4.31–10.16)

## 2020-06-29 PROCEDURE — 84154 ASSAY OF PSA FREE: CPT

## 2020-06-29 PROCEDURE — 1036F TOBACCO NON-USER: CPT | Performed by: INTERNAL MEDICINE

## 2020-06-29 PROCEDURE — 99214 OFFICE O/P EST MOD 30 MIN: CPT | Performed by: INTERNAL MEDICINE

## 2020-06-29 PROCEDURE — 86618 LYME DISEASE ANTIBODY: CPT

## 2020-06-29 PROCEDURE — 3079F DIAST BP 80-89 MM HG: CPT | Performed by: INTERNAL MEDICINE

## 2020-06-29 PROCEDURE — 85025 COMPLETE CBC W/AUTO DIFF WBC: CPT

## 2020-06-29 PROCEDURE — 84153 ASSAY OF PSA TOTAL: CPT

## 2020-06-29 PROCEDURE — 86140 C-REACTIVE PROTEIN: CPT

## 2020-06-29 PROCEDURE — 73110 X-RAY EXAM OF WRIST: CPT

## 2020-06-29 PROCEDURE — 36415 COLL VENOUS BLD VENIPUNCTURE: CPT

## 2020-06-29 PROCEDURE — 4040F PNEUMOC VAC/ADMIN/RCVD: CPT | Performed by: INTERNAL MEDICINE

## 2020-06-29 PROCEDURE — 3074F SYST BP LT 130 MM HG: CPT | Performed by: INTERNAL MEDICINE

## 2020-06-29 PROCEDURE — 84550 ASSAY OF BLOOD/URIC ACID: CPT

## 2020-06-29 PROCEDURE — 3008F BODY MASS INDEX DOCD: CPT | Performed by: INTERNAL MEDICINE

## 2020-06-29 PROCEDURE — 80048 BASIC METABOLIC PNL TOTAL CA: CPT

## 2020-06-29 PROCEDURE — 1160F RVW MEDS BY RX/DR IN RCRD: CPT | Performed by: INTERNAL MEDICINE

## 2020-06-29 RX ORDER — PREDNISONE 20 MG/1
20 TABLET ORAL 2 TIMES DAILY WITH MEALS
Qty: 10 TABLET | Refills: 0 | Status: SHIPPED | OUTPATIENT
Start: 2020-06-29 | End: 2020-07-04

## 2020-06-29 RX ORDER — COLCHICINE 0.6 MG/1
0.6 TABLET ORAL 2 TIMES DAILY
Qty: 30 TABLET | Refills: 5 | Status: SHIPPED | OUTPATIENT
Start: 2020-06-29 | End: 2020-07-21 | Stop reason: SDUPTHER

## 2020-06-30 LAB
PSA FREE MFR SERPL: 17.5 %
PSA FREE SERPL-MCNC: 0.07 NG/ML
PSA SERPL-MCNC: 0.4 NG/ML (ref 0–4)

## 2020-07-01 LAB — B BURGDOR IGG+IGM SER-ACNC: <0.91 ISR (ref 0–0.9)

## 2020-07-02 ENCOUNTER — OFFICE VISIT (OUTPATIENT)
Dept: INTERNAL MEDICINE CLINIC | Age: 80
End: 2020-07-02
Payer: COMMERCIAL

## 2020-07-02 VITALS
HEART RATE: 63 BPM | SYSTOLIC BLOOD PRESSURE: 122 MMHG | OXYGEN SATURATION: 96 % | DIASTOLIC BLOOD PRESSURE: 58 MMHG | BODY MASS INDEX: 33.71 KG/M2 | TEMPERATURE: 99 F | WEIGHT: 222.4 LBS | HEIGHT: 68 IN

## 2020-07-02 DIAGNOSIS — M25.521 RIGHT ELBOW PAIN: ICD-10-CM

## 2020-07-02 DIAGNOSIS — M19.031 LOCALIZED PRIMARY OSTEOARTHRITIS OF RIGHT WRIST: Primary | ICD-10-CM

## 2020-07-02 DIAGNOSIS — M25.531 PAIN AND SWELLING OF RIGHT WRIST: ICD-10-CM

## 2020-07-02 DIAGNOSIS — M25.431 PAIN AND SWELLING OF RIGHT WRIST: ICD-10-CM

## 2020-07-02 PROBLEM — R07.81 PLEURITIC PAIN: Status: RESOLVED | Noted: 2020-03-07 | Resolved: 2020-07-02

## 2020-07-02 PROCEDURE — 1036F TOBACCO NON-USER: CPT | Performed by: INTERNAL MEDICINE

## 2020-07-02 PROCEDURE — 3008F BODY MASS INDEX DOCD: CPT | Performed by: INTERNAL MEDICINE

## 2020-07-02 PROCEDURE — 4040F PNEUMOC VAC/ADMIN/RCVD: CPT | Performed by: INTERNAL MEDICINE

## 2020-07-02 PROCEDURE — 1160F RVW MEDS BY RX/DR IN RCRD: CPT | Performed by: INTERNAL MEDICINE

## 2020-07-02 PROCEDURE — 3078F DIAST BP <80 MM HG: CPT | Performed by: INTERNAL MEDICINE

## 2020-07-02 PROCEDURE — 99213 OFFICE O/P EST LOW 20 MIN: CPT | Performed by: INTERNAL MEDICINE

## 2020-07-02 PROCEDURE — 3074F SYST BP LT 130 MM HG: CPT | Performed by: INTERNAL MEDICINE

## 2020-07-02 NOTE — PROGRESS NOTES
Assessment/Plan:     Diagnoses and all orders for this visit:    Localized primary osteoarthritis of right wrist    Right elbow pain  -     Cyclic citrul peptide antibody, IgG; Future  -     RF Screen w/ Reflex to Titer; Future  -     Sjogren's Antibodies; Future  -     ANDRAE Screen w/ Reflex to Titer/Pattern; Future    Pain and swelling of right wrist  -     Cyclic citrul peptide antibody, IgG; Future  -     RF Screen w/ Reflex to Titer; Future  -     Sjogren's Antibodies; Future  -     ANDRAE Screen w/ Reflex to Titer/Pattern; Future             Subjective:   Chief Complaint   Patient presents with    Follow-up     rt wrist pain- swollen from elbow to finger tips- pt is much better-    review xray and labs 6/29/20         Patient ID: Luwanna Goltz  is a 79 y o  male  HPI  He was seen in the office on Monday for right wrist pain and the right elbow pain right wrist was significantly swollen red warm to touch and the right elbow mostly on the posterior part of the elbow patient was treated with the prednisone and colchicine uric acid level was 6 2 patient responded very well to the medication the symptoms started to resolve in 24-48 hours and completely resolved now he is taking colchicine 0 6 mg once a day he has an appointment with the rheumatologist  Multiple issues related to the this problem he has problem with the pain in the knees and also in the shoulder also in ankle right the wrist and the right elbow previous history of a interstitial lung disease looks like more rheumatological problem if he combined together also with associated parotid enlargement for which the parotid biopsy was done  But his  ANDRAE rheumatoid factor, CCP antibody, were all negative this time his the CRP was significantly elevated as 124  I will wait for the rheumatological workup and rheumatological follow-up and will go from there    Continue present care I was thinking about giving him allopurinol to decrease his the uric acid less than 6 but I will wait until the urological examination I discussed with the patient  The following portions of the patient's history were reviewed and updated as appropriate: allergies, current medications, past family history, past medical history, past social history, past surgical history and problem list     Review of Systems   Constitutional: Negative for appetite change, fatigue and fever  HENT: Negative for congestion, ear pain, hearing loss, nosebleeds, sneezing, tinnitus and voice change  Eyes: Negative for pain, discharge and redness  Respiratory: Positive for shortness of breath  Negative for cough, chest tightness and wheezing  Cardiovascular: Negative for chest pain, palpitations and leg swelling  Gastrointestinal: Negative for abdominal pain, blood in stool, constipation, diarrhea, nausea and vomiting  Genitourinary: Negative for difficulty urinating, dysuria, hematuria and urgency  Musculoskeletal: Negative for arthralgias, back pain, gait problem and joint swelling  Joint pain better    Skin: Negative for rash and wound  Allergic/Immunologic: Negative for environmental allergies  Neurological: Negative for dizziness, tremors, seizures, weakness, light-headedness and numbness  Hematological: Negative for adenopathy  Does not bruise/bleed easily  Psychiatric/Behavioral: Negative for behavioral problems and confusion  The patient is not nervous/anxious  Past Medical History:   Diagnosis Date    Abnormal electrocardiogram     Last assessed: Oct 11, 2013    Allergic rhinitis     last assessed: Oct 11, 2013    Allergic rhinitis due to pollen     last assessed: Oct 10, 2013    Allergic sinusitis     Last assessed: May 11, 2015    Allergy     resolved: July 22, 2015    Benign essential hypertension     Last assessed/resolved:  May 31, 2017    BPH (benign prostatic hyperplasia)     Colitis     Last assessed: May 24, 2016    Generalized osteoarthritis     Last assessed: Oct 11, 2013    GERD without esophagitis     Last assessed/resolved: May 31, 2017    Hearing loss     Hiatal hernia     resolved: July 22, 2015    History of gastroesophageal reflux (GERD)     History of stomach ulcers     last assessed: May 11, 2015    Hypertension     Incomplete bladder emptying     Kidney stone     Nodular prostate with lower urinary tract symptoms     Nontoxic single thyroid nodule     last assessed: April 16, 2014    Orchalgia     Overweight     last assessed: Oct 31, 2013    Poor urinary stream     Pulmonary embolism [facility])     Salivary gland disorder     last assessed: April 16, 2014    Seasonal allergies     last assessed: May 15, 2015    Spermatocele     Straining to void     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:  May 7, 2014         Current Outpatient Medications:     ASPIRIN 81 PO, Take 1 tablet by mouth daily, Disp: , Rfl:     colchicine (COLCRYS) 0 6 mg tablet, Take 1 tablet (0 6 mg total) by mouth 2 (two) times a day, Disp: 30 tablet, Rfl: 5    diltiazem (CARDIZEM CD) 180 mg 24 hr capsule, TAKE 1 CAPSULE BY MOUTH EVERY DAY, Disp: 90 capsule, Rfl: 1    finasteride (PROSCAR) 5 mg tablet, TAKE 1 TABLET BY MOUTH EVERY DAY, Disp: 90 tablet, Rfl: 2    gabapentin (NEURONTIN) 100 mg capsule, Take 2 capsules (200 mg total) by mouth 2 (two) times a day (Patient taking differently: Take 300 mg by mouth 2 (two) times a day ), Disp: , Rfl:     levocetirizine (XYZAL) 5 MG tablet, Take 1 tablet (5 mg total) by mouth every evening, Disp: 30 tablet, Rfl: 11    montelukast (SINGULAIR) 10 mg tablet, Take 10 mg by mouth daily at bedtime, Disp: , Rfl:     predniSONE 20 mg tablet, Take 1 tablet (20 mg total) by mouth 2 (two) times a day with meals for 5 days, Disp: 10 tablet, Rfl: 0    tamsulosin (FLOMAX) 0 4 mg, Take 1 capsule (0 4 mg total) by mouth daily with dinner, Disp: 90 capsule, Rfl: 3    traMADol-acetaminophen (ULTRACET) 37 5-325 mg per tablet, Take 1 tablet by mouth every 6 (six) hours as needed for moderate pain, Disp: 30 tablet, Rfl: 0    gabapentin (NEURONTIN) 100 mg capsule, Take 1 capsule (100 mg total) by mouth 2 (two) times a day (Patient not taking: Reported on 6/29/2020), Disp: 60 capsule, Rfl: 4    No Known Allergies    Social History   Past Surgical History:   Procedure Laterality Date    BLADDER SURGERY      CHOLECYSTECTOMY  2000    laparoscopic     HEMORRHOID SURGERY      pilionidal cyst removal     HERNIA REPAIR Left 01/17/2008    inguinal     KNEE ARTHROSCOPY Left 1974    KNEE CARTILAGE SURGERY      NASAL SEPTUM SURGERY      Deviation repair     PROSTATE BIOPSY  2017    STOMACH SURGERY      TONSILLECTOMY AND ADENOIDECTOMY      at age 37   Bentley Nieves [address] - right 01/04 0 left 01/95    VASECTOMY       Family History   Problem Relation Age of Onset    Hypertension Mother     Stroke Mother     Stomach cancer Father     Hypertension Father     Hypertension Family     Stroke Family         syndrome     Heart attack Son        Objective:  /58 (BP Location: Left arm, Patient Position: Sitting, Cuff Size: Standard)   Pulse 63   Temp 99 °F (37 2 °C) (Tympanic)   Ht 5' 8" (1 727 m) Comment: shoes on  Wt 101 kg (222 lb 6 4 oz) Comment: shoes on  SpO2 96%   BMI 33 82 kg/m²        Physical Exam   Constitutional: He appears well-developed and well-nourished  Cardiovascular: Normal rate, regular rhythm and normal heart sounds  Pulmonary/Chest: Effort normal and breath sounds normal    Musculoskeletal:    wrist is much better and the elbow pain and swelling is better   Skin: Skin is warm and dry

## 2020-07-21 ENCOUNTER — APPOINTMENT (OUTPATIENT)
Dept: LAB | Facility: MEDICAL CENTER | Age: 80
End: 2020-07-21
Payer: COMMERCIAL

## 2020-07-21 ENCOUNTER — OFFICE VISIT (OUTPATIENT)
Dept: RHEUMATOLOGY | Facility: CLINIC | Age: 80
End: 2020-07-21
Payer: COMMERCIAL

## 2020-07-21 VITALS
HEIGHT: 68 IN | SYSTOLIC BLOOD PRESSURE: 140 MMHG | WEIGHT: 222 LBS | TEMPERATURE: 98.7 F | BODY MASS INDEX: 33.65 KG/M2 | DIASTOLIC BLOOD PRESSURE: 90 MMHG

## 2020-07-21 DIAGNOSIS — M25.431 SWELLING OF JOINT OF RIGHT WRIST: ICD-10-CM

## 2020-07-21 DIAGNOSIS — M25.521 RIGHT ELBOW PAIN: ICD-10-CM

## 2020-07-21 DIAGNOSIS — M25.531 RIGHT WRIST PAIN: ICD-10-CM

## 2020-07-21 DIAGNOSIS — M1A.9XX0 CHRONIC GOUT WITHOUT TOPHUS, UNSPECIFIED CAUSE, UNSPECIFIED SITE: Primary | ICD-10-CM

## 2020-07-21 DIAGNOSIS — M79.10 MYALGIA: ICD-10-CM

## 2020-07-21 DIAGNOSIS — M25.439 WRIST SWELLING: ICD-10-CM

## 2020-07-21 DIAGNOSIS — M25.431 PAIN AND SWELLING OF RIGHT WRIST: ICD-10-CM

## 2020-07-21 DIAGNOSIS — M25.531 PAIN AND SWELLING OF RIGHT WRIST: ICD-10-CM

## 2020-07-21 LAB
25(OH)D3 SERPL-MCNC: 16.2 NG/ML (ref 30–100)
ERYTHROCYTE [SEDIMENTATION RATE] IN BLOOD: 12 MM/HOUR (ref 0–10)
PLATELET # BLD AUTO: 164 THOUSANDS/UL (ref 149–390)
PMV BLD AUTO: 11 FL (ref 8.9–12.7)

## 2020-07-21 PROCEDURE — 85049 AUTOMATED PLATELET COUNT: CPT

## 2020-07-21 PROCEDURE — 86430 RHEUMATOID FACTOR TEST QUAL: CPT

## 2020-07-21 PROCEDURE — 36415 COLL VENOUS BLD VENIPUNCTURE: CPT

## 2020-07-21 PROCEDURE — 86200 CCP ANTIBODY: CPT

## 2020-07-21 PROCEDURE — 82306 VITAMIN D 25 HYDROXY: CPT | Performed by: INTERNAL MEDICINE

## 2020-07-21 PROCEDURE — 86235 NUCLEAR ANTIGEN ANTIBODY: CPT

## 2020-07-21 PROCEDURE — 99204 OFFICE O/P NEW MOD 45 MIN: CPT | Performed by: INTERNAL MEDICINE

## 2020-07-21 PROCEDURE — 86038 ANTINUCLEAR ANTIBODIES: CPT

## 2020-07-21 PROCEDURE — 85652 RBC SED RATE AUTOMATED: CPT | Performed by: INTERNAL MEDICINE

## 2020-07-21 RX ORDER — ALLOPURINOL 100 MG/1
100 TABLET ORAL DAILY
Qty: 30 TABLET | Refills: 3 | Status: SHIPPED | OUTPATIENT
Start: 2020-07-21 | End: 2020-10-27 | Stop reason: SDUPTHER

## 2020-07-21 RX ORDER — COLCHICINE 0.6 MG/1
0.6 TABLET ORAL DAILY
Qty: 30 TABLET | Refills: 3 | Status: SHIPPED | OUTPATIENT
Start: 2020-07-21 | End: 2020-10-27 | Stop reason: SDUPTHER

## 2020-07-22 LAB
CCP IGA+IGG SERPL IA-ACNC: 3 UNITS (ref 0–19)
ENA SS-A AB SER-ACNC: <0.2 AI (ref 0–0.9)
ENA SS-B AB SER-ACNC: <0.2 AI (ref 0–0.9)
RHEUMATOID FACT SER QL LA: NEGATIVE
RYE IGE QN: NEGATIVE

## 2020-08-12 ENCOUNTER — TELEPHONE (OUTPATIENT)
Dept: OTHER | Facility: OTHER | Age: 80
End: 2020-08-12

## 2020-08-13 ENCOUNTER — OFFICE VISIT (OUTPATIENT)
Dept: UROLOGY | Facility: MEDICAL CENTER | Age: 80
End: 2020-08-13
Payer: COMMERCIAL

## 2020-08-13 VITALS
DIASTOLIC BLOOD PRESSURE: 80 MMHG | HEIGHT: 68 IN | TEMPERATURE: 97.8 F | SYSTOLIC BLOOD PRESSURE: 140 MMHG | WEIGHT: 224 LBS | BODY MASS INDEX: 33.95 KG/M2

## 2020-08-13 DIAGNOSIS — N40.1 BPH WITH OBSTRUCTION/LOWER URINARY TRACT SYMPTOMS: ICD-10-CM

## 2020-08-13 DIAGNOSIS — N13.8 BPH WITH OBSTRUCTION/LOWER URINARY TRACT SYMPTOMS: ICD-10-CM

## 2020-08-13 DIAGNOSIS — Z08 ENCOUNTER FOR FOLLOW-UP SURVEILLANCE OF PROSTATE CANCER: ICD-10-CM

## 2020-08-13 DIAGNOSIS — C61 MALIGNANT NEOPLASM OF PROSTATE (HCC): Primary | ICD-10-CM

## 2020-08-13 DIAGNOSIS — Z85.46 ENCOUNTER FOR FOLLOW-UP SURVEILLANCE OF PROSTATE CANCER: ICD-10-CM

## 2020-08-13 LAB
SL AMB  POCT GLUCOSE, UA: NORMAL
SL AMB LEUKOCYTE ESTERASE,UA: NORMAL
SL AMB POCT BILIRUBIN,UA: NORMAL
SL AMB POCT BLOOD,UA: NORMAL
SL AMB POCT CLARITY,UA: CLEAR
SL AMB POCT COLOR,UA: YELLOW
SL AMB POCT KETONES,UA: NORMAL
SL AMB POCT NITRITE,UA: NORMAL
SL AMB POCT PH,UA: 5.5
SL AMB POCT SPECIFIC GRAVITY,UA: 1.02
SL AMB POCT URINE PROTEIN: NORMAL
SL AMB POCT UROBILINOGEN: 1

## 2020-08-13 PROCEDURE — 99214 OFFICE O/P EST MOD 30 MIN: CPT | Performed by: UROLOGY

## 2020-08-13 PROCEDURE — 81003 URINALYSIS AUTO W/O SCOPE: CPT | Performed by: UROLOGY

## 2020-08-13 PROCEDURE — 1036F TOBACCO NON-USER: CPT | Performed by: UROLOGY

## 2020-08-13 PROCEDURE — 3008F BODY MASS INDEX DOCD: CPT | Performed by: UROLOGY

## 2020-08-13 PROCEDURE — 3079F DIAST BP 80-89 MM HG: CPT | Performed by: UROLOGY

## 2020-08-13 PROCEDURE — 4040F PNEUMOC VAC/ADMIN/RCVD: CPT | Performed by: UROLOGY

## 2020-08-13 PROCEDURE — 3077F SYST BP >= 140 MM HG: CPT | Performed by: UROLOGY

## 2020-08-13 PROCEDURE — 1160F RVW MEDS BY RX/DR IN RCRD: CPT | Performed by: UROLOGY

## 2020-08-13 NOTE — PROGRESS NOTES
Assessment/Plan:      Diagnoses and all orders for this visit:    Malignant neoplasm of prostate ([address] )  -     PSA, total and free; Future    Encounter for follow-up surveillance of prostate cancer  -     PSA, total and free; Future    BPH with obstruction/lower urinary tract symptoms        Plan:  Continue active surveillance    Subjective:  No urologic/urinary complaints     Patient ID: Sanchez Strickland  is a 79 y o  male  HPI  The patient is initial prostate biopsy showed 1 core of Tremaine score 6 cancer   Continuing the protocol for active surveillance, he underwent his 1st confirmatory surveillance re-biopsy on 05/08/2018, with findings were notable in that, though the grade of the cancer appeared to stay the same, the volume at this interval biopsy increased to involving all 8 cores demonstrating the low-grade prostate cancer   And an additional biopsy 07/09/2019 which was better and showed again low volume Tremaine 6 disease   His PSAs have remained quite low less than 1, though the patient is on finasteride       The patient also is being treated for BPH with finasteride  Carlos Kuhn occasionally has obstructive voiding symptoms and he is inquiring about possibly adding either a medication or possibly some nutritional supplement  Review of Systems   Constitutional: Negative  HENT: Negative  Eyes: Negative  Respiratory: Negative  Cardiovascular: Negative  Gastrointestinal: Negative  Endocrine: Negative  Genitourinary: Negative  Negative for difficulty urinating  Musculoskeletal: Negative  Skin: Negative  Allergic/Immunologic: Negative  Neurological: Negative  Hematological: Negative  Psychiatric/Behavioral: Negative  Objective:     Physical Exam  Vitals signs and nursing note reviewed  Constitutional:       General: He is not in acute distress  Appearance: He is well-developed  HENT:      Head: Normocephalic and atraumatic        Nose: Nose normal    Eyes: General: No scleral icterus  Conjunctiva/sclera: Conjunctivae normal       Pupils: Pupils are equal, round, and reactive to light  Neck:      Musculoskeletal: Normal range of motion and neck supple  Pulmonary:      Effort: Pulmonary effort is normal  No respiratory distress  Abdominal:      General: Bowel sounds are normal  There is no distension  Palpations: Abdomen is soft  There is no mass  Tenderness: There is no abdominal tenderness  Genitourinary:     Prostate: Not tender and no nodules present  Comments: Prostate exam:  Minimal enlargement, nontender, no obvious nodules or induration  Musculoskeletal: Normal range of motion  General: No tenderness  Skin:     General: Skin is warm and dry  Coloration: Skin is not pale  Findings: No erythema or rash  Neurological:      Mental Status: He is alert and oriented to person, place, and time  Cranial Nerves: No cranial nerve deficit  Psychiatric:         Behavior: Behavior normal          Thought Content: Thought content normal          Judgment: Judgment normal            PSA, total and free    Ref Range & Units  6/29/20 2:50 PM   Prostate Specific Antigen Total  0 0 - 4 0 ng/mL  0 4       PSA, Free  N/A ng/mL  0 07    Comment: Roche ECLIA methodology      PSA, Free Pct  %  17 5

## 2020-08-13 NOTE — TELEPHONE ENCOUNTER
Pt wife calling to notify office that they will be in tomorrow at 10:45am  Per pt wife received a voice mail

## 2020-08-26 ENCOUNTER — OFFICE VISIT (OUTPATIENT)
Dept: INTERNAL MEDICINE CLINIC | Age: 80
End: 2020-08-26
Payer: COMMERCIAL

## 2020-08-26 VITALS
HEIGHT: 68 IN | OXYGEN SATURATION: 95 % | BODY MASS INDEX: 34.25 KG/M2 | HEART RATE: 86 BPM | WEIGHT: 226 LBS | TEMPERATURE: 98.9 F | SYSTOLIC BLOOD PRESSURE: 138 MMHG | DIASTOLIC BLOOD PRESSURE: 72 MMHG

## 2020-08-26 DIAGNOSIS — I10 BENIGN ESSENTIAL HYPERTENSION: Primary | Chronic | ICD-10-CM

## 2020-08-26 DIAGNOSIS — R39.16 BENIGN PROSTATIC HYPERPLASIA (BPH) WITH STRAINING ON URINATION: Chronic | ICD-10-CM

## 2020-08-26 DIAGNOSIS — N40.1 BENIGN PROSTATIC HYPERPLASIA (BPH) WITH STRAINING ON URINATION: Chronic | ICD-10-CM

## 2020-08-26 DIAGNOSIS — E55.9 VITAMIN D DEFICIENCY: ICD-10-CM

## 2020-08-26 DIAGNOSIS — M1A.0310 IDIOPATHIC CHRONIC GOUT OF RIGHT WRIST WITHOUT TOPHUS: ICD-10-CM

## 2020-08-26 PROBLEM — J18.9 PNEUMONIA OF BOTH LUNGS DUE TO INFECTIOUS ORGANISM: Status: RESOLVED | Noted: 2020-03-24 | Resolved: 2020-08-26

## 2020-08-26 PROCEDURE — 3725F SCREEN DEPRESSION PERFORMED: CPT | Performed by: INTERNAL MEDICINE

## 2020-08-26 PROCEDURE — 3078F DIAST BP <80 MM HG: CPT | Performed by: INTERNAL MEDICINE

## 2020-08-26 PROCEDURE — 3075F SYST BP GE 130 - 139MM HG: CPT | Performed by: INTERNAL MEDICINE

## 2020-08-26 PROCEDURE — 4040F PNEUMOC VAC/ADMIN/RCVD: CPT | Performed by: INTERNAL MEDICINE

## 2020-08-26 PROCEDURE — 3008F BODY MASS INDEX DOCD: CPT | Performed by: INTERNAL MEDICINE

## 2020-08-26 PROCEDURE — 99214 OFFICE O/P EST MOD 30 MIN: CPT | Performed by: INTERNAL MEDICINE

## 2020-08-26 PROCEDURE — 1036F TOBACCO NON-USER: CPT | Performed by: INTERNAL MEDICINE

## 2020-08-26 PROCEDURE — 1160F RVW MEDS BY RX/DR IN RCRD: CPT | Performed by: INTERNAL MEDICINE

## 2020-08-26 RX ORDER — ERGOCALCIFEROL 1.25 MG/1
50000 CAPSULE ORAL WEEKLY
Qty: 10 CAPSULE | Refills: 0 | Status: SHIPPED | OUTPATIENT
Start: 2020-08-26 | End: 2020-11-02

## 2020-08-26 NOTE — PROGRESS NOTES
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    Vitamin D deficiency  -     ergocalciferol (VITAMIN D2) 50,000 units; Take 1 capsule (50,000 Units total) by mouth once a week  -     Vitamin D 25 hydroxy; Future      Peripheral neuropathy       Subjective:   Chief Complaint   Patient presents with    Follow-up     6 week fu- labs 7/21/20        Patient ID: Alfred Becker  is a 79 y o  male  HPI  Complaining of bilateral numbness of the feet and burning could be multifactorial peripheral neuropathy and spinal stenosis I discussed with him, workup for peripheral neuropathy was done, also complaining of some joint pains mostly in the hands, he was evaluated by the rheumatologist and was told it is gout specially with the wrist and he is on colchicine and allopurinol no recent episodes of joint pain or episode of gout still having problem with his joint of the thumb  Hypertension is very well controlled  Coughing and  occasional bringing up of blood in the sputum and workup was negative CT scan was done  The following portions of the patient's history were reviewed and updated as appropriate: allergies, current medications, past family history, past medical history, past social history, past surgical history and problem list     Review of Systems   Constitutional: Positive for fatigue  Negative for appetite change and fever  HENT: Negative for congestion, ear pain, hearing loss, nosebleeds, sneezing, tinnitus and voice change  Eyes: Negative for pain, discharge and redness  Respiratory: Negative for cough, chest tightness and wheezing  Cardiovascular: Negative for chest pain, palpitations and leg swelling     Gastrointestinal: Negative for abdominal pain, blood in stool, constipation, diarrhea, nausea and vomiting  Genitourinary: Negative for difficulty urinating, dysuria, hematuria and urgency  Musculoskeletal: Positive for back pain  Negative for arthralgias, gait problem and joint swelling  Joint pain    Skin: Negative for rash and wound  Allergic/Immunologic: Negative for environmental allergies  Neurological: Positive for dizziness, light-headedness and numbness  Negative for tremors, seizures and weakness  Hematological: Negative for adenopathy  Does not bruise/bleed easily  Psychiatric/Behavioral: Negative for behavioral problems and confusion  The patient is not nervous/anxious  Past Medical History:   Diagnosis Date    Abnormal electrocardiogram     Last assessed: Oct 11, 2013    Allergic rhinitis     last assessed: Oct 11, 2013    Allergic rhinitis due to pollen     last assessed: Oct 10, 2013    Allergic sinusitis     Last assessed: May 11, 2015    Allergy     resolved: July 22, 2015    Benign essential hypertension     Last assessed/resolved: May 31, 2017    BPH (benign prostatic hyperplasia)     Colitis     Last assessed: May 24, 2016    Generalized osteoarthritis     Last assessed: Oct 11, 2013    GERD without esophagitis     Last assessed/resolved: May 31, 2017    Hearing loss     Hiatal hernia     resolved: July 22, 2015    History of gastroesophageal reflux (GERD)     History of stomach ulcers     last assessed: May 11, 2015    Hypertension     Incomplete bladder emptying     Kidney stone     Nodular prostate with lower urinary tract symptoms     Nontoxic single thyroid nodule     last assessed: April 16, 2014    Orchalgia     Overweight     last assessed:  Oct 31, 2013    Poor urinary stream     Pulmonary embolism [facility])     Salivary gland disorder     last assessed: April 16, 2014    Seasonal allergies     last assessed: May 15, 2015    Spermatocele     Straining to void     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, 2014         Current Outpatient Medications:     allopurinol (ZYLOPRIM) 100 mg tablet, Take 1 tablet (100 mg total) by mouth daily, Disp: 30 tablet, Rfl: 3    ASPIRIN 81 PO, Take 1 tablet by mouth daily, Disp: , Rfl:     colchicine (COLCRYS) 0 6 mg tablet, Take 1 tablet (0 6 mg total) by mouth daily, Disp: 30 tablet, Rfl: 3    finasteride (PROSCAR) 5 mg tablet, TAKE 1 TABLET BY MOUTH EVERY DAY, Disp: 90 tablet, Rfl: 2    levocetirizine (XYZAL) 5 MG tablet, Take 1 tablet (5 mg total) by mouth every evening, Disp: 30 tablet, Rfl: 11    tamsulosin (FLOMAX) 0 4 mg, Take 1 capsule (0 4 mg total) by mouth daily with dinner, Disp: 90 capsule, Rfl: 3    diltiazem (CARDIZEM CD) 180 mg 24 hr capsule, TAKE 1 CAPSULE BY MOUTH EVERY DAY (Patient not taking: Reported on 7/21/2020), Disp: 90 capsule, Rfl: 1    montelukast (SINGULAIR) 10 mg tablet, Take 10 mg by mouth daily at bedtime, Disp: , Rfl:     No Known Allergies    Social History   Past Surgical History:   Procedure Laterality Date    BLADDER SURGERY      CHOLECYSTECTOMY  2000    laparoscopic     HEMORRHOID SURGERY      pilionidal cyst removal     HERNIA REPAIR Left 01/17/2008    inguinal     KNEE ARTHROSCOPY Left 1974    KNEE CARTILAGE SURGERY      NASAL SEPTUM SURGERY      Deviation repair     PROSTATE BIOPSY  2017    STOMACH SURGERY      TONSILLECTOMY AND ADENOIDECTOMY      at age 37   Mayela Campos TOTAL SHOULDER ARTHROPLASTY      SHOULDER JOINT REPLACEMENT - right 01/04 0 left 01/95    VASECTOMY       Family History   Problem Relation Age of Onset    Hypertension Mother     Stroke Mother     Stomach cancer Father     Hypertension Father     Hypertension Family     Stroke Family         syndrome     Heart attack Son        Objective:  /72 (BP Location: Left arm, Patient Position: Sitting, Cuff Size: Standard)   Pulse 86   Temp 98 9 °F (37 2 °C) (Temporal)   Ht 5' 8" (1 727 m) Comment: shoes on  Wt 103 kg (226 lb) Comment: shoes on  SpO2 95%   BMI 34 36 kg/m²        Physical Exam  Constitutional:       Appearance: He is well-developed  HENT:      Right Ear: External ear normal    Eyes:      Conjunctiva/sclera: Conjunctivae normal       Pupils: Pupils are equal, round, and reactive to light  Neck:      Musculoskeletal: Normal range of motion  Thyroid: No thyromegaly  Vascular: No JVD  Cardiovascular:      Rate and Rhythm: Normal rate and regular rhythm  Heart sounds: Normal heart sounds  Comments: Bilateral varicose veins more on the left than on the right side with swelling and edema of the both LEs  Pulmonary:      Breath sounds: Normal breath sounds  Abdominal:      General: Bowel sounds are normal       Palpations: Abdomen is soft  Musculoskeletal: Normal range of motion  Right lower leg: Edema present  Left lower leg: Edema present  Lymphadenopathy:      Cervical: No cervical adenopathy  Skin:     General: Skin is dry  Comments: Bilateral lower extremity chronic venous insufficiency and hyperpigmentation   Neurological:      General: No focal deficit present  Mental Status: He is alert and oriented to person, place, and time  Mental status is at baseline  Deep Tendon Reflexes: Reflexes are normal and symmetric  Psychiatric:         Behavior: Behavior normal          Thought Content:  Thought content normal          Judgment: Judgment normal

## 2020-09-22 DIAGNOSIS — G60.9 IDIOPATHIC PERIPHERAL NEUROPATHY: Primary | ICD-10-CM

## 2020-09-22 NOTE — TELEPHONE ENCOUNTER
Patient's wife Alton Grandchild is calling to get the     traMADol-acetaminophen (ULTRACET) 37 5-325 mg per tablet takes 2 tablets by mouth every 6 hours as need for mild pain  [facility] [place]    Please call back since they are leaving tomorrow for vacation        She stated that she called the RX line last week but there was no documentation that it was called in    Patient takes this only as needed and it has been awhile that it was RX to [facility]

## 2020-10-11 DIAGNOSIS — R35.1 BENIGN PROSTATIC HYPERPLASIA WITH NOCTURIA: ICD-10-CM

## 2020-10-11 DIAGNOSIS — N40.1 BENIGN PROSTATIC HYPERPLASIA WITH NOCTURIA: ICD-10-CM

## 2020-10-12 DIAGNOSIS — N40.1 BPH WITH OBSTRUCTION/LOWER URINARY TRACT SYMPTOMS: ICD-10-CM

## 2020-10-12 DIAGNOSIS — N13.8 BPH WITH OBSTRUCTION/LOWER URINARY TRACT SYMPTOMS: ICD-10-CM

## 2020-10-12 RX ORDER — TAMSULOSIN HYDROCHLORIDE 0.4 MG/1
0.4 CAPSULE ORAL
Qty: 90 CAPSULE | Refills: 3 | Status: CANCELLED | OUTPATIENT
Start: 2020-10-12

## 2020-10-12 RX ORDER — FINASTERIDE 5 MG/1
TABLET, FILM COATED ORAL
Qty: 90 TABLET | Refills: 2 | Status: SHIPPED | OUTPATIENT
Start: 2020-10-12 | End: 2021-04-15

## 2020-10-13 DIAGNOSIS — N40.1 BPH WITH OBSTRUCTION/LOWER URINARY TRACT SYMPTOMS: ICD-10-CM

## 2020-10-13 DIAGNOSIS — N13.8 BPH WITH OBSTRUCTION/LOWER URINARY TRACT SYMPTOMS: ICD-10-CM

## 2020-10-13 RX ORDER — TAMSULOSIN HYDROCHLORIDE 0.4 MG/1
0.4 CAPSULE ORAL
Qty: 90 CAPSULE | Refills: 3 | Status: SHIPPED | OUTPATIENT
Start: 2020-10-13 | End: 2021-11-01 | Stop reason: SDUPTHER

## 2020-10-27 ENCOUNTER — OFFICE VISIT (OUTPATIENT)
Dept: RHEUMATOLOGY | Facility: CLINIC | Age: 80
End: 2020-10-27
Payer: COMMERCIAL

## 2020-10-27 ENCOUNTER — APPOINTMENT (OUTPATIENT)
Dept: LAB | Facility: MEDICAL CENTER | Age: 80
End: 2020-10-27
Payer: COMMERCIAL

## 2020-10-27 VITALS
WEIGHT: 226.8 LBS | DIASTOLIC BLOOD PRESSURE: 97 MMHG | SYSTOLIC BLOOD PRESSURE: 164 MMHG | TEMPERATURE: 98.3 F | HEART RATE: 69 BPM | BODY MASS INDEX: 34.37 KG/M2 | HEIGHT: 68 IN

## 2020-10-27 DIAGNOSIS — M25.572 BILATERAL ANKLE PAIN, UNSPECIFIED CHRONICITY: ICD-10-CM

## 2020-10-27 DIAGNOSIS — M25.571 BILATERAL ANKLE PAIN, UNSPECIFIED CHRONICITY: ICD-10-CM

## 2020-10-27 DIAGNOSIS — M1A.0310 IDIOPATHIC CHRONIC GOUT OF RIGHT WRIST WITHOUT TOPHUS: Primary | ICD-10-CM

## 2020-10-27 DIAGNOSIS — E55.9 VITAMIN D DEFICIENCY: ICD-10-CM

## 2020-10-27 DIAGNOSIS — M1A.9XX0 CHRONIC GOUT WITHOUT TOPHUS, UNSPECIFIED CAUSE, UNSPECIFIED SITE: ICD-10-CM

## 2020-10-27 DIAGNOSIS — M25.531 RIGHT WRIST PAIN: ICD-10-CM

## 2020-10-27 DIAGNOSIS — M25.431 SWELLING OF JOINT OF RIGHT WRIST: ICD-10-CM

## 2020-10-27 LAB
25(OH)D3 SERPL-MCNC: 48.6 NG/ML (ref 30–100)
ALBUMIN SERPL BCP-MCNC: 3.9 G/DL (ref 3.5–5)
ALP SERPL-CCNC: 80 U/L (ref 46–116)
ALT SERPL W P-5'-P-CCNC: 26 U/L (ref 12–78)
ANION GAP SERPL CALCULATED.3IONS-SCNC: 3 MMOL/L (ref 4–13)
AST SERPL W P-5'-P-CCNC: 25 U/L (ref 5–45)
BASOPHILS # BLD AUTO: 0.07 THOUSANDS/ΜL (ref 0–0.1)
BASOPHILS NFR BLD AUTO: 2 % (ref 0–1)
BILIRUB SERPL-MCNC: 0.94 MG/DL (ref 0.2–1)
BUN SERPL-MCNC: 22 MG/DL (ref 5–25)
CALCIUM SERPL-MCNC: 8.9 MG/DL (ref 8.3–10.1)
CHLORIDE SERPL-SCNC: 107 MMOL/L (ref 100–108)
CO2 SERPL-SCNC: 29 MMOL/L (ref 21–32)
CREAT SERPL-MCNC: 1.09 MG/DL (ref 0.6–1.3)
CRP SERPL QL: 5.8 MG/L
EOSINOPHIL # BLD AUTO: 0.04 THOUSAND/ΜL (ref 0–0.61)
EOSINOPHIL NFR BLD AUTO: 1 % (ref 0–6)
ERYTHROCYTE [DISTWIDTH] IN BLOOD BY AUTOMATED COUNT: 13.8 % (ref 11.6–15.1)
ERYTHROCYTE [SEDIMENTATION RATE] IN BLOOD: 9 MM/HOUR (ref 0–19)
GFR SERPL CREATININE-BSD FRML MDRD: 64 ML/MIN/1.73SQ M
GLUCOSE P FAST SERPL-MCNC: 90 MG/DL (ref 65–99)
HCT VFR BLD AUTO: 52.9 % (ref 36.5–49.3)
HGB BLD-MCNC: 17.1 G/DL (ref 12–17)
IMM GRANULOCYTES # BLD AUTO: 0.09 THOUSAND/UL (ref 0–0.2)
IMM GRANULOCYTES NFR BLD AUTO: 2 % (ref 0–2)
LYMPHOCYTES # BLD AUTO: 1.18 THOUSANDS/ΜL (ref 0.6–4.47)
LYMPHOCYTES NFR BLD AUTO: 26 % (ref 14–44)
MCH RBC QN AUTO: 29.9 PG (ref 26.8–34.3)
MCHC RBC AUTO-ENTMCNC: 32.3 G/DL (ref 31.4–37.4)
MCV RBC AUTO: 93 FL (ref 82–98)
MONOCYTES # BLD AUTO: 0.31 THOUSAND/ΜL (ref 0.17–1.22)
MONOCYTES NFR BLD AUTO: 7 % (ref 4–12)
NEUTROPHILS # BLD AUTO: 2.82 THOUSANDS/ΜL (ref 1.85–7.62)
NEUTS SEG NFR BLD AUTO: 62 % (ref 43–75)
NRBC BLD AUTO-RTO: 0 /100 WBCS
PLATELET # BLD AUTO: 200 THOUSANDS/UL (ref 149–390)
PMV BLD AUTO: 11.2 FL (ref 8.9–12.7)
POTASSIUM SERPL-SCNC: 4.1 MMOL/L (ref 3.5–5.3)
PROT SERPL-MCNC: 6.8 G/DL (ref 6.4–8.2)
RBC # BLD AUTO: 5.71 MILLION/UL (ref 3.88–5.62)
SODIUM SERPL-SCNC: 139 MMOL/L (ref 136–145)
WBC # BLD AUTO: 4.51 THOUSAND/UL (ref 4.31–10.16)

## 2020-10-27 PROCEDURE — 84550 ASSAY OF BLOOD/URIC ACID: CPT | Performed by: INTERNAL MEDICINE

## 2020-10-27 PROCEDURE — 99214 OFFICE O/P EST MOD 30 MIN: CPT | Performed by: INTERNAL MEDICINE

## 2020-10-27 PROCEDURE — 82306 VITAMIN D 25 HYDROXY: CPT | Performed by: INTERNAL MEDICINE

## 2020-10-27 PROCEDURE — 80053 COMPREHEN METABOLIC PANEL: CPT | Performed by: INTERNAL MEDICINE

## 2020-10-27 PROCEDURE — 85652 RBC SED RATE AUTOMATED: CPT | Performed by: INTERNAL MEDICINE

## 2020-10-27 PROCEDURE — 86140 C-REACTIVE PROTEIN: CPT | Performed by: INTERNAL MEDICINE

## 2020-10-27 PROCEDURE — 85025 COMPLETE CBC W/AUTO DIFF WBC: CPT | Performed by: INTERNAL MEDICINE

## 2020-10-27 PROCEDURE — 36415 COLL VENOUS BLD VENIPUNCTURE: CPT | Performed by: INTERNAL MEDICINE

## 2020-10-27 RX ORDER — COLCHICINE 0.6 MG/1
0.6 TABLET ORAL DAILY
Qty: 90 TABLET | Refills: 3 | Status: SHIPPED | OUTPATIENT
Start: 2020-10-27 | End: 2021-08-12

## 2020-10-27 RX ORDER — ALLOPURINOL 100 MG/1
100 TABLET ORAL DAILY
Qty: 90 TABLET | Refills: 3 | Status: SHIPPED | OUTPATIENT
Start: 2020-10-27 | End: 2021-08-12

## 2020-10-27 NOTE — PROGRESS NOTES
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  -     Sedimentation rate, automated  -     C-reactive protein  -     CBC and differential  -     Comprehensive metabolic panel  -     Uric acid    Vitamin D deficiency  -     Vitamin D 25 hydroxy    Right wrist pain  -     colchicine (COLCRYS) 0 6 mg tablet; Take 1 tablet (0 6 mg total) by mouth daily    Swelling of joint of right wrist  -     colchicine (COLCRYS) 0 6 mg tablet; Take 1 tablet (0 6 mg total) by mouth daily    Chronic gout without tophus, unspecified cause, unspecified site  -     allopurinol (ZYLOPRIM) 100 mg tablet; Take 1 tablet (100 mg total) by mouth daily    Bilateral ankle pain, unspecified chronicity  -     diclofenac sodium (VOLTAREN) 1 %; Apply 2 g topically 4 (four) times a day (Patient not taking: Reported on 1/6/2021)    Follow-up plan: Return to clinic in 6 months      Rheumatic Disease Summary  Colby Perez  is a 79 y o   male who originally presented on 7/21/20 as a Rheumatology consult referred by his PCP Calin Kennedy MD for evaluation of right wrist swelling  Patient had been having gout episodes predominantly involving right wrist, but also involving right elbow and right ankle  He had some synovitis of right wrist on day of visit; was recovering from a recent gout flare  Started patient on chronic urate lowering therapy with allopurinol 100mg po daily, while continuing colchicine 0 6mg po daily for gout prophylaxis   Latest uric acid level was 6 7; goal uric acid level for non-tophaceous gout is <6  Asked patient to cut back on clams, crab meat, and Snapple  He was to call clinic with future gout flares  HPI  Mayra Pichardo  is a 79 y o   male who presents for follow-up of his gout  Complains that his ankles are bothering him, but not from gout  Gout attacks in the past involved right ankle and wrist  Admits to eating 4 dozen clams a few weeks ago  The following portions of the patient's history were reviewed and updated as appropriate: allergies, current medications, past family history, past medical history, past social history, past surgical history and problem list     Review of Systems:   Review of Systems   Constitutional: Negative for chills, fatigue, fever and unexpected weight change  HENT: Negative for mouth sores and trouble swallowing  Eyes: Negative for pain and visual disturbance  Respiratory: Negative for cough and shortness of breath  Cardiovascular: Negative for chest pain and leg swelling  Gastrointestinal: Negative for constipation and diarrhea  Musculoskeletal: Positive for arthralgias and joint swelling  Negative for back pain and myalgias  Skin: Negative for color change and rash  Neurological: Positive for numbness  Negative for weakness  Hematological: Negative for adenopathy  Psychiatric/Behavioral: Negative for sleep disturbance         Home Medications:    Current Outpatient Medications:     allopurinol (ZYLOPRIM) 100 mg tablet, Take 1 tablet (100 mg total) by mouth daily, Disp: 90 tablet, Rfl: 3    ASPIRIN 81 PO, Take 1 tablet by mouth daily, Disp: , Rfl:     colchicine (COLCRYS) 0 6 mg tablet, Take 1 tablet (0 6 mg total) by mouth daily, Disp: 90 tablet, Rfl: 3    finasteride (PROSCAR) 5 mg tablet, TAKE 1 TABLET BY MOUTH EVERY DAY, Disp: 90 tablet, Rfl: 2    levocetirizine (XYZAL) 5 MG tablet, Take 1 tablet (5 mg total) by mouth every evening, Disp: 30 tablet, Rfl: 11    tamsulosin (FLOMAX) 0 4 mg, Take 1 capsule (0 4 mg total) by mouth daily with dinner, Disp: 90 capsule, Rfl: 3    traMADol-acetaminophen (ULTRACET) 37 5-325 mg per tablet, Take 2 tablets by mouth every 6 (six) hours as needed for moderate pain, Disp: 60 tablet, Rfl: 0    diclofenac sodium (VOLTAREN) 1 %, Apply 2 g topically 4 (four) times a day (Patient not taking: Reported on 1/6/2021), Disp: 100 g, Rfl: 6    diltiazem (TIAZAC) 180 MG 24 hr capsule, Take 1 capsule (180 mg total) by mouth daily, Disp: 30 capsule, Rfl: 3    ergocalciferol (VITAMIN D2) 50,000 units, Take 1 capsule (50,000 Units total) by mouth once a week, Disp: 10 capsule, Rfl: 0    fluticasone (FLONASE) 50 mcg/act nasal spray, 2 sprays into each nostril daily, Disp: 1 Bottle, Rfl: 3    Objective:    Vitals:    10/27/20 1107   BP: 164/97   BP Location: Left arm   Patient Position: Sitting   Cuff Size: Large   Pulse: 69   Temp: 98 3 °F (36 8 °C)   TempSrc: Temporal   Weight: 103 kg (226 lb 12 8 oz)   Height: 5' 8" (1 727 m)       Physical Exam  Constitutional:       General: He is not in acute distress  Appearance: He is well-developed  HENT:      Head: Normocephalic and atraumatic  Eyes:      General: Lids are normal  No scleral icterus  Conjunctiva/sclera: Conjunctivae normal    Neck:      Musculoskeletal: Neck supple  No muscular tenderness  Cardiovascular:      Rate and Rhythm: Normal rate and regular rhythm  Heart sounds: S1 normal and S2 normal  No murmur  No friction rub  Pulmonary:      Effort: Pulmonary effort is normal  No tachypnea or respiratory distress  Breath sounds: Normal breath sounds  No wheezing, rhonchi or rales  Musculoskeletal:         General: Swelling and tenderness present  Comments: Bilateral ankle swelling/tenderness; bilateral MTP squeeze tenderness   Skin:     General: Skin is warm and dry  Findings: No rash  Nails: There is no clubbing  Neurological:      Mental Status: He is alert  Sensory: No sensory deficit  Psychiatric:         Behavior: Behavior normal  Behavior is cooperative  Reviewed labs and imaging  Imaging:   Right Wrist x-rays 6/29/20  IMPRESSION:  Progression of deformity and arthritic changes of the wrist compared with prior imaging   There is swelling of the wrist   No conclusive evidence of acute fractures      Labs:   Office Visit on 10/27/2020   Component Date Value Ref Range Status    Sed Rate 10/27/2020 9  0 - 19 mm/hour Final    CRP 10/27/2020 5 8* <3 0 mg/L Final    WBC 10/27/2020 4 51  4 31 - 10 16 Thousand/uL Final    RBC 10/27/2020 5 71* 3 88 - 5 62 Million/uL Final    Hemoglobin 10/27/2020 17 1* 12 0 - 17 0 g/dL Final    Hematocrit 10/27/2020 52 9* 36 5 - 49 3 % Final    MCV 10/27/2020 93  82 - 98 fL Final    MCH 10/27/2020 29 9  26 8 - 34 3 pg Final    MCHC 10/27/2020 32 3  31 4 - 37 4 g/dL Final    RDW 10/27/2020 13 8  11 6 - 15 1 % Final    MPV 10/27/2020 11 2  8 9 - 12 7 fL Final    Platelets 73/70/1733 200  149 - 390 Thousands/uL Final    nRBC 10/27/2020 0  /100 WBCs Final    Neutrophils Relative 10/27/2020 62  43 - 75 % Final    Immat GRANS % 10/27/2020 2  0 - 2 % Final    Lymphocytes Relative 10/27/2020 26  14 - 44 % Final    Monocytes Relative 10/27/2020 7  4 - 12 % Final    Eosinophils Relative 10/27/2020 1  0 - 6 % Final    Basophils Relative 10/27/2020 2* 0 - 1 % Final    Neutrophils Absolute 10/27/2020 2 82  1 85 - 7 62 Thousands/µL Final    Immature Grans Absolute 10/27/2020 0 09  0 00 - 0 20 Thousand/uL Final    Lymphocytes Absolute 10/27/2020 1 18  0 60 - 4 47 Thousands/µL Final    Monocytes Absolute 10/27/2020 0 31  0 17 - 1 22 Thousand/µL Final    Eosinophils Absolute 10/27/2020 0 04  0 00 - 0 61 Thousand/µL Final    Basophils Absolute 10/27/2020 0 07  0 00 - 0 10 Thousands/µL Final    Sodium 10/27/2020 139  136 - 145 mmol/L Final    Potassium 10/27/2020 4 1  3 5 - 5 3 mmol/L Final    Chloride 10/27/2020 107  100 - 108 mmol/L Final    CO2 10/27/2020 29  21 - 32 mmol/L Final    ANION GAP 10/27/2020 3* 4 - 13 mmol/L Final    BUN 10/27/2020 22  5 - 25 mg/dL Final    Creatinine 10/27/2020 1 09  0 60 - 1 30 mg/dL Final    Standardized to IDMS reference method    Glucose, Fasting 10/27/2020 90  65 - 99 mg/dL Final    Specimen collection should occur prior to Sulfasalazine administration due to the potential for falsely depressed results  Specimen collection should occur prior to Sulfapyridine administration due to the potential for falsely elevated results   Calcium 10/27/2020 8 9  8 3 - 10 1 mg/dL Final    AST 10/27/2020 25  5 - 45 U/L Final    Specimen collection should occur prior to Sulfasalazine administration due to the potential for falsely depressed results   ALT 10/27/2020 26  12 - 78 U/L Final    Specimen collection should occur prior to Sulfasalazine and/or Sulfapyridine administration due to the potential for falsely depressed results   Alkaline Phosphatase 10/27/2020 80  46 - 116 U/L Final    Total Protein 10/27/2020 6 8  6 4 - 8 2 g/dL Final    Albumin 10/27/2020 3 9  3 5 - 5 0 g/dL Final    Total Bilirubin 10/27/2020 0 94  0 20 - 1 00 mg/dL Final    Use of this assay is not recommended for patients undergoing treatment with eltrombopag due to the potential for falsely elevated results   eGFR 10/27/2020 64  ml/min/1 73sq m Final    Vit D, 25-Hydroxy 10/27/2020 48 6  30 0 - 100 0 ng/mL Final    Uric Acid 10/27/2020 5 1  4 2 - 8 0 mg/dL Final    Specimen collection should occur prior to Metamizole administration due to the potential for falsely depressed results     Office Visit on 08/13/2020   Component Date Value Ref Range Status     COLOR,UA 08/13/2020 yellow   Final    CLARITY,UA 08/13/2020 clear   Final    SPECIFIC GRAVITY,UA 08/13/2020 1 020   Final     PH,UA 08/13/2020 5 5   Final    LEUKOCYTE ESTERASE,UA 08/13/2020 neg   Final    NITRITE,UA 08/13/2020 neg   Final    GLUCOSE, UA 08/13/2020 neg   Final   Ole Mejia 08/13/2020 neg   Final    BILIRUBIN,UA 08/13/2020 neg   Final    BLOOD,UA 08/13/2020 neg   Final    POCT URINE PROTEIN 08/13/2020 neg   Final    SL AMB POCT UROBILINOGEN 08/13/2020 1 0   Final   Appointment on 07/21/2020   Component Date Value Ref Range Status    Cyclic Citrullin Peptide Ab 07/21/2020 3  0 - 19 units Final                              Negative               <20                            Weak positive      20 - 39                            Moderate positive  40 - 59                            Strong positive        >59    Rheumatoid Factor 07/21/2020 Negative  Negative Final    SS-A (RO) Ab 07/21/2020 <0 2  0 0 - 0 9 AI Final    SS-B (LA) Ab 07/21/2020 <0 2  0 0 - 0 9 AI Final    ANDRAE 07/21/2020 Negative  Negative Final   Office Visit on 07/21/2020   Component Date Value Ref Range Status    Sed Rate 07/21/2020 12* 0 - 10 mm/hour Final    Vit D, 25-Hydroxy 07/21/2020 16 2* 30 0 - 100 0 ng/mL Final   Appointment on 06/29/2020   Component Date Value Ref Range Status    Uric Acid 06/29/2020 6 7  4 2 - 8 0 mg/dL Final    Specimen collection should occur prior to Metamizole administration due to the potential for falsely depressed results      Lyme IgG/IgM Ab 06/29/2020 <0 91  0 00 - 0 90 ISR Final                                    Negative         <0 91                                  Equivocal  0 91 - 1 09                                  Positive         >1 09    CRP 06/29/2020 124 0* <3 0 mg/L Final    WBC 06/29/2020 6 19  4 31 - 10 16 Thousand/uL Final    RBC 06/29/2020 5 09  3 88 - 5 62 Million/uL Final    Hemoglobin 06/29/2020 15 3  12 0 - 17 0 g/dL Final    Hematocrit 06/29/2020 46 6  36 5 - 49 3 % Final    MCV 06/29/2020 92  82 - 98 fL Final    MCH 06/29/2020 30 1  26 8 - 34 3 pg Final    MCHC 06/29/2020 32 8  31 4 - 37 4 g/dL Final    RDW 06/29/2020 13 2  11 6 - 15 1 % Final    MPV 06/29/2020 10 5  8 9 - 12 7 fL Final    Platelets 06/29/2020 204  149 - 390 Thousands/uL Final    nRBC 06/29/2020 0  /100 WBCs Final    Neutrophils Relative 06/29/2020 78* 43 - 75 % Final    Immat GRANS % 06/29/2020 2  0 - 2 % Final    Lymphocytes Relative 06/29/2020 12* 14 - 44 % Final    Monocytes Relative 06/29/2020 7  4 - 12 % Final    Eosinophils Relative 06/29/2020 0  0 - 6 % Final    Basophils Relative 06/29/2020 1  0 - 1 % Final    Neutrophils Absolute 06/29/2020 4 82  1 85 - 7 62 Thousands/µL Final    Immature Grans Absolute 06/29/2020 0 12  0 00 - 0 20 Thousand/uL Final    Lymphocytes Absolute 06/29/2020 0 77  0 60 - 4 47 Thousands/µL Final    Monocytes Absolute 06/29/2020 0 43  0 17 - 1 22 Thousand/µL Final    Eosinophils Absolute 06/29/2020 0 01  0 00 - 0 61 Thousand/µL Final    Basophils Absolute 06/29/2020 0 04  0 00 - 0 10 Thousands/µL Final    Prostate Specific Antigen Total 06/29/2020 0 4  0 0 - 4 0 ng/mL Final    Roche ECLIA methodology  According to the American Urological Association, Serum PSA should  decrease and remain at undetectable levels after radical  prostatectomy  The AUA defines biochemical recurrence as an initial  PSA value 0 2 ng/mL or greater followed by a subsequent confirmatory  PSA value 0 2 ng/mL or greater  Values obtained with different assay methods or kits cannot be used  interchangeably  Results cannot be interpreted as absolute evidence  of the presence or absence of malignant disease   PSA, Free 06/29/2020 0 07  N/A ng/mL Final    Roche ECLIA methodology   PSA, Free Pct 06/29/2020 17 5  % Final    The table below lists the probability of prostate cancer for  men with non-suspicious BROCK results and total PSA between  4 and 10 ng/mL, by patient age Alexandre Blair, [address] [place] [address],  [id number])                      % Free PSA       50-64 yr        65-75 yr                    0 00-10 00%        56%             55%                   10 01-15 00%        24%             35%                   15 01-20 00% 17%             23%                   20 01-25 00%        10%             20%                        >25 00%         5%              9%  Please note:  Ilana et al did not make specific                recommendations regarding the use of                percent free PSA for any other population                of men   Sodium 06/29/2020 137  136 - 145 mmol/L Final    Potassium 06/29/2020 4 0  3 5 - 5 3 mmol/L Final    Chloride 06/29/2020 99* 100 - 108 mmol/L Final    CO2 06/29/2020 34* 21 - 32 mmol/L Final    ANION GAP 06/29/2020 4  4 - 13 mmol/L Final    BUN 06/29/2020 20  5 - 25 mg/dL Final    Creatinine 06/29/2020 1 21  0 60 - 1 30 mg/dL Final    Standardized to IDMS reference method    Glucose 06/29/2020 115  65 - 140 mg/dL Final      If the patient is fasting, the ADA then defines impaired fasting glucose as > 100 mg/dL and diabetes as > or equal to 123 mg/dL  Specimen collection should occur prior to Sulfasalazine administration due to the potential for falsely depressed results  Specimen collection should occur prior to Sulfapyridine administration due to the potential for falsely elevated results   Calcium 06/29/2020 9 3  8 3 - 10 1 mg/dL Final    eGFR 06/29/2020 57  ml/min/1 73sq m Final   Orders Only on 03/24/2020   Component Date Value Ref Range Status    SARS-CoV-2  03/24/2020 Not Detected  Not Detected Final    Testing was performed using the jean carlos(R) SARS-CoV-2 test   This test was developed and its performance characteristics determined  by [employer]  This test has not been FDA cleared or  approved  This test has been authorized by FDA under an Emergency Use  Authorization (EUA)  This test is only authorized for the duration of  time the declaration that circumstances exist justifying the  authorization of the emergency use of in vitro diagnostic tests for  detection of SARS-CoV-2 virus and/or diagnosis of COVID-19 infection  under section 564(b)(1) of the Act, 21 U  S C  360bbb-3(b)(1), unless  the authorization is terminated or revoked sooner   Platelets 89/62/9970 164  149 - 390 Thousands/uL Final    MPV 07/21/2020 11 0  8 9 - 12 7 fL Final   No results displayed because visit has over 200 results        Admission on 03/07/2020, Discharged on 03/08/2020   Component Date Value Ref Range Status    WBC 03/07/2020 6 22  4 31 - 10 16 Thousand/uL Final    RBC 03/07/2020 5 01  3 88 - 5 62 Million/uL Final    Hemoglobin 03/07/2020 15 4  12 0 - 17 0 g/dL Final    Hematocrit 03/07/2020 45 8  36 5 - 49 3 % Final    MCV 03/07/2020 91  82 - 98 fL Final    MCH 03/07/2020 30 7  26 8 - 34 3 pg Final    MCHC 03/07/2020 33 6  31 4 - 37 4 g/dL Final    RDW 03/07/2020 13 6  11 6 - 15 1 % Final    MPV 03/07/2020 10 5  8 9 - 12 7 fL Final    Platelets 65/69/3756 151  149 - 390 Thousands/uL Final    nRBC 03/07/2020 0  /100 WBCs Final    Neutrophils Relative 03/07/2020 75  43 - 75 % Final    Immat GRANS % 03/07/2020 2  0 - 2 % Final    Lymphocytes Relative 03/07/2020 14  14 - 44 % Final    Monocytes Relative 03/07/2020 8  4 - 12 % Final    Eosinophils Relative 03/07/2020 0  0 - 6 % Final    Basophils Relative 03/07/2020 1  0 - 1 % Final    Neutrophils Absolute 03/07/2020 4 69  1 85 - 7 62 Thousands/µL Final    Immature Grans Absolute 03/07/2020 0 12  0 00 - 0 20 Thousand/uL Final    Lymphocytes Absolute 03/07/2020 0 87  0 60 - 4 47 Thousands/µL Final    Monocytes Absolute 03/07/2020 0 49  0 17 - 1 22 Thousand/µL Final    Eosinophils Absolute 03/07/2020 0 02  0 00 - 0 61 Thousand/µL Final    Basophils Absolute 03/07/2020 0 03  0 00 - 0 10 Thousands/µL Final    Protime 03/07/2020 14 1  11 6 - 14 5 seconds Final    INR 03/07/2020 1 15  0 84 - 1 19 Final    PTT 03/07/2020 28  23 - 37 seconds Final    Therapeutic Heparin Range =  60-90 seconds    Sodium 03/07/2020 138  136 - 145 mmol/L Final    Potassium 03/07/2020 4 3  3 5 - 5 3 mmol/L Final    Chloride 03/07/2020 102  100 - 108 mmol/L Final    CO2 03/07/2020 26  21 - 32 mmol/L Final    ANION GAP 03/07/2020 10  4 - 13 mmol/L Final    BUN 03/07/2020 22  5 - 25 mg/dL Final    Creatinine 03/07/2020 1 13  0 60 - 1 30 mg/dL Final    Standardized to IDMS reference method    Glucose 03/07/2020 111  65 - 140 mg/dL Final      If the patient is fasting, the ADA then defines impaired fasting glucose as > 100 mg/dL and diabetes as > or equal to 123 mg/dL  Specimen collection should occur prior to Sulfasalazine administration due to the potential for falsely depressed results  Specimen collection should occur prior to Sulfapyridine administration due to the potential for falsely elevated results   Calcium 03/07/2020 8 7  8 3 - 10 1 mg/dL Final    AST 03/07/2020 22  5 - 45 U/L Final      Specimen collection should occur prior to Sulfasalazine administration due to the potential for falsely depressed results   ALT 03/07/2020 14  12 - 78 U/L Final      Specimen collection should occur prior to Sulfasalazine administration due to the potential for falsely depressed results   Alkaline Phosphatase 03/07/2020 72  46 - 116 U/L Final    Total Protein 03/07/2020 6 8  6 4 - 8 2 g/dL Final    Albumin 03/07/2020 3 3* 3 5 - 5 0 g/dL Final    Total Bilirubin 03/07/2020 1 03* 0 20 - 1 00 mg/dL Final    eGFR 03/07/2020 62  ml/min/1 73sq m Final    Lipase 03/07/2020 87  73 - 393 u/L Final    Total CK 03/07/2020 106  39 - 308 U/L Final    D-Dimer, Quant 03/07/2020 0 63* <0 50 ug/ml FEU Final      Reference and upper limits to exclude DVT and PE are the same  Do not use to exclude if clinical symptoms are present      Troponin I 03/07/2020 <0 02  <=0 04 ng/mL Final    Autovalidation override  [employer] Chemistry analyzer 99% cutoff is > 0 04 ng/mL in network labs     o cTnI 99% cutoff is useful only when applied to patients in the clinical setting of myocardial ischemia   o cTnI 99% cutoff should be interpreted in the context of clinical history, ECG findings and possibly cardiac imaging to establish correct diagnosis  o cTnI 99% cutoff may be suggestive but clearly not indicative of a coronary event without the clinical setting of myocardial ischemia   NT-proBNP 03/07/2020 161  <450 pg/mL Final    LACTIC ACID 03/07/2020 1 0  0 5 - 2 0 mmol/L Final    Blood Culture 03/07/2020 No Growth After 5 Days  Final    Blood Culture 03/07/2020 No Growth After 5 Days  Final    CRP 03/07/2020 >90 0* <3 0 mg/L Final    Sed Rate 03/07/2020 8  0 - 10 mm/hour Final    Procalcitonin 03/07/2020 <0 05  <=0 25 ng/ml Final    Comment: Suspected Lower Respiratory Tract Infection (LRTI):  - LESS than or EQUAL to 0 25 ng/mL:   low likelihood for bacterial LRTI; antibiotics DISCOURAGED   - GREATER than 0 25 ng/mL:   increased likelihood for bacterial LRTI; antibiotics ENCOURAGED  Suspected Sepsis:  - Strongly consider initiating antibiotics in ALL UNSTABLE patients  - LESS than or EQUAL to 0 5 ng/mL:   low likelihood for bacterial sepsis; antibiotics DISCOURAGED   - GREATER than 0 5 ng/mL:   increased likelihood for bacterial sepsis; antibiotics ENCOURAGED   - GREATER than 2 ng/mL:   high risk for severe sepsis / septic shock; antibiotics strongly ENCOURAGED  Decisions on antibiotic use should not be based solely on Procalcitonin (PCT) levels  If PCT is low but uncertainty exists with stopping antibiotics, repeat PCT in 6-24 hours to confirm the low level  If antibiotics are administered (regardless if initial PCT was high or low), repeat PCT every 1-2 days to consider early antibiotic cessation (when GREATER                            than 80% decrease from the peak OR when PCT drops below designated cutoffs, whichever comes first), so long as the infection is NOT one that typically requires prolonged treatment durations (e g , bone/joint infections, endocarditis, Staph  aureus bacteremia)      Situations of FALSE-POSITIVE Procalcitonin values:  1) Newborns < 67 hours old  2) Massive stress from severe trauma / burns, major surgery, acute pancreatitis, cardiogenic / hemorrhagic shock, sickle cell crisis, or other organ perfusion abnormalities  3) Malaria and some Candidal infections  4) Treatment with agents that stimulate cytokines (e g , OKT3, anti-lymphocyte globulins, alemtuzumab, IL-2, granulocyte transfusion [NOT GCSFs])  5) Chronic renal disease causes elevated baseline levels (consider GREATER than 0 75 ng/mL as an abnormal cut-off); initiating HD/CRRT may cause transient decreases  6) Paraneoplastic syndromes from medullary thyroid or SCLC, some forms of vasculitis, and acute [id number]                            disease    Situations of FALSE-NEGATIVE Procalcitonin values:  1) Too early in clinical course for PCT to have reached its peak (may repeat in 6-24 hours to confirm low level)  2) Localized infection WITHOUT systemic (SIRS / sepsis) response (e g , an abscess, osteomyelitis, cystitis)  3) Mycobacteria (e g , Tuberculosis, MAC)  4) Cystic fibrosis exacerbations      Legionella Urinary Antigen 03/07/2020 Negative  Negative Final    Strep pneumoniae antigen, urine 03/07/2020 Negative  Negative Final    Sodium 03/07/2020 136  136 - 145 mmol/L Final    Potassium 03/07/2020 4 2  3 5 - 5 3 mmol/L Final    Chloride 03/07/2020 101  100 - 108 mmol/L Final    CO2 03/07/2020 27  21 - 32 mmol/L Final    ANION GAP 03/07/2020 8  4 - 13 mmol/L Final    BUN 03/07/2020 20  5 - 25 mg/dL Final    Creatinine 03/07/2020 1 14  0 60 - 1 30 mg/dL Final    Standardized to IDMS reference method    Glucose 03/07/2020 96  65 - 140 mg/dL Final      If the patient is fasting, the ADA then defines impaired fasting glucose as > 100 mg/dL and diabetes as > or equal to 123 mg/dL  Specimen collection should occur prior to Sulfasalazine administration due to the potential for falsely depressed results   Specimen collection should occur prior to Sulfapyridine administration due to the potential for falsely elevated results   Calcium 03/07/2020 8 7  8 3 - 10 1 mg/dL Final    AST 03/07/2020 20  5 - 45 U/L Final      Specimen collection should occur prior to Sulfasalazine administration due to the potential for falsely depressed results   ALT 03/07/2020 15  12 - 78 U/L Final      Specimen collection should occur prior to Sulfasalazine administration due to the potential for falsely depressed results       Alkaline Phosphatase 03/07/2020 76  46 - 116 U/L Final    Total Protein 03/07/2020 6 9  6 4 - 8 2 g/dL Final    Albumin 03/07/2020 3 4* 3 5 - 5 0 g/dL Final    Total Bilirubin 03/07/2020 1 11* 0 20 - 1 00 mg/dL Final    eGFR 03/07/2020 61  ml/min/1 73sq m Final    WBC 03/07/2020 5 66  4 31 - 10 16 Thousand/uL Final    RBC 03/07/2020 5 23  3 88 - 5 62 Million/uL Final    Hemoglobin 03/07/2020 16 0  12 0 - 17 0 g/dL Final    Hematocrit 03/07/2020 47 9  36 5 - 49 3 % Final    MCV 03/07/2020 92  82 - 98 fL Final    MCH 03/07/2020 30 6  26 8 - 34 3 pg Final    MCHC 03/07/2020 33 4  31 4 - 37 4 g/dL Final    RDW 03/07/2020 13 5  11 6 - 15 1 % Final    MPV 03/07/2020 10 7  8 9 - 12 7 fL Final    Platelets 09/45/4180 174  149 - 390 Thousands/uL Final    nRBC 03/07/2020 0  /100 WBCs Final    Neutrophils Relative 03/07/2020 68  43 - 75 % Final    Immat GRANS % 03/07/2020 2  0 - 2 % Final    Lymphocytes Relative 03/07/2020 20  14 - 44 % Final    Monocytes Relative 03/07/2020 9  4 - 12 % Final    Eosinophils Relative 03/07/2020 0  0 - 6 % Final    Basophils Relative 03/07/2020 1  0 - 1 % Final    Neutrophils Absolute 03/07/2020 3 90  1 85 - 7 62 Thousands/µL Final    Immature Grans Absolute 03/07/2020 0 09  0 00 - 0 20 Thousand/uL Final    Lymphocytes Absolute 03/07/2020 1 12  0 60 - 4 47 Thousands/µL Final    Monocytes Absolute 03/07/2020 0 50  0 17 - 1 22 Thousand/µL Final    Eosinophils Absolute 03/07/2020 0 02  0 00 - 0 61 Thousand/µL Final    Basophils Absolute 03/07/2020 0 03  0 00 - 0 10 Thousands/µL Final    INFLUENZA A PCR 03/07/2020 None Detected  None Detected Final    INFLUENZA B PCR 03/07/2020 None Detected  None Detected Final    RSV PCR 03/07/2020 None Detected  None Detected Final    Procalcitonin 03/08/2020 0 05  <=0 25 ng/ml Final    Comment: Suspected Lower Respiratory Tract Infection (LRTI):  - LESS than or EQUAL to 0 25 ng/mL:   low likelihood for bacterial LRTI; antibiotics DISCOURAGED   - GREATER than 0 25 ng/mL:   increased likelihood for bacterial LRTI; antibiotics ENCOURAGED  Suspected Sepsis:  - Strongly consider initiating antibiotics in ALL UNSTABLE patients  - LESS than or EQUAL to 0 5 ng/mL:   low likelihood for bacterial sepsis; antibiotics DISCOURAGED   - GREATER than 0 5 ng/mL:   increased likelihood for bacterial sepsis; antibiotics ENCOURAGED   - GREATER than 2 ng/mL:   high risk for severe sepsis / septic shock; antibiotics strongly ENCOURAGED  Decisions on antibiotic use should not be based solely on Procalcitonin (PCT) levels  If PCT is low but uncertainty exists with stopping antibiotics, repeat PCT in 6-24 hours to confirm the low level  If antibiotics are administered (regardless if initial PCT was high or low), repeat PCT every 1-2 days to consider early antibiotic cessation (when GREATER                            than 80% decrease from the peak OR when PCT drops below designated cutoffs, whichever comes first), so long as the infection is NOT one that typically requires prolonged treatment durations (e g , bone/joint infections, endocarditis, Staph  aureus bacteremia)      Situations of FALSE-POSITIVE Procalcitonin values:  1) Newborns < 67 hours old  2) Massive stress from severe trauma / burns, major surgery, acute pancreatitis, cardiogenic / hemorrhagic shock, sickle cell crisis, or other organ perfusion abnormalities  3) Malaria and some Candidal infections  4) Treatment with agents that stimulate cytokines (e g , OKT3, anti-lymphocyte globulins, alemtuzumab, IL-2, granulocyte transfusion [NOT GCSFs])  5) Chronic renal disease causes elevated baseline levels (consider GREATER than 0 75 ng/mL as an abnormal cut-off); initiating HD/CRRT may cause transient decreases  6) Paraneoplastic syndromes from medullary thyroid or SCLC, some forms of vasculitis, and acute [id number]                            disease    Situations of FALSE-NEGATIVE Procalcitonin values:  1) Too early in clinical course for PCT to have reached its peak (may repeat in 6-24 hours to confirm low level)  2) Localized infection WITHOUT systemic (SIRS / sepsis) response (e g , an abscess, osteomyelitis, cystitis)  3) Mycobacteria (e g , Tuberculosis, MAC)  4) Cystic fibrosis exacerbations      WBC 03/08/2020 6 15  4 31 - 10 16 Thousand/uL Final    RBC 03/08/2020 4 80  3 88 - 5 62 Million/uL Final    Hemoglobin 03/08/2020 14 7  12 0 - 17 0 g/dL Final    Hematocrit 03/08/2020 43 6  36 5 - 49 3 % Final    MCV 03/08/2020 91  82 - 98 fL Final    MCH 03/08/2020 30 6  26 8 - 34 3 pg Final    MCHC 03/08/2020 33 7  31 4 - 37 4 g/dL Final    RDW 03/08/2020 13 2  11 6 - 15 1 % Final    MPV 03/08/2020 10 2  8 9 - 12 7 fL Final    Platelets 78/08/9111 137* 149 - 390 Thousands/uL Final    nRBC 03/08/2020 0  /100 WBCs Final    Sodium 03/08/2020 139  136 - 145 mmol/L Final    Potassium 03/08/2020 4 0  3 5 - 5 3 mmol/L Final    Chloride 03/08/2020 103  100 - 108 mmol/L Final    CO2 03/08/2020 27  21 - 32 mmol/L Final    ANION GAP 03/08/2020 9  4 - 13 mmol/L Final    BUN 03/08/2020 18  5 - 25 mg/dL Final    Creatinine 03/08/2020 1 26  0 60 - 1 30 mg/dL Final    Standardized to IDMS reference method    Glucose 03/08/2020 100  65 - 140 mg/dL Final      If the patient is fasting, the ADA then defines impaired fasting glucose as > 100 mg/dL and diabetes as > or equal to 123 mg/dL    Specimen collection should occur prior to Sulfasalazine administration due to the potential for falsely depressed results  Specimen collection should occur prior to Sulfapyridine administration due to the potential for falsely elevated results   Calcium 03/08/2020 8 5  8 3 - 10 1 mg/dL Final    eGFR 03/08/2020 54  ml/min/1 73sq m Final    Segmented % 03/08/2020 84* 43 - 75 % Final    Bands % 03/08/2020 1  0 - 8 % Final    Lymphocytes % 03/08/2020 8* 14 - 44 % Final    Monocytes % 03/08/2020 7  4 - 12 % Final    Eosinophils, % 03/08/2020 0  0 - 6 % Final    Basophils % 03/08/2020 0  0 - 1 % Final    Absolute Neutrophils 03/08/2020 5 23  1 85 - 7 62 Thousand/uL Final    Lymphocytes Absolute 03/08/2020 0 49* 0 60 - 4 47 Thousand/uL Final    Monocytes Absolute 03/08/2020 0 43  0 00 - 1 22 Thousand/uL Final    Eosinophils Absolute 03/08/2020 0 00  0 00 - 0 40 Thousand/uL Final    Basophils Absolute 03/08/2020 0 00  0 00 - 0 10 Thousand/uL Final    Total Counted 03/08/2020 100   Final    RBC Morphology 03/08/2020 Normal   Final    Platelet Estimate 13/98/9664 Borderline* Adequate Final    Ventricular Rate 03/07/2020 81  BPM Final    Atrial Rate 03/07/2020 81  BPM Final    ME Interval 03/07/2020 156  ms Final    QRSD Interval 03/07/2020 88  ms Final    QT Interval 03/07/2020 346  ms Final    QTC Interval 03/07/2020 401  ms Final    P Axis 03/07/2020 60  degrees Final    QRS Axis 03/07/2020 -4  degrees Final    T Wave [place] 03/07/2020 18  degrees Final   Appointment on 02/22/2020   Component Date Value Ref Range Status    Sputum Culture 02/24/2020 3+ Growth of    Final    Mixed Respiratory khang    Sputum Culture 02/24/2020 Commensal respiratory khang only; No significant growth of Staph aureus/MRSA or Pseudomonas aeruginosa     Final    Gram Stain Result 02/24/2020 3+ Polys*  Final    Gram Stain Result 02/24/2020 4+ Gram positive cocci in pairs, chains and clusters*  Final    Gram Stain Result 02/24/2020 1+ Gram negative rods*  Final   Office Visit on 01/24/2020   Component Date Value Ref Range Status     COLOR,UA 01/24/2020 yellow   Final    CLARITY,UA 01/24/2020 clear   Final    SPECIFIC GRAVITY,UA 01/24/2020 1 025   Final     PH,UA 01/24/2020 6 0   Final    LEUKOCYTE ESTERASE,UA 01/24/2020 neg   Final    Merribeth Cousin 01/24/2020 neg   Final    GLUCOSE, UA 01/24/2020 neg   Final    KETONES,UA 01/24/2020 neg   Final    BILIRUBIN,UA 01/24/2020 neg   Final    BLOOD,UA 01/24/2020 neg   Final    POCT URINE PROTEIN 01/24/2020 neg   Final    SL AMB POCT UROBILINOGEN 01/24/2020 1 0   Final   There may be more visits with results that are not included

## 2020-10-27 NOTE — PATIENT INSTRUCTIONS
Do labs today  Continue colchicine one tab daily  Continue allopurinol daily  Can use diclofenac gel as needed for ankle pain  Call if have another gout flare     Return to clinic in 6 months    Low Purine Diet   WHAT YOU NEED TO KNOW:   What is a low-purine diet? A low-purine diet is a meal plan based on foods that are low in purine content  Purine is a substance that is found in foods and is produced naturally by the body  Purines are broken down by the body and changed to uric acid  The kidneys normally filter the uric acid, and it leaves the body through the urine  However, people with gout sometimes have a buildup of uric acid in the blood  This buildup of uric acid can cause swelling and pain (a gout attack)  A low-purine diet may help to treat and prevent gout attacks  What foods can I include? The following foods are low in purine  · Eggs, nuts, and peanut butter    · Low-fat and fat free cheese and ice cream    · Skim or 1% milk    · Soup made without meat extract or broth    · Vegetables that are not on the medium-purine list below    · All fruit and fruit juices    · Bread, pasta, rice, cake, cornbread, and popcorn    · Water, soda, tea, coffee, and cocoa    · Sugar, sweets, and gelatin    · Fat and oil  What foods should I limit? · Medium-purine foods:      ¨ Meats:  Limit the following to 4 to 6 ounces each day  ¨ Meat and poultry     ¨ Crab, lobster, oysters, and shrimp    ¨ Vegetables:  Limit the following vegetables to ½ cup each day  ¨ Asparagus    ¨ Cauliflower    ¨ Spinach    ¨ Mushrooms    ¨ Green peas    ¨ Beans, peas, and lentils (limit to 1 cup each day)    ¨ Oats and oatmeal (limit to ? cup uncooked each day)    ¨ Wheat germ and bran (limit to ¼ cup each day)    · High-purine foods:  Limit or avoid foods high in purine      ¨ Anchovies, sardines, scallops, and mussels    ¨ [place], codfish, herring, and [employer], like goose and duck    [employer], such as brains, heart, kidney, liver, and sweetbreads    ¨ Gravies and sauces made with meat    ¨ Yeast extracts taken in the form of a supplement  What other guidelines should I follow? · Increase liquid intake  Drink 8 to 16 (eight-ounce) cups of liquid each day  At least half of the liquid you drink should be water  Liquid can help your body get rid of extra uric acid  · Limit or avoid alcohol  Alcohol (especially beer) increases your risk of a gout attack  Beer contains a high amount of purine  · Maintain a healthy weight  If you are overweight, you should lose weight slowly  Weight loss can help decrease the amount of stress on your joints  Regular exercise can help you lose weight if you are overweight, or maintain your weight if you are at a normal weight  Talk to your healthcare provider before you begin an exercise program   CARE AGREEMENT:   You have the right to help plan your care  Discuss treatment options with your caregivers to decide what care you want to receive  You always have the right to refuse treatment  The above information is an  only  It is not intended as medical advice for individual conditions or treatments  Talk to your doctor, nurse or pharmacist before following any medical regimen to see if it is safe and effective for you  © 2017 [employer]  Information is for End User's use only and may not be sold, redistributed or otherwise used for commercial purposes  All illustrations and images included in CareNotes® are the copyrighted property of [employer]  or [employer]  Gout   AMBULATORY CARE:   Gout  is a form of arthritis that causes severe joint pain and stiffness  Acute gout pain starts suddenly, gets worse quickly, and stops on its own  Acute gout can become chronic and cause permanent damage to your joints  Seek care immediately if:   · You have severe pain in one or more of your joints that you cannot tolerate       · You have a fever or redness that spreads beyond the joint area  Contact your healthcare provider if:   · You have new symptoms, such as a rash, after you start gout treatment  · Your joint pain and swelling do not go away, even after treatment  · You are not urinating as much or as often as you usually do  · You have trouble taking your gout medicines  · You have questions or concerns about your condition or care  Stages of gout:   · Hyperuricemia  starts with high levels of uric acid  Hyperuricemia is not gout, but it increases your risk for gout  You may have no symptoms at this stage, and it usually does not need treatment  · Acute gouty arthritis  starts with a sudden attack of pain and swelling, usually in 1 joint  The attack may last from a few days to 2 weeks  · Intercritical gout  is the time between attacks  You may go months or years without another attack  You will not have joint pain or stiffness, but this does not mean your gout is cured  You will still need treatment to prevent chronic gout  · Chronic tophaceous gout  develops if gout is not treated  Large amounts of uric acid crystals, called tophi, collect around your joints  The crystals can destroy or deform the joints  Gout attacks occur more often, and last hours to weeks  More than 1 joint may be painful and swollen  At this stage, gout symptoms do not go away on their own  Treatment  can make your symptoms stop sooner, prevent attacks, and decrease your risk of joint damage  You may need any of the following:  · Medicines:      ¨ NSAIDs , such as ibuprofen, help decrease swelling, pain, and fever  This medicine is available with or without a doctor's order  NSAIDs can cause stomach bleeding or kidney problems in certain people  If you take blood thinner medicine, always ask your healthcare provider if NSAIDs are safe for you  Always read the medicine label and follow directions      ¨ Gout medicine  decreases joint pain and swelling  It may also be given to prevent new gout attacks  ¨ Steroids  reduce inflammation and can help your joint stiffness and pain during gout attacks  ¨ Uric acid medicine  may be given to reduce the amount of uric acid your body makes  Some medicines may help you pass more uric acid when you urinate  ¨ Take your medicine as directed  Contact your healthcare provider if you think your medicine is not helping or if you have side effects  Tell him or her if you are allergic to any medicine  Keep a list of the medicines, vitamins, and herbs you take  Include the amounts, and when and why you take them  Bring the list or the pill bottles to follow-up visits  Carry your medicine list with you in case of an emergency  · Surgery  called a bone graft may be needed for tophi that are painful or infected  Bone in the joint may be replaced with bone taken from another place in your body  Ask your healthcare provider for more information about bone graft surgery  Manage gout:   · Rest your painful joint so it can heal   Your healthcare provider may recommend crutches or a walker if the affected joint is in a leg  · Apply ice to your joint  Ice decreases pain and swelling  Use an ice pack, or put crushed ice in a plastic bag  Cover the ice pack or bag with a towel before you apply it to your painful joint  Apply ice for 15 to 20 minutes every hour, or as directed  · Elevate your joint  Elevation helps reduce swelling and pain  Raise your joint above the level of your heart as often as you can  Prop your painful joint on pillows to keep it above your heart comfortably  · Go to physical therapy if directed  A physical therapist can teach you exercises to improve flexibility and range of motion  Prevent gout attacks:   · Do not eat high-purine foods  These foods include meats, seafood, asparagus, spinach, cauliflower, and some types of beans   Healthcare providers may tell you to eat more low-fat milk products, such as yogurt  Milk products may decrease your risk for gout attacks  Vitamin C and coffee may also help  Your healthcare provider or dietitian can help you create a meal plan  · Drink more liquids as directed  Liquids such as water help remove uric acid from your body  Ask how much liquid to drink each day and which liquids are best for you  · Manage your weight  Weight loss may decrease the amount of uric acid in your body  Exercise can help you lose weight  Talk to your healthcare provider about the best exercises for you  · Control your blood sugar level if you have diabetes  Keep your blood sugar level in a normal range  This can help prevent gout attacks  · Limit or do not drink alcohol as directed  Alcohol can trigger a gout attack  Alcohol also increases your risk for dehydration  Ask your healthcare provider if alcohol is safe for you  Follow up with your healthcare provider as directed:  Write down your questions so you remember to ask them during your visits  © 2017 [employer]  Information is for End User's use only and may not be sold, redistributed or otherwise used for commercial purposes  All illustrations and images included in CareNotes® are the copyrighted property of [employer]  or [employer]  The above information is an  only  It is not intended as medical advice for individual conditions or treatments  Talk to your doctor, nurse or pharmacist before following any medical regimen to see if it is safe and effective for you

## 2020-10-28 LAB — URATE SERPL-MCNC: 5.1 MG/DL (ref 4.2–8)

## 2020-11-01 DIAGNOSIS — E55.9 VITAMIN D DEFICIENCY: ICD-10-CM

## 2020-11-02 RX ORDER — ERGOCALCIFEROL 1.25 MG/1
CAPSULE ORAL
Qty: 10 CAPSULE | Refills: 0 | Status: SHIPPED | OUTPATIENT
Start: 2020-11-02 | End: 2021-01-06 | Stop reason: SDUPTHER

## 2020-11-17 ENCOUNTER — OFFICE VISIT (OUTPATIENT)
Dept: PULMONOLOGY | Facility: CLINIC | Age: 80
End: 2020-11-17
Payer: COMMERCIAL

## 2020-11-17 VITALS
SYSTOLIC BLOOD PRESSURE: 130 MMHG | TEMPERATURE: 97.8 F | DIASTOLIC BLOOD PRESSURE: 84 MMHG | RESPIRATION RATE: 16 BRPM | OXYGEN SATURATION: 95 % | WEIGHT: 226 LBS | BODY MASS INDEX: 34.36 KG/M2 | HEART RATE: 76 BPM

## 2020-11-17 DIAGNOSIS — R04.2 COUGH WITH HEMOPTYSIS: ICD-10-CM

## 2020-11-17 DIAGNOSIS — J84.9 INTERSTITIAL LUNG DISEASE (HCC): Primary | ICD-10-CM

## 2020-11-17 PROCEDURE — 3079F DIAST BP 80-89 MM HG: CPT | Performed by: INTERNAL MEDICINE

## 2020-11-17 PROCEDURE — 3075F SYST BP GE 130 - 139MM HG: CPT | Performed by: INTERNAL MEDICINE

## 2020-11-17 PROCEDURE — 1160F RVW MEDS BY RX/DR IN RCRD: CPT | Performed by: INTERNAL MEDICINE

## 2020-11-17 PROCEDURE — 1036F TOBACCO NON-USER: CPT | Performed by: INTERNAL MEDICINE

## 2020-11-17 PROCEDURE — 99214 OFFICE O/P EST MOD 30 MIN: CPT | Performed by: INTERNAL MEDICINE

## 2020-11-23 ENCOUNTER — APPOINTMENT (OUTPATIENT)
Dept: LAB | Facility: IMAGING CENTER | Age: 80
End: 2020-11-23
Payer: COMMERCIAL

## 2020-11-23 ENCOUNTER — TRANSCRIBE ORDERS (OUTPATIENT)
Dept: ADMINISTRATIVE | Facility: HOSPITAL | Age: 80
End: 2020-11-23

## 2020-11-23 DIAGNOSIS — E55.9 VITAMIN D DEFICIENCY: Primary | ICD-10-CM

## 2020-11-23 DIAGNOSIS — I10 BENIGN ESSENTIAL HYPERTENSION: Chronic | ICD-10-CM

## 2020-11-23 DIAGNOSIS — E55.9 VITAMIN D DEFICIENCY: ICD-10-CM

## 2020-11-23 LAB
25(OH)D3 SERPL-MCNC: 40.1 NG/ML (ref 30–100)
ANION GAP SERPL CALCULATED.3IONS-SCNC: 5 MMOL/L (ref 4–13)
BASOPHILS # BLD AUTO: 0.06 THOUSANDS/ΜL (ref 0–0.1)
BASOPHILS NFR BLD AUTO: 2 % (ref 0–1)
BUN SERPL-MCNC: 20 MG/DL (ref 5–25)
CALCIUM SERPL-MCNC: 9.3 MG/DL (ref 8.3–10.1)
CHLORIDE SERPL-SCNC: 109 MMOL/L (ref 100–108)
CO2 SERPL-SCNC: 29 MMOL/L (ref 21–32)
CREAT SERPL-MCNC: 1.09 MG/DL (ref 0.6–1.3)
EOSINOPHIL # BLD AUTO: 0.04 THOUSAND/ΜL (ref 0–0.61)
EOSINOPHIL NFR BLD AUTO: 1 % (ref 0–6)
ERYTHROCYTE [DISTWIDTH] IN BLOOD BY AUTOMATED COUNT: 13.5 % (ref 11.6–15.1)
GFR SERPL CREATININE-BSD FRML MDRD: 64 ML/MIN/1.73SQ M
GLUCOSE P FAST SERPL-MCNC: 79 MG/DL (ref 65–99)
HCT VFR BLD AUTO: 51.1 % (ref 36.5–49.3)
HGB BLD-MCNC: 17.1 G/DL (ref 12–17)
IMM GRANULOCYTES # BLD AUTO: 0.06 THOUSAND/UL (ref 0–0.2)
IMM GRANULOCYTES NFR BLD AUTO: 2 % (ref 0–2)
LYMPHOCYTES # BLD AUTO: 1.34 THOUSANDS/ΜL (ref 0.6–4.47)
LYMPHOCYTES NFR BLD AUTO: 37 % (ref 14–44)
MCH RBC QN AUTO: 30.3 PG (ref 26.8–34.3)
MCHC RBC AUTO-ENTMCNC: 33.5 G/DL (ref 31.4–37.4)
MCV RBC AUTO: 90 FL (ref 82–98)
MONOCYTES # BLD AUTO: 0.34 THOUSAND/ΜL (ref 0.17–1.22)
MONOCYTES NFR BLD AUTO: 9 % (ref 4–12)
NEUTROPHILS # BLD AUTO: 1.83 THOUSANDS/ΜL (ref 1.85–7.62)
NEUTS SEG NFR BLD AUTO: 49 % (ref 43–75)
NRBC BLD AUTO-RTO: 0 /100 WBCS
PLATELET # BLD AUTO: 179 THOUSANDS/UL (ref 149–390)
PMV BLD AUTO: 10.5 FL (ref 8.9–12.7)
POTASSIUM SERPL-SCNC: 3.9 MMOL/L (ref 3.5–5.3)
RBC # BLD AUTO: 5.65 MILLION/UL (ref 3.88–5.62)
SODIUM SERPL-SCNC: 143 MMOL/L (ref 136–145)
WBC # BLD AUTO: 3.67 THOUSAND/UL (ref 4.31–10.16)

## 2020-11-23 PROCEDURE — 82306 VITAMIN D 25 HYDROXY: CPT

## 2020-11-23 PROCEDURE — 85025 COMPLETE CBC W/AUTO DIFF WBC: CPT

## 2020-11-23 PROCEDURE — 36415 COLL VENOUS BLD VENIPUNCTURE: CPT

## 2020-11-23 PROCEDURE — 80048 BASIC METABOLIC PNL TOTAL CA: CPT

## 2020-12-03 ENCOUNTER — OFFICE VISIT (OUTPATIENT)
Dept: INTERNAL MEDICINE CLINIC | Age: 80
End: 2020-12-03
Payer: COMMERCIAL

## 2020-12-03 VITALS
OXYGEN SATURATION: 96 % | HEART RATE: 70 BPM | TEMPERATURE: 98.6 F | HEIGHT: 68 IN | SYSTOLIC BLOOD PRESSURE: 138 MMHG | DIASTOLIC BLOOD PRESSURE: 92 MMHG | BODY MASS INDEX: 34.89 KG/M2 | WEIGHT: 230.2 LBS

## 2020-12-03 DIAGNOSIS — Z00.00 MEDICARE ANNUAL WELLNESS VISIT, SUBSEQUENT: ICD-10-CM

## 2020-12-03 DIAGNOSIS — R39.16 BENIGN PROSTATIC HYPERPLASIA (BPH) WITH STRAINING ON URINATION: Chronic | ICD-10-CM

## 2020-12-03 DIAGNOSIS — M1A.0310 IDIOPATHIC CHRONIC GOUT OF RIGHT WRIST WITHOUT TOPHUS: ICD-10-CM

## 2020-12-03 DIAGNOSIS — J84.9 INTERSTITIAL LUNG DISEASE (HCC): Primary | ICD-10-CM

## 2020-12-03 DIAGNOSIS — N40.1 BENIGN PROSTATIC HYPERPLASIA (BPH) WITH STRAINING ON URINATION: Chronic | ICD-10-CM

## 2020-12-03 DIAGNOSIS — I10 BENIGN ESSENTIAL HYPERTENSION: Chronic | ICD-10-CM

## 2020-12-03 DIAGNOSIS — D75.1 POLYCYTHEMIA: ICD-10-CM

## 2020-12-03 PROCEDURE — 1036F TOBACCO NON-USER: CPT | Performed by: INTERNAL MEDICINE

## 2020-12-03 PROCEDURE — 1160F RVW MEDS BY RX/DR IN RCRD: CPT | Performed by: INTERNAL MEDICINE

## 2020-12-03 PROCEDURE — G0439 PPPS, SUBSEQ VISIT: HCPCS | Performed by: INTERNAL MEDICINE

## 2020-12-03 PROCEDURE — 3075F SYST BP GE 130 - 139MM HG: CPT | Performed by: INTERNAL MEDICINE

## 2020-12-03 PROCEDURE — 1170F FXNL STATUS ASSESSED: CPT | Performed by: INTERNAL MEDICINE

## 2020-12-03 PROCEDURE — 3725F SCREEN DEPRESSION PERFORMED: CPT | Performed by: INTERNAL MEDICINE

## 2020-12-03 PROCEDURE — 99214 OFFICE O/P EST MOD 30 MIN: CPT | Performed by: INTERNAL MEDICINE

## 2020-12-03 PROCEDURE — 3080F DIAST BP >= 90 MM HG: CPT | Performed by: INTERNAL MEDICINE

## 2020-12-03 PROCEDURE — 1125F AMNT PAIN NOTED PAIN PRSNT: CPT | Performed by: INTERNAL MEDICINE

## 2020-12-07 ENCOUNTER — TELEPHONE (OUTPATIENT)
Dept: SURGICAL ONCOLOGY | Facility: CLINIC | Age: 80
End: 2020-12-07

## 2020-12-14 ENCOUNTER — HOSPITAL ENCOUNTER (OUTPATIENT)
Dept: RADIOLOGY | Facility: IMAGING CENTER | Age: 80
Discharge: HOME | End: 2020-12-14
Payer: COMMERCIAL

## 2020-12-14 DIAGNOSIS — R04.2 COUGH WITH HEMOPTYSIS: ICD-10-CM

## 2020-12-14 DIAGNOSIS — J84.9 INTERSTITIAL LUNG DISEASE (HCC): ICD-10-CM

## 2020-12-14 PROCEDURE — 71250 CT THORAX DX C-: CPT

## 2020-12-14 PROCEDURE — G1004 CDSM NDSC: HCPCS

## 2020-12-16 ENCOUNTER — TELEPHONE (OUTPATIENT)
Dept: PULMONOLOGY | Facility: CLINIC | Age: 80
End: 2020-12-16

## 2020-12-23 ENCOUNTER — TELEPHONE (OUTPATIENT)
Dept: PULMONOLOGY | Facility: HOSPITAL | Age: 80
End: 2020-12-23

## 2021-01-06 ENCOUNTER — TELEPHONE (OUTPATIENT)
Dept: INTERNAL MEDICINE CLINIC | Age: 81
End: 2021-01-06

## 2021-01-06 ENCOUNTER — TELEMEDICINE (OUTPATIENT)
Dept: INTERNAL MEDICINE CLINIC | Age: 81
End: 2021-01-06
Payer: COMMERCIAL

## 2021-01-06 VITALS — WEIGHT: 220 LBS | BODY MASS INDEX: 33.34 KG/M2 | HEIGHT: 68 IN | TEMPERATURE: 94.6 F

## 2021-01-06 DIAGNOSIS — J01.01 ACUTE RECURRENT MAXILLARY SINUSITIS: ICD-10-CM

## 2021-01-06 DIAGNOSIS — J30.1 SEASONAL ALLERGIC RHINITIS DUE TO POLLEN: ICD-10-CM

## 2021-01-06 DIAGNOSIS — I10 BENIGN ESSENTIAL HYPERTENSION: Primary | Chronic | ICD-10-CM

## 2021-01-06 DIAGNOSIS — E55.9 VITAMIN D DEFICIENCY: ICD-10-CM

## 2021-01-06 PROCEDURE — 99442 PR PHYS/QHP TELEPHONE EVALUATION 11-20 MIN: CPT | Performed by: INTERNAL MEDICINE

## 2021-01-06 RX ORDER — DILTIAZEM HYDROCHLORIDE 180 MG/1
180 CAPSULE, EXTENDED RELEASE ORAL DAILY
COMMUNITY
End: 2021-01-06 | Stop reason: SDUPTHER

## 2021-01-06 RX ORDER — FLUTICASONE PROPIONATE 50 MCG
2 SPRAY, SUSPENSION (ML) NASAL DAILY
Qty: 1 BOTTLE | Refills: 3 | Status: SHIPPED | OUTPATIENT
Start: 2021-01-06 | End: 2021-04-15

## 2021-01-06 RX ORDER — DILTIAZEM HYDROCHLORIDE 180 MG/1
180 CAPSULE, EXTENDED RELEASE ORAL DAILY
Qty: 30 CAPSULE | Refills: 3 | Status: SHIPPED | OUTPATIENT
Start: 2021-01-06 | End: 2021-04-02 | Stop reason: SDUPTHER

## 2021-01-06 RX ORDER — CEFDINIR 300 MG/1
300 CAPSULE ORAL EVERY 12 HOURS SCHEDULED
Qty: 16 CAPSULE | Refills: 0 | Status: SHIPPED | OUTPATIENT
Start: 2021-01-06 | End: 2021-01-14

## 2021-01-06 RX ORDER — ERGOCALCIFEROL 1.25 MG/1
50000 CAPSULE ORAL WEEKLY
Qty: 10 CAPSULE | Refills: 0 | Status: SHIPPED | OUTPATIENT
Start: 2021-01-06 | End: 2021-04-15

## 2021-01-06 NOTE — TELEPHONE ENCOUNTER
Patient called because he was prescribed the following, fluticasone (FLONASE) 50 mcg/act nasal spray, but he was told last year that he is not supposed to use nasal sprays since his nose was cauterized after a severe nose bleed  He already picked it up from the pharmacy  They would like to know if you would still like him to use it or if you would like him to use something else

## 2021-01-06 NOTE — PROGRESS NOTES
Virtual Brief Visit  It was my intent to perform this visit via video technology but the patient was not able to do a video connection so the visit was completed via audio telephone only  Assessment/Plan:    Problem List Items Addressed This Visit        Respiratory    Seasonal allergic rhinitis due to pollen    Relevant Medications    fluticasone (FLONASE) 50 mcg/act nasal spray    Acute recurrent maxillary sinusitis    Relevant Medications    cefdinir (OMNICEF) 300 mg capsule       Cardiovascular and Mediastinum    Benign essential hypertension - Primary (Chronic)    Relevant Medications    diltiazem (TIAZAC) 180 MG 24 hr capsule      Other Visit Diagnoses     Vitamin D deficiency        Relevant Medications    ergocalciferol (VITAMIN D2) 50,000 units                Reason for visit is   Chief Complaint   Patient presents with    Sinus Problem     Stuffy nose small amt of mucus when he blows and did have a small spot of blood , Sinus pressure behind the eyes, some Tightness in Chest  dry mouth that wakes him up at night  Started about a week ago    health maintenance     BMI f/u due    Medication Refill     would like vit d and diltiazem refilled        Encounter provider Theodore Bailey MD    Provider located at [facility] [id number]    Recent Visits  No visits were found meeting these conditions  Showing recent visits within past 7 days and meeting all other requirements     Today's Visits  Date Type Provider Dept   01/06/21 Telemedicine Theodore Bailey MD [facility]   Showing today's visits and meeting all other requirements     Future Appointments  No visits were found meeting these conditions  Showing future appointments within next 150 days and meeting all other requirements        After connecting through telephone, the patient was identified by name and date of birth   Pedro Gerber Neha Boo  was informed that this is a telemedicine visit and that the visit is being conducted through telephone  My office door was closed  No one else was in the room  He acknowledged consent and understanding of privacy and security of the platform  The patient has agreed to participate and understands he can discontinue the visit at any time  Patient is aware this is a billable service  Subjective    Moreno BALBUENA Laurasameer Jr  is a 79 y o  male symptoms of sinusitis as given above history of recurrent sinusitis history of allergic rhinitis  This is the 79 years young gentleman developed 3 days history of pain behind his eyes also in the maxillary and the frontal area stuffy nose fatigue yellow color mucus from the nose and his the typical symptoms of sinus infection when he get the sinusitis he has a history of chronic allergic rhinitis he does not have any fever or chills no nausea or vomiting no dizziness, he is using his regular daily allergy medication  History of hypertension which is well controlled       Past Medical History:   Diagnosis Date    Abnormal electrocardiogram     Last assessed: Oct 11, 2013    Allergic rhinitis     last assessed: Oct 11, 2013    Allergic rhinitis due to pollen     last assessed: Oct 10, 2013    Allergic sinusitis     Last assessed: May 11, 2015    Allergy     resolved: July 22, 2015    Benign essential hypertension     Last assessed/resolved: May 31, 2017    BPH (benign prostatic hyperplasia)     Colitis     Last assessed: May 24, 2016    Generalized osteoarthritis     Last assessed: Oct 11, 2013    GERD without esophagitis     Last assessed/resolved:  May 31, 2017    Hearing loss     Hiatal hernia     resolved: July 22, 2015    History of gastroesophageal reflux (GERD)     History of stomach ulcers     last assessed: May 11, 2015    Hypertension     Incomplete bladder emptying     Kidney stone     Nodular prostate with lower urinary tract symptoms     Nontoxic single thyroid nodule     last assessed: April 16, 2014    Orchalgia     Overweight     last assessed: Oct 31, 2013    Poor urinary stream     Pulmonary [facility]ism [facility])     Salivary gland disorder     last assessed: April 16, 2014    Seasonal allergies     last assessed: May 15, 2015    Spermatocele     Straining to void     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:  May 7, 2014       Past Surgical History:   Procedure Laterality Date    BLADDER SURGERY      CHOLECYSTECTOMY  2000    laparoscopic     HEMORRHOID SURGERY      pilionidal cyst removal     HERNIA REPAIR Left 01/17/2008    inguinal     KNEE ARTHROSCOPY Left 1974    KNEE CARTILAGE SURGERY      NASAL SEPTUM SURGERY      Deviation repair     PROSTATE BIOPSY  2017    STOMACH SURGERY      TONSILLECTOMY AND ADENOIDECTOMY      at age 37   [address] [address] [address] - right 01/04 0 left 01/95    VASECTOMY         Current Outpatient Medications   Medication Sig Dispense Refill    allopurinol (ZYLOPRIM) 100 mg tablet Take 1 tablet (100 mg total) by mouth daily 90 tablet 3    ASPIRIN 81 PO Take 1 tablet by mouth daily      colchicine (COLCRYS) 0 6 mg tablet Take 1 tablet (0 6 mg total) by mouth daily 90 tablet 3    diltiazem (TIAZAC) 180 MG 24 hr capsule Take 1 capsule (180 mg total) by mouth daily 30 capsule 3    ergocalciferol (VITAMIN D2) 50,000 units Take 1 capsule (50,000 Units total) by mouth once a week 10 capsule 0    finasteride (PROSCAR) 5 mg tablet TAKE 1 TABLET BY MOUTH EVERY DAY 90 tablet 2    levocetirizine (XYZAL) 5 MG tablet Take 1 tablet (5 mg total) by mouth every evening 30 tablet 11    tamsulosin (FLOMAX) 0 4 mg Take 1 capsule (0 4 mg total) by mouth daily with dinner 90 capsule 3    traMADol-acetaminophen (ULTRACET) 37 5-325 mg per tablet Take 2 tablets by mouth every 6 (six) hours as needed for moderate pain 60 tablet 0    cefdinir (OMNICEF) 300 mg capsule Take 1 capsule (300 mg total) by mouth every 12 (twelve) hours for 8 days 16 capsule 0    diclofenac sodium (VOLTAREN) 1 % Apply 2 g topically 4 (four) times a day (Patient not taking: Reported on 1/6/2021) 100 g 6    fluticasone (FLONASE) 50 mcg/act nasal spray 2 sprays into each nostril daily 1 Bottle 3     No current facility-administered medications for this visit  No Known Allergies    Review of Systems   Constitutional: Negative for activity change, fatigue and fever  HENT: Positive for congestion, hearing loss, postnasal drip, sinus pressure, sinus pain and sore throat  Eyes: Negative for discharge  Respiratory: Positive for cough  Negative for shortness of breath  Cardiovascular: Negative for chest pain and palpitations  Gastrointestinal: Negative for constipation, diarrhea and nausea  Genitourinary: Negative for hematuria and urgency  Musculoskeletal: Negative for back pain and gait problem  Neurological: Negative for dizziness, weakness and light-headedness  Psychiatric/Behavioral: Positive for sleep disturbance  The patient is not nervous/anxious  Vitals:    01/06/21 1043   Temp: (!) 94 6 °F (34 8 °C)   Weight: 99 8 kg (220 lb)   Height: 5' 8" (1 727 m)         I spent 15 minutes directly with the patient during this visit    [address] [place] [address] Varinder Persaud  acknowledges that he has consented to an online visit or consultation  He understands that the online visit is based solely on information provided by him, and that, in the absence of a face-to-face physical evaluation by the physician, the diagnosis he receives is both limited and provisional in terms of accuracy and completeness  This is not intended to replace a full medical face-to-face evaluation by the physician  Luis Leyva  understands and accepts these terms

## 2021-01-22 ENCOUNTER — OFFICE VISIT (OUTPATIENT)
Dept: PULMONOLOGY | Facility: CLINIC | Age: 81
End: 2021-01-22
Payer: COMMERCIAL

## 2021-01-22 VITALS
HEART RATE: 69 BPM | TEMPERATURE: 97.5 F | OXYGEN SATURATION: 97 % | DIASTOLIC BLOOD PRESSURE: 82 MMHG | HEIGHT: 68 IN | SYSTOLIC BLOOD PRESSURE: 160 MMHG | BODY MASS INDEX: 34.1 KG/M2 | WEIGHT: 225 LBS

## 2021-01-22 DIAGNOSIS — J84.9 INTERSTITIAL LUNG DISEASE (HCC): Primary | ICD-10-CM

## 2021-01-22 DIAGNOSIS — R06.00 DYSPNEA ON EXERTION: ICD-10-CM

## 2021-01-22 PROBLEM — R04.2 COUGH WITH HEMOPTYSIS: Status: RESOLVED | Noted: 2020-11-17 | Resolved: 2021-01-22

## 2021-01-22 PROCEDURE — 1160F RVW MEDS BY RX/DR IN RCRD: CPT | Performed by: INTERNAL MEDICINE

## 2021-01-22 PROCEDURE — 99214 OFFICE O/P EST MOD 30 MIN: CPT | Performed by: INTERNAL MEDICINE

## 2021-01-22 PROCEDURE — 1036F TOBACCO NON-USER: CPT | Performed by: INTERNAL MEDICINE

## 2021-01-22 PROCEDURE — 3079F DIAST BP 80-89 MM HG: CPT | Performed by: INTERNAL MEDICINE

## 2021-01-22 PROCEDURE — 3077F SYST BP >= 140 MM HG: CPT | Performed by: INTERNAL MEDICINE

## 2021-01-23 PROBLEM — R06.09 DYSPNEA ON EXERTION: Status: ACTIVE | Noted: 2021-01-23

## 2021-01-23 PROBLEM — R06.00 DYSPNEA ON EXERTION: Status: ACTIVE | Noted: 2021-01-23

## 2021-01-23 NOTE — ASSESSMENT & PLAN NOTE
· This is multifactorial due to cardiovascular deconditioning, obesity, and ground-glass changes on CT imaging  · Encouraged continued activity, weight loss  · Will continue with surveillance on his lung findings    Would not recommend aggressive intervention at this point given age, comorbidities, and patient preference

## 2021-01-23 NOTE — PROGRESS NOTES
Progress note - Pulmonary Medicine   Tere Blunt  79 y o  male MRN: [id number]       Impression & Plan:     Interstitial lung disease ([address] )  · CT imaging reviewed and shows mild increase in ground-glass changes  No fibrosis or honeycombing  · Continue to monitor with follow-up CT imaging  Will repeat noncontrast CT scan in 6 months  · He has not had any further hemoptysis, this has completely resolved    Dyspnea on exertion  · This is multifactorial due to cardiovascular deconditioning, obesity, and ground-glass changes on CT imaging  · Encouraged continued activity, weight loss  · Will continue with surveillance on his lung findings  Would not recommend aggressive intervention at this point given age, comorbidities, and patient preference      We did discuss COVID-19 vaccination  He is appropriate for vaccination  He is having difficulty scheduling this  I did give him recommendations for scheduling both for himself and his wife   ______________________________________________________________________    HPI:    Tere Blunt  presents today for follow-up of hemoptysis with cough, dyspnea on exertion, and imaging studies that show mild interstitial lung disease with ground-glass changes  He has not had any new symptoms  Hemoptysis has completely resolved and he has not had this in several months  He denies any chest pain or cardiac complaints  No pleurisy  No wheezing  He does get short of breath with exertion but is fairly sedentary, particularly during the COVID-19 pandemic  He has not had chest pain palpitations, or leg swelling  He denies any other cardiac complaints  No lightheadedness or dizziness  No neurologic symptoms  He does have some chronic arthritis  He denies abdominal pain  No GERD symptoms  He is obese  He has gained a little bit of weight recently    He remains quite sedentary    Current Medications:    Current Outpatient Medications:     allopurinol (ZYLOPRIM) 100 mg tablet, Take 1 tablet (100 mg total) by mouth daily, Disp: 90 tablet, Rfl: 3    ASPIRIN 81 PO, Take 1 tablet by mouth daily, Disp: , Rfl:     colchicine (COLCRYS) 0 6 mg tablet, Take 1 tablet (0 6 mg total) by mouth daily, Disp: 90 tablet, Rfl: 3    diltiazem (TIAZAC) 180 MG 24 hr capsule, Take 1 capsule (180 mg total) by mouth daily, Disp: 30 capsule, Rfl: 3    ergocalciferol (VITAMIN D2) 50,000 units, Take 1 capsule (50,000 Units total) by mouth once a week, Disp: 10 capsule, Rfl: 0    finasteride (PROSCAR) 5 mg tablet, TAKE 1 TABLET BY MOUTH EVERY DAY, Disp: 90 tablet, Rfl: 2    fluticasone (FLONASE) 50 mcg/act nasal spray, 2 sprays into each nostril daily, Disp: 1 Bottle, Rfl: 3    levocetirizine (XYZAL) 5 MG tablet, Take 1 tablet (5 mg total) by mouth every evening, Disp: 30 tablet, Rfl: 11    tamsulosin (FLOMAX) 0 4 mg, Take 1 capsule (0 4 mg total) by mouth daily with dinner, Disp: 90 capsule, Rfl: 3    traMADol-acetaminophen (ULTRACET) 37 5-325 mg per tablet, Take 2 tablets by mouth every 6 (six) hours as needed for moderate pain, Disp: 60 tablet, Rfl: 0    diclofenac sodium (VOLTAREN) 1 %, Apply 2 g topically 4 (four) times a day (Patient not taking: Reported on 2021), Disp: 100 g, Rfl: 6    Review of Systems:  Aside from what is mentioned in the HPI, the review of systems is otherwise negative    Past medical history, surgical history, and family history were reviewed and updated as appropriate    Social history updates:  Social History     Tobacco Use   Smoking Status Former Smoker    Packs/day: 1 00    Years: 25 00    Pack years: 25 00    Types: Cigarettes    Quit date: 26    Years since quittin 0   Smokeless Tobacco Never Used       PhysicalExamination:  Vitals:   /82 (BP Location: Left arm, Patient Position: Sitting)   Pulse 69   Temp 97 5 °F (36 4 °C)   Ht 5' 8" (1 727 m)   Wt 102 kg (225 lb)   SpO2 97%   BMI 34 21 kg/m²   Gen:  Obese, comfortable on room air  HEENT: PERRL  Oropharynx is crowded with a low-lying palate  There is no erythema or exudate  Neck: Stout  I do not appreciate any JVD or lymphadenopathy  Trachea is midline  No thyromegaly  Chest: Breath sounds are distant but clear to auscultation  There are no wheezes, rales or rhonchi  Cardiac:  Slightly distant heart tones Regular rate and rhythm  There are no murmurs, rubs or gallops appreciated  Abdomen: Obese  Soft and nontender  Extremities: No clubbing, cyanosis or edema  Neurologic: No focal deficits  Skin: No appreciable rashes  Diagnostic Data:  Labs: I personally reviewed the most recent laboratory data pertinent to today's visit    Lab Results   Component Value Date    WBC 3 67 (L) 11/23/2020    HGB 17 1 (H) 11/23/2020    HCT 51 1 (H) 11/23/2020    MCV 90 11/23/2020     11/23/2020     Lab Results   Component Value Date    SODIUM 143 11/23/2020    K 3 9 11/23/2020    CO2 29 11/23/2020     (H) 11/23/2020    BUN 20 11/23/2020    CREATININE 1 09 11/23/2020    CALCIUM 9 3 11/23/2020 June 2020 CRP was elevated at 124  July 2020 testing showed normal rheumatoid factor, Sjogren's antibody, ANDRAE, anti CCP  July 2020 sed rate was mildly elevated at 12    Imaging:  I personally reviewed the images on the [facility] system pertinent to today's visit  CT scan of the chest from December 2020 shows slight interval progression of the mild bilateral ground-glass changes, greater on the right than on the left    This has slightly increased in comparison with May 2020 study      Misty Ordaz MD

## 2021-01-23 NOTE — ASSESSMENT & PLAN NOTE
· CT imaging reviewed and shows mild increase in ground-glass changes  No fibrosis or honeycombing  · Continue to monitor with follow-up CT imaging  Will repeat noncontrast CT scan in 6 months    · He has not had any further hemoptysis, this has completely resolved

## 2021-01-27 ENCOUNTER — CONSULT (OUTPATIENT)
Dept: HEMATOLOGY ONCOLOGY | Facility: CLINIC | Age: 81
End: 2021-01-27
Payer: COMMERCIAL

## 2021-01-27 VITALS
RESPIRATION RATE: 18 BRPM | TEMPERATURE: 98.2 F | WEIGHT: 226 LBS | HEIGHT: 68 IN | SYSTOLIC BLOOD PRESSURE: 148 MMHG | HEART RATE: 74 BPM | OXYGEN SATURATION: 99 % | BODY MASS INDEX: 34.25 KG/M2 | DIASTOLIC BLOOD PRESSURE: 98 MMHG

## 2021-01-27 DIAGNOSIS — D75.1 POLYCYTHEMIA: ICD-10-CM

## 2021-01-27 PROCEDURE — 99204 OFFICE O/P NEW MOD 45 MIN: CPT | Performed by: PHYSICIAN ASSISTANT

## 2021-01-29 ENCOUNTER — TELEPHONE (OUTPATIENT)
Dept: INTERNAL MEDICINE CLINIC | Facility: CLINIC | Age: 81
End: 2021-01-29

## 2021-02-01 NOTE — PROGRESS NOTES
Hematology/Oncology Outpatient Consult  Star Gu  79 y o  male 1940 [id number]    Date:  2/1/2021      Assessment and Plan:  1  R/O Polycythemia  78-year-old male presents for consultation today due to erythrocytosis  He is accompanied by his grandson  Patient's grandson does state that he is not good about drinking liquids  Labs that showed elevated hematocrit were done in the morning and were fasting  It is possible that this could represent dehydration  His grandson states that he rarely drinks water  He mostly just drinks now but restrict his fluids due to frequent urination related to age  Will rule out any underlying bone marrow disorder causing this as well  Patient's grandson will take him for labs nonfasting and make sure that he is well hydrated  He did have imaging in March 2020 which showed a normal liver and spleen  As below patient's wife is home bound secondary to injuring both legs  The next visit will be done over video visit with his wife  Patient and grandson agreeable with the plan  - CBC and differential; Future  - JAK2 V617F,Ql,W/RFL Exons 12,13 and MPL O993,E226; Future  - Erythropoietin; Future  - Comprehensive metabolic panel; Future      HPI:  78-year-old male presents for consultation regarding erythrocytosis  Patient is very hard of hearing is accompanied by his grandson today  His grandson is a flight nurse for [employer]  This states the patient is usually accompanied by his wife for medical visits however she recently fell down the steps and injured both legs  She is at home  ROS: Review of Systems   Constitutional: Positive for fatigue  Negative for activity change, appetite change, chills, fever and unexpected weight change  Respiratory: Positive for shortness of breath (with climbing steps )  Negative for cough  Cardiovascular: Negative for leg swelling     Gastrointestinal: Negative for abdominal pain, constipation, diarrhea, nausea and vomiting  Genitourinary: Negative for difficulty urinating, dysuria and hematuria  Musculoskeletal: Positive for arthralgias (age related )  Neurological: Negative for dizziness, weakness, light-headedness and headaches  Hematological: Negative  Psychiatric/Behavioral: Negative  Past Medical History:   Diagnosis Date    Abnormal electrocardiogram     Last assessed: Oct 11, 2013    Allergic rhinitis     last assessed: Oct 11, 2013    Allergic rhinitis due to pollen     last assessed: Oct 10, 2013    Allergic sinusitis     Last assessed: May 11, 2015    Allergy     resolved: July 22, 2015    Benign essential hypertension     Last assessed/resolved: May 31, 2017    BPH (benign prostatic hyperplasia)     Colitis     Last assessed: May 24, 2016    Cough with hemoptysis 11/17/2020    Generalized osteoarthritis     Last assessed: Oct 11, 2013    GERD without esophagitis     Last assessed/resolved: May 31, 2017    Hearing loss     Hiatal hernia     resolved: July 22, 2015    History of gastroesophageal reflux (GERD)     History of stomach ulcers     last assessed: May 11, 2015    Hypertension     Incomplete bladder emptying     Kidney stone     Nodular prostate with lower urinary tract symptoms     Nontoxic single thyroid nodule     last assessed: April 16, 2014    Orchalgia     Overweight     last assessed: Oct 31, 2013    Poor urinary stream     Pulmonary embolism [facility])     Salivary gland disorder     last assessed: April 16, 2014    Seasonal allergies     last assessed: May 15, 2015    Spermatocele     Straining to void     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:  May 7, 2014       Past Surgical History:   Procedure Laterality Date    BLADDER SURGERY      CHOLECYSTECTOMY  2000    laparoscopic     HEMORRHOID SURGERY      pilionidal cyst removal     HERNIA REPAIR Left 01/17/2008    inguinal     KNEE ARTHROSCOPY Left 1974    KNEE CARTILAGE SURGERY      NASAL SEPTUM SURGERY      Deviation repair     PROSTATE BIOPSY  2017    STOMACH SURGERY      TONSILLECTOMY AND ADENOIDECTOMY      at age 37   [address] - right  0 left     VASECTOMY         Social History     Socioeconomic History    Marital status: /Civil Union     Spouse name: Linda Carvajal Number of children: 1    Years of education: None    Highest education level: None   Occupational History    Occupation: Retired     Occupation: securtity      Comment: [employer]    Social Needs    Financial resource strain: None    Food insecurity     Worry: None     Inability: None    Transportation needs     Medical: None     Non-medical: None   Tobacco Use    Smoking status: Former Smoker     Packs/day:      Years:      Pack years:      Types: Cigarettes     Quit date:      Years since quittin 1    Smokeless tobacco: Never Used   Substance and Sexual Activity    Alcohol use: Yes     Comment: rare    Drug use: No    Sexual activity: None   Lifestyle    Physical activity     Days per week: 7 days     Minutes per session: 60 min    Stress: None   Relationships    Social connections     Talks on phone: None     Gets together: None     Attends [employer] service: None     Active member of club or organization: None     Attends meetings of clubs or organizations: None     Relationship status: None    Intimate partner violence     Fear of current or ex partner: None     Emotionally abused: None     Physically abused: None     Forced sexual activity: None   Other Topics Concern    None   Social History Narrative    Who lives in your home: wife     What type of home do you live in: Single house    Age of your home: Built [birth year]     How long have you been living there:     Type of heat: Baseboard    Type of fuel: Electric    What type of elaine is in your bedroom: Carpet     Do you have the following in or near your home:    Air products: Central air    Pests: None    Pets: None    Are pets allowed in bedroom: N/A    Open fields, wooded areas nearby: Open fields and Wooded areas    Basement: Finished    Exposure to second hand smoke: No        Habits:    Caffeine: Coffee 1 cup of coffee -peach snapple few a week     Chocolate: rare     Other:       Family History   Problem Relation Age of Onset    Hypertension Mother     Stroke Mother     Stomach cancer Father     Hypertension Father     Hypertension Family     Stroke Family         syndrome     Heart attack Son        No Known Allergies      Current Outpatient Medications:     allopurinol (ZYLOPRIM) 100 mg tablet, Take 1 tablet (100 mg total) by mouth daily, Disp: 90 tablet, Rfl: 3    ASPIRIN 81 PO, Take 1 tablet by mouth daily, Disp: , Rfl:     colchicine (COLCRYS) 0 6 mg tablet, Take 1 tablet (0 6 mg total) by mouth daily, Disp: 90 tablet, Rfl: 3    diltiazem (TIAZAC) 180 MG 24 hr capsule, Take 1 capsule (180 mg total) by mouth daily, Disp: 30 capsule, Rfl: 3    ergocalciferol (VITAMIN D2) 50,000 units, Take 1 capsule (50,000 Units total) by mouth once a week, Disp: 10 capsule, Rfl: 0    finasteride (PROSCAR) 5 mg tablet, TAKE 1 TABLET BY MOUTH EVERY DAY, Disp: 90 tablet, Rfl: 2    fluticasone (FLONASE) 50 mcg/act nasal spray, 2 sprays into each nostril daily, Disp: 1 Bottle, Rfl: 3    levocetirizine (XYZAL) 5 MG tablet, Take 1 tablet (5 mg total) by mouth every evening, Disp: 30 tablet, Rfl: 11    tamsulosin (FLOMAX) 0 4 mg, Take 1 capsule (0 4 mg total) by mouth daily with dinner, Disp: 90 capsule, Rfl: 3    traMADol-acetaminophen (ULTRACET) 37 5-325 mg per tablet, Take 2 tablets by mouth every 6 (six) hours as needed for moderate pain, Disp: 60 tablet, Rfl: 0    diclofenac sodium (VOLTAREN) 1 %, Apply 2 g topically 4 (four) times a day (Patient not taking: Reported on 1/6/2021), Disp: 100 g, Rfl: 6      Physical Exam:  /98 (BP Location: Left arm, Patient Position: Sitting, Cuff Size: Adult)   Pulse 74   Temp 98 2 °F (36 8 °C)   Resp 18   Ht 5' 8" (1 727 m)   Wt 103 kg (226 lb)   SpO2 99%   BMI 34 36 kg/m²     Physical Exam  Vitals signs reviewed  Constitutional:       General: He is not in acute distress  Appearance: He is well-developed  He is obese  HENT:      Head: Normocephalic and atraumatic  Ears:      Comments: Patient is very hard of hearing   Eyes:      General: No scleral icterus  Conjunctiva/sclera: Conjunctivae normal    Neck:      Musculoskeletal: Normal range of motion and neck supple  Cardiovascular:      Rate and Rhythm: Normal rate and regular rhythm  Heart sounds: Normal heart sounds  No murmur  Pulmonary:      Effort: Pulmonary effort is normal  No respiratory distress  Breath sounds: Normal breath sounds  Abdominal:      Palpations: Abdomen is soft  Tenderness: There is no abdominal tenderness  Musculoskeletal: Normal range of motion  General: No tenderness  Lymphadenopathy:      Cervical: No cervical adenopathy  Skin:     General: Skin is warm and dry  Neurological:      Mental Status: He is alert and oriented to person, place, and time  Cranial Nerves: No cranial nerve deficit  Labs:  Lab Results   Component Value Date    WBC 3 67 (L) 11/23/2020    HGB 17 1 (H) 11/23/2020    HCT 51 1 (H) 11/23/2020    MCV 90 11/23/2020     11/23/2020     Lab Results   Component Value Date    K 3 9 11/23/2020     (H) 11/23/2020    CO2 29 11/23/2020    BUN 20 11/23/2020    CREATININE 1 09 11/23/2020    GLUF 79 11/23/2020    CALCIUM 9 3 11/23/2020    AST 25 10/27/2020    ALT 26 10/27/2020    ALKPHOS 80 10/27/2020    EGFR 64 11/23/2020       Patient voiced understanding and agreement in the above discussion  Aware to contact our office with questions/symptoms in the interim  This note has been generated by voice recognition software system    Therefore, there may be spelling, grammar, and or syntax errors  Please contact if questions arise

## 2021-02-11 ENCOUNTER — IMMUNIZATIONS (OUTPATIENT)
Dept: FAMILY MEDICINE CLINIC | Facility: HOSPITAL | Age: 81
End: 2021-02-11

## 2021-02-11 DIAGNOSIS — Z23 ENCOUNTER FOR IMMUNIZATION: Primary | ICD-10-CM

## 2021-02-11 PROCEDURE — 91300 SARS-COV-2 / COVID-19 MRNA VACCINE (PFIZER-BIONTECH) 30 MCG: CPT

## 2021-02-11 PROCEDURE — 0001A SARS-COV-2 / COVID-19 MRNA VACCINE (PFIZER-BIONTECH) 30 MCG: CPT

## 2021-02-26 ENCOUNTER — LAB (OUTPATIENT)
Dept: LAB | Facility: IMAGING CENTER | Age: 81
End: 2021-02-26
Payer: COMMERCIAL

## 2021-02-26 ENCOUNTER — OFFICE VISIT (OUTPATIENT)
Dept: UROLOGY | Facility: MEDICAL CENTER | Age: 81
End: 2021-02-26
Payer: COMMERCIAL

## 2021-02-26 VITALS
DIASTOLIC BLOOD PRESSURE: 82 MMHG | HEIGHT: 68 IN | BODY MASS INDEX: 33.34 KG/M2 | SYSTOLIC BLOOD PRESSURE: 136 MMHG | WEIGHT: 220 LBS

## 2021-02-26 DIAGNOSIS — C61 PROSTATE CANCER (HCC): Primary | ICD-10-CM

## 2021-02-26 DIAGNOSIS — D75.1 POLYCYTHEMIA: ICD-10-CM

## 2021-02-26 DIAGNOSIS — C61 PROSTATE CANCER (HCC): ICD-10-CM

## 2021-02-26 LAB
ALBUMIN SERPL BCP-MCNC: 3.9 G/DL (ref 3.5–5)
ALP SERPL-CCNC: 74 U/L (ref 46–116)
ALT SERPL W P-5'-P-CCNC: 27 U/L (ref 12–78)
ANION GAP SERPL CALCULATED.3IONS-SCNC: 4 MMOL/L (ref 4–13)
AST SERPL W P-5'-P-CCNC: 29 U/L (ref 5–45)
BASOPHILS # BLD MANUAL: 0.04 THOUSAND/UL (ref 0–0.1)
BASOPHILS NFR MAR MANUAL: 1 % (ref 0–1)
BILIRUB SERPL-MCNC: 1.27 MG/DL (ref 0.2–1)
BUN SERPL-MCNC: 21 MG/DL (ref 5–25)
CALCIUM SERPL-MCNC: 9.3 MG/DL (ref 8.3–10.1)
CHLORIDE SERPL-SCNC: 107 MMOL/L (ref 100–108)
CO2 SERPL-SCNC: 29 MMOL/L (ref 21–32)
CREAT SERPL-MCNC: 1.11 MG/DL (ref 0.6–1.3)
EOSINOPHIL # BLD MANUAL: 0.08 THOUSAND/UL (ref 0–0.4)
EOSINOPHIL NFR BLD MANUAL: 2 % (ref 0–6)
ERYTHROCYTE [DISTWIDTH] IN BLOOD BY AUTOMATED COUNT: 13.6 % (ref 11.6–15.1)
GFR SERPL CREATININE-BSD FRML MDRD: 63 ML/MIN/1.73SQ M
GLUCOSE P FAST SERPL-MCNC: 92 MG/DL (ref 65–99)
HCT VFR BLD AUTO: 50.7 % (ref 36.5–49.3)
HGB BLD-MCNC: 16.9 G/DL (ref 12–17)
LYMPHOCYTES # BLD AUTO: 1.02 THOUSAND/UL (ref 0.6–4.47)
LYMPHOCYTES # BLD AUTO: 24 % (ref 14–44)
MCH RBC QN AUTO: 29.8 PG (ref 26.8–34.3)
MCHC RBC AUTO-ENTMCNC: 33.3 G/DL (ref 31.4–37.4)
MCV RBC AUTO: 89 FL (ref 82–98)
MICROCYTES BLD QL AUTO: PRESENT
MONOCYTES # BLD AUTO: 0.25 THOUSAND/UL (ref 0–1.22)
MONOCYTES NFR BLD: 6 % (ref 4–12)
NEUTROPHILS # BLD MANUAL: 2.75 THOUSAND/UL (ref 1.85–7.62)
NEUTS BAND NFR BLD MANUAL: 1 % (ref 0–8)
NEUTS SEG NFR BLD AUTO: 64 % (ref 43–75)
NRBC BLD AUTO-RTO: 0 /100 WBCS
PLATELET # BLD AUTO: 167 THOUSANDS/UL (ref 149–390)
PLATELET BLD QL SMEAR: ADEQUATE
PMV BLD AUTO: 11 FL (ref 8.9–12.7)
POTASSIUM SERPL-SCNC: 4.2 MMOL/L (ref 3.5–5.3)
PROT SERPL-MCNC: 6.8 G/DL (ref 6.4–8.2)
PSA SERPL-MCNC: 0.3 NG/ML (ref 0–4)
RBC # BLD AUTO: 5.67 MILLION/UL (ref 3.88–5.62)
SODIUM SERPL-SCNC: 140 MMOL/L (ref 136–145)
TOTAL CELLS COUNTED SPEC: 100
VARIANT LYMPHS # BLD AUTO: 2 %
WBC # BLD AUTO: 4.23 THOUSAND/UL (ref 4.31–10.16)

## 2021-02-26 PROCEDURE — 84153 ASSAY OF PSA TOTAL: CPT

## 2021-02-26 PROCEDURE — 36415 COLL VENOUS BLD VENIPUNCTURE: CPT

## 2021-02-26 PROCEDURE — 85007 BL SMEAR W/DIFF WBC COUNT: CPT

## 2021-02-26 PROCEDURE — 3079F DIAST BP 80-89 MM HG: CPT | Performed by: UROLOGY

## 2021-02-26 PROCEDURE — 99214 OFFICE O/P EST MOD 30 MIN: CPT | Performed by: UROLOGY

## 2021-02-26 PROCEDURE — 80053 COMPREHEN METABOLIC PANEL: CPT

## 2021-02-26 PROCEDURE — 85027 COMPLETE CBC AUTOMATED: CPT

## 2021-02-26 PROCEDURE — 3075F SYST BP GE 130 - 139MM HG: CPT | Performed by: UROLOGY

## 2021-02-26 PROCEDURE — 82668 ASSAY OF ERYTHROPOIETIN: CPT

## 2021-02-26 NOTE — PROGRESS NOTES
HISTORY:      Follow-up for low-grade prostate cancer on active surveillance  biopsy information:   Initial biopsy in October 2017 showed one core of [place] six cancer  May 2018, repeat biopsy showed several cores "suspicious for cancer, not diagnostic"    July 2019, one core [place] six cancer  PSAs:   January 2020, PSA 0 4   June 2019, 0 5   November 2018, 0 6   September 2017, 1 8         ASSESSMENT / PLAN:      Check PSA     Patient comfortable on active surveillance  Will consider MRI if any indication    Follow-up one year        The following portions of the patient's history were reviewed and updated as appropriate: allergies, current medications, past family history, past medical history, past social history, past surgical history and problem list     Review of Systems   All other systems reviewed and are negative  Objective:     Physical Exam  Constitutional:       General: He is not in acute distress  Appearance: He is well-developed  He is not diaphoretic  HENT:      Head: Normocephalic and atraumatic  Eyes:      General: No scleral icterus  Pulmonary:      Effort: Pulmonary effort is normal    Genitourinary:     Comments:  Penis testes normal    Prostate minimally enlarged no nodules  Skin:     Coloration: Skin is not pale  Neurological:      Mental Status: He is alert and oriented to person, place, and time  Psychiatric:         Behavior: Behavior normal          Thought Content:  Thought content normal          Judgment: Judgment normal            0   Lab Value Date/Time    PSA 0 4 06/29/2020 1450    PSA 0 4 01/15/2020 1139    PSA 0 5 06/18/2019 1412    PSA 0 6 11/05/2018 1328   ]  BUN   Date Value Ref Range Status   11/23/2020 20 5 - 25 mg/dL Final   08/09/2019 20 7 - 25 mg/dL Final     Creatinine   Date Value Ref Range Status   11/23/2020 1 09 0 60 - 1 30 mg/dL Final     Comment:     Standardized to IDMS reference method     No components found for: CBC      Patient Active Problem List   Diagnosis    Benign essential hypertension    Benign prostatic hyperplasia (BPH) with straining on urination    Gastroesophageal reflux disease without esophagitis    Seasonal allergic rhinitis due to pollen    Truncal obesity    Luetscher's syndrome    Benign 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    Class 1 obesity in adult    Need for vaccination against Streptococcus pneumoniae using pneumococcal conjugate vaccine 13    Need for shingles vaccine    Glaucoma screening    Screening for osteoporosis    Screening for AAA (abdominal aortic aneurysm)    [facility]    Localized primary osteoarthritis of wrist    Current tear of medial cartilage or meniscus of knee    Idiopathic peripheral neuropathy    Swelling of right thumb    Right elbow pain    Back pain    Pain in both lower extremities    Radiculopathy of leg    Leg swelling    Vasomotor rhinitis    Post-nasal drip    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    Acute recurrent maxillary sinusitis    Dyspnea on exertion        Diagnoses and all orders for this visit:    Prostate cancer ([facility] )  -     PSA Total, Diagnostic; Future           Patient ID: Dick Hinojosa  is a 79 y o  male        Current Outpatient Medications:     allopurinol (ZYLOPRIM) 100 mg tablet, Take 1 tablet (100 mg total) by mouth daily, Disp: 90 tablet, Rfl: 3    ASPIRIN 81 PO, Take 1 tablet by mouth daily, Disp: , Rfl:     colchicine (COLCRYS) 0 6 mg tablet, Take 1 tablet (0 6 mg total) by mouth daily, Disp: 90 tablet, Rfl: 3    ergocalciferol (VITAMIN D2) 50,000 units, Take 1 capsule (50,000 Units total) by mouth once a week, Disp: 10 capsule, Rfl: 0    finasteride (PROSCAR) 5 mg tablet, TAKE 1 TABLET BY MOUTH EVERY DAY, Disp: 90 tablet, Rfl: 2    levocetirizine (XYZAL) 5 MG tablet, Take 1 tablet (5 mg total) by mouth every evening, Disp: 30 tablet, Rfl: 11   tamsulosin (FLOMAX) 0 4 mg, Take 1 capsule (0 4 mg total) by mouth daily with dinner, Disp: 90 capsule, Rfl: 3    diclofenac sodium (VOLTAREN) 1 %, Apply 2 g topically 4 (four) times a day (Patient not taking: Reported on 1/6/2021), Disp: 100 g, Rfl: 6    diltiazem (TIAZAC) 180 MG 24 hr capsule, Take 1 capsule (180 mg total) by mouth daily (Patient not taking: Reported on 2/26/2021), Disp: 30 capsule, Rfl: 3    fluticasone (FLONASE) 50 mcg/act nasal spray, 2 sprays into each nostril daily (Patient not taking: Reported on 2/26/2021), Disp: 1 Bottle, Rfl: 3    traMADol-acetaminophen (ULTRACET) 37 5-325 mg per tablet, Take 2 tablets by mouth every 6 (six) hours as needed for moderate pain (Patient not taking: Reported on 2/26/2021), Disp: 60 tablet, Rfl: 0    Past Medical History:   Diagnosis Date    Abnormal electrocardiogram     Last assessed: Oct 11, 2013    Allergic rhinitis     last assessed: Oct 11, 2013    Allergic rhinitis due to pollen     last assessed: Oct 10, 2013    Allergic sinusitis     Last assessed: May 11, 2015    Allergy     resolved: July 22, 2015    Benign essential hypertension     Last assessed/resolved: May 31, 2017    BPH (benign prostatic hyperplasia)     Colitis     Last assessed: May 24, 2016    Cough with hemoptysis 11/17/2020    Generalized osteoarthritis     Last assessed: Oct 11, 2013    GERD without esophagitis     Last assessed/resolved: May 31, 2017    Hearing loss     Hiatal hernia     resolved: July 22, 2015    History of gastroesophageal reflux (GERD)     History of stomach ulcers     last assessed: May 11, 2015    Hypertension     Incomplete bladder emptying     Kidney stone     Nodular prostate with lower urinary tract symptoms     Nontoxic single thyroid nodule     last assessed: April 16, 2014    Orchalgia     Overweight     last assessed:  Oct 31, 2013    Poor urinary stream     Pulmonary embolism (HCC)     Salivary gland disorder     last assessed: April 16, 2014    Seasonal allergies     last assessed: May 15, 2015    Spermatocele     Straining to void     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:  May 7, 2014       Past Surgical History:   Procedure Laterality Date    BLADDER SURGERY      CHOLECYSTECTOMY  2000    laparoscopic     HEMORRHOID SURGERY      pilionidal cyst removal     HERNIA REPAIR Left 01/17/2008    inguinal     KNEE ARTHROSCOPY Left 1974    KNEE CARTILAGE SURGERY      NASAL SEPTUM SURGERY      Deviation repair     PROSTATE BIOPSY  2017    STOMACH SURGERY      TONSILLECTOMY AND ADENOIDECTOMY      at age 37   [address] [address] [address] - right 01/04 0 left 01/95    VASECTOMY         Social History

## 2021-02-27 LAB — EPO SERPL-ACNC: 8.3 MIU/ML (ref 2.6–18.5)

## 2021-03-02 ENCOUNTER — IMMUNIZATIONS (OUTPATIENT)
Dept: FAMILY MEDICINE CLINIC | Facility: HOSPITAL | Age: 81
End: 2021-03-02

## 2021-03-02 DIAGNOSIS — Z23 ENCOUNTER FOR IMMUNIZATION: Primary | ICD-10-CM

## 2021-03-02 PROCEDURE — 91300 SARS-COV-2 / COVID-19 MRNA VACCINE (PFIZER-BIONTECH) 30 MCG: CPT

## 2021-03-02 PROCEDURE — 0002A SARS-COV-2 / COVID-19 MRNA VACCINE (PFIZER-BIONTECH) 30 MCG: CPT

## 2021-03-11 ENCOUNTER — TELEMEDICINE (OUTPATIENT)
Dept: HEMATOLOGY ONCOLOGY | Facility: CLINIC | Age: 81
End: 2021-03-11
Payer: COMMERCIAL

## 2021-03-11 ENCOUNTER — TELEPHONE (OUTPATIENT)
Dept: HEMATOLOGY ONCOLOGY | Facility: CLINIC | Age: 81
End: 2021-03-11

## 2021-03-11 DIAGNOSIS — D75.1 POLYCYTHEMIA: Primary | ICD-10-CM

## 2021-03-11 DIAGNOSIS — Z85.46 HISTORY OF PROSTATE CANCER: ICD-10-CM

## 2021-03-11 PROCEDURE — 1036F TOBACCO NON-USER: CPT | Performed by: INTERNAL MEDICINE

## 2021-03-11 PROCEDURE — 1160F RVW MEDS BY RX/DR IN RCRD: CPT | Performed by: INTERNAL MEDICINE

## 2021-03-11 PROCEDURE — 99213 OFFICE O/P EST LOW 20 MIN: CPT | Performed by: INTERNAL MEDICINE

## 2021-03-11 NOTE — TELEPHONE ENCOUNTER
Patient's grandson is calling in inquiring whether he can have a tele visit instead of a Facetime due to grandparent's cell phone not having the capability for video  I have messaged Jane to ask and she indicated that would be fine   I have informed Shan Jones of this information and he voiced understanding

## 2021-03-12 NOTE — PROGRESS NOTES
Virtual Brief Visit    Assessment/Plan:    Problem List Items Addressed This Visit     None                Reason for visit is No chief complaint on file  Encounter provider Angelita Hood MD    Provider located at [address] [address] [phone]    Recent Visits  No visits were found meeting these conditions  Showing recent visits within past 7 days and meeting all other requirements     Today's Visits  Date Type Provider Dept   03/11/21 Telephone Karrie Mandujano [facility] [place]   03/11/21 Telemedicine [facility] [place]   Showing today's visits and meeting all other requirements     Future Appointments  No visits were found meeting these conditions  Showing future appointments within next 150 days and meeting all other requirements        After connecting through [employer] and patient was informed that this is not a secure, HIPAA-compliant platform  He agrees to proceed  , the patient was identified by name and date of birth  [place]  was informed that this is a telemedicine visit and that the visit is being conducted through vozero and patient was informed that this is not a secure, HIPAA-compliant platform  He agrees to proceed     My office door was closed  No one else was in the room  He acknowledged consent and understanding of privacy and security of the platform  The patient has agreed to participate and understands he can discontinue the visit at any time  Patient is aware this is a billable service  Subjective and HPI:  Virtual telephone visit  I think patient's wife was also there to assist because patient is hard of hearing  Is a follow-up visit for increased hemoglobin and hematocrit since October 2020 and hematocrit has been fluctuating between 50 7 and 52 9  No increase in WBC and platelet counts  No splenomegaly    No kidney lesion other than assessed  Chronic lung disease like bronchiectasis and patient follows with his lung specialist     Patient is taking baby aspirin without having GI problems  He gives history of tiredness, osteoarthritis and exertional dyspnea  Beverly Smyth  is a 79 y o  male   Past Medical History:   Diagnosis Date    Abnormal electrocardiogram     Last assessed: Oct 11, 2013    Allergic rhinitis     last assessed: Oct 11, 2013    Allergic rhinitis due to pollen     last assessed: Oct 10, 2013    Allergic sinusitis     Last assessed: May 11, 2015    Allergy     resolved: July 22, 2015    Benign essential hypertension     Last assessed/resolved: May 31, 2017    BPH (benign prostatic hyperplasia)     Colitis     Last assessed: May 24, 2016    Cough with hemoptysis 11/17/2020    Generalized osteoarthritis     Last assessed: Oct 11, 2013    GERD without esophagitis     Last assessed/resolved: May 31, 2017    Hearing loss     Hiatal hernia     resolved: July 22, 2015    History of gastroesophageal reflux (GERD)     History of stomach ulcers     last assessed: May 11, 2015    Hypertension     Incomplete bladder emptying     Kidney stone     Nodular prostate with lower urinary tract symptoms     Nontoxic single thyroid nodule     last assessed: April 16, 2014    Orchalgia     Overweight     last assessed: Oct 31, 2013    Poor urinary stream     Pulmonary embolism [facility])     Salivary gland disorder     last assessed: April 16, 2014    Seasonal allergies     last assessed: May 15, 2015    Spermatocele     Straining to void     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:  May 7, 2014       Past Surgical History:   Procedure Laterality Date    BLADDER SURGERY      CHOLECYSTECTOMY  2000    laparoscopic     HEMORRHOID SURGERY      pilionidal cyst removal     HERNIA REPAIR Left 01/17/2008    inguinal     KNEE ARTHROSCOPY Left 1974    KNEE CARTILAGE SURGERY      NASAL SEPTUM SURGERY      Deviation repair     PROSTATE BIOPSY  2017    STOMACH SURGERY      TONSILLECTOMY AND ADENOIDECTOMY      at age 37   [address] [address] [address] - right 01/04 0 left 01/95    VASECTOMY         Current Outpatient Medications   Medication Sig Dispense Refill    allopurinol (ZYLOPRIM) 100 mg tablet Take 1 tablet (100 mg total) by mouth daily 90 tablet 3    ASPIRIN 81 PO Take 1 tablet by mouth daily      colchicine (COLCRYS) 0 6 mg tablet Take 1 tablet (0 6 mg total) by mouth daily 90 tablet 3    diclofenac sodium (VOLTAREN) 1 % Apply 2 g topically 4 (four) times a day (Patient not taking: Reported on 1/6/2021) 100 g 6    diltiazem (TIAZAC) 180 MG 24 hr capsule Take 1 capsule (180 mg total) by mouth daily (Patient not taking: Reported on 2/26/2021) 30 capsule 3    ergocalciferol (VITAMIN D2) 50,000 units Take 1 capsule (50,000 Units total) by mouth once a week 10 capsule 0    finasteride (PROSCAR) 5 mg tablet TAKE 1 TABLET BY MOUTH EVERY DAY 90 tablet 2    fluticasone (FLONASE) 50 mcg/act nasal spray 2 sprays into each nostril daily (Patient not taking: Reported on 2/26/2021) 1 Bottle 3    levocetirizine (XYZAL) 5 MG tablet Take 1 tablet (5 mg total) by mouth every evening 30 tablet 11    tamsulosin (FLOMAX) 0 4 mg Take 1 capsule (0 4 mg total) by mouth daily with dinner 90 capsule 3    traMADol-acetaminophen (ULTRACET) 37 5-325 mg per tablet Take 2 tablets by mouth every 6 (six) hours as needed for moderate pain (Patient not taking: Reported on 2/26/2021) 60 tablet 0     No current facility-administered medications for this visit  No Known Allergies    Review of Systems : Reviewed 12 systems  Symptoms are in HPI  No other neurological, cardiac, pulmonary, GI and  symptoms other than listed in HPI  No fevers, chills, bleeding, bone pains, skin rash, weight loss, night sweats and no swelling of the ankles and no swollen glands      There were no vitals filed for this visit         I reviewed patient's records prior to this virtual visit  I interviewed the patient  No examination  Reviewed blood counts, CMP,  Erythropoietin, PSA,CT chest and previous CT of abdomen and pelvis  JAK2 mutation test result is pending  Unless patient has JAK2 mutation patient's condition and counts will monitored  I discussed this with patient and his wife on the phone  Condition and plan discussed  Questions answered  He will eat iron poor foods  See diagnoses and orders below   1  Polycythemia      2  History of prostate cancer      Patient to be called with JAK2 mutation test results and to give follow-up instructions  VIRTUAL VISIT DISCLAIMER    Moreno Gaspar [place]  acknowledges that he has consented to an online visit or consultation  He understands that the online visit is based solely on information provided by him, and that, in the absence of a face-to-face physical evaluation by the physician, the diagnosis he receives is both limited and provisional in terms of accuracy and completeness  This is not intended to replace a full medical face-to-face evaluation by the physician  Clementine Malin  understands and accepts these terms

## 2021-03-15 ENCOUNTER — TELEPHONE (OUTPATIENT)
Dept: UROLOGY | Facility: MEDICAL CENTER | Age: 81
End: 2021-03-15

## 2021-03-15 NOTE — TELEPHONE ENCOUNTER
Patient was called and made aware of normal results       ----- Message from Chay Drake MD sent at 3/15/2021  3:31 PM EDT -----  Call pt, normal results

## 2021-04-02 DIAGNOSIS — I10 BENIGN ESSENTIAL HYPERTENSION: Chronic | ICD-10-CM

## 2021-04-02 RX ORDER — DILTIAZEM HYDROCHLORIDE 180 MG/1
CAPSULE, COATED, EXTENDED RELEASE ORAL
Qty: 90 CAPSULE | Refills: 1 | Status: SHIPPED | OUTPATIENT
Start: 2021-04-02 | End: 2021-04-15

## 2021-04-15 ENCOUNTER — OFFICE VISIT (OUTPATIENT)
Dept: INTERNAL MEDICINE CLINIC | Age: 81
End: 2021-04-15
Payer: COMMERCIAL

## 2021-04-15 VITALS
BODY MASS INDEX: 34.13 KG/M2 | DIASTOLIC BLOOD PRESSURE: 94 MMHG | WEIGHT: 225.2 LBS | HEART RATE: 66 BPM | HEIGHT: 68 IN | OXYGEN SATURATION: 97 % | TEMPERATURE: 98.4 F | SYSTOLIC BLOOD PRESSURE: 148 MMHG

## 2021-04-15 DIAGNOSIS — J84.9 INTERSTITIAL LUNG DISEASE (HCC): ICD-10-CM

## 2021-04-15 DIAGNOSIS — I10 BENIGN ESSENTIAL HYPERTENSION: Chronic | ICD-10-CM

## 2021-04-15 DIAGNOSIS — D75.1 POLYCYTHEMIA: ICD-10-CM

## 2021-04-15 DIAGNOSIS — G60.9 IDIOPATHIC PERIPHERAL NEUROPATHY: Primary | ICD-10-CM

## 2021-04-15 PROCEDURE — 99214 OFFICE O/P EST MOD 30 MIN: CPT | Performed by: INTERNAL MEDICINE

## 2021-04-15 PROCEDURE — 3077F SYST BP >= 140 MM HG: CPT | Performed by: INTERNAL MEDICINE

## 2021-04-15 PROCEDURE — 1160F RVW MEDS BY RX/DR IN RCRD: CPT | Performed by: INTERNAL MEDICINE

## 2021-04-15 PROCEDURE — 3080F DIAST BP >= 90 MM HG: CPT | Performed by: INTERNAL MEDICINE

## 2021-04-15 PROCEDURE — 1036F TOBACCO NON-USER: CPT | Performed by: INTERNAL MEDICINE

## 2021-04-15 RX ORDER — DILTIAZEM HYDROCHLORIDE 240 MG/1
240 CAPSULE, COATED, EXTENDED RELEASE ORAL DAILY
Qty: 90 CAPSULE | Refills: 1 | Status: SHIPPED | OUTPATIENT
Start: 2021-04-15 | End: 2021-12-27 | Stop reason: SDUPTHER

## 2021-04-15 NOTE — PROGRESS NOTES
Assessment/Plan:     Diagnoses and all orders for this visit:    Idiopathic peripheral neuropathy    Benign essential hypertension  -     diltiazem (CARDIZEM CD) 240 mg 24 hr capsule; Take 1 capsule (240 mg total) by mouth daily    Polycythemia    Interstitial lung disease ([address] )             Subjective:   Chief Complaint   Patient presents with    Follow-up     HTN  Review labs    Health maint     BMI FOLLOW UP        Patient ID: Rahat Saxena  is a 79 y o  male  HPI  Continued to complain of numbness and pain in the both lower extremity he was not able to tolerate or the gabapentin did not help much  It interferes with walking and the sleep  Hypertension is poor controlled I will increase the Cardizem from 180-240 and will see him back in about 3 months  History of interstitial lung disease it is better  Right wrist gout is much improved  BPH he stopped taking finasteride now he is only taking tamsulosin  Patient was seen by the Hematology-Oncology for polycythemia he does not have any symptoms he is taking aspirin every day and the he will be followed up by the Hematology-Oncology  He was advised blood donation and he will look into that  The following portions of the patient's history were reviewed and updated as appropriate: allergies, current medications, past family history, past medical history, past social history, past surgical history and problem list     Review of Systems   Constitutional: Negative for appetite change, fatigue and fever  HENT: Negative for congestion, ear pain, hearing loss, nosebleeds, sneezing, tinnitus and voice change  Eyes: Negative for pain, discharge and redness  Respiratory: Negative for cough, chest tightness and wheezing  Cardiovascular: Negative for chest pain, palpitations and leg swelling  Gastrointestinal: Negative for abdominal pain, blood in stool, constipation, diarrhea, nausea and vomiting     Genitourinary: Negative for difficulty urinating, dysuria, hematuria and urgency  Musculoskeletal: Negative for arthralgias, back pain, gait problem and joint swelling  Skin: Negative for rash and wound  Allergic/Immunologic: Negative for environmental allergies  Neurological: Negative for dizziness, tremors, seizures, weakness, light-headedness and numbness  Bilateral numbness of the feet and the leg secondary to neuropathy   Hematological: Negative for adenopathy  Does not bruise/bleed easily  Psychiatric/Behavioral: Negative for behavioral problems and confusion  The patient is not nervous/anxious  Past Medical History:   Diagnosis Date    Abnormal electrocardiogram     Last assessed: Oct 11, 2013    Allergic rhinitis     last assessed: Oct 11, 2013    Allergic rhinitis due to pollen     last assessed: Oct 10, 2013    Allergic sinusitis     Last assessed: May 11, 2015    Allergy     resolved: July 22, 2015    Benign essential hypertension     Last assessed/resolved: May 31, 2017    BPH (benign prostatic hyperplasia)     Colitis     Last assessed: May 24, 2016    Cough with hemoptysis 11/17/2020    Generalized osteoarthritis     Last assessed: Oct 11, 2013    GERD without esophagitis     Last assessed/resolved: May 31, 2017    Hearing loss     Hiatal hernia     resolved: July 22, 2015    History of gastroesophageal reflux (GERD)     History of stomach ulcers     last assessed: May 11, 2015    Hypertension     Incomplete bladder emptying     Kidney stone     Nodular prostate with lower urinary tract symptoms     Nontoxic single thyroid nodule     last assessed: April 16, 2014    Orchalgia     Overweight     last assessed: Oct 31, 2013    Poor urinary stream     Pulmonary embolism [facility])     Salivary gland disorder     last assessed: April 16, 2014    Seasonal allergies     last assessed: May 15, 2015    Spermatocele     Straining to void     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:  May 7, 2014         Current Outpatient Medications:     allopurinol (ZYLOPRIM) 100 mg tablet, Take 1 tablet (100 mg total) by mouth daily, Disp: 90 tablet, Rfl: 3    ASPIRIN 81 PO, Take 1 tablet by mouth daily, Disp: , Rfl:     colchicine (COLCRYS) 0 6 mg tablet, Take 1 tablet (0 6 mg total) by mouth daily, Disp: 90 tablet, Rfl: 3    diltiazem (CARDIZEM CD) 180 mg 24 hr capsule, TAKE 1 CAPSULE BY MOUTH EVERY DAY, Disp: 90 capsule, Rfl: 1    levocetirizine (XYZAL) 5 MG tablet, Take 1 tablet (5 mg total) by mouth every evening, Disp: 30 tablet, Rfl: 11    tamsulosin (FLOMAX) 0 4 mg, Take 1 capsule (0 4 mg total) by mouth daily with dinner, Disp: 90 capsule, Rfl: 3    traMADol-acetaminophen (ULTRACET) 37 5-325 mg per tablet, Take 2 tablets by mouth every 6 (six) hours as needed for moderate pain, Disp: 60 tablet, Rfl: 0    ergocalciferol (VITAMIN D2) 50,000 units, Take 1 capsule (50,000 Units total) by mouth once a week (Patient not taking: Reported on 4/15/2021), Disp: 10 capsule, Rfl: 0    No Known Allergies    Social History   Past Surgical History:   Procedure Laterality Date    BLADDER SURGERY      CHOLECYSTECTOMY  2000    laparoscopic     HEMORRHOID SURGERY      pilionidal cyst removal     HERNIA REPAIR Left 01/17/2008    inguinal     KNEE ARTHROSCOPY Left 1974    KNEE CARTILAGE SURGERY      NASAL SEPTUM SURGERY      Deviation repair     PROSTATE BIOPSY  2017    STOMACH SURGERY      TONSILLECTOMY AND ADENOIDECTOMY      at age 37   [facility] TOTAL SHOULDER ARTHROPLASTY      SHOULDER JOINT REPLACEMENT - right 01/04 0 left 01/95    VASECTOMY       Family History   Problem Relation Age of Onset    Hypertension Mother     Stroke Mother     Stomach cancer Father     Hypertension Father     Hypertension Family     Stroke Family         syndrome     Heart attack Son        Objective:  /94 (BP Location: Left arm, Patient Position: Sitting, Cuff Size: Standard)   Pulse 66   Temp 98 4 °F (36 9 °C) (Temporal)   Ht 5' 8" (1 727 m)   Wt 102 kg (225 lb 3 2 oz)   SpO2 97%   BMI 34 24 kg/m²        Physical Exam  Constitutional:       Appearance: He is well-developed  HENT:      Right Ear: External ear normal    Eyes:      Conjunctiva/sclera: Conjunctivae normal       Pupils: Pupils are equal, round, and reactive to light  Neck:      Musculoskeletal: Normal range of motion  Thyroid: No thyromegaly  Vascular: No JVD  Cardiovascular:      Rate and Rhythm: Normal rate and regular rhythm  Heart sounds: Normal heart sounds  Pulmonary:      Breath sounds: Normal breath sounds  Abdominal:      General: Bowel sounds are normal       Palpations: Abdomen is soft  Musculoskeletal: Normal range of motion  Lymphadenopathy:      Cervical: No cervical adenopathy  Skin:     General: Skin is dry  Neurological:      Mental Status: He is alert and oriented to person, place, and time  Deep Tendon Reflexes: Reflexes are normal and symmetric     Psychiatric:         Behavior: Behavior normal

## 2021-04-23 DIAGNOSIS — G60.9 IDIOPATHIC PERIPHERAL NEUROPATHY: ICD-10-CM

## 2021-04-27 DIAGNOSIS — G60.9 IDIOPATHIC PERIPHERAL NEUROPATHY: ICD-10-CM

## 2021-05-05 ENCOUNTER — TELEPHONE (OUTPATIENT)
Dept: PULMONOLOGY | Facility: CLINIC | Age: 81
End: 2021-05-05

## 2021-05-05 NOTE — TELEPHONE ENCOUNTER
Established patient needing pre-op clearance  Surgery: SUPERFICIAL PAROTIDECTOMY WITH FACIAL NERVE MONITORING (Right Throat  Surgery date: 6/9/21  Last seen by us: 1/22/21  On oxygen: No  Kind of Sedation: General  Length of surgery: 135 minutes  Surgeon: Que Eduardo  Office contact: [phone]  Form/Office Note: Form  Fax: [phone]    Would you like to clear patient via a phone call from last visit or would like us to schedule them with you or an AP?

## 2021-05-19 ENCOUNTER — RA CDI HCC (OUTPATIENT)
Dept: OTHER | Facility: HOSPITAL | Age: 81
End: 2021-05-19

## 2021-05-24 ENCOUNTER — APPOINTMENT (OUTPATIENT)
Dept: LAB | Facility: IMAGING CENTER | Age: 81
End: 2021-05-24
Payer: COMMERCIAL

## 2021-05-24 DIAGNOSIS — K11.8 MASS OF RIGHT PAROTID GLAND: ICD-10-CM

## 2021-05-24 LAB
ANION GAP SERPL CALCULATED.3IONS-SCNC: 6 MMOL/L (ref 4–13)
BASOPHILS # BLD AUTO: 0.03 THOUSANDS/ΜL (ref 0–0.1)
BASOPHILS NFR BLD AUTO: 1 % (ref 0–1)
BUN SERPL-MCNC: 22 MG/DL (ref 5–25)
CALCIUM SERPL-MCNC: 9.4 MG/DL (ref 8.3–10.1)
CHLORIDE SERPL-SCNC: 109 MMOL/L (ref 100–108)
CO2 SERPL-SCNC: 29 MMOL/L (ref 21–32)
CREAT SERPL-MCNC: 1.07 MG/DL (ref 0.6–1.3)
EOSINOPHIL # BLD AUTO: 0.07 THOUSAND/ΜL (ref 0–0.61)
EOSINOPHIL NFR BLD AUTO: 2 % (ref 0–6)
ERYTHROCYTE [DISTWIDTH] IN BLOOD BY AUTOMATED COUNT: 13.8 % (ref 11.6–15.1)
GFR SERPL CREATININE-BSD FRML MDRD: 65 ML/MIN/1.73SQ M
GLUCOSE P FAST SERPL-MCNC: 92 MG/DL (ref 65–99)
HCT VFR BLD AUTO: 50 % (ref 36.5–49.3)
HGB BLD-MCNC: 16.3 G/DL (ref 12–17)
IMM GRANULOCYTES # BLD AUTO: 0.06 THOUSAND/UL (ref 0–0.2)
IMM GRANULOCYTES NFR BLD AUTO: 2 % (ref 0–2)
LYMPHOCYTES # BLD AUTO: 1.33 THOUSANDS/ΜL (ref 0.6–4.47)
LYMPHOCYTES NFR BLD AUTO: 37 % (ref 14–44)
MCH RBC QN AUTO: 30.8 PG (ref 26.8–34.3)
MCHC RBC AUTO-ENTMCNC: 32.6 G/DL (ref 31.4–37.4)
MCV RBC AUTO: 95 FL (ref 82–98)
MONOCYTES # BLD AUTO: 0.3 THOUSAND/ΜL (ref 0.17–1.22)
MONOCYTES NFR BLD AUTO: 8 % (ref 4–12)
NEUTROPHILS # BLD AUTO: 1.82 THOUSANDS/ΜL (ref 1.85–7.62)
NEUTS SEG NFR BLD AUTO: 50 % (ref 43–75)
NRBC BLD AUTO-RTO: 0 /100 WBCS
PLATELET # BLD AUTO: 179 THOUSANDS/UL (ref 149–390)
PMV BLD AUTO: 11 FL (ref 8.9–12.7)
POTASSIUM SERPL-SCNC: 4.5 MMOL/L (ref 3.5–5.3)
RBC # BLD AUTO: 5.29 MILLION/UL (ref 3.88–5.62)
SODIUM SERPL-SCNC: 144 MMOL/L (ref 136–145)
WBC # BLD AUTO: 3.61 THOUSAND/UL (ref 4.31–10.16)

## 2021-05-24 PROCEDURE — 85025 COMPLETE CBC W/AUTO DIFF WBC: CPT

## 2021-05-24 PROCEDURE — 36415 COLL VENOUS BLD VENIPUNCTURE: CPT

## 2021-05-24 PROCEDURE — 80048 BASIC METABOLIC PNL TOTAL CA: CPT

## 2021-05-25 ENCOUNTER — CONSULT (OUTPATIENT)
Dept: INTERNAL MEDICINE CLINIC | Age: 81
End: 2021-05-25
Payer: COMMERCIAL

## 2021-05-25 VITALS
DIASTOLIC BLOOD PRESSURE: 80 MMHG | TEMPERATURE: 98.4 F | HEART RATE: 63 BPM | SYSTOLIC BLOOD PRESSURE: 142 MMHG | WEIGHT: 221.1 LBS | OXYGEN SATURATION: 95 % | HEIGHT: 68 IN | BODY MASS INDEX: 33.51 KG/M2

## 2021-05-25 DIAGNOSIS — Z13.6 SCREENING FOR CARDIOVASCULAR CONDITION: Primary | ICD-10-CM

## 2021-05-25 DIAGNOSIS — M1A.0310 IDIOPATHIC CHRONIC GOUT OF RIGHT WRIST WITHOUT TOPHUS: ICD-10-CM

## 2021-05-25 DIAGNOSIS — Z01.818 PRE-OPERATIVE CLEARANCE: ICD-10-CM

## 2021-05-25 DIAGNOSIS — D49.0 PAROTID TUMOR: ICD-10-CM

## 2021-05-25 DIAGNOSIS — D75.1 POLYCYTHEMIA: ICD-10-CM

## 2021-05-25 DIAGNOSIS — J84.9 INTERSTITIAL LUNG DISEASE (HCC): ICD-10-CM

## 2021-05-25 PROBLEM — J01.01 ACUTE RECURRENT MAXILLARY SINUSITIS: Status: RESOLVED | Noted: 2021-01-06 | Resolved: 2021-05-25

## 2021-05-25 PROCEDURE — 99214 OFFICE O/P EST MOD 30 MIN: CPT | Performed by: INTERNAL MEDICINE

## 2021-05-25 PROCEDURE — 93000 ELECTROCARDIOGRAM COMPLETE: CPT | Performed by: INTERNAL MEDICINE

## 2021-05-25 NOTE — PROGRESS NOTES
Presurgical Evaluation    Subjective:   Chief Complaint   Patient presents with    Pre-op Exam      pre op clearance 06/09/2021--Right Superficial Parotidectomy with facial nerve monitoring--Dr Jae Kilpatrick doing the surgery        Patient ID: Yrn Meza  is a [de-identified] y o  male  Chief Complaint   Patient presents with    Pre-op Exam      pre op clearance 06/09/2021--Right Superficial Parotidectomy with facial nerve monitoring--Dr Jae Kilpatrick doing the surgery       HPI  This is a very pleasant [de-identified] years young gentleman who is going for the surgery for right the parotid mass he has and small nodule on the left side to this mass is painful to touch and let slightly tender  Otherwise he is doing good  Hypertension is very well controlled  Interstitial lung disease shortness of breath is better than before followed up by the Pulmonary   EKG done today which is within normal limits patient does not have any history of renal insufficiency type 2 diabetes mellitus no history of acute MI TIA or CVA history of gout which is very well controlled  The following portions of the patient's history were reviewed and updated as appropriate: allergies, current medications, past family history, past medical history, past social history, past surgical history and problem list     Procedure date: June 9, 2021    Surgeon:Dr Linden Osorio  Planned procedure:  Right parotid surgery    Diagnosis for procedure: Right Parotidmass    Prior anesthesia: Yes   General; Complications:  None / Tolerated well    CAD History: None   NOTE: Patient should see Cardiology if time available before surgery, and if appropriate  Pulmonary History:  Yes interstitial lung disease  Renal history: None    Diabetes History:  None     Neurological History: None     On Immunosuppressant meds/biologics: No      Review of Systems   Constitutional: Negative for chills and fever  HENT: Negative for ear pain and sore throat           Lateral parotid swelling right greater than left   Eyes: Negative for pain and visual disturbance  Respiratory: Negative for cough and shortness of breath  Cardiovascular: Negative for chest pain and palpitations  Gastrointestinal: Negative for abdominal pain and vomiting  Genitourinary: Negative for dysuria and hematuria  Musculoskeletal: Negative for arthralgias and back pain  Skin: Negative for color change and rash  Neurological: Negative for seizures and syncope  All other systems reviewed and are negative  Current Outpatient Medications   Medication Sig Dispense Refill    allopurinol (ZYLOPRIM) 100 mg tablet Take 1 tablet (100 mg total) by mouth daily 90 tablet 3    ASPIRIN 81 PO Take 1 tablet by mouth daily      colchicine (COLCRYS) 0 6 mg tablet Take 1 tablet (0 6 mg total) by mouth daily 90 tablet 3    diltiazem (CARDIZEM CD) 240 mg 24 hr capsule Take 1 capsule (240 mg total) by mouth daily 90 capsule 1    levocetirizine (XYZAL) 5 MG tablet Take 1 tablet (5 mg total) by mouth every evening 30 tablet 11    tamsulosin (FLOMAX) 0 4 mg Take 1 capsule (0 4 mg total) by mouth daily with dinner 90 capsule 3    traMADol-acetaminophen (ULTRACET) 37 5-325 mg per tablet Take 2 tablets by mouth every 6 (six) hours as needed for moderate pain 60 tablet 0     No current facility-administered medications for this visit  Allergies on file:   Patient has no known allergies      Patient Active Problem List   Diagnosis    Benign essential hypertension    Benign prostatic hyperplasia (BPH) with straining on urination    Gastroesophageal reflux disease without esophagitis    Prostate cancer (HCC)    Seasonal allergic rhinitis due to pollen    Truncal obesity    Luetscher's syndrome    Benign 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    Class 1 obesity in adult    Need for vaccination against Streptococcus pneumoniae using pneumococcal conjugate vaccine 13    Need for shingles vaccine  Glaucoma screening    Screening for osteoporosis    Screening for AAA (abdominal aortic aneurysm)    Interstitial lung disease (HCC)    Localized primary osteoarthritis of wrist    Current tear of medial cartilage or meniscus of knee    Idiopathic peripheral neuropathy    Swelling of right thumb    Right elbow pain    Back pain    Pain in both lower extremities    Radiculopathy of leg    Leg swelling    Vasomotor rhinitis    Post-nasal drip    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    Acute recurrent maxillary sinusitis    Dyspnea on exertion    Polycythemia    History of prostate cancer        Past Medical History:   Diagnosis Date    Abnormal electrocardiogram     Last assessed: Oct 11, 2013    Allergic rhinitis     last assessed: Oct 11, 2013    Allergic rhinitis due to pollen     last assessed: Oct 10, 2013    Allergic sinusitis     Last assessed: May 11, 2015    Allergy     resolved: July 22, 2015    Benign essential hypertension     Last assessed/resolved: May 31, 2017    BPH (benign prostatic hyperplasia)     Colitis     Last assessed: May 24, 2016    Cough with hemoptysis 11/17/2020    Generalized osteoarthritis     Last assessed: Oct 11, 2013    GERD without esophagitis     Last assessed/resolved: May 31, 2017    Hearing loss     Hiatal hernia     resolved: July 22, 2015    History of gastroesophageal reflux (GERD)     History of stomach ulcers     last assessed: May 11, 2015    Hypertension     Incomplete bladder emptying     Kidney stone     Nodular prostate with lower urinary tract symptoms     Nontoxic single thyroid nodule     last assessed: April 16, 2014    Orchalgia     Overweight     last assessed:  Oct 31, 2013    Poor urinary stream     [facility])     Salivary gland disorder     last assessed: April 16, 2014    Seasonal allergies     last assessed: May 15, 2015    Spermatocele     Straining to void     Warthin tumor last assessed: May 7, 2014       Past Surgical History:   Procedure Laterality Date    BLADDER SURGERY      CHOLECYSTECTOMY      laparoscopic     HEMORRHOID SURGERY      pilionidal cyst removal     HERNIA REPAIR Left 2008    inguinal     KNEE ARTHROSCOPY Left     KNEE CARTILAGE SURGERY      NASAL SEPTUM SURGERY      Deviation repair     PROSTATE BIOPSY  2017    STOMACH SURGERY      TONSILLECTOMY AND ADENOIDECTOMY      at age 37   [address] - right  0 left     VASECTOMY         Family History   Problem Relation Age of Onset    Hypertension Mother     Stroke Mother     Stomach cancer Father     Hypertension Father     Hypertension Family     Stroke Family         syndrome     Heart attack Son        Social History     Tobacco Use    Smoking status: Former Smoker     Packs/day: 1 00     Years: 25 00     Pack years: 25 00     Types: Cigarettes     Quit date:      Years since quittin 4    Smokeless tobacco: Never Used   Substance Use Topics    Alcohol use: Yes     Comment: rare    Drug use: No       Objective:    Vitals:    21 1608   BP: 142/80   BP Location: Left arm   Patient Position: Sitting   Cuff Size: Large   Pulse: 63   Temp: 98 4 °F (36 9 °C)   TempSrc: Temporal   SpO2: 95%   Weight: 100 kg (221 lb 1 6 oz)   Height: 5' 8" (1 727 m)        Physical Exam  Constitutional:       Appearance: He is well-developed  He is obese  HENT:      Right Ear: External ear normal    Eyes:      Conjunctiva/sclera: Conjunctivae normal       Pupils: Pupils are equal, round, and reactive to light  Neck:      Musculoskeletal: Normal range of motion  Thyroid: No thyromegaly  Vascular: No JVD  Cardiovascular:      Rate and Rhythm: Normal rate and regular rhythm  Heart sounds: Normal heart sounds  Pulmonary:      Breath sounds: Normal breath sounds     Abdominal:      General: Bowel sounds are normal  Palpations: Abdomen is soft  Musculoskeletal: Normal range of motion  Lymphadenopathy:      Cervical: No cervical adenopathy  Skin:     General: Skin is dry  Neurological:      General: No focal deficit present  Mental Status: He is alert and oriented to person, place, and time  Mental status is at baseline  Deep Tendon Reflexes: Reflexes are normal and symmetric  Psychiatric:         Mood and Affect: Mood normal          Behavior: Behavior normal            Preop labs/testing available and reviewed: yes    eGFR   Date Value Ref Range Status   2021 65 ml/min/1 73sq m Final     WBC   Date Value Ref Range Status   2021 3 61 (L) 4 31 - 10 16 Thousand/uL Final          EKG yes    Echo no    Stress test/cath no    PFT/[place] no    Functional capacity: Walking , 4-5 MPH               4 Mets   Pick the highest level patient can comfortably perform   4 mets or greater for surgery    RCRI  High Risk surgery? 1 Point  CAD History:         1 Point   MI; Positive Stress Test; CP due to Mi;  Nitrate Usage to control Angina; Pathologic Q wave on EKG  CHF Active:         1 Point   Pulm Edema; Paroxysmal Nocturnal Dyspnea;  Bibasilar Rales (crackles);S3; CHF on CXR  Cerebrovascular Disease (TIA or CVA):     1 Point  DM on Insulin:        1 Point  Serum Creat >2 0 mg/dl:       1 Point          Total Points: 0     Scorin: Class I, Very Low Risk (0 4%)     1: Class II, Low risk (0 9%)     2: Class III Moderate (6 6%)     3: Class IV High (>11%)      KAYLI Risk:  GFR:   eGFR   Date Value Ref Range Status   2021 65 ml/min/1 73sq m Final         For PCP:  If GFR>60, Hold ACE/ARB/Diuretic on the day of surgery, and NSAIDS 10 days before  If GFR<45, Consider PRE and POST op Nephrology Consult  If 46 <GFR> 59 : Has Patient had KAYLI in last 6 Months? 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    Interstitial lung disease (HCC)        Pre-Surgery Instructions:   Medication Instructions    allopurinol (ZYLOPRIM) 100 mg tablet Take morning of surgery    ASPIRIN 81 PO per anesthesia guidelines     colchicine (COLCRYS) 0 6 mg tablet per anesthesia guidelines     diltiazem (CARDIZEM CD) 240 mg 24 hr capsule Take morning of surgery    levocetirizine (XYZAL) 5 MG tablet Take morning of surgery    tamsulosin (FLOMAX) 0 4 mg Take morning of surgery    traMADol-acetaminophen (ULTRACET) 37 5-325 mg per tablet per anesthesia guidelines         NOTE: Please use the above to review important meds for your specialty, the remainder "per anesthesia Guidelines "    NOTE: Please place an Inbasket message for "[facility]" pool for complicated patients

## 2021-05-25 NOTE — H&P (VIEW-ONLY)
Presurgical Evaluation    Subjective:   Chief Complaint   Patient presents with    Pre-op Exam      pre op clearance 06/09/2021--Right Superficial Parotidectomy with facial nerve monitoring--Dr Reid Da Silva doing the surgery        Patient ID: Carmenza Felder  is a [de-identified] y o  male  Chief Complaint   Patient presents with    Pre-op Exam      pre op clearance 06/09/2021--Right Superficial Parotidectomy with facial nerve monitoring--Dr Reid Da Silva doing the surgery       HPI  This is a very pleasant [de-identified] years young gentleman who is going for the surgery for right the parotid mass he has and small nodule on the left side to this mass is painful to touch and let slightly tender  Otherwise he is doing good  Hypertension is very well controlled  Interstitial lung disease shortness of breath is better than before followed up by the Pulmonary   EKG done today which is within normal limits patient does not have any history of renal insufficiency type 2 diabetes mellitus no history of acute MI TIA or CVA history of gout which is very well controlled  The following portions of the patient's history were reviewed and updated as appropriate: allergies, current medications, past family history, past medical history, past social history, past surgical history and problem list     Procedure date: June 9, 2021    Surgeon:Dr Helen Lynn  Planned procedure:  Right parotid surgery    Diagnosis for procedure: Right Parotidmass    Prior anesthesia: Yes   General; Complications:  None / Tolerated well    CAD History: None   NOTE: Patient should see Cardiology if time available before surgery, and if appropriate  Pulmonary History:  Yes interstitial lung disease  Renal history: None    Diabetes History:  None     Neurological History: None     On Immunosuppressant meds/biologics: No      Review of Systems   Constitutional: Negative for chills and fever  HENT: Negative for ear pain and sore throat           Lateral parotid swelling right greater than left   Eyes: Negative for pain and visual disturbance  Respiratory: Negative for cough and shortness of breath  Cardiovascular: Negative for chest pain and palpitations  Gastrointestinal: Negative for abdominal pain and vomiting  Genitourinary: Negative for dysuria and hematuria  Musculoskeletal: Negative for arthralgias and back pain  Skin: Negative for color change and rash  Neurological: Negative for seizures and syncope  All other systems reviewed and are negative  Current Outpatient Medications   Medication Sig Dispense Refill    allopurinol (ZYLOPRIM) 100 mg tablet Take 1 tablet (100 mg total) by mouth daily 90 tablet 3    ASPIRIN 81 PO Take 1 tablet by mouth daily      colchicine (COLCRYS) 0 6 mg tablet Take 1 tablet (0 6 mg total) by mouth daily 90 tablet 3    diltiazem (CARDIZEM CD) 240 mg 24 hr capsule Take 1 capsule (240 mg total) by mouth daily 90 capsule 1    levocetirizine (XYZAL) 5 MG tablet Take 1 tablet (5 mg total) by mouth every evening 30 tablet 11    tamsulosin (FLOMAX) 0 4 mg Take 1 capsule (0 4 mg total) by mouth daily with dinner 90 capsule 3    traMADol-acetaminophen (ULTRACET) 37 5-325 mg per tablet Take 2 tablets by mouth every 6 (six) hours as needed for moderate pain 60 tablet 0     No current facility-administered medications for this visit  Allergies on file:   Patient has no known allergies      Patient Active Problem List   Diagnosis    Benign essential hypertension    Benign prostatic hyperplasia (BPH) with straining on urination    Gastroesophageal reflux disease without esophagitis    Prostate cancer (HCC)    Seasonal allergic rhinitis due to pollen    Truncal obesity    Luetscher's syndrome    Benign 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    Class 1 obesity in adult    Need for vaccination against Streptococcus pneumoniae using pneumococcal conjugate vaccine 13    Need for shingles vaccine  Glaucoma screening    Screening for osteoporosis    Screening for AAA (abdominal aortic aneurysm)    Interstitial lung disease (HCC)    Localized primary osteoarthritis of wrist    Current tear of medial cartilage or meniscus of knee    Idiopathic peripheral neuropathy    Swelling of right thumb    Right elbow pain    Back pain    Pain in both lower extremities    Radiculopathy of leg    Leg swelling    Vasomotor rhinitis    Post-nasal drip    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    Acute recurrent maxillary sinusitis    Dyspnea on exertion    Polycythemia    History of prostate cancer        Past Medical History:   Diagnosis Date    Abnormal electrocardiogram     Last assessed: Oct 11, 2013    Allergic rhinitis     last assessed: Oct 11, 2013    Allergic rhinitis due to pollen     last assessed: Oct 10, 2013    Allergic sinusitis     Last assessed: May 11, 2015    Allergy     resolved: July 22, 2015    Benign essential hypertension     Last assessed/resolved: May 31, 2017    BPH (benign prostatic hyperplasia)     Colitis     Last assessed: May 24, 2016    Cough with hemoptysis 11/17/2020    Generalized osteoarthritis     Last assessed: Oct 11, 2013    GERD without esophagitis     Last assessed/resolved: May 31, 2017    Hearing loss     Hiatal hernia     resolved: July 22, 2015    History of gastroesophageal reflux (GERD)     History of stomach ulcers     last assessed: May 11, 2015    Hypertension     Incomplete bladder emptying     Kidney stone     Nodular prostate with lower urinary tract symptoms     Nontoxic single thyroid nodule     last assessed: April 16, 2014    Orchalgia     Overweight     last assessed:  Oct 31, 2013    Poor urinary stream     Pulmonary embolism [facility])     Salivary gland disorder     last assessed: April 16, 2014    Seasonal allergies     last assessed: May 15, 2015    Spermatocele     Straining to void     Warthin tumor last assessed: May 7, 2014       Past Surgical History:   Procedure Laterality Date    BLADDER SURGERY      CHOLECYSTECTOMY      laparoscopic     HEMORRHOID SURGERY      pilionidal cyst removal     HERNIA REPAIR Left 2008    inguinal     KNEE ARTHROSCOPY Left     KNEE CARTILAGE SURGERY      NASAL SEPTUM SURGERY      Deviation repair     PROSTATE BIOPSY  2017    STOMACH SURGERY      TONSILLECTOMY AND ADENOIDECTOMY      at age 37   [address] - right  0 left     VASECTOMY         Family History   Problem Relation Age of Onset    Hypertension Mother     Stroke Mother     Stomach cancer Father     Hypertension Father     Hypertension Family     Stroke Family         syndrome     Heart attack Son        Social History     Tobacco Use    Smoking status: Former Smoker     Packs/day: 1 00     Years: 25 00     Pack years: 25 00     Types: Cigarettes     Quit date:      Years since quittin 4    Smokeless tobacco: Never Used   Substance Use Topics    Alcohol use: Yes     Comment: rare    Drug use: No       Objective:    Vitals:    21 1608   BP: 142/80   BP Location: Left arm   Patient Position: Sitting   Cuff Size: Large   Pulse: 63   Temp: 98 4 °F (36 9 °C)   TempSrc: Temporal   SpO2: 95%   Weight: 100 kg (221 lb 1 6 oz)   Height: 5' 8" (1 727 m)        Physical Exam  Constitutional:       Appearance: He is well-developed  He is obese  HENT:      Right Ear: External ear normal    Eyes:      Conjunctiva/sclera: Conjunctivae normal       Pupils: Pupils are equal, round, and reactive to light  Neck:      Musculoskeletal: Normal range of motion  Thyroid: No thyromegaly  Vascular: No JVD  Cardiovascular:      Rate and Rhythm: Normal rate and regular rhythm  Heart sounds: Normal heart sounds  Pulmonary:      Breath sounds: Normal breath sounds     Abdominal:      General: Bowel sounds are normal  Palpations: Abdomen is soft  Musculoskeletal: Normal range of motion  Lymphadenopathy:      Cervical: No cervical adenopathy  Skin:     General: Skin is dry  Neurological:      General: No focal deficit present  Mental Status: He is alert and oriented to person, place, and time  Mental status is at baseline  Deep Tendon Reflexes: Reflexes are normal and symmetric  Psychiatric:         Mood and Affect: Mood normal          Behavior: Behavior normal            Preop labs/testing available and reviewed: yes    eGFR   Date Value Ref Range Status   2021 65 ml/min/1 73sq m Final     WBC   Date Value Ref Range Status   2021 3 61 (L) 4 31 - 10 16 Thousand/uL Final          EKG yes    Echo no    Stress test/cath no    PFT/[place] no    Functional capacity: Walking , 4-5 MPH               4 Mets   Pick the highest level patient can comfortably perform   4 mets or greater for surgery    RCRI  High Risk surgery? 1 Point  CAD History:         1 Point   MI; Positive Stress Test; CP due to Mi;  Nitrate Usage to control Angina; Pathologic Q wave on EKG  CHF Active:         1 Point   Pulm Edema; Paroxysmal Nocturnal Dyspnea;  Bibasilar Rales (crackles);S3; CHF on CXR  Cerebrovascular Disease (TIA or CVA):     1 Point  DM on Insulin:        1 Point  Serum Creat >2 0 mg/dl:       1 Point          Total Points: 0     Scorin: Class I, Very Low Risk (0 4%)     1: Class II, Low risk (0 9%)     2: Class III Moderate (6 6%)     3: Class IV High (>11%)      KAYLI Risk:  GFR:   eGFR   Date Value Ref Range Status   2021 65 ml/min/1 73sq m Final         For PCP:  If GFR>60, Hold ACE/ARB/Diuretic on the day of surgery, and NSAIDS 10 days before  If GFR<45, Consider PRE and POST op Nephrology Consult  If 46 <GFR> 59 : Has Patient had KAYLI in last 6 Months? 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    Interstitial lung disease (HCC)        Pre-Surgery Instructions:   Medication Instructions    allopurinol (ZYLOPRIM) 100 mg tablet Take morning of surgery    ASPIRIN 81 PO per anesthesia guidelines     colchicine (COLCRYS) 0 6 mg tablet per anesthesia guidelines     diltiazem (CARDIZEM CD) 240 mg 24 hr capsule Take morning of surgery    levocetirizine (XYZAL) 5 MG tablet Take morning of surgery    tamsulosin (FLOMAX) 0 4 mg Take morning of surgery    traMADol-acetaminophen (ULTRACET) 37 5-325 mg per tablet per anesthesia guidelines         NOTE: Please use the above to review important meds for your specialty, the remainder "per anesthesia Guidelines "    NOTE: Please place an Inbasket message for "[facility]" pool for complicated patients

## 2021-06-03 ENCOUNTER — OFFICE VISIT (OUTPATIENT)
Dept: PULMONOLOGY | Facility: CLINIC | Age: 81
End: 2021-06-03
Payer: COMMERCIAL

## 2021-06-03 VITALS
HEIGHT: 68 IN | HEART RATE: 67 BPM | BODY MASS INDEX: 33.34 KG/M2 | DIASTOLIC BLOOD PRESSURE: 94 MMHG | OXYGEN SATURATION: 97 % | WEIGHT: 220 LBS | TEMPERATURE: 97.2 F | RESPIRATION RATE: 18 BRPM | SYSTOLIC BLOOD PRESSURE: 146 MMHG

## 2021-06-03 DIAGNOSIS — R06.00 DYSPNEA ON EXERTION: ICD-10-CM

## 2021-06-03 DIAGNOSIS — E66.09 CLASS 1 OBESITY DUE TO EXCESS CALORIES WITHOUT SERIOUS COMORBIDITY WITH BODY MASS INDEX (BMI) OF 33.0 TO 33.9 IN ADULT: ICD-10-CM

## 2021-06-03 DIAGNOSIS — J84.9 INTERSTITIAL LUNG DISEASE (HCC): ICD-10-CM

## 2021-06-03 DIAGNOSIS — Z01.818 PREOPERATIVE EVALUATION OF A MEDICAL CONDITION TO RULE OUT SURGICAL CONTRAINDICATIONS (TAR REQUIRED): Primary | ICD-10-CM

## 2021-06-03 PROCEDURE — 3077F SYST BP >= 140 MM HG: CPT | Performed by: INTERNAL MEDICINE

## 2021-06-03 PROCEDURE — 1036F TOBACCO NON-USER: CPT | Performed by: INTERNAL MEDICINE

## 2021-06-03 PROCEDURE — 1160F RVW MEDS BY RX/DR IN RCRD: CPT | Performed by: INTERNAL MEDICINE

## 2021-06-03 PROCEDURE — 99213 OFFICE O/P EST LOW 20 MIN: CPT | Performed by: INTERNAL MEDICINE

## 2021-06-03 PROCEDURE — 3080F DIAST BP >= 90 MM HG: CPT | Performed by: INTERNAL MEDICINE

## 2021-06-03 NOTE — PROGRESS NOTES
Office Progress Note - Pulmonary    Alfred Becker  [de-identified] y o  male MRN: [id number]    Encounter: [id number]      Assessment:   Preop evaluation   Interstitial lung disease   Dyspnea on exertion   Obesity  Plan:     The patient has an acceptable risk to proceed with the surgery scheduled next week   Follow-up with Dr Kay chino as previously scheduled  Subjective: The patient is here for preop evaluation  He has a cyst under his right mandible which is scheduled for removal next week  The patient has chronic dyspnea on exertion which is multifactorial   He was diagnosed with interstitial lung disease on the basis of CT findings  He denies any cough, wheezing or sputum production  Denies any chest pain  Review of systems:  A 12 point system review is done and aside from what is stated above the rest of the review of systems is negative  Family history and social history are reviewed  Medications list is reviewed  Vitals: Blood pressure 146/94, pulse 67, temperature (!) 97 2 °F (36 2 °C), resp  rate 18, height 5' 8" (1 727 m), weight 99 8 kg (220 lb), SpO2 97 %  ,     Physical Exam  Gen: Awake, alert, oriented x 3, no acute distress  HEENT: Mucous membranes moist, no oral lesions, no thrush  NECK: No accessory muscle use, JVP not elevated  Cardiac: Regular, single S1, single S2, no murmurs, no rubs, no gallops  Lungs:  Diminished breath sounds  No wheezing or rhonchi  Abdomen: normoactive bowel sounds, soft nontender, nondistended, no rebound or rigidity, no guarding  Extremities: no cyanosis, no clubbing, no edema  Neuro:  Grossly nonfocal   Skin:  No rash  CT of the chest is reviewed on [facility]  He has ground-glass opacities in both lungs  Mild bronchiectatic changes at the lungs bases      Lab Results   Component Value Date    WBC 3 61 (L) 05/24/2021    HGB 16 3 05/24/2021    HCT 50 0 (H) 05/24/2021    MCV 95 05/24/2021     05/24/2021     Lab Results   Component Value Date    SODIUM 144 05/24/2021    K 4 5 05/24/2021     (H) 05/24/2021    CO2 29 05/24/2021    BUN 22 05/24/2021    CREATININE 1 07 05/24/2021    GLUC 115 06/29/2020    CALCIUM 9 4 05/24/2021

## 2021-06-07 NOTE — PRE-PROCEDURE INSTRUCTIONS
Pre-Surgery Instructions:   Medication Instructions    allopurinol (ZYLOPRIM) 100 mg tablet Instructed patient per Anesthesia Guidelines   ASPIRIN 81 PO Instructed patient per Anesthesia Guidelines   Cholecalciferol (Vitamin D3) 1 25 MG (15415 UT) TABS Instructed patient per Anesthesia Guidelines   colchicine (COLCRYS) 0 6 mg tablet Instructed patient per Anesthesia Guidelines   diltiazem (CARDIZEM CD) 240 mg 24 hr capsule Instructed patient per Anesthesia Guidelines   levocetirizine (XYZAL) 5 MG tablet Instructed patient per Anesthesia Guidelines   tamsulosin (FLOMAX) 0 4 mg Instructed patient per Anesthesia Guidelines  Patient may take allopurinol, cardizem, and chochicine morning of surgery  Last dose of asa was on 6/2

## 2021-06-08 ENCOUNTER — ANESTHESIA EVENT (OUTPATIENT)
Dept: PERIOP | Facility: HOSPITAL | Age: 81
End: 2021-06-08
Payer: COMMERCIAL

## 2021-06-09 ENCOUNTER — ANESTHESIA (OUTPATIENT)
Dept: PERIOP | Facility: HOSPITAL | Age: 81
End: 2021-06-09
Payer: COMMERCIAL

## 2021-06-09 ENCOUNTER — HOSPITAL ENCOUNTER (OUTPATIENT)
Facility: HOSPITAL | Age: 81
Setting detail: OUTPATIENT SURGERY
Discharge: HOME | End: 2021-06-09
Attending: OTOLARYNGOLOGY | Admitting: OTOLARYNGOLOGY
Payer: COMMERCIAL

## 2021-06-09 VITALS
SYSTOLIC BLOOD PRESSURE: 158 MMHG | RESPIRATION RATE: 18 BRPM | TEMPERATURE: 97 F | OXYGEN SATURATION: 95 % | HEART RATE: 64 BPM | DIASTOLIC BLOOD PRESSURE: 94 MMHG | BODY MASS INDEX: 33.04 KG/M2 | WEIGHT: 218 LBS | HEIGHT: 68 IN

## 2021-06-09 DIAGNOSIS — K11.8 MASS OF RIGHT PAROTID GLAND: ICD-10-CM

## 2021-06-09 PROCEDURE — 42415 EXCISE PAROTID GLAND/LESION: CPT | Performed by: OTOLARYNGOLOGY

## 2021-06-09 PROCEDURE — 88307 TISSUE EXAM BY PATHOLOGIST: CPT | Performed by: PATHOLOGY

## 2021-06-09 RX ORDER — SODIUM CHLORIDE, SODIUM LACTATE, POTASSIUM CHLORIDE, CALCIUM CHLORIDE 600; 310; 30; 20 MG/100ML; MG/100ML; MG/100ML; MG/100ML
100 INJECTION, SOLUTION INTRAVENOUS CONTINUOUS
Status: DISCONTINUED | OUTPATIENT
Start: 2021-06-09 | End: 2021-06-09 | Stop reason: HOSPADM

## 2021-06-09 RX ORDER — ONDANSETRON 2 MG/ML
4 INJECTION INTRAMUSCULAR; INTRAVENOUS ONCE AS NEEDED
Status: DISCONTINUED | OUTPATIENT
Start: 2021-06-09 | End: 2021-06-09 | Stop reason: HOSPADM

## 2021-06-09 RX ORDER — GLYCOPYRROLATE 0.2 MG/ML
INJECTION INTRAMUSCULAR; INTRAVENOUS AS NEEDED
Status: DISCONTINUED | OUTPATIENT
Start: 2021-06-09 | End: 2021-06-09

## 2021-06-09 RX ORDER — ONDANSETRON 2 MG/ML
INJECTION INTRAMUSCULAR; INTRAVENOUS AS NEEDED
Status: DISCONTINUED | OUTPATIENT
Start: 2021-06-09 | End: 2021-06-09

## 2021-06-09 RX ORDER — SODIUM CHLORIDE 9 MG/ML
INJECTION, SOLUTION INTRAVENOUS CONTINUOUS PRN
Status: DISCONTINUED | OUTPATIENT
Start: 2021-06-09 | End: 2021-06-09

## 2021-06-09 RX ORDER — LIDOCAINE HYDROCHLORIDE 10 MG/ML
INJECTION, SOLUTION EPIDURAL; INFILTRATION; INTRACAUDAL; PERINEURAL AS NEEDED
Status: DISCONTINUED | OUTPATIENT
Start: 2021-06-09 | End: 2021-06-09

## 2021-06-09 RX ORDER — EPHEDRINE SULFATE 50 MG/ML
INJECTION INTRAVENOUS AS NEEDED
Status: DISCONTINUED | OUTPATIENT
Start: 2021-06-09 | End: 2021-06-09

## 2021-06-09 RX ORDER — ACETAMINOPHEN 160 MG/5ML
650 SUSPENSION, ORAL (FINAL DOSE FORM) ORAL EVERY 4 HOURS PRN
Status: DISCONTINUED | OUTPATIENT
Start: 2021-06-09 | End: 2021-06-09 | Stop reason: HOSPADM

## 2021-06-09 RX ORDER — MIDAZOLAM HYDROCHLORIDE 2 MG/2ML
INJECTION, SOLUTION INTRAMUSCULAR; INTRAVENOUS AS NEEDED
Status: DISCONTINUED | OUTPATIENT
Start: 2021-06-09 | End: 2021-06-09

## 2021-06-09 RX ORDER — FENTANYL CITRATE 50 UG/ML
INJECTION, SOLUTION INTRAMUSCULAR; INTRAVENOUS AS NEEDED
Status: DISCONTINUED | OUTPATIENT
Start: 2021-06-09 | End: 2021-06-09

## 2021-06-09 RX ORDER — MAGNESIUM HYDROXIDE 1200 MG/15ML
LIQUID ORAL AS NEEDED
Status: DISCONTINUED | OUTPATIENT
Start: 2021-06-09 | End: 2021-06-09 | Stop reason: HOSPADM

## 2021-06-09 RX ORDER — FENTANYL CITRATE/PF 50 MCG/ML
25 SYRINGE (ML) INJECTION
Status: DISCONTINUED | OUTPATIENT
Start: 2021-06-09 | End: 2021-06-09 | Stop reason: HOSPADM

## 2021-06-09 RX ORDER — HYDROMORPHONE HCL/PF 1 MG/ML
0.5 SYRINGE (ML) INJECTION
Status: DISCONTINUED | OUTPATIENT
Start: 2021-06-09 | End: 2021-06-09 | Stop reason: HOSPADM

## 2021-06-09 RX ORDER — SODIUM CHLORIDE, SODIUM LACTATE, POTASSIUM CHLORIDE, CALCIUM CHLORIDE 600; 310; 30; 20 MG/100ML; MG/100ML; MG/100ML; MG/100ML
INJECTION, SOLUTION INTRAVENOUS CONTINUOUS PRN
Status: DISCONTINUED | OUTPATIENT
Start: 2021-06-09 | End: 2021-06-09

## 2021-06-09 RX ORDER — LIDOCAINE HYDROCHLORIDE AND EPINEPHRINE 10; 10 MG/ML; UG/ML
INJECTION, SOLUTION INFILTRATION; PERINEURAL AS NEEDED
Status: DISCONTINUED | OUTPATIENT
Start: 2021-06-09 | End: 2021-06-09 | Stop reason: HOSPADM

## 2021-06-09 RX ORDER — KETAMINE HCL IN NACL, ISO-OSM 100MG/10ML
SYRINGE (ML) INJECTION AS NEEDED
Status: DISCONTINUED | OUTPATIENT
Start: 2021-06-09 | End: 2021-06-09

## 2021-06-09 RX ORDER — PROPOFOL 10 MG/ML
INJECTION, EMULSION INTRAVENOUS AS NEEDED
Status: DISCONTINUED | OUTPATIENT
Start: 2021-06-09 | End: 2021-06-09

## 2021-06-09 RX ORDER — HYDROCODONE BITARTRATE AND ACETAMINOPHEN 5; 325 MG/1; MG/1
1 TABLET ORAL EVERY 6 HOURS PRN
Status: DISCONTINUED | OUTPATIENT
Start: 2021-06-09 | End: 2021-06-09 | Stop reason: HOSPADM

## 2021-06-09 RX ORDER — ALBUMIN, HUMAN INJ 5% 5 %
SOLUTION INTRAVENOUS CONTINUOUS PRN
Status: DISCONTINUED | OUTPATIENT
Start: 2021-06-09 | End: 2021-06-09

## 2021-06-09 RX ORDER — PROPOFOL 10 MG/ML
INJECTION, EMULSION INTRAVENOUS CONTINUOUS PRN
Status: DISCONTINUED | OUTPATIENT
Start: 2021-06-09 | End: 2021-06-09

## 2021-06-09 RX ORDER — DEXAMETHASONE SODIUM PHOSPHATE 10 MG/ML
INJECTION, SOLUTION INTRAMUSCULAR; INTRAVENOUS AS NEEDED
Status: DISCONTINUED | OUTPATIENT
Start: 2021-06-09 | End: 2021-06-09

## 2021-06-09 RX ADMIN — PHENYLEPHRINE HYDROCHLORIDE 10 MCG/MIN: 10 INJECTION INTRAVENOUS at 11:57

## 2021-06-09 RX ADMIN — MIDAZOLAM HYDROCHLORIDE 2 MG: 1 INJECTION, SOLUTION INTRAMUSCULAR; INTRAVENOUS at 11:50

## 2021-06-09 RX ADMIN — HYDROCODONE BITARTRATE AND ACETAMINOPHEN 1 TABLET: 5; 325 TABLET ORAL at 14:47

## 2021-06-09 RX ADMIN — EPHEDRINE SULFATE 10 MG: 50 INJECTION, SOLUTION INTRAVENOUS at 11:48

## 2021-06-09 RX ADMIN — SODIUM CHLORIDE, SODIUM LACTATE, POTASSIUM CHLORIDE, AND CALCIUM CHLORIDE: .6; .31; .03; .02 INJECTION, SOLUTION INTRAVENOUS at 11:27

## 2021-06-09 RX ADMIN — ONDANSETRON 4 MG: 2 INJECTION INTRAMUSCULAR; INTRAVENOUS at 11:59

## 2021-06-09 RX ADMIN — GLYCOPYRROLATE 0.1 MG: 0.2 INJECTION, SOLUTION INTRAMUSCULAR; INTRAVENOUS at 11:50

## 2021-06-09 RX ADMIN — ALBUMIN HUMAN: 0.05 INJECTION, SOLUTION INTRAVENOUS at 12:18

## 2021-06-09 RX ADMIN — DEXAMETHASONE SODIUM PHOSPHATE 4 MG: 10 INJECTION, SOLUTION INTRAMUSCULAR; INTRAVENOUS at 11:59

## 2021-06-09 RX ADMIN — FENTANYL CITRATE 50 MCG: 50 INJECTION, SOLUTION INTRAMUSCULAR; INTRAVENOUS at 11:50

## 2021-06-09 RX ADMIN — EPHEDRINE SULFATE 10 MG: 50 INJECTION, SOLUTION INTRAVENOUS at 11:58

## 2021-06-09 RX ADMIN — LIDOCAINE HYDROCHLORIDE 100 MG: 10 INJECTION, SOLUTION EPIDURAL; INFILTRATION; INTRACAUDAL at 11:32

## 2021-06-09 RX ADMIN — REMIFENTANIL HYDROCHLORIDE 0.2 MCG/KG/MIN: 1 INJECTION, POWDER, LYOPHILIZED, FOR SOLUTION INTRAVENOUS at 11:35

## 2021-06-09 RX ADMIN — SODIUM CHLORIDE: 9 INJECTION, SOLUTION INTRAVENOUS at 11:35

## 2021-06-09 RX ADMIN — FENTANYL CITRATE 50 MCG: 50 INJECTION, SOLUTION INTRAMUSCULAR; INTRAVENOUS at 11:32

## 2021-06-09 RX ADMIN — Medication 20 MG: at 11:50

## 2021-06-09 RX ADMIN — PROPOFOL 120 MG: 10 INJECTION, EMULSION INTRAVENOUS at 11:32

## 2021-06-09 RX ADMIN — PROPOFOL 120 MCG/KG/MIN: 10 INJECTION, EMULSION INTRAVENOUS at 11:37

## 2021-06-09 RX ADMIN — EPHEDRINE SULFATE 10 MG: 50 INJECTION, SOLUTION INTRAVENOUS at 12:02

## 2021-06-09 RX ADMIN — GLYCOPYRROLATE 0.1 MG: 0.2 INJECTION, SOLUTION INTRAMUSCULAR; INTRAVENOUS at 11:54

## 2021-06-09 NOTE — INTERVAL H&P NOTE
H&P reviewed  After examining the patient I find no changes in the patients condition since the H&P had been written    R parotid mass  Plan for R parotidectomy, superficial    Vitals:    06/09/21 1011   BP: (!) 182/109   Pulse: 75   Resp: 20   Temp: (!) 97 1 °F (36 2 °C)   SpO2: 97%

## 2021-06-09 NOTE — OP NOTE
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]    Procedure(s) (LRB):  SUPERFICIAL PAROTIDECTOMY WITH FACIAL NERVE MONITORING (Right)    Specimen(s):  ID Type Source Tests Collected by Time Destination   1 : Right Parotid Tissue Parotid gland TISSUE EXAM Hiram Ruvalcaba MD 2021 1238        Estimated Blood Loss:   Minimal    Drains:  * No LDAs found *    Anesthesia Type:   General    Operative Indications: 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]      Operative Findings:  Intact CN 7, GA  Tumor c/w [place]    Complications:   None    Procedure and Technique:  The patient was positively identified and transferred onto the operating table in the supine position  Appropriate monitoring devices were put in place, anesthesia was induced and the patient was intubated without difficulty  The operating room table was turned 90 degrees, and the patients right neck was examined  Palpation confirmed the presence of a large tumor in the inferior aspect of the parotid gland  An appropriate site for skin incision was demarcated anterior to the right ear, curving beneath and behind the lobule of the ear, down into the neck in curvilinear fashion  Before proceeding further, the timeout process was completed  Local anesthesia in the form of 1% lidocaine with 1/100,000 epinephrine was then injected to the marked site  Electrodes for facial nerve monitoring were put in place, and monitoring was noted to be functioning appropriately  The patients neck was then prepped and draped in the usual sterile fashion  Hash marks were created along the skin to aid in closure   Skin incision was then made at the marked site using a 15 blade, which was used to continue dissection through subcutaneous tissues  A superficial skin flap was elevated using Bovie cautery, and this flap was held in a retracted position using a 2-0 silk stitch  Dissection then continued down to the sternocleidomastoid muscle posteriorly using Bovie cautery  The greater auricular nerve was identified and preserved  The tail of the parotid gland was grasped using Allis forceps, and dissection continued by elevating the gland off the sternocleidomastoid muscle using the bovie electrocautery  A encapsulated tumor was noted to be present within the superficial lobe of the parotid gland  Dissection was then carried out through the dissection was then carried out through the parotid fascia circumferentially around the tumor  The marginal mandibular nerve was then identified and traced retrograde  The marginal mandibular nerve was preserved  The tumor was then dissected from the additional investing fascia in gland circumferentially  It was then removed from the field and passed off for permanent analysis  Hemostasis was achieved  The marginal mandibular nerve was stimulated and found to be working appropriately  The surgical site was irrigated with saline, which was suctioned free, and hemomstatis was accomplished using bipolar cautery  The marginal branch of the facial nerve was stimulated and functioning at 0 5 mA  The surgical site was closed by re-approximating deeper tissue using 3-0 Vicryl sutures  Skin itself was closed using a 5-0 Prolene suture in a running fashion followed by Bacitracin ointment  Drapes were removed, and anesthesia was reversed  The patient was awakened, extubated and taken to the recovery room in stable condition  All counts were correct at the end of the case, and no complications were encountered       I was present for the entire procedure    Patient Disposition:  PACU     SIGNATURE: Jarod Howell MD  DATE: June 9, 2021  TIME: 1:58 PM

## 2021-06-09 NOTE — DISCHARGE INSTRUCTIONS
Parotidectomy  Office [address] [facility]       WHAT YOU SHOULD KNOW:    Your submandibular gland sits below your jaw bone, and may have required removal due to infection, inflammation, or the presence of stones  AFTER YOU LEAVE:    Medicines: The following medicines may  be ordered by your primary healthcare provider:  · Pain medicine  can help take away or decrease pain  Do not wait until the pain is severe before you take your medicine  · Take your medicine as directed  Call your healthcare provider if you think your medicine is not helping or if you have side effects  Tell him if you are allergic to any medicine  Keep a list of the medicines, vitamins, and herbs you take  Include the amounts, and when and why you take them  Bring the list or the pill bottles to follow-up visits  Carry your medicine list with you in case of an emergency  Follow up with your or surgeon as directed: You will need to return to have your wound checked and possiblye have stitches removed  Write down your questions so you remember to ask them during your visits  Self-care:   · Rest when you need to while you heal after surgery  Slowly start to do more each day  Return to your daily activities as directed  · Wound care: You may shower or bathe, but do not rub or scrub the surgical site  Please leave the surgical adhesive dressing in place  Please use Bacitracin ointment (available over the counter) twice daily  · Swallowing and voice changes: You may have a sore throat, hoarse voice, or difficulty swallowing after surgery  These symptoms should go away after a few days  Contact your surgeon if:   · You have a fever or chills     · You vomit several times in a row  · Your incision is red, swollen, or draining pus  · You have new voice weakness or hoarseness, or it is getting worse  · You have questions or concerns about your condition or care  Seek care immediately or call 911 if: [place] · Blood soaks through your bandage  · Your stitches come apart  · You have sudden swelling in your neck or difficulty swallowing  · You have trouble breathing  · You have a seizure  © 2014 [address] is for End User's use only and may not be sold, redistributed or otherwise used for commercial purposes  All illustrations and images included in CareNotes® are the copyrighted property of A D A M , Inc  or [employer]  The above information is an  only  It is not intended as medical advice for individual conditions or treatments  Talk to your doctor, nurse or pharmacist before following any medical regimen to see if it is safe and effective for you  Call Dr Helen Lynn with any questions or concerns: office ; mobile  (urgent issues)

## 2021-06-09 NOTE — ANESTHESIA POSTPROCEDURE EVALUATION
Post-Op Assessment Note    No complications documented      BP  139/88   Temp  97   Pulse  91   Resp   12   SpO2   96

## 2021-06-09 NOTE — ANESTHESIA PREPROCEDURE EVALUATION
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   (+) Localized primary osteoarthritis of wrist      NEURO/PSYCH   (+) History of prostate cancer      PULMONARY   (+) Dyspnea on exertion        Physical Exam    Airway    Mallampati score: III  TM Distance: >3 FB  Neck ROM: full     Dental       Cardiovascular  Rhythm: regular,     Pulmonary      Other Findings        Anesthesia Plan  ASA Score- 3     Anesthesia Type- general with ASA Monitors  Additional Monitors:   Airway Plan: ETT  Plan Factors-    Chart reviewed  EKG reviewed  Existing labs reviewed  Patient summary reviewed  Patient is not a current smoker  Induction- intravenous  Postoperative Plan-     Informed Consent- Anesthetic plan and risks discussed with patient  I personally reviewed this patient with the CRNA  Discussed and agreed on the Anesthesia Plan with the CRNA  Colton Echevarria

## 2021-06-09 NOTE — ANESTHESIA POSTPROCEDURE EVALUATION
Post-Op Assessment Note    CV Status:  Stable  Pain Score: 0    Pain management: adequate     Mental Status:  Alert and awake   Hydration Status:  Euvolemic and stable   PONV Controlled:  Controlled   Airway Patency:  Patent and adequate      Post Op Vitals Reviewed: Yes      Staff: CRNA         No complications documented      BP     Temp     Pulse     Resp      SpO2

## 2021-06-09 NOTE — INTERVAL H&P NOTE
H&P reviewed  After examining the patient I find no changes in the patients condition since the H&P had been written    R superficial parotid with NIMS    NAD  AAOx3  CTAB  RRR  Abd soft NT/ND  HOLLIDAY    Vitals:    06/09/21 1011   BP: (!) 182/109   Pulse: 75   Resp: 20   Temp: (!) 97 1 °F (36 2 °C)   SpO2: 97%

## 2021-06-16 ENCOUNTER — HOSPITAL ENCOUNTER (OUTPATIENT)
Dept: RADIOLOGY | Facility: IMAGING CENTER | Age: 81
Discharge: HOME | End: 2021-06-16
Payer: COMMERCIAL

## 2021-06-16 DIAGNOSIS — J84.9 INTERSTITIAL LUNG DISEASE (HCC): ICD-10-CM

## 2021-06-16 PROCEDURE — G1004 CDSM NDSC: HCPCS

## 2021-06-16 PROCEDURE — 71250 CT THORAX DX C-: CPT

## 2021-06-22 ENCOUNTER — OFFICE VISIT (OUTPATIENT)
Dept: PULMONOLOGY | Facility: CLINIC | Age: 81
End: 2021-06-22
Payer: COMMERCIAL

## 2021-06-22 VITALS
HEIGHT: 68 IN | TEMPERATURE: 97.9 F | SYSTOLIC BLOOD PRESSURE: 150 MMHG | OXYGEN SATURATION: 96 % | DIASTOLIC BLOOD PRESSURE: 80 MMHG | WEIGHT: 217 LBS | HEART RATE: 78 BPM | BODY MASS INDEX: 32.89 KG/M2

## 2021-06-22 DIAGNOSIS — J84.9 INTERSTITIAL LUNG DISEASE (HCC): Primary | ICD-10-CM

## 2021-06-22 DIAGNOSIS — R06.00 DYSPNEA ON EXERTION: ICD-10-CM

## 2021-06-22 PROCEDURE — 1160F RVW MEDS BY RX/DR IN RCRD: CPT | Performed by: INTERNAL MEDICINE

## 2021-06-22 PROCEDURE — 1036F TOBACCO NON-USER: CPT | Performed by: INTERNAL MEDICINE

## 2021-06-22 PROCEDURE — 99213 OFFICE O/P EST LOW 20 MIN: CPT | Performed by: INTERNAL MEDICINE

## 2021-06-22 RX ORDER — DILTIAZEM HYDROCHLORIDE 180 MG/1
180 CAPSULE, EXTENDED RELEASE ORAL DAILY
COMMUNITY
Start: 2021-01-06 | End: 2021-07-15

## 2021-06-22 NOTE — PROGRESS NOTES
Progress note - Pulmonary Medicine   Cally Ellsworth  [de-identified] y o  male MRN: [id number]       Impression & Plan:     Interstitial lung disease ([address] )  · Symptoms stable, imaging stable to my review  Formal radiology interpretation of the current CT scan is pending  · Will notify him of any changes in his CT scan if necessary   · Continue surveillance and will see him back in 6 months  · Prior hemoptysis has resolved fully for several months now    Dyspnea on exertion  ·  Currently not reporting significant dyspnea on exertion, this is improved from prior   · Continue to monitor symptoms  No indication for inhalers or other pulmonary medications at this time     follow-up in 6 months  ______________________________________________________________________    HPI:    Cally Ellsworth  presents today for follow-up of  Interstitial lung disease, prior hemoptysis, dyspnea on exertion  Today he reports he is doing well  He reports no shortness of breath with exertion  No cough or hemoptysis  He denies any new pulmonary symptoms and is not on any inhalers  He has no fever or chills  No sick contacts  He did receive his COVID-19 vaccination  No chest pain or palpitations  No cardiovascular complaints  No abdominal pain or GERD symptoms  He reports he is otherwise doing quite well      Current Medications:    Current Outpatient Medications:     allopurinol (ZYLOPRIM) 100 mg tablet, Take 1 tablet (100 mg total) by mouth daily, Disp: 90 tablet, Rfl: 3    ASPIRIN 81 PO, Take 1 tablet by mouth daily, Disp: , Rfl:     Cholecalciferol (Vitamin D3) 1 25 MG (58098 UT) TABS, Take by mouth once a week Monday, Disp: , Rfl:     colchicine (COLCRYS) 0 6 mg tablet, Take 1 tablet (0 6 mg total) by mouth daily, Disp: 90 tablet, Rfl: 3    diltiazem (CARDIZEM CD) 240 mg 24 hr capsule, Take 1 capsule (240 mg total) by mouth daily, Disp: 90 capsule, Rfl: 1    levocetirizine (XYZAL) 5 MG tablet, Take 1 tablet (5 mg total) by mouth every evening, Disp: 30 tablet, Rfl: 11    tamsulosin (FLOMAX) 0 4 mg, Take 1 capsule (0 4 mg total) by mouth daily with dinner, Disp: 90 capsule, Rfl: 3    traMADol-acetaminophen (ULTRACET) 37 5-325 mg per tablet, Take 2 tablets by mouth every 6 (six) hours as needed for moderate pain, Disp: 60 tablet, Rfl: 0    diltiazem (TIAZAC) 180 MG 24 hr capsule, Take 180 mg by mouth daily, Disp: , Rfl:     Review of Systems:    Aside from what is mentioned in the HPI, the review of systems is otherwise negative    Past medical history, surgical history, and family history were reviewed and updated as appropriate    Social history updates:  Social History     Tobacco Use   Smoking Status Former Smoker    Packs/day:     Years:     Pack years:     Types: Cigarettes    Start date:     Quit date: 5    Years since quittin 4   Smokeless Tobacco Never Used       PhysicalExamination:  Vitals:   /80 (BP Location: Left arm, Patient Position: Sitting)   Pulse 78   Temp 97 9 °F (36 6 °C)   Ht 5' 8" (1 727 m)   Wt 98 4 kg (217 lb)   SpO2 96%   BMI 32 99 kg/m²       Gen: Comfortable  On room air  Non-labored breathing  Very hard of hearing  HEENT: PERRL  O/P: clear  moist  Neck: Trachea is midline  No JVD  No adenopathy  Chest:  Symmetric chest wall excursion  Lungs are distant but otherwise clear to auscultation  Cardiac:  regular  no murmur  Abdomen: Soft and nontender  Bowel sounds are present  Extremities: No edema    Diagnostic Data:  Labs:   I personally reviewed the most recent laboratory data pertinent to today's visit    Lab Results   Component Value Date    WBC 3 61 (L) 2021    HGB 16 3 2021    HCT 50 0 (H) 2021    MCV 95 2021     2021     Lab Results   Component Value Date    SODIUM 144 2021    K 4 5 2021    CO2 29 2021     (H) 2021    BUN 22 2021    CREATININE 1 07 2021    CALCIUM 9 4 2021 Imaging:  I personally reviewed the images on the [facility] system pertinent to today's visit    June 16, 2021 CT scan of the chest was viewed personally on the [facility] system  This was high-resolution protocol  He has interstitial lung changes at the bases right greater than left  These do not appear appreciably changed in comparison with the prior study from December 2020  No new findings per my review of the imaging    Formal radiology interpretation is pending and will be reviewed    Fredy Mccoy MD

## 2021-06-22 NOTE — ASSESSMENT & PLAN NOTE
·  Currently not reporting significant dyspnea on exertion, this is improved from prior   · Continue to monitor symptoms    No indication for inhalers or other pulmonary medications at this time

## 2021-06-22 NOTE — ASSESSMENT & PLAN NOTE
· Symptoms stable, imaging stable to my review    Formal radiology interpretation of the current CT scan is pending  · Will notify him of any changes in his CT scan if necessary   · Continue surveillance and will see him back in 6 months  · Prior hemoptysis has resolved fully for several months now

## 2021-06-25 ENCOUNTER — TELEPHONE (OUTPATIENT)
Dept: PULMONOLOGY | Facility: CLINIC | Age: 81
End: 2021-06-25

## 2021-06-25 ENCOUNTER — TELEPHONE (OUTPATIENT)
Dept: OTHER | Facility: HOSPITAL | Age: 81
End: 2021-06-25

## 2021-06-25 NOTE — TELEPHONE ENCOUNTER
Lauren Gonzalez from [facility] called with report on CT scan significant findings

## 2021-07-12 ENCOUNTER — RA CDI HCC (OUTPATIENT)
Dept: OTHER | Facility: HOSPITAL | Age: 81
End: 2021-07-12

## 2021-07-12 NOTE — PROGRESS NOTES
[facility]  coding opportunities          Chart reviewed, no opportunity found: CHART REVIEWED, NO OPPORTUNITY FOUND     Previously reviewed, no additional opportunities found                Patients insurance company: [address]  (Medicare Advantage and Commercial)

## 2021-07-15 ENCOUNTER — OFFICE VISIT (OUTPATIENT)
Dept: INTERNAL MEDICINE CLINIC | Age: 81
End: 2021-07-15
Payer: COMMERCIAL

## 2021-07-15 VITALS
OXYGEN SATURATION: 97 % | BODY MASS INDEX: 34.25 KG/M2 | WEIGHT: 226 LBS | DIASTOLIC BLOOD PRESSURE: 88 MMHG | HEART RATE: 74 BPM | TEMPERATURE: 98.8 F | HEIGHT: 68 IN | SYSTOLIC BLOOD PRESSURE: 144 MMHG

## 2021-07-15 DIAGNOSIS — J84.9 INTERSTITIAL LUNG DISEASE (HCC): ICD-10-CM

## 2021-07-15 DIAGNOSIS — E04.1 LEFT THYROID NODULE: ICD-10-CM

## 2021-07-15 DIAGNOSIS — Z48.02 ENCOUNTER FOR REMOVAL OF SUTURES: ICD-10-CM

## 2021-07-15 DIAGNOSIS — M1A.0310 IDIOPATHIC CHRONIC GOUT OF RIGHT WRIST WITHOUT TOPHUS: ICD-10-CM

## 2021-07-15 DIAGNOSIS — G60.9 IDIOPATHIC PERIPHERAL NEUROPATHY: ICD-10-CM

## 2021-07-15 DIAGNOSIS — E66.09 CLASS 1 OBESITY DUE TO EXCESS CALORIES WITHOUT SERIOUS COMORBIDITY WITH BODY MASS INDEX (BMI) OF 33.0 TO 33.9 IN ADULT: Primary | ICD-10-CM

## 2021-07-15 DIAGNOSIS — I10 BENIGN ESSENTIAL HYPERTENSION: ICD-10-CM

## 2021-07-15 PROBLEM — Z23 NEED FOR VACCINATION AGAINST STREPTOCOCCUS PNEUMONIAE USING PNEUMOCOCCAL CONJUGATE VACCINE 13: Status: RESOLVED | Noted: 2018-09-06 | Resolved: 2021-07-15

## 2021-07-15 PROBLEM — M79.89 LEG SWELLING: Status: RESOLVED | Noted: 2020-05-11 | Resolved: 2021-07-15

## 2021-07-15 PROCEDURE — 99214 OFFICE O/P EST MOD 30 MIN: CPT | Performed by: INTERNAL MEDICINE

## 2021-07-15 PROCEDURE — 1160F RVW MEDS BY RX/DR IN RCRD: CPT | Performed by: INTERNAL MEDICINE

## 2021-07-15 NOTE — PROGRESS NOTES
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  -     Basic metabolic panel; Future    Left thyroid nodule  -     US thyroid; Future    Benign essential hypertension  -     Basic metabolic panel; Future  -     CBC and differential; Future        BMI Counseling: Body mass index is 34 36 kg/m²  The BMI is above normal  Nutrition recommendations include decreasing portion sizes, encouraging healthy choices of fruits and vegetables and moderation in carbohydrate intake  Exercise recommendations include moderate physical activity 150 minutes/week  Subjective:   Chief Complaint   Patient presents with    Follow-up     3 month follow up for HTN  Patient ID: Rosalio Hopkins  is a [de-identified] y o  male      HPI  Patient had the lump in his neck which was removed 1 suture was left I removed the suture patient tolerated well  Significant problem with the hearing  Review of system was essentially unremarkable other  CT scan of the chest was done for interstitial lung disease noted to have a 1 8 cm thyroid nodule in the left thyroid I will order ultrasound of the thyroid and follow-up from there  Hypertension could be better controlled  Discussed about the diet exercise and weight loss patient is unable to lose weight although he try to do some exercises and diet what the unable to lose maybe not compliant to the diet    The following portions of the patient's history were reviewed and updated as appropriate: allergies, current medications, past family history, past medical history, past social history, past surgical history and problem list     Review of Systems   Constitutional: Positive for fatigue  Negative for appetite change and fever  HENT: Positive for hearing loss  Negative for congestion, ear pain, nosebleeds, sneezing, tinnitus and voice change  Eyes: Negative for pain, discharge and redness  Respiratory: Negative for cough, chest tightness and wheezing  Cardiovascular: Negative for chest pain, palpitations and leg swelling  Gastrointestinal: Negative for abdominal pain, blood in stool, constipation, diarrhea, nausea and vomiting  Genitourinary: Negative for difficulty urinating, dysuria, hematuria and urgency  Musculoskeletal: Negative for arthralgias, back pain, gait problem and joint swelling  Skin: Negative for rash and wound  Allergic/Immunologic: Negative for environmental allergies  Neurological: Negative for dizziness, tremors, seizures, weakness, light-headedness and numbness  Hematological: Negative for adenopathy  Does not bruise/bleed easily  Psychiatric/Behavioral: Negative for behavioral problems and confusion  The patient is not nervous/anxious  Past Medical History:   Diagnosis Date    Abnormal electrocardiogram     Last assessed: Oct 11, 2013    Allergic rhinitis     last assessed: Oct 11, 2013    Allergic rhinitis due to pollen     last assessed: Oct 10, 2013    Allergic sinusitis     Last assessed: May 11, 2015    Allergy     resolved: July 22, 2015    Benign essential hypertension     Last assessed/resolved: May 31, 2017    BPH (benign prostatic hyperplasia)     Cancer [facility])     prostate    Colitis     Last assessed: May 24, 2016    Cough with hemoptysis 11/17/2020    Generalized osteoarthritis     Last assessed: Oct 11, 2013    GERD without esophagitis     Last assessed/resolved:  May 31, 2017    Hearing loss     Hiatal hernia     resolved: July 22, 2015    History of gastroesophageal  NC EXC PAROTD,LAT LOBE,DISSECT 5TH NERV Right 2021    Procedure: SUPERFICIAL PAROTIDECTOMY WITH FACIAL NERVE MONITORING;  Surgeon: Eden Russell MD;  Location: [facility] Main [facility];  Service: ENT    PROSTATE BIOPSY  2017    STOMACH SURGERY      TONSILLECTOMY AND ADENOIDECTOMY      at age 37   [employer] [address] - right  0 left     VASECTOMY       Family History   Problem Relation Age of Onset    Hypertension Mother     Stroke Mother     Stomach cancer Father     Hypertension Father     Hypertension Family     Stroke Family         syndrome     Heart attack Son        Objective:  /88 (BP Location: Left arm, Patient Position: Sitting, Cuff Size: Standard)   Pulse 74   Temp 98 8 °F (37 1 °C) (Temporal)   Ht 5' 8" (1 727 m)   Wt 103 kg (226 lb)   SpO2 97%   BMI 34 36 kg/m²        Physical Exam  Constitutional:       Appearance: He is well-developed  HENT:      Right Ear: External ear normal    Eyes:      Conjunctiva/sclera: Conjunctivae normal       Pupils: Pupils are equal, round, and reactive to light  Neck:      Thyroid: No thyromegaly  Vascular: No JVD  Cardiovascular:      Rate and Rhythm: Normal rate and regular rhythm  Heart sounds: Normal heart sounds  Comments: Bilateral varicose vein more on the left than on the right  Pulmonary:      Breath sounds: Normal breath sounds  Abdominal:      General: Bowel sounds are normal       Palpations: Abdomen is soft  Musculoskeletal:         General: Normal range of motion  Cervical back: Normal range of motion  Right lower le+ Edema present  Left lower le+ Edema present  Lymphadenopathy:      Cervical: No cervical adenopathy  Skin:     General: Skin is dry  Neurological:      Mental Status: He is alert and oriented to person, place, and time  Deep Tendon Reflexes: Reflexes are normal and symmetric     Psychiatric:         Behavior: Behavior normal          Suture removal    Date/Time: 7/15/2021 5:22 PM  Performed by: Eric Billings MD  Authorized by: Eric Billings MD   Universal Protocol:  Consent: Verbal consent obtained  Written consent not obtained  Consent given by: patient  Patient understanding: patient states understanding of the procedure being performed  Patient consent: the patient's understanding of the procedure matches consent given  Procedure consent: procedure consent matches procedure scheduled  Relevant documents: relevant documents not present or verified  Test results: test results available and properly labeled  Site marked: the operative site was marked  Radiology Images displayed and confirmed  If images not available, report reviewed: imaging studies available  Patient identity confirmed: verbally with patient        Patient location:  Clinic  Location:     Laterality:  Right    Location:  [address] location:  Neck  Procedure details: Tools used:  Suture removal kit, scissors and tweezers    Wound appearance:  No sign(s) of infection    Number of sutures removed:  1  Post-procedure details:     Post-removal:  Antibiotic ointment applied and Band-Aid applied    Patient tolerance of procedure:   Tolerated well, no immediate complications

## 2021-08-13 ENCOUNTER — TELEPHONE (OUTPATIENT)
Dept: PULMONOLOGY | Facility: CLINIC | Age: 81
End: 2021-08-13

## 2021-08-13 NOTE — TELEPHONE ENCOUNTER
Called pt to schedule 6m fu with Dr Junie Johnson for December    LM for pt to call back and schedule

## 2021-10-11 ENCOUNTER — APPOINTMENT (OUTPATIENT)
Dept: LAB | Age: 81
End: 2021-10-11
Payer: COMMERCIAL

## 2021-10-11 DIAGNOSIS — Z01.812 PRE-OPERATIVE LABORATORY EXAMINATION: ICD-10-CM

## 2021-10-11 DIAGNOSIS — Z01.818 OTHER SPECIFIED PRE-OPERATIVE EXAMINATION: ICD-10-CM

## 2021-10-11 DIAGNOSIS — R22.2 MASS IN CHEST: ICD-10-CM

## 2021-10-11 LAB
ANION GAP SERPL CALCULATED.3IONS-SCNC: 0 MMOL/L (ref 4–13)
BUN SERPL-MCNC: 18 MG/DL (ref 5–25)
CALCIUM SERPL-MCNC: 9.5 MG/DL (ref 8.3–10.1)
CHLORIDE SERPL-SCNC: 106 MMOL/L (ref 100–108)
CO2 SERPL-SCNC: 32 MMOL/L (ref 21–32)
CREAT SERPL-MCNC: 1.31 MG/DL (ref 0.6–1.3)
ERYTHROCYTE [DISTWIDTH] IN BLOOD BY AUTOMATED COUNT: 13.3 % (ref 11.6–15.1)
GFR SERPL CREATININE-BSD FRML MDRD: 51 ML/MIN/1.73SQ M
GLUCOSE SERPL-MCNC: 89 MG/DL (ref 65–140)
HCT VFR BLD AUTO: 48.5 % (ref 36.5–49.3)
HGB BLD-MCNC: 16.2 G/DL (ref 12–17)
MCH RBC QN AUTO: 30.7 PG (ref 26.8–34.3)
MCHC RBC AUTO-ENTMCNC: 33.4 G/DL (ref 31.4–37.4)
MCV RBC AUTO: 92 FL (ref 82–98)
PLATELET # BLD AUTO: 197 THOUSANDS/UL (ref 149–390)
PMV BLD AUTO: 10.4 FL (ref 8.9–12.7)
POTASSIUM SERPL-SCNC: 4.5 MMOL/L (ref 3.5–5.3)
RBC # BLD AUTO: 5.27 MILLION/UL (ref 3.88–5.62)
SODIUM SERPL-SCNC: 138 MMOL/L (ref 136–145)
WBC # BLD AUTO: 4.34 THOUSAND/UL (ref 4.31–10.16)

## 2021-10-11 PROCEDURE — 85027 COMPLETE CBC AUTOMATED: CPT

## 2021-10-11 PROCEDURE — 36415 COLL VENOUS BLD VENIPUNCTURE: CPT

## 2021-10-11 PROCEDURE — 80048 BASIC METABOLIC PNL TOTAL CA: CPT

## 2021-10-19 ENCOUNTER — RA CDI HCC (OUTPATIENT)
Dept: OTHER | Facility: HOSPITAL | Age: 81
End: 2021-10-19

## 2021-10-20 ENCOUNTER — OFFICE VISIT (OUTPATIENT)
Dept: INTERNAL MEDICINE CLINIC | Age: 81
End: 2021-10-20
Payer: COMMERCIAL

## 2021-10-20 VITALS
SYSTOLIC BLOOD PRESSURE: 126 MMHG | OXYGEN SATURATION: 98 % | BODY MASS INDEX: 36.21 KG/M2 | TEMPERATURE: 98.2 F | HEART RATE: 64 BPM | DIASTOLIC BLOOD PRESSURE: 74 MMHG | HEIGHT: 66 IN | WEIGHT: 225.3 LBS

## 2021-10-20 DIAGNOSIS — D61.818 OTHER PANCYTOPENIA (HCC): ICD-10-CM

## 2021-10-20 DIAGNOSIS — I10 BENIGN ESSENTIAL HYPERTENSION: Primary | Chronic | ICD-10-CM

## 2021-10-20 DIAGNOSIS — C61 PROSTATE CANCER (HCC): ICD-10-CM

## 2021-10-20 DIAGNOSIS — G60.9 IDIOPATHIC PERIPHERAL NEUROPATHY: ICD-10-CM

## 2021-10-20 DIAGNOSIS — R39.16 BENIGN PROSTATIC HYPERPLASIA (BPH) WITH STRAINING ON URINATION: Chronic | ICD-10-CM

## 2021-10-20 DIAGNOSIS — E79.0 HYPERURICEMIA: ICD-10-CM

## 2021-10-20 DIAGNOSIS — N40.1 BENIGN PROSTATIC HYPERPLASIA (BPH) WITH STRAINING ON URINATION: Chronic | ICD-10-CM

## 2021-10-20 PROCEDURE — 3078F DIAST BP <80 MM HG: CPT | Performed by: INTERNAL MEDICINE

## 2021-10-20 PROCEDURE — 1036F TOBACCO NON-USER: CPT | Performed by: INTERNAL MEDICINE

## 2021-10-20 PROCEDURE — 1160F RVW MEDS BY RX/DR IN RCRD: CPT | Performed by: INTERNAL MEDICINE

## 2021-10-20 PROCEDURE — 99214 OFFICE O/P EST MOD 30 MIN: CPT | Performed by: INTERNAL MEDICINE

## 2021-10-20 PROCEDURE — 3074F SYST BP LT 130 MM HG: CPT | Performed by: INTERNAL MEDICINE

## 2021-10-20 RX ORDER — COLCHICINE 0.6 MG/1
TABLET ORAL
COMMUNITY
End: 2021-10-20

## 2021-10-20 RX ORDER — FLUTICASONE PROPIONATE 50 MCG
SPRAY, SUSPENSION (ML) NASAL
COMMUNITY
End: 2021-10-20

## 2021-10-20 RX ORDER — ERGOCALCIFEROL (VITAMIN D2) 1250 MCG
CAPSULE ORAL
COMMUNITY
End: 2021-11-24

## 2021-10-20 RX ORDER — CEFDINIR 300 MG/1
CAPSULE ORAL
COMMUNITY
End: 2021-10-20

## 2021-10-20 RX ORDER — ALLOPURINOL 100 MG/1
TABLET ORAL
COMMUNITY
End: 2021-10-20 | Stop reason: SDUPTHER

## 2021-10-20 RX ORDER — ALLOPURINOL 100 MG/1
100 TABLET ORAL 2 TIMES DAILY
Qty: 180 TABLET | Refills: 1 | Status: SHIPPED | OUTPATIENT
Start: 2021-10-20

## 2021-10-20 RX ORDER — FINASTERIDE 5 MG/1
TABLET, FILM COATED ORAL
COMMUNITY
End: 2021-11-18

## 2021-10-22 ENCOUNTER — HOSPITAL ENCOUNTER (OUTPATIENT)
Dept: RADIOLOGY | Facility: IMAGING CENTER | Age: 81
Discharge: HOME | End: 2021-10-22
Payer: COMMERCIAL

## 2021-10-22 DIAGNOSIS — E04.1 LEFT THYROID NODULE: ICD-10-CM

## 2021-10-22 PROCEDURE — 76536 US EXAM OF HEAD AND NECK: CPT

## 2021-10-22 RX ORDER — ASPIRIN 81 MG/1
81 TABLET ORAL DAILY
COMMUNITY

## 2021-10-27 ENCOUNTER — ANESTHESIA EVENT (OUTPATIENT)
Dept: PERIOP | Facility: HOSPITAL | Age: 81
End: 2021-10-27
Payer: COMMERCIAL

## 2021-10-27 PROBLEM — N18.9 CHRONIC KIDNEY DISEASE: Status: ACTIVE | Noted: 2021-10-27

## 2021-10-27 PROBLEM — D61.818 OTHER PANCYTOPENIA (HCC): Status: RESOLVED | Noted: 2021-10-20 | Resolved: 2021-10-27

## 2021-10-28 ENCOUNTER — HOSPITAL ENCOUNTER (OUTPATIENT)
Facility: HOSPITAL | Age: 81
Setting detail: OUTPATIENT SURGERY
Discharge: HOME | End: 2021-10-28
Attending: STUDENT IN AN ORGANIZED HEALTH CARE EDUCATION/TRAINING PROGRAM | Admitting: STUDENT IN AN ORGANIZED HEALTH CARE EDUCATION/TRAINING PROGRAM
Payer: COMMERCIAL

## 2021-10-28 ENCOUNTER — ANESTHESIA (OUTPATIENT)
Dept: PERIOP | Facility: HOSPITAL | Age: 81
End: 2021-10-28
Payer: COMMERCIAL

## 2021-10-28 VITALS
HEART RATE: 70 BPM | WEIGHT: 225 LBS | HEIGHT: 66 IN | TEMPERATURE: 97 F | BODY MASS INDEX: 36.16 KG/M2 | DIASTOLIC BLOOD PRESSURE: 94 MMHG | OXYGEN SATURATION: 98 % | RESPIRATION RATE: 20 BRPM | SYSTOLIC BLOOD PRESSURE: 158 MMHG

## 2021-10-28 DIAGNOSIS — R22.2 LOCALIZED SWELLING, MASS AND LUMP, TRUNK: ICD-10-CM

## 2021-10-28 PROCEDURE — 88305 TISSUE EXAM BY PATHOLOGIST: CPT | Performed by: SPECIALIST

## 2021-10-28 RX ORDER — LIDOCAINE HYDROCHLORIDE AND EPINEPHRINE 10; 10 MG/ML; UG/ML
INJECTION, SOLUTION INFILTRATION; PERINEURAL AS NEEDED
Status: DISCONTINUED | OUTPATIENT
Start: 2021-10-28 | End: 2021-10-28 | Stop reason: HOSPADM

## 2021-10-28 RX ORDER — MAGNESIUM HYDROXIDE 1200 MG/15ML
LIQUID ORAL AS NEEDED
Status: DISCONTINUED | OUTPATIENT
Start: 2021-10-28 | End: 2021-10-28 | Stop reason: HOSPADM

## 2021-10-28 RX ORDER — PROMETHAZINE HYDROCHLORIDE 25 MG/ML
12.5 INJECTION, SOLUTION INTRAMUSCULAR; INTRAVENOUS ONCE AS NEEDED
Status: DISCONTINUED | OUTPATIENT
Start: 2021-10-28 | End: 2021-10-28 | Stop reason: HOSPADM

## 2021-10-28 RX ORDER — LIDOCAINE HYDROCHLORIDE 10 MG/ML
INJECTION, SOLUTION EPIDURAL; INFILTRATION; INTRACAUDAL; PERINEURAL AS NEEDED
Status: DISCONTINUED | OUTPATIENT
Start: 2021-10-28 | End: 2021-10-28

## 2021-10-28 RX ORDER — GINSENG 100 MG
CAPSULE ORAL AS NEEDED
Status: DISCONTINUED | OUTPATIENT
Start: 2021-10-28 | End: 2021-10-28 | Stop reason: HOSPADM

## 2021-10-28 RX ORDER — FENTANYL CITRATE/PF 50 MCG/ML
25 SYRINGE (ML) INJECTION
Status: DISCONTINUED | OUTPATIENT
Start: 2021-10-28 | End: 2021-10-28 | Stop reason: HOSPADM

## 2021-10-28 RX ORDER — DIPHENHYDRAMINE HYDROCHLORIDE 50 MG/ML
12.5 INJECTION INTRAMUSCULAR; INTRAVENOUS ONCE AS NEEDED
Status: DISCONTINUED | OUTPATIENT
Start: 2021-10-28 | End: 2021-10-28 | Stop reason: HOSPADM

## 2021-10-28 RX ORDER — ACETAMINOPHEN 325 MG/1
650 TABLET ORAL EVERY 6 HOURS PRN
Status: DISCONTINUED | OUTPATIENT
Start: 2021-10-28 | End: 2021-10-28 | Stop reason: HOSPADM

## 2021-10-28 RX ORDER — DEXAMETHASONE SODIUM PHOSPHATE 4 MG/ML
4 INJECTION, SOLUTION INTRA-ARTICULAR; INTRALESIONAL; INTRAMUSCULAR; INTRAVENOUS; SOFT TISSUE ONCE AS NEEDED
Status: DISCONTINUED | OUTPATIENT
Start: 2021-10-28 | End: 2021-10-28 | Stop reason: HOSPADM

## 2021-10-28 RX ORDER — FENTANYL CITRATE 50 UG/ML
INJECTION, SOLUTION INTRAMUSCULAR; INTRAVENOUS AS NEEDED
Status: DISCONTINUED | OUTPATIENT
Start: 2021-10-28 | End: 2021-10-28

## 2021-10-28 RX ORDER — PROPOFOL 10 MG/ML
INJECTION, EMULSION INTRAVENOUS CONTINUOUS PRN
Status: DISCONTINUED | OUTPATIENT
Start: 2021-10-28 | End: 2021-10-28

## 2021-10-28 RX ORDER — ONDANSETRON 2 MG/ML
INJECTION INTRAMUSCULAR; INTRAVENOUS AS NEEDED
Status: DISCONTINUED | OUTPATIENT
Start: 2021-10-28 | End: 2021-10-28

## 2021-10-28 RX ORDER — CEFAZOLIN SODIUM 2 G/50ML
SOLUTION INTRAVENOUS AS NEEDED
Status: DISCONTINUED | OUTPATIENT
Start: 2021-10-28 | End: 2021-10-28

## 2021-10-28 RX ORDER — MEPERIDINE HYDROCHLORIDE 25 MG/ML
12.5 INJECTION INTRAMUSCULAR; INTRAVENOUS; SUBCUTANEOUS
Status: DISCONTINUED | OUTPATIENT
Start: 2021-10-28 | End: 2021-10-28 | Stop reason: HOSPADM

## 2021-10-28 RX ORDER — CEFAZOLIN SODIUM 2 G/50ML
2000 SOLUTION INTRAVENOUS ONCE
Status: DISCONTINUED | OUTPATIENT
Start: 2021-10-28 | End: 2021-10-28 | Stop reason: HOSPADM

## 2021-10-28 RX ORDER — SODIUM CHLORIDE, SODIUM LACTATE, POTASSIUM CHLORIDE, CALCIUM CHLORIDE 600; 310; 30; 20 MG/100ML; MG/100ML; MG/100ML; MG/100ML
75 INJECTION, SOLUTION INTRAVENOUS CONTINUOUS
Status: DISCONTINUED | OUTPATIENT
Start: 2021-10-28 | End: 2021-10-28 | Stop reason: HOSPADM

## 2021-10-28 RX ORDER — FENTANYL CITRATE/PF 50 MCG/ML
12.5 SYRINGE (ML) INJECTION
Status: DISCONTINUED | OUTPATIENT
Start: 2021-10-28 | End: 2021-10-28 | Stop reason: HOSPADM

## 2021-10-28 RX ORDER — DEXAMETHASONE SODIUM PHOSPHATE 4 MG/ML
INJECTION, SOLUTION INTRA-ARTICULAR; INTRALESIONAL; INTRAMUSCULAR; INTRAVENOUS; SOFT TISSUE AS NEEDED
Status: DISCONTINUED | OUTPATIENT
Start: 2021-10-28 | End: 2021-10-28

## 2021-10-28 RX ORDER — PROPOFOL 10 MG/ML
INJECTION, EMULSION INTRAVENOUS AS NEEDED
Status: DISCONTINUED | OUTPATIENT
Start: 2021-10-28 | End: 2021-10-28

## 2021-10-28 RX ADMIN — ONDANSETRON 4 MG: 2 INJECTION INTRAMUSCULAR; INTRAVENOUS at 10:26

## 2021-10-28 RX ADMIN — DEXAMETHASONE SODIUM PHOSPHATE 4 MG: 4 INJECTION, SOLUTION INTRAMUSCULAR; INTRAVENOUS at 10:32

## 2021-10-28 RX ADMIN — FENTANYL CITRATE 25 MCG: 50 INJECTION, SOLUTION INTRAMUSCULAR; INTRAVENOUS at 10:31

## 2021-10-28 RX ADMIN — CEFAZOLIN SODIUM 2000 MG: 2 SOLUTION INTRAVENOUS at 10:22

## 2021-10-28 RX ADMIN — SODIUM CHLORIDE, SODIUM LACTATE, POTASSIUM CHLORIDE, AND CALCIUM CHLORIDE: .6; .31; .03; .02 INJECTION, SOLUTION INTRAVENOUS at 10:22

## 2021-10-28 RX ADMIN — PROPOFOL 20 MG: 10 INJECTION, EMULSION INTRAVENOUS at 10:26

## 2021-10-28 RX ADMIN — ONDANSETRON 500 MG: 2 INJECTION INTRAMUSCULAR; INTRAVENOUS at 11:30

## 2021-10-28 RX ADMIN — FENTANYL CITRATE 50 MCG: 50 INJECTION, SOLUTION INTRAMUSCULAR; INTRAVENOUS at 10:26

## 2021-10-28 RX ADMIN — FENTANYL CITRATE 25 MCG: 50 INJECTION, SOLUTION INTRAMUSCULAR; INTRAVENOUS at 10:47

## 2021-10-28 RX ADMIN — PROPOFOL 130 MCG/KG/MIN: 10 INJECTION, EMULSION INTRAVENOUS at 10:26

## 2021-10-28 RX ADMIN — LIDOCAINE HYDROCHLORIDE 50 MG: 10 INJECTION, SOLUTION EPIDURAL; INFILTRATION; INTRACAUDAL; PERINEURAL at 10:26

## 2021-11-01 DIAGNOSIS — N40.1 BPH WITH OBSTRUCTION/LOWER URINARY TRACT SYMPTOMS: ICD-10-CM

## 2021-11-01 DIAGNOSIS — N13.8 BPH WITH OBSTRUCTION/LOWER URINARY TRACT SYMPTOMS: ICD-10-CM

## 2021-11-02 RX ORDER — TAMSULOSIN HYDROCHLORIDE 0.4 MG/1
0.4 CAPSULE ORAL
Qty: 90 CAPSULE | Refills: 1 | Status: SHIPPED | OUTPATIENT
Start: 2021-11-02 | End: 2022-05-05 | Stop reason: SDUPTHER

## 2021-11-16 ENCOUNTER — RA CDI HCC (OUTPATIENT)
Dept: OTHER | Facility: HOSPITAL | Age: 81
End: 2021-11-16

## 2021-11-18 ENCOUNTER — OFFICE VISIT (OUTPATIENT)
Dept: INTERNAL MEDICINE CLINIC | Facility: CLINIC | Age: 81
End: 2021-11-18
Payer: COMMERCIAL

## 2021-11-18 VITALS
TEMPERATURE: 98 F | BODY MASS INDEX: 36.32 KG/M2 | HEIGHT: 66 IN | OXYGEN SATURATION: 96 % | DIASTOLIC BLOOD PRESSURE: 84 MMHG | SYSTOLIC BLOOD PRESSURE: 136 MMHG | WEIGHT: 226 LBS | HEART RATE: 61 BPM

## 2021-11-18 DIAGNOSIS — G60.9 IDIOPATHIC PERIPHERAL NEUROPATHY: ICD-10-CM

## 2021-11-18 DIAGNOSIS — F11.20 CONTINUOUS OPIOID DEPENDENCE (HCC): ICD-10-CM

## 2021-11-18 DIAGNOSIS — N18.31 STAGE 3A CHRONIC KIDNEY DISEASE (HCC): ICD-10-CM

## 2021-11-18 DIAGNOSIS — E04.1 THYROID NODULE: Primary | ICD-10-CM

## 2021-11-18 DIAGNOSIS — E27.9 DISORDER OF ADRENAL GLAND, UNSPECIFIED (HCC): ICD-10-CM

## 2021-11-18 DIAGNOSIS — E66.01 OBESITY, MORBID (HCC): ICD-10-CM

## 2021-11-18 PROCEDURE — 3725F SCREEN DEPRESSION PERFORMED: CPT | Performed by: INTERNAL MEDICINE

## 2021-11-18 PROCEDURE — 99213 OFFICE O/P EST LOW 20 MIN: CPT | Performed by: INTERNAL MEDICINE

## 2021-11-24 ENCOUNTER — OFFICE VISIT (OUTPATIENT)
Dept: INTERNAL MEDICINE CLINIC | Facility: CLINIC | Age: 81
End: 2021-11-24
Payer: COMMERCIAL

## 2021-11-24 VITALS
BODY MASS INDEX: 36.48 KG/M2 | HEIGHT: 66 IN | DIASTOLIC BLOOD PRESSURE: 76 MMHG | SYSTOLIC BLOOD PRESSURE: 126 MMHG | TEMPERATURE: 97.6 F | OXYGEN SATURATION: 98 % | RESPIRATION RATE: 22 BRPM | HEART RATE: 64 BPM

## 2021-11-24 DIAGNOSIS — M10.072 ACUTE IDIOPATHIC GOUT OF LEFT FOOT: Primary | ICD-10-CM

## 2021-11-24 PROCEDURE — 99213 OFFICE O/P EST LOW 20 MIN: CPT | Performed by: INTERNAL MEDICINE

## 2021-11-24 PROCEDURE — 3074F SYST BP LT 130 MM HG: CPT | Performed by: INTERNAL MEDICINE

## 2021-11-24 PROCEDURE — 3078F DIAST BP <80 MM HG: CPT | Performed by: INTERNAL MEDICINE

## 2021-11-24 PROCEDURE — 1160F RVW MEDS BY RX/DR IN RCRD: CPT | Performed by: INTERNAL MEDICINE

## 2021-11-24 PROCEDURE — 1036F TOBACCO NON-USER: CPT | Performed by: INTERNAL MEDICINE

## 2021-11-24 RX ORDER — PREDNISONE 20 MG/1
40 TABLET ORAL DAILY
Qty: 10 TABLET | Refills: 0 | Status: SHIPPED | OUTPATIENT
Start: 2021-11-24 | End: 2021-11-29

## 2021-12-14 ENCOUNTER — OFFICE VISIT (OUTPATIENT)
Dept: PULMONOLOGY | Facility: CLINIC | Age: 81
End: 2021-12-14
Payer: COMMERCIAL

## 2021-12-14 VITALS
HEIGHT: 66 IN | BODY MASS INDEX: 37.03 KG/M2 | OXYGEN SATURATION: 97 % | TEMPERATURE: 97.8 F | SYSTOLIC BLOOD PRESSURE: 120 MMHG | DIASTOLIC BLOOD PRESSURE: 80 MMHG | WEIGHT: 230.4 LBS | HEART RATE: 68 BPM

## 2021-12-14 DIAGNOSIS — J84.9 INTERSTITIAL LUNG DISEASE (HCC): Primary | ICD-10-CM

## 2021-12-14 DIAGNOSIS — E04.1 THYROID NODULE: ICD-10-CM

## 2021-12-14 PROBLEM — R06.09 DYSPNEA ON EXERTION: Status: RESOLVED | Noted: 2021-01-23 | Resolved: 2021-12-14

## 2021-12-14 PROBLEM — R06.00 DYSPNEA ON EXERTION: Status: RESOLVED | Noted: 2021-01-23 | Resolved: 2021-12-14

## 2021-12-14 PROCEDURE — 3079F DIAST BP 80-89 MM HG: CPT | Performed by: INTERNAL MEDICINE

## 2021-12-14 PROCEDURE — 99214 OFFICE O/P EST MOD 30 MIN: CPT | Performed by: INTERNAL MEDICINE

## 2021-12-14 PROCEDURE — 3074F SYST BP LT 130 MM HG: CPT | Performed by: INTERNAL MEDICINE

## 2021-12-14 PROCEDURE — 1160F RVW MEDS BY RX/DR IN RCRD: CPT | Performed by: INTERNAL MEDICINE

## 2021-12-14 PROCEDURE — 1036F TOBACCO NON-USER: CPT | Performed by: INTERNAL MEDICINE

## 2021-12-14 RX ORDER — LEVOCETIRIZINE DIHYDROCHLORIDE 5 MG/1
5 TABLET, FILM COATED ORAL EVERY EVENING
COMMUNITY
End: 2022-04-18 | Stop reason: SDUPTHER

## 2021-12-21 ENCOUNTER — TELEPHONE (OUTPATIENT)
Dept: PULMONOLOGY | Facility: CLINIC | Age: 81
End: 2021-12-21

## 2021-12-27 DIAGNOSIS — I10 BENIGN ESSENTIAL HYPERTENSION: Chronic | ICD-10-CM

## 2021-12-27 RX ORDER — DILTIAZEM HYDROCHLORIDE 240 MG/1
240 CAPSULE, COATED, EXTENDED RELEASE ORAL DAILY
Qty: 90 CAPSULE | Refills: 1 | Status: SHIPPED | OUTPATIENT
Start: 2021-12-27 | End: 2022-03-23 | Stop reason: SDUPTHER

## 2021-12-29 ENCOUNTER — VBI (OUTPATIENT)
Dept: ADMINISTRATIVE | Facility: OTHER | Age: 81
End: 2021-12-29

## 2022-01-11 NOTE — NURSING NOTE
Call placed to patient to discuss upcoming appointment at [place] [address] radiology department and complete consultation with patient, spoke with wife Elina Pinto due to patient is [facility] and can not hear instructions over the phone  Patient is having thyroid biopsy utilizing  US  guidance  Reviewed patient's allergies, current anticoagulant medication of ASA 81 mg  present per patient but not required to stop per periprocedural management of coagulation status and hemostasis risk in percutaneous image guided procedure guidelines, also discussed the pre and post procedure expectations  Reminded patient of location and time expected for procedure, Patient expressed understanding by verbalizing and repeating instructions

## 2022-01-17 ENCOUNTER — OFFICE VISIT (OUTPATIENT)
Dept: INTERNAL MEDICINE CLINIC | Facility: OTHER | Age: 82
End: 2022-01-17
Payer: COMMERCIAL

## 2022-01-17 VITALS
DIASTOLIC BLOOD PRESSURE: 86 MMHG | HEIGHT: 66 IN | TEMPERATURE: 98.3 F | HEART RATE: 72 BPM | OXYGEN SATURATION: 98 % | BODY MASS INDEX: 36.22 KG/M2 | SYSTOLIC BLOOD PRESSURE: 128 MMHG | WEIGHT: 225.4 LBS

## 2022-01-17 DIAGNOSIS — I10 BENIGN ESSENTIAL HYPERTENSION: Chronic | ICD-10-CM

## 2022-01-17 DIAGNOSIS — E04.1 THYROID NODULE: ICD-10-CM

## 2022-01-17 DIAGNOSIS — R39.16 BENIGN PROSTATIC HYPERPLASIA (BPH) WITH STRAINING ON URINATION: Chronic | ICD-10-CM

## 2022-01-17 DIAGNOSIS — N18.31 STAGE 3A CHRONIC KIDNEY DISEASE (HCC): ICD-10-CM

## 2022-01-17 DIAGNOSIS — M10.9 GOUT: ICD-10-CM

## 2022-01-17 DIAGNOSIS — G47.62 NOCTURNAL LEG CRAMPS: Primary | ICD-10-CM

## 2022-01-17 DIAGNOSIS — N40.1 BENIGN PROSTATIC HYPERPLASIA (BPH) WITH STRAINING ON URINATION: Chronic | ICD-10-CM

## 2022-01-17 PROCEDURE — 99214 OFFICE O/P EST MOD 30 MIN: CPT | Performed by: INTERNAL MEDICINE

## 2022-01-17 PROCEDURE — 1101F PT FALLS ASSESS-DOCD LE1/YR: CPT | Performed by: INTERNAL MEDICINE

## 2022-01-17 NOTE — PROGRESS NOTES
Clinic Visit Note  Jennifer Corona  [de-identified] y o  male   MRN: [id number]    Assessment and Plan      Diagnoses and all orders for this visit:    Nocturnal leg cramps  Trial tonic water at night, check electrolytes for next visit in February  -     Basic metabolic panel; Future  -     Magnesium; Future  -     Phosphorus; Future    Benign essential hypertension  Stable, continue aspirin and Cardizem    Stage 3a chronic kidney disease (HCC)  BMP with electrolytes pending    Gout  No acute flare, continue allopurinol    Benign prostatic hyperplasia (BPH) with straining on urination  Symptoms stable, continue Flomax    Thyroid nodule  Following with specialist, CT chest and ultrasound-guided thyroid biopsy pending    My impressions and treatment recommendations were discussed in detail with the patient who verbalized understanding and had no further questions  Discharge instructions were provided  Subjective     Chief Complaint:  Nocturnal leg cramps    History of Present Illness:    Patient is a very pleasant 52-year-old male presenting secondary to nocturnal leg cramps that of a going on and off for the last month  Patient notes that he only gets these really at night, they do awaken him and he has to start moving around to relieve the pain  He does have notable history for varicose veins, no swelling, does have a history of gout with recent cortical steroid injection in ankle  We discussed that we should check electrolytes to make sure there is no abnormalities and that he can take tonic water at night to see if this helps  The following portions of the patient's history were reviewed and updated as appropriate: allergies, current medications, past family history, past medical history, past social history, past surgical history and problem list     REVIEW OF SYSTEMS:  A complete 12-point review of systems is negative other than that noted in the HPI      Review of Systems   Constitutional: Negative for chills and fatigue  Respiratory: Negative for cough, shortness of breath and wheezing  Cardiovascular: Negative for chest pain, palpitations and leg swelling  Genitourinary: Negative for difficulty urinating and frequency  Musculoskeletal: Negative for back pain and gait problem  Leg cramps bilaterally   Neurological: Negative for dizziness, seizures and headaches  Psychiatric/Behavioral: Negative for agitation, behavioral problems and confusion  All other systems reviewed and are negative  Current Outpatient Medications:     allopurinol (ZYLOPRIM) 100 mg tablet, Take 1 tablet (100 mg total) by mouth 2 (two) times a day, Disp: 180 tablet, Rfl: 1    aspirin (ECOTRIN LOW STRENGTH) 81 mg EC tablet, Take 81 mg by mouth daily, Disp: , Rfl:     Cholecalciferol (VITAMIN D3 PO), Take by mouth 2000 IU, Disp: , Rfl:     levocetirizine (XYZAL) 5 MG tablet, Take 5 mg by mouth every evening, Disp: , Rfl:     tamsulosin (FLOMAX) 0 4 mg, Take 1 capsule (0 4 mg total) by mouth daily with dinner, Disp: 90 capsule, Rfl: 1    traMADol-acetaminophen (ULTRACET) 37 5-325 mg per tablet, Take 2 tablets by mouth every 6 (six) hours as needed for moderate pain, Disp: 60 tablet, Rfl: 0    diltiazem (CARDIZEM CD) 240 mg 24 hr capsule, Take 1 capsule (240 mg total) by mouth daily, Disp: 90 capsule, Rfl: 1  Past Medical History:   Diagnosis Date    Abnormal electrocardiogram     Last assessed: Oct 11, 2013    Allergic rhinitis     last assessed: Oct 11, 2013    Allergic rhinitis due to pollen     last assessed: Oct 10, 2013    Allergic sinusitis     Last assessed: May 11, 2015    Allergy     resolved: July 22, 2015    Benign essential hypertension     Last assessed/resolved: May 31, 2017    BPH (benign prostatic hyperplasia)     Cancer [facility])     prostate    Colitis     Last assessed: May 24, 2016    Cough with hemoptysis 11/17/2020    Generalized osteoarthritis     Last assessed:  Oct 11, 2013    GERD without esophagitis     Last assessed/resolved: May 31, 2017    Hearing loss     Hiatal hernia     resolved: July 22, 2015    History of gastroesophageal reflux (GERD)     History of stomach ulcers     last assessed: May 11, 2015    Hypertension     Incomplete bladder emptying     Kidney stone     Nodular prostate with lower urinary tract symptoms     Nontoxic single thyroid nodule     last assessed: April 16, 2014    Orchalgia     Overweight     last assessed: Oct 31, 2013    Poor urinary stream     Pulmonary embolism [facility])     Salivary gland disorder     last assessed: April 16, 2014    Seasonal allergies     last assessed: May 15, 2015    Spermatocele     Straining to void     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:  May 7, 2014     Past Surgical History:   Procedure Laterality Date    BLADDER SURGERY      CHOLECYSTECTOMY  2000    laparoscopic     FLAP LOCAL TRUNK Left 10/28/2021    Procedure: EXCISION OF FLANK MASS;  Surgeon: Franky Espinosa DO;  Location: [address] [place] [address] MAIN OR;  Service: Plastics    HEMORRHOID SURGERY      pilionidal cyst removal     HERNIA REPAIR Left 01/17/2008    inguinal     KNEE ARTHROSCOPY Left 1974    KNEE CARTILAGE SURGERY      NASAL SEPTUM SURGERY      Deviation repair     ID EXC PAROTD,LAT LOBE,DISSECT 5TH NERV Right 6/9/2021    Procedure: SUPERFICIAL PAROTIDECTOMY WITH FACIAL NERVE MONITORING;  Surgeon: Tray Rodriges MD;  [facility];  Service: ENT    PROSTATE BIOPSY  2017    STOMACH SURGERY      TONSILLECTOMY AND ADENOIDECTOMY      at age 37   [address] - right 01/04 0 left 01/95    VASECTOMY       Social History     Socioeconomic History    Marital status: /Civil Union     Spouse name: [address] [place] [address] Number of children: 1    Years of education: Not on file    Highest education level: Not on file   Occupational History    Occupation: Retired     Occupation: securtity      Comment: [address] [facility] Tobacco Use    Smoking status: Former Smoker     Packs/day: 1 00     Years: 30 00     Pack years: 30 00     Types: Cigarettes     Start date: 5     Quit date:      Years since quittin 0    Smokeless tobacco: Never Used   Vaping Use    Vaping Use: Never used   Substance and Sexual Activity    Alcohol use: Yes     Comment: rare    Drug use: No    Sexual activity: Yes     Partners: Female   Other Topics Concern    Not on file   Social History Narrative    Who lives in your home: wife     What type of home do you live in: Single house    Age of your home: Built [birth year]     How long have you been living there:     Type of heat: Baseboard    Type of fuel: Electric    What type of elaine is in your bedroom: Carpet     Do you have the following in or near your home:    Air products: Central air    Pests: None    Pets: None    Are pets allowed in bedroom: N/A    Open fields, wooded areas nearby: Open fields and Wooded areas    Basement: Finished    Exposure to second hand smoke: No        Habits:    Caffeine: Coffee 1 cup of coffee -peach snapple few a week     Chocolate: rare     Other:     Social Determinants of Health     Financial Resource Strain: Not on file   Food Insecurity: Not on file   Transportation Needs: Not on file   Physical Activity: Not on file   Stress: Not on file   Social Connections: Not on file   Intimate Partner Violence: Not on file   Housing Stability: Not on file     Family History   Problem Relation Age of Onset    Hypertension Mother     Stroke Mother     Stomach cancer Father     Hypertension Father     Hypertension Family     Stroke Family         syndrome     Heart attack Son      Allergies   Allergen Reactions    Ace Inhibitors Hyperactivity       Objective     Vitals:    22 1009   BP: 128/86   BP Location: Left arm   Patient Position: Sitting   Cuff Size: Standard   Pulse: 72   Temp: 98 3 °F (36 8 °C)   TempSrc: Temporal   SpO2: 98%   Weight: 102 kg (225 lb 6 4 oz)   Height: 5' 6" (1 676 m)       Physical exam:     GENERAL: NAD, pleasant   HEENT:  NC/AT, PERRL, EOMI, no scleral icterus  CARDIAC:  RRR, +S1/S2, no S3/S4 heard, no m/g/r  PULMONARY:  CTA B/L, no wheezing/rales/rhonci, non-labored breathing  ABDOMEN:  Soft, NT/ND, no rebound/guarding/rigidity  Extremities:  No edema, cyanosis, or clubbing, varicose veins appreciated bilaterally lower extremities  NEUROLOGIC: Grossly intact, No focal deficits  SKIN:  No rashes or erythema noted on exposed skin  Psych: Normal affect    ==  PLEASE NOTE:  This encounter was completed utilizing the M- Modal/Fluency Direct Speech Voice Recognition Software  Grammatical errors, random word insertions, pronoun errors and incomplete sentences are occasional consequences of the system due to software limitations, ambient noise and hardware issues  These may be missed by proof reading prior to affixing electronic signature  Any questions or concerns about the content, text or information contained within the body of this dictation should be directly addressed to the physician for clarification  Please do not hesitate to call me directly if you have any any questions or concerns      Gaviota Pichardo, DO [facility] Internal Medicine PGY-3

## 2022-01-19 ENCOUNTER — HOSPITAL ENCOUNTER (OUTPATIENT)
Dept: RADIOLOGY | Facility: HOSPITAL | Age: 82
Discharge: HOME | End: 2022-01-19
Attending: INTERNAL MEDICINE | Admitting: RADIOLOGY
Payer: COMMERCIAL

## 2022-01-19 DIAGNOSIS — E04.1 THYROID NODULE: ICD-10-CM

## 2022-01-19 PROCEDURE — 88172 CYTP DX EVAL FNA 1ST EA SITE: CPT | Performed by: PATHOLOGY

## 2022-01-19 PROCEDURE — 88173 CYTOPATH EVAL FNA REPORT: CPT | Performed by: PATHOLOGY

## 2022-01-19 PROCEDURE — 10005 FNA BX W/US GDN 1ST LES: CPT

## 2022-01-19 RX ORDER — LIDOCAINE HYDROCHLORIDE 10 MG/ML
2 INJECTION, SOLUTION EPIDURAL; INFILTRATION; INTRACAUDAL; PERINEURAL ONCE
Status: COMPLETED | OUTPATIENT
Start: 2022-01-19 | End: 2022-01-19

## 2022-01-19 RX ADMIN — LIDOCAINE HYDROCHLORIDE 2 ML: 10 INJECTION, SOLUTION EPIDURAL; INFILTRATION; INTRACAUDAL; PERINEURAL at 09:24

## 2022-01-20 ENCOUNTER — APPOINTMENT (OUTPATIENT)
Dept: LAB | Facility: IMAGING CENTER | Age: 82
End: 2022-01-20
Payer: COMMERCIAL

## 2022-01-20 DIAGNOSIS — G47.62 NOCTURNAL LEG CRAMPS: ICD-10-CM

## 2022-01-20 LAB
ANION GAP SERPL CALCULATED.3IONS-SCNC: 2 MMOL/L (ref 4–13)
BUN SERPL-MCNC: 24 MG/DL (ref 5–25)
CALCIUM SERPL-MCNC: 9.1 MG/DL (ref 8.3–10.1)
CHLORIDE SERPL-SCNC: 108 MMOL/L (ref 100–108)
CO2 SERPL-SCNC: 31 MMOL/L (ref 21–32)
CREAT SERPL-MCNC: 1.11 MG/DL (ref 0.6–1.3)
GFR SERPL CREATININE-BSD FRML MDRD: 62 ML/MIN/1.73SQ M
GLUCOSE P FAST SERPL-MCNC: 92 MG/DL (ref 65–99)
MAGNESIUM SERPL-MCNC: 2.2 MG/DL (ref 1.6–2.6)
PHOSPHATE SERPL-MCNC: 3.7 MG/DL (ref 2.3–4.1)
POTASSIUM SERPL-SCNC: 4.3 MMOL/L (ref 3.5–5.3)
SODIUM SERPL-SCNC: 141 MMOL/L (ref 136–145)

## 2022-01-20 PROCEDURE — 83735 ASSAY OF MAGNESIUM: CPT

## 2022-01-20 PROCEDURE — 36415 COLL VENOUS BLD VENIPUNCTURE: CPT

## 2022-01-20 PROCEDURE — 84100 ASSAY OF PHOSPHORUS: CPT

## 2022-01-20 PROCEDURE — 80048 BASIC METABOLIC PNL TOTAL CA: CPT

## 2022-01-21 NOTE — RESULT ENCOUNTER NOTE
I reviewed the result with the patient's wife by phone   Result forwarded to Dr Contreras Harley for continued surveillance vs consultation referral

## 2022-02-09 ENCOUNTER — OFFICE VISIT (OUTPATIENT)
Dept: INTERNAL MEDICINE CLINIC | Age: 82
End: 2022-02-09
Payer: COMMERCIAL

## 2022-02-09 VITALS
BODY MASS INDEX: 35.79 KG/M2 | HEIGHT: 66 IN | WEIGHT: 222.7 LBS | TEMPERATURE: 98.5 F | DIASTOLIC BLOOD PRESSURE: 86 MMHG | SYSTOLIC BLOOD PRESSURE: 142 MMHG | HEART RATE: 60 BPM | OXYGEN SATURATION: 98 %

## 2022-02-09 DIAGNOSIS — E04.1 THYROID NODULE: ICD-10-CM

## 2022-02-09 DIAGNOSIS — F11.20 CONTINUOUS OPIOID DEPENDENCE (HCC): ICD-10-CM

## 2022-02-09 DIAGNOSIS — C61 PROSTATE CANCER (HCC): ICD-10-CM

## 2022-02-09 DIAGNOSIS — J84.9 INTERSTITIAL LUNG DISEASE (HCC): ICD-10-CM

## 2022-02-09 DIAGNOSIS — E66.01 OBESITY, MORBID (HCC): ICD-10-CM

## 2022-02-09 DIAGNOSIS — R25.2 LEG CRAMPS: Primary | ICD-10-CM

## 2022-02-09 DIAGNOSIS — E27.9 DISORDER OF ADRENAL GLAND, UNSPECIFIED (HCC): ICD-10-CM

## 2022-02-09 DIAGNOSIS — Z00.00 MEDICARE ANNUAL WELLNESS VISIT, SUBSEQUENT: ICD-10-CM

## 2022-02-09 PROCEDURE — 1036F TOBACCO NON-USER: CPT | Performed by: INTERNAL MEDICINE

## 2022-02-09 PROCEDURE — 1160F RVW MEDS BY RX/DR IN RCRD: CPT | Performed by: INTERNAL MEDICINE

## 2022-02-09 PROCEDURE — G0439 PPPS, SUBSEQ VISIT: HCPCS | Performed by: INTERNAL MEDICINE

## 2022-02-09 PROCEDURE — 1003F LEVEL OF ACTIVITY ASSESS: CPT | Performed by: INTERNAL MEDICINE

## 2022-02-09 PROCEDURE — 3079F DIAST BP 80-89 MM HG: CPT | Performed by: INTERNAL MEDICINE

## 2022-02-09 PROCEDURE — 99214 OFFICE O/P EST MOD 30 MIN: CPT | Performed by: INTERNAL MEDICINE

## 2022-02-09 PROCEDURE — 1170F FXNL STATUS ASSESSED: CPT | Performed by: INTERNAL MEDICINE

## 2022-02-09 PROCEDURE — 3077F SYST BP >= 140 MM HG: CPT | Performed by: INTERNAL MEDICINE

## 2022-02-09 PROCEDURE — 1125F AMNT PAIN NOTED PAIN PRSNT: CPT | Performed by: INTERNAL MEDICINE

## 2022-02-09 PROCEDURE — 3725F SCREEN DEPRESSION PERFORMED: CPT | Performed by: INTERNAL MEDICINE

## 2022-02-09 PROCEDURE — 3288F FALL RISK ASSESSMENT DOCD: CPT | Performed by: INTERNAL MEDICINE

## 2022-02-09 RX ORDER — GABAPENTIN 100 MG/1
100 CAPSULE ORAL
COMMUNITY
Start: 2022-01-21 | End: 2022-02-09

## 2022-02-09 RX ORDER — QUININE SULFATE 324 MG/1
324 CAPSULE ORAL 2 TIMES DAILY
Qty: 90 CAPSULE | Refills: 1 | Status: SHIPPED | OUTPATIENT
Start: 2022-02-09 | End: 2022-02-17

## 2022-02-09 NOTE — PROGRESS NOTES
Assessment/Plan:     Diagnoses and all orders for this visit:    Leg cramps  -     quiNINE (QUALAQUIN) 324 mg capsule; Take 1 capsule (324 mg total) by mouth 2 (two) times a day    Continuous opioid dependence (HCC)    Obesity, morbid ([facility] )    Interstitial lung disease ([facility] )    Disorder of adrenal gland, unspecified ([facility] )    Prostate cancer ([facility] )    Thyroid nodule  -     Ambulatory Referral to Surgical Oncology; Future    Other orders  -     Discontinue: gabapentin (NEURONTIN) 100 mg capsule; Take 100 mg by mouth daily at bedtime             M*Modal software was used to dictate this note  It may contain errors with dictating incorrect words or incorrect spelling  Please contact the provider directly with any questions  Subjective:   Chief Complaint   Patient presents with    Follow-up     HTN   Medicare Wellness Visit     SUB AWV        Patient ID: Staci Lynn  is a [de-identified] y o  male  HPI  This is a very pleasant [de-identified] years young gentleman who was recently seen by the Rheumatology because of the swelling of his ankle  Arthrocentesis was done there was no crystals but the injection of cortisone was given  Ankle swelling and pain is much better  He has a long history of gout specially him his the wrist he is on allopurinol plan is to continue with the present medication and no changes and follow-up with the Rheumatology if needed  Also he had a thyroid nodule biopsy which shows abnormal follicular cells and the I will send him to the oncology surgeon for further evaluation and treatment  Significant problem with the hearing  Hypertension is controlled  Long-time problem with the leg cramps tried multiple medication as given above did not help I will start the quinine and see how he does if he is not get better with that then most likely will give him something else but we will try with this 1 1st as this is not very expensive treatment    His magnesium phosphorous renal function and electrolytes all within normal range which was done for evaluation of the leg cramps  The following portions of the patient's history were reviewed and updated as appropriate: allergies, current medications, past family history, past medical history, past social history, past surgical history and problem list     Review of Systems   Constitutional: Positive for fatigue  Negative for appetite change and fever  HENT: Negative for congestion, ear pain, hearing loss (Significant hearing impaired), nosebleeds, sneezing, tinnitus and voice change  Eyes: Negative for pain, discharge and redness  Respiratory: Negative for cough, chest tightness and wheezing  Cardiovascular: Negative for chest pain, palpitations and leg swelling  Gastrointestinal: Negative for abdominal pain, blood in stool, constipation, diarrhea, nausea and vomiting  Genitourinary: Negative for difficulty urinating, dysuria, hematuria and urgency  Musculoskeletal: Negative for arthralgias, back pain, gait problem and joint swelling  Ankle pain and swelling  Leg cramps patient was given gabapentin which did not help  He was taking tonic water which did not help   Skin: Negative for rash and wound  Allergic/Immunologic: Negative for environmental allergies  Neurological: Negative for dizziness, tremors, seizures, weakness, light-headedness and numbness  Hematological: Negative for adenopathy  Does not bruise/bleed easily  Psychiatric/Behavioral: Negative for behavioral problems and confusion  The patient is not nervous/anxious  Past Medical History:   Diagnosis Date    Abnormal electrocardiogram     Last assessed: Oct 11, 2013    Allergic rhinitis     last assessed: Oct 11, 2013    Allergic rhinitis due to pollen     last assessed: Oct 10, 2013    Allergic sinusitis     Last assessed: May 11, 2015    Allergy     resolved: July 22, 2015    Benign essential hypertension     Last assessed/resolved:  May 31, 2017    BPH (benign prostatic hyperplasia)     Cancer [facility])     prostate    Colitis     Last assessed: May 24, 2016    Cough with hemoptysis 11/17/2020    Generalized osteoarthritis     Last assessed: Oct 11, 2013    GERD without esophagitis     Last assessed/resolved: May 31, 2017    Hearing loss     Hiatal hernia     resolved: July 22, 2015    History of gastroesophageal reflux (GERD)     History of stomach ulcers     last assessed: May 11, 2015    Hypertension     Incomplete bladder emptying     Kidney stone     Nodular prostate with lower urinary tract symptoms     Nontoxic single thyroid nodule     last assessed: April 16, 2014    Orchalgia     Overweight     last assessed: Oct 31, 2013    Poor urinary stream     Pulmonary embolism [facility])     Salivary gland disorder     last assessed: April 16, 2014    Seasonal allergies     last assessed: May 15, 2015    Spermatocele     Straining to void     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:  May 7, 2014         Current Outpatient Medications:     allopurinol (ZYLOPRIM) 100 mg tablet, Take 1 tablet (100 mg total) by mouth 2 (two) times a day, Disp: 180 tablet, Rfl: 1    aspirin (ECOTRIN LOW STRENGTH) 81 mg EC tablet, Take 81 mg by mouth daily, Disp: , Rfl:     Cholecalciferol (VITAMIN D3 PO), Take by mouth 2000 IU, Disp: , Rfl:     diltiazem (CARDIZEM CD) 240 mg 24 hr capsule, Take 1 capsule (240 mg total) by mouth daily, Disp: 90 capsule, Rfl: 1    levocetirizine (XYZAL) 5 MG tablet, Take 5 mg by mouth every evening, Disp: , Rfl:     tamsulosin (FLOMAX) 0 4 mg, Take 1 capsule (0 4 mg total) by mouth daily with dinner, Disp: 90 capsule, Rfl: 1    traMADol-acetaminophen (ULTRACET) 37 5-325 mg per tablet, Take 2 tablets by mouth every 6 (six) hours as needed for moderate pain, Disp: 60 tablet, Rfl: 0    gabapentin (NEURONTIN) 100 mg capsule, Take 100 mg by mouth daily at bedtime, Disp: , Rfl:     Allergies   Allergen Reactions    Ace Inhibitors Hyperactivity       Social History Past Surgical History:   Procedure Laterality Date    BLADDER SURGERY      CHOLECYSTECTOMY  2000    laparoscopic     FLAP LOCAL TRUNK Left 10/28/2021    Procedure: EXCISION OF FLANK MASS;  Surgeon: Brodie Schreiber DO;  Location: [address] [place] [address] MAIN OR;  Service: Plastics    HEMORRHOID SURGERY      pilionidal cyst removal     HERNIA REPAIR Left 01/17/2008    inguinal     KNEE ARTHROSCOPY Left 1974    KNEE CARTILAGE SURGERY      NASAL SEPTUM SURGERY      Deviation repair     [place] EXC PAROTD,LAT LOBE,DISSECT 5TH NERV Right 6/9/2021    Procedure: SUPERFICIAL PAROTIDECTOMY WITH FACIAL NERVE MONITORING;  Surgeon: Irlanda Buckner MD;  [facility];  Service: ENT    PROSTATE BIOPSY  2017    STOMACH SURGERY      TONSILLECTOMY AND ADENOIDECTOMY      at age 37   [place] [address] - right 01/04 0 left 01/95    US GUIDED THYROID BIOPSY  1/19/2022    VASECTOMY       Family History   Problem Relation Age of Onset    Hypertension Mother     Stroke Mother     Stomach cancer Father     Hypertension Father     Hypertension Family     Stroke Family         syndrome     Heart attack Son        Objective:  /86 (BP Location: Left arm, Patient Position: Sitting, Cuff Size: Standard)   Pulse 60   Temp 98 5 °F (36 9 °C) (Temporal)   Ht 5' 6" (1 676 m)   Wt 101 kg (222 lb 11 2 oz)   SpO2 98%   BMI 35 94 kg/m²        Physical Exam  Constitutional:       Appearance: He is well-developed  HENT:      Right Ear: External ear normal    Eyes:      Conjunctiva/sclera: Conjunctivae normal       Pupils: Pupils are equal, round, and reactive to light  Neck:      Thyroid: No thyromegaly  Vascular: No JVD  Comments: Thyroid nodule status post biopsy which shows the follicular abnormal cells  Cardiovascular:      Rate and Rhythm: Normal rate and regular rhythm  Heart sounds: Normal heart sounds  Pulmonary:      Breath sounds: Normal breath sounds     Abdominal: General: Bowel sounds are normal       Palpations: Abdomen is soft  Musculoskeletal:         General: Normal range of motion  Cervical back: Normal range of motion  Comments: Bilateral ankle swelling more on the right than on the left   Lymphadenopathy:      Cervical: No cervical adenopathy  Skin:     General: Skin is dry  Neurological:      Mental Status: He is alert and oriented to person, place, and time  Deep Tendon Reflexes: Reflexes are normal and symmetric     Psychiatric:         Behavior: Behavior normal

## 2022-02-09 NOTE — PATIENT INSTRUCTIONS
Medicare Preventive Visit Patient Instructions  Thank you for completing your Welcome to Medicare Visit or Medicare Annual Wellness Visit today  Your next wellness visit will be due in one year (2/10/2023)  The screening/preventive services that you may require over the next 5-10 years are detailed below  Some tests may not apply to you based off risk factors and/or age  Screening tests ordered at today's visit but not completed yet may show as past due  Also, please note that scanned in results may not display below  Preventive Screenings:  Service Recommendations Previous Testing/Comments   Colorectal Cancer Screening  · Colonoscopy    · Fecal Occult Blood Test (FOBT)/Fecal Immunochemical Test (FIT)  · Fecal DNA/Cologuard Test  · Flexible Sigmoidoscopy Age: 54-65 years old   Colonoscopy: every 10 years (May be performed more frequently if at higher risk)  OR  FOBT/FIT: every 1 year  OR  Cologuard: every 3 years  OR  Sigmoidoscopy: every 5 years  Screening may be recommended earlier than age 48 if at higher risk for colorectal cancer  Also, an individualized decision between you and your healthcare provider will decide whether screening between the ages of 74-80 would be appropriate   Colonoscopy: 07/14/2015  FOBT/FIT: Not on file  Cologuard: Not on file  Sigmoidoscopy: Not on file          Prostate Cancer Screening Individualized decision between patient and health care provider in men between ages of 53-78   Medicare will cover every 12 months beginning on the day after your 50th birthday PSA: 0 3 ng/mL     History Prostate Cancer  Screening Not Indicated     Hepatitis C Screening Once for adults born between 1945 and 1965  More frequently in patients at high risk for Hepatitis C Hep C Antibody: Not on file        Diabetes Screening 1-2 times per year if you're at risk for diabetes or have pre-diabetes Fasting glucose: 92 mg/dL   A1C: 5 3 %    Screening Current   Cholesterol Screening Once every 5 years if you don't have a lipid disorder  May order more often based on risk factors  Lipid panel: 03/09/2020    Screening Current      Other Preventive Screenings Covered by Medicare:  1  Abdominal Aortic Aneurysm (AAA) Screening: covered once if your at risk  You're considered to be at risk if you have a family history of AAA or a male between the age of 73-68 who smoking at least 100 cigarettes in your lifetime  2  Lung Cancer Screening: covers low dose CT scan once per year if you meet all of the following conditions: (1) Age 50-69; (2) No signs or symptoms of lung cancer; (3) Current smoker or have quit smoking within the last 15 years; (4) You have a tobacco smoking history of at least 30 pack years (packs per day x number of years you smoked); (5) You get a written order from a healthcare provider  3  Glaucoma Screening: covered annually if you're considered high risk: (1) You have diabetes OR (2) Family history of glaucoma OR (3)  aged 48 and older OR (3)  American aged 72 and older  3  Osteoporosis Screening: covered every 2 years if you meet one of the following conditions: (1) Have a vertebral abnormality; (2) On glucocorticoid therapy for more than 3 months; (3) Have primary hyperparathyroidism; (4) On osteoporosis medications and need to assess response to drug therapy  5  HIV Screening: covered annually if you're between the age of 12-76  Also covered annually if you are younger than 13 and older than 72 with risk factors for HIV infection  For pregnant patients, it is covered up to 3 times per pregnancy      Immunizations:  Immunization Recommendations   Influenza Vaccine Annual influenza vaccination during flu season is recommended for all persons aged >= 6 months who do not have contraindications   Pneumococcal Vaccine (Prevnar and Pneumovax)  * Prevnar = PCV13  * Pneumovax = PPSV23 Adults 25-60 years old: 1-3 doses may be recommended based on certain risk factors  Adults 72 years old: Prevnar (PCV13) vaccine recommended followed by Pneumovax (PPSV23) vaccine  If already received PPSV23 since turning 65, then PCV13 recommended at least one year after PPSV23 dose  Hepatitis B Vaccine 3 dose series if at intermediate or high risk (ex: diabetes, end stage renal disease, liver disease)   Tetanus (Td) Vaccine - COST NOT COVERED BY MEDICARE PART B Following completion of primary series, a booster dose should be given every 10 years to maintain immunity against tetanus  Td may also be given as tetanus wound prophylaxis  Tdap Vaccine - COST NOT COVERED BY MEDICARE PART B Recommended at least once for all adults  For pregnant patients, recommended with each pregnancy  Shingles Vaccine (Shingrix) - COST NOT COVERED BY MEDICARE PART B  2 shot series recommended in those aged 48 and above     Health Maintenance Due:      Topic Date Due    Colorectal Cancer Screening  07/14/2020     Immunizations Due:      Topic Date Due    DTaP,Tdap,and Td Vaccines (1 - Tdap) Never done     Advance Directives   What are advance directives? Advance directives are legal documents that state your wishes and plans for medical care  These plans are made ahead of time in case you lose your ability to make decisions for yourself  Advance directives can apply to any medical decision, such as the treatments you want, and if you want to donate organs  What are the types of advance directives? There are many types of advance directives, and each state has rules about how to use them  You may choose a combination of any of the following:  · Living will: This is a written record of the treatment you want  You can also choose which treatments you do not want, which to limit, and which to stop at a certain time  This includes surgery, medicine, IV fluid, and tube feedings  · Durable power of  for healthcare [facility]):   This is a written record that states who you want to make healthcare choices for you when you are unable to make them for yourself  This person, called a proxy, is usually a family member or a friend  You may choose more than 1 proxy  · Do not resuscitate (DNR) order:  A DNR order is used in case your heart stops beating or you stop breathing  It is a request not to have certain forms of treatment, such as CPR  A DNR order may be included in other types of advance directives  · Medical directive: This covers the care that you want if you are in a coma, near death, or unable to make decisions for yourself  You can list the treatments you want for each condition  Treatment may include pain medicine, surgery, blood transfusions, dialysis, IV or tube feedings, and a ventilator (breathing machine)  · Values history: This document has questions about your views, beliefs, and how you feel and think about life  This information can help others choose the care that you would choose  Why are advance directives important? An advance directive helps you control your care  Although spoken wishes may be used, it is better to have your wishes written down  Spoken wishes can be misunderstood, or not followed  Treatments may be given even if you do not want them  An advance directive may make it easier for your family to make difficult choices about your care  Weight Management   Why it is important to manage your weight:  Being overweight increases your risk of health conditions such as heart disease, high blood pressure, type 2 diabetes, and certain types of cancer  It can also increase your risk for osteoarthritis, sleep apnea, and other respiratory problems  Aim for a slow, steady weight loss  Even a small amount of weight loss can lower your risk of health problems  How to lose weight safely:  A safe and healthy way to lose weight is to eat fewer calories and get regular exercise  You can lose up about 1 pound a week by decreasing the number of calories you eat by 500 calories each day     Healthy meal plan for weight management:  A healthy meal plan includes a variety of foods, contains fewer calories, and helps you stay healthy  A healthy meal plan includes the following:  · Eat whole-grain foods more often  A healthy meal plan should contain fiber  Fiber is the part of grains, fruits, and vegetables that is not broken down by your body  Whole-grain foods are healthy and provide extra fiber in your diet  Some examples of whole-grain foods are whole-wheat breads and pastas, oatmeal, brown rice, and bulgur  · Eat a variety of vegetables every day  Include dark, leafy greens such as spinach, kale, crispin greens, and mustard greens  Eat yellow and orange vegetables such as carrots, sweet potatoes, and winter squash  · Eat a variety of fruits every day  Choose fresh or canned fruit (canned in its own juice or light syrup) instead of juice  Fruit juice has very little or no fiber  · Eat low-fat dairy foods  Drink fat-free (skim) milk or 1% milk  Eat fat-free yogurt and low-fat cottage cheese  Try low-fat cheeses such as mozzarella and other reduced-fat cheeses  · Choose meat and other protein foods that are low in fat  Choose beans or other legumes such as split peas or lentils  Choose fish, skinless poultry (chicken or turkey), or lean cuts of red meat (beef or pork)  Before you cook meat or poultry, cut off any visible fat  · Use less fat and oil  Try baking foods instead of frying them  Add less fat, such as margarine, sour cream, regular salad dressing and mayonnaise to foods  Eat fewer high-fat foods  Some examples of high-fat foods include french fries, doughnuts, ice cream, and cakes  · Eat fewer sweets  Limit foods and drinks that are high in sugar  This includes candy, cookies, regular soda, and sweetened drinks  Exercise:  Exercise at least 30 minutes per day on most days of the week  Some examples of exercise include walking, biking, dancing, and swimming   You can also fit in more physical activity by taking the stairs instead of the elevator or parking farther away from stores  Ask your healthcare provider about the best exercise plan for you  Narcotic (Opioid) Safety    Use narcotics safely:  · Take prescribed narcotics exactly as directed  · Do not give narcotics to others or take narcotics that belong to someone else  · Do not mix narcotics without medicines or alcohol  · Do not drive or operate heavy machinery after you take the narcotic  · Monitor for side effects and notify your healthcare provider if you experienced side effects such as nausea, sleepiness, itching, or trouble thinking clearly  Manage constipation:    Constipation is the most common side effect of narcotic medicine  Constipation is when you have hard, dry bowel movements, or you go longer than usual between bowel movements  Tell your healthcare provider about all changes in your bowel movements while you are taking narcotics  He or she may recommend laxative medicine to help you have a bowel movement  He or she may also change the kind of narcotic you are taking, or change when you take it  The following are more ways you can prevent or relieve constipation:    · Drink liquids as directed  You may need to drink extra liquids to help soften and move your bowels  Ask how much liquid to drink each day and which liquids are best for you  · Eat high-fiber foods  This may help decrease constipation by adding bulk to your bowel movements  High-fiber foods include fruits, vegetables, whole-grain breads and cereals, and beans  Your healthcare provider or dietitian can help you create a high-fiber meal plan  Your provider may also recommend a fiber supplement if you cannot get enough fiber from food  · Exercise regularly  Regular physical activity can help stimulate your intestines  Walking is a good exercise to prevent or relieve constipation  Ask which exercises are best for you  · Schedule a time each day to have a bowel movement  This may help train your body to have regular bowel movements  Bend forward while you are on the toilet to help move the bowel movement out  Sit on the toilet for at least 10 minutes, even if you do not have a bowel movement  Store narcotics safely:   · Store narcotics where others cannot easily get them  Keep them in a locked cabinet or secure area  Do not  keep them in a purse or other bag you carry with you  A person may be looking for something else and find the narcotics  · Make sure narcotics are stored out of the reach of children  A child can easily overdose on narcotics  Narcotics may look like candy to a small child  The best way to dispose of narcotics: The laws vary by country and area  In the [place], the best way is to return the narcotics through a take-back program  This program is offered by the [employer])  The following are options for using the program:  · Take the narcotics to a ALE[employer] collection site  The site is often a law enforcement center  Call your local law enforcement center for scheduled take-back days in your area  You will be given information on where to go if the collection site is in a different location  · Take the narcotics to an approved pharmacy or hospital   A pharmacy or hospital may be set up as a collection site  You will need to ask if it is a A[employer]A collection site if you were not directed there  A pharmacy or doctor's office may not be able to take back narcotics unless it is a ALEJAN[employer]A site  · Use a mail-back system  This means you are given containers to put the narcotics into  You will then mail them in the containers  · Use a take-back drop box  This is a place to leave the narcotics at any time  People and animals will not be able to get into the box  Your local law enforcement agency can tell you where to find a drop box in your area      Other ways to manage pain:   · Ask your healthcare provider about non-narcotic medicines to control pain  Nonprescription medicines include NSAIDs (such as ibuprofen) and acetaminophen  Prescription medicines include muscle relaxers, antidepressants, and steroids  · Pain may be managed without any medicines  Some ways to relieve pain include massage, aromatherapy, or meditation  Physical or occupational therapy may also help  For more information:   · Drug Enforcement Administration  [facility] [address]  Phone: 1- 978 - 589-7128  Web Address: [facility]/drug_disposal/    · [address]  and Drug Administration  [place]  [place] [address] [facility]  Phone: 2- 545 - 191-2680  Web Address: http://[employer]/     © Copyright [employer] 2018 Information is for End User's use only and may not be sold, redistributed or otherwise used for commercial purposes   All illustrations and images included in CareNotes® are the copyrighted property of A D A M , Inc  or [address] [place] [address]

## 2022-02-09 NOTE — PROGRESS NOTES
Assessment and Plan:     Problem List Items Addressed This Visit     None        BMI Counseling: Body mass index is 35 94 kg/m²  The BMI is above normal  Nutrition recommendations include decreasing portion sizes, encouraging healthy choices of fruits and vegetables and moderation in carbohydrate intake  Exercise recommendations include moderate physical activity 150 minutes/week  Rationale for BMI follow-up plan is due to patient being overweight or obese  Depression Screening and Follow-up Plan: Patient was screened for depression during today's encounter  They screened negative with a PHQ-2 score of 0  Preventive health issues were discussed with patient, and age appropriate screening tests were ordered as noted in patient's After Visit Summary  Personalized health advice and appropriate referrals for health education or preventive services given if needed, as noted in patient's After Visit Summary       History of Present Illness:     Patient presents for Medicare Annual Wellness visit    Patient Care Team:  Ankush Garcia MD as PCP - MD Salo Wills MD as Consulting Physician (Allergy)  Iram Eagle MD as Consulting Physician (Pulmonary Disease)     Problem List:     Patient Active Problem List   Diagnosis    Benign essential hypertension    Benign prostatic hyperplasia (BPH) with straining on urination    Gastroesophageal reflux disease without esophagitis    Prostate cancer ([address] )    Seasonal allergic rhinitis due to pollen    Truncal obesity    Luetscher's syndrome    Benign 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    Obesity, morbid ([address] )    Need for shingles vaccine    Glaucoma screening    Screening for osteoporosis    Screening for AAA (abdominal aortic aneurysm)    Interstitial lung disease ([address] )    Localized primary osteoarthritis of wrist    Current tear of medial cartilage or meniscus of knee    Idiopathic peripheral neuropathy    Swelling of right thumb    Right elbow pain    Back pain    Pain in both lower extremities    Radiculopathy of leg    Vasomotor rhinitis    Post-nasal drip    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    Stage 3a chronic kidney disease ([facility]    Acute idiopathic gout of left foot    Thyroid nodule      Past Medical and Surgical History:     Past Medical History:   Diagnosis Date    Abnormal electrocardiogram     Last assessed: Oct 11, 2013    Allergic rhinitis     last assessed: Oct 11, 2013    Allergic rhinitis due to pollen     last assessed: Oct 10, 2013    Allergic sinusitis     Last assessed: May 11, 2015    Allergy     resolved: July 22, 2015    Benign essential hypertension     Last assessed/resolved: May 31, 2017    BPH (benign prostatic hyperplasia)     Cancer [facility])     prostate    Colitis     Last assessed: May 24, 2016    Cough with hemoptysis 11/17/2020    Generalized osteoarthritis     Last assessed: Oct 11, 2013    GERD without esophagitis     Last assessed/resolved: May 31, 2017    Hearing loss     Hiatal hernia     resolved: July 22, 2015    History of gastroesophageal reflux (GERD)     History of stomach ulcers     last assessed: May 11, 2015    Hypertension     Incomplete bladder emptying     Kidney stone     Nodular prostate with lower urinary tract symptoms     Nontoxic single thyroid nodule     last assessed: April 16, 2014    Orchalgia     Overweight     last assessed: Oct 31, 2013    Poor urinary stream     Pulmonary embolism [facility])     Salivary gland disorder     last assessed: April 16, 2014    Seasonal allergies     last assessed: May 15, 2015    Spermatocele     Straining to void     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:  May 7, 2014     Past Surgical History: Procedure Laterality Date    BLADDER SURGERY      CHOLECYSTECTOMY      laparoscopic     FLAP LOCAL TRUNK Left 10/28/2021    Procedure: EXCISION OF FLANK MASS;  Surgeon: Brisa Mac DO;  Location: [address] [place] [address] MAIN OR;  Service: Plastics    HEMORRHOID SURGERY      pilionidal cyst removal     HERNIA REPAIR Left 2008    inguinal     KNEE ARTHROSCOPY Left     KNEE CARTILAGE SURGERY      NASAL SEPTUM SURGERY      Deviation repair     AL EXC PAROTD,LAT LOBE,DISSECT 5TH NERV Right 2021    Procedure: SUPERFICIAL PAROTIDECTOMY WITH FACIAL NERVE MONITORING;  Surgeon: Luisa Robles MD;  [facility];  Service: ENT    PROSTATE BIOPSY  2017    STOMACH SURGERY      TONSILLECTOMY AND ADENOIDECTOMY      at age 37   [address] [facility] - right  0 left     US GUIDED THYROID BIOPSY  2022    VASECTOMY        Family History:     Family History   Problem Relation Age of Onset    Hypertension Mother     Stroke Mother     Stomach cancer Father     Hypertension Father     Hypertension Family     Stroke Family         syndrome     Heart attack Son       Social History:     Social History     Socioeconomic History    Marital status: /Civil Union     Spouse name: Orvil Crews Number of children: 1    Years of education: None    Highest education level: None   Occupational History    Occupation: Retired     Occupation: [email or web address]      Comment: [place]    Tobacco Use    Smoking status: Former Smoker     Packs/day: 1 00     Years: 30 00     Pack years: 30 00     Types: Cigarettes     Start date:      Quit date:      Years since quittin 1    Smokeless tobacco: Never Used   Vaping Use    Vaping Use: Never used   Substance and Sexual Activity    Alcohol use: Yes     Comment: rare    Drug use: No    Sexual activity: Yes     Partners: Female   Other Topics Concern    None   Social History Narrative    Who lives in your home: wife     What type of home do you live in: Single house    Age of your home: Built [birth year]     How long have you been living there: 26    Type of heat: Baseboard    Type of fuel: Electric    What type of elaine is in your bedroom: Carpet     Do you have the following in or near your home:    Air products: Central air    Pests: None    Pets: None    Are pets allowed in bedroom: N/A    Open fields, wooded areas nearby: Open fields and Wooded areas    Basement: Finished    Exposure to second hand smoke: No        Habits:    Caffeine: Coffee 1 cup of coffee -peach snapple few a week     Chocolate: rare     Other:     Social Determinants of Health     Financial Resource Strain: Not on file   Food Insecurity: Not on file   Transportation Needs: Not on file   Physical Activity: Not on file   Stress: Not on file   Social Connections: Not on file   Intimate Partner Violence: Not on file   Housing Stability: Not on file      Medications and Allergies:     Current Outpatient Medications   Medication Sig Dispense Refill    allopurinol (ZYLOPRIM) 100 mg tablet Take 1 tablet (100 mg total) by mouth 2 (two) times a day 180 tablet 1    aspirin (ECOTRIN LOW STRENGTH) 81 mg EC tablet Take 81 mg by mouth daily      Cholecalciferol (VITAMIN D3 PO) Take by mouth 2000 IU      diltiazem (CARDIZEM CD) 240 mg 24 hr capsule Take 1 capsule (240 mg total) by mouth daily 90 capsule 1    levocetirizine (XYZAL) 5 MG tablet Take 5 mg by mouth every evening      tamsulosin (FLOMAX) 0 4 mg Take 1 capsule (0 4 mg total) by mouth daily with dinner 90 capsule 1    traMADol-acetaminophen (ULTRACET) 37 5-325 mg per tablet Take 2 tablets by mouth every 6 (six) hours as needed for moderate pain 60 tablet 0    gabapentin (NEURONTIN) 100 mg capsule Take 100 mg by mouth daily at bedtime       No current facility-administered medications for this visit       Allergies   Allergen Reactions    Ace Inhibitors Hyperactivity Immunizations:     Immunization History   Administered Date(s) Administered    COVID-19 PFIZER VACCINE 0 3 ML IM 02/11/2021, 03/02/2021, 12/07/2021    INFLUENZA 12/12/2005, 10/20/2006, 11/02/2007, 10/17/2008, 09/17/2009, 11/13/2012, 10/08/2014, 10/23/2015, 10/25/2016, 10/17/2017, 10/08/2021    Influenza Split High Dose Preservative Free IM 10/23/2015, 10/25/2016, 10/25/2019    Influenza, high dose seasonal 0 7 mL 09/28/2018, 10/10/2020    Influenza, seasonal, injectable 09/27/2010, 10/05/2011, 10/01/2013, 10/08/2014    Pneumococcal Conjugate 13-Valent 09/06/2018    Pneumococcal Polysaccharide PPV23 11/01/2006    Tuberculin Skin Test-PPD Intradermal 07/28/2008, 06/03/2015    Zoster 10/07/2013    Zoster Vaccine Recombinant 02/11/2020, 07/24/2020      Health Maintenance:         Topic Date Due    Colorectal Cancer Screening  07/14/2020         Topic Date Due    DTaP,Tdap,and Td Vaccines (1 - Tdap) Never done      Medicare Health Risk Assessment:     /86 (BP Location: Left arm, Patient Position: Sitting, Cuff Size: Standard)   Pulse 60   Temp 98 5 °F (36 9 °C) (Temporal)   Ht 5' 6" (1 676 m)   Wt 101 kg (222 lb 11 2 oz)   SpO2 98%   BMI 35 94 kg/m²      Jose Webster is here for his Subsequent Wellness visit  Last Medicare Wellness visit information reviewed, patient interviewed and updates made to the record today  Health Risk Assessment:   Patient rates overall health as very good  Patient feels that their physical health rating is same  Patient is satisfied with their life  Eyesight was rated as same  Hearing was rated as slightly worse  Patient feels that their emotional and mental health rating is same  Patients states they are sometimes angry  Patient states they are sometimes unusually tired/fatigued  Pain experienced in the last 7 days has been some  Patient's pain rating has been 5/10  Patient states that he has experienced no weight loss or gain in last 6 months       Depression Screening: PHQ-2 Score: 0      Fall Risk Screening: In the past year, patient has experienced: no history of falling in past year      Home Safety:  Patient does not have trouble with stairs inside or outside of their home  Patient has working smoke alarms and has no working carbon monoxide detector  Home safety hazards include: none  Nutrition:   Current diet is Limited junk food  Medications:   Patient is currently taking over-the-counter supplements  OTC medications include: see medication list  Patient is able to manage medications  Activities of Daily Living (ADLs)/Instrumental Activities of Daily Living (IADLs):   Walk and transfer into and out of bed and chair?: Yes  Dress and groom yourself?: Yes    Bathe or shower yourself?: Yes    Feed yourself?  Yes  Do your laundry/housekeeping?: Yes  Manage your money, pay your bills and track your expenses?: Yes  Make your own meals?: Yes    Do your own shopping?: Yes    Previous Hospitalizations:   Any hospitalizations or ED visits within the last 12 months?: Yes    How many hospitalizations have you had in the last year?: 1-2    Advance Care Planning:   Living will: Yes    Advanced directive: Yes    Advanced directive counseling given: Yes      Cognitive Screening:   Provider or family/friend/caregiver concerned regarding cognition?: No    PREVENTIVE SCREENINGS      Cardiovascular Screening:    General: Screening Current      Diabetes Screening:     General: Screening Current      Prostate Cancer Screening:    General: History Prostate Cancer and Screening Not Indicated      Abdominal Aortic Aneurysm (AAA) Screening:    Risk factors include: tobacco use        Lung Cancer Screening:     General: Screening Not Indicated      Hepatitis C Screening:    General: Screening Current    Screening, Brief Intervention, and Referral to Treatment (SBIRT)    Screening    Typical number of drinks in a week: 0    AUDIT-C Screenin) How often did you have a drink containing alcohol in the past year? never  2) How many drinks did you have on a typical day when you were drinking in the past year? 0  3) How often did you have 6 or more drinks on one occasion in the past year? never    AUDIT-C Score: 0  Interpretation: Score 0-3 (male): Negative screen for alcohol misuse    Single Item Drug Screening:  How often have you used an illegal drug (including marijuana) or a prescription medication for non-medical reasons in the past year? never    Single Item Drug Screen Score: 0  Interpretation: Negative screen for possible drug use disorder    Review of Current Opioid Use  Opioid Risk Tool (ORT) Score: 0  Opioid Risk Tool (ORT) Interpretation: Score 0-3: Low risk for opioid misuse    Other Counseling Topics:   Regular weightbearing exercise and calcium and vitamin D intake         Abner Irene MD

## 2022-02-10 ENCOUNTER — TELEPHONE (OUTPATIENT)
Dept: HEMATOLOGY ONCOLOGY | Facility: CLINIC | Age: 82
End: 2022-02-10

## 2022-02-10 ENCOUNTER — TELEPHONE (OUTPATIENT)
Dept: INTERNAL MEDICINE CLINIC | Age: 82
End: 2022-02-10

## 2022-02-10 NOTE — TELEPHONE ENCOUNTER
rec'd fax through [facility] for prior auth for medication:    Quinine sulfate 324mg capsules    Scanning into media

## 2022-02-11 NOTE — TELEPHONE ENCOUNTER
Your information has been submitted to [place] Medicare Part D  [facility] Medicare Part D will review the request and will issue a decision, typically within 1-3 days from your submission  You can check the updated outcome later by reopening this request   If [facility] Medicare Part D has not responded in 1-3 days or if you have any questions about your ePA request, please contact [place] Medicare Part D at [phone]

## 2022-02-14 NOTE — TELEPHONE ENCOUNTER
Your request was denied We have denied coverage or payment under your Medicare Part D benefit for the following prescription drug(s) that you or your prescriber requested: [id number] Why did we deny your request? We denied this request under Medicare Part D because: The information provided by your prescriber did not meet the requirements for covering this medication (prior authorization)  Your plan does not allow coverage of this medication based on your prescriber answering NO to the following question(s): Does the patient have a diagnosis of any of the following: A) uncomplicated Plasmodium falciparum malaria, B) Babesiosis, C) uncomplicated Plasmodium vivax malaria         Please advise     Thank you

## 2022-02-16 ENCOUNTER — TELEPHONE (OUTPATIENT)
Dept: INTERNAL MEDICINE CLINIC | Facility: OTHER | Age: 82
End: 2022-02-16

## 2022-02-16 NOTE — TELEPHONE ENCOUNTER
Please call wife back she has some qeustion about medication,  He stopped in office this morning About his medication that was not approved quinine

## 2022-02-16 NOTE — TELEPHONE ENCOUNTER
Quinine was denied, Wife stated patient took the Gabapentin medication at a 300 mg does and it did help the leg pain  Patient want tot know if he can take the gabapentin 300 mg daily due tot he fact the quinine is not covered   Please advise

## 2022-02-17 DIAGNOSIS — G60.9 IDIOPATHIC PERIPHERAL NEUROPATHY: Primary | ICD-10-CM

## 2022-02-17 RX ORDER — GABAPENTIN 300 MG/1
300 CAPSULE ORAL DAILY
Qty: 90 CAPSULE | Refills: 1 | Status: SHIPPED | OUTPATIENT
Start: 2022-02-17 | End: 2022-03-23 | Stop reason: SDUPTHER

## 2022-02-21 ENCOUNTER — TELEPHONE (OUTPATIENT)
Dept: SURGICAL ONCOLOGY | Facility: CLINIC | Age: 82
End: 2022-02-21

## 2022-02-21 NOTE — TELEPHONE ENCOUNTER
Called and spoke with Moreno's wife to schedule an appointment with Jaymie Jones  She requested a later appt and in the [place] office  Scheduled appt for Friday 3/4 at 3pm in [place] office  Wife reported that patient is hard of hearing and she will be coming with him to the visit  Reviewed mask/visitor policy, no show/cancellation policy, and to arrive 15 minutes early  Confirmed location information  She was agreeable and will tell patient

## 2022-03-04 ENCOUNTER — CONSULT (OUTPATIENT)
Dept: SURGICAL ONCOLOGY | Facility: CLINIC | Age: 82
End: 2022-03-04
Payer: COMMERCIAL

## 2022-03-04 VITALS
HEIGHT: 66 IN | BODY MASS INDEX: 34.72 KG/M2 | WEIGHT: 216 LBS | SYSTOLIC BLOOD PRESSURE: 122 MMHG | TEMPERATURE: 98.4 F | DIASTOLIC BLOOD PRESSURE: 80 MMHG

## 2022-03-04 DIAGNOSIS — E04.1 THYROID NODULE: Primary | ICD-10-CM

## 2022-03-04 PROCEDURE — 3079F DIAST BP 80-89 MM HG: CPT | Performed by: SURGERY

## 2022-03-04 PROCEDURE — 3074F SYST BP LT 130 MM HG: CPT | Performed by: SURGERY

## 2022-03-04 PROCEDURE — 99203 OFFICE O/P NEW LOW 30 MIN: CPT | Performed by: SURGERY

## 2022-03-04 NOTE — PROGRESS NOTES
Surgical Oncology Consult       [facility] [place]  [address]  [facility] [id number]  [address]   1940  [id number]  [facility] [place]  [address] [place], [place] [address]  [facility] [id number] [phone]    Diagnoses and all orders for this visit:    Thyroid nodule  -     Ambulatory Referral to Surgical Oncology  -     US guided thyroid biopsy with [facility]        No chief complaint on file  Return in about 3 months (around 6/4/2022) for Office Visit  Oncology History    No history exists  History of Present Illness:  81-year-old male who had a CT for interstitial lung disease  CT from June 16, 2021 revealed a 1 8 cm left thyroid nodule with calcification  This was stable going back to 2019  Follow-up thyroid ultrasound revealed multiple bilateral thyroid nodules  There was a left upper pole measuring 2 2 x 1 7 x 1 9 cm  This met the criteria for biopsy  Biopsy was performed on January 19, 2022  Pathology was follicular lesion of undetermined significance, Malin 3  I personally reviewed his films  He comes in now to discuss further therapy  He denies any dysphagia or hoarseness  No history of radiation to his head or neck  No family history of thyroid cancer or thyroid problems  No pressure sensation in his neck  Review of Systems  Complete ROS Surg Onc:   Constitutional: The patient denies new or recent history of general fatigue, no recent weight loss, no change in appetite  Eyes: No complaints of visual problems, no scleral icterus  ENT: no complaints of ear pain, no hoarseness, no difficulty swallowing,  no tinnitus and no new masses in head, oral cavity, or neck  Cardiovascular: No complaints of chest pain, no palpitations, no ankle edema  Respiratory: No complaints of shortness of breath, no cough  Gastrointestinal: No complaints of jaundice, no bloody stools, no pale stools  Genitourinary: No complaints of dysuria, no hematuria, no nocturia, no frequent urination, no urethral discharge  Musculoskeletal: No complaints of weakness, paralysis, joint stiffness or arthralgias  Integumentary: No complaints of rash, no new lesions  Neurological: No complaints of convulsions, no seizures, no dizziness  Hematologic/Lymphatic: No complaints of easy bruising  Endocrine:  No hot or cold intolerance  No polydipsia, polyphagia, or polyuria  Allergy/immunology:  No environmental allergies  No food allergies  Not immunocompromised  Skin:  No pallor or rash  No wound            Patient Active Problem List   Diagnosis    Benign essential hypertension    Benign prostatic hyperplasia (BPH) with straining on urination    Gastroesophageal reflux disease without esophagitis    Prostate cancer (HCC)    Seasonal allergic rhinitis due to pollen    Truncal obesity    Luetscher's syndrome    Benign 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    Obesity, morbid ([facility] )    Need for shingles vaccine    Glaucoma screening    Screening for osteoporosis    Screening for AAA (abdominal aortic aneurysm)    Interstitial lung disease ([facility] )    Localized primary osteoarthritis of wrist    Current tear of medial cartilage or meniscus of knee    Idiopathic peripheral neuropathy    Swelling of right thumb    Right elbow pain    Back pain    Pain in both lower extremities    Radiculopathy of leg    Vasomotor rhinitis    Post-nasal drip    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    Polycythemia    History of prostate cancer    Chronic kidney disease    Continuous opioid dependence ([facility] )    Disorder of adrenal gland, unspecified ([facility] )    Stage 3a chronic kidney disease ([facility] )    Acute idiopathic gout of left foot    Thyroid nodule     Past Medical History:   Diagnosis Date    Abnormal electrocardiogram     Last assessed: Oct 11, 2013    Allergic rhinitis     last assessed: Oct 11, 2013    Allergic rhinitis due to pollen     last assessed: Oct 10, 2013    Allergic sinusitis     Last assessed: May 11, 2015    Allergy     resolved: July 22, 2015    Benign essential hypertension     Last assessed/resolved: May 31, 2017    BPH (benign prostatic hyperplasia)     Cancer [facility])     prostate    Colitis     Last assessed: May 24, 2016    Cough with hemoptysis 11/17/2020    Generalized osteoarthritis     Last assessed: Oct 11, 2013    GERD without esophagitis     Last assessed/resolved: May 31, 2017    Hearing loss     Hiatal hernia     resolved: July 22, 2015    History of gastroesophageal reflux (GERD)     History of stomach ulcers     last assessed: May 11, 2015    Hypertension     Incomplete bladder emptying     Kidney stone     Nodular prostate with lower urinary tract symptoms     Nontoxic single thyroid nodule     last assessed: April 16, 2014    Orchalgia     Overweight     last assessed: Oct 31, 2013    Poor urinary stream     Pulmonary embolism [facility])     Salivary gland disorder     last assessed: April 16, 2014    Seasonal allergies     last assessed: May 15, 2015    Spermatocele     Straining to void     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:  May 7, 2014     Past Surgical History:   Procedure Laterality Date    BLADDER SURGERY      CHOLECYSTECTOMY  2000    laparoscopic     FLAP LOCAL TRUNK Left 10/28/2021    Procedure: EXCISION OF FLANK MASS;  Surgeon: Soco Grijalva DO;  Location: [address] [place] [address];  Service: Plastics    HEMORRHOID SURGERY      pilionidal cyst removal     HERNIA REPAIR Left 01/17/2008    inguinal     KNEE ARTHROSCOPY Left 1974    KNEE CARTILAGE SURGERY      NASAL SEPTUM SURGERY      Deviation repair     WI EXC PAROTD,LAT LOBE,DISSECT 5TH NERV Right 6/9/2021    Procedure: SUPERFICIAL PAROTIDECTOMY WITH FACIAL NERVE MONITORING;  Surgeon: Macarena Diaz MD;  [facility];  Service: ENT    PROSTATE BIOPSY  2017    STOMACH SURGERY      TONSILLECTOMY AND ADENOIDECTOMY      at age 37   [address] - right  0 left     US GUIDED THYROID BIOPSY  2022    VASECTOMY       Family History   Problem Relation Age of Onset    Hypertension Mother     Stroke Mother     Stomach cancer Father     Hypertension Father     Hypertension Family     Stroke Family         syndrome     Heart attack Son      Social History     Socioeconomic History    Marital status: /Civil Union     Spouse name: Emma Duong Number of children: 1    Years of education: Not on file    Highest education level: Not on file   Occupational History    Occupation: Retired     Occupation: securtity      Comment: [employer]    Tobacco Use    Smoking status: Former Smoker     Packs/day: 1 00     Years: 30      Pack years: 30      Types: Cigarettes     Start date:      Quit date:      Years since quittin 1    Smokeless tobacco: Never Used   Vaping Use    Vaping Use: Never used   Substance and Sexual Activity    Alcohol use: Yes     Comment: rare    Drug use: No    Sexual activity: Yes     Partners: Female   Other Topics Concern    Not on file   Social History Narrative    Who lives in your home: wife     What type of home do you live in: Single house    Age of your home: Built [birth year]     How long have you been living there:     Type of heat: Baseboard    Type of fuel: Electric    What type of elaine is in your bedroom: Carpet     Do you have the following in or near your home:    Air products: Central air    Pests: None    Pets: None    Are pets allowed in bedroom: N/A    Open fields, wooded areas nearby: Open fields and Wooded areas    Basement: Finished    Exposure to second hand smoke: No        Habits:    Caffeine: Coffee 1 cup of coffee -peach snapple few a week Chocolate: rare     Other:     Social Determinants of Health     Financial Resource Strain: Not on file   Food Insecurity: Not on file   Transportation Needs: Not on file   Physical Activity: Not on file   Stress: Not on file   Social Connections: Not on file   Intimate Partner Violence: Not on file   Housing Stability: Not on file       Current Outpatient Medications:     allopurinol (ZYLOPRIM) 100 mg tablet, Take 1 tablet (100 mg total) by mouth 2 (two) times a day, Disp: 180 tablet, Rfl: 1    aspirin (ECOTRIN LOW STRENGTH) 81 mg EC tablet, Take 81 mg by mouth daily, Disp: , Rfl:     Cholecalciferol (VITAMIN D3 PO), Take by mouth 2000 IU, Disp: , Rfl:     diltiazem (CARDIZEM CD) 240 mg 24 hr capsule, Take 1 capsule (240 mg total) by mouth daily, Disp: 90 capsule, Rfl: 1    gabapentin (Neurontin) 300 mg capsule, Take 1 capsule (300 mg total) by mouth daily, Disp: 90 capsule, Rfl: 1    levocetirizine (XYZAL) 5 MG tablet, Take 5 mg by mouth every evening, Disp: , Rfl:     tamsulosin (FLOMAX) 0 4 mg, Take 1 capsule (0 4 mg total) by mouth daily with dinner, Disp: 90 capsule, Rfl: 1    traMADol-acetaminophen (ULTRACET) 37 5-325 mg per tablet, Take 2 tablets by mouth every 6 (six) hours as needed for moderate pain, Disp: 60 tablet, Rfl: 0  Allergies   Allergen Reactions    Ace Inhibitors Hyperactivity     Vitals:    03/04/22 1440   BP: 122/80   Temp: 98 4 °F (36 9 °C)       Physical Exam   Constitutional: General appearance: The Patient is well-developed and well-nourished who appears the stated age in no acute distress  Patient is pleasant and talkative  HEENT:  Normocephalic  Sclerae are anicteric  Mucous membranes are moist  Neck is supple without adenopathy  No JVD  Palpable left thyroid nodule     Chest: The lungs are clear to auscultation  Cardiac: Heart is regular rate  Abdomen: Abdomen is soft, non-tender, non-distended and without masses  Extremities: There is no clubbing or cyanosis  There is no edema  Symmetric  Neuro: Grossly nonfocal  Gait is normal      Lymphatic: No evidence of cervical adenopathy bilaterally  No evidence of axillary adenopathy bilaterally  No evidence of inguinal adenopathy bilaterally  Skin: Warm, anicteric  Psych:  Patient is pleasant and talkative  Breasts:      Pathology:  Final Diagnosis   A & B  Thyroid, Left, upper: (Thin-Prep and smears)  Follicular lesion of undetermined significance ([place] Category III) - See note   -Scattered atypical follicular cells with crowding, overlapping, and nuclear size variation  Microfollicular patterns also present  Scant colloid and mixed inflammatory cells      Satisfactory for evaluation      Note:  (1) As reported in the [address] [place] [address] for Reporting Thyroid Cytopathology*, this diagnostic category has demonstrated anywhere from 5% - 15% risk of malignancy being found in subsequent resections  The manual reports that the usual management following this diagnosis is repeating the FNA, no sooner than 3 months time (sooner if sudden change in size occurs)  Ultimately, clinical/imaging correlation for this patient is needed in arriving at the actual management plan      *The [place] System for Reporting Thyroid Cytopathology, Jenny Law ; Irene Cano ) 2010 (1st Ed )      (2) In the Revised American Thyroid Association Management Guidelines for Patients with Thyroid Nodules and Differentiated Thyroid Cancer (November 2009), Recommendation 8 states: The use of molecular markers may be considered for patients with indeterminate cytology on FNA to help guide management  * If repeat FNA of thyroid nodule(s) is undertaken, consideration may be given to obtaining samples for ancillary molecular testing (e g  Afirma GEC testing)      * Revised American Thyroid Association Management Guidelines for Patients with Thyroid Nodules and Differentiated Thyroid Cancer:  The American Thyroid Association (ONESIMO) Guidelines Taskforce on Thyroid Nodules and Differentiated Thyroid Cancer, Thyroid Vol 19 (11), 2009  Electronically signed by Teresa Bazan MD on 1/21/2022 at 10:41 AM         Labs:      Imaging  CT and thyroid ultrasound are as above  I personally reviewed the films  I reviewed the above laboratory and imaging data  Discussion/Summary:  81-year-old male with a left thyroid nodule  Biopsy was Misenheimer 3  We had a long discussion regarding treatment options  He is asymptomatic  I would not recommend surgical intervention at this time  He has multiple other nodules, so this is likely benign  We discussed that with a Misenheimer 3 biopsy, the risk of malignancy is 10-15%  We also discussed repeat biopsy with Afirma testing  I would plan on this in another month since it has been 6 weeks since his initial biopsy  I will see him back once we have those results  If that is benign, I discussed the 4% false negative biopsy risk  If this is Misenheimer 3 year Misenheimer 4, it will be sent for Afirma  If that is suspicious, then I would proceed with hemithyroidectomy  I will see him back once we have those results  He is agreeable to this plan  All his questions were answered

## 2022-03-07 DIAGNOSIS — C61 PROSTATE CANCER (HCC): Primary | ICD-10-CM

## 2022-03-08 ENCOUNTER — APPOINTMENT (OUTPATIENT)
Dept: LAB | Facility: IMAGING CENTER | Age: 82
End: 2022-03-08
Payer: COMMERCIAL

## 2022-03-08 DIAGNOSIS — C61 PROSTATE CANCER (HCC): ICD-10-CM

## 2022-03-08 LAB — PSA SERPL-MCNC: 1 NG/ML (ref 0–4)

## 2022-03-08 PROCEDURE — 84153 ASSAY OF PSA TOTAL: CPT

## 2022-03-10 ENCOUNTER — OFFICE VISIT (OUTPATIENT)
Dept: UROLOGY | Facility: MEDICAL CENTER | Age: 82
End: 2022-03-10
Payer: COMMERCIAL

## 2022-03-10 VITALS
BODY MASS INDEX: 32.74 KG/M2 | WEIGHT: 216 LBS | HEART RATE: 65 BPM | HEIGHT: 68 IN | SYSTOLIC BLOOD PRESSURE: 140 MMHG | DIASTOLIC BLOOD PRESSURE: 90 MMHG

## 2022-03-10 DIAGNOSIS — N40.1 BPH WITH OBSTRUCTION/LOWER URINARY TRACT SYMPTOMS: Primary | ICD-10-CM

## 2022-03-10 DIAGNOSIS — C61 PROSTATE CANCER (HCC): ICD-10-CM

## 2022-03-10 DIAGNOSIS — N13.8 BPH WITH OBSTRUCTION/LOWER URINARY TRACT SYMPTOMS: Primary | ICD-10-CM

## 2022-03-10 LAB
SL AMB  POCT GLUCOSE, UA: NORMAL
SL AMB LEUKOCYTE ESTERASE,UA: NORMAL
SL AMB POCT BILIRUBIN,UA: NORMAL
SL AMB POCT BLOOD,UA: NORMAL
SL AMB POCT CLARITY,UA: CLEAR
SL AMB POCT COLOR,UA: NORMAL
SL AMB POCT KETONES,UA: NORMAL
SL AMB POCT NITRITE,UA: NORMAL
SL AMB POCT PH,UA: 5.5
SL AMB POCT SPECIFIC GRAVITY,UA: 1.03
SL AMB POCT URINE PROTEIN: NORMAL
SL AMB POCT UROBILINOGEN: 0.2

## 2022-03-10 PROCEDURE — 99214 OFFICE O/P EST MOD 30 MIN: CPT | Performed by: UROLOGY

## 2022-03-10 PROCEDURE — 1160F RVW MEDS BY RX/DR IN RCRD: CPT | Performed by: UROLOGY

## 2022-03-10 PROCEDURE — 81003 URINALYSIS AUTO W/O SCOPE: CPT | Performed by: UROLOGY

## 2022-03-10 NOTE — PROGRESS NOTES
HISTORY:        Follow-up for low-grade prostate cancer on active surveillance since 2017         biopsy information:   Initial biopsy in October 2017 showed one core of [place] six cancer        May 2018, repeat biopsy showed several cores "suspicious for cancer, not diagnostic"     July 2019, one core [place] six cancer        PSAs:   March 2022, 1 0  January 2020, PSA 0 4   June 2019, 0 5   November 2018, 0 6   September 2017, 1 8    Moderate BPH symptoms, on tamsulosin with overall good results  No significant incontinence, he has nocturia x1 or so  No hematuria infections stones  ASSESSMENT / PLAN:    Doing well  He stopped finasteride about a year ago, that may be why the PSA went from 0 4 in 2020, up to 1 0 just now  Will see him back in one year with PSA    The following portions of the patient's history were reviewed and updated as appropriate: allergies, current medications, past family history, past medical history, past social history, past surgical history and problem list     Review of Systems   All other systems reviewed and are negative  Objective:     Physical Exam  Constitutional:       General: He is not in acute distress  Appearance: He is well-developed  He is not diaphoretic  HENT:      Head: Normocephalic and atraumatic  Eyes:      General: No scleral icterus  Pulmonary:      Effort: Pulmonary effort is normal    Skin:     Coloration: Skin is not pale  Neurological:      Mental Status: He is alert and oriented to person, place, and time  Psychiatric:         Behavior: Behavior normal          Thought Content:  Thought content normal          Judgment: Judgment normal            0   Lab Value Date/Time    PSA 1 0 03/08/2022 0847    PSA 0 3 02/26/2021 1257    PSA 0 4 06/29/2020 1450    PSA 0 4 01/15/2020 1139   ]  BUN   Date Value Ref Range Status   01/20/2022 24 5 - 25 mg/dL Final   08/09/2019 20 7 - 25 mg/dL Final     Creatinine   Date Value Ref Range Status 01/20/2022 1 11 0 60 - 1 30 mg/dL Final     Comment:     Standardized to IDMS reference method     No components found for: CBC      Patient Active Problem List   Diagnosis    Benign essential hypertension    Benign prostatic hyperplasia (BPH) with straining on urination    Gastroesophageal reflux disease without esophagitis    Prostate cancer ([facility] )    Seasonal allergic rhinitis due to pollen    Truncal obesity    Luetscher's syndrome    Benign 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    Obesity, morbid ([facility] )    Need for shingles vaccine    Glaucoma screening    Screening for osteoporosis    Screening for AAA (abdominal aortic aneurysm)    Interstitial lung disease ([facility] )    Localized primary osteoarthritis of wrist    Current tear of medial cartilage or meniscus of knee    Idiopathic peripheral neuropathy    Swelling of right thumb    Right elbow pain    Back pain    Pain in both lower extremities    Radiculopathy of leg    Vasomotor rhinitis    Post-nasal drip    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    Polycythemia    History of prostate cancer    Chronic kidney disease    Continuous opioid dependence ([place] [address] )    Disorder of adrenal gland, unspecified ([place] [address] )    Stage 3a chronic kidney disease ([place] [address] )    Acute idiopathic gout of left foot    Thyroid nodule        Diagnoses and all orders for this visit:    BPH with obstruction/lower urinary tract symptoms  -     POCT urine dip auto non-scope    Prostate cancer ([place] [address] )  -     POCT urine dip auto non-scope  -     PSA Total, Diagnostic; Future           Patient ID: Jennifer Corona  is a [de-identified] y o  male        Current Outpatient Medications:     allopurinol (ZYLOPRIM) 100 mg tablet, Take 1 tablet (100 mg total) by mouth 2 (two) times a day, Disp: 180 tablet, Rfl: 1    aspirin (ECOTRIN LOW STRENGTH) 81 mg EC tablet, Take 81 mg by mouth daily, Disp: , Rfl:     Cholecalciferol (VITAMIN D3 PO), Take by mouth 2000 IU, Disp: , Rfl:     diltiazem (CARDIZEM CD) 240 mg 24 hr capsule, Take 1 capsule (240 mg total) by mouth daily, Disp: 90 capsule, Rfl: 1    gabapentin (Neurontin) 300 mg capsule, Take 1 capsule (300 mg total) by mouth daily, Disp: 90 capsule, Rfl: 1    levocetirizine (XYZAL) 5 MG tablet, Take 5 mg by mouth every evening, Disp: , Rfl:     tamsulosin (FLOMAX) 0 4 mg, Take 1 capsule (0 4 mg total) by mouth daily with dinner, Disp: 90 capsule, Rfl: 1    traMADol-acetaminophen (ULTRACET) 37 5-325 mg per tablet, Take 2 tablets by mouth every 6 (six) hours as needed for moderate pain, Disp: 60 tablet, Rfl: 0    Past Medical History:   Diagnosis Date    Abnormal electrocardiogram     Last assessed: Oct 11, 2013    Allergic rhinitis     last assessed: Oct 11, 2013    Allergic rhinitis due to pollen     last assessed: Oct 10, 2013    Allergic sinusitis     Last assessed: May 11, 2015    Allergy     resolved: July 22, 2015    Benign essential hypertension     Last assessed/resolved: May 31, 2017    BPH (benign prostatic hyperplasia)     Cancer [facility])     prostate    Colitis     Last assessed: May 24, 2016    Cough with hemoptysis 11/17/2020    Generalized osteoarthritis     Last assessed: Oct 11, 2013    GERD without esophagitis     Last assessed/resolved: May 31, 2017    Hearing loss     Hiatal hernia     resolved: July 22, 2015    History of gastroesophageal reflux (GERD)     History of stomach ulcers     last assessed: May 11, 2015    Hypertension     Incomplete bladder emptying     Kidney stone     Nodular prostate with lower urinary tract symptoms     Nontoxic single thyroid nodule     last assessed: April 16, 2014    Orchalgia     Overweight     last assessed:  Oct 31, 2013    Poor urinary stream     Pulmonary embolism [facility])     Salivary gland disorder     last assessed: April 16, 2014    Seasonal allergies     last assessed: May 15, 2015    Spermatocele     Straining to void  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:  May 7, 2014       Past Surgical History:   Procedure Laterality Date    BLADDER SURGERY      CHOLECYSTECTOMY  2000    laparoscopic     FLAP LOCAL TRUNK Left 10/28/2021    Procedure: EXCISION OF FLANK MASS;  Surgeon: Alexander Rodriguez DO;  Location: [address] [place] [address] MAIN OR;  Service: Plastics    HEMORRHOID SURGERY      pilionidal cyst removal     HERNIA REPAIR Left 01/17/2008    inguinal     KNEE ARTHROSCOPY Left 1974    KNEE CARTILAGE SURGERY      NASAL SEPTUM SURGERY      Deviation repair     [place] EXC PAROTD,LAT LOBE,DISSECT 5TH NERV Right 6/9/2021    Procedure: SUPERFICIAL PAROTIDECTOMY WITH FACIAL NERVE MONITORING;  Surgeon: Matthew Aguilera MD;  [facility];  Service: ENT    PROSTATE BIOPSY  2017    STOMACH SURGERY      TONSILLECTOMY AND ADENOIDECTOMY      at age 37   [address] [address] [address] - right 01/04 0 left 01/95    US GUIDED THYROID BIOPSY  1/19/2022    VASECTOMY         Social History

## 2022-03-14 DIAGNOSIS — G60.9 IDIOPATHIC PERIPHERAL NEUROPATHY: ICD-10-CM

## 2022-03-17 ENCOUNTER — RA CDI HCC (OUTPATIENT)
Dept: OTHER | Facility: HOSPITAL | Age: 82
End: 2022-03-17

## 2022-03-17 NOTE — PROGRESS NOTES
[facility]  coding opportunities       Chart reviewed, no opportunity found:   [address]        Patients Insurance     Medicare Insurance: [employer] Advantage

## 2022-03-23 ENCOUNTER — OFFICE VISIT (OUTPATIENT)
Dept: INTERNAL MEDICINE CLINIC | Facility: OTHER | Age: 82
End: 2022-03-23
Payer: COMMERCIAL

## 2022-03-23 VITALS
SYSTOLIC BLOOD PRESSURE: 156 MMHG | OXYGEN SATURATION: 98 % | DIASTOLIC BLOOD PRESSURE: 92 MMHG | HEART RATE: 68 BPM | TEMPERATURE: 97.6 F | HEIGHT: 66 IN | BODY MASS INDEX: 35.52 KG/M2 | WEIGHT: 221 LBS

## 2022-03-23 DIAGNOSIS — G60.9 IDIOPATHIC PERIPHERAL NEUROPATHY: ICD-10-CM

## 2022-03-23 DIAGNOSIS — R25.2 LEG CRAMP: ICD-10-CM

## 2022-03-23 DIAGNOSIS — Z12.11 ENCOUNTER FOR SCREENING FOR MALIGNANT NEOPLASM OF COLON: Primary | ICD-10-CM

## 2022-03-23 DIAGNOSIS — I10 BENIGN ESSENTIAL HYPERTENSION: Chronic | ICD-10-CM

## 2022-03-23 PROCEDURE — 99214 OFFICE O/P EST MOD 30 MIN: CPT | Performed by: INTERNAL MEDICINE

## 2022-03-23 PROCEDURE — 1160F RVW MEDS BY RX/DR IN RCRD: CPT | Performed by: INTERNAL MEDICINE

## 2022-03-23 PROCEDURE — 1036F TOBACCO NON-USER: CPT | Performed by: INTERNAL MEDICINE

## 2022-03-23 RX ORDER — GABAPENTIN 100 MG/1
100 CAPSULE ORAL
Qty: 90 CAPSULE | Refills: 1 | Status: SHIPPED | OUTPATIENT
Start: 2022-03-23 | End: 2022-05-17

## 2022-03-23 RX ORDER — GABAPENTIN 400 MG/1
400 CAPSULE ORAL DAILY
Qty: 90 CAPSULE | Refills: 1 | Status: SHIPPED | OUTPATIENT
Start: 2022-03-23 | End: 2022-05-17 | Stop reason: SDUPTHER

## 2022-03-23 RX ORDER — DILTIAZEM HYDROCHLORIDE 360 MG/1
360 CAPSULE, EXTENDED RELEASE ORAL DAILY
Qty: 90 CAPSULE | Refills: 1 | Status: SHIPPED | OUTPATIENT
Start: 2022-03-23 | End: 2022-04-15 | Stop reason: HOSPADM

## 2022-03-23 NOTE — PROGRESS NOTES
Assessment/Plan:     Diagnoses and all orders for this visit:    Encounter for screening for malignant neoplasm of colon  -     Ambulatory Referral to Gastroenterology; Future    Idiopathic peripheral neuropathy  -     gabapentin (Neurontin) 400 mg capsule; Take 1 capsule (400 mg total) by mouth daily  -     gabapentin (Neurontin) 100 mg capsule; Take 1 capsule (100 mg total) by mouth daily at bedtime  -     CBC and differential; Future    Leg cramp  -     gabapentin (Neurontin) 100 mg capsule; Take 1 capsule (100 mg total) by mouth daily at bedtime  -     Magnesium; Future    Benign essential hypertension  -     diltiazem (CARDIZEM CD) 360 MG 24 hr capsule; Take 1 capsule (360 mg total) by mouth daily  -     Basic metabolic panel; Future  -     CBC and differential; Future  -     Magnesium; Future             M*Modal software was used to dictate this note  It may contain errors with dictating incorrect words or incorrect spelling  Please contact the provider directly with any questions  Subjective:   Chief Complaint   Patient presents with    Follow-up     last bw 03/10/2022, no refills needed, PT STARTED TAKING GABAPENTIN UP TO 500MG TO HELP WITH LEG CRAMPS AT NIGHT   Hypertension        Patient ID: Javi Bartlett  is a [de-identified] y o  male      HPI  This is the [de-identified] years young gentleman who is here today for the regular follow-up he is doing well except for the few complaints a lesion on the right leg which is getting bigger and tender sometimes is we need to get the excision and the biopsy done for that lesion looks like a basal cell carcinoma  Hypertension is poorly controlled recently we stopped giving him the diuretic because of his the acute gout and his blood pressure readings are coming up I will increase his the diltiazem from 240-360 and see how he does with his blood pressure and the heart rate  Cramping in his both legs he started on gabapentin started with and went up to 300 mg he increase the dose to 500 mg and at 500 mg now he is feeling much better and the leg cramps are much less in intensity I will give him the prescription for 400 mg and 100 mg gabapentin so that he can make it to 500 and take it  Also he has a history of peripheral neuropathy which is helped by the gabapentin to0  The following portions of the patient's history were reviewed and updated as appropriate: allergies, current medications, past family history, past medical history, past social history, past surgical history and problem list     Review of Systems   Constitutional: Positive for activity change (Decreased activities secondary to winter)  Negative for appetite change, fatigue and fever  HENT: Negative for congestion, ear pain, hearing loss, nosebleeds, sneezing, tinnitus and voice change  Eyes: Negative for pain, discharge and redness  Respiratory: Positive for shortness of breath (Shortness of breath on exertion)  Negative for cough, chest tightness and wheezing  Cardiovascular: Negative for chest pain, palpitations and leg swelling  Gastrointestinal: Negative for abdominal pain, blood in stool, constipation, diarrhea, nausea and vomiting  Genitourinary: Negative for difficulty urinating, dysuria, hematuria and urgency  Musculoskeletal: Positive for arthralgias (Joint pain in the knee)  Negative for back pain, gait problem and joint swelling  Skin: Negative for rash and wound  Lesion on the lateral side of the right leg getting bigger   Allergic/Immunologic: Negative for environmental allergies  Neurological: Negative for dizziness, tremors, seizures, weakness, light-headedness and numbness  Hematological: Negative for adenopathy  Does not bruise/bleed easily  Psychiatric/Behavioral: Negative for behavioral problems and confusion  The patient is not nervous/anxious  Past Medical History:   Diagnosis Date    Abnormal electrocardiogram     Last assessed:  Oct 11, 2013    Allergic rhinitis last assessed: Oct 11, 2013    Allergic rhinitis due to pollen     last assessed: Oct 10, 2013    Allergic sinusitis     Last assessed: May 11, 2015    Allergy     resolved: July 22, 2015    Benign essential hypertension     Last assessed/resolved: May 31, 2017    BPH (benign prostatic hyperplasia)     Cancer [facility])     prostate    Colitis     Last assessed: May 24, 2016    Cough with hemoptysis 11/17/2020    Generalized osteoarthritis     Last assessed: Oct 11, 2013    GERD without esophagitis     Last assessed/resolved: May 31, 2017    Hearing loss     Hiatal hernia     resolved: July 22, 2015    History of gastroesophageal reflux (GERD)     History of stomach ulcers     last assessed: May 11, 2015    Hypertension     Incomplete bladder emptying     Kidney stone     Nodular prostate with lower urinary tract symptoms     Nontoxic single thyroid nodule     last assessed: April 16, 2014    Orchalgia     Overweight     last assessed: Oct 31, 2013    Poor urinary stream     Pulmonary embolism [facility])     Salivary gland disorder     last assessed: April 16, 2014    Seasonal allergies     last assessed: May 15, 2015    Spermatocele     Straining to void     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:  May 7, 2014         Current Outpatient Medications:     allopurinol (ZYLOPRIM) 100 mg tablet, Take 1 tablet (100 mg total) by mouth 2 (two) times a day, Disp: 180 tablet, Rfl: 1    aspirin (ECOTRIN LOW STRENGTH) 81 mg EC tablet, Take 81 mg by mouth daily, Disp: , Rfl:     Cholecalciferol (VITAMIN D3 PO), Take by mouth 2000 IU, Disp: , Rfl:     diltiazem (CARDIZEM CD) 240 mg 24 hr capsule, Take 1 capsule (240 mg total) by mouth daily, Disp: 90 capsule, Rfl: 1    gabapentin (Neurontin) 300 mg capsule, Take 1 capsule (300 mg total) by mouth daily (Patient taking differently: Take 500 mg by mouth daily  ), Disp: 90 capsule, Rfl: 1    levocetirizine (XYZAL) 5 MG tablet, Take 5 mg by mouth every evening, Disp: , Rfl:     tamsulosin (FLOMAX) 0 4 mg, Take 1 capsule (0 4 mg total) by mouth daily with dinner, Disp: 90 capsule, Rfl: 1    traMADol-acetaminophen (ULTRACET) 37 5-325 mg per tablet, Take 2 tablets by mouth every 6 (six) hours as needed for moderate pain, Disp: 60 tablet, Rfl: 0    Allergies   Allergen Reactions    Ace Inhibitors Hyperactivity       Social History   Past Surgical History:   Procedure Laterality Date    BLADDER SURGERY      CHOLECYSTECTOMY  2000    laparoscopic     FLAP LOCAL TRUNK Left 10/28/2021    Procedure: EXCISION OF FLANK MASS;  Surgeon: Joaquim Brown DO;  [facility] MA[facility];  Service: Plastics    HEMORRHOID SURGERY      pilionidal cyst removal     HERNIA REPAIR Left 01/17/2008    inguinal     KNEE ARTHROSCOPY Left 1974    KNEE CARTILAGE SURGERY      NASAL SEPTUM SURGERY      Deviation repair     CT EXC PAROTD,LAT LOBE,DISSECT 5TH NERV Right 6/9/2021    Procedure: SUPERFICIAL PAROTIDECTOMY WITH FACIAL NERVE MONITORING;  Surgeon: Turner Fuentes MD;  [facility];  Service: ENT    PROSTATE BIOPSY  2017    STOMACH SURGERY      TONSILLECTOMY AND ADENOIDECTOMY      at age 37   James Pert Luis Angel Raudel Don 89 - right 01/04 0 left 01/95    US GUIDED THYROID BIOPSY  1/19/2022    VASECTOMY       Family History   Problem Relation Age of Onset    Hypertension Mother     Stroke Mother     Stomach cancer Father     Hypertension Father     Hypertension Family     Stroke Family         syndrome     Heart attack Son        Objective:  /92 (BP Location: Left arm, Patient Position: Sitting, Cuff Size: Adult)   Pulse 68   Temp 97 6 °F (36 4 °C) (Temporal)   Ht 5' 6" (1 676 m)   Wt 100 kg (221 lb) Comment: PT REPORTED  SpO2 98% Comment: RA  BMI 35 67 kg/m²        Physical Exam  Constitutional:       Appearance: He is well-developed  He is obese     HENT:      Right Ear: External ear normal    Eyes:      Conjunctiva/sclera: Conjunctivae normal       Pupils: Pupils are equal, round, and reactive to light  Neck:      Thyroid: No thyromegaly  Vascular: No JVD  Cardiovascular:      Rate and Rhythm: Normal rate and regular rhythm  Heart sounds: Normal heart sounds  Pulmonary:      Breath sounds: Normal breath sounds  Abdominal:      General: Bowel sounds are normal       Palpations: Abdomen is soft  Musculoskeletal:         General: Normal range of motion  Cervical back: Normal range of motion  Right lower leg: Edema present  Left lower leg: Edema present  Lymphadenopathy:      Cervical: No cervical adenopathy  Skin:     General: Skin is dry  Findings: Lesion (Lesion on right leg tender to touch) present  Neurological:      Mental Status: He is alert and oriented to person, place, and time  Deep Tendon Reflexes: Reflexes are normal and symmetric     Psychiatric:         Behavior: Behavior normal

## 2022-04-08 ENCOUNTER — APPOINTMENT (EMERGENCY)
Dept: CT IMAGING | Facility: HOSPITAL | Age: 82
DRG: 871 | End: 2022-04-08
Payer: COMMERCIAL

## 2022-04-08 ENCOUNTER — APPOINTMENT (EMERGENCY)
Dept: RADIOLOGY | Facility: HOSPITAL | Age: 82
DRG: 871 | End: 2022-04-08
Payer: COMMERCIAL

## 2022-04-08 ENCOUNTER — HOSPITAL ENCOUNTER (INPATIENT)
Facility: HOSPITAL | Age: 82
LOS: 6 days | Discharge: HOME | DRG: 871 | End: 2022-04-15
Attending: EMERGENCY MEDICINE | Admitting: GENERAL PRACTICE
Payer: COMMERCIAL

## 2022-04-08 DIAGNOSIS — K52.9 GASTROENTERITIS: Primary | ICD-10-CM

## 2022-04-08 DIAGNOSIS — R10.9 ABDOMINAL PAIN: ICD-10-CM

## 2022-04-08 DIAGNOSIS — K56.609 SMALL BOWEL OBSTRUCTION (HCC): ICD-10-CM

## 2022-04-08 DIAGNOSIS — I10 BENIGN ESSENTIAL HYPERTENSION: Chronic | ICD-10-CM

## 2022-04-08 DIAGNOSIS — K11.20 SIALOADENITIS: ICD-10-CM

## 2022-04-08 DIAGNOSIS — K52.9 ENTERITIS: ICD-10-CM

## 2022-04-08 DIAGNOSIS — E66.01 OBESITY, MORBID (HCC): ICD-10-CM

## 2022-04-08 DIAGNOSIS — N17.9 AKI (ACUTE KIDNEY INJURY) (HCC): ICD-10-CM

## 2022-04-08 LAB
ALBUMIN SERPL BCP-MCNC: 3.6 G/DL (ref 3.5–5)
ALP SERPL-CCNC: 83 U/L (ref 46–116)
ALT SERPL W P-5'-P-CCNC: 24 U/L (ref 12–78)
ANION GAP SERPL CALCULATED.3IONS-SCNC: 11 MMOL/L (ref 4–13)
APTT PPP: 27 SECONDS (ref 23–37)
AST SERPL W P-5'-P-CCNC: 25 U/L (ref 5–45)
BASOPHILS # BLD MANUAL: 0.06 THOUSAND/UL (ref 0–0.1)
BASOPHILS NFR MAR MANUAL: 1 % (ref 0–1)
BILIRUB SERPL-MCNC: 0.79 MG/DL (ref 0.2–1)
BILIRUB UR QL STRIP: ABNORMAL
BUN SERPL-MCNC: 31 MG/DL (ref 5–25)
CALCIUM SERPL-MCNC: 8.6 MG/DL (ref 8.3–10.1)
CHLORIDE SERPL-SCNC: 104 MMOL/L (ref 100–108)
CLARITY UR: CLEAR
CO2 SERPL-SCNC: 26 MMOL/L (ref 21–32)
COLOR UR: YELLOW
CREAT SERPL-MCNC: 1.36 MG/DL (ref 0.6–1.3)
EOSINOPHIL # BLD MANUAL: 0 THOUSAND/UL (ref 0–0.4)
EOSINOPHIL NFR BLD MANUAL: 0 % (ref 0–6)
ERYTHROCYTE [DISTWIDTH] IN BLOOD BY AUTOMATED COUNT: 15.9 % (ref 11.6–15.1)
GFR SERPL CREATININE-BSD FRML MDRD: 48 ML/MIN/1.73SQ M
GLUCOSE SERPL-MCNC: 125 MG/DL (ref 65–140)
GLUCOSE UR STRIP-MCNC: NEGATIVE MG/DL
HCT VFR BLD AUTO: 46.5 % (ref 36.5–49.3)
HGB BLD-MCNC: 15.2 G/DL (ref 12–17)
HGB UR QL STRIP.AUTO: NEGATIVE
INR PPP: 1.07 (ref 0.84–1.19)
KETONES UR STRIP-MCNC: ABNORMAL MG/DL
LACTATE SERPL-SCNC: 1.2 MMOL/L (ref 0.5–2)
LEUKOCYTE ESTERASE UR QL STRIP: NEGATIVE
LIPASE SERPL-CCNC: 40 U/L (ref 73–393)
LYMPHOCYTES # BLD AUTO: 0.31 THOUSAND/UL (ref 0.6–4.47)
LYMPHOCYTES # BLD AUTO: 5 % (ref 14–44)
MCH RBC QN AUTO: 27.5 PG (ref 26.8–34.3)
MCHC RBC AUTO-ENTMCNC: 32.7 G/DL (ref 31.4–37.4)
MCV RBC AUTO: 84 FL (ref 82–98)
MONOCYTES # BLD AUTO: 0 THOUSAND/UL (ref 0–1.22)
MONOCYTES NFR BLD: 0 % (ref 4–12)
NEUTROPHILS # BLD MANUAL: 5.75 THOUSAND/UL (ref 1.85–7.62)
NEUTS BAND NFR BLD MANUAL: 2 % (ref 0–8)
NEUTS SEG NFR BLD AUTO: 92 % (ref 43–75)
NITRITE UR QL STRIP: NEGATIVE
PH UR STRIP.AUTO: 5.5 [PH] (ref 4.5–8)
PLATELET # BLD AUTO: 189 THOUSANDS/UL (ref 149–390)
PLATELET BLD QL SMEAR: ADEQUATE
PMV BLD AUTO: 10.5 FL (ref 8.9–12.7)
POTASSIUM SERPL-SCNC: 3.9 MMOL/L (ref 3.5–5.3)
PROT SERPL-MCNC: 6.7 G/DL (ref 6.4–8.2)
PROT UR STRIP-MCNC: ABNORMAL MG/DL
PROTHROMBIN TIME: 13.9 SECONDS (ref 11.6–14.5)
RBC # BLD AUTO: 5.52 MILLION/UL (ref 3.88–5.62)
RBC MORPH BLD: NORMAL
SODIUM SERPL-SCNC: 141 MMOL/L (ref 136–145)
SP GR UR STRIP.AUTO: >=1.03 (ref 1–1.03)
UROBILINOGEN UR QL STRIP.AUTO: 1 E.U./DL
WBC # BLD AUTO: 6.12 THOUSAND/UL (ref 4.31–10.16)

## 2022-04-08 PROCEDURE — 87493 C DIFF AMPLIFIED PROBE: CPT | Performed by: EMERGENCY MEDICINE

## 2022-04-08 PROCEDURE — 83605 ASSAY OF LACTIC ACID: CPT | Performed by: EMERGENCY MEDICINE

## 2022-04-08 PROCEDURE — 85007 BL SMEAR W/DIFF WBC COUNT: CPT | Performed by: EMERGENCY MEDICINE

## 2022-04-08 PROCEDURE — 80053 COMPREHEN METABOLIC PANEL: CPT | Performed by: EMERGENCY MEDICINE

## 2022-04-08 PROCEDURE — 83690 ASSAY OF LIPASE: CPT | Performed by: EMERGENCY MEDICINE

## 2022-04-08 PROCEDURE — 81001 URINALYSIS AUTO W/SCOPE: CPT

## 2022-04-08 PROCEDURE — 71045 X-RAY EXAM CHEST 1 VIEW: CPT

## 2022-04-08 PROCEDURE — 85610 PROTHROMBIN TIME: CPT | Performed by: EMERGENCY MEDICINE

## 2022-04-08 PROCEDURE — 99285 EMERGENCY DEPT VISIT HI MDM: CPT | Performed by: EMERGENCY MEDICINE

## 2022-04-08 PROCEDURE — 99285 EMERGENCY DEPT VISIT HI MDM: CPT

## 2022-04-08 PROCEDURE — 96361 HYDRATE IV INFUSION ADD-ON: CPT

## 2022-04-08 PROCEDURE — 93005 ELECTROCARDIOGRAM TRACING: CPT

## 2022-04-08 PROCEDURE — 87505 NFCT AGENT DETECTION GI: CPT | Performed by: EMERGENCY MEDICINE

## 2022-04-08 PROCEDURE — G1004 CDSM NDSC: HCPCS

## 2022-04-08 PROCEDURE — 74177 CT ABD & PELVIS W/CONTRAST: CPT

## 2022-04-08 PROCEDURE — 83735 ASSAY OF MAGNESIUM: CPT | Performed by: EMERGENCY MEDICINE

## 2022-04-08 PROCEDURE — 85730 THROMBOPLASTIN TIME PARTIAL: CPT | Performed by: EMERGENCY MEDICINE

## 2022-04-08 PROCEDURE — 96375 TX/PRO/DX INJ NEW DRUG ADDON: CPT

## 2022-04-08 PROCEDURE — 85027 COMPLETE CBC AUTOMATED: CPT | Performed by: EMERGENCY MEDICINE

## 2022-04-08 PROCEDURE — 96374 THER/PROPH/DIAG INJ IV PUSH: CPT

## 2022-04-08 PROCEDURE — 36415 COLL VENOUS BLD VENIPUNCTURE: CPT

## 2022-04-08 PROCEDURE — 87040 BLOOD CULTURE FOR BACTERIA: CPT | Performed by: EMERGENCY MEDICINE

## 2022-04-08 RX ORDER — HYDROMORPHONE HCL/PF 1 MG/ML
0.2 SYRINGE (ML) INJECTION ONCE
Status: COMPLETED | OUTPATIENT
Start: 2022-04-08 | End: 2022-04-08

## 2022-04-08 RX ORDER — SODIUM CHLORIDE 9 MG/ML
125 INJECTION, SOLUTION INTRAVENOUS CONTINUOUS
Status: DISCONTINUED | OUTPATIENT
Start: 2022-04-08 | End: 2022-04-09

## 2022-04-08 RX ORDER — ONDANSETRON 2 MG/ML
4 INJECTION INTRAMUSCULAR; INTRAVENOUS ONCE
Status: COMPLETED | OUTPATIENT
Start: 2022-04-08 | End: 2022-04-08

## 2022-04-08 RX ADMIN — HYDROMORPHONE HYDROCHLORIDE 0.2 MG: 1 INJECTION, SOLUTION INTRAMUSCULAR; INTRAVENOUS; SUBCUTANEOUS at 23:12

## 2022-04-08 RX ADMIN — IOHEXOL 100 ML: 350 INJECTION, SOLUTION INTRAVENOUS at 23:32

## 2022-04-08 RX ADMIN — SODIUM CHLORIDE 500 ML: 0.9 INJECTION, SOLUTION INTRAVENOUS at 23:38

## 2022-04-08 RX ADMIN — ONDANSETRON 4 MG: 2 INJECTION INTRAMUSCULAR; INTRAVENOUS at 23:12

## 2022-04-09 ENCOUNTER — APPOINTMENT (INPATIENT)
Dept: RADIOLOGY | Facility: HOSPITAL | Age: 82
DRG: 871 | End: 2022-04-09
Payer: COMMERCIAL

## 2022-04-09 PROBLEM — K52.9 ENTERITIS: Status: ACTIVE | Noted: 2022-04-09

## 2022-04-09 PROBLEM — R10.9 ABDOMINAL PAIN: Status: ACTIVE | Noted: 2022-04-09

## 2022-04-09 LAB
ANION GAP SERPL CALCULATED.3IONS-SCNC: 8 MMOL/L (ref 4–13)
BACTERIA UR QL AUTO: ABNORMAL /HPF
BUN SERPL-MCNC: 29 MG/DL (ref 5–25)
C DIFF TOX GENS STL QL NAA+PROBE: ABNORMAL
CALCIUM SERPL-MCNC: 8.2 MG/DL (ref 8.3–10.1)
CAMPYLOBACTER DNA SPEC NAA+PROBE: NORMAL
CHLORIDE SERPL-SCNC: 108 MMOL/L (ref 100–108)
CO2 SERPL-SCNC: 26 MMOL/L (ref 21–32)
CREAT SERPL-MCNC: 1.22 MG/DL (ref 0.6–1.3)
GFR SERPL CREATININE-BSD FRML MDRD: 55 ML/MIN/1.73SQ M
GLUCOSE P FAST SERPL-MCNC: 110 MG/DL (ref 65–99)
GLUCOSE SERPL-MCNC: 110 MG/DL (ref 65–140)
HOLD SPECIMEN: NORMAL
MAGNESIUM SERPL-MCNC: 1.9 MG/DL (ref 1.6–2.6)
MUCOUS THREADS UR QL AUTO: ABNORMAL
NON-SQ EPI CELLS URNS QL MICRO: ABNORMAL /HPF
POTASSIUM SERPL-SCNC: 3.8 MMOL/L (ref 3.5–5.3)
RBC #/AREA URNS AUTO: ABNORMAL /HPF
SALMONELLA DNA SPEC QL NAA+PROBE: NORMAL
SHIGA TOXIN STX GENE SPEC NAA+PROBE: NORMAL
SHIGELLA DNA SPEC QL NAA+PROBE: NORMAL
SODIUM SERPL-SCNC: 142 MMOL/L (ref 136–145)
WBC #/AREA URNS AUTO: ABNORMAL /HPF

## 2022-04-09 PROCEDURE — 99222 1ST HOSP IP/OBS MODERATE 55: CPT | Performed by: SURGERY

## 2022-04-09 PROCEDURE — 87077 CULTURE AEROBIC IDENTIFY: CPT | Performed by: EMERGENCY MEDICINE

## 2022-04-09 PROCEDURE — 87186 SC STD MICRODIL/AGAR DIL: CPT | Performed by: EMERGENCY MEDICINE

## 2022-04-09 PROCEDURE — 80048 BASIC METABOLIC PNL TOTAL CA: CPT | Performed by: PHYSICIAN ASSISTANT

## 2022-04-09 PROCEDURE — 36415 COLL VENOUS BLD VENIPUNCTURE: CPT | Performed by: EMERGENCY MEDICINE

## 2022-04-09 PROCEDURE — 87040 BLOOD CULTURE FOR BACTERIA: CPT | Performed by: EMERGENCY MEDICINE

## 2022-04-09 PROCEDURE — C9113 INJ PANTOPRAZOLE SODIUM, VIA: HCPCS | Performed by: SURGERY

## 2022-04-09 PROCEDURE — 99223 1ST HOSP IP/OBS HIGH 75: CPT | Performed by: GENERAL PRACTICE

## 2022-04-09 PROCEDURE — 96361 HYDRATE IV INFUSION ADD-ON: CPT

## 2022-04-09 PROCEDURE — 74018 RADEX ABDOMEN 1 VIEW: CPT

## 2022-04-09 PROCEDURE — 87086 URINE CULTURE/COLONY COUNT: CPT | Performed by: EMERGENCY MEDICINE

## 2022-04-09 PROCEDURE — 87493 C DIFF AMPLIFIED PROBE: CPT | Performed by: GENERAL PRACTICE

## 2022-04-09 RX ORDER — ASPIRIN 81 MG/1
81 TABLET ORAL DAILY
Status: DISCONTINUED | OUTPATIENT
Start: 2022-04-09 | End: 2022-04-09

## 2022-04-09 RX ORDER — TAMSULOSIN HYDROCHLORIDE 0.4 MG/1
0.4 CAPSULE ORAL
Status: DISCONTINUED | OUTPATIENT
Start: 2022-04-09 | End: 2022-04-09

## 2022-04-09 RX ORDER — MELATONIN
1000 DAILY
Status: DISCONTINUED | OUTPATIENT
Start: 2022-04-09 | End: 2022-04-09

## 2022-04-09 RX ORDER — METOPROLOL TARTRATE 5 MG/5ML
5 INJECTION INTRAVENOUS EVERY 8 HOURS PRN
Status: DISCONTINUED | OUTPATIENT
Start: 2022-04-09 | End: 2022-04-10

## 2022-04-09 RX ORDER — PANTOPRAZOLE SODIUM 40 MG/1
40 INJECTION, POWDER, FOR SOLUTION INTRAVENOUS
Status: DISCONTINUED | OUTPATIENT
Start: 2022-04-09 | End: 2022-04-13

## 2022-04-09 RX ORDER — HYDROMORPHONE HCL/PF 1 MG/ML
0.2 SYRINGE (ML) INJECTION EVERY 6 HOURS PRN
Status: DISCONTINUED | OUTPATIENT
Start: 2022-04-09 | End: 2022-04-15 | Stop reason: HOSPADM

## 2022-04-09 RX ORDER — ACETAMINOPHEN 650 MG/1
650 SUPPOSITORY RECTAL EVERY 6 HOURS PRN
Status: DISCONTINUED | OUTPATIENT
Start: 2022-04-09 | End: 2022-04-11

## 2022-04-09 RX ORDER — ACETAMINOPHEN 325 MG/1
650 TABLET ORAL EVERY 6 HOURS PRN
Status: DISCONTINUED | OUTPATIENT
Start: 2022-04-09 | End: 2022-04-09

## 2022-04-09 RX ORDER — DILTIAZEM HYDROCHLORIDE 180 MG/1
360 CAPSULE, COATED, EXTENDED RELEASE ORAL DAILY
Status: DISCONTINUED | OUTPATIENT
Start: 2022-04-09 | End: 2022-04-09

## 2022-04-09 RX ORDER — SODIUM CHLORIDE 9 MG/ML
100 INJECTION, SOLUTION INTRAVENOUS CONTINUOUS
Status: DISCONTINUED | OUTPATIENT
Start: 2022-04-09 | End: 2022-04-10

## 2022-04-09 RX ORDER — ONDANSETRON 2 MG/ML
4 INJECTION INTRAMUSCULAR; INTRAVENOUS EVERY 6 HOURS PRN
Status: DISCONTINUED | OUTPATIENT
Start: 2022-04-09 | End: 2022-04-15 | Stop reason: HOSPADM

## 2022-04-09 RX ORDER — GABAPENTIN 400 MG/1
400 CAPSULE ORAL
Status: DISCONTINUED | OUTPATIENT
Start: 2022-04-09 | End: 2022-04-09

## 2022-04-09 RX ORDER — HYDROMORPHONE HCL/PF 1 MG/ML
0.2 SYRINGE (ML) INJECTION ONCE
Status: COMPLETED | OUTPATIENT
Start: 2022-04-09 | End: 2022-04-09

## 2022-04-09 RX ORDER — ALLOPURINOL 100 MG/1
100 TABLET ORAL 2 TIMES DAILY
Status: DISCONTINUED | OUTPATIENT
Start: 2022-04-09 | End: 2022-04-09

## 2022-04-09 RX ADMIN — ALLOPURINOL 100 MG: 100 TABLET ORAL at 09:12

## 2022-04-09 RX ADMIN — SODIUM CHLORIDE 75 ML/HR: 0.9 INJECTION, SOLUTION INTRAVENOUS at 04:20

## 2022-04-09 RX ADMIN — HYDROMORPHONE HYDROCHLORIDE 0.2 MG: 1 INJECTION, SOLUTION INTRAMUSCULAR; INTRAVENOUS; SUBCUTANEOUS at 21:58

## 2022-04-09 RX ADMIN — PANTOPRAZOLE SODIUM 40 MG: 40 INJECTION, POWDER, FOR SOLUTION INTRAVENOUS at 13:34

## 2022-04-09 RX ADMIN — HYDROMORPHONE HYDROCHLORIDE 0.2 MG: 1 INJECTION, SOLUTION INTRAMUSCULAR; INTRAVENOUS; SUBCUTANEOUS at 18:40

## 2022-04-09 RX ADMIN — PHENOL 1 SPRAY: 1.5 LIQUID ORAL at 17:00

## 2022-04-09 RX ADMIN — METOPROLOL TARTRATE 5 MG: 5 INJECTION INTRAVENOUS at 21:59

## 2022-04-09 RX ADMIN — Medication 1000 UNITS: at 09:11

## 2022-04-09 RX ADMIN — SODIUM CHLORIDE 125 ML/HR: 0.9 INJECTION, SOLUTION INTRAVENOUS at 00:01

## 2022-04-09 RX ADMIN — ASPIRIN 81 MG: 81 TABLET, COATED ORAL at 09:11

## 2022-04-09 RX ADMIN — ENOXAPARIN SODIUM 40 MG: 40 INJECTION SUBCUTANEOUS at 09:11

## 2022-04-09 RX ADMIN — DILTIAZEM HYDROCHLORIDE 360 MG: 180 CAPSULE, COATED, EXTENDED RELEASE ORAL at 09:12

## 2022-04-09 RX ADMIN — SODIUM CHLORIDE 100 ML/HR: 0.9 INJECTION, SOLUTION INTRAVENOUS at 16:57

## 2022-04-09 RX ADMIN — HYDROMORPHONE HYDROCHLORIDE 0.2 MG: 1 INJECTION, SOLUTION INTRAMUSCULAR; INTRAVENOUS; SUBCUTANEOUS at 04:25

## 2022-04-09 NOTE — UTILIZATION REVIEW
Initial Clinical Review  ED PROVIDER CARE & NOTE ON 4/8/2022  FIRST PRIMARY PROVIDER NOTE 4/9/2022  OBSERVATION ADMISSION 4/9/2022 0153 CONVERTED TO INPATIENT ADMISSION 4/9/2022 0947 DUE TO ONGOING MANAGEMENT FOR POSS SBO    Admission: Date/Time/Statement:   Admission Orders (From admission, onward)     Ordered        04/09/22 0947  Inpatient Admission  Once            04/09/22 0153  Place in Observation  Once                      Orders Placed This Encounter   Procedures    Inpatient Admission     Standing Status:   Standing     Number of Occurrences:   1     Order Specific Question:   Level of Care     Answer:   Med Surg [16]     Order Specific Question:   Estimated length of stay     Answer:   More than 2 Midnights     Order Specific Question:   Certification     Answer:   I certify that inpatient services are medically necessary for this patient for a duration of greater than two midnights  See H&P and MD Progress Notes for additional information about the patient's course of treatment  ED Arrival Information     Expected Arrival Acuity    - 4/8/2022 21:50 Urgent         Means of arrival Escorted by Service Admission type    Walk-In Family Member Hospitalist Urgent         Arrival complaint    VOMITING/ABDOMINAL PAIN        Chief Complaint   Patient presents with    Abdominal Pain     pt reports mid abdominal pain since this morning, +NVD       Initial Presentation: [de-identified] y o  male  urgetly to ED w family member from home presents for Observation admission for evaluation & treatment of Enteritis, CKD    PMHx  HTN, neuropathy, gout and s/p partial gastrectomy reports he woke ate a bowl of cereal for breakfast & abruptly begun w Non bloody diarrhea w 15-20 episodes thru the day w an episode of vomiting after attempt to eat half a hoagie   Reports periumbilical abd pain; recently more constipated over few wks w change in BMs once every 5 days  EXAM: ABD abd tenderness in periumbilical area; CTA/P reveals multiple midlly dilated loops of sm bowel wo a clear transition point; concern for sm bowel enteritis or early obstruction  Labs w elevated creatinine  PLAN  IVF, pain & anti emetics, cl liq diet, monitor off antibx, stool studies  Monitor BMP, avoid nephrotoxins/hypotension  Cont gabapentin, cardizem  Date:      4/9 Gen Surgery   CT concerning for possible SBO, surgery consulted for recs  abd significantly distended; NGT placed w 300cc output  Cont NPO, NGT to cont med sx, IVF at 125 ML/Hr, stool studies; lovenox   Monitor abd exam;   Consider SBFT I e  gastrografin challenge, on 4/10 if no relief in distension  ED Triage Vitals   Temperature Pulse Respirations Blood Pressure SpO2   04/08/22 2213 04/08/22 2213 04/08/22 2213 04/08/22 2213 04/08/22 2213   98 3 °F (36 8 °C) 92 16 144/76 95 %      Temp Source Heart Rate Source Patient Position - Orthostatic VS BP Location FiO2 (%)   04/08/22 2213 04/08/22 2213 04/08/22 2213 04/08/22 2213 --   Oral Monitor Sitting Left arm       Pain Score       04/08/22 2300       4          Wt Readings from Last 1 Encounters:   04/09/22 101 kg (223 lb 1 7 oz)     Additional Vital Signs:   Date/Time Temp Pulse Resp BP MAP (mmHg) SpO2 Calculated FIO2 (%) - Nasal Cannula Nasal Cannula O2 Flow Rate (L/min) O2 Device Patient Position - Orthostatic VS   04/09/22 07:54:59 98 4 °F (36 9 °C) 78 -- 149/100 116 95 % -- -- -- --   04/09/22 0500 -- -- -- -- -- -- 28 2 L/min Nasal cannula --   04/09/22 03:59:26 97 8 °F (36 6 °C) 78 18 150/100 117 97 % -- -- -- --   04/09/22 0331 -- 78 16 136/86 -- 95 % -- -- -- Lying   04/09/22 0115 -- 86 16 128/83 101 95 % -- -- None (Room air) Lying   04/09/22 0000 -- 78 16 153/95 119 92 % -- -- None (Room air) Lying   04/08/22 2213 98 3 °F (36 8 °C) 92 16 144/76 -- 95 % -- -- None (Room air) Sitting       Weights (last 14 days)    Date/Time Weight Weight Method Height   04/09/22 0600 101 kg (223 lb 1 7 oz) Bed scale --   04/09/22 03:59:26 -- -- 5' 6" (1 676 m) Pertinent Labs/Diagnostic Test Results:   4/8 EKG sr Ectopy: PVCs      PVCs:  Infrequent  QRS:     QRS axis:  Normal    QRS intervals:  Normal  Conduction:     Conduction: normal    ST segments:     ST segments:  Normal  T waves:     T waves: non-specific    CT abdomen pelvis with contrast      Final Result by Temo Hernández MD (04/09 3724)      1  Dilated loops of small bowel without focal transition point  There is a gradual tapering of the distal small bowel, which may represent a partial or early small bowel obstruction  2   Postsurgical changes of gastrojejunal anastomosis, with distended stomach, and decompression distal to the anastomosis  Correlate for gastric outlet obstruction  This was not described in the preliminary [facility] report  XR chest 1 view portable   ED Interpretation by Galina Finley MD (04/09 0122)   No acute disease read by me               Results from last 7 days   Lab Units 04/08/22  2222   WBC Thousand/uL 6 12   HEMOGLOBIN g/dL 15 2   HEMATOCRIT % 46 5   PLATELETS Thousands/uL 189   BANDS PCT % 2         Results from last 7 days   Lab Units 04/09/22  0431 04/08/22  2222   SODIUM mmol/L 142 141   POTASSIUM mmol/L 3 8 3 9   CHLORIDE mmol/L 108 104   CO2 mmol/L 26 26   ANION GAP mmol/L 8 11   BUN mg/dL 29* 31*   CREATININE mg/dL 1 22 1 36*   EGFR ml/min/1 73sq m 55 48   CALCIUM mg/dL 8 2* 8 6   MAGNESIUM mg/dL  --  1 9     Results from last 7 days   Lab Units 04/08/22  2222   AST U/L 25   ALT U/L 24   ALK PHOS U/L 83   TOTAL PROTEIN g/dL 6 7   ALBUMIN g/dL 3 6   TOTAL BILIRUBIN mg/dL 0 79         Results from last 7 days   Lab Units 04/09/22  0431 04/08/22  2222   GLUCOSE RANDOM mg/dL 110 125             No results found for: BETA-HYDROXYBUTYRATE                           Results from last 7 days   Lab Units 04/08/22  2222   PROTIME seconds 13 9   INR  1 07   PTT seconds 27             Results from last 7 days   Lab Units 04/08/22  2310   LACTIC ACID mmol/L 1 2 Results from last 7 days   Lab Units 04/08/22  2222   LIPASE u/L 40*                 Results from last 7 days   Lab Units 04/08/22  2332   CLARITY UA  Clear   COLOR UA  Yellow   SPEC GRAV UA  >=1 030   PH UA  5 5   GLUCOSE UA mg/dl Negative   KETONES UA mg/dl Trace*   BLOOD UA  Negative   PROTEIN UA mg/dl Trace*   NITRITE UA  Negative   BILIRUBIN UA  Interference- unable to analyze*   UROBILINOGEN UA E U /dl 1 0   LEUKOCYTES UA  Negative   WBC UA /hpf 1-2   RBC UA /hpf 0-1   BACTERIA UA /hpf Occasional   EPITHELIAL CELLS WET PREP /hpf Occasional   MUCUS THREADS  Innumerable*                                 Results from last 7 days   Lab Units 04/09/22  0000 04/08/22  2310   BLOOD CULTURE  Received in Microbiology Lab  Culture in Progress  Received in Microbiology Lab  Culture in Progress  ED Treatment:   Medication Administration from 04/08/2022 2150 to 04/09/2022 0349       Date/Time Order Dose Route Action     04/08/2022 2338 sodium chloride 0 9 % bolus 500 mL 500 mL Intravenous New Bag     04/09/2022 0001 sodium chloride 0 9 % infusion 125 mL/hr Intravenous New Bag     04/08/2022 2312 ondansetron (ZOFRAN) injection 4 mg 4 mg Intravenous Given     04/08/2022 2312 HYDROmorphone (DILAUDID) injection 0 2 mg 0 2 mg Intravenous Given        Past Medical History:   Diagnosis Date    Abnormal electrocardiogram     Last assessed: Oct 11, 2013    Allergic rhinitis     last assessed: Oct 11, 2013    Allergic rhinitis due to pollen     last assessed: Oct 10, 2013    Allergic sinusitis     Last assessed: May 11, 2015    Allergy     resolved: July 22, 2015    Benign essential hypertension     Last assessed/resolved: May 31, 2017    BPH (benign prostatic hyperplasia)     Cancer [facility])     prostate    Colitis     Last assessed: May 24, 2016    Cough with hemoptysis 11/17/2020    Generalized osteoarthritis     Last assessed: Oct 11, 2013    GERD without esophagitis     Last assessed/resolved:  May 31, 2017    Hearing loss     Hiatal hernia     resolved: July 22, 2015    History of gastroesophageal reflux (GERD)     History of stomach ulcers     last assessed: May 11, 2015    Hypertension     Incomplete bladder emptying     Kidney stone     Nodular prostate with lower urinary tract symptoms     Nontoxic single thyroid nodule     last assessed: April 16, 2014    Orchalgia     Overweight     last assessed: Oct 31, 2013    Poor urinary stream     Pulmonary embolism [facility])     Salivary gland disorder     last assessed: April 16, 2014    Seasonal allergies     last assessed: May 15, 2015    Spermatocele     Straining to void     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, 2014     Present on Admission:   Benign essential hypertension   Chronic kidney disease   Idiopathic peripheral neuropathy      Admitting Diagnosis: Gastroenteritis [K52 9]  Abdominal pain [R10 9]  KAYLI (acute kidney injury) ([facility] [address] ) [N17 9]  Age/Sex: [de-identified] y o  male  Admission Orders:  CL LIQ  CONTACT ISOLATION  SCD  Scheduled Medications:  allopurinol, 100 mg, Oral, BID  aspirin, 81 mg, Oral, Daily  cholecalciferol, 1,000 Units, Oral, Daily  diltiazem, 360 mg, Oral, Daily  enoxaparin, 40 mg, Subcutaneous, Daily  gabapentin, 400 mg, Oral, HS  tamsulosin, 0 4 mg, Oral, Daily With Dinner      Continuous IV Infusions:  sodium chloride, 75 mL/hr, Intravenous, Continuous      PRN Meds:  acetaminophen, 650 mg, Oral, Q6H PRN  ondansetron, 4 mg, Intravenous, Q6H PRN            Network Utilization Review Department  ATTENTION: Please call with any questions or concerns to [phone] and carefully listen to the prompts so that you are directed to the right person  All voicemails are confidential   [place] all requests for admission clinical reviews, approved or denied determinations and any other requests to dedicated fax number below belonging to the campus where the patient is receiving treatment   List of dedicated fax numbers for the Facilities:  FACILITY NAME UR FAX NUMBER   ADMISSION DENIALS (Administrative/Medical Necessity) [phone]   [address] (Maternity/NICU/Pediatrics) [address] [facility],[address] Floor [facility] [address] [place] [address]  [phone]   [id number] [place] [address] [address] [place] [address] [facility] [phone]

## 2022-04-09 NOTE — CONSULTS
Consultation - Acute Care Surgery   Luis Leyva  [de-identified] y o  male MRN: [id number]  Unit/Bed#: W -01 Encounter: [id number]      Assessment/Plan      Assessment:  [de-identified] y/o M w h/o gastric ulcer c/b partial gastrectomy G-J anastomosis > 10y ago, multiple prior hernia repairs, presenting w enteritis, diarrhea, CT concerning for possible SBO, surgery consulted for recs  Vss  Afebrile  Abdomen significantly distended  Non tender  No rebound, guarding or peritonitis  NGT placed  Approximately 300 cc of output  Labs:   Wbc: 6 12  Cr: 1 36-> 1 22  Lactate: 1 2    Plan:  NPO  NGT, medium continuous suction  IVF @ 125  Monitor off abx  Stool studies per primary service, rule out cdiff  lovenox dvt ppx  Monitor abdominal exam  Consider SBFT I e  gastrografin challenge, on 4/10 if no relief in distension    History of Present Illness   Physician Requesting Consult: Celine Leyva DO  Reason for Consult / Principal Problem: rule out SBO  History, ROS and PFSH unobtainable from any source due to none  HPI: Luis Leyva  is a [de-identified]y o  year old male w PMH of gastric ulcer, complicated by partial gastrectomy GJ anastomosis > 10 y ago at outside facility, prior umbilical and inguinal hernia repairs who presents with 1 day of upper abdominal pain, nausea, vomiting  No recent abx use  Denied any sick contacts  No h/o MI or stroke  No anticoagulant use  Denied any chest pain or shortness of breath  Denied fever or chills  Main complaint is persistent no bloody diarrhea starting approximately one day ago  Inpatient consult to Acute Care Surgery  Consult performed by: Vladimir Norris MD  Consult ordered by: Celine Leyva DO          Review of Systems   Constitutional: Negative for chills and fatigue  HENT: Negative  Eyes: Negative  Respiratory: Negative  Cardiovascular: Negative  Gastrointestinal: Positive for abdominal distention, abdominal pain and nausea  Negative for vomiting  Endocrine: Negative  Genitourinary: Negative  Musculoskeletal: Negative  Skin: Negative  Negative for wound  Allergic/Immunologic: Negative  Neurological: Negative  Hematological: Negative  Psychiatric/Behavioral: Negative  Historical Information   Past Medical History:   Diagnosis Date    Abnormal electrocardiogram     Last assessed: Oct 11, 2013    Allergic rhinitis     last assessed: Oct 11, 2013    Allergic rhinitis due to pollen     last assessed: Oct 10, 2013    Allergic sinusitis     Last assessed: May 11, 2015    Allergy     resolved: July 22, 2015    Benign essential hypertension     Last assessed/resolved: May 31, 2017    BPH (benign prostatic hyperplasia)     Cancer [facility])     prostate    Colitis     Last assessed: May 24, 2016    Cough with hemoptysis 11/17/2020    Generalized osteoarthritis     Last assessed: Oct 11, 2013    GERD without esophagitis     Last assessed/resolved: May 31, 2017    Hearing loss     Hiatal hernia     resolved: July 22, 2015    History of gastroesophageal reflux (GERD)     History of stomach ulcers     last assessed: May 11, 2015    Hypertension     Incomplete bladder emptying     Kidney stone     Nodular prostate with lower urinary tract symptoms     Nontoxic single thyroid nodule     last assessed: April 16, 2014    Orchalgia     Overweight     last assessed: Oct 31, 2013    Poor urinary stream     Pulmonary embolism [facility])     Salivary gland disorder     last assessed: April 16, 2014    Seasonal allergies     last assessed: May 15, 2015    Spermatocele     Straining to void     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:  May 7, 2014     Past Surgical History:   Procedure Laterality Date    BLADDER SURGERY      CHOLECYSTECTOMY  2000    laparoscopic     FLAP LOCAL TRUNK Left 10/28/2021    Procedure: EXCISION OF FLANK MASS;  Surgeon: Roel Michelle DO;  Location:  MAIN OR;  Service: Plastics    HEMORRHOID SURGERY      pilionidal cyst removal     HERNIA REPAIR Left 2008    inguinal     KNEE ARTHROSCOPY Left     KNEE CARTILAGE SURGERY      NASAL SEPTUM SURGERY      Deviation repair     DC EXC PAROTD,LAT LOBE,DISSECT 5TH NERV Right 2021    Procedure: SUPERFICIAL PAROTIDECTOMY WITH FACIAL NERVE MONITORING;  Surgeon: Marcelino Pandey MD;  [facility];  Service: ENT    PROSTATE BIOPSY  2017    STOMACH SURGERY      TONSILLECTOMY AND ADENOIDECTOMY      at age 37   [address] - right  0 left     US GUIDED THYROID BIOPSY  2022    VASECTOMY       Social History   Social History     Substance and Sexual Activity   Alcohol Use Yes    Comment: rare     Social History     Substance and Sexual Activity   Drug Use No     E-Cigarette/Vaping    E-Cigarette Use Never User      E-Cigarette/Vaping Substances    Nicotine No     THC No     CBD No     Flavoring No     Other No     Unknown No      Social History     Tobacco Use   Smoking Status Former Smoker    Packs/day:     Years:     Pack years:     Types: Cigarettes    Start date:     Quit date: 5    Years since quittin 2   Smokeless Tobacco Never Used     Family History: non-contributory}    Meds/Allergies   all current active meds have been reviewed  Allergies   Allergen Reactions    Ace Inhibitors Hyperactivity       Objective   Vitals: Blood pressure 149/100, pulse 78, temperature 98 4 °F (36 9 °C), resp  rate 18, height 5' 6" (1 676 m), weight 101 kg (223 lb 1 7 oz), SpO2 95 %  ,Body mass index is 36 01 kg/m²  Intake/Output Summary (Last 24 hours) at 2022 0949  Last data filed at 2022 5338  Gross per 24 hour   Intake --   Output 200 ml   Net -200 ml     Invasive Devices  Report    Peripheral Intravenous Line            Peripheral IV 22 Left Antecubital <1 day                Physical Exam  Vitals reviewed  Constitutional:       General: He is not in acute distress  Appearance: He is not toxic-appearing  HENT:      Head: Normocephalic and atraumatic  Nose: Nose normal    Eyes:      Pupils: Pupils are equal, round, and reactive to light  Cardiovascular:      Rate and Rhythm: Normal rate  Pulses: Normal pulses  Pulmonary:      Effort: Pulmonary effort is normal    Abdominal:      General: There is distension  Palpations: Abdomen is soft  Tenderness: There is no abdominal tenderness  There is no guarding  Hernia: No hernia is present  Genitourinary:     Comments: deferred  Musculoskeletal:         General: Normal range of motion  Cervical back: Normal range of motion and neck supple  Skin:     General: Skin is warm  Capillary Refill: Capillary refill takes 2 to 3 seconds  Neurological:      General: No focal deficit present  Mental Status: He is alert and oriented to person, place, and time  Psychiatric:         Mood and Affect: Mood normal          Lab Results:   I have personally reviewed pertinent reports    , Coags:   Lab Results   Component Value Date    PTT 27 04/08/2022    INR 1 07 04/08/2022   , Creatinine:   Lab Results   Component Value Date    CREATININE 1 22 04/09/2022   , Lipid Panel: No results found for: CHOL, CBC with diff:   Lab Results   Component Value Date    WBC 6 12 04/08/2022    HGB 15 2 04/08/2022    HCT 46 5 04/08/2022    MCV 84 04/08/2022     04/08/2022    MCH 27 5 04/08/2022    MCHC 32 7 04/08/2022    RDW 15 9 (H) 04/08/2022    MPV 10 5 04/08/2022   , BMP/CMP:   Lab Results   Component Value Date    SODIUM 142 04/09/2022    K 3 8 04/09/2022     04/09/2022    CO2 26 04/09/2022    BUN 29 (H) 04/09/2022    CREATININE 1 22 04/09/2022    CALCIUM 8 2 (L) 04/09/2022    AST 25 04/08/2022    ALT 24 04/08/2022    ALKPHOS 83 04/08/2022    EGFR 55 04/09/2022   , Coags:   Lab Results   Component Value Date    PTT 27 04/08/2022    INR 1 07 04/08/2022     Imaging Studies: I have personally reviewed pertinent reports  EKG, Pathology, and Other Studies: I have personally reviewed pertinent reports      VTE Prophylaxis: Sequential compression device Mayco Victor      Code Status: Level 1 - Full Code  Advance Directive and Living Will:      Power of :    POLST:      Counseling / Coordination of Care  None

## 2022-04-09 NOTE — NURSING NOTE
Received final report from Dr Alfred Ruano for CT abdomen/pelvis  MD questioning possible gastic outlet/obstruction  Report sent to Dr Taqueria Marcos

## 2022-04-09 NOTE — PROGRESS NOTES
Progress Note - Colby Perez  [de-identified] y o  male MRN: [id number]    Unit/Bed#: W -01 Encounter: [id number]      Assessment:  [de-identified] y/o M w h/o partial gastrectomy GJ anastomosis, who presents w distension and concern for obstruction, possible stricture of GJ anastomosis  Afebrile  Vss  Abd soft, improving distension, non tender  NGT: 900 cc  Plan:  Continue npo  Continue ngt  Continue ivf  Wait return of bowel function  dvt ppx    Subjective:   Passing flatus  Reports improvement in abd pain  Requesting ngt to be removed  Reports it is too uncomfortable  Denied fever, chills, chest pain, shortness of breath, nausea, vomiting, or abdominal pain this morning  Objective:     Vitals: Blood pressure (!) 170/106, pulse 75, temperature 97 8 °F (36 6 °C), resp  rate 18, height 5' 6" (1 676 m), weight 101 kg (222 lb 14 2 oz), SpO2 94 %  ,Body mass index is 35 97 kg/m²  Intake/Output Summary (Last 24 hours) at 4/10/2022 0631  Last data filed at 4/10/2022 0401  Gross per 24 hour   Intake 2715 ml   Output 1800 ml   Net 915 ml       Physical Exam  General: NAD  HEENT: NC/AT  MMM  Cv: RRR  Lungs: normal effort  Ab: Soft, NT/ND  Ex: no CCE  Neuro: AAOx3    Scheduled Meds:  Current Facility-Administered Medications   Medication Dose Route Frequency Provider Last Rate    acetaminophen  650 mg Rectal Q6H PRN Red Goon, DO      enoxaparin  40 mg Subcutaneous Daily Tomas Chapa PA-C      HYDROmorphone  0 2 mg Intravenous Q6H PRN Red Goon, DO      metoprolol  5 mg Intravenous Q8H PRN Tomas Chapa PA-C      ondansetron  4 mg Intravenous Q6H PRN Tomas Chapa PA-C      pantoprazole  40 mg Intravenous Q24H [facility] Neta Canavan, MD      phenol  1 spray Mouth/Throat Q2H PRN Red Goon, DO      sodium chloride  100 mL/hr Intravenous Continuous Neta Canavan,  mL/hr (04/09/22 1657)     Continuous Infusions:sodium chloride, 100 mL/hr, Last Rate: 100 mL/hr (04/09/22 1657)      PRN Meds:   acetaminophen    HYDROmorphone    metoprolol    ondansetron    phenol      Invasive Devices  Report    Peripheral Intravenous Line            Peripheral IV 04/10/22 Right Antecubital <1 day          Drain            NG/OG/Enteral Tube 14 Fr Right nare <1 day                Lab, Imaging and other studies: I have personally reviewed pertinent films in PACS  VTE Pharmacologic Prophylaxis: Sequential compression device (Venodyne)   VTE Mechanical Prophylaxis: sequential compression device

## 2022-04-09 NOTE — PLAN OF CARE
Problem: MOBILITY - ADULT  Goal: Maintain or return to baseline ADL function  Description: INTERVENTIONS:  -  Assess patient's ability to carry out ADLs; assess patient's baseline for ADL function and identify physical deficits which impact ability to perform ADLs (bathing, care of mouth/teeth, toileting, grooming, dressing, etc )  - Assess/evaluate cause of self-care deficits   - Assess range of motion  - Assess patient's mobility; develop plan if impaired  - Assess patient's need for assistive devices and provide as appropriate  - Encourage maximum independence but intervene and supervise when necessary  - Involve family in performance of ADLs  - Assess for home care needs following discharge   - Consider OT consult to assist with ADL evaluation and planning for discharge  - Provide patient education as appropriate  Outcome: Progressing  Goal: Maintains/Returns to pre admission functional level  Description: INTERVENTIONS:  - Perform BMAT or MOVE assessment daily    - Set and communicate daily mobility goal to care team and patient/family/caregiver  - Collaborate with rehabilitation services on mobility goals if consulted  - Perform Range of Motion 2 times a day    - Dangle patient 2 times a day  - Stand patient 2 times a day  - Ambulate patient 2 times a day  - Out of bed to chair 2 times a day   - Out of bed for meals 2 times a day  - Out of bed for toileting  - Record patient progress and toleration of activity level   Outcome: Progressing

## 2022-04-09 NOTE — H&P
[address] [facility] Natasha Bird  1940, [de-identified] y o  male MRN: [id number]  Unit/Bed#: W -01 Encounter: [id number]  Primary Care Provider: Aubrey Pena MD   Date and time admitted to hospital: 4/8/2022 10:41 PM    * Enteritis  Assessment & Plan  · Presented with abdominal pain, diarrhea x 1 day  Patient also report 1 episode of vomiting  · CT A/P preliminary report with "multiple mildly dilated loops of small bowel in the abdomen without a clear transition po point, findings may represent small bowel enteritis however early obstruction not entirely excluded "  · Follow-up on stool studies  · Monitor off antibiotics; afebrile, no leukocytosis  · Continue IV fluids  · Pain control  · PRN Zofran  · Clear liquid diet as tolerated  · Consider GI eval if symptoms worsen/ persist     Chronic kidney disease  Assessment & Plan  Lab Results   Component Value Date    EGFR 48 04/08/2022    EGFR 62 01/20/2022    EGFR 51 10/11/2021    CREATININE 1 36 (H) 04/08/2022    CREATININE 1 11 01/20/2022    CREATININE 1 31 (H) 10/11/2021     · Baseline Cr around 1 10   · Cr mildly elevated at 1 36 on arrival    · Monitor intake/output  · Avoid nephrotoxins and hypotension  · Continue IV fluids at 75 mL/hr  · Repeat BMP in AM      Idiopathic peripheral neuropathy  Assessment & Plan  · Continue gabapentin  Benign essential hypertension  Assessment & Plan  · BP acceptable  · Continue Cardizem  VTE Pharmacologic Prophylaxis: VTE Score: 5 High Risk (Score >/= 5) - Pharmacological DVT Prophylaxis Ordered: heparin  Sequential Compression Devices Ordered  Code Status: level 1- full code  Discussion with family: Updated  (wife) at bedside  Anticipated Length of Stay: Patient will be admitted on an observation basis with an anticipated length of stay of less than 2 midnights secondary to enteritis      Total Time for Visit, including Counseling / Coordination of Care: 70 minutes Greater than 50% of this total time spent on direct patient counseling and coordination of care  Chief Complaint: abdominal pain, diarrhea    History of Present Illness:  Rachelle Andre  is a [de-identified] y o  male with a PMH of HTN, neuropathy, gout and s/p partial gastrectomy who presents with diarrhea and abdominal pain  Patient reports that yesterday morning he awoke and ate a bowl of cereal for breakfast and then started having non-bloody diarrhea with 15-20 episodes throughout the day  He also notes an episode of vomiting after attempting to eat half a hoagie from [facility] for lunch  He admits to periumbilical abdominal pain  He denies fevers/ chills, hematemesis, chest pain, SOB, dizziness/ lightheadedness  He also denies questionable foot intake, sick contacts or recent travel  He reports that over the past few weeks he had been more constipated than usual and was only have bowel movements once every 5 days until now  Review of Systems:  Review of Systems   Constitutional: Positive for appetite change  Negative for chills and fever  Respiratory: Negative for cough and shortness of breath  Cardiovascular: Negative for chest pain and leg swelling  Gastrointestinal: Positive for abdominal pain, diarrhea, nausea and vomiting  Negative for blood in stool  Genitourinary: Negative for difficulty urinating  Neurological: Negative for dizziness and light-headedness  All other systems reviewed and are negative  Past Medical and Surgical History:   Past Medical History:   Diagnosis Date    Abnormal electrocardiogram     Last assessed: Oct 11, 2013    Allergic rhinitis     last assessed: Oct 11, 2013    Allergic rhinitis due to pollen     last assessed: Oct 10, 2013    Allergic sinusitis     Last assessed: May 11, 2015    Allergy     resolved: July 22, 2015    Benign essential hypertension     Last assessed/resolved:  May 31, 2017    BPH (benign prostatic hyperplasia)     Cancer (HCC) prostate    Colitis     Last assessed: May 24, 2016    Cough with hemoptysis 11/17/2020    Generalized osteoarthritis     Last assessed: Oct 11, 2013    GERD without esophagitis     Last assessed/resolved: May 31, 2017    Hearing loss     Hiatal hernia     resolved: July 22, 2015    History of gastroesophageal reflux (GERD)     History of stomach ulcers     last assessed: May 11, 2015    Hypertension     Incomplete bladder emptying     Kidney stone     Nodular prostate with lower urinary tract symptoms     Nontoxic single thyroid nodule     last assessed: April 16, 2014    Orchalgia     Overweight     last assessed: Oct 31, 2013    Poor urinary stream     Pulmonary embolism [facility]     Salivary gland disorder     last assessed: April 16, 2014    Seasonal allergies     last assessed: May 15, 2015    Spermatocele     Straining to void     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, 2014       Past Surgical History:   Procedure Laterality Date    BLADDER SURGERY      CHOLECYSTECTOMY  2000    laparoscopic     FLAP LOCAL TRUNK Left 10/28/2021    Procedure: EXCISION OF FLANK MASS;  Surgeon: Maria G Guerra DO;  Location: [address] [place] [address] MAIN OR;  Service: Plastics    HEMORRHOID SURGERY      pilionidal cyst removal     HERNIA REPAIR Left 01/17/2008    inguinal     KNEE ARTHROSCOPY Left 1974    KNEE CARTILAGE SURGERY      NASAL SEPTUM SURGERY      Deviation repair     W[place] EXC PAROTD,LAT LOBE,DISSECT 5TH NERV Right 6/9/2021    Procedure: SUPERFICIAL PAROTIDECTOMY WITH FACIAL NERVE MONITORING;  Surgeon: Adriana Benoit MD;  [facility];  Service: ENT    PROSTATE BIOPSY  2017    STOMACH SURGERY      TONSILLECTOMY AND ADENOIDECTOMY      at age 37   [address] - right 01/04 0 left 01/95    US GUIDED THYROID BIOPSY  1/19/2022    VASECTOMY         Meds/Allergies:  Prior to Admission medications    Medication Sig Start Date End Date Taking?  Authorizing Provider   allopurinol (ZYLOPRIM) 100 mg tablet Take 1 tablet (100 mg total) by mouth 2 (two) times a day 10/20/21   Travis Linda MD   aspirin (ECOTRIN LOW STRENGTH) 81 mg EC tablet Take 81 mg by mouth daily    Historical Provider, MD   Cholecalciferol (VITAMIN D3 PO) Take by mouth 2000 IU    Historical Provider, MD   diltiazem (CARDIZEM CD) 360 MG 24 hr capsule Take 1 capsule (360 mg total) by mouth daily 3/23/22   Travis Linda MD   gabapentin (Neurontin) 100 mg capsule Take 1 capsule (100 mg total) by mouth daily at bedtime 3/23/22   Travis Linda MD   gabapentin (Neurontin) 400 mg capsule Take 1 capsule (400 mg total) by mouth daily 3/23/22   Travis Linda MD   levocetirizine (XYZAL) 5 MG tablet Take 5 mg by mouth every evening    Historical Provider, MD   tamsulosin (FLOMAX) 0 4 mg Take 1 capsule (0 4 mg total) by mouth daily with dinner 21   Travis Linda MD   traMADol-acetaminophen (ULTRACET) 37 5-325 mg per tablet Take 2 tablets by mouth every 6 (six) hours as needed for moderate pain 3/15/22   Travis Linda MD     I have reviewed home medications with a medical source (PCP, Pharmacy, other)  Allergies:    Allergies   Allergen Reactions    Ace Inhibitors Hyperactivity       Social History:  Marital Status: /Civil Union   Occupation:   Patient Pre-hospital Living Situation: Home, With spouse  Patient Pre-hospital Level of Mobility: walks  Patient Pre-hospital Diet Restrictions: none  Substance Use History:   Social History     Substance and Sexual Activity   Alcohol Use Yes    Comment: rare     Social History     Tobacco Use   Smoking Status Former Smoker    Packs/day: 1 00    Years: 30 00    Pack years: 30 00    Types: Cigarettes    Start date:     Quit date: Rizwan Huddleston Years since quittin 2   Smokeless Tobacco Never Used     Social History     Substance and Sexual Activity   Drug Use No       Family History:  Family History   Problem Relation Age of Onset    Hypertension Mother     Stroke Mother     Stomach cancer Father     Hypertension Father     Hypertension Family     Stroke Family         syndrome     Heart attack Son        Physical Exam:     Vitals:   Blood Pressure: 150/100 (04/09/22 0359)  Pulse: 78 (04/09/22 0359)  Temperature: 97 8 °F (36 6 °C) (04/09/22 0359)  Temp Source: Oral (04/09/22 0359)  Respirations: 18 (04/09/22 0359)  Height: 5' 6" (167 6 cm) (04/09/22 0359)  SpO2: 97 % (04/09/22 0359)    Physical Exam  Vitals and nursing note reviewed  Constitutional:       General: He is not in acute distress  Appearance: He is obese  He is not diaphoretic  HENT:      Head: Normocephalic and atraumatic  Right Ear: Decreased hearing noted  Left Ear: Decreased hearing noted  Eyes:      Conjunctiva/sclera: Conjunctivae normal    Cardiovascular:      Rate and Rhythm: Normal rate and regular rhythm  Pulmonary:      Effort: Pulmonary effort is normal  No respiratory distress  Breath sounds: Normal breath sounds  Abdominal:      General: Bowel sounds are normal  There is distension  Palpations: Abdomen is soft  Tenderness: There is abdominal tenderness in the periumbilical area  Musculoskeletal:      Right lower leg: No edema  Left lower leg: No edema  Skin:     General: Skin is warm and dry  Coloration: Skin is not pale  Neurological:      Mental Status: He is alert and oriented to person, place, and time     Psychiatric:         Mood and Affect: Mood normal           Additional Data:     Lab Results:  Results from last 7 days   Lab Units 04/08/22  2222   WBC Thousand/uL 6 12   HEMOGLOBIN g/dL 15 2   HEMATOCRIT % 46 5   PLATELETS Thousands/uL 189   BANDS PCT % 2   LYMPHO PCT % 5*   MONO PCT % 0*   EOS PCT % 0     Results from last 7 days   Lab Units 04/08/22  2222   SODIUM mmol/L 141   POTASSIUM mmol/L 3 9   CHLORIDE mmol/L 104   CO2 mmol/L 26   BUN mg/dL 31*   CREATININE mg/dL 1 36*   ANION GAP mmol/L 11 CALCIUM mg/dL 8 6   ALBUMIN g/dL 3 6   TOTAL BILIRUBIN mg/dL 0 79   ALK PHOS U/L 83   ALT U/L 24   AST U/L 25   GLUCOSE RANDOM mg/dL 125     Results from last 7 days   Lab Units 04/08/22  2222   INR  1 07             Results from last 7 days   Lab Units 04/08/22  2310   LACTIC ACID mmol/L 1 2       Imaging: Reviewed radiology reports from this admission including: abdominal/pelvic CT  CT abdomen pelvis with contrast   ED Interpretation by Daniel Bhatt MD (04/09 0121)   COMPARISON:  CT ABDOMEN PELVIS W CONTRAST 9/25/2019 7:54 PM  FINDINGS:  Lungs: The visualized portions of the lung bases are normal   Liver: Normal  No mass  Gallbladder and bile ducts: There has been a cholecystectomy  Pancreas: Normal  No ductal dilation  Spleen: Normal  No splenomegaly  Adrenal glands: Normal  No mass  Kidneys and ureters: There are multiple parapelvic cysts of the left kidney and a cortical cyst of the  right kidney  There is a nonobstructing calcification in the left kidney  Stomach and bowel: There are multiple mildly dilated fluid-filled loops of small bowel measuring as  large as 3 0 cm without a transition point suggestive of small bowel enteritis  There is liquid  consistency stool in the colon which can be seen with infectious process such as diarrhea  There is  sigmoid diverticulosis without diverticulitis  Appendix: A normal appendix is identified  Intraperitoneal space: Unremarkable  No free air  No significant fluid collection  Arteries: Unrema   rkable  No abdominal aortic aneurysm  Lymph nodes: Unremarkable  No enlarged lymph nodes  Urinary bladder: Unremarkable as visualized  Reproductive: Unremarkable as visualized  Bones/joints: Unremarkable  No acute fracture  Soft tissues: Unremarkable  IMPRESSION:  Multiple mildly dilated loops of small bowel in the abdomen without a clear transition po point, findings  may represent small bowel enteritis however early obstruction not entirely excluded  Recommend  follow-up  Thank you for allowing us to participate in the care of your patient  Dictated and Authenticated by: Catrachita Rosa MD  04/09/2022 1:17 AM Balwinder Thompsonel Time ([id number])      XR chest 1 view portable   ED Interpretation by Madiha Boone MD (04/09 0122)   No acute disease read by me   EKG and Other Studies Reviewed on Admission:   · EKG: sinus rhythm with infrequent PVCs  Nonspecific T waves  QTc 401  ** Please Note: This note has been constructed using a voice recognition system   **

## 2022-04-09 NOTE — ED PROVIDER NOTES
History  Chief Complaint   Patient presents with    Abdominal Pain     pt reports mid abdominal pain since this morning, +NVD     Patient is a [de-identified]year old male with worsening constant abdominal pain with bloating and N/v/D since this AM  Tried pepto bismol  No GI bleeding  No urinary sx  No fever  Has had prior partial gastrectomy for PUD  Was last seen in this ED on 3/9/20 for sepsis  [place] [facility] website checked on this patient and last Rx filled was on 3/15/22 for ultracet for 7 day supply  No known ill contacts  No travel  No raw meat, eggs, fish or recent abx use  History provided by:  Patient and spouse   used: No    Abdominal Pain  Associated symptoms: diarrhea, nausea and vomiting    Associated symptoms: no fever        Prior to Admission Medications   Prescriptions Last Dose Informant Patient Reported? Taking? Cholecalciferol (VITAMIN D3 PO)  Self Yes No   Sig: Take by mouth 2000 IU   allopurinol (ZYLOPRIM) 100 mg tablet  Self No No   Sig: Take 1 tablet (100 mg total) by mouth 2 (two) times a day   aspirin (ECOTRIN LOW STRENGTH) 81 mg EC tablet  Self Yes No   Sig: Take 81 mg by mouth daily   diltiazem (CARDIZEM CD) 360 MG 24 hr capsule   No No   Sig: Take 1 capsule (360 mg total) by mouth daily   gabapentin (Neurontin) 100 mg capsule   No No   Sig: Take 1 capsule (100 mg total) by mouth daily at bedtime   gabapentin (Neurontin) 400 mg capsule   No No   Sig: Take 1 capsule (400 mg total) by mouth daily   levocetirizine (XYZAL) 5 MG tablet  Self Yes No   Sig: Take 5 mg by mouth every evening   tamsulosin (FLOMAX) 0 4 mg  Self No No   Sig: Take 1 capsule (0 4 mg total) by mouth daily with dinner   traMADol-acetaminophen (ULTRACET) 37 5-325 mg per tablet  Self No No   Sig: Take 2 tablets by mouth every 6 (six) hours as needed for moderate pain      Facility-Administered Medications: None       Past Medical History:   Diagnosis Date    Abnormal electrocardiogram     Last assessed:  Oct 11, 2013    Allergic rhinitis     last assessed: Oct 11, 2013    Allergic rhinitis due to pollen     last assessed: Oct 10, 2013    Allergic sinusitis     Last assessed: May 11, 2015    Allergy     resolved: July 22, 2015    Benign essential hypertension     Last assessed/resolved: May 31, 2017    BPH (benign prostatic hyperplasia)     Cancer [facility])     prostate    Colitis     Last assessed: May 24, 2016    Cough with hemoptysis 11/17/2020    Generalized osteoarthritis     Last assessed: Oct 11, 2013    GERD without esophagitis     Last assessed/resolved: May 31, 2017    Hearing loss     Hiatal hernia     resolved: July 22, 2015    History of gastroesophageal reflux (GERD)     History of stomach ulcers     last assessed: May 11, 2015    Hypertension     Incomplete bladder emptying     Kidney stone     Nodular prostate with lower urinary tract symptoms     Nontoxic single thyroid nodule     last assessed: April 16, 2014    Orchalgia     Overweight     last assessed: Oct 31, 2013    Poor urinary stream     Pulmonary embolism [facility])     Salivary gland disorder     last assessed: April 16, 2014    Seasonal allergies     last assessed: May 15, 2015    Spermatocele     Straining to void     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:  May 7, 2014       Past Surgical History:   Procedure Laterality Date    BLADDER SURGERY      CHOLECYSTECTOMY  2000    laparoscopic     FLAP LOCAL TRUNK Left 10/28/2021    Procedure: EXCISION OF FLANK MASS;  Surgeon: Zakia Gray DO;  Location: [address] [place] [address] MAIN OR;  Service: Plastics    HEMORRHOID SURGERY      pilionidal cyst removal     HERNIA REPAIR Left 01/17/2008    inguinal     KNEE ARTHROSCOPY Left 1974    KNEE CARTILAGE SURGERY      NASAL SEPTUM SURGERY      Deviation repair     [place] EXC PAROTD,LAT LOBE,DISSECT 5TH NERV Right 6/9/2021    Procedure: SUPERFICIAL PAROTIDECTOMY WITH FACIAL NERVE MONITORING;  Surgeon: Florencia Austin MD;  [facility] Main [facility];  Service: ENT  PROSTATE BIOPSY  2017    STOMACH SURGERY      TONSILLECTOMY AND ADENOIDECTOMY      at age 37   [address] - right  0 left     US GUIDED THYROID BIOPSY  2022    [de-identified]         Family History   Problem Relation Age of Onset    Hypertension Mother     Stroke Mother     Stomach cancer Father     Hypertension Father     Hypertension Family     Stroke Family         syndrome     Heart attack Son      I have reviewed and agree with the history as documented  E-Cigarette/Vaping    E-Cigarette Use Never User      E-Cigarette/Vaping Substances    Nicotine No     THC No     CBD No     Flavoring No     Other No     Unknown No      Social History     Tobacco Use    Smoking status: Former Smoker     Packs/day:      Years: 30      Pack years: 30      Types: Cigarettes     Start date:      Quit date:      Years since quittin 2    Smokeless tobacco: Never Used   Vaping Use    Vaping Use: Never used   Substance Use Topics    Alcohol use: Yes     Comment: rare    Drug use: No       Review of Systems   Constitutional: Negative for fever  HENT: Positive for hearing loss (chronic)  Gastrointestinal: Positive for abdominal distention, abdominal pain, diarrhea, nausea and vomiting  Negative for blood in stool  Genitourinary: Negative for difficulty urinating  All other systems reviewed and are negative  Physical Exam  Physical Exam  Vitals and nursing note reviewed  Constitutional:       General: He is in acute distress (moderate)  HENT:      Head: Normocephalic and atraumatic  Ears:      Comments: Hard of hearing     Mouth/Throat:      Comments: Mucous membranes somewhat dry  Eyes:      General: No scleral icterus  Cardiovascular:      Rate and Rhythm: Normal rate and regular rhythm  Heart sounds: Normal heart sounds  No murmur heard        Pulmonary:      Effort: Pulmonary effort is normal  No respiratory distress  Breath sounds: Normal breath sounds  No stridor  No wheezing, rhonchi or rales  Abdominal:      General: Bowel sounds are normal  There is distension  Tenderness: There is abdominal tenderness (diffuse)  There is no guarding or rebound  Musculoskeletal:         General: No deformity  Cervical back: Normal range of motion and neck supple  Right lower leg: No edema  Left lower leg: No edema  Skin:     General: Skin is warm and dry  Coloration: Skin is not jaundiced  Findings: No erythema or rash  Neurological:      General: No focal deficit present  Mental Status: He is alert and oriented to person, place, and time     Psychiatric:         Mood and Affect: Mood normal          Vital Signs  ED Triage Vitals   Temperature Pulse Respirations Blood Pressure SpO2   04/08/22 2213 04/08/22 2213 04/08/22 2213 04/08/22 2213 04/08/22 2213   98 3 °F (36 8 °C) 92 16 144/76 95 %      Temp Source Heart Rate Source Patient Position - Orthostatic VS BP Location FiO2 (%)   04/08/22 2213 04/08/22 2213 04/08/22 2213 04/08/22 2213 --   Oral Monitor Sitting Left arm       Pain Score       04/08/22 2300       4           Vitals:    04/08/22 2213 04/09/22 0000 04/09/22 0115   BP: 144/76 153/95 128/83   Pulse: 92 78 86   Patient Position - Orthostatic VS: Sitting Lying Lying         Visual Acuity  Visual Acuity      Most Recent Value   L Pupil Size (mm) 3   R Pupil Size (mm) 3          ED Medications  Medications   sodium chloride 0 9 % infusion (125 mL/hr Intravenous New Bag 4/9/22 0001)   sodium chloride 0 9 % bolus 500 mL (0 mL Intravenous Stopped 4/9/22 0038)   ondansetron (ZOFRAN) injection 4 mg (4 mg Intravenous Given 4/8/22 2312)   HYDROmorphone (DILAUDID) injection 0 2 mg (0 2 mg Intravenous Given 4/8/22 2312)   iohexol (OMNIPAQUE) 350 MG/ML injection (SINGLE-DOSE) 100 mL (100 mL Intravenous Given 4/8/22 2332)       Diagnostic Studies  Results Reviewed Procedure Component Value Units Date/Time    Urine culture [[id number]] Collected: 04/09/22 0018    Lab Status: In process Specimen: Urine, Clean Catch Updated: 04/09/22 0018    Urine Microscopic [[id number]]  (Abnormal) Collected: 04/08/22 2332    Lab Status: Final result Specimen: Urine, Clean Catch Updated: 04/09/22 0010     RBC, UA 0-1 /hpf      WBC, UA 1-2 /hpf      Epithelial Cells Occasional /hpf      Bacteria, UA Occasional /hpf      MUCUS THREADS Innumerable    Blood culture #2 [[id number]] Collected: 04/09/22 0000    Lab Status: In process Specimen: Blood from Hand, Right Updated: 04/09/22 0004    [place] draw [[id number]] Collected: 04/08/22 2222    Lab Status: Final result Specimen: Blood from Arm, Left Updated: 04/09/22 0001    Narrative: The following orders were created for panel order [place] draw  Procedure                               Abnormality         Status                     ---------                               -----------         ------                     Livan Felicitas Top on TEMU[[id number]]                           Final result               Gold top on YHPZ[[id number]]                                 Final result               Green / Black tube on XZZK[[id number]]                       Final result                 Please view results for these tests on the individual orders  Magnesium [[id number]]  (Normal) Collected: 04/08/22 2222    Lab Status: Final result Specimen: Blood from Arm, Left Updated: 04/09/22 0000     Magnesium 1 9 mg/dL     Lactic acid [[id number]]  (Normal) Collected: 04/08/22 2310    Lab Status: Final result Specimen: Blood from Arm, Left Updated: 04/08/22 2352     LACTIC ACID 1 2 mmol/L     Narrative:      Result may be elevated if tourniquet was used during collection      Protime-INR [[id number]]  (Normal) Collected: 04/08/22 2222    Lab Status: Final result Specimen: Blood from Arm, Left Updated: 04/08/22 2336     Protime 13 9 seconds      INR 1 07    APTT [[id number]]  (Normal) Collected: 04/08/22 2222    Lab Status: Final result Specimen: Blood from Arm, Left Updated: 04/08/22 2336     PTT 27 seconds     Stool Enteric Bacterial Panel by PCR [[id number]] Collected: 04/08/22 2327    Lab Status: In process Specimen: Stool from Rectum Updated: 04/08/22 2334    Clostridium difficile toxin by PCR with EIA [[id number]] Collected: 04/08/22 2327    Lab Status: In process Specimen: Stool from Per Rectum Updated: 04/08/22 2334    Urine Macroscopic, POC [[id number]]  (Abnormal) Collected: 04/08/22 2332    Lab Status: Final result Specimen: Urine Updated: 04/08/22 2333     Color, UA Yellow     Clarity, UA Clear     pH, UA 5 5     Leukocytes, UA Negative     Nitrite, UA Negative     Protein, UA Trace mg/dl      Glucose, UA Negative mg/dl      Ketones, UA Trace mg/dl      Urobilinogen, UA 1 0 E U /dl      Bilirubin, UA Interference- unable to analyze     Blood, UA Negative     Specific Gravity, UA >=1 030    Narrative:      [place] RESULT    Blood culture #1 [[id number]] Collected: 04/08/22 2310    Lab Status:  In process Specimen: Blood from Arm, Left Updated: 04/08/22 2327    Manual Differential(PHLEBS Do Not Order) [[id number]]  (Abnormal) Collected: 04/08/22 2222    Lab Status: Final result Specimen: Blood from Arm, Left Updated: 04/08/22 2307     Segmented % 92 %      Bands % 2 %      Lymphocytes % 5 %      Monocytes % 0 %      Eosinophils, % 0 %      Basophils % 1 %      Absolute Neutrophils 5 75 Thousand/uL      Lymphocytes Absolute 0 31 Thousand/uL      Monocytes Absolute 0 00 Thousand/uL      Eosinophils Absolute 0 00 Thousand/uL      Basophils Absolute 0 06 Thousand/uL      Total Counted --     RBC Morphology Normal     Platelet Estimate Adequate    CBC and differential [[id number]]  (Abnormal) Collected: 04/08/22 2222    Lab Status: Final result Specimen: Blood from Arm, Left Updated: 04/08/22 2307     WBC 6 12 Thousand/uL      RBC 5 52 Million/uL      Hemoglobin 15 2 g/dL Hematocrit 46 5 %      MCV 84 fL      MCH 27 5 pg      MCHC 32 7 g/dL      RDW 15 9 %      MPV 10 5 fL      Platelets 078 Thousands/uL     Narrative: This is an appended report  These results have been appended to a previously verified report  Lipase [[id number]]  (Abnormal) Collected: 04/08/22 2222    Lab Status: Final result Specimen: Blood from Arm, Left Updated: 04/08/22 2300     Lipase 40 u/L     Comprehensive metabolic panel [[id number]]  (Abnormal) Collected: 04/08/22 2222    Lab Status: Final result Specimen: Blood from Arm, Left Updated: 04/08/22 2300     Sodium 141 mmol/L      Potassium 3 9 mmol/L      Chloride 104 mmol/L      CO2 26 mmol/L      ANION GAP 11 mmol/L      BUN 31 mg/dL      Creatinine 1 36 mg/dL      Glucose 125 mg/dL      Calcium 8 6 mg/dL      AST 25 U/L      ALT 24 U/L      Alkaline Phosphatase 83 U/L      Total Protein 6 7 g/dL      Albumin 3 6 g/dL      Total Bilirubin 0 79 mg/dL      eGFR 48 ml/min/1 73sq m     Narrative:      [place] guidelines for Chronic Kidney Disease (CKD):     Stage 1 with normal or high GFR (GFR > 90 mL/min/1 73 square meters)    Stage 2 Mild CKD (GFR = 60-89 mL/min/1 73 square meters)    Stage 3A Moderate CKD (GFR = 45-59 mL/min/1 73 square meters)    Stage 3B Moderate CKD (GFR = 30-44 mL/min/1 73 square meters)    Stage 4 Severe CKD (GFR = 15-29 mL/min/1 73 square meters)    Stage 5 End Stage CKD (GFR <15 mL/min/1 73 square meters)  Note: GFR calculation is accurate only with a steady state creatinine                 CT abdomen pelvis with contrast   ED Interpretation by Jahaira Brown MD (04/09 0121)   COMPARISON:  CT ABDOMEN PELVIS W CONTRAST 9/25/2019 7:54 PM  FINDINGS:  Lungs: The visualized portions of the lung bases are normal   Liver: Normal  No mass  Gallbladder and bile ducts: There has been a cholecystectomy  Pancreas: Normal  No ductal dilation  Spleen: Normal  No splenomegaly    Adrenal glands: Normal  No mass  Kidneys and ureters: There are multiple parapelvic cysts of the left kidney and a cortical cyst of the  right kidney  There is a nonobstructing calcification in the left kidney  Stomach and bowel: There are multiple mildly dilated fluid-filled loops of small bowel measuring as  large as 3 0 cm without a transition point suggestive of small bowel enteritis  There is liquid  consistency stool in the colon which can be seen with infectious process such as diarrhea  There is  sigmoid diverticulosis without diverticulitis  Appendix: A normal appendix is identified  Intraperitoneal space: Unremarkable  No free air  No significant fluid collection  Arteries: Unrema   rkable  No abdominal aortic aneurysm  Lymph nodes: Unremarkable  No enlarged lymph nodes  Urinary bladder: Unremarkable as visualized  Reproductive: Unremarkable as visualized  Bones/joints: Unremarkable  No acute fracture  Soft tissues: Unremarkable  IMPRESSION:  Multiple mildly dilated loops of small bowel in the abdomen without a clear transition po point, findings  may represent small bowel enteritis however early obstruction not entirely excluded  Recommend  follow-up  Thank you for allowing us to participate in the care of your patient  Dictated and Authenticated by: Carole Bailey MD  04/09/2022 1:17 AM Oral Fern Time (Andrez Madrigal [address])      XR chest 1 view portable   ED Interpretation by Alonzo Weems MD (04/09 0122)   No acute disease read by me                    Procedures  ECG 12 Lead Documentation Only    Date/Time: 4/8/2022 11:53 PM  Performed by: Alonzo Weems MD  Authorized by: Alonzo Weems MD     Indications / Diagnosis:  Abdominal pain  ECG reviewed by me, the ED Provider: yes    Patient location:  ED  Previous ECG:     Previous ECG:  Compared to current    Comparison ECG info:  3/9/20    Similarity:  Changes noted (PVC now)  Rate:     ECG rate:  79    ECG rate assessment: normal    Rhythm:     Rhythm: sinus rhythm    Ectopy:     Ectopy: PVCs      PVCs:  Infrequent  QRS:     QRS axis:  Normal    QRS intervals:  Normal  Conduction:     Conduction: normal    ST segments:     ST segments:  Normal  T waves:     T waves: non-specific               ED Course  ED Course as of 04/09/22 0154   Fri Apr 08, 2022   2305 Creatinine(!): 1 36  IVFs ordered  Sat Apr 09, 2022   0017 Labs and CXR d/w patient and wife with patient's permission and pain improved  Nasal cannula oxygen ordered for decreased pulse ox from dilaudid  0134 CT d/w patient and wife   MDM  Number of Diagnoses or Management Options  Diagnosis management comments: DDx including but not limited to: appendicitis, gastroenteritis, gastritis, PUD, GERD, gastroparesis, hepatitis, pancreatitis, colitis, enteritis, diverticulitis, food poisoning, epiploic appendagitis, mesenteric adenitis, mesenteric panniculitis, mesenteric ischemia, IBD, IBS, ileus, bowel obstruction, volvulus, internal hernia, cholecystitis, biliary colic, choledocholithiasis, perforated viscus, tumor, splenic etiology, constipation, AAA, doubt testicular torsion; renal colic, pyelonephritis, UTI, sepsis; doubt cardiac etiology          Amount and/or Complexity of Data Reviewed  Clinical lab tests: ordered and reviewed  Tests in the radiology section of CPT®: ordered and reviewed  Decide to obtain previous medical records or to obtain history from someone other than the patient: yes  Obtain history from someone other than the patient: yes  Review and summarize past medical records: yes  Independent visualization of images, tracings, or specimens: yes        Disposition  Final diagnoses:   Gastroenteritis   Abdominal pain   KAYLI (acute kidney injury) ([facility] [address] )     Time reflects when diagnosis was documented in both MDM as applicable and the Disposition within this note     Time User Action Codes Description Comment    4/9/2022  1:36 AM Valeria Webster Add [K52 9] Gastroenteritis     4/9/2022  1:36 AM Chelsea [place] Add [R10 9] Abdominal pain     4/9/2022  1:36 AM Chelsea [place] Add [N17 9] KAYLI (acute kidney injury) [facility])       ED Disposition     ED Disposition Condition Date/Time Comment    Admit Stable Sat Apr 9, 2022  1:53 AM Case was discussed with SERINA Vieira and the patient's admission status was agreed to be Admission Status: observation status to the service of Dr Gutierrez [place]   Follow-up Information    None         Patient's Medications   Discharge Prescriptions    No medications on file       No discharge procedures on file      PDMP Review       Value Time User    PDMP Reviewed  Yes 4/8/2022 10:47 PM Matthew Ramirez MD          ED Provider  Electronically Signed by           Matthew Ramirez MD  04/09/22 0639

## 2022-04-09 NOTE — ASSESSMENT & PLAN NOTE
Lab Results   Component Value Date    EGFR 48 04/08/2022    EGFR 62 01/20/2022    EGFR 51 10/11/2021    CREATININE 1 36 (H) 04/08/2022    CREATININE 1 11 01/20/2022    CREATININE 1 31 (H) 10/11/2021     · Baseline Cr around 1 10   · Cr mildly elevated at 1 36 on arrival    · Monitor intake/output  · Avoid nephrotoxins and hypotension  · Continue IV fluids at 75 mL/hr     · Repeat BMP in AM

## 2022-04-09 NOTE — ASSESSMENT & PLAN NOTE
· Presented with abdominal pain, diarrhea x 1 day  Patient also report 1 episode of vomiting  · CT A/P preliminary report with "multiple mildly dilated loops of small bowel in the abdomen without a clear transition po point, findings may represent small bowel enteritis however early obstruction not entirely excluded "  · Follow-up on stool studies  · Monitor off antibiotics; afebrile, no leukocytosis  · Continue IV fluids  · Pain control  · PRN Zofran  · Clear liquid diet as tolerated    · Consider GI eval if symptoms worsen/ persist

## 2022-04-10 PROBLEM — K56.609 SMALL BOWEL OBSTRUCTION (HCC): Status: ACTIVE | Noted: 2022-04-10

## 2022-04-10 PROBLEM — E87.8 ELECTROLYTE ABNORMALITY: Status: ACTIVE | Noted: 2022-04-10

## 2022-04-10 PROBLEM — R50.9 FEVER: Status: ACTIVE | Noted: 2022-04-10

## 2022-04-10 PROBLEM — F41.9 ANXIETY: Status: ACTIVE | Noted: 2022-04-10

## 2022-04-10 LAB
ANION GAP SERPL CALCULATED.3IONS-SCNC: 9 MMOL/L (ref 4–13)
BUN SERPL-MCNC: 19 MG/DL (ref 5–25)
C DIFF TOX GENS STL QL NAA+PROBE: NEGATIVE
CALCIUM SERPL-MCNC: 8.3 MG/DL (ref 8.3–10.1)
CHLORIDE SERPL-SCNC: 102 MMOL/L (ref 100–108)
CO2 SERPL-SCNC: 25 MMOL/L (ref 21–32)
CREAT SERPL-MCNC: 1.11 MG/DL (ref 0.6–1.3)
ERYTHROCYTE [DISTWIDTH] IN BLOOD BY AUTOMATED COUNT: 15.9 % (ref 11.6–15.1)
FLUAV RNA RESP QL NAA+PROBE: NEGATIVE
FLUBV RNA RESP QL NAA+PROBE: NEGATIVE
GFR SERPL CREATININE-BSD FRML MDRD: 62 ML/MIN/1.73SQ M
GLUCOSE SERPL-MCNC: 92 MG/DL (ref 65–140)
HCT VFR BLD AUTO: 46.9 % (ref 36.5–49.3)
HGB BLD-MCNC: 15.2 G/DL (ref 12–17)
LACTATE SERPL-SCNC: 1.1 MMOL/L (ref 0.5–2)
MAGNESIUM SERPL-MCNC: 1.8 MG/DL (ref 1.6–2.6)
MCH RBC QN AUTO: 27.5 PG (ref 26.8–34.3)
MCHC RBC AUTO-ENTMCNC: 32.4 G/DL (ref 31.4–37.4)
MCV RBC AUTO: 85 FL (ref 82–98)
PHOSPHATE SERPL-MCNC: 2.7 MG/DL (ref 2.3–4.1)
PLATELET # BLD AUTO: 166 THOUSANDS/UL (ref 149–390)
PMV BLD AUTO: 10.6 FL (ref 8.9–12.7)
POTASSIUM SERPL-SCNC: 3.4 MMOL/L (ref 3.5–5.3)
PROCALCITONIN SERPL-MCNC: 0.12 NG/ML
RBC # BLD AUTO: 5.53 MILLION/UL (ref 3.88–5.62)
RSV RNA RESP QL NAA+PROBE: NEGATIVE
SARS-COV-2 RNA RESP QL NAA+PROBE: NEGATIVE
SODIUM SERPL-SCNC: 136 MMOL/L (ref 136–145)
WBC # BLD AUTO: 6.59 THOUSAND/UL (ref 4.31–10.16)

## 2022-04-10 PROCEDURE — 0241U HB NFCT DS VIR RESP RNA 4 TRGT: CPT | Performed by: GENERAL PRACTICE

## 2022-04-10 PROCEDURE — 80048 BASIC METABOLIC PNL TOTAL CA: CPT | Performed by: GENERAL PRACTICE

## 2022-04-10 PROCEDURE — 84145 PROCALCITONIN (PCT): CPT | Performed by: GENERAL PRACTICE

## 2022-04-10 PROCEDURE — 99232 SBSQ HOSP IP/OBS MODERATE 35: CPT | Performed by: SURGERY

## 2022-04-10 PROCEDURE — 85027 COMPLETE CBC AUTOMATED: CPT | Performed by: GENERAL PRACTICE

## 2022-04-10 PROCEDURE — C9113 INJ PANTOPRAZOLE SODIUM, VIA: HCPCS | Performed by: SURGERY

## 2022-04-10 PROCEDURE — 99232 SBSQ HOSP IP/OBS MODERATE 35: CPT | Performed by: GENERAL PRACTICE

## 2022-04-10 PROCEDURE — 84100 ASSAY OF PHOSPHORUS: CPT | Performed by: GENERAL PRACTICE

## 2022-04-10 PROCEDURE — 83735 ASSAY OF MAGNESIUM: CPT | Performed by: GENERAL PRACTICE

## 2022-04-10 PROCEDURE — 83605 ASSAY OF LACTIC ACID: CPT | Performed by: GENERAL PRACTICE

## 2022-04-10 PROCEDURE — 87040 BLOOD CULTURE FOR BACTERIA: CPT | Performed by: GENERAL PRACTICE

## 2022-04-10 RX ORDER — XYLITOL/YERBA SANTA
5 AEROSOL, SPRAY WITH PUMP (ML) MUCOUS MEMBRANE 4 TIMES DAILY PRN
Status: DISCONTINUED | OUTPATIENT
Start: 2022-04-10 | End: 2022-04-15 | Stop reason: HOSPADM

## 2022-04-10 RX ORDER — POTASSIUM CHLORIDE 14.9 MG/ML
20 INJECTION INTRAVENOUS ONCE
Status: COMPLETED | OUTPATIENT
Start: 2022-04-10 | End: 2022-04-10

## 2022-04-10 RX ORDER — OLANZAPINE 5 MG/1
2.5 TABLET, ORALLY DISINTEGRATING ORAL EVERY 8 HOURS PRN
Status: DISCONTINUED | OUTPATIENT
Start: 2022-04-10 | End: 2022-04-15 | Stop reason: HOSPADM

## 2022-04-10 RX ORDER — DEXTROSE, SODIUM CHLORIDE, AND POTASSIUM CHLORIDE 5; .45; .15 G/100ML; G/100ML; G/100ML
100 INJECTION INTRAVENOUS CONTINUOUS
Status: DISCONTINUED | OUTPATIENT
Start: 2022-04-10 | End: 2022-04-11

## 2022-04-10 RX ORDER — LABETALOL 20 MG/4 ML (5 MG/ML) INTRAVENOUS SYRINGE
20 EVERY 4 HOURS PRN
Status: DISCONTINUED | OUTPATIENT
Start: 2022-04-10 | End: 2022-04-15 | Stop reason: HOSPADM

## 2022-04-10 RX ORDER — MAGNESIUM SULFATE HEPTAHYDRATE 40 MG/ML
2 INJECTION, SOLUTION INTRAVENOUS ONCE
Status: COMPLETED | OUTPATIENT
Start: 2022-04-10 | End: 2022-04-10

## 2022-04-10 RX ADMIN — HYDROMORPHONE HYDROCHLORIDE 0.2 MG: 1 INJECTION, SOLUTION INTRAMUSCULAR; INTRAVENOUS; SUBCUTANEOUS at 04:02

## 2022-04-10 RX ADMIN — ENOXAPARIN SODIUM 40 MG: 40 INJECTION SUBCUTANEOUS at 08:21

## 2022-04-10 RX ADMIN — POTASSIUM CHLORIDE 20 MEQ: 200 INJECTION, SOLUTION INTRAVENOUS at 08:21

## 2022-04-10 RX ADMIN — OLANZAPINE 2.5 MG: 5 TABLET, ORALLY DISINTEGRATING ORAL at 14:30

## 2022-04-10 RX ADMIN — DEXTROSE, SODIUM CHLORIDE, AND POTASSIUM CHLORIDE 100 ML/HR: 5; .45; .15 INJECTION INTRAVENOUS at 11:09

## 2022-04-10 RX ADMIN — POTASSIUM PHOSPHATE, MONOBASIC POTASSIUM PHOSPHATE, DIBASIC 30 MMOL: 224; 236 INJECTION, SOLUTION, CONCENTRATE INTRAVENOUS at 15:39

## 2022-04-10 RX ADMIN — ACETAMINOPHEN 650 MG: 650 SUPPOSITORY RECTAL at 16:41

## 2022-04-10 RX ADMIN — MAGNESIUM SULFATE IN WATER 2 G: 40 INJECTION, SOLUTION INTRAVENOUS at 11:39

## 2022-04-10 RX ADMIN — ACETAMINOPHEN 650 MG: 650 SUPPOSITORY RECTAL at 08:21

## 2022-04-10 RX ADMIN — METOPROLOL TARTRATE 5 MG: 5 INJECTION INTRAVENOUS at 11:41

## 2022-04-10 RX ADMIN — OLANZAPINE 2.5 MG: 5 TABLET, ORALLY DISINTEGRATING ORAL at 23:45

## 2022-04-10 RX ADMIN — PANTOPRAZOLE SODIUM 40 MG: 40 INJECTION, POWDER, FOR SOLUTION INTRAVENOUS at 08:20

## 2022-04-10 NOTE — PLAN OF CARE
Problem: MOBILITY - ADULT  Goal: Maintain or return to baseline ADL function  Description: INTERVENTIONS:  -  Assess patient's ability to carry out ADLs; assess patient's baseline for ADL function and identify physical deficits which impact ability to perform ADLs (bathing, care of mouth/teeth, toileting, grooming, dressing, etc )  - Assess/evaluate cause of self-care deficits   - Assess range of motion  - Assess patient's mobility; develop plan if impaired  - Assess patient's need for assistive devices and provide as appropriate  - Encourage maximum independence but intervene and supervise when necessary  - Involve family in performance of ADLs  - Assess for home care needs following discharge   - Consider OT consult to assist with ADL evaluation and planning for discharge  - Provide patient education as appropriate  Outcome: Progressing  Goal: Maintains/Returns to pre admission functional level  Description: INTERVENTIONS:  - Perform BMAT or MOVE assessment daily    - Set and communicate daily mobility goal to care team and patient/family/caregiver  - Collaborate with rehabilitation services on mobility goals if consulted  - Perform Range of Motion 5 times a day  - Reposition patient every 2 hours    - Ambulate patient 5 times a day  - Out of bed for toileting  - Record patient progress and toleration of activity level   Outcome: Progressing     Problem: Prexisting or High Potential for Compromised Skin Integrity  Goal: Skin integrity is maintained or improved  Description: INTERVENTIONS:  - Identify patients at risk for skin breakdown  - Assess and monitor skin integrity  - Assess and monitor nutrition and hydration status  - Monitor labs   - Assess for incontinence   - Turn and reposition patient  - Assist with mobility/ambulation  - Relieve pressure over bony prominences  - Avoid friction and shearing  - Provide appropriate hygiene as needed including keeping skin clean and dry  - Evaluate need for skin moisturizer/barrier cream  - Collaborate with interdisciplinary team   - Patient/family teaching  - Consider wound care consult   Outcome: Progressing

## 2022-04-10 NOTE — PROGRESS NOTES
[facility]  Progress Note - Mayra Pichardo  1940, [de-identified] y o  male MRN: [id number]  Unit/Bed#: GRIC[facility] -01 Encounter: [id number]  Primary Care Provider: Jack Saucedo MD   Date and time admitted to hospital: 4/8/2022 10:41 PM    * Enteritis  Assessment & Plan  · Presented with abdominal pain, diarrhea x 1 day  Patient also report 1 episode of vomiting  · CT A/P preliminary report with "multiple mildly dilated loops of small bowel in the abdomen without a clear transition po point, findings may represent small bowel enteritis however early obstruction not entirely excluded "  · stool studies and C diff neg   · Monitor off antibiotics; no leukocytosis and afebrile  · Continue IV fluids  · Pain control  · PRN Zofran  · NPO/NGT  No PO meds  Ice chips ok    Small bowel obstruction (HCC)  Assessment & Plan  · Gen surg appreciated  · NPO/NGT    Fever  Assessment & Plan  · No other signs of sepsis  · B Cx in ER neg, repeated today  · F/u COVID/Flu/RSV  · Procla neg - f/u AM procal    Electrolyte abnormality  Assessment & Plan  · Keep K at 4, P at 3, Mag at 2  · Replete IV    Anxiety  Assessment & Plan  · 2/2 NGT  · Zydis prn    Chronic kidney disease  Assessment & Plan  Lab Results   Component Value Date    EGFR 62 04/10/2022    EGFR 55 04/09/2022    EGFR 48 04/08/2022    CREATININE 1 11 04/10/2022    CREATININE 1 22 04/09/2022    CREATININE 1 36 (H) 04/08/2022     · Estimated Creatinine Clearance: 59 1 mL/min (by C-G formula based on SCr of 1 11 mg/dL)  · Baseline Cr around 1 10   · Cr mildly elevated at 1 36 on arrival    · Monitor intake/output  · Avoid nephrotoxins and hypotension     · Continue IVF as pt NPO  · Repeat BMP in AM      Idiopathic peripheral neuropathy  Assessment & Plan  · Gabapentin 500 mg qhs on hold  · Home Ultracet on hold  · Dilaudid prn    Benign essential hypertension  Assessment & Plan  · Cardizem held  · Labetalol prn      VTE Pharmacologic Prophylaxis: VTE Score: 5 High Risk (Score >/= 5) - Pharmacological DVT Prophylaxis Ordered: enoxaparin (Lovenox)  Sequential Compression Devices Ordered  Patient Centered Rounds: I performed bedside rounds with nursing staff today  Discussions with Specialists or Other Care Team Provider: not yet    Education and Discussions with Family / Patient: Updated  (son and daughter in law) at bedside  Time Spent for Care: 30 minutes  More than 50% of total time spent on counseling and coordination of care as described above  Current Length of Stay: 1 day(s)  Current Patient Status: Inpatient   Certification Statement: The patient will continue to require additional inpatient hospital stay due to need for NGT  Discharge Plan: Anticipate discharge in 48-72 hrs to home  Code Status: Level 1 - Full Code    Subjective:   Pt getting anxious due to NGT    Objective:     Vitals:   Temp (24hrs), Av 3 °F (37 4 °C), Min:97 8 °F (36 6 °C), Max:102 5 °F (39 2 °C)    Temp:  [97 8 °F (36 6 °C)-102 5 °F (39 2 °C)] 99 4 °F (37 4 °C)  HR:  [73-90] 83  Resp:  [18] 18  BP: (151-189)/(100-118) 172/117  SpO2:  [91 %-99 %] 99 %  Body mass index is 35 97 kg/m²  Input and Output Summary (last 24 hours): Intake/Output Summary (Last 24 hours) at 4/10/2022 1455  Last data filed at 4/10/2022 0601  Gross per 24 hour   Intake 1431 67 ml   Output 1850 ml   Net -418 33 ml       Physical Exam:   Physical Exam  HENT:      Head: Normocephalic and atraumatic  Nose: Nose normal       Mouth/Throat:      Mouth: Mucous membranes are moist    Eyes:      Extraocular Movements: Extraocular movements intact  Conjunctiva/sclera: Conjunctivae normal    Cardiovascular:      Rate and Rhythm: Normal rate and regular rhythm  Pulmonary:      Effort: Pulmonary effort is normal       Breath sounds: Normal breath sounds  No wheezing or rales  Abdominal:      General: There is distension  Tenderness: There is no abdominal tenderness  Musculoskeletal:         General: Normal range of motion  Cervical back: Normal range of motion and neck supple  Right lower leg: No edema  Left lower leg: No edema  Skin:     General: Skin is warm and dry  Neurological:      Mental Status: He is alert and oriented to person, place, and time  Additional Data:     Labs:  Results from last 7 days   Lab Units 04/10/22  0402 04/08/22 2222 04/08/22  2222   WBC Thousand/uL 6 59   < > 6 12   HEMOGLOBIN g/dL 15 2   < > 15 2   HEMATOCRIT % 46 9   < > 46 5   PLATELETS Thousands/uL 166   < > 189   BANDS PCT %  --   --  2   LYMPHO PCT %  --   --  5*   MONO PCT %  --   --  0*   EOS PCT %  --   --  0    < > = values in this interval not displayed  Results from last 7 days   Lab Units 04/10/22  0402 04/09/22  0431 04/08/22 2222   SODIUM mmol/L 136   < > 141   POTASSIUM mmol/L 3 4*   < > 3 9   CHLORIDE mmol/L 102   < > 104   CO2 mmol/L 25   < > 26   BUN mg/dL 19   < > 31*   CREATININE mg/dL 1 11   < > 1 36*   ANION GAP mmol/L 9   < > 11   CALCIUM mg/dL 8 3   < > 8 6   ALBUMIN g/dL  --   --  3 6   TOTAL BILIRUBIN mg/dL  --   --  0 79   ALK PHOS U/L  --   --  83   ALT U/L  --   --  24   AST U/L  --   --  25   GLUCOSE RANDOM mg/dL 92   < > 125    < > = values in this interval not displayed       Results from last 7 days   Lab Units 04/08/22  2222   INR  1 07             Results from last 7 days   Lab Units 04/10/22  0906 04/08/22  2310   LACTIC ACID mmol/L 1 1 1 2   PROCALCITONIN ng/ml 0 12  --        Lines/Drains:  Invasive Devices  Report    Peripheral Intravenous Line            Peripheral IV 04/10/22 Left;Proximal;Ventral (anterior) Forearm <1 day    Peripheral IV 04/10/22 Right Antecubital <1 day          Drain            NG/OG/Enteral Tube 14 Fr Right nare 1 day                      Imaging: Reviewed radiology reports from this admission including: abdominal/pelvic CT and xray(s)    Recent Cultures (last 7 days):   Results from last 7 days   Lab Units 04/10/22  0908 04/09/22  1844 04/09/22  0018 04/09/22  0000 04/08/22  2327 04/08/22  2310   BLOOD CULTURE  Received in Microbiology Lab  Culture in Progress  Received in Microbiology Lab  Culture in Progress  --   --  No Growth at 24 hrs   --  No Growth at 24 hrs  URINE CULTURE   --   --  10,000-19,000 cfu/ml Proteus mirabilis*  <10,000 cfu/ml   --   --   --    C DIFF TOXIN B BY PCR   --  Negative  --   --  Indeterminate Result by PCR  Suggest Re-collection  *  --        Last 24 Hours Medication List:   Current Facility-Administered Medications   Medication Dose Route Frequency Provider Last Rate    acetaminophen  650 mg Rectal Q6H PRN Mary Carmen Leung DO      dextrose 5 % and sodium chloride 0 45 % with KCl 20 mEq/L  100 mL/hr Intravenous Continuous Yamile Mcelroy  mL/hr (04/10/22 1109)    enoxaparin  40 mg Subcutaneous Daily Kiesha De La Cruz PA-C      HYDROmorphone  0 2 mg Intravenous Q6H PRN Mary Carmen Leung,       Labetalol HCl  20 mg Intravenous Q4H PRN Mary Carmen Leung, DO      OLANZapine  2 5 mg Oral Q8H PRN Mary Carmen Leung, DO      ondansetron  4 mg Intravenous Q6H PRN Kiesha De La Cruz PA-C      pantoprazole  40 mg Intravenous Q24H [address] Herve Bueno MD      phenol  1 spray Mouth/Throat Q2H PRN Mary Carmen Leung DO      potassium phosphate  30 mmol Intravenous Once Yamile Mcelroy MD      saliva substitute  5 spray Mouth/Throat 4x Daily PRN Yamile Mcelroy MD          Today, Patient Was Seen By: Mary Carmen Leung DO    **Please Note: This note may have been constructed using a voice recognition system  **

## 2022-04-10 NOTE — ASSESSMENT & PLAN NOTE
· Presented with abdominal pain, diarrhea x 1 day  Patient also report 1 episode of vomiting  · CT A/P preliminary report with "multiple mildly dilated loops of small bowel in the abdomen without a clear transition po point, findings may represent small bowel enteritis however early obstruction not entirely excluded "  · stool studies and C diff neg   · Monitor off antibiotics; no leukocytosis and afebrile  · Continue IV fluids  · Pain control  · PRN Zofran  · NPO/NGT  No PO meds    Ice chips ok

## 2022-04-10 NOTE — ASSESSMENT & PLAN NOTE
Lab Results   Component Value Date    EGFR 62 04/10/2022    EGFR 55 04/09/2022    EGFR 48 04/08/2022    CREATININE 1 11 04/10/2022    CREATININE 1 22 04/09/2022    CREATININE 1 36 (H) 04/08/2022     · Estimated Creatinine Clearance: 59 1 mL/min (by C-G formula based on SCr of 1 11 mg/dL)  · Baseline Cr around 1 10   · Cr mildly elevated at 1 36 on arrival    · Monitor intake/output  · Avoid nephrotoxins and hypotension     · Continue IVF as pt NPO  · Repeat BMP in AM

## 2022-04-10 NOTE — ASSESSMENT & PLAN NOTE
· No other signs of sepsis  · B Cx in ER neg, repeated today  · F/u COVID/Flu/RSV  · Procla neg - f/u AM procal

## 2022-04-11 ENCOUNTER — APPOINTMENT (INPATIENT)
Dept: CT IMAGING | Facility: HOSPITAL | Age: 82
DRG: 871 | End: 2022-04-11
Payer: COMMERCIAL

## 2022-04-11 ENCOUNTER — APPOINTMENT (INPATIENT)
Dept: RADIOLOGY | Facility: HOSPITAL | Age: 82
DRG: 871 | End: 2022-04-11
Payer: COMMERCIAL

## 2022-04-11 LAB
ALBUMIN SERPL BCP-MCNC: 3.5 G/DL (ref 3.5–5)
ALP SERPL-CCNC: 63 U/L (ref 46–116)
ALT SERPL W P-5'-P-CCNC: 31 U/L (ref 12–78)
ANION GAP SERPL CALCULATED.3IONS-SCNC: 9 MMOL/L (ref 4–13)
ANION GAP SERPL CALCULATED.3IONS-SCNC: 9 MMOL/L (ref 4–13)
APTT PPP: 33 SECONDS (ref 23–37)
AST SERPL W P-5'-P-CCNC: 48 U/L (ref 5–45)
BACTERIA UR CULT: ABNORMAL
BACTERIA UR CULT: ABNORMAL
BASOPHILS # BLD MANUAL: 0 THOUSAND/UL (ref 0–0.1)
BASOPHILS # BLD MANUAL: 0 THOUSAND/UL (ref 0–0.1)
BASOPHILS NFR MAR MANUAL: 0 % (ref 0–1)
BASOPHILS NFR MAR MANUAL: 0 % (ref 0–1)
BILIRUB SERPL-MCNC: 1.1 MG/DL (ref 0.2–1)
BUN SERPL-MCNC: 17 MG/DL (ref 5–25)
BUN SERPL-MCNC: 17 MG/DL (ref 5–25)
CALCIUM SERPL-MCNC: 8.3 MG/DL (ref 8.3–10.1)
CALCIUM SERPL-MCNC: 8.6 MG/DL (ref 8.3–10.1)
CHLORIDE SERPL-SCNC: 100 MMOL/L (ref 100–108)
CHLORIDE SERPL-SCNC: 104 MMOL/L (ref 100–108)
CO2 SERPL-SCNC: 27 MMOL/L (ref 21–32)
CO2 SERPL-SCNC: 28 MMOL/L (ref 21–32)
CREAT SERPL-MCNC: 1.22 MG/DL (ref 0.6–1.3)
CREAT SERPL-MCNC: 1.33 MG/DL (ref 0.6–1.3)
CRP SERPL QL: 156.8 MG/L
D DIMER PPP FEU-MCNC: 1.51 UG/ML FEU
EOSINOPHIL # BLD MANUAL: 0 THOUSAND/UL (ref 0–0.4)
EOSINOPHIL # BLD MANUAL: 0 THOUSAND/UL (ref 0–0.4)
EOSINOPHIL NFR BLD MANUAL: 0 % (ref 0–6)
EOSINOPHIL NFR BLD MANUAL: 0 % (ref 0–6)
ERYTHROCYTE [DISTWIDTH] IN BLOOD BY AUTOMATED COUNT: 16 % (ref 11.6–15.1)
ERYTHROCYTE [DISTWIDTH] IN BLOOD BY AUTOMATED COUNT: 16.4 % (ref 11.6–15.1)
ERYTHROCYTE [SEDIMENTATION RATE] IN BLOOD: 10 MM/HOUR (ref 0–19)
GFR SERPL CREATININE-BSD FRML MDRD: 50 ML/MIN/1.73SQ M
GFR SERPL CREATININE-BSD FRML MDRD: 55 ML/MIN/1.73SQ M
GLUCOSE SERPL-MCNC: 102 MG/DL (ref 65–140)
GLUCOSE SERPL-MCNC: 98 MG/DL (ref 65–140)
HCT VFR BLD AUTO: 44.6 % (ref 36.5–49.3)
HCT VFR BLD AUTO: 48.6 % (ref 36.5–49.3)
HGB BLD-MCNC: 14.4 G/DL (ref 12–17)
HGB BLD-MCNC: 16 G/DL (ref 12–17)
INR PPP: 1.05 (ref 0.84–1.19)
LACTATE SERPL-SCNC: 2.3 MMOL/L (ref 0.5–2)
LACTATE SERPL-SCNC: 3.5 MMOL/L (ref 0.5–2)
LYMPHOCYTES # BLD AUTO: 0.43 THOUSAND/UL (ref 0.6–4.47)
LYMPHOCYTES # BLD AUTO: 0.51 THOUSAND/UL (ref 0.6–4.47)
LYMPHOCYTES # BLD AUTO: 14 % (ref 14–44)
LYMPHOCYTES # BLD AUTO: 8 % (ref 14–44)
MAGNESIUM SERPL-MCNC: 2 MG/DL (ref 1.6–2.6)
MCH RBC QN AUTO: 27.4 PG (ref 26.8–34.3)
MCH RBC QN AUTO: 27.6 PG (ref 26.8–34.3)
MCHC RBC AUTO-ENTMCNC: 32.3 G/DL (ref 31.4–37.4)
MCHC RBC AUTO-ENTMCNC: 32.9 G/DL (ref 31.4–37.4)
MCV RBC AUTO: 84 FL (ref 82–98)
MCV RBC AUTO: 85 FL (ref 82–98)
MONOCYTES # BLD AUTO: 0 THOUSAND/UL (ref 0–1.22)
MONOCYTES # BLD AUTO: 0.16 THOUSAND/UL (ref 0–1.22)
MONOCYTES NFR BLD: 0 % (ref 4–12)
MONOCYTES NFR BLD: 3 % (ref 4–12)
NEUTROPHILS # BLD MANUAL: 3.16 THOUSAND/UL (ref 1.85–7.62)
NEUTROPHILS # BLD MANUAL: 4.83 THOUSAND/UL (ref 1.85–7.62)
NEUTS BAND NFR BLD MANUAL: 2 % (ref 0–8)
NEUTS BAND NFR BLD MANUAL: 7 % (ref 0–8)
NEUTS SEG NFR BLD AUTO: 82 % (ref 43–75)
NEUTS SEG NFR BLD AUTO: 84 % (ref 43–75)
PHOSPHATE SERPL-MCNC: 2.2 MG/DL (ref 2.3–4.1)
PLATELET # BLD AUTO: 135 THOUSANDS/UL (ref 149–390)
PLATELET # BLD AUTO: 138 THOUSANDS/UL (ref 149–390)
PLATELET BLD QL SMEAR: ABNORMAL
PLATELET BLD QL SMEAR: ABNORMAL
PMV BLD AUTO: 10.9 FL (ref 8.9–12.7)
PMV BLD AUTO: 11.8 FL (ref 8.9–12.7)
POTASSIUM SERPL-SCNC: 3.6 MMOL/L (ref 3.5–5.3)
POTASSIUM SERPL-SCNC: 3.8 MMOL/L (ref 3.5–5.3)
PROCALCITONIN SERPL-MCNC: 0.25 NG/ML
PROCALCITONIN SERPL-MCNC: 0.28 NG/ML
PROT SERPL-MCNC: 7.1 G/DL (ref 6.4–8.2)
PROTHROMBIN TIME: 13.7 SECONDS (ref 11.6–14.5)
RBC # BLD AUTO: 5.26 MILLION/UL (ref 3.88–5.62)
RBC # BLD AUTO: 5.79 MILLION/UL (ref 3.88–5.62)
RBC MORPH BLD: NORMAL
RBC MORPH BLD: NORMAL
SODIUM SERPL-SCNC: 137 MMOL/L (ref 136–145)
SODIUM SERPL-SCNC: 140 MMOL/L (ref 136–145)
WBC # BLD AUTO: 3.67 THOUSAND/UL (ref 4.31–10.16)
WBC # BLD AUTO: 5.43 THOUSAND/UL (ref 4.31–10.16)

## 2022-04-11 PROCEDURE — 71045 X-RAY EXAM CHEST 1 VIEW: CPT

## 2022-04-11 PROCEDURE — 94664 DEMO&/EVAL PT USE INHALER: CPT

## 2022-04-11 PROCEDURE — 86140 C-REACTIVE PROTEIN: CPT | Performed by: INTERNAL MEDICINE

## 2022-04-11 PROCEDURE — 84100 ASSAY OF PHOSPHORUS: CPT | Performed by: STUDENT IN AN ORGANIZED HEALTH CARE EDUCATION/TRAINING PROGRAM

## 2022-04-11 PROCEDURE — 84145 PROCALCITONIN (PCT): CPT | Performed by: GENERAL PRACTICE

## 2022-04-11 PROCEDURE — 84145 PROCALCITONIN (PCT): CPT | Performed by: INTERNAL MEDICINE

## 2022-04-11 PROCEDURE — 85027 COMPLETE CBC AUTOMATED: CPT | Performed by: STUDENT IN AN ORGANIZED HEALTH CARE EDUCATION/TRAINING PROGRAM

## 2022-04-11 PROCEDURE — 99232 SBSQ HOSP IP/OBS MODERATE 35: CPT | Performed by: SURGERY

## 2022-04-11 PROCEDURE — 85610 PROTHROMBIN TIME: CPT | Performed by: INTERNAL MEDICINE

## 2022-04-11 PROCEDURE — 71275 CT ANGIOGRAPHY CHEST: CPT

## 2022-04-11 PROCEDURE — 83605 ASSAY OF LACTIC ACID: CPT | Performed by: INTERNAL MEDICINE

## 2022-04-11 PROCEDURE — 85007 BL SMEAR W/DIFF WBC COUNT: CPT | Performed by: INTERNAL MEDICINE

## 2022-04-11 PROCEDURE — 94640 AIRWAY INHALATION TREATMENT: CPT

## 2022-04-11 PROCEDURE — 87040 BLOOD CULTURE FOR BACTERIA: CPT | Performed by: INTERNAL MEDICINE

## 2022-04-11 PROCEDURE — 85007 BL SMEAR W/DIFF WBC COUNT: CPT | Performed by: STUDENT IN AN ORGANIZED HEALTH CARE EDUCATION/TRAINING PROGRAM

## 2022-04-11 PROCEDURE — 85027 COMPLETE CBC AUTOMATED: CPT | Performed by: INTERNAL MEDICINE

## 2022-04-11 PROCEDURE — 85730 THROMBOPLASTIN TIME PARTIAL: CPT | Performed by: INTERNAL MEDICINE

## 2022-04-11 PROCEDURE — 80053 COMPREHEN METABOLIC PANEL: CPT | Performed by: INTERNAL MEDICINE

## 2022-04-11 PROCEDURE — 99232 SBSQ HOSP IP/OBS MODERATE 35: CPT | Performed by: GENERAL PRACTICE

## 2022-04-11 PROCEDURE — 85379 FIBRIN DEGRADATION QUANT: CPT | Performed by: INTERNAL MEDICINE

## 2022-04-11 PROCEDURE — 94760 N-INVAS EAR/PLS OXIMETRY 1: CPT

## 2022-04-11 PROCEDURE — G1004 CDSM NDSC: HCPCS

## 2022-04-11 PROCEDURE — 83735 ASSAY OF MAGNESIUM: CPT | Performed by: STUDENT IN AN ORGANIZED HEALTH CARE EDUCATION/TRAINING PROGRAM

## 2022-04-11 PROCEDURE — 92610 EVALUATE SWALLOWING FUNCTION: CPT

## 2022-04-11 PROCEDURE — 85652 RBC SED RATE AUTOMATED: CPT | Performed by: INTERNAL MEDICINE

## 2022-04-11 PROCEDURE — 80048 BASIC METABOLIC PNL TOTAL CA: CPT | Performed by: STUDENT IN AN ORGANIZED HEALTH CARE EDUCATION/TRAINING PROGRAM

## 2022-04-11 PROCEDURE — C9113 INJ PANTOPRAZOLE SODIUM, VIA: HCPCS | Performed by: SURGERY

## 2022-04-11 RX ORDER — FUROSEMIDE 10 MG/ML
20 INJECTION INTRAMUSCULAR; INTRAVENOUS ONCE
Status: COMPLETED | OUTPATIENT
Start: 2022-04-11 | End: 2022-04-11

## 2022-04-11 RX ORDER — GABAPENTIN 400 MG/1
400 CAPSULE ORAL
Status: DISCONTINUED | OUTPATIENT
Start: 2022-04-11 | End: 2022-04-11

## 2022-04-11 RX ORDER — ALLOPURINOL 100 MG/1
100 TABLET ORAL 2 TIMES DAILY
Status: DISCONTINUED | OUTPATIENT
Start: 2022-04-11 | End: 2022-04-15 | Stop reason: HOSPADM

## 2022-04-11 RX ORDER — HEPARIN SODIUM 1000 [USP'U]/ML
8000 INJECTION, SOLUTION INTRAVENOUS; SUBCUTANEOUS ONCE
Status: DISCONTINUED | OUTPATIENT
Start: 2022-04-11 | End: 2022-04-11

## 2022-04-11 RX ORDER — ASPIRIN 81 MG/1
81 TABLET ORAL DAILY
Status: DISCONTINUED | OUTPATIENT
Start: 2022-04-11 | End: 2022-04-15 | Stop reason: HOSPADM

## 2022-04-11 RX ORDER — HEPARIN SODIUM 1000 [USP'U]/ML
8000 INJECTION, SOLUTION INTRAVENOUS; SUBCUTANEOUS
Status: DISCONTINUED | OUTPATIENT
Start: 2022-04-11 | End: 2022-04-11

## 2022-04-11 RX ORDER — LEVALBUTEROL INHALATION SOLUTION 1.25 MG/3ML
1.25 SOLUTION RESPIRATORY (INHALATION) EVERY 6 HOURS PRN
Status: DISCONTINUED | OUTPATIENT
Start: 2022-04-11 | End: 2022-04-11

## 2022-04-11 RX ORDER — LEVALBUTEROL 1.25 MG/.5ML
1.25 SOLUTION, CONCENTRATE RESPIRATORY (INHALATION) EVERY 6 HOURS PRN
Status: DISCONTINUED | OUTPATIENT
Start: 2022-04-11 | End: 2022-04-11

## 2022-04-11 RX ORDER — MELATONIN
2000 DAILY
Status: DISCONTINUED | OUTPATIENT
Start: 2022-04-12 | End: 2022-04-15 | Stop reason: HOSPADM

## 2022-04-11 RX ORDER — LANOLIN ALCOHOL/MO/W.PET/CERES
3 CREAM (GRAM) TOPICAL
Status: DISCONTINUED | OUTPATIENT
Start: 2022-04-11 | End: 2022-04-15 | Stop reason: HOSPADM

## 2022-04-11 RX ORDER — MELATONIN
1000 DAILY
Status: DISCONTINUED | OUTPATIENT
Start: 2022-04-11 | End: 2022-04-11

## 2022-04-11 RX ORDER — LEVALBUTEROL INHALATION SOLUTION 1.25 MG/3ML
1.25 SOLUTION RESPIRATORY (INHALATION)
Status: DISCONTINUED | OUTPATIENT
Start: 2022-04-11 | End: 2022-04-11

## 2022-04-11 RX ORDER — HEPARIN SODIUM 1000 [USP'U]/ML
4000 INJECTION, SOLUTION INTRAVENOUS; SUBCUTANEOUS
Status: DISCONTINUED | OUTPATIENT
Start: 2022-04-11 | End: 2022-04-11

## 2022-04-11 RX ORDER — LEVALBUTEROL INHALATION SOLUTION 1.25 MG/3ML
1.25 SOLUTION RESPIRATORY (INHALATION) 3 TIMES DAILY PRN
Status: DISCONTINUED | OUTPATIENT
Start: 2022-04-11 | End: 2022-04-15 | Stop reason: HOSPADM

## 2022-04-11 RX ORDER — HEPARIN SODIUM 5000 [USP'U]/ML
5000 INJECTION, SOLUTION INTRAVENOUS; SUBCUTANEOUS EVERY 8 HOURS SCHEDULED
Status: DISCONTINUED | OUTPATIENT
Start: 2022-04-11 | End: 2022-04-15 | Stop reason: HOSPADM

## 2022-04-11 RX ORDER — HEPARIN SODIUM 10000 [USP'U]/100ML
3-30 INJECTION, SOLUTION INTRAVENOUS
Status: DISCONTINUED | OUTPATIENT
Start: 2022-04-11 | End: 2022-04-11

## 2022-04-11 RX ORDER — DILTIAZEM HYDROCHLORIDE 180 MG/1
360 CAPSULE, COATED, EXTENDED RELEASE ORAL DAILY
Status: DISCONTINUED | OUTPATIENT
Start: 2022-04-11 | End: 2022-04-12

## 2022-04-11 RX ORDER — TAMSULOSIN HYDROCHLORIDE 0.4 MG/1
0.4 CAPSULE ORAL
Status: DISCONTINUED | OUTPATIENT
Start: 2022-04-11 | End: 2022-04-15 | Stop reason: HOSPADM

## 2022-04-11 RX ORDER — ACETAMINOPHEN 325 MG/1
975 TABLET ORAL EVERY 6 HOURS PRN
Status: DISCONTINUED | OUTPATIENT
Start: 2022-04-11 | End: 2022-04-15 | Stop reason: HOSPADM

## 2022-04-11 RX ORDER — SODIUM CHLORIDE 9 MG/ML
100 INJECTION, SOLUTION INTRAVENOUS CONTINUOUS
Status: DISCONTINUED | OUTPATIENT
Start: 2022-04-11 | End: 2022-04-11

## 2022-04-11 RX ORDER — LORATADINE 10 MG/1
10 TABLET ORAL DAILY
Status: DISCONTINUED | OUTPATIENT
Start: 2022-04-11 | End: 2022-04-15 | Stop reason: HOSPADM

## 2022-04-11 RX ADMIN — Medication 3 MG: at 21:57

## 2022-04-11 RX ADMIN — DILTIAZEM HYDROCHLORIDE 360 MG: 180 CAPSULE, COATED, EXTENDED RELEASE ORAL at 14:24

## 2022-04-11 RX ADMIN — ASPIRIN 81 MG: 81 TABLET, COATED ORAL at 14:24

## 2022-04-11 RX ADMIN — LORATADINE 10 MG: 10 TABLET ORAL at 14:24

## 2022-04-11 RX ADMIN — TAMSULOSIN HYDROCHLORIDE 0.4 MG: 0.4 CAPSULE ORAL at 16:35

## 2022-04-11 RX ADMIN — GABAPENTIN 500 MG: 400 CAPSULE ORAL at 21:57

## 2022-04-11 RX ADMIN — CEFTRIAXONE 1000 MG: 1 INJECTION, POWDER, FOR SOLUTION INTRAMUSCULAR; INTRAVENOUS at 17:47

## 2022-04-11 RX ADMIN — SODIUM PHOSPHATE, MONOBASIC, MONOHYDRATE 12 MMOL: 276; 142 INJECTION, SOLUTION INTRAVENOUS at 19:32

## 2022-04-11 RX ADMIN — PHENOL 1 SPRAY: 1.5 LIQUID ORAL at 09:15

## 2022-04-11 RX ADMIN — FUROSEMIDE 20 MG: 10 INJECTION, SOLUTION INTRAMUSCULAR; INTRAVENOUS at 15:34

## 2022-04-11 RX ADMIN — IOHEXOL 85 ML: 350 INJECTION, SOLUTION INTRAVENOUS at 20:35

## 2022-04-11 RX ADMIN — ACETAMINOPHEN 650 MG: 650 SUPPOSITORY RECTAL at 09:25

## 2022-04-11 RX ADMIN — IPRATROPIUM BROMIDE 0.5 MG: 0.5 SOLUTION RESPIRATORY (INHALATION) at 15:20

## 2022-04-11 RX ADMIN — ENOXAPARIN SODIUM 40 MG: 40 INJECTION SUBCUTANEOUS at 09:20

## 2022-04-11 RX ADMIN — ALLOPURINOL 100 MG: 100 TABLET ORAL at 17:54

## 2022-04-11 RX ADMIN — HEPARIN SODIUM 5000 UNITS: 5000 INJECTION INTRAVENOUS; SUBCUTANEOUS at 21:57

## 2022-04-11 RX ADMIN — LEVALBUTEROL HYDROCHLORIDE 1.25 MG: 1.25 SOLUTION RESPIRATORY (INHALATION) at 15:21

## 2022-04-11 RX ADMIN — METRONIDAZOLE 500 MG: 500 INJECTION, SOLUTION INTRAVENOUS at 16:35

## 2022-04-11 RX ADMIN — PANTOPRAZOLE SODIUM 40 MG: 40 INJECTION, POWDER, FOR SOLUTION INTRAVENOUS at 10:11

## 2022-04-11 RX ADMIN — POTASSIUM PHOSPHATE, MONOBASIC POTASSIUM PHOSPHATE, DIBASIC 30 MMOL: 224; 236 INJECTION, SOLUTION, CONCENTRATE INTRAVENOUS at 10:58

## 2022-04-11 RX ADMIN — ALLOPURINOL 100 MG: 100 TABLET ORAL at 14:24

## 2022-04-11 NOTE — PLAN OF CARE
Ok to begin clear liquids, aspiration precautions  Meds as tolerated   If no improvement in odynophagia in next 24 hours, may need to consider ENT consult &/or VBS

## 2022-04-11 NOTE — UTILIZATION REVIEW
Continued Stay Review    Date: 4/10/2022                          Current Patient Class: inpatient     Current Level of Care: med surg    HPI:81 y o  male initially admitt ED PROVIDER CARE & NOTE ON 4/8/2022  FIRST PRIMARY PROVIDER NOTE 4/9/2022  OBSERVATION ADMISSION 4/9/2022 0153 CONVERTED TO INPATIENT ADMISSION 4/9/2022 0947 DUE TO ONGOING MANAGEMENT FOR POSS SBO    Assessment/Plan:   Gen Surgery h/o partial gastrectomy GJ anastomosis, presents w distension and concern for obstruction, possible stricture of GJ anastomosis  Afebrile; VSS abd soft, improving distension, passing flatus  Non tender w NGT output 900 cc  Cont NPO & NGT & IVF  Wait return of bowel function, DVT ppx  Reports improvement of abd pain & requesting NGT removed due to its uncomfortable  Attending: Cont w abd distention, NPO , no PO meds, ice chips ok  Monitor off antibx, IVF, pain control prn antiemetics  Replete electrolytes   B Cx in ED negative repeated today follow up pro ghanshyam in am  Gabapentin home ultracet on hold; Dilaudid prn   Vital Signs:   04/10/22 20:37:26 99 °F (37 2 °C) -- -- 169/108 Abnormal  128 -- -- -- -- Lying   04/10/22 1819 100 1 °F (37 8 °C) -- -- -- -- -- -- -- -- --   04/10/22 16:15:55 102 9 °F (39 4 °C) Abnormal   -- -- 172/108 Abnormal  129 -- -- -- -- Lying   04/10/22 16:15:35 102 9 °F (39 4 °C) Abnormal  -- -- 172/108 Abnormal  129 -- -- -- -- --   04/10/22 13:18:46 99 4 °F (37 4 °C) 83 -- -- -- 99 % -- -- -- --   04/10/22 11:44:45 -- 90 -- 172/117 Abnormal  135 94 % -- -- -- --   04/10/22 10:42:38 98 7 °F (37 1 °C) 85 -- -- -- 92 % -- -- -- --   04/10/22 0900 -- -- -- -- -- -- -- -- None (Room air) --   04/10/22 0724 102 5 °F (39 2 °C) Abnormal  -- -- -- -- -- -- -- -- --   04/10/22 07:23:08 100 8 °F (38 2 °C) Abnormal  88 18 171/111 Abnormal  -- 91 % -- -- -- --   04/09/22 2316 -- -- -- 170/106 Abnormal  -- -- -- -- -- Lying   04/09/22 23:12:53 -- 75 -- 171/104 Abnormal  126 94 % --            Pertinent Labs/Diagnostic Results:   Results from last 7 days   Lab Units 04/10/22  1706   SARS-COV-2  Negative     Results from last 7 days   Lab Units 04/10/22  0402 04/08/22  2222   WBC Thousand/uL 6 59 6 12   HEMOGLOBIN g/dL 15 2 15 2   HEMATOCRIT % 46 9 46 5   PLATELETS Thousands/uL 166 189   BANDS PCT %  --  2         Results from last 7 days   Lab Units 04/10/22  0402 04/09/22  0431 04/08/22  2222   SODIUM mmol/L 136 142 141   POTASSIUM mmol/L 3 4* 3 8 3 9   CHLORIDE mmol/L 102 108 104   CO2 mmol/L 25 26 26   ANION GAP mmol/L 9 8 11   BUN mg/dL 19 29* 31*   CREATININE mg/dL 1 11 1 22 1 36*   EGFR ml/min/1 73sq m 62 55 48   CALCIUM mg/dL 8 3 8 2* 8 6   MAGNESIUM mg/dL 1 8  --  1 9   PHOSPHORUS mg/dL 2 7  --   --      Results from last 7 days   Lab Units 04/08/22  2222   AST U/L 25   ALT U/L 24   ALK PHOS U/L 83   TOTAL PROTEIN g/dL 6 7   ALBUMIN g/dL 3 6   TOTAL BILIRUBIN mg/dL 0 79         Results from last 7 days   Lab Units 04/10/22  0402 04/09/22  0431 04/08/22  2222   GLUCOSE RANDOM mg/dL 92 110 125             No results found for: BETA-HYDROXYBUTYRATE                           Results from last 7 days   Lab Units 04/08/22  2222   PROTIME seconds 13 9   INR  1 07   PTT seconds 27         Results from last 7 days   Lab Units 04/10/22  0906   PROCALCITONIN ng/ml 0 12     Results from last 7 days   Lab Units 04/10/22  0906 04/08/22  2310   LACTIC ACID mmol/L 1 1 1 2                         Results from last 7 days   Lab Units 04/08/22  2222   LIPASE u/L 40*                 Results from last 7 days   Lab Units 04/08/22  2332   CLARITY UA  Clear   COLOR UA  Yellow   SPEC GRAV UA  >=1 030   PH UA  5 5   GLUCOSE UA mg/dl Negative   KETONES UA mg/dl Trace*   BLOOD UA  Negative   PROTEIN UA mg/dl Trace*   NITRITE UA  Negative   BILIRUBIN UA  Interference- unable to analyze*   UROBILINOGEN UA E U /dl 1 0   LEUKOCYTES UA  Negative   WBC UA /hpf 1-2   RBC UA /hpf 0-1   BACTERIA UA /hpf Occasional   EPITHELIAL CELLS WET PREP /hpf Occasional   MUCUS THREADS  Innumerable*     Results from last 7 days   Lab Units 04/10/22  1706   INFLUENZA A PCR  Negative   INFLUENZA B PCR  Negative   RSV PCR  Negative                 Results from last 7 days   Lab Units 04/09/22  1844 04/08/22  2327   C DIFF TOXIN B BY PCR  Negative Indeterminate Result by PCR  Suggest Re-collection  *     Results from last 7 days   Lab Units 04/08/22  2327   SALMONELLA SP PCR  None Detected   SHIGELLA SP/ENTEROINVASIVE E  COLI (EIEC)  None Detected   CAMPYLOBACTER SP (JEJUNI AND COLI)  None Detected   SHIGA TOXIN 1/SHIGA TOXIN 2  None Detected         Results from last 7 days   Lab Units 04/10/22  0908 04/09/22  0018 04/09/22  0000 04/08/22  2310   BLOOD CULTURE  Received in Microbiology Lab  Culture in Progress  Received in Microbiology Lab  Culture in Progress  --  No Growth at 24 hrs  No Growth at 24 hrs  URINE CULTURE   --  10,000-19,000 cfu/ml Proteus mirabilis*  <10,000 cfu/ml   --   --                  Medications:   Scheduled Medications:  enoxaparin, 40 mg, Subcutaneous, Daily  pantoprazole, 40 mg, Intravenous, Q24H YAMILEX      Continuous IV Infusions:  dextrose 5 % and sodium chloride 0 45 % with KCl 20 mEq/L, 100 mL/hr, Intravenous, Continuous      PRN Meds:  acetaminophen, 650 mg, Rectal, Q6H PRN  HYDROmorphone, 0 2 mg, Intravenous, Q6H PRN  Labetalol HCl, 20 mg, Intravenous, Q4H PRN  OLANZapine, 2 5 mg, Oral, Q8H PRN  ondansetron, 4 mg, Intravenous, Q6H PRN  phenol, 1 spray, Mouth/Throat, Q2H PRN  saliva substitute, 5 spray, Mouth/Throat, 4x Daily PRN        Discharge Plan: d    Network Utilization Review Department  ATTENTION: Please call with any questions or concerns to [phone] and carefully listen to the prompts so that you are directed to the right person   All voicemails are confidential   [facility] all requests for admission clinical reviews, approved or denied determinations and any other requests to dedicated fax number below belonging to the [facility] where the patient is receiving treatment   List of dedicated fax numbers for the Facilities:  [address] [place] [address] DENIALS (Administrative/Medical Necessity) [phone]   [address]  [address] (Maternity/NICU/Pediatrics) [phone] [address]  [address] [place] [address] [place] [address] [address] [place] [place] [address] [phone]

## 2022-04-11 NOTE — ASSESSMENT & PLAN NOTE
Lab Results   Component Value Date    EGFR 55 04/11/2022    EGFR 62 04/10/2022    EGFR 55 04/09/2022    CREATININE 1 22 04/11/2022    CREATININE 1 11 04/10/2022    CREATININE 1 22 04/09/2022     Estimated Creatinine Clearance: 54 mL/min (by C-G formula based on SCr of 1 22 mg/dL)  · Baseline Cr around 1 10   · Cr mildly elevated at 1 36 on arrival    · Monitor intake/output  · Avoid nephrotoxins and hypotension     · Continue IVF, encourage oral intake  · Repeat BMP in AM

## 2022-04-11 NOTE — ASSESSMENT & PLAN NOTE
· No other signs of sepsis  · Subjectively, patient feels well but complains of a sore throat likely secondary to recent NG tube placement  · Check strep throat given persistent fever  · B Cx in ER neg, repeat blood cultures from 04/10 with no growth at 24hours  · COVID/Flu/RSV negative  · Procalcitonin negative x2  · Lactic acid negative  · Differential diagnosis include malignancy versus DVT versus viral infection  · Low suspicion for DVT given patient has been on VTE prophylaxis but will check D-dimer for completion  · Other malignancy, was evaluated by Surgical Oncology for thyroid nodule which was felt to be benign  He also follows up with Urology for low-grade prostate cancer currently on finasteride  CT abdomen/pelvis with no evidence of malignancy    Nodule noted on CT chest in June 2019, no follow-up recommended due to low risk

## 2022-04-11 NOTE — QUICK NOTE
Patient with acute respiratory distress early evening  IVF stopped, given lasix 20mg  Asked to follow up on CXR and labs for sepsis concerns  Patient with slightly elevated creat/bili, lactate, procal  Likely related to lasix  Elevated inflammatory marker  Remains hemodynamically stable, weaned by RN team to 2L, sats in 90s  Already on ABx     With elevated D-dimer and 4PEPs score of 6 -- placed on IVF, heparin gtt, and will obtain CTA PE    Plan relayed to RN team

## 2022-04-11 NOTE — ASSESSMENT & PLAN NOTE
· Gabapentin 500 mg qhs restarted  · Home Ultracet on hold, requiring Tylenol for fever    · Dilaudid prn

## 2022-04-11 NOTE — ASSESSMENT & PLAN NOTE
· Gen surg appreciated  · NGT removed 4/11  Started on clear liquid diet    · Monitor intake and bowel movements  · Serial abdominal exams

## 2022-04-11 NOTE — PLAN OF CARE
Problem: MOBILITY - ADULT  Goal: Maintain or return to baseline ADL function  Description: INTERVENTIONS:  -  Assess patient's ability to carry out ADLs; assess patient's baseline for ADL function and identify physical deficits which impact ability to perform ADLs (bathing, care of mouth/teeth, toileting, grooming, dressing, etc )  - Assess/evaluate cause of self-care deficits   - Assess range of motion  - Assess patient's mobility; develop plan if impaired  - Assess patient's need for assistive devices and provide as appropriate  - Encourage maximum independence but intervene and supervise when necessary  - Involve family in performance of ADLs  - Assess for home care needs following discharge   - Consider OT consult to assist with ADL evaluation and planning for discharge  - Provide patient education as appropriate  Outcome: Progressing  Goal: Maintains/Returns to pre admission functional level  Description: INTERVENTIONS:  - Perform BMAT or MOVE assessment daily    - Set and communicate daily mobility goal to care team and patient/family/caregiver  - Collaborate with rehabilitation services on mobility goals if consulted  - Perform Range of Motion  times a day  - Reposition patient every  hours    - Dangle patient  times a day  - Stand patient  times a day  - Ambulate patient  times a day  - Out of bed to chair  times a day   - Out of bed for meals times a day  - Out of bed for toileting  - Record patient progress and toleration of activity level   Outcome: Progressing     Problem: Prexisting or High Potential for Compromised Skin Integrity  Goal: Skin integrity is maintained or improved  Description: INTERVENTIONS:  - Identify patients at risk for skin breakdown  - Assess and monitor skin integrity  - Assess and monitor nutrition and hydration status  - Monitor labs   - Assess for incontinence   - Turn and reposition patient  - Assist with mobility/ambulation  - Relieve pressure over bony prominences  - Avoid friction and shearing  - Provide appropriate hygiene as needed including keeping skin clean and dry  - Evaluate need for skin moisturizer/barrier cream  - Collaborate with interdisciplinary team   - Patient/family teaching  - Consider wound care consult   Outcome: Progressing     Problem: Potential for Falls  Goal: Patient will remain free of falls  Description: INTERVENTIONS:  - Educate patient/family on patient safety including physical limitations  - Instruct patient to call for assistance with activity   - Consult OT/PT to assist with strengthening/mobility   - Keep Call bell within reach  - Keep bed low and locked with side rails adjusted as appropriate  - Keep care items and personal belongings within reach  - Initiate and maintain comfort rounds  - Make Fall Risk Sign visible to staff  - Offer Toileting every  Hours, in advance of need  - Initiate/Maintain alarm  - Obtain necessary fall risk management equipment:   - Apply yellow socks and bracelet for high fall risk patients  - Consider moving patient to room near nurses station  Outcome: Progressing

## 2022-04-11 NOTE — SPEECH THERAPY NOTE
Speech-Language Pathology Bedside Swallow Evaluation        Patient Name: Josue Zapata  Today's Date: 4/11/2022     Problem List  Principal Problem:    Enteritis  Active Problems:    Benign essential hypertension    Idiopathic peripheral neuropathy    Chronic kidney disease    Fever    Anxiety    Electrolyte abnormality    Small bowel obstruction [facility])         Past Medical History  Past Medical History:   Diagnosis Date    Abnormal electrocardiogram     Last assessed: Oct 11, 2013    Allergic rhinitis     last assessed: Oct 11, 2013    Allergic rhinitis due to pollen     last assessed: Oct 10, 2013    Allergic sinusitis     Last assessed: May 11, 2015    Allergy     resolved: July 22, 2015    Benign essential hypertension     Last assessed/resolved: May 31, 2017    BPH (benign prostatic hyperplasia)     Cancer [facility])     prostate    Colitis     Last assessed: May 24, 2016    Cough with hemoptysis 11/17/2020    Generalized osteoarthritis     Last assessed: Oct 11, 2013    GERD without esophagitis     Last assessed/resolved: May 31, 2017    Hearing loss     Hiatal hernia     resolved: July 22, 2015    History of gastroesophageal reflux (GERD)     History of stomach ulcers     last assessed: May 11, 2015    Hypertension     Incomplete bladder emptying     Kidney stone     Nodular prostate with lower urinary tract symptoms     Nontoxic single thyroid nodule     last assessed: April 16, 2014    Orchalgia     Overweight     last assessed: Oct 31, 2013    Poor urinary stream     Pulmonary embolism [facility])     Salivary gland disorder     last assessed: April 16, 2014    Seasonal allergies     last assessed: May 15, 2015    Spermatocele     Straining to void     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:  May 7, 2014       Past Surgical History  Past Surgical History:   Procedure Laterality Date    BLADDER SURGERY      CHOLECYSTECTOMY  2000    laparoscopic     FLAP LOCAL TRUNK Left 10/28/2021 Procedure: EXCISION OF FLANK MASS;  Surgeon: Erick Kidd DO;  [facility]  [facility];  Service: Plastics    HEMORRHOID SURGERY      pilionidal cyst removal     HERNIA REPAIR Left 01/17/2008    inguinal     KNEE ARTHROSCOPY Left 1974    KNEE CARTILAGE SURGERY      NASAL SEPTUM SURGERY      Deviation repair     AL EXC PAROTD,LAT LOBE,DISSECT 5TH NERV Right 6/9/2021    Procedure: SUPERFICIAL PAROTIDECTOMY WITH FACIAL NERVE MONITORING;  Surgeon: Kathleen Campuzano MD;  [facility];  Service: ENT    PROSTATE BIOPSY  2017    STOMACH SURGERY      TONSILLECTOMY AND ADENOIDECTOMY      at age 37   [address] [address] [address] - right 01/04 0 left 01/95    US GUIDED THYROID BIOPSY  1/19/2022    VASECTOMY         Summary    Pt presents with possible mild pharyngeal dysphagia due to c/o sore throat, odynophagia, intermittent s/s aspiration with NTL more than thin liquids  Recommendations:   Diet: clear liquids per surgery   Meds: whole with liquid and whole with puree -as tolerated  Frequent Oral care: 4x/day  Aspiration precautions  Other Recommendations/ considerations: will cont to follow, may need to consider VBS &/or ENT consult  Current Medical Status  Pt is a [de-identified] y o  male who presented to [address] [place] [address]  with enteritis  Pt s/p partial gastrectomy who presents with diarrhea and abdominal pain  Patient reports that yesterday morning he awoke and ate a bowl of cereal for breakfast and then started having non-bloody diarrhea with 15-20 episodes throughout the day  He also notes an episode of vomiting after attempting to eat half a hoagie from [facility] for lunch  He admits to periumbilical abdominal pain  He denies fevers/ chills, hematemesis, chest pain, SOB, dizziness/ lightheadedness  He also denies questionable foot intake, sick contacts or recent travel   He reports that over the past few weeks he had been more constipated than usual and was only have bowel movements once every 5 days until now  Past medical history:   Please see H&P for details    Special Studies:  XR abd kub: 4/9/22 Visualized lung bases are clear  Social/Education/Vocational Hx:  Pt lives with family    Swallow Information   Current Risks for Dysphagia & Aspiration: c/o sore throat, pain w/ swallowing since NGT placement, which was removed this am   Current Symptoms/Concerns: c/o odynophagia  Current Diet: NPO   Baseline Diet: regular diet and thin liquids  Takes pills-whole w/ water     Baseline Assessment   Behavior/Cognition: alert and [place]  Speech/Language Status: able to participate in basic conversation and able to follow commands  Patient Positioning: upright in bed-sat EOB     Swallow Mechanism Exam   Facial: symmetrical  Labial: WFL  Lingual: WFL  Velum: unable to visualize  Mandible: adequate ROM  Dentition: adequate  Vocal quality:rough   Volitional Cough: strong/productive   Respiratory: 3L NC, but took off while eating    Consistencies Assessed and Performance   Consistencies Administered: thin liquids, nectar thick and jello    Oral Stage: pt able to self feed, drink from cup and straw, appeared with good oral control  Pharyngeal Stage: swallow initiation was timely and complete  Intermittent throat clearing noted- wife reported pt often throat clears and expectorates mucous at home  Decrease in O2 sats also noted with NTL by straw > cup, but not noted with thin liquids  Pt w/ facial grimacing and c/o pain w/ swallowing, especially on L side         Esophageal Concerns: none reported      Results Reviewed with: patient, RN, PA and family   Dysphagia Goals: pt will tolerate thin liquids with clears without s/s of aspiration x48 hours       April Pauline Souza MA CCC-SLP  Speech Pathologist  [place] license # [id number]  [place] license # [id number]  Available via [employer]

## 2022-04-11 NOTE — CASE MANAGEMENT
Case Management Assessment & Discharge Planning Note    Patient name Will Gloria W /W -01 MRN [id number]  : 1940 Date 2022       Current Admission Date: 2022  Current Admission Diagnosis:Enteritis   Patient Active Problem List    Diagnosis Date Noted    Fever 04/10/2022    Anxiety 04/10/2022    Electrolyte abnormality 04/10/2022    [facility] bowel obstruction ([facility] ) 04/10/2022    Enteritis 2022    Thyroid nodule 2021    Acute idiopathic gout of left foot 2021    Continuous opioid dependence ([facility] ) 2021    [facility] gland, unspecified [facility] 2021    Stage 3a chronic kidney disease ([facility] ) 2021    Chronic kidney disease 10/27/2021    Polycythemia 2021    History of prostate cancer 2021    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    Vasomotor rhinitis 2020    Post-nasal drip 2020    Radiculopathy of leg 03/10/2020    Pain in both lower extremities 2020    Back pain 2020    Swelling of right thumb 2019    Right elbow pain 2019    Idiopathic peripheral neuropathy 10/22/2019    Localized primary osteoarthritis of wrist 2019    Current tear of medial cartilage or meniscus of knee 2019    Interstitial lung disease ([facility] ) 2019    Need for shingles vaccine 2018    Glaucoma screening 2018    Screening for osteoporosis 2018    Screening for AAA (abdominal aortic aneurysm) 2018    Benign 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 2018    Obesity, morbid ([address] ) 2018    Prostate cancer ([facility] ) 2017    Benign prostatic hyperplasia (BPH) with straining on urination 2017    Benign essential hypertension 2017    Gastroesophageal reflux disease without esophagitis 2017    Seasonal allergic rhinitis due to pollen 2017    Truncal obesity 01/13/2017    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  Secondary Insurance:     ASSESSMENT:  [address] [facility] [address] Representative - Spouse   Primary Phone: [phone] [facility])  Mobile Phone: [phone]               Advance Directives  Does patient have a [address]?: Yes  Does patient have Advance Directives?: Yes  Advance Directives: Power of  for health care  Primary Contact: Ivy-spouse    Readmission Root Cause  30 Day Readmission: No    Patient Information  Admitted from[de-identified] Home  Mental Status: Alert  During Assessment patient was accompanied by: Not accompanied during assessment  Assessment information provided by[de-identified] Spouse  Primary Caregiver: Self  Support Systems: Self,Spouse/significant other,Family members  [place] of Residence: [address] [facility] do you live in?: [address] entry access options   Select all that apply : Stairs  Number of steps to enter home : 2  Do the steps have railings?: No  Type of Current Residence: [facility]  In the last 12 months, was there a time when you were not able to pay the mortgage or rent on time?: No  In the last 12 months, was there a time when you did not have a steady place to sleep or slept in a shelter (including now)?: No  Homeless/housing insecurity resource given?: No  Living Arrangements: Lives w/ Spouse/significant other    Activities of Daily Living Prior to Admission  Functional Status: Independent  Completes ADLs independently?: Yes  Ambulates independently?: Yes  Does patient use assisted devices?: No  Does patient currently own DME?: Yes  What DME does the patient currently own?: Bridger Mcneil  Does patient have a history of Outpatient Therapy (PT/OT)?: No  Does the patient have a history of Short-Term Rehab?: Yes ([facility])  Does patient have a history of HHC?: No  Does patient currently have [address]?: No    Patient Information Continued  Income Source: Employed  Does patient have prescription coverage?: Yes  Food insecurity resource given?: No  Does patient receive dialysis treatments?: No  Does patient have a history of substance abuse?: No  Does patient have a history of Mental Health Diagnosis?: No    PHQ 2/9 Screening   Reviewed PHQ 2/9 Depression Screening Score?: No    Means of Transportation  Means of Transport to Appts[de-identified] Drives Self  In the past 12 months, has lack of transportation kept you from medical appointments or from getting medications?: No  In the past 12 months, has lack of transportation kept you from meetings, work, or from getting things needed for daily living?: No  Was application for public transport provided?: No    DISCHARGE DETAILS:    Discharge planning discussed with[de-identified] patient's spouse [address] of Choice: Yes     CM contacted family/caregiver?: Yes  Were Treatment Team discharge recommendations reviewed with patient/caregiver?: Yes  Did patient/caregiver verbalize understanding of patient care needs?: Yes  Were patient/caregiver advised of the risks associated with not following Treatment Team discharge recommendations?: Yes    Contacts  Patient Contacts: Spouse-Ivy  Relationship to Patient[de-identified] Family  Contact Method: Phone  Reason/Outcome: Continuity of [address]         Is the patient interested in [address] at discharge?: No    DME Referral Provided  Referral made for DME?: No    Treatment Team Recommendation: Home  Discharge Destination Plan[de-identified] Home  Transport at Discharge : Family     CM spoke with patient's spouse Cleola Saint on the phone, [phone]  CM introduced self and role  Per spouse, patient is independent with ADLS  Patient does not use any DME at home  Patient currently works as a  at [employer]  Patient currently drives  Patient uses [facility] in [place] for prescriptions  Patient has prescription insurance and is able to afford his medications  Patient's PCP is Dr Demetrice Segura with [facility]  Patient is Covid vaccinated x 2 and boosted  Patient is scheduled for second booster on 4/27/2022    Patient's spouse or son will transport at discharge  No current CM needs noted  CM will continue to f/u for any potential discharge needs  CM reviewed discharge planning process including the following: identifying caregivers at home, preference for d/c planning needs,   availability of [facility] Meds to [facility] program, availability of treatment team to discuss questions or concerns patient and/or family may have regarding diagnosis, plan of care, old or new medications and discharge planning   CM will continue to follow for care coordination and update assessment as appropriate

## 2022-04-11 NOTE — PROGRESS NOTES
[facility]  Progress Note - Star Gu  1940, [de-identified] y o  male MRN: [id number]  Unit/Bed#: W -01 Encounter: [id number]  Primary Care Provider: Patti Wayne MD   Date and time admitted to hospital: 4/8/2022 10:41 PM    * Enteritis  Assessment & Plan  · Presented with abdominal pain, diarrhea x 1 day  Patient also report 1 episode of vomiting  · CT A/P preliminary report with "multiple mildly dilated loops of small bowel in the abdomen without a clear transition point, findings may represent small bowel enteritis however early obstruction not entirely excluded "  · stool studies and C diff neg   · Monitor off antibiotics; no leukocytosis but with persistent fever  · Continue IV fluids, encourage oral intake  · Trial on clear liquid diet  · Pain control  · PRN Zofran  Small bowel obstruction ([address] )  Assessment & Plan  · Gen surg appreciated  · NGT removed 4/11  Started on clear liquid diet  · Monitor intake and bowel movements  · Serial abdominal exams    Fever  Assessment & Plan  · No other signs of sepsis  · Subjectively, patient feels well but complains of a sore throat likely secondary to recent NG tube placement  · Check strep throat given persistent fever  · B Cx in ER neg, repeat blood cultures from 04/10 with no growth at 24hours  · COVID/Flu/RSV negative  · Procalcitonin negative x2  · Lactic acid negative  · Differential diagnosis include malignancy versus DVT versus viral infection  · Low suspicion for DVT given patient has been on VTE prophylaxis but will check D-dimer for completion  · Other malignancy, was evaluated by Surgical Oncology for thyroid nodule which was felt to be benign  He also follows up with Urology for low-grade prostate cancer currently on finasteride  CT abdomen/pelvis with no evidence of malignancy    Nodule noted on CT chest in June 2019, no follow-up recommended due to low risk    Electrolyte abnormality  Assessment & Plan  · Keep K at 4, P at 3, Mag at 2  · Replete  Anxiety  Assessment & Plan  · 2/2 NGT, improved  · Zydis prn    Chronic kidney disease  Assessment & Plan  Lab Results   Component Value Date    EGFR 55 2022    EGFR 62 04/10/2022    EGFR 55 2022    CREATININE 1 22 2022    CREATININE 1 11 04/10/2022    CREATININE 1 22 2022     Estimated Creatinine Clearance: 54 mL/min (by C-G formula based on SCr of 1 22 mg/dL)  · Baseline Cr around 1 10   · Cr mildly elevated at 1 36 on arrival    · Monitor intake/output  · Avoid nephrotoxins and hypotension  · Continue IVF, encourage oral intake  · Repeat BMP in AM      Idiopathic peripheral neuropathy  Assessment & Plan  · Gabapentin 500 mg qhs restarted  · Home Ultracet on hold, requiring Tylenol for fever  · Dilaudid prn    Benign essential hypertension  Assessment & Plan  · Cardizem restarted  · Labetalol prn        VTE Pharmacologic Prophylaxis: VTE Score: 5 Moderate Risk (Score 3-4) - Pharmacological DVT Prophylaxis Ordered: enoxaparin (Lovenox)  Patient Centered Rounds: I performed bedside rounds with nursing staff today  Discussions with Specialists or Other Care Team Provider:  Reviewed surgical notes    Education and Discussions with Family / Patient: Updated  (wife) at bedside  Current Length of Stay: 2 day(s)  Current Patient Status: Inpatient   Discharge Plan: Anticipate discharge in 48-72 hrs to home  Code Status: Level 1 - Full Code    Subjective:   Patient reports that he feels well today and would like to have a diet  He denies any nausea, vomiting or abdominal pain  His last bowel movement was on Saturday  Also complaining of a sore throat      Objective:     Vitals:   Temp (24hrs), Av 1 °F (38 4 °C), Min:99 °F (37 2 °C), Max:102 9 °F (39 4 °C)    Temp:  [99 °F (37 2 °C)-102 9 °F (39 4 °C)] 101 1 °F (38 4 °C)  HR:  [83-94] 88  Resp:  [17-18] 18  BP: (160-172)/(106-108) 160/106  SpO2:  [94 %-99 %] 94 %  Body mass index is 36 37 kg/m²  Input and Output Summary (last 24 hours): Intake/Output Summary (Last 24 hours) at 4/11/2022 1307  Last data filed at 4/11/2022 0244  Gross per 24 hour   Intake 1181 82 ml   Output 825 ml   Net 356 82 ml       Physical Exam:   Physical Exam  Vitals and nursing note reviewed  Constitutional:       General: He is not in acute distress  Appearance: He is well-developed  He is obese  He is not ill-appearing or diaphoretic  HENT:      Head: Normocephalic and atraumatic  Ears:      Comments: Hard of hearing     Mouth/Throat:      Comments: Unable to fully visualize the oropharynx but they did appear to be some erythema at the back of his mouth  Cardiovascular:      Rate and Rhythm: Normal rate and regular rhythm  Heart sounds: Normal heart sounds  No murmur heard  Pulmonary:      Effort: Pulmonary effort is normal       Breath sounds: Normal breath sounds  No wheezing, rhonchi or rales  Abdominal:      General: Bowel sounds are normal  There is no distension  Palpations: Abdomen is soft  Tenderness: There is no abdominal tenderness  Musculoskeletal:      Right lower leg: No edema  Left lower leg: No edema  Skin:     General: Skin is warm and dry  Neurological:      Mental Status: He is alert and oriented to person, place, and time     Psychiatric:         Mood and Affect: Mood normal          Behavior: Behavior normal           Additional Data:     Labs:  Results from last 7 days   Lab Units 04/11/22  0618   WBC Thousand/uL 3 67*   HEMOGLOBIN g/dL 14 4   HEMATOCRIT % 44 6   PLATELETS Thousands/uL 138*   BANDS PCT % 2   LYMPHO PCT % 14   MONO PCT % 0*   EOS PCT % 0     Results from last 7 days   Lab Units 04/11/22  0623 04/09/22  0431 04/08/22  2222   SODIUM mmol/L 140   < > 141   POTASSIUM mmol/L 3 6   < > 3 9   CHLORIDE mmol/L 104   < > 104   CO2 mmol/L 27   < > 26   BUN mg/dL 17   < > 31*   CREATININE mg/dL 1 22   < > 1 36*   ANION GAP mmol/L 9   < > 11   CALCIUM mg/dL 8 3   < > 8 6   ALBUMIN g/dL  --   --  3 6   TOTAL BILIRUBIN mg/dL  --   --  0 79   ALK PHOS U/L  --   --  83   ALT U/L  --   --  24   AST U/L  --   --  25   GLUCOSE RANDOM mg/dL 102   < > 125    < > = values in this interval not displayed  Results from last 7 days   Lab Units 04/08/22  2222   INR  1 07             Results from last 7 days   Lab Units 04/11/22  0618 04/10/22  0906 04/08/22  2310   LACTIC ACID mmol/L  --  1 1 1 2   PROCALCITONIN ng/ml 0 25 0 12  --        Lines/Drains:  Invasive Devices  Report    Peripheral Intravenous Line            Peripheral IV 04/10/22 Left;Proximal;Ventral (anterior) Forearm 1 day    Peripheral IV 04/10/22 Right Antecubital 1 day                      Imaging: Reviewed radiology reports from this admission including: abdominal/pelvic CT and xray(s)    Recent Cultures (last 7 days):   Results from last 7 days   Lab Units 04/10/22  0908 04/09/22  1844 04/09/22  0018 04/09/22  0000 04/08/22  2327 04/08/22  2310   BLOOD CULTURE  No Growth at 24 hrs  No Growth at 24 hrs   --   --  No Growth at 48 hrs  --  No Growth at 48 hrs  URINE CULTURE   --   --  10,000-19,000 cfu/ml Proteus mirabilis*  <10,000 cfu/ml   --   --   --    C DIFF TOXIN B BY PCR   --  Negative  --   --  Indeterminate Result by PCR  Suggest Re-collection   *  --        Last 24 Hours Medication List:   Current Facility-Administered Medications   Medication Dose Route Frequency Provider Last Rate    acetaminophen  975 mg Oral Q6H PRN Bennett Borrero MD      al mag oxide-diphenhydramine-lidocaine viscous  10 mL Swish & Spit Q4H PRN Bennett Borrero MD      allopurinol  100 mg Oral BID Bennett Borrero MD      aspirin  81 mg Oral Daily MD Aureliano Lipscomb ON 4/12/2022] cholecalciferol  2,000 Units Oral Daily Bennett Borrero MD      dextrose 5 % and sodium chloride 0 45 % with KCl 20 mEq/L  100 mL/hr Intravenous Continuous Jane Madera  mL/hr (04/10/22 5479)    diltiazem  360 mg Oral Daily Hai Franz MD      enoxaparin  40 mg Subcutaneous Daily Mal Petersen PA-C      gabapentin  500 mg Oral HS Hai Franz MD      HYDROmorphone  0 2 mg Intravenous Q6H PRN Sutton Search, DO      Labetalol HCl  20 mg Intravenous Q4H PRN Sutton Search, DO      loratadine  10 mg Oral Daily Hai Franz MD      OLANZapine  2 5 mg Oral Q8H PRN Sutton Search, DO      ondansetron  4 mg Intravenous Q6H PRN Mal Petersen PA-C      pantoprazole  40 mg Intravenous Q24H [address] Yasir Malcolm MD      phenol  1 spray Mouth/Throat Q2H PRN Sutton Search, DO      potassium phosphate  30 mmol Intravenous Once Chay Fearing, PAVeronicaC 30 mmol (04/11/22 1058)    saliva substitute  5 spray Mouth/Throat 4x Daily PRN Addie Chandler MD      sodium phosphate  12 mmol Intravenous Once Chay Fearing, PA-C      tamsulosin  0 4 mg Oral Daily With Lorenzo Knutson MD          Today, Patient Was Seen By: Amy Cardona MD    **Please Note: This note may have been constructed using a voice recognition system  **

## 2022-04-11 NOTE — PROGRESS NOTES
Progress Note - General Surgery   Uday Michelle  [de-identified] y o  male MRN: [id number]  Unit/Bed#: W -01 Encounter: [id number]    Assessment:  81M with fevers and dilated SB loops, likely enteritis  Plan:  - remove NGT today, advance to clears if passes speech  - speech eval   - IVF hydration  - PRN analgesia  - trend wbc/fever curve  - OOB/ambulate  - dvt ppx      Subjective/Objective   Subjective: continues to spike fevers  Became very agitated overnight with NGT, so removed given he is passing flatus and having no abdominal pain  Bloating much improved    Objective:    Blood pressure (!) 169/108, pulse 83, temperature 99 °F (37 2 °C), temperature source Oral, resp  rate 18, height 5' 6" (1 676 m), weight 101 kg (222 lb 14 2 oz), SpO2 99 %  ,Body mass index is 35 97 kg/m²  I/O last 24 hours:   In: 2643 5 [I V :2311 7; NG/GT:90; IV Piggyback:241 8]  Out: 200 [Urine:600; Emesis/NG output:1300]    Invasive Devices  Report    Peripheral Intravenous Line            Peripheral IV 04/10/22 Left;Proximal;Ventral (anterior) Forearm <1 day    Peripheral IV 04/10/22 Right Antecubital <1 day          Drain            NG/OG/Enteral Tube 14 Fr Right nare 1 day                Physical Exam:   NAD, alert and oriented x3  Normocephalic, atraumatic  MMM, EOMI, PERRLA  Norm resp effort on RA  RRR  Abd soft, NT/ND  No calf tenderness or peripheral edema  Motor/sensation intact in distal extremities  CN grossly intact  -rash/lesions      Lab, Imaging and other studies:  Lab Results   Component Value Date    WBC 6 59 04/10/2022    HGB 15 2 04/10/2022    HCT 46 9 04/10/2022    MCV 85 04/10/2022     04/10/2022      Lab Results   Component Value Date    CALCIUM 8 3 04/10/2022    K 3 4 (L) 04/10/2022    CO2 25 04/10/2022     04/10/2022    BUN 19 04/10/2022    CREATININE 1 11 04/10/2022       VTE Pharmacologic Prophylaxis: Enoxaparin (Lovenox)  VTE Mechanical Prophylaxis: sequential compression device

## 2022-04-11 NOTE — ASSESSMENT & PLAN NOTE
· Presented with abdominal pain, diarrhea x 1 day  Patient also report 1 episode of vomiting  · CT A/P preliminary report with "multiple mildly dilated loops of small bowel in the abdomen without a clear transition point, findings may represent small bowel enteritis however early obstruction not entirely excluded "  · stool studies and C diff neg   · Monitor off antibiotics; no leukocytosis but with persistent fever  · Continue IV fluids, encourage oral intake  · Trial on clear liquid diet  · Pain control  · PRN Zofran

## 2022-04-12 PROBLEM — A41.9 SEPSIS (HCC): Status: ACTIVE | Noted: 2022-04-10

## 2022-04-12 LAB
ANION GAP SERPL CALCULATED.3IONS-SCNC: 11 MMOL/L (ref 4–13)
BASOPHILS # BLD MANUAL: 0 THOUSAND/UL (ref 0–0.1)
BASOPHILS NFR MAR MANUAL: 0 % (ref 0–1)
BUN SERPL-MCNC: 20 MG/DL (ref 5–25)
CALCIUM SERPL-MCNC: 8.2 MG/DL (ref 8.3–10.1)
CHLORIDE SERPL-SCNC: 101 MMOL/L (ref 100–108)
CO2 SERPL-SCNC: 27 MMOL/L (ref 21–32)
CREAT SERPL-MCNC: 1.42 MG/DL (ref 0.6–1.3)
EOSINOPHIL # BLD MANUAL: 0 THOUSAND/UL (ref 0–0.4)
EOSINOPHIL NFR BLD MANUAL: 0 % (ref 0–6)
ERYTHROCYTE [DISTWIDTH] IN BLOOD BY AUTOMATED COUNT: 15.9 % (ref 11.6–15.1)
GFR SERPL CREATININE-BSD FRML MDRD: 46 ML/MIN/1.73SQ M
GLUCOSE SERPL-MCNC: 104 MG/DL (ref 65–140)
HCT VFR BLD AUTO: 40.5 % (ref 36.5–49.3)
HGB BLD-MCNC: 13.5 G/DL (ref 12–17)
LACTATE SERPL-SCNC: 1.6 MMOL/L (ref 0.5–2)
LYMPHOCYTES # BLD AUTO: 0.28 THOUSAND/UL (ref 0.6–4.47)
LYMPHOCYTES # BLD AUTO: 7 % (ref 14–44)
MAGNESIUM SERPL-MCNC: 1.9 MG/DL (ref 1.6–2.6)
MCH RBC QN AUTO: 27.5 PG (ref 26.8–34.3)
MCHC RBC AUTO-ENTMCNC: 33.3 G/DL (ref 31.4–37.4)
MCV RBC AUTO: 83 FL (ref 82–98)
MONOCYTES # BLD AUTO: 0.08 THOUSAND/UL (ref 0–1.22)
MONOCYTES NFR BLD: 2 % (ref 4–12)
NEUTROPHILS # BLD MANUAL: 3.69 THOUSAND/UL (ref 1.85–7.62)
NEUTS BAND NFR BLD MANUAL: 1 % (ref 0–8)
NEUTS SEG NFR BLD AUTO: 90 % (ref 43–75)
PHOSPHATE SERPL-MCNC: 3.5 MG/DL (ref 2.3–4.1)
PLATELET # BLD AUTO: 128 THOUSANDS/UL (ref 149–390)
PLATELET BLD QL SMEAR: ADEQUATE
PMV BLD AUTO: 11.5 FL (ref 8.9–12.7)
POTASSIUM SERPL-SCNC: 3.5 MMOL/L (ref 3.5–5.3)
PROCALCITONIN SERPL-MCNC: 1.3 NG/ML
RBC # BLD AUTO: 4.91 MILLION/UL (ref 3.88–5.62)
RBC MORPH BLD: NORMAL
SODIUM SERPL-SCNC: 139 MMOL/L (ref 136–145)
WBC # BLD AUTO: 4.06 THOUSAND/UL (ref 4.31–10.16)

## 2022-04-12 PROCEDURE — 85007 BL SMEAR W/DIFF WBC COUNT: CPT | Performed by: INTERNAL MEDICINE

## 2022-04-12 PROCEDURE — 99232 SBSQ HOSP IP/OBS MODERATE 35: CPT | Performed by: SURGERY

## 2022-04-12 PROCEDURE — 99232 SBSQ HOSP IP/OBS MODERATE 35: CPT | Performed by: INTERNAL MEDICINE

## 2022-04-12 PROCEDURE — 83605 ASSAY OF LACTIC ACID: CPT | Performed by: INTERNAL MEDICINE

## 2022-04-12 PROCEDURE — 88184 FLOWCYTOMETRY/ TC 1 MARKER: CPT | Performed by: INTERNAL MEDICINE

## 2022-04-12 PROCEDURE — 84145 PROCALCITONIN (PCT): CPT | Performed by: GENERAL PRACTICE

## 2022-04-12 PROCEDURE — 92526 ORAL FUNCTION THERAPY: CPT

## 2022-04-12 PROCEDURE — 80048 BASIC METABOLIC PNL TOTAL CA: CPT | Performed by: INTERNAL MEDICINE

## 2022-04-12 PROCEDURE — 84100 ASSAY OF PHOSPHORUS: CPT | Performed by: INTERNAL MEDICINE

## 2022-04-12 PROCEDURE — 83735 ASSAY OF MAGNESIUM: CPT | Performed by: INTERNAL MEDICINE

## 2022-04-12 PROCEDURE — 99223 1ST HOSP IP/OBS HIGH 75: CPT | Performed by: NURSE PRACTITIONER

## 2022-04-12 PROCEDURE — 85027 COMPLETE CBC AUTOMATED: CPT | Performed by: INTERNAL MEDICINE

## 2022-04-12 PROCEDURE — C9113 INJ PANTOPRAZOLE SODIUM, VIA: HCPCS | Performed by: SURGERY

## 2022-04-12 PROCEDURE — 88185 FLOWCYTOMETRY/TC ADD-ON: CPT

## 2022-04-12 RX ORDER — METRONIDAZOLE 500 MG/1
500 TABLET ORAL EVERY 8 HOURS SCHEDULED
Status: DISCONTINUED | OUTPATIENT
Start: 2022-04-12 | End: 2022-04-15 | Stop reason: HOSPADM

## 2022-04-12 RX ORDER — SODIUM CHLORIDE, SODIUM GLUCONATE, SODIUM ACETATE, POTASSIUM CHLORIDE, MAGNESIUM CHLORIDE, SODIUM PHOSPHATE, DIBASIC, AND POTASSIUM PHOSPHATE .53; .5; .37; .037; .03; .012; .00082 G/100ML; G/100ML; G/100ML; G/100ML; G/100ML; G/100ML; G/100ML
100 INJECTION, SOLUTION INTRAVENOUS CONTINUOUS
Status: DISCONTINUED | OUTPATIENT
Start: 2022-04-12 | End: 2022-04-12

## 2022-04-12 RX ORDER — DILTIAZEM HYDROCHLORIDE 180 MG/1
180 CAPSULE, COATED, EXTENDED RELEASE ORAL DAILY
Status: DISCONTINUED | OUTPATIENT
Start: 2022-04-12 | End: 2022-04-15 | Stop reason: HOSPADM

## 2022-04-12 RX ORDER — POTASSIUM CHLORIDE 20 MEQ/1
40 TABLET, EXTENDED RELEASE ORAL ONCE
Status: COMPLETED | OUTPATIENT
Start: 2022-04-12 | End: 2022-04-12

## 2022-04-12 RX ADMIN — Medication 3 MG: at 21:14

## 2022-04-12 RX ADMIN — PANTOPRAZOLE SODIUM 40 MG: 40 INJECTION, POWDER, FOR SOLUTION INTRAVENOUS at 09:22

## 2022-04-12 RX ADMIN — ALLOPURINOL 100 MG: 100 TABLET ORAL at 09:21

## 2022-04-12 RX ADMIN — ACETAMINOPHEN 975 MG: 325 TABLET ORAL at 22:17

## 2022-04-12 RX ADMIN — Medication 2000 UNITS: at 09:21

## 2022-04-12 RX ADMIN — METRONIDAZOLE 500 MG: 500 TABLET ORAL at 15:54

## 2022-04-12 RX ADMIN — METRONIDAZOLE 500 MG: 500 TABLET ORAL at 21:14

## 2022-04-12 RX ADMIN — TAMSULOSIN HYDROCHLORIDE 0.4 MG: 0.4 CAPSULE ORAL at 15:54

## 2022-04-12 RX ADMIN — DILTIAZEM HYDROCHLORIDE 180 MG: 180 CAPSULE, COATED, EXTENDED RELEASE ORAL at 09:21

## 2022-04-12 RX ADMIN — SODIUM CHLORIDE, SODIUM GLUCONATE, SODIUM ACETATE, POTASSIUM CHLORIDE, MAGNESIUM CHLORIDE, SODIUM PHOSPHATE, DIBASIC, AND POTASSIUM PHOSPHATE 100 ML/HR: .53; .5; .37; .037; .03; .012; .00082 INJECTION, SOLUTION INTRAVENOUS at 09:20

## 2022-04-12 RX ADMIN — METRONIDAZOLE 500 MG: 500 INJECTION, SOLUTION INTRAVENOUS at 00:40

## 2022-04-12 RX ADMIN — ALLOPURINOL 100 MG: 100 TABLET ORAL at 15:59

## 2022-04-12 RX ADMIN — GABAPENTIN 500 MG: 400 CAPSULE ORAL at 21:14

## 2022-04-12 RX ADMIN — POTASSIUM CHLORIDE 40 MEQ: 1500 TABLET, EXTENDED RELEASE ORAL at 09:21

## 2022-04-12 RX ADMIN — CEFTRIAXONE 1000 MG: 1 INJECTION, POWDER, FOR SOLUTION INTRAMUSCULAR; INTRAVENOUS at 15:59

## 2022-04-12 RX ADMIN — HEPARIN SODIUM 5000 UNITS: 5000 INJECTION INTRAVENOUS; SUBCUTANEOUS at 13:45

## 2022-04-12 RX ADMIN — PHENOL 1 SPRAY: 1.5 LIQUID ORAL at 13:50

## 2022-04-12 RX ADMIN — HEPARIN SODIUM 5000 UNITS: 5000 INJECTION INTRAVENOUS; SUBCUTANEOUS at 05:45

## 2022-04-12 RX ADMIN — METRONIDAZOLE 500 MG: 500 INJECTION, SOLUTION INTRAVENOUS at 09:20

## 2022-04-12 RX ADMIN — LORATADINE 10 MG: 10 TABLET ORAL at 09:21

## 2022-04-12 RX ADMIN — ASPIRIN 81 MG: 81 TABLET, COATED ORAL at 09:21

## 2022-04-12 RX ADMIN — HEPARIN SODIUM 5000 UNITS: 5000 INJECTION INTRAVENOUS; SUBCUTANEOUS at 21:14

## 2022-04-12 NOTE — ASSESSMENT & PLAN NOTE
· Cardizem restarted yesterday with episode of bradycardia  Will half the dose for now    · Blood pressure is much improved  · Labetalol prn

## 2022-04-12 NOTE — ASSESSMENT & PLAN NOTE
Patient with increased oxygen requirements yesterday  Required a maximal 4 L of oxygen via nasal cannula, currently improved at 1-2 L  Reviewed history with mild interstitial lung disease  Chest x-ray with no acute cardiopulmonary disease  CTA performed due to an elevated D-dimer with no evidence of pulmonary embolism but mild pulmonary congestion  Echo from 2020 with grade 1 diastolic dysfunction and EF of 60%  Received 20 mg of IV Lasix yesterday in addition to nebulizer treatment  Etiology likely secondary to pulmonary congestion in the setting of interstitial lung disease in addition to metabolic acidosis  Currently improved

## 2022-04-12 NOTE — PROGRESS NOTES
[facility]  Progress Note - Rachelle Andre  1940, [de-identified] y o  male MRN: [id number]  Unit/Bed#: W -01 Encounter: [id number]  Primary Care Provider: Theodore Bailey MD   Date and time admitted to hospital: 4/8/2022 10:41 PM    * Enteritis  Assessment & Plan  · Presented with abdominal pain, diarrhea x 1 day  Patient also report 1 episode of vomiting  · CT A/P preliminary report with "multiple mildly dilated loops of small bowel in the abdomen without a clear transition point, findings may represent small bowel enteritis however early obstruction not entirely excluded "  · stool studies and C diff neg   · Started on antibiotics 4/11 -ceftriaxone and Flagyl  · Continue IV fluids, encourage oral intake  · Advanced diet as per surgical recommendations  · Pain control  · PRN Zofran  Small bowel obstruction ([address] )  Assessment & Plan  · Gen surg appreciated  · NGT removed 4/11  · Tolerating clear liquid diet, advance diet as per surgery recommendations  · Monitor intake and bowel movements, last bowel movement was on Saturday  Passing gas  · Serial abdominal exams    Sepsis ([address] )  Assessment & Plan  · Patient with evolving sepsis as evidenced by fever, leukopenia and lactic acidosis  · Blood cultures with no growth to date  · COVID/Flu/RSV negative  · Elevated procalcitonin inflammatory markers  · CT chest with no evidence of active disease or pulmonary embolism  · Etiology likely secondary to enteritis  · Continue with ceftriaxone and Flagyl  · Monitor temperature and white cell count  · Appreciate infectious disease recommendations      Electrolyte abnormality  Assessment & Plan  · Keep K at 4, P at 3, Mag at 2  · Resolved    Anxiety  Assessment & Plan  · 2/2 NGT, resolved  · Zydis prn    Chronic kidney disease  Assessment & Plan  Lab Results   Component Value Date    EGFR 46 04/12/2022    EGFR 50 04/11/2022    EGFR 55 04/11/2022    CREATININE 1 42 (H) 04/12/2022 CREATININE 1 33 (H) 04/11/2022    CREATININE 1 22 04/11/2022     Estimated Creatinine Clearance: 46 4 mL/min (A) (by C-G formula based on SCr of 1 42 mg/dL (H))  · Baseline Cr around 1 10   · Cr mildly elevated at 1 36 on arrival    · Monitor intake/output  · Avoid nephrotoxins and hypotension  · Continue IVF with recent contrast use  · Encourage oral intake  · Repeat BMP in AM      Acute respiratory failure with hypoxia [facility])  Assessment & Plan  Patient with increased oxygen requirements yesterday  Required a maximal 4 L of oxygen via nasal cannula, currently improved at 1-2 L  Reviewed history with mild interstitial lung disease  Chest x-ray with no acute cardiopulmonary disease  CTA performed due to an elevated D-dimer with no evidence of pulmonary embolism but mild pulmonary congestion  Echo from 2020 with grade 1 diastolic dysfunction and EF of 60%  Received 20 mg of IV Lasix yesterday in addition to nebulizer treatment  Etiology likely secondary to pulmonary congestion in the setting of interstitial lung disease in addition to metabolic acidosis  Currently improved  Idiopathic peripheral neuropathy  Assessment & Plan  · Gabapentin 500 mg qhs restarted  · Home Ultracet on hold, requiring Tylenol for fever  · Dilaudid prn    Benign essential hypertension  Assessment & Plan  · Cardizem restarted yesterday with episode of bradycardia  Will half the dose for now  · Blood pressure is much improved  · Labetalol prn      VTE Pharmacologic Prophylaxis: VTE Score: 5 High Risk (Score >/= 5) - Pharmacological DVT Prophylaxis Ordered: heparin  Sequential Compression Devices Ordered  Patient Centered Rounds: I performed bedside rounds with nursing staff today  Discussions with Specialists or Other Care Team Provider:  Infectious disease, surgery    Education and Discussions with Family / Patient: Updated  (wife) at bedside      Current Length of Stay: 3 day(s)  Current Patient Status: Inpatient   Discharge Plan: Anticipate discharge in 24-48 hrs to home  Code Status: Level 1 - Full Code    Subjective:   Patient reports that he feels well today  Continues to complain of throat pain but states that this is improved from yesterday  He denies any abdominal pain, nausea or vomiting  Able to tolerate clear liquid diet  Denies any bowel movements but is passing gas  Objective:     Vitals:   Temp (24hrs), Av 1 °F (37 3 °C), Min:97 3 °F (36 3 °C), Max:102 7 °F (39 3 °C)    Temp:  [97 3 °F (36 3 °C)-102 7 °F (39 3 °C)] 99 3 °F (37 4 °C)  HR:  [52-87] 74  Resp:  [17-28] 17  BP: (108-165)/() 108/64  SpO2:  [85 %-95 %] 94 %  Body mass index is 36 33 kg/m²  Input and Output Summary (last 24 hours): Intake/Output Summary (Last 24 hours) at 2022 1240  Last data filed at 2022 0805  Gross per 24 hour   Intake 1840 ml   Output 1950 ml   Net -110 ml       Physical Exam:   Physical Exam  Vitals and nursing note reviewed  Constitutional:       General: He is not in acute distress  Appearance: He is well-developed  He is obese  He is not ill-appearing or diaphoretic  HENT:      Head: Normocephalic and atraumatic  Ears:      Comments: Hard of hearing     Mouth/Throat:      Comments: Unable to fully visualize the oropharynx but they did appear to be some erythema at the back of his mouth  Cardiovascular:      Rate and Rhythm: Normal rate and regular rhythm  Heart sounds: Normal heart sounds  No murmur heard  Pulmonary:      Effort: Pulmonary effort is normal       Breath sounds: Normal breath sounds  No wheezing, rhonchi or rales  Abdominal:      General: Bowel sounds are normal  There is no distension  Palpations: Abdomen is soft  Tenderness: There is no abdominal tenderness  Musculoskeletal:      Right lower leg: No edema  Left lower leg: No edema  Skin:     General: Skin is warm and dry     Neurological:      Mental Status: He is alert and oriented to person, place, and time  Psychiatric:         Mood and Affect: Mood normal          Behavior: Behavior normal           Additional Data:     Labs:  Results from last 7 days   Lab Units 04/12/22  0452   WBC Thousand/uL 4 06*   HEMOGLOBIN g/dL 13 5   HEMATOCRIT % 40 5   PLATELETS Thousands/uL 128*   BANDS PCT % 1   LYMPHO PCT % 7*   MONO PCT % 2*   EOS PCT % 0     Results from last 7 days   Lab Units 04/12/22  0452 04/11/22  1616 04/11/22  1616   SODIUM mmol/L 139   < > 137   POTASSIUM mmol/L 3 5   < > 3 8   CHLORIDE mmol/L 101   < > 100   CO2 mmol/L 27   < > 28   BUN mg/dL 20   < > 17   CREATININE mg/dL 1 42*   < > 1 33*   ANION GAP mmol/L 11   < > 9   CALCIUM mg/dL 8 2*   < > 8 6   ALBUMIN g/dL  --   --  3 5   TOTAL BILIRUBIN mg/dL  --   --  1 10*   ALK PHOS U/L  --   --  63   ALT U/L  --   --  31   AST U/L  --   --  48*   GLUCOSE RANDOM mg/dL 104   < > 98    < > = values in this interval not displayed  Results from last 7 days   Lab Units 04/11/22  1938   INR  1 05             Results from last 7 days   Lab Units 04/12/22  1112 04/12/22  0452 04/11/22  1820 04/11/22  1616 04/11/22  0618 04/10/22  0906   LACTIC ACID mmol/L 1 6  --  3 5* 2 3*  --  1 1   PROCALCITONIN ng/ml  --  1 30*  --  0 28* 0 25 0 12       Lines/Drains:  Invasive Devices  Report    Peripheral Intravenous Line            Peripheral IV 04/11/22 Dorsal (posterior); Right Forearm <1 day                      Imaging: Reviewed radiology reports from this admission including: chest xray and chest CT scan    Recent Cultures (last 7 days):   Results from last 7 days   Lab Units 04/11/22  1705 04/11/22  1617 04/10/22  0908 04/09/22  1844 04/09/22  0018 04/09/22  0000 04/08/22  2327 04/08/22  2310   BLOOD CULTURE  Received in Microbiology Lab  Culture in Progress  Received in Microbiology Lab  Culture in Progress  No Growth at 48 hrs  No Growth at 48 hrs   --   --  No Growth at 72 hrs   --  No Growth at 72 hrs     URINE CULTURE   -- --   --   --  10,000-19,000 cfu/ml Proteus mirabilis*  <10,000 cfu/ml   --   --   --    C DIFF TOXIN B BY PCR   --   --   --  Negative  --   --  Indeterminate Result by PCR  Suggest Re-collection   *  --        Last 24 Hours Medication List:   Current Facility-Administered Medications   Medication Dose Route Frequency Provider Last Rate    acetaminophen  975 mg Oral Q6H PRN Chalino Jack MD      al mag oxide-diphenhydramine-lidocaine viscous  10 mL Swish & Spit Q4H PRN Chalino Jack MD      allopurinol  100 mg Oral BID Chalino Jack MD      aspirin  81 mg Oral Daily Chalino Jack MD      cefTRIAXone  1,000 mg Intravenous Q24H Chalino Jack MD 1,000 mg (04/11/22 1747)    cholecalciferol  2,000 Units Oral Daily Chalino Jack MD      diltiazem  180 mg Oral Daily Chalino Jack MD      gabapentin  500 mg Oral HS Chalino Jack MD      heparin (porcine)  5,000 Units Subcutaneous [facility] Emelyn Tamayo MD      HYDROmorphone  0 2 mg Intravenous Q6H PRN Star Frankel, DO      ipratropium  0 5 mg Nebulization TID PRN Star Frankel, DO      Labetalol HCl  20 mg Intravenous Q4H PRN Star Frankel, DO      levalbuterol  1 25 mg Nebulization TID PRN Star Frankel, DO      loratadine  10 mg Oral Daily Chalino Jack MD      melatonin  3 mg Oral HS Emelyn Tamayo MD      metroNIDAZOLE  500 mg Intravenous Q8H Chalino Jack  mg (04/12/22 0920)    multi-electrolyte  100 mL/hr Intravenous Continuous Chalino Jack  mL/hr (04/12/22 0920)    OLANZapine  2 5 mg Oral Q8H PRN Star Frankel, DO      ondansetron  4 mg Intravenous Q6H PRN Melva Medina PA-C      pantoprazole  40 mg Intravenous Q24H [address] Danelle Hurley MD      phenol  1 spray Mouth/Throat Q2H PRN Star Frankel, DO      saliva substitute  5 spray Mouth/Throat 4x Daily PRN Jasmin Ortiz MD      tamsulosin  0 4 mg Oral Daily With Calderon Moyer MD          Today, Patient Was Seen By: Vladimir Schneider MD    **Please Note: This note may have been constructed using a voice recognition system  **

## 2022-04-12 NOTE — PLAN OF CARE
Problem: MOBILITY - ADULT  Goal: Maintain or return to baseline ADL function  Description: INTERVENTIONS:  -  Assess patient's ability to carry out ADLs; assess patient's baseline for ADL function and identify physical deficits which impact ability to perform ADLs (bathing, care of mouth/teeth, toileting, grooming, dressing, etc )  - Assess/evaluate cause of self-care deficits   - Assess range of motion  - Assess patient's mobility; develop plan if impaired  - Assess patient's need for assistive devices and provide as appropriate  - Encourage maximum independence but intervene and supervise when necessary  - Involve family in performance of ADLs  - Assess for home care needs following discharge   - Consider OT consult to assist with ADL evaluation and planning for discharge  - Provide patient education as appropriate  Outcome: Progressing  Goal: Maintains/Returns to pre admission functional level  Description: INTERVENTIONS:  - Perform BMAT or MOVE assessment daily    - Set and communicate daily mobility goal to care team and patient/family/caregiver  - Collaborate with rehabilitation services on mobility goals if consulted  - Perform Range of Motion  times a day  - Reposition patient every  hours    - Dangle patient  times a day  - Stand patient times a day  - Ambulate patient  times a day  - Out of bed to chair  times a day   - Out of bed for meals  times a day  - Out of bed for toileting  - Record patient progress and toleration of activity level   Outcome: Progressing     Problem: Prexisting or High Potential for Compromised Skin Integrity  Goal: Skin integrity is maintained or improved  Description: INTERVENTIONS:  - Identify patients at risk for skin breakdown  - Assess and monitor skin integrity  - Assess and monitor nutrition and hydration status  - Monitor labs   - Assess for incontinence   - Turn and reposition patient  - Assist with mobility/ambulation  - Relieve pressure over bony prominences  - Avoid friction and shearing  - Provide appropriate hygiene as needed including keeping skin clean and dry  - Evaluate need for skin moisturizer/barrier cream  - Collaborate with interdisciplinary team   - Patient/family teaching  - Consider wound care consult   Outcome: Progressing     Problem: Potential for Falls  Goal: Patient will remain free of falls  Description: INTERVENTIONS:  - Educate patient/family on patient safety including physical limitations  - Instruct patient to call for assistance with activity   - Consult OT/PT to assist with strengthening/mobility   - Keep Call bell within reach  - Keep bed low and locked with side rails adjusted as appropriate  - Keep care items and personal belongings within reach  - Initiate and maintain comfort rounds  - Make Fall Risk Sign visible to staff  - Offer Toileting every  Hours, in advance of need  - Initiate/Maintain alarm  - Obtain necessary fall risk management equipment:   - Apply yellow socks and bracelet for high fall risk patients  - Consider moving patient to room near nurses station  Outcome: Progressing     Problem: Nutrition/Hydration-ADULT  Goal: Nutrient/Hydration intake appropriate for improving, restoring or maintaining nutritional needs  Description: Monitor and assess patient's nutrition/hydration status for malnutrition  Collaborate with interdisciplinary team and initiate plan and interventions as ordered  Monitor patient's weight and dietary intake as ordered or per policy  Utilize nutrition screening tool and intervene as necessary  Determine patient's food preferences and provide high-protein, high-caloric foods as appropriate       INTERVENTIONS:  - Monitor oral intake, urinary output, labs, and treatment plans  - Assess nutrition and hydration status and recommend course of action  - Evaluate amount of meals eaten  - Assist patient with eating if necessary   - Allow adequate time for meals  - Recommend/ encourage appropriate diets, oral nutritional supplements, and vitamin/mineral supplements  - Order, calculate, and assess calorie counts as needed  - Recommend, monitor, and adjust tube feedings and TPN/PPN based on assessed needs  - Assess need for intravenous fluids  - Provide specific nutrition/hydration education as appropriate  - Include patient/family/caregiver in decisions related to nutrition  Outcome: Progressing

## 2022-04-12 NOTE — ASSESSMENT & PLAN NOTE
Lab Results   Component Value Date    EGFR 46 04/12/2022    EGFR 50 04/11/2022    EGFR 55 04/11/2022    CREATININE 1 42 (H) 04/12/2022    CREATININE 1 33 (H) 04/11/2022    CREATININE 1 22 04/11/2022     Estimated Creatinine Clearance: 46 4 mL/min (A) (by C-G formula based on SCr of 1 42 mg/dL (H))  · Baseline Cr around 1 10   · Cr mildly elevated at 1 36 on arrival    · Monitor intake/output  · Avoid nephrotoxins and hypotension     · Continue IVF with recent contrast use  · Encourage oral intake  · Repeat BMP in AM

## 2022-04-12 NOTE — SPEECH THERAPY NOTE
Speech Language/Pathology    Speech/Language Pathology Progress Note    Patient Name: Rachelle Andre  Today's Date: 4/12/2022       Subjective:  Pt seen for dysphagia tx follow up  Per wife, pt is tired of clear liquids, wants foods  Per surgery noted, ok to advance diet as tolerated, but not ordered  Objective:  Pt seated EOB, stated his throat feels better, but still has a sore spot on R side  Pt self fed applesauce, toast, drank OJ by straw  Able to bite toast, mastication/manipulation was WNL  No oral residue noted  Pt w/ good oral control of liquids by straw  Swallow initiation was timely and complete with no coughing, throat clearing or c/o foods getting stuck, only c/o of some mild pain on R side of throat  Assessment:  Pt cont w/ mild odynophagia- R side of throat, otherwise oral and pharyngeal stages of swallowing appeared WNL-w/o c/o food dysphagia symptoms or s/s aspiration       Plan/Recommendations:  rec advance to regular diet w/ thin liquids  meds as tolerated   will follow up x 1-2 as needed      Elisabet Williamson CCC-SLP  Speech Pathologist  Available via  tiger text

## 2022-04-12 NOTE — ASSESSMENT & PLAN NOTE
· Gen surg appreciated  · NGT removed 4/11  · Tolerating clear liquid diet, advance diet as per surgery recommendations  · Monitor intake and bowel movements, last bowel movement was on Saturday  Passing gas    · Serial abdominal exams

## 2022-04-12 NOTE — ASSESSMENT & PLAN NOTE
· Presented with abdominal pain, diarrhea x 1 day  Patient also report 1 episode of vomiting  · CT A/P preliminary report with "multiple mildly dilated loops of small bowel in the abdomen without a clear transition point, findings may represent small bowel enteritis however early obstruction not entirely excluded "  · stool studies and C diff neg   · Started on antibiotics 4/11 -ceftriaxone and Flagyl  · Continue IV fluids, encourage oral intake  · Advanced diet as per surgical recommendations  · Pain control  · PRN Zofran

## 2022-04-12 NOTE — ASSESSMENT & PLAN NOTE
· Patient with evolving sepsis as evidenced by fever, leukopenia and lactic acidosis  · Blood cultures with no growth to date  · COVID/Flu/RSV negative  · Elevated procalcitonin inflammatory markers  · CT chest with no evidence of active disease or pulmonary embolism  · Etiology likely secondary to enteritis  · Continue with ceftriaxone and Flagyl  · Monitor temperature and white cell count  · Appreciate infectious disease recommendations

## 2022-04-12 NOTE — PROGRESS NOTES
Progress Note - General Surgery   Jeniffer [place]  [de-identified] y o  male MRN: [id number]  Unit/Bed#: W -01 Encounter: [id number]    Assessment:  81M with fevers and dilated SB loops, likely enteritis  Plan:  - advance diet as tolerated  - currently on ceftriaxone/Flagyl per primary team  - F/u ID consult  - PRN analgesia  - trend wbc/fever curve  - OOB/ambulate  - dvt ppx      Subjective/Objective   Subjective:  Remains febrile  Tolerating clear liquids  Having flatus  Objective:    Blood pressure 108/64, pulse 74, temperature (!) 102 7 °F (39 3 °C), resp  rate 17, height 5' 6" (1 676 m), weight 102 kg (225 lb 1 4 oz), SpO2 95 %  ,Body mass index is 36 33 kg/m²  I/O last 24 hours: In: 5567 [P O :840; I V :800; IV Piggyback:200]  Out: 1950 [Urine:1950]    Invasive Devices  Report    Peripheral Intravenous Line            Peripheral IV 04/10/22 Left;Proximal;Ventral (anterior) Forearm 1 day    Peripheral IV 04/11/22 Dorsal (posterior); Right Forearm <1 day                Physical Exam:   GEN: NAD  HEENT: MMM  CV:  Warm/well perfused  Lung: normal effort  Ab: Soft, NT, protuberant  Extrem: No CCE  Neuro:  A+Ox3, motor and sensation grossly intact      Lab, Imaging and other studies:  Lab Results   Component Value Date    WBC 4 06 (L) 04/12/2022    HGB 13 5 04/12/2022    HCT 40 5 04/12/2022    MCV 83 04/12/2022     (L) 04/12/2022      Lab Results   Component Value Date    CALCIUM 8 2 (L) 04/12/2022    K 3 5 04/12/2022    CO2 27 04/12/2022     04/12/2022    BUN 20 04/12/2022    CREATININE 1 42 (H) 04/12/2022       VTE Pharmacologic Prophylaxis: Enoxaparin (Lovenox)  VTE Mechanical Prophylaxis: sequential compression device

## 2022-04-12 NOTE — PROGRESS NOTES
Progress Note - General Surgery   Veronika [place]  [de-identified] y o  male MRN: [id number]  Unit/Bed#: W -01 Encounter: [id number]    Assessment:  81M with fevers and dilated SB loops, likely enteritis  Plan:  Diet as tolerated  Appreciate ID recommendations, continue rocephin/flagyl  Trend WBC/fever curve  PRN analgesia  OOB/ambulate  DVT PPX    Subjective/Objective      Subjective: No acute events overnight, tolerating lo res diet without nausea or vomiting, states he is passing flatus but denies BM, denies abdominal pain currently    Objective:     Blood pressure 136/87, pulse 80, temperature (!) 101 7 °F (38 7 °C), resp  rate 14, height 5' 6" (1 676 m), weight 102 kg (225 lb 5 oz), SpO2 94 %  ,Body mass index is 36 37 kg/m²  Intake/Output Summary (Last 24 hours) at 4/13/2022 0643  Last data filed at 4/12/2022 1558  Gross per 24 hour   Intake 663 33 ml   Output 200 ml   Net 463 33 ml       Invasive Devices  Report    Peripheral Intravenous Line            Peripheral IV 04/11/22 Dorsal (posterior); Right Forearm 1 day                Physical Exam:  Gen:    NAD  CV:      warm, well-perfused  Lungs: No respiratory distress  Abd:     soft, NT/ND  Ext:      no CCE  Neuro: A&Ox3

## 2022-04-12 NOTE — QUICK NOTE
CTA showed no PE, will discontinue heparin drip  Imaging showed bilateral ground-glass opacities concerning for pulmonary edema  Patient currently stable oxygen saturation, status post IV Lasix  Will hold off Lasix for now as the patient received contrast and is stable  Addendum: also will discontinue fluids   Will monitor creatinine am

## 2022-04-12 NOTE — CONSULTS
Consultation - Infectious Disease   Jef Aggarwal  [de-identified] y o  male MRN: [id number]  Unit/Bed#: W -01 Encounter: [id number]    IMPRESSION & RECOMMENDATIONS:   1  SIRS vs sepsis  Evolving since admission  Fever, mild leukopenia, with lactic acidosis  Suspect this is all secondary to enteritis  Consider role of dehydration from excessive diarrhea  This may be all viral in origin as no acute bacterial source has been found at this time  Fortunately the patient remains hemodynamically stable and nontoxic  This morning his WBC count did improve  Lactic acidosis has resolved   -antibiotics as below  -check CBC and BMP tomorrow  -follow up blood cultures  -monitor vitals  -supportive care    2  Enteritis  Possibly all viral in origin  No clear bacterial source identified so far  Stool PCR and c diff PCR were negative  I personally reviewed his CT of the abdomen/pelvis which showed gradual tapering of the distal ileum suggesting a possible small obstruction but no acute infectious findings  No recent consumption of unusual or raw foods  No pets in the home  No recent travels  No recent sick contacts  Patient was decompressed with NG tube with high output  He has transition to a clear diet and is hoping to start solid food later today  He has no ongoing diarrhea  Given his high fevers, and elevations in procalcitonin, he was initiated on antibiotic treatment with IV Flagyl and ceftriaxone yesterday  He has been tolerating these antibiotics without difficulty  Because his fevers have persisted, and procalcitonin is trending up, I will continue this regimen for now, but I will transition his Flagyl administration from IV to PO   -continue IV ceftriaxone  -continue Flagyl but transition administration from IV to PO  -check CBCD and BMP tomorrow  -trend procalcitonin tomorrow  -serial abdominal exams  -monitor abdominal symptoms    3  Acute hypoxic respiratory failure    Patient felt short of breath yesterday and was started on nasal cannula O2  Chest imaging showed mild pulmonary vascular congestion  He received Lasix  Patient's breathing has improved and he has reduced supplemental O2 requirement today   -diuretics per primary service  -monitor vitals  -monitor respiratory status  -O2 support per primary service    4  CKD  Upon review of patient's available past medical records it appears his baseline creatinine is approximately 1 1  His creatinine was mildly elevated at  1 36 upon admission  Creatinine increased slightly but he has received contrast   -check creatinine tomorrow  -dose adjust antibiotics for renal function as needed  -avoid nephrotoxins    5  MO  BMI = 36 33  Thank you for the consult  We will continue to follow along with the patient  Above plan was discussed in detail with patient at the bedside  Above plan was discussed in detail with SLIM  HISTORY OF PRESENT ILLNESS:  Reason for Consult: gastroenteritis   HPI: Zaid Castaneda  is an [de-identified]y o  year old male who presented to the [address] [place] [address] ED on 4/8/2022 with middle abdominal pain, bloating, nausea, vomiting, and diarrhea  His symptoms started suddenly earlier that morning after eating half a sandwich  Upon arrival to the ED the patient's temperature was 98  3  HR was 92  RR was 16  O2 saturation was 95% on room air  WBC count was 6  12  Lactic acid was 1 2  Creatinine was 1 36 with GFR = 48  UA was benign  Blood cultures were sent  He complete a chest xray which showed no acute infectious cardiopulmonary findings  He complete a CT abdomen/pelvis which showed dilated loops of small bowel and possible distal obstruction  The patient was admitted for additional medical management  After his admission stool studies were obtained  Initially c diff PCR was indeterminate but repeat screen was negative  Stool PCR was negative  General surgery assessed patient  An NG tube was temporarily required  Patient then developed persistent fevers  Viral gastroenteritis was suspected  Repeat blood cultures were sent and are negative  He experienced mild respiratory distress  COVID/Flu/RSV PCR was negative  He received IV lasix  Chest CT PE study was negative but he was found to have mild pulmonary edema  This morning we have been asked for formal consult for gastroenteritis  The patient has BPH, HTN, hiatal hernia, GERD, hearing loss, allergic rhinitis, OA, salivary gland disorder, obesity, and thyroid nodule  He has a history of partial gastrectomy due to PUD with G-J anastomosis, difficulty urinating, warthin tumor, spermatocele, kidney stones, abnormal EKG, prostate cancer, colitis, stomach ulcers, bladder surgery, cholecystectomy, B/L shoulder replacement, prostate biopsy, vasectomy, thyroid biopsy, tonsillectomy and adenoidectomy, 5th nerve dissection, L knee arthroscopy, excision of flank mass, pilonidal cyst removal, nasal septum surgery, and inguinal hernia repair  He is a former smoker  He has an allergy to ace inhibitors  REVIEW OF SYSTEMS:  The patient reports he's feeling a bit better today  He is happy to have the NG out of his nose and is hopeful that he will be allowed to have solid food for lunch  Reports he ate some toast with the speech pathologist this morning and he tolerated it without difficulty  He tells me the NG tube gave him a sore throat  He reports no additional diarrhea, believes his last BM was on Saturday, 3 days ago  He denies nausea and vomiting  His wife reports he's been very gassy today  He feels that his breathing is fine right now  Denies chest pain, cough, and SOB  He has had some shaking chills but no sweats  Patient's wife noted a red rash developing on his buttocks  A complete 12 point system-based review of systems is otherwise negative  PAST MEDICAL HISTORY:  Past Medical History:   Diagnosis Date    Abnormal electrocardiogram     Last assessed: Oct 11, 2013    Allergic rhinitis     last assessed:  Oct 11, 2013    Allergic rhinitis due to pollen     last assessed: Oct 10, 2013    Allergic sinusitis     Last assessed: May 11, 2015    Allergy     resolved: July 22, 2015    Benign essential hypertension     Last assessed/resolved: May 31, 2017    BPH (benign prostatic hyperplasia)     Cancer [facility])     prostate    Colitis     Last assessed: May 24, 2016    Cough with hemoptysis 11/17/2020    Generalized osteoarthritis     Last assessed: Oct 11, 2013    GERD without esophagitis     Last assessed/resolved: May 31, 2017    Hearing loss     Hiatal hernia     resolved: July 22, 2015    History of gastroesophageal reflux (GERD)     History of stomach ulcers     last assessed: May 11, 2015    Hypertension     Incomplete bladder emptying     Kidney stone     Nodular prostate with lower urinary tract symptoms     Nontoxic single thyroid nodule     last assessed: April 16, 2014    Orchalgia     Overweight     last assessed: Oct 31, 2013    Poor urinary stream     Pulmonary embolism [facility])     Salivary gland disorder     last assessed: April 16, 2014    Seasonal allergies     last assessed: May 15, 2015    Spermatocele     Straining to void     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:  May 7, 2014     Past Surgical History:   Procedure Laterality Date    BLADDER SURGERY      CHOLECYSTECTOMY  2000    laparoscopic     FLAP LOCAL TRUNK Left 10/28/2021    Procedure: EXCISION OF FLANK MASS;  Surgeon: Oksana Macias DO;  Location[facility] MA[facility] OR;  Service: Plastics    HEMORRHOID SURGERY      pilionidal cyst removal     HERNIA REPAIR Left 01/17/2008    inguinal     KNEE ARTHROSCOPY Left 1974    KNEE CARTILAGE SURGERY      NASAL SEPTUM SURGERY      Deviation repair     UT EXC PAROTD,LAT LOBE,DISSECT 5TH NERV Right 6/9/2021    Procedure: SUPERFICIAL PAROTIDECTOMY WITH FACIAL NERVE MONITORING;  Surgeon: Katy Messer MD;  [facility];  Service: ENT    PROSTATE BIOPSY  2017    STOMACH SURGERY      TONSILLECTOMY AND ADENOIDECTOMY      at age 37   [address] [facility] [address] - right  0 left     US GUIDED THYROID BIOPSY  2022    VASECTOMY       FAMILY HISTORY:  Non-contributory    SOCIAL HISTORY:  Social History   Social History     Substance and Sexual Activity   Alcohol Use Yes    Comment: rare     Social History     Substance and Sexual Activity   Drug Use No     Social History     Tobacco Use   Smoking Status Former Smoker    Packs/day: 1 00    Years: 30 00    Pack years: 30 00    Types: Cigarettes    Start date:     Quit date: 5    Years since quittin 3   Smokeless Tobacco Never Used     ALLERGIES:  Allergies   Allergen Reactions    Ace Inhibitors Hyperactivity     MEDICATIONS:  All current active medications have been reviewed  ANTIBIOTICS:  Ceftriaxone 2  Flagyl 2  Antibiotics 2    PHYSICAL EXAM:  Temp:  [97 3 °F (36 3 °C)-101 1 °F (38 4 °C)] 99 7 °F (37 6 °C)  HR:  [52-88] 52  Resp:  [18-28] 28  BP: (134-165)/() 134/80  SpO2:  [89 %-95 %] 95 %  Temp (24hrs), Av 7 °F (37 1 °C), Min:97 3 °F (36 3 °C), Max:101 1 °F (38 4 °C)  Current: Temperature: 99 7 °F (37 6 °C)    Intake/Output Summary (Last 24 hours) at 2022 0747  Last data filed at 2022 0600  Gross per 24 hour   Intake 1840 ml   Output 1750 ml   Net 90 ml     General Appearance:  Appearing well, nontoxic, and in no acute distress  He appeared comfortable supine in bed  Transitioned to sitting on side of his bed during our exam fairly easily  Head:  Normocephalic, without obvious abnormality, atraumatic  Eyes:  Conjunctiva pink and sclera anicteric, both eyes  Nose: Nares normal, mucosa normal, no drainage  Throat: Oropharynx moist without lesions  Missing dentition  Neck: Supple, symmetrical, no adenopathy, no tenderness/mass/nodules  Back:   ROM normal, no CVA tenderness     Lungs:   Clear to auscultation bilaterally, respirations unlabored on nasal cannula O2  Chest Wall:  No tenderness or deformity  Heart:  Bradycardic; no murmur, rub or gallop  Abdomen:   Non-tender, distended, hyperactive bowel sounds throughout  Extremities: No cyanosis or clubbing, no edema  Skin: No lesions or draining wounds noted on exposed skin  Faint flat red rash near rectum extending towards buttocks, no underlying erythematous streak, no active drainage, pt reports no pain or itching at site  Neurologic: Alert and oriented times 3, follows commands, speech is organized and appropriate, symmetric facial movement  He is hard of hearing  LABS, IMAGING, & OTHER STUDIES:  Lab Results:  I have personally reviewed pertinent labs  Results from last 7 days   Lab Units 04/12/22  0452 04/11/22  1616 04/11/22  0618   WBC Thousand/uL 4 06* 5 43 3 67*   HEMOGLOBIN g/dL 13 5 16 0 14 4   PLATELETS Thousands/uL 128* 135* 138*     Results from last 7 days   Lab Units 04/12/22  0452 04/11/22  1616 04/11/22  1616 04/09/22  0431 04/08/22  2222   POTASSIUM mmol/L 3 5   < > 3 8   < > 3 9   CHLORIDE mmol/L 101   < > 100   < > 104   CO2 mmol/L 27   < > 28   < > 26   BUN mg/dL 20   < > 17   < > 31*   CREATININE mg/dL 1 42*   < > 1 33*   < > 1 36*   EGFR ml/min/1 73sq m 46   < > 50   < > 48   CALCIUM mg/dL 8 2*   < > 8 6   < > 8 6   AST U/L  --   --  48*  --  25   ALT U/L  --   --  31  --  24   ALK PHOS U/L  --   --  63  --  83    < > = values in this interval not displayed  Results from last 7 days   Lab Units 04/11/22  1705 04/11/22  1617 04/10/22  0908 04/09/22  1844 04/09/22  0018 04/09/22  0000 04/08/22  2327 04/08/22  2310   BLOOD CULTURE  Received in Microbiology Lab  Culture in Progress  Received in Microbiology Lab  Culture in Progress  No Growth at 24 hrs  No Growth at 24 hrs   --   --  No Growth at 48 hrs  --  No Growth at 48 hrs     URINE CULTURE   --   --   --   --  10,000-19,000 cfu/ml Proteus mirabilis*  <10,000 cfu/ml   --   --   --    C DIFF TOXIN B BY PCR --   --   --  Negative  --   --  Indeterminate Result by PCR  Suggest Re-collection  *  --      Results from last 7 days   Lab Units 04/12/22  0452 04/11/22  1616 04/11/22  0618 04/10/22  0906   PROCALCITONIN ng/ml 1 30* 0 28* 0 25 0 12     Results from last 7 days   Lab Units 04/11/22  1616   CRP mg/L 156 8*     Results from last 7 days   Lab Units 04/11/22  1616   D-DIMER QUANTITATIVE ug/ml FEU 1 51*     Imaging Studies:   I have personally reviewed pertinent imaging study reports and images in PACS  CTA CHEST PE STUDY 4/11/2022 - I personally reviewed this study which was negative for PE  Lungs with scattered groundglass suspicious for pulmonary edema  XR CHEST PORTABLE 4/11/2022 - I personally reviewed this image which showed no acute infectious cardiopulmonary findings  XR ABDOMEN 1 VIEW KUB 4/9/2022 - I personally reviewed this image which showed a nonobstructive bowel gas pattern  NG slightly coiled  CT ABDOMEN PELVIS WITH CONTRAST 4/9/2022 - I personally reviewed this image which showed dilated loops of small bowel with gradual tapering of the distal small bowl possibly representing obstruction  XR CHEST 1 VIEW PORTABLE 4/9/2022 - I personally reviewed this image which showed no acute infectious cardiopulmonary findings  Other Studies:   Blood cultures are pending  I have personally reviewed pertinent reports:    04/10/2022 1706 04/10/2022 1808 COVID/FLU/RSV [[id number]]    Nares from Nose    Final result [address] - copy/paste COVID Guidelines URL to browser: [email or web address]   SARS-CoV-2 assay is a Nucleic Acid Amplification assay intended for the   qualitative detection of nucleic acid from SARS-CoV-2 in nasopharyngeal   swabs  Results are for the presumptive identification of SARS-CoV-2 RNA     Positive results are indicative of infection with SARS-CoV-2, the virus   causing COVID-19, but do not rule out bacterial infection or co-infection   with other viruses  Laboratories within the [place] and its   territories are required to report all positive results to the appropriate   public health authorities  Negative results do not preclude SARS-CoV-2   infection and should not be used as the sole basis for treatment or other   patient management decisions  Negative results must be combined with   clinical observations, patient history, and epidemiological information  This test has not been FDA cleared or approved  This test has been authorized by FDA under an Emergency Use Authorization   (EUA)  This test is only authorized for the duration of time the   declaration that circumstances exist justifying the authorization of the   emergency use of an in vitro diagnostic tests for detection of SARS-CoV-2   virus and/or diagnosis of COVID-19 infection under section 564(b)(1) of   the Act, 21 U  S C  152GAY-4(E)(9), unless the authorization is terminated   or revoked sooner  The test has been validated but independent review by FDA   and CLIA is pending  Test performed using [employer] GeneXpert: This RT-PCR assay targets N2,   a region unique to SARS-CoV-2  A conserved region in the E-gene was chosen   for pan-Sarbecovirus detection which includes SARS-CoV-2      Component Value   SARS-CoV-2 Negative    INFLUENZA A PCR Negative    INFLUENZA B PCR Negative    RSV PCR Negative

## 2022-04-13 LAB
ALBUMIN SERPL BCP-MCNC: 2.7 G/DL (ref 3.5–5)
ALP SERPL-CCNC: 52 U/L (ref 46–116)
ALT SERPL W P-5'-P-CCNC: 25 U/L (ref 12–78)
ANION GAP SERPL CALCULATED.3IONS-SCNC: 8 MMOL/L (ref 4–13)
AST SERPL W P-5'-P-CCNC: 51 U/L (ref 5–45)
BASOPHILS # BLD AUTO: 0.04 THOUSANDS/ΜL (ref 0–0.1)
BASOPHILS NFR BLD AUTO: 1 % (ref 0–1)
BILIRUB SERPL-MCNC: 0.71 MG/DL (ref 0.2–1)
BUN SERPL-MCNC: 26 MG/DL (ref 5–25)
CALCIUM ALBUM COR SERPL-MCNC: 9.1 MG/DL (ref 8.3–10.1)
CALCIUM SERPL-MCNC: 8.1 MG/DL (ref 8.3–10.1)
CHLORIDE SERPL-SCNC: 101 MMOL/L (ref 100–108)
CO2 SERPL-SCNC: 28 MMOL/L (ref 21–32)
CREAT SERPL-MCNC: 1.39 MG/DL (ref 0.6–1.3)
EOSINOPHIL # BLD AUTO: 0 THOUSAND/ΜL (ref 0–0.61)
EOSINOPHIL NFR BLD AUTO: 0 % (ref 0–6)
ERYTHROCYTE [DISTWIDTH] IN BLOOD BY AUTOMATED COUNT: 15.7 % (ref 11.6–15.1)
GFR SERPL CREATININE-BSD FRML MDRD: 47 ML/MIN/1.73SQ M
GLUCOSE SERPL-MCNC: 108 MG/DL (ref 65–140)
HCT VFR BLD AUTO: 42.9 % (ref 36.5–49.3)
HGB BLD-MCNC: 14 G/DL (ref 12–17)
IMM GRANULOCYTES # BLD AUTO: 0.29 THOUSAND/UL (ref 0–0.2)
IMM GRANULOCYTES NFR BLD AUTO: 6 % (ref 0–2)
LACTATE SERPL-SCNC: 1.2 MMOL/L (ref 0.5–2)
LYMPHOCYTES # BLD AUTO: 0.45 THOUSANDS/ΜL (ref 0.6–4.47)
LYMPHOCYTES NFR BLD AUTO: 9 % (ref 14–44)
MCH RBC QN AUTO: 27.2 PG (ref 26.8–34.3)
MCHC RBC AUTO-ENTMCNC: 32.6 G/DL (ref 31.4–37.4)
MCV RBC AUTO: 83 FL (ref 82–98)
MONOCYTES # BLD AUTO: 0.19 THOUSAND/ΜL (ref 0.17–1.22)
MONOCYTES NFR BLD AUTO: 4 % (ref 4–12)
NEUTROPHILS # BLD AUTO: 4.07 THOUSANDS/ΜL (ref 1.85–7.62)
NEUTS SEG NFR BLD AUTO: 80 % (ref 43–75)
NRBC BLD AUTO-RTO: 0 /100 WBCS
PLATELET # BLD AUTO: 122 THOUSANDS/UL (ref 149–390)
PMV BLD AUTO: 11.1 FL (ref 8.9–12.7)
POTASSIUM SERPL-SCNC: 3.5 MMOL/L (ref 3.5–5.3)
PROCALCITONIN SERPL-MCNC: 1.04 NG/ML
PROT SERPL-MCNC: 5.9 G/DL (ref 6.4–8.2)
RBC # BLD AUTO: 5.15 MILLION/UL (ref 3.88–5.62)
SODIUM SERPL-SCNC: 137 MMOL/L (ref 136–145)
WBC # BLD AUTO: 5.04 THOUSAND/UL (ref 4.31–10.16)

## 2022-04-13 PROCEDURE — 80053 COMPREHEN METABOLIC PANEL: CPT | Performed by: INTERNAL MEDICINE

## 2022-04-13 PROCEDURE — 85027 COMPLETE CBC AUTOMATED: CPT | Performed by: INTERNAL MEDICINE

## 2022-04-13 PROCEDURE — 99232 SBSQ HOSP IP/OBS MODERATE 35: CPT | Performed by: SURGERY

## 2022-04-13 PROCEDURE — 99232 SBSQ HOSP IP/OBS MODERATE 35: CPT | Performed by: NURSE PRACTITIONER

## 2022-04-13 PROCEDURE — 99232 SBSQ HOSP IP/OBS MODERATE 35: CPT | Performed by: INTERNAL MEDICINE

## 2022-04-13 PROCEDURE — 84145 PROCALCITONIN (PCT): CPT | Performed by: INTERNAL MEDICINE

## 2022-04-13 PROCEDURE — 83605 ASSAY OF LACTIC ACID: CPT | Performed by: INTERNAL MEDICINE

## 2022-04-13 RX ORDER — PANTOPRAZOLE SODIUM 40 MG/1
40 TABLET, DELAYED RELEASE ORAL
Status: DISCONTINUED | OUTPATIENT
Start: 2022-04-13 | End: 2022-04-15 | Stop reason: HOSPADM

## 2022-04-13 RX ADMIN — ALLOPURINOL 100 MG: 100 TABLET ORAL at 10:00

## 2022-04-13 RX ADMIN — DILTIAZEM HYDROCHLORIDE 180 MG: 180 CAPSULE, COATED, EXTENDED RELEASE ORAL at 10:00

## 2022-04-13 RX ADMIN — LORATADINE 10 MG: 10 TABLET ORAL at 10:00

## 2022-04-13 RX ADMIN — ALLOPURINOL 100 MG: 100 TABLET ORAL at 17:08

## 2022-04-13 RX ADMIN — METRONIDAZOLE 500 MG: 500 TABLET ORAL at 22:13

## 2022-04-13 RX ADMIN — METRONIDAZOLE 500 MG: 500 TABLET ORAL at 05:39

## 2022-04-13 RX ADMIN — METRONIDAZOLE 500 MG: 500 TABLET ORAL at 13:33

## 2022-04-13 RX ADMIN — HEPARIN SODIUM 5000 UNITS: 5000 INJECTION INTRAVENOUS; SUBCUTANEOUS at 05:39

## 2022-04-13 RX ADMIN — ASPIRIN 81 MG: 81 TABLET, COATED ORAL at 10:00

## 2022-04-13 RX ADMIN — Medication 3 MG: at 22:14

## 2022-04-13 RX ADMIN — PANTOPRAZOLE SODIUM 40 MG: 40 TABLET, DELAYED RELEASE ORAL at 10:00

## 2022-04-13 RX ADMIN — GABAPENTIN 500 MG: 400 CAPSULE ORAL at 22:13

## 2022-04-13 RX ADMIN — HEPARIN SODIUM 5000 UNITS: 5000 INJECTION INTRAVENOUS; SUBCUTANEOUS at 13:33

## 2022-04-13 RX ADMIN — CEFTRIAXONE 1000 MG: 1 INJECTION, POWDER, FOR SOLUTION INTRAMUSCULAR; INTRAVENOUS at 16:15

## 2022-04-13 RX ADMIN — HEPARIN SODIUM 5000 UNITS: 5000 INJECTION INTRAVENOUS; SUBCUTANEOUS at 22:14

## 2022-04-13 RX ADMIN — TAMSULOSIN HYDROCHLORIDE 0.4 MG: 0.4 CAPSULE ORAL at 16:17

## 2022-04-13 RX ADMIN — Medication 2000 UNITS: at 10:00

## 2022-04-13 NOTE — ASSESSMENT & PLAN NOTE
· Presented with abdominal pain, diarrhea x 1 day  Patient also report 1 episode of vomiting  · CT A/P preliminary report with "multiple mildly dilated loops of small bowel in the abdomen without a clear transition point, findings may represent small bowel enteritis however early obstruction not entirely excluded "  · stool studies and C diff neg   · Started on antibiotics 4/11 -ceftriaxone and Flagyl  · Encourage oral intake  · Advanced diet as per surgical recommendations  · Pain control  · PRN Zofran

## 2022-04-13 NOTE — PROGRESS NOTES
[facility]  Progress Note - Massimo Watkins  1940, [de-identified] y o  male MRN: [id number]  Unit/Bed#: W -01 Encounter: [id number]  Primary Care Provider: Raj Monsalve MD   Date and time admitted to hospital: 4/8/2022 10:41 PM    * Enteritis  Assessment & Plan  · Presented with abdominal pain, diarrhea x 1 day  Patient also report 1 episode of vomiting  · CT A/P preliminary report with "multiple mildly dilated loops of small bowel in the abdomen without a clear transition point, findings may represent small bowel enteritis however early obstruction not entirely excluded "  · stool studies and C diff neg   · Started on antibiotics 4/11 -ceftriaxone and Flagyl  · Encourage oral intake  · Advanced diet as per surgical recommendations  · Pain control  · PRN Zofran  Small bowel obstruction ([facility] [address] )  Assessment & Plan  · Gen surg appreciated  · NGT removed 4/11  · Tolerating diet, advance diet as per surgery recommendations  · Monitor intake and bowel movements, last bowel movement was on Saturday  Passing gas  · Serial abdominal exams    Sepsis ([address] )  Assessment & Plan  · Patient with evolving sepsis as evidenced by fever, leukopenia and lactic acidosis  · Blood cultures with no growth to date  · COVID/Flu/RSV negative  · CT chest with no evidence of active disease or pulmonary embolism  · Etiology likely secondary to enteritis vs viral illness  · Continue with ceftriaxone and Flagyl  · Monitor temperature and white cell count  · Appreciate infectious disease recommendations      Electrolyte abnormality  Assessment & Plan  · Keep K at 4, P at 3, Mag at 2  · Resolved    Anxiety  Assessment & Plan  · 2/2 NGT, resolved  · Zydis prn    Chronic kidney disease  Assessment & Plan  Lab Results   Component Value Date    EGFR 47 04/13/2022    EGFR 46 04/12/2022    EGFR 50 04/11/2022    CREATININE 1 39 (H) 04/13/2022    CREATININE 1 42 (H) 04/12/2022    CREATININE 1 33 (H) 04/11/2022 Estimated Creatinine Clearance: 47 4 mL/min (A) (by C-G formula based on SCr of 1 39 mg/dL (H))  · Baseline Cr around 1 10   · Cr mildly elevated at 1 36 on arrival    · Monitor intake/output  · Avoid nephrotoxins and hypotension  · Encourage oral intake  · Repeat BMP in AM      Acute respiratory failure with hypoxia [facility])  Assessment & Plan  Patient with increased oxygen requirements yesterday  Required a maximal 4 L of oxygen via nasal cannula, currently improved at 1-2 L  Reviewed history with mild interstitial lung disease  Chest x-ray with no acute cardiopulmonary disease  CTA performed due to an elevated D-dimer with no evidence of pulmonary embolism but mild pulmonary congestion  Echo from 2020 with grade 1 diastolic dysfunction and EF of 60%  Received 20 mg of IV Lasix yesterday in addition to nebulizer treatment  Etiology likely secondary to pulmonary congestion in the setting of interstitial lung disease  Resolved     Idiopathic peripheral neuropathy  Assessment & Plan  · Continue Gabapentin 500 mg qhs  · Home Ultracet on hold, requiring Tylenol for fever  · Dilaudid prn    Benign essential hypertension  Assessment & Plan  · Cardizem restarted yesterday with episode of bradycardia  Continue at half home dose  · Blood pressure reviewed and acceptable  · Labetalol prn          VTE Pharmacologic Prophylaxis: VTE Score: 5 High Risk (Score >/= 5) - Pharmacological DVT Prophylaxis Ordered: heparin  Sequential Compression Devices Ordered  Patient Centered Rounds: I performed bedside rounds with nursing staff today  Discussions with Specialists or Other Care Team Provider:  Nursing, infectious disease  Reviewed surgical notes  Education and Discussions with Family / Patient: Updated  (wife) at bedside      Current Length of Stay: 4 day(s)  Current Patient Status: Inpatient   Discharge Plan: Anticipate discharge in 24-48 hrs to discharge location to be determined pending rehab evaluations  Code Status: Level 1 - Full Code    Subjective:   Patient seen and examined today  Sitting up in bed having lunch  He reports that he feels well and has been walking down the hallway  States that his throat pain is improving  Objective:     Vitals:   Temp (24hrs), Av 8 °F (37 7 °C), Min:98 7 °F (37 1 °C), Max:101 7 °F (38 7 °C)    Temp:  [98 7 °F (37 1 °C)-101 7 °F (38 7 °C)] 100 1 °F (37 8 °C)  HR:  [69-80] 76  Resp:  [14-18] 18  BP: (136-148)/(87-95) 148/95  SpO2:  [85 %-94 %] 93 %  Body mass index is 36 37 kg/m²  Input and Output Summary (last 24 hours): Intake/Output Summary (Last 24 hours) at 2022 1130  Last data filed at 2022 1558  Gross per 24 hour   Intake 663 33 ml   Output --   Net 663 33 ml       Physical Exam:   Physical Exam  Vitals and nursing note reviewed  Constitutional:       General: He is not in acute distress  Appearance: He is well-developed  He is obese  He is not ill-appearing or diaphoretic  HENT:      Head: Normocephalic and atraumatic  Ears:      Comments: Hard of hearing  Cardiovascular:      Rate and Rhythm: Normal rate and regular rhythm  Heart sounds: Normal heart sounds  No murmur heard  Pulmonary:      Effort: Pulmonary effort is normal       Breath sounds: Normal breath sounds  No wheezing, rhonchi or rales  Abdominal:      General: Bowel sounds are normal  There is no distension  Palpations: Abdomen is soft  Tenderness: There is no abdominal tenderness  Musculoskeletal:      Right lower leg: No edema  Left lower leg: No edema  Skin:     General: Skin is warm and dry  Neurological:      Mental Status: He is alert and oriented to person, place, and time     Psychiatric:         Mood and Affect: Mood normal          Behavior: Behavior normal           Additional Data:     Labs:  Results from last 7 days   Lab Units 22  0521 22  0452 22  0452   WBC Thousand/uL 5 04   < > 4 06* HEMOGLOBIN g/dL 14 0   < > 13 5   HEMATOCRIT % 42 9   < > 40 5   PLATELETS Thousands/uL 122*   < > 128*   BANDS PCT %  --   --  1   NEUTROS PCT % 80*  --   --    LYMPHS PCT % 9*  --   --    LYMPHO PCT %  --   --  7*   MONOS PCT % 4  --   --    MONO PCT %  --   --  2*   EOS PCT % 0  --  0    < > = values in this interval not displayed  Results from last 7 days   Lab Units 04/13/22  0521   SODIUM mmol/L 137   POTASSIUM mmol/L 3 5   CHLORIDE mmol/L 101   CO2 mmol/L 28   BUN mg/dL 26*   CREATININE mg/dL 1 39*   ANION GAP mmol/L 8   CALCIUM mg/dL 8 1*   ALBUMIN g/dL 2 7*   TOTAL BILIRUBIN mg/dL 0 71   ALK PHOS U/L 52   ALT U/L 25   AST U/L 51*   GLUCOSE RANDOM mg/dL 108     Results from last 7 days   Lab Units 04/11/22  1938   INR  1 05             Results from last 7 days   Lab Units 04/13/22  0521 04/12/22  1112 04/12/22  0452 04/11/22  1820 04/11/22  1616 04/11/22  0618 04/10/22  0906 04/10/22  0906   LACTIC ACID mmol/L 1 2 1 6  --  3 5* 2 3*  --    < > 1 1   PROCALCITONIN ng/ml 1 04*  --  1 30*  --  0 28* 0 25  --  0 12    < > = values in this interval not displayed  Lines/Drains:  Invasive Devices  Report    Peripheral Intravenous Line            Peripheral IV 04/11/22 Dorsal (posterior); Right Forearm 1 day                      Imaging: Reviewed radiology reports from this admission including: chest CT scan    Recent Cultures (last 7 days):   Results from last 7 days   Lab Units 04/11/22  1705 04/11/22  1617 04/10/22  0908 04/09/22  1844 04/09/22  0018 04/09/22  0000 04/08/22  2327 04/08/22  2310   BLOOD CULTURE  No Growth at 24 hrs  No Growth at 24 hrs  No Growth at 48 hrs  No Growth at 48 hrs   --   --  No Growth After 4 Days  --  No Growth After 4 Days  URINE CULTURE   --   --   --   --  10,000-19,000 cfu/ml Proteus mirabilis*  <10,000 cfu/ml   --   --   --    C DIFF TOXIN B BY PCR   --   --   --  Negative  --   --  Indeterminate Result by PCR  Suggest Re-collection   *  --        Last 24 Hours Medication List:   Current Facility-Administered Medications   Medication Dose Route Frequency Provider Last Rate    acetaminophen  975 mg Oral Q6H PRN Doni Cameron MD      al mag oxide-diphenhydramine-lidocaine viscous  10 mL Swish & Spit Q4H PRN Doni Cameron MD      allopurinol  100 mg Oral BID Doni Cameron MD      aspirin  81 mg Oral Daily Doni Cameron MD      cefTRIAXone  1,000 mg Intravenous Q24H Doni Cameron MD 1,000 mg (04/12/22 2568)    cholecalciferol  2,000 Units Oral Daily Doni Cameron MD      diltiazem  180 mg Oral Daily Doni Cameron MD      gabapentin  500 mg Oral HS Doni Cameron MD      heparin (porcine)  5,000 Units Subcutaneous [facility] Alex Frye MD      HYDROmorphone  0 2 mg Intravenous Q6H PRN Catherne La Liga, DO      ipratropium  0 5 mg Nebulization TID PRN Catherne La Liga, DO      Labetalol HCl  20 mg Intravenous Q4H PRN Catherne La Liga, DO      levalbuterol  1 25 mg Nebulization TID PRN Catherne La Liga, DO      loratadine  10 mg Oral Daily Doni Cameron MD      melatonin  3 mg Oral HS Alex Frye MD      metroNIDAZOLE  500 mg Oral [facility] [place], [address]      OLANZapine  2 5 mg Oral Q8H PRN Catherne La Liga, DO      ondansetron  4 mg Intravenous Q6H PRN Marcos Amaro PA-C      pantoprazole  40 mg Oral Daily Before Breakfast Stephani Aschoff, MD      phenol  1 spray Mouth/Throat Q2H PRN Catherne La Liga, DO      saliva substitute  5 spray Mouth/Throat 4x Daily PRN Jelani Pinto MD      tamsulosin  0 4 mg Oral Daily With Adebayo Mae MD          Today, Patient Was Seen By: Samuel Trejo MD    **Please Note: This note may have been constructed using a voice recognition system  **

## 2022-04-13 NOTE — ASSESSMENT & PLAN NOTE
· Gen surg appreciated  · NGT removed 4/11  · Tolerating diet, advance diet as per surgery recommendations  · Monitor intake and bowel movements, last bowel movement was on Saturday  Passing gas    · Serial abdominal exams

## 2022-04-13 NOTE — ASSESSMENT & PLAN NOTE
· Patient with evolving sepsis as evidenced by fever, leukopenia and lactic acidosis  · Blood cultures with no growth to date  · COVID/Flu/RSV negative  · CT chest with no evidence of active disease or pulmonary embolism  · Etiology likely secondary to enteritis vs viral illness  · Continue with ceftriaxone and Flagyl  · Monitor temperature and white cell count  · Appreciate infectious disease recommendations

## 2022-04-13 NOTE — PROGRESS NOTES
Progress Note - Infectious Disease   Caesar Thapa  [de-identified] y o  male MRN: [id number]  Unit/Bed#: W -01 Encounter: [id number]    Impression/Plan:  1  SIRS vs sepsis  Evolving since admission  Fever, mild leukopenia, with lactic acidosis  Suspect this is all secondary to enteritis  Consider role of dehydration from excessive diarrhea  This may be all viral in origin as no acute bacterial source has been found at this time  Fortunately the patient remains hemodynamically stable and nontoxic  Blood cultures are negative  Lactic acidosis has resolved  His WBC count has normalized  He continues to have occasional fevers but curve appears to be trending down   -antibiotics as below  -monitor CBC and BMP  -follow up blood cultures  -monitor vitals  -supportive care     2  Enteritis  Possibly all viral in origin  No clear bacterial source identified so far  Stool PCR and c diff PCR were negative  CT of the abdomen/pelvis showed gradual tapering of the distal ileum suggesting a possible small obstruction, but no acute infectious findings  No recent consumption of unusual or raw foods  No pets in the home  No recent travels  No recent sick contacts  Patient was decompressed with NG tube with high output  His diet has transitioned and he is tolerating PO input  He has no ongoing diarrhea  Given his high fevers, and elevations in procalcitonin, he was initiated on antibiotic treatment with IV Flagyl and ceftriaxone  He has been tolerating these antibiotics without difficulty  This morning his procalcitonin is trending down  I will continue his antibiotic treatment for now   -continue IV ceftriaxone  -continue PO Flagyl  -monitor CBC and BMP  -trend procalcitonin tomorrow  -serial abdominal exams  -monitor abdominal symptoms     3  Acute hypoxic respiratory failure  Patient felt short of breath and was started on nasal cannula O2  Chest imaging showed mild pulmonary vascular congestion  He received Lasix    Patient's breathing has improved and he is currently stable on room air   -diuretics per primary service  -monitor vitals  -monitor respiratory status  -O2 support per primary service     4  CKD  Upon review of patient's available past medical records it appears his baseline creatinine is approximately 1 1  His creatinine was mildly elevated at  1 36 upon admission  Creatinine is trending down this morning   -monitor creatinine  -dose adjust antibiotics for renal function as needed  -avoid nephrotoxins     5  MO  BMI = 36 37  Above plan was discussed in detail with patient and his wife at the bedside  Above plan was discussed in detail with SLIM  Antibiotics:  Ceftriaxone 3  Flagyl 3  Antibiotics 3    Subjective:  Patient reports he's doing a bit better today  He has an appetite and was able to eat a full meal (fish and vegetables) for dinner last night  Had some cereal this morning for breakfast  He has had no nausea, vomiting, or abdominal pain today  Reports no bowel movements overnight or this morning, is worried about becoming constipated although he tells me he has not had much to eat and probably does not have much stool yet  He was able to get up and walk in hallway earlier today  He tells me he did feel dizzy when he got back to his room  He has no fever, chills, sweats, or shakes; no cough, shortness of breath, or chest pain  No new symptoms  Objective:  Vitals:  Temp:  [98 7 °F (37 1 °C)-102 7 °F (39 3 °C)] 101 7 °F (38 7 °C)  HR:  [69-80] 80  Resp:  [14-17] 14  BP: (108-137)/(64-87) 136/87  SpO2:  [85 %-95 %] 94 %  Temp (24hrs), Av 3 °F (37 9 °C), Min:98 7 °F (37 1 °C), Max:102 7 °F (39 3 °C)  Current: Temperature: (!) 101 7 °F (38 7 °C)    Physical Exam:   General Appearance:  Alert, interactive, nontoxic, no acute distress  He appears comfortable sitting out of bed in his chair  Throat: Oropharynx moist without lesions      Lungs:   Clear to auscultation bilaterally; no wheezes, rhonchi or rales; respirations unlabored on room air  Heart:  RRR; no murmur, rub or gallop  Abdomen:   Obese, non-tender, distended, hyperactive bowel sounds  Extremities: No clubbing or cyanosis, no edema  Skin: No new rashes, lesions, or draining wounds noted on exposed skin  Labs, Imaging, & Other studies:   All pertinent labs and imaging studies were personally reviewed  Results from last 7 days   Lab Units 04/13/22  0521 04/12/22 0452 04/11/22  1616   WBC Thousand/uL 5 04 4 06* 5 43   HEMOGLOBIN g/dL 14 0 13 5 16 0   PLATELETS Thousands/uL 122* 128* 135*     Results from last 7 days   Lab Units 04/13/22  0521 04/12/22  0452 04/11/22  1616 04/09/22  0431 04/08/22  2222   POTASSIUM mmol/L 3 5   < > 3 8   < > 3 9   CHLORIDE mmol/L 101   < > 100   < > 104   CO2 mmol/L 28   < > 28   < > 26   BUN mg/dL 26*   < > 17   < > 31*   CREATININE mg/dL 1 39*   < > 1 33*   < > 1 36*   EGFR ml/min/1 73sq m 47   < > 50   < > 48   CALCIUM mg/dL 8 1*   < > 8 6   < > 8 6   AST U/L 51*  --  48*  --  25   ALT U/L 25  --  31  --  24   ALK PHOS U/L 52  --  63  --  83    < > = values in this interval not displayed  Results from last 7 days   Lab Units 04/11/22  1705 04/11/22  1617 04/10/22  0908 04/09/22  1844 04/09/22  0018 04/09/22  0000 04/08/22  2327 04/08/22  2310   BLOOD CULTURE  No Growth at 24 hrs  No Growth at 24 hrs  No Growth at 48 hrs  No Growth at 48 hrs   --   --  No Growth at 72 hrs   --  No Growth at 72 hrs  URINE CULTURE   --   --   --   --  10,000-19,000 cfu/ml Proteus mirabilis*  <10,000 cfu/ml   --   --   --    C DIFF TOXIN B BY PCR   --   --   --  Negative  --   --  Indeterminate Result by PCR  Suggest Re-collection   *  --      Results from last 7 days   Lab Units 04/13/22  0521 04/12/22  0452 04/11/22  1616 04/11/22  0618 04/10/22  0906   PROCALCITONIN ng/ml 1 04* 1 30* 0 28* 0 25 0 12     Results from last 7 days   Lab Units 04/11/22  1616   CRP mg/L 156 8*         Results from last 7 days   Lab Units 04/11/22  1616   D-DIMER QUANTITATIVE ug/ml FEU 1 51*

## 2022-04-13 NOTE — ASSESSMENT & PLAN NOTE
Lab Results   Component Value Date    EGFR 47 04/13/2022    EGFR 46 04/12/2022    EGFR 50 04/11/2022    CREATININE 1 39 (H) 04/13/2022    CREATININE 1 42 (H) 04/12/2022    CREATININE 1 33 (H) 04/11/2022     Estimated Creatinine Clearance: 47 4 mL/min (A) (by C-G formula based on SCr of 1 39 mg/dL (H))  · Baseline Cr around 1 10   · Cr mildly elevated at 1 36 on arrival    · Monitor intake/output  · Avoid nephrotoxins and hypotension     · Encourage oral intake  · Repeat BMP in AM

## 2022-04-13 NOTE — PROGRESS NOTES
The pantoprazole has been converted to Oral per [facility] IV-to-PO Auto-Conversion Protocol for Adults as approved by the Pharmacy and Therapeutics Committee  The patient met all eligible criteria:  3 Age = 25years old   2) Received at least one dose of the IV form   3) Receiving at least one other scheduled oral/enteral medication   4) Tolerating an oral/enteral diet   and did not have any exclusions:   1) Critical care patient   2) Active GI bleed (IF assessing H2RAs or PPIs)   3) Continuous tube feeding (IF assessing cipro, doxycycline, levofloxacin, minocycline, rifampin, or voriconazole)   4) Receiving PO vancomycin (IF assessing metronidazole)   5) Persistent nausea and/or vomiting   6) Ileus or gastrointestinal obstruction   7) Alaina/nasogastric tube set for continuous suction   8) Specific order not to automatically convert to PO (in the order's comments or if discussed in the most recent Infectious Disease or primary team's progress notes)

## 2022-04-13 NOTE — PLAN OF CARE
Problem: MOBILITY - ADULT  Goal: Maintain or return to baseline ADL function  Description: INTERVENTIONS:  -  Assess patient's ability to carry out ADLs; assess patient's baseline for ADL function and identify physical deficits which impact ability to perform ADLs (bathing, care of mouth/teeth, toileting, grooming, dressing, etc )  - Assess/evaluate cause of self-care deficits   - Assess range of motion  - Assess patient's mobility; develop plan if impaired  - Assess patient's need for assistive devices and provide as appropriate  - Encourage maximum independence but intervene and supervise when necessary  - Involve family in performance of ADLs  - Assess for home care needs following discharge   - Consider OT consult to assist with ADL evaluation and planning for discharge  - Provide patient education as appropriate  Outcome: Progressing    Problem: Prexisting or High Potential for Compromised Skin Integrity  Goal: Skin integrity is maintained or improved  Description: INTERVENTIONS:  - Identify patients at risk for skin breakdown  - Assess and monitor skin integrity  - Assess and monitor nutrition and hydration status  - Monitor labs   - Assess for incontinence   - Turn and reposition patient  - Assist with mobility/ambulation  - Relieve pressure over bony prominences  - Avoid friction and shearing  - Provide appropriate hygiene as needed including keeping skin clean and dry  - Evaluate need for skin moisturizer/barrier cream  - Collaborate with interdisciplinary team   - Patient/family teaching  - Consider wound care consult   Outcome: Progressing     Problem: Potential for Falls  Goal: Patient will remain free of falls  Description: INTERVENTIONS:  - Educate patient/family on patient safety including physical limitations  - Instruct patient to call for assistance with activity   - Consult OT/PT to assist with strengthening/mobility   - Keep Call bell within reach  - Keep bed low and locked with side rails adjusted as appropriate  - Keep care items and personal belongings within reach  - Initiate and maintain comfort rounds  - Make Fall Risk Sign visible to staff  - Offer Toileting every two Hours, in advance of need  - Initiate/Maintain bed alarm  - Apply yellow socks and bracelet for high fall risk patients  - Consider moving patient to room near nurses station  Outcome: Progressing  Problem: Nutrition/Hydration-ADULT  Goal: Nutrient/Hydration intake appropriate for improving, restoring or maintaining nutritional needs  Description: Monitor and assess patient's nutrition/hydration status for malnutrition  Collaborate with interdisciplinary team and initiate plan and interventions as ordered  Monitor patient's weight and dietary intake as ordered or per policy  Utilize nutrition screening tool and intervene as necessary  Determine patient's food preferences and provide high-protein, high-caloric foods as appropriate       INTERVENTIONS:  - Monitor oral intake, urinary output, labs, and treatment plans  - Assess nutrition and hydration status and recommend course of action  - Evaluate amount of meals eaten  - Assist patient with eating if necessary   - Allow adequate time for meals  - Recommend/ encourage appropriate diets, oral nutritional supplements, and vitamin/mineral supplements  - Order, calculate, and assess calorie counts as needed  - Recommend, monitor, and adjust tube feedings and TPN/PPN based on assessed needs  - Assess need for intravenous fluids  - Provide specific nutrition/hydration education as appropriate  - Include patient/family/caregiver in decisions related to nutrition  Outcome: Progressing

## 2022-04-13 NOTE — ASSESSMENT & PLAN NOTE
Patient with increased oxygen requirements yesterday  Required a maximal 4 L of oxygen via nasal cannula, currently improved at 1-2 L  Reviewed history with mild interstitial lung disease  Chest x-ray with no acute cardiopulmonary disease  CTA performed due to an elevated D-dimer with no evidence of pulmonary embolism but mild pulmonary congestion  Echo from 2020 with grade 1 diastolic dysfunction and EF of 60%  Received 20 mg of IV Lasix yesterday in addition to nebulizer treatment    Etiology likely secondary to pulmonary congestion in the setting of interstitial lung disease  Resolved

## 2022-04-13 NOTE — ASSESSMENT & PLAN NOTE
· Cardizem restarted yesterday with episode of bradycardia  Continue at half home dose    · Blood pressure reviewed and acceptable  · Labetalol prn

## 2022-04-13 NOTE — CASE MANAGEMENT
Case Management Progress Note    Patient name Mer Lincoln  Location W /W -01 MRN [id number]  : 1940 Date 2022       LOS (days): 4  Geometric Mean LOS (GMLOS) (days): 2 60  Days to GMLOS:-1 4        OBJECTIVE:        Current admission status: Inpatient  Preferred Pharmacy:   [facility] [id number]  [address] [facility] [place] [id number]  Phone: [phone] Fax: [phone]    Primary Care Provider: Suresh Vasquez MD    Primary Insurance: [facility]  Secondary Insurance:     PROGRESS NOTE:    Weekly Care Management Length of Stay Review     Current LOS: 4    Most Recent Labs:     Lab Results   Component Value Date/Time    WBC 5 04 2022 05:21 AM    HGB 14 0 2022 05:21 AM    HCT 42 9 2022 05:21 AM     (L) 2022 05:21 AM    BANDSPCT 1 2022 04:52 AM    SODIUM 137 2022 05:21 AM    K 3 5 2022 05:21 AM     2022 05:21 AM    CO2 28 2022 05:21 AM    BUN 26 (H) 2022 05:21 AM    CREATININE 1 39 (H) 2022 05:21 AM    GLUC 108 2022 05:21 AM    ALKPHOS 52 2022 05:21 AM    ALT 25 2022 05:21 AM    AST 51 (H) 2022 05:21 AM    ALB 2 7 (L) 2022 05:21 AM    TBILI 0 71 2022 05:21 AM    INR 1 05 2022 07:38 PM       Most Recent Vitals:   Vitals:    22 1011   BP:    Pulse:    Resp:    Temp:    SpO2: 93%        Brief Care Summary & Need for Continued Stay[de-identified] NGTube placed, advancing diet  Wheezing, SOB  Continued diuresis  ID consulted for fevers  Blood cultures pending  On IV ABX, procal pending  Intended Discharge Disposition: Home/Self Care    Expected Discharge Date: 24-48 Hrs    Current Identified Barriers to Discharge & Delays[de-identified] normalized labwork

## 2022-04-14 ENCOUNTER — APPOINTMENT (INPATIENT)
Dept: CT IMAGING | Facility: HOSPITAL | Age: 82
DRG: 871 | End: 2022-04-14
Payer: COMMERCIAL

## 2022-04-14 PROBLEM — K11.20 SIALOADENITIS: Status: ACTIVE | Noted: 2022-04-14

## 2022-04-14 PROBLEM — J81.1 PULMONARY EDEMA: Status: ACTIVE | Noted: 2022-04-14

## 2022-04-14 LAB
ALBUMIN SERPL BCP-MCNC: 2.8 G/DL (ref 3.5–5)
ALP SERPL-CCNC: 50 U/L (ref 46–116)
ALT SERPL W P-5'-P-CCNC: 28 U/L (ref 12–78)
ANION GAP SERPL CALCULATED.3IONS-SCNC: 10 MMOL/L (ref 4–13)
AST SERPL W P-5'-P-CCNC: 63 U/L (ref 5–45)
ATRIAL RATE: 79 BPM
BACTERIA BLD CULT: NORMAL
BACTERIA BLD CULT: NORMAL
BASOPHILS # BLD AUTO: 0.02 THOUSANDS/ΜL (ref 0–0.1)
BASOPHILS NFR BLD AUTO: 1 % (ref 0–1)
BILIRUB SERPL-MCNC: 0.72 MG/DL (ref 0.2–1)
BUN SERPL-MCNC: 25 MG/DL (ref 5–25)
CALCIUM ALBUM COR SERPL-MCNC: 9.5 MG/DL (ref 8.3–10.1)
CALCIUM SERPL-MCNC: 8.5 MG/DL (ref 8.3–10.1)
CHLORIDE SERPL-SCNC: 100 MMOL/L (ref 100–108)
CO2 SERPL-SCNC: 26 MMOL/L (ref 21–32)
CREAT SERPL-MCNC: 1.31 MG/DL (ref 0.6–1.3)
EOSINOPHIL # BLD AUTO: 0 THOUSAND/ΜL (ref 0–0.61)
EOSINOPHIL NFR BLD AUTO: 0 % (ref 0–6)
ERYTHROCYTE [DISTWIDTH] IN BLOOD BY AUTOMATED COUNT: 15.8 % (ref 11.6–15.1)
GFR SERPL CREATININE-BSD FRML MDRD: 51 ML/MIN/1.73SQ M
GLUCOSE SERPL-MCNC: 107 MG/DL (ref 65–140)
HCT VFR BLD AUTO: 41 % (ref 36.5–49.3)
HCV AB SER QL: NORMAL
HGB BLD-MCNC: 13.5 G/DL (ref 12–17)
HIV 1+2 AB+HIV1 P24 AG SERPL QL IA: NORMAL
IMM GRANULOCYTES # BLD AUTO: 0.22 THOUSAND/UL (ref 0–0.2)
IMM GRANULOCYTES NFR BLD AUTO: 6 % (ref 0–2)
LYMPHOCYTES # BLD AUTO: 0.56 THOUSANDS/ΜL (ref 0.6–4.47)
LYMPHOCYTES NFR BLD AUTO: 14 % (ref 14–44)
MCH RBC QN AUTO: 27.4 PG (ref 26.8–34.3)
MCHC RBC AUTO-ENTMCNC: 32.9 G/DL (ref 31.4–37.4)
MCV RBC AUTO: 83 FL (ref 82–98)
MONOCYTES # BLD AUTO: 0.15 THOUSAND/ΜL (ref 0.17–1.22)
MONOCYTES NFR BLD AUTO: 4 % (ref 4–12)
NEUTROPHILS # BLD AUTO: 2.94 THOUSANDS/ΜL (ref 1.85–7.62)
NEUTS SEG NFR BLD AUTO: 75 % (ref 43–75)
NRBC BLD AUTO-RTO: 0 /100 WBCS
P AXIS: 27 DEGREES
PLATELET # BLD AUTO: 142 THOUSANDS/UL (ref 149–390)
PMV BLD AUTO: 11.7 FL (ref 8.9–12.7)
POTASSIUM SERPL-SCNC: 3.9 MMOL/L (ref 3.5–5.3)
PR INTERVAL: 160 MS
PROCALCITONIN SERPL-MCNC: 0.76 NG/ML
PROT SERPL-MCNC: 6.1 G/DL (ref 6.4–8.2)
QRS AXIS: 9 DEGREES
QRSD INTERVAL: 76 MS
QT INTERVAL: 350 MS
QTC INTERVAL: 401 MS
RBC # BLD AUTO: 4.92 MILLION/UL (ref 3.88–5.62)
SODIUM SERPL-SCNC: 136 MMOL/L (ref 136–145)
T WAVE AXIS: 32 DEGREES
VENTRICULAR RATE: 79 BPM
WBC # BLD AUTO: 3.89 THOUSAND/UL (ref 4.31–10.16)

## 2022-04-14 PROCEDURE — 93010 ELECTROCARDIOGRAM REPORT: CPT | Performed by: INTERNAL MEDICINE

## 2022-04-14 PROCEDURE — 99232 SBSQ HOSP IP/OBS MODERATE 35: CPT | Performed by: INTERNAL MEDICINE

## 2022-04-14 PROCEDURE — 70491 CT SOFT TISSUE NECK W/DYE: CPT

## 2022-04-14 PROCEDURE — 99221 1ST HOSP IP/OBS SF/LOW 40: CPT | Performed by: STUDENT IN AN ORGANIZED HEALTH CARE EDUCATION/TRAINING PROGRAM

## 2022-04-14 PROCEDURE — 87389 HIV-1 AG W/HIV-1&-2 AB AG IA: CPT | Performed by: INTERNAL MEDICINE

## 2022-04-14 PROCEDURE — 97163 PT EVAL HIGH COMPLEX 45 MIN: CPT

## 2022-04-14 PROCEDURE — 86803 HEPATITIS C AB TEST: CPT | Performed by: INTERNAL MEDICINE

## 2022-04-14 PROCEDURE — 99232 SBSQ HOSP IP/OBS MODERATE 35: CPT | Performed by: SURGERY

## 2022-04-14 PROCEDURE — 80053 COMPREHEN METABOLIC PANEL: CPT | Performed by: INTERNAL MEDICINE

## 2022-04-14 PROCEDURE — 85027 COMPLETE CBC AUTOMATED: CPT | Performed by: INTERNAL MEDICINE

## 2022-04-14 PROCEDURE — G1004 CDSM NDSC: HCPCS

## 2022-04-14 PROCEDURE — 84145 PROCALCITONIN (PCT): CPT | Performed by: NURSE PRACTITIONER

## 2022-04-14 RX ORDER — FUROSEMIDE 10 MG/ML
20 INJECTION INTRAMUSCULAR; INTRAVENOUS ONCE
Status: COMPLETED | OUTPATIENT
Start: 2022-04-14 | End: 2022-04-14

## 2022-04-14 RX ADMIN — ALLOPURINOL 100 MG: 100 TABLET ORAL at 09:30

## 2022-04-14 RX ADMIN — Medication 3 MG: at 22:49

## 2022-04-14 RX ADMIN — METRONIDAZOLE 500 MG: 500 TABLET ORAL at 06:05

## 2022-04-14 RX ADMIN — DILTIAZEM HYDROCHLORIDE 180 MG: 180 CAPSULE, COATED, EXTENDED RELEASE ORAL at 09:30

## 2022-04-14 RX ADMIN — TAMSULOSIN HYDROCHLORIDE 0.4 MG: 0.4 CAPSULE ORAL at 16:15

## 2022-04-14 RX ADMIN — METRONIDAZOLE 500 MG: 500 TABLET ORAL at 14:03

## 2022-04-14 RX ADMIN — LORATADINE 10 MG: 10 TABLET ORAL at 09:30

## 2022-04-14 RX ADMIN — PANTOPRAZOLE SODIUM 40 MG: 40 TABLET, DELAYED RELEASE ORAL at 06:06

## 2022-04-14 RX ADMIN — ALLOPURINOL 100 MG: 100 TABLET ORAL at 17:05

## 2022-04-14 RX ADMIN — HEPARIN SODIUM 5000 UNITS: 5000 INJECTION INTRAVENOUS; SUBCUTANEOUS at 14:03

## 2022-04-14 RX ADMIN — CEFTRIAXONE 1000 MG: 1 INJECTION, POWDER, FOR SOLUTION INTRAMUSCULAR; INTRAVENOUS at 16:15

## 2022-04-14 RX ADMIN — HEPARIN SODIUM 5000 UNITS: 5000 INJECTION INTRAVENOUS; SUBCUTANEOUS at 06:05

## 2022-04-14 RX ADMIN — Medication 2000 UNITS: at 09:30

## 2022-04-14 RX ADMIN — ASPIRIN 81 MG: 81 TABLET, COATED ORAL at 09:30

## 2022-04-14 RX ADMIN — IOHEXOL 85 ML: 350 INJECTION, SOLUTION INTRAVENOUS at 12:39

## 2022-04-14 RX ADMIN — HEPARIN SODIUM 5000 UNITS: 5000 INJECTION INTRAVENOUS; SUBCUTANEOUS at 22:49

## 2022-04-14 RX ADMIN — GABAPENTIN 500 MG: 400 CAPSULE ORAL at 22:49

## 2022-04-14 RX ADMIN — METRONIDAZOLE 500 MG: 500 TABLET ORAL at 22:49

## 2022-04-14 RX ADMIN — FUROSEMIDE 20 MG: 10 INJECTION, SOLUTION INTRAMUSCULAR; INTRAVENOUS at 16:15

## 2022-04-14 NOTE — ASSESSMENT & PLAN NOTE
· Presented with abdominal pain, diarrhea x 1 day  Patient also report 1 episode of vomiting  · CT A/P preliminary report with "multiple mildly dilated loops of small bowel in the abdomen without a clear transition point, findings may represent small bowel enteritis however early obstruction not entirely excluded "  · stool studies and C diff neg   · Started on antibiotics 4/11 -ceftriaxone and Flagyl  · Encourage oral intake  · Denies any abdominal pain

## 2022-04-14 NOTE — ASSESSMENT & PLAN NOTE
· Patient with intermittent episodes of fever with associated leukopenia and lactic acidosis  · Blood cultures with no growth to date  · COVID/Flu/RSV negative  · CT chest with no evidence of active disease or pulmonary embolism  · Etiology likely secondary to enteritis vs viral illness  · Continue with ceftriaxone and Flagyl  · Monitor temperature and white cell count  · Appreciate infectious disease recommendations

## 2022-04-14 NOTE — PHYSICAL THERAPY NOTE
PHYSICAL THERAPY EVALUATION NOTE          Patient Name: Maribel Reyes  Today's Date: 2022       AGE:   [de-identified] y o  Mrn:   [id number]  ADMIT DX:  Gastroenteritis [K52 9]  Abdominal pain [R10 9]  KAYLI (acute kidney injury) ([facility] [address] ) [N17 9]    Past Medical History:   Diagnosis Date    Abnormal electrocardiogram     Last assessed: Oct 11, 2013    Allergic rhinitis     last assessed: Oct 11, 2013    Allergic rhinitis due to pollen     last assessed: Oct 10, 2013    Allergic sinusitis     Last assessed: May 11, 2015    Allergy     resolved: 2015    Benign essential hypertension     Last assessed/resolved: May 31, 2017    BPH (benign prostatic hyperplasia)     Cancer [facility])     prostate    Colitis     Last assessed: May 24, 2016    Cough with hemoptysis 2020    Generalized osteoarthritis     Last assessed: Oct 11, 2013    GERD without esophagitis     Last assessed/resolved: May 31, 2017    Hearing loss     Hiatal hernia     resolved: 2015    History of gastroesophageal reflux (GERD)     History of stomach ulcers     last assessed: May 11, 2015    Hypertension     Incomplete bladder emptying     Kidney stone     Nodular prostate with lower urinary tract symptoms     Nontoxic single thyroid nodule     last assessed: 2014    Orchalgia     Overweight     last assessed: Oct 31, 2013    Poor urinary stream     Pulmonary embolism [facility])     Salivary gland disorder     last assessed: 2014    Seasonal allergies     last assessed: May 15, 2015    Spermatocele     Straining to void     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:  May 7, 2014     Length Of Stay: 5  PHYSICAL THERAPY EVALUATION :   Patient's identity confirmed via 2 patient identifiers (full name and ) at start of session       22 0927   PT Last Visit   PT Visit Date 22   Note Type   Note type Evaluation   Pain Assessment   Pain Assessment Tool 0-10   Pain Score No Pain   Restrictions/Precautions   Weight Bearing Precautions Per Order No   Other Precautions O2;Hard of hearing  (1L O2 via NC)   Home Living   Type of [address] Two level;Performs ADLs on one level; Able to live on main level with bedroom/bathroom;Stairs to enter without rails; Laundry in basement  (2 VICENTE; 12 steps down to basement)   Bathroom Shower/Tub Tub/shower unit  (tub/shower on 1st floor, walk-in shower in basement)   Bathroom Toilet Standard   Bathroom Equipment Grab bars in shower; Shower chair   [address] Walker;Cane;Grab bars   Additional Comments Pt resides w/ wife in a house w/ 2 VICENTE  Pt has 1st floor set-up available w/ tub shower, 12 steps down to basement walk-in shower   Prior Function   Level of [place] Independent with ADLs and functional mobility; Needs assistance with IADLs   Lives With Spouse   Receives Help From Family   ADL Assistance Independent   IADLs Needs assistance   Falls in the last 6 months 0   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[employer])   Comments At baseline pt ambulates independently w/o AD, performs ADLs independently, and requires assistance w/ IADLs  Pt drives and works part-time/seasonally   General   Additional Pertinent History Pt's SpO2 dropped to 83-85% on 1L while ambulating   Seated rest break required between mobility trials, SpO2 recovered to >90% w/in 20-30 seconds   Family/Caregiver Present Yes  (pt's wife)   Cognition   Overall Cognitive Status WFL   Arousal/Participation Cooperative   Orientation Level Oriented X4   Memory Within functional limits   Following Commands Follows one step commands without difficulty   Comments Pt ID via name and ; pt agreeable to PT eval and functional mobility   Strength RLE   RLE Overall Strength 3+/5  (grossly assessed w/ functional mobility)   Strength LLE   LLE Overall Strength 3+/5  (grossly assessed w/ functional mobility)   Light Touch   RLE Light Touch Grossly intact   LLE Light Touch Grossly intact   Bed Mobility   Supine to Sit Unable to assess   Sit to Supine Unable to assess   Additional Comments pt OOB in recliner chair upon arrival, and at OOB in chair at end of session   Transfers   Sit to Stand 5  Supervision   Additional items Armrests; Increased time required;Verbal cues   Stand to Sit 5  Supervision   Additional items Armrests; Increased time required;Verbal cues   Additional Comments Pt performed 2 STS transfer trials w/ supervision and VC for hand placement on armrests   Ambulation/Elevation   Gait pattern Wide MOISES; Decreased foot clearance; Short stride   Gait Assistance 5  Supervision   Additional items Assist x 1;Verbal cues   Assistive Device Rolling walker;None   Distance 140'  (20' w/o AD, 120' w/ RW)   Stair Management Assistance 5  Supervision  (close supervision)   Additional items Assist x 1;Verbal cues   Stair Management Technique Two rails; Step to pattern   Number of Stairs 7   Ambulation/Elevation Additional Comments Pt ambulated 20' in room w/o AD and 120' into hallway w/ RW  Pt demonstrated ability to negotiate multiple turns w/o LOB  SpO2 dropped to 83% during mobility, pt denied feeling lightheaded, dizzy, and/or SOB   Balance   Static Sitting Fair +   Dynamic Sitting Fair   Static Standing Fair   Dynamic Standing Fair -   Ambulatory Fair -   Endurance Deficit   Endurance Deficit Yes   Endurance Deficit Description Pt w/ limited ambulatory endurance; drop in SpO2 while mobilizing   Activity Tolerance   Activity Tolerance Patient limited by fatigue   Medical Staff Made Aware Spoke to [facility] Jessica Peck; Resident Olamide Giraldo notified post of pt's SpO2 reading   Nurse Made Aware BETH Knapp, RN updated post of pt's SpO2   Assessment   Prognosis Fair   Problem List Decreased endurance; Impaired balance;Decreased mobility   Assessment Adrienne Boykin Varinder Persaud  is a [de-identified] y o   Male who presents [facility] on 4/8/2022 from home w/ c/o abdominal pain and diagnosis of enteritis  Orders for PT eval and treat received, w/ activity orders of activity as tolerated  Comorbidities affecting pt at time of evaluation include: HTN, osteoarthritis, h/o PE, neuropathy  Personal factors affecting pt at time of evaluation include: lives in 2 story house, stairs to enter home, inability to navigate community distances, hearing impairments and inability to perform IADLs  At baseline, pt mobilizes independently w/ no AD, and reports 0 falls in the last 6 months  Upon evaluation, pt presents w/ the following deficits: weakness, impaired hearing, decreased endurance and gait deviations  Upon eval, pt requires supervision for transfers, supervision for gait w/ RW, and supervision for step trial  Pt's clinical presentation is unstable/unpredictable due to declining oxygen saturation w/ low level of activity, need for supplemental oxygen in order to maintain oxygen saturation, ongoing medical/monitoring management and need for input for mobility technique  Patient is at an increased risk of falls due to limited ambulatory endurance  Given the above findings, discharge recommendation is for Home w/ OPPT  During this admission, pt would benefit from continued skilled inpatient PT in the acute care setting in order to address the abovementioned deficits to maximize function and mobility before DC from acute care     Goals   Patient Goals Pt stated he wants to go home today   STG Expiration Date 04/24/22   Short Term Goal #1 Pt will: perform bed mobility w/ mod I to decrease pt's burden of care; perform transfers w/ mod I to increase pt's OOB mobility; ambulate at least 350' w/ LRAD and mod I to increase pt's ambulatory endurance; negotiate 12 steps w/ railing and mod I to facilitate pt returning to home living environment; increase all balance ratings by at least 1 grade to decrease pt's risk of falls   PT Treatment Day 0   Plan   Treatment/Interventions Functional transfer training;LE strengthening/ROM; Elevations; Therapeutic exercise; Endurance training;Patient/family training;Equipment eval/education; Bed mobility;Gait training   PT Frequency 2-3x/wk   Recommendation   PT Discharge Recommendation Home with outpatient rehabilitation   Equipment Recommended [facility] Recommended Wheeled walker   Change/add to [employer]? No   AM-PAC Basic Mobility Inpatient   Turning in Bed Without Bedrails 4   Lying on Back to Sitting on Edge of Flat Bed 3   Moving Bed to Chair 3   Standing Up From Chair 3   Walk in Room 3   Climb 3-5 Stairs 3   Basic Mobility Inpatient Raw Score 19   Basic Mobility Standardized Score 42 48   Highest Level Of Mobility   JH-HLM Goal 6: Walk 10 steps or more   JH-HLM Highest Level of Mobility 7: Walk 25 feet or more   JH-HLM Goal Achieved Yes   End of Consult   Patient Position at End of Consult Bedside chair; All needs within reach  (wife remaining in room)       The patient's AM-PAC Basic Mobility Inpatient Short Form Raw Score is 19  A Raw score of greater than 16 suggests the patient may benefit from discharge to home  Please also refer to the recommendation of the Physical Therapist for safe discharge planning      Pt would benefit from skilled inpatient PT during this admission in order to facilitate progress towards goals to maximize functional independence    DC rec: OPPT w/ family support/supervision      Radha Cadena, PT, DPT  04/14/22

## 2022-04-14 NOTE — PROGRESS NOTES
Progress Note - Infectious Disease   Denia Jones  [de-identified] y o  male MRN: [id number]  Unit/Bed#: W -01 Encounter: [id number]    Impression/Recommendations:  1  Sepsis, POA:  Fever, mild leukopenia, elevated lactate  Presumed source is small bowel enteritis    Lactic acid level and procalcitonin are trending down  CT abdomen demonstrated gradual tapering of distal ileum suggestive of possible small bowel obstruction  · Follow-up blood cultures: ngtd    · Continue ceftriaxone IV and metronidazole po  · Monitor clinical response, temperature curve     · Supportive care as per primary service  2  Small bowel enteritis: Possible source of sepsis  Noted patient's symptoms began acutely after consuming an [place] hoagie  Consider bacterial or viral gastroenteritis   Noted blood cultures, stool enteric panel PCR and stool C diff PCR negative   No other clear source of fever is noted  · Continue ceftriaxone IV and metronidazole p o  · Anticipate 5 day antibiotic course through 4/15  3  LT acute inflammation of LT parotid gland  · Follow CT neck  · ENT service has been consulted  · Continue antibiotic therapy as stated above  · Pain control and supportive care as per primary service  4  CKD stage 3: Creatinine is stable  · Renal dose medications, avoid nephrotoxins     · Repeat BMP in a m  · Volume management as per primary service  5  Morbid obesity with BMI of 36:  This is a risk factor for severe infection  My recommendations were discussed with the patient and his wife who verbalized understanding  Thank you for allowing me to participate in the care of this patient  The ID service will follow      Antibiotics:  Ceftriaxone, metronidazole day 4  Scheduled Meds:  Current Facility-Administered Medications   Medication Dose Route Frequency Provider Last Rate    acetaminophen  975 mg Oral Q6H PRN Carlos Rivera MD      al mag oxide-diphenhydramine-lidocaine viscous  10 mL Swish & Spit Q4H PRN Carlos Rivera MD      allopurinol  100 mg Oral BID Sallye Olszewski, MD      aspirin  81 mg Oral Daily Sallye Olszewski, MD      cefTRIAXone  1,000 mg Intravenous Q24H Sallye Olszewski, MD Stopped (22 1708)    cholecalciferol  2,000 Units Oral Daily Sallye Olszewski, MD      diltiazem  180 mg Oral Daily Sallye Olszewski, MD      gabapentin  500 mg Oral HS Sallye Olszewski, MD      heparin (porcine)  5,000 Units Subcutaneous [facility] Tarun Kidd MD      HYDROmorphone  0 2 mg Intravenous Q6H PRN Mary Carmen Hope, DO      ipratropium  0 5 mg Nebulization TID PRN Mary Carmen Hope, DO      Labetalol HCl  20 mg Intravenous Q4H PRN Mary Carmen Hope, DO      levalbuterol  1 25 mg Nebulization TID PRN Mary Carmen Hope, DO      loratadine  10 mg Oral Daily Sallye Olszewski, MD      melatonin  3 mg Oral HS Tarun Kidd MD      metroNIDAZOLE  500 mg Oral [facility] [place], Paula Batista      OLANZapine  2 5 mg Oral Q8H PRN Mary Carmen Hope, DO      ondansetron  4 mg Intravenous Q6H PRN Kiesha De La Cruz PA-C      pantoprazole  40 mg Oral Daily Before Breakfast Herve Bueno MD      phenol  1 spray Mouth/Throat Q2H PRN Mary Carmen Hope, DO      saliva substitute  5 spray Mouth/Throat 4x Daily PRN Yamile Mcelroy MD      tamsulosin  0 4 mg Oral Daily With Frederick Singh MD       Continuous Infusions:   PRN Meds:   acetaminophen    al mag oxide-diphenhydramine-lidocaine viscous    HYDROmorphone    ipratropium    Labetalol HCl    levalbuterol    OLANZapine    ondansetron    phenol    saliva substitute    Subjective:  Patient reports swelling and pain below his left ear  He had a temperature of 100 1 F yesterday morning  He denies fever, chills, vomiting, diarrhea, shortness of breath, rash       Objective:  Vitals:  Temp:  [98 5 °F (36 9 °C)-99 9 °F (37 7 °C)] 98 5 °F (36 9 °C)  HR:  [65-74] 66  Resp:  [18-19] 18  BP: (119-144)/(82-92) 119/82  SpO2:  [93 %-97 %] 96 %  Temp (24hrs), Av °F (37 2 °C), Min:98 5 °F (36 9 °C), Max:99 9 °F (37 7 °C)  Current: Temperature: 98 5 °F (36 9 °C)  Physical Exam:   General Appearance:  Alert, awake, nontoxic, no acute distress   ENT: Oropharynx moist without lesions   Lungs:   Clear to auscultation bilaterally; respirations unlabored   Heart:  S1, S2, RRR, no murmur   Abdomen:   Soft, non-tender, distended   : No Coates catheter present   Extremities: No distal leg edema b/l   Neurologic: AAO to person, surroundings, conversant, fluent speech, hard of hearing   Skin: Erythema edema noted left parotid region, inferior to LT auricle  Venous stasis changes and hyperpigmentation of distal lower extremities  Labs, Cultures, Imaging, & Other studies:   All pertinent labs, cultures and imaging studies were personally reviewed  Results from last 7 days   Lab Units 04/14/22  0551 04/13/22  0521 04/12/22  0452   WBC Thousand/uL 3 89* 5 04 4 06*   HEMOGLOBIN g/dL 13 5 14 0 13 5   PLATELETS Thousands/uL 142* 122* 128*     Results from last 7 days   Lab Units 04/14/22  0551 04/13/22  0521 04/13/22  0521 04/12/22  0452 04/12/22  0452 04/11/22  1616 04/11/22  1616   SODIUM mmol/L 136  --  137  --  139   < > 137   POTASSIUM mmol/L 3 9   < > 3 5   < > 3 5   < > 3 8   CHLORIDE mmol/L 100   < > 101   < > 101   < > 100   CO2 mmol/L 26   < > 28   < > 27   < > 28   BUN mg/dL 25   < > 26*   < > 20   < > 17   CREATININE mg/dL 1 31*   < > 1 39*   < > 1 42*   < > 1 33*   EGFR ml/min/1 73sq m 51   < > 47   < > 46   < > 50   CALCIUM mg/dL 8 5   < > 8 1*   < > 8 2*   < > 8 6   AST U/L 63*  --  51*  --   --   --  48*   ALT U/L 28   < > 25  --   --    < > 31   ALK PHOS U/L 50   < > 52  --   --    < > 63    < > = values in this interval not displayed  Results from last 7 days   Lab Units 04/11/22  1705 04/11/22  1617 04/10/22  0908 04/09/22  1844 04/09/22  0018 04/09/22  0000 04/08/22  2327 04/08/22  2310   BLOOD CULTURE  No Growth at 48 hrs  No Growth at 48 hrs  No Growth After 4 Days  No Growth After 4 Days    --   --  No Growth After 5 Days  --  No Growth After 5 Days  URINE CULTURE   --   --   --   --  10,000-19,000 cfu/ml Proteus mirabilis*  <10,000 cfu/ml   --   --   --    C DIFF TOXIN B BY PCR   --   --   --  Negative  --   --  Indeterminate Result by PCR  Suggest Re-collection   *  --      Results from last 7 days   Lab Units 04/14/22  0551 04/13/22  0521 04/12/22  0452 04/11/22  1616 04/11/22  0618 04/10/22  0906   PROCALCITONIN ng/ml 0 76* 1 04* 1 30* 0 28* 0 25 0 12     Results from last 7 days   Lab Units 04/11/22  1616   CRP mg/L 156 8*         Results from last 7 days   Lab Units 04/11/22  1616   D-DIMER QUANTITATIVE ug/ml FEU 1 51*

## 2022-04-14 NOTE — PLAN OF CARE
Problem: PHYSICAL THERAPY ADULT  Goal: Performs mobility at highest level of function for planned discharge setting  See evaluation for individualized goals  Description: Treatment/Interventions: Functional transfer training,LE strengthening/ROM,Elevations,Therapeutic exercise,Endurance training,Patient/family training,Equipment eval/education,Bed mobility,Gait training  Equipment Recommended: Tianna Hameed       See flowsheet documentation for full assessment, interventions and recommendations  4/14/2022 1158 by Yoon He PT  Note: Prognosis: Fair  Problem List: Decreased endurance,Impaired balance,Decreased mobility  Assessment: Gayle Lindsay  is a [de-identified] y o  Male who presents [facility] on 4/8/2022 from home w/ c/o abdominal pain and diagnosis of enteritis  Orders for PT eval and treat received, w/ activity orders of activity as tolerated  Comorbidities affecting pt at time of evaluation include: HTN, osteoarthritis, h/o PE, neuropathy  Personal factors affecting pt at time of evaluation include: lives in 2 story house, stairs to enter home, inability to navigate community distances, hearing impairments and inability to perform IADLs  At baseline, pt mobilizes independently w/ no AD, and reports 0 falls in the last 6 months  Upon evaluation, pt presents w/ the following deficits: weakness, impaired hearing, decreased endurance and gait deviations  Upon eval, pt requires supervision for transfers, supervision for gait w/ RW, and supervision for step trial  Pt's clinical presentation is unstable/unpredictable due to declining oxygen saturation w/ low level of activity, need for supplemental oxygen in order to maintain oxygen saturation, ongoing medical/monitoring management and need for input for mobility technique  Patient is at an increased risk of falls due to limited ambulatory endurance  Given the above findings, discharge recommendation is for Home w/ OPPT   During this admission, pt would benefit from continued skilled inpatient PT in the acute care setting in order to address the abovementioned deficits to maximize function and mobility before DC from acute care  PT Discharge Recommendation: Home with outpatient rehabilitation          See flowsheet documentation for full assessment

## 2022-04-14 NOTE — ASSESSMENT & PLAN NOTE
Lab Results   Component Value Date    EGFR 51 04/14/2022    EGFR 47 04/13/2022    EGFR 46 04/12/2022    CREATININE 1 31 (H) 04/14/2022    CREATININE 1 39 (H) 04/13/2022    CREATININE 1 42 (H) 04/12/2022     Estimated Creatinine Clearance: 49 7 mL/min (A) (by C-G formula based on SCr of 1 31 mg/dL (H))  · Baseline Cr around 1 10   · Cr mildly elevated at 1 36 on arrival    · Monitor intake/output  · Avoid nephrotoxins and hypotension     · Encourage oral intake  · Repeat BMP in AM

## 2022-04-14 NOTE — ASSESSMENT & PLAN NOTE
· Continue Gabapentin 500 mg qhs  · Home Ultracet on hold, requiring Tylenol for fever    · Dilaudid prn

## 2022-04-14 NOTE — ASSESSMENT & PLAN NOTE
· Gen surg appreciated  · NGT removed 4/11  · Tolerating diet  · Monitor intake and bowel movements, last bowel movement was on Saturday  Passing gas    · Serial abdominal exams

## 2022-04-14 NOTE — CONSULTS
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  CT scan neck 4/14/22:  3 2 cm irregular lobulated mass in left parotid gland inferior superficial aspect, 1 1 cm smaller lesion in left parotid gland superior superficial aspect may be a small satellite lesion or intraparotid lymph node, with findings suggestive of acute   sialadenitis of left parotid gland extending into left  and left submandibular spaces  Left parotid gland mass and lesion are suspicious for benign or malignant parotid gland neoplasms - 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 given history of this in   right parotid gland  Plan:  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  Sialoadenitis   - Warm compressors  - Hydration  - Sour candy/ lemon drops  - Antibiotics   - Will arrange appointment with Dr Aida Marin ASA to plan for surgery     History of Present Illness   Physician Requesting Consult: Barbara Harvey MD  Reason for Consult / Principal Problem: Left parotid swelling  HPI: Vivian Hebert  is a [de-identified]y o  year old male who presents with  a PMH of HTN, neuropathy, gout and s/p partial gastrectomy who presents with diarrhea and abdominal pain  Admitted for non-bloody diarrhea, fever, vomiting and low oxygen  Consulted for a left sided tender parotid swelling X few days  Had right parotid mass removed in June by Dr Aida Marin, 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    CT scan neck 4/14/22:  3 2 cm irregular lobulated mass in left parotid gland inferior superficial aspect, 1 1 cm smaller lesion in left parotid gland superior superficial aspect may be a small satellite lesion or intraparotid lymph node, with findings suggestive of acute   sialadenitis of left parotid gland extending into left  and left submandibular spaces  Left parotid gland mass and lesion are suspicious for benign or malignant parotid gland neoplasms - 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 given history of this in   right parotid gland  Consults    Review of Systems  Gen: no fatigue, no fevers/chills  ENT: as per HPI  GI: diarrhea   Allergy/Immun: no allergies/asthma  Neuro: no headache  Heme: no bleeding/bruising concerns  Endocrine: hot/cold intolerance  Pulm: no SOB; no asthma; no cough  CV: no chest pain or palpitations  Derm: no rashes      Historical Information   Past Medical History:   Diagnosis Date    Abnormal electrocardiogram     Last assessed: Oct 11, 2013    Allergic rhinitis     last assessed: Oct 11, 2013    Allergic rhinitis due to pollen     last assessed: Oct 10, 2013    Allergic sinusitis     Last assessed: May 11, 2015    Allergy     resolved: July 22, 2015    Benign essential hypertension     Last assessed/resolved: May 31, 2017    BPH (benign prostatic hyperplasia)     Cancer [facility])     prostate    Colitis     Last assessed: May 24, 2016    Cough with hemoptysis 11/17/2020    Generalized osteoarthritis     Last assessed: Oct 11, 2013    GERD without esophagitis     Last assessed/resolved: May 31, 2017    Hearing loss     Hiatal hernia     resolved: July 22, 2015    History of gastroesophageal reflux (GERD)     History of stomach ulcers     last assessed: May 11, 2015    Hypertension     Incomplete bladder emptying     Kidney stone     Nodular prostate with lower urinary tract symptoms     Nontoxic single thyroid nodule     last assessed: April 16, 2014    Orchalgia     Overweight     last assessed:  Oct 31, 2013    Poor urinary stream     Pulmonary embolism (HCC)     Salivary gland disorder     last assessed: 2014    Seasonal allergies     last assessed: May 15, 2015    Spermatocele     Straining to void     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, 2014     Past Surgical History:   Procedure Laterality Date    BLADDER SURGERY      CHOLECYSTECTOMY      laparoscopic     FLAP LOCAL TRUNK Left 10/28/2021    Procedure: EXCISION OF FLANK MASS;  Surgeon: Hira Pickens DO;  Location: [address] [place] [address] MAIN OR;  Service: Plastics    HEMORRHOID SURGERY      pilionidal cyst removal     HERNIA REPAIR Left 2008    inguinal     KNEE ARTHROSCOPY Left     KNEE CARTILAGE SURGERY      NASAL SEPTUM SURGERY      Deviation repair     ND EXC PAROTD,LAT LOBE,DISSECT 5TH NERV Right 2021    Procedure: SUPERFICIAL PAROTIDECTOMY WITH FACIAL NERVE MONITORING;  Surgeon: Nazario Pepe MD;  [facility];  Service: ENT    PROSTATE BIOPSY      STOMACH SURGERY      TONSILLECTOMY AND ADENOIDECTOMY      at age 37   [address] [address] [address] - right  0 left     US GUIDED THYROID BIOPSY  2022    VASECTOMY       Social History   Social History     Substance and Sexual Activity   Alcohol Use Yes    Comment: rare     Social History     Substance and Sexual Activity   Drug Use No     Social History     Tobacco Use   Smoking Status Former Smoker    Packs/day: 1 00    Years: 30 00    Pack years: 30 00    Types: Cigarettes    Start date:     Quit date: 5    Years since quittin 3   Smokeless Tobacco Never Used     Family History: non-contributory    Meds/Allergies   all current active meds have been reviewed  No current facility-administered medications on file prior to encounter       Current Outpatient Medications on File Prior to Encounter   Medication Sig Dispense Refill    allopurinol (ZYLOPRIM) 100 mg tablet Take 1 tablet (100 mg total) by mouth 2 (two) times a day 180 tablet 1    aspirin (ECOTRIN LOW STRENGTH) 81 mg EC tablet Take 81 mg by mouth daily      Cholecalciferol (VITAMIN D3 PO) Take by mouth 2000 IU      diltiazem (CARDIZEM CD) 360 MG 24 hr capsule Take 1 capsule (360 mg total) by mouth daily 90 capsule 1    gabapentin (Neurontin) 100 mg capsule Take 1 capsule (100 mg total) by mouth daily at bedtime 90 capsule 1    gabapentin (Neurontin) 400 mg capsule Take 1 capsule (400 mg total) by mouth daily 90 capsule 1    levocetirizine (XYZAL) 5 MG tablet Take 5 mg by mouth every evening      tamsulosin (FLOMAX) 0 4 mg Take 1 capsule (0 4 mg total) by mouth daily with dinner 90 capsule 1    traMADol-acetaminophen (ULTRACET) 37 5-325 mg per tablet Take 2 tablets by mouth every 6 (six) hours as needed for moderate pain 60 tablet 0       Allergies   Allergen Reactions    Ace Inhibitors Hyperactivity         Objective     Vitals:    04/14/22 0745 04/14/22 0930 04/14/22 1538 04/14/22 1538   BP: 144/92 132/87 119/82 119/82   BP Location:    Right arm   Pulse: 68  65 66   Resp: 19   18   Temp: 99 9 °F (37 7 °C)  98 5 °F (36 9 °C) 98 5 °F (36 9 °C)   TempSrc:    Oral   SpO2: 97%  97% 96%   Weight:       Height:             Intake/Output Summary (Last 24 hours) at 4/14/2022 1818  Last data filed at 4/14/2022 1802  Gross per 24 hour   Intake 790 ml   Output 1025 ml   Net -235 ml       Invasive Devices  Report    Peripheral Intravenous Line            Peripheral IV 04/11/22 Dorsal (posterior); Right Forearm 2 days                Physical Exam  Gen: NAD, awake/alert, no stridor  Voice:   Ears: pinnae unremarkable; EAC patent; TM intact/translucent/without OME  Nose: no external abnormality; nares patent; septum midline; inferior turbinates pink; clear secretions; no pus; no polyps  Oral cavity: no lesions; tongue soft/mobile  Oropharynx: no lesions; tonsils without ulceration/exudate; soft palate/posterior wall unremarkable  Face and neck:left parotid 3x2 cm tender swelling with intact overlaying skin    CN: II-XII grossly intact  Hearing: grossly intact with finger rub  Salivary glands: parotids/submandibular glands soft, nontender    Procedure:    Lab Results: I have personally reviewed pertinent lab results  Imaging Studies: I have personally reviewed pertinent reports  EKG, Pathology, and Other Studies: I have personally reviewed pertinent reports  Code Status: Level 1 - Full Code  Advance Directive and Living Will:      Power of :    POLST:      Counseling/Coordination of Care: Total floor / unit time spent today 60 min minutes  Greater than 50% of total time was spent with the patient and / or family counseling and / or coordination of care   A description of the counseling / coordination of care: given

## 2022-04-14 NOTE — SPEECH THERAPY NOTE
Speech Language/Pathology    Speech/Language Pathology Progress Note    Patient Name: Florinda Grijalva  Today's Date: 4/14/2022     Attempted to see pt at lunch for follow up of swallowing, however pt's wife reported that pt is going for a CT of the neck at 1200 and is not allowed to eat anything  Will follow up as able, as appropriate         Elisabet Williamson CCC-SLP  Speech Pathologist  Available via  tiger text

## 2022-04-14 NOTE — ASSESSMENT & PLAN NOTE
Patient with increased oxygen requirements yesterday  Required a maximal 4 L of oxygen via nasal cannula, currently on room air at rest with increased oxygen requirements with activity  Reviewed history with mild interstitial lung disease  Chest x-ray with no acute cardiopulmonary disease  CTA performed due to an elevated D-dimer with no evidence of pulmonary embolism but mild pulmonary congestion  Echo from 2020 with grade 1 diastolic dysfunction and EF of 60%  Received 20 mg of IV Lasix yesterday in addition to nebulizer treatment  Etiology likely secondary to pulmonary congestion in the setting of interstitial lung disease  Resolved, ambulatory pulse ox prior to discharge

## 2022-04-14 NOTE — PROGRESS NOTES
[facility]  Progress Note - Clementine Malin  1940, [de-identified] y o  male MRN: [id number]  Unit/Bed#: W -01 Encounter: [id number]  Primary Care Provider: Cornelius Parsons MD   Date and time admitted to hospital: 4/8/2022 10:41 PM    * Enteritis  Assessment & Plan  · Presented with abdominal pain, diarrhea x 1 day  Patient also report 1 episode of vomiting  · CT A/P preliminary report with "multiple mildly dilated loops of small bowel in the abdomen without a clear transition point, findings may represent small bowel enteritis however early obstruction not entirely excluded "  · stool studies and C diff neg   · Started on antibiotics 4/11 -ceftriaxone and Flagyl  · Encourage oral intake  · Denies any abdominal pain  Small bowel obstruction ([address] )  Assessment & Plan  · Gen surg appreciated  · NGT removed 4/11  · Tolerating diet  · Monitor intake and bowel movements, last bowel movement was on Saturday  Passing gas  · Serial abdominal exams    Fever  Assessment & Plan  · Patient with intermittent episodes of fever with associated leukopenia and lactic acidosis  · Blood cultures with no growth to date  · COVID/Flu/RSV negative  · CT chest with no evidence of active disease or pulmonary embolism  · Etiology likely secondary to enteritis vs viral illness  · Continue with ceftriaxone and Flagyl  · Monitor temperature and white cell count  · Appreciate infectious disease recommendations  Acute respiratory failure with hypoxia [facility])  Assessment & Plan  Patient with increased oxygen requirements yesterday  Required a maximal 4 L of oxygen via nasal cannula, currently on room air at rest with increased oxygen requirements with activity  Reviewed history with mild interstitial lung disease  Chest x-ray with no acute cardiopulmonary disease  CTA performed due to an elevated D-dimer with no evidence of pulmonary embolism but mild pulmonary congestion    Echo from 2020 with grade 1 diastolic dysfunction and EF of 60%  Received 20 mg of IV Lasix yesterday in addition to nebulizer treatment  Etiology likely secondary to pulmonary congestion in the setting of interstitial lung disease  Resolved, ambulatory pulse ox prior to discharge  Suspected sialoadenitis  Assessment & Plan  · Patient complaining of left sided jaw pain with associated swelling  · Tender to palpation  · 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  · CT soft tissue neck  · ENT consulted  Pulmonary edema  Assessment & Plan  · Noted on CT scan  · See plan under acute respiratory failure  · P r n  Lasix      Electrolyte abnormality  Assessment & Plan  · Keep K at 4, P at 3, Mag at 2  · Resolved    Anxiety  Assessment & Plan  · 2/2 NGT, resolved  · Zydis prn    Chronic kidney disease  Assessment & Plan  Lab Results   Component Value Date    EGFR 51 04/14/2022    EGFR 47 04/13/2022    EGFR 46 04/12/2022    CREATININE 1 31 (H) 04/14/2022    CREATININE 1 39 (H) 04/13/2022    CREATININE 1 42 (H) 04/12/2022     Estimated Creatinine Clearance: 49 7 mL/min (A) (by C-G formula based on SCr of 1 31 mg/dL (H))  · Baseline Cr around 1 10   · Cr mildly elevated at 1 36 on arrival    · Monitor intake/output  · Avoid nephrotoxins and hypotension  · Encourage oral intake  · Repeat BMP in AM      Idiopathic peripheral neuropathy  Assessment & Plan  · Continue Gabapentin 500 mg qhs  · Home Ultracet on hold, requiring Tylenol for fever  · Dilaudid prn    Benign essential hypertension  Assessment & Plan  · Cardizem, continue at half the dose  · Blood pressure reviewed and acceptable  · Labetalol prn        VTE Pharmacologic Prophylaxis: VTE Score: 5 High Risk (Score >/= 5) - Pharmacological DVT Prophylaxis Ordered: heparin  Sequential Compression Devices Ordered  Patient Centered Rounds: I performed bedside rounds with nursing staff today    Discussions with Specialists or Other Care Team Provider: ENT     Education and Discussions with Family / Patient: Updated  (wife) at bedside  Current Length of Stay: 5 day(s)  Current Patient Status: Inpatient   Discharge Plan: Anticipate discharge in 24-48 hrs to home with home services  Code Status: Level 1 - Full Code    Subjective:   Patient complaining of left-sided jaw pain and swelling  Objective:     Vitals:   Temp (24hrs), Av 4 °F (37 4 °C), Min:98 7 °F (37 1 °C), Max:99 9 °F (37 7 °C)    Temp:  [98 7 °F (37 1 °C)-99 9 °F (37 7 °C)] 99 9 °F (37 7 °C)  HR:  [68-75] 68  Resp:  [18-19] 19  BP: (132-144)/(87-92) 132/87  SpO2:  [93 %-97 %] 97 %  Body mass index is 35 41 kg/m²  Input and Output Summary (last 24 hours): Intake/Output Summary (Last 24 hours) at 2022 1405  Last data filed at 2022 0601  Gross per 24 hour   Intake 530 ml   Output 400 ml   Net 130 ml       Physical Exam:   Physical Exam  Vitals and nursing note reviewed  Constitutional:       General: He is not in acute distress  Appearance: He is well-developed  He is obese  He is not ill-appearing or diaphoretic  HENT:      Head: Normocephalic and atraumatic  Comments: Left-sided chiquis of the jaw swelling about 3 inches in size with tenderness to palpation     Ears:      Comments: Hard of hearing  Cardiovascular:      Rate and Rhythm: Normal rate and regular rhythm  Heart sounds: Normal heart sounds  No murmur heard  Pulmonary:      Effort: Pulmonary effort is normal       Breath sounds: Normal breath sounds  No wheezing, rhonchi or rales  Abdominal:      General: Bowel sounds are normal  There is no distension  Palpations: Abdomen is soft  Tenderness: There is no abdominal tenderness  Musculoskeletal:      Right lower leg: No edema  Left lower leg: No edema  Skin:     General: Skin is warm and dry  Neurological:      Mental Status: He is alert and oriented to person, place, and time     Psychiatric:         Mood and Affect: Mood normal          Behavior: Behavior normal           Additional Data:     Labs:  Results from last 7 days   Lab Units 04/14/22  0551 04/13/22  0521 04/12/22  0452   WBC Thousand/uL 3 89*   < > 4 06*   HEMOGLOBIN g/dL 13 5   < > 13 5   HEMATOCRIT % 41 0   < > 40 5   PLATELETS Thousands/uL 142*   < > 128*   BANDS PCT %  --   --  1   NEUTROS PCT % 75   < >  --    LYMPHS PCT % 14   < >  --    LYMPHO PCT %  --   --  7*   MONOS PCT % 4   < >  --    MONO PCT %  --   --  2*   EOS PCT % 0   < > 0    < > = values in this interval not displayed  Results from last 7 days   Lab Units 04/14/22  0551   SODIUM mmol/L 136   POTASSIUM mmol/L 3 9   CHLORIDE mmol/L 100   CO2 mmol/L 26   BUN mg/dL 25   CREATININE mg/dL 1 31*   ANION GAP mmol/L 10   CALCIUM mg/dL 8 5   ALBUMIN g/dL 2 8*   TOTAL BILIRUBIN mg/dL 0 72   ALK PHOS U/L 50   ALT U/L 28   AST U/L 63*   GLUCOSE RANDOM mg/dL 107     Results from last 7 days   Lab Units 04/11/22  1938   INR  1 05             Results from last 7 days   Lab Units 04/14/22  0551 04/13/22  0521 04/12/22  1112 04/12/22  0452 04/11/22  1820 04/11/22  1616 04/11/22  0618 04/10/22  0906   LACTIC ACID mmol/L  --  1 2 1 6  --  3 5* 2 3*  --    < >   PROCALCITONIN ng/ml 0 76* 1 04*  --  1 30*  --  0 28* 0 25  --     < > = values in this interval not displayed  Lines/Drains:  Invasive Devices  Report    Peripheral Intravenous Line            Peripheral IV 04/11/22 Dorsal (posterior); Right Forearm 2 days                      Imaging: Reviewed radiology reports from this admission including: chest CT scan    Recent Cultures (last 7 days):   Results from last 7 days   Lab Units 04/11/22  1705 04/11/22  1617 04/10/22  0908 04/09/22  1844 04/09/22  0018 04/09/22  0000 04/08/22  2327 04/08/22  2310   BLOOD CULTURE  No Growth at 48 hrs  No Growth at 48 hrs  No Growth After 4 Days  No Growth After 4 Days  --   --  No Growth After 5 Days  --  No Growth After 5 Days     URINE CULTURE   --   -- --   --  10,000-19,000 cfu/ml Proteus mirabilis*  <10,000 cfu/ml   --   --   --    C DIFF TOXIN B BY PCR   --   --   --  Negative  --   --  Indeterminate Result by PCR  Suggest Re-collection  *  --        Last 24 Hours Medication List:   Current Facility-Administered Medications   Medication Dose Route Frequency Provider Last Rate    acetaminophen  975 mg Oral Q6H PRN Kimberly Aguilera MD      al mag oxide-diphenhydramine-lidocaine viscous  10 mL Swish & Spit Q4H PRN Kimberly Aguilera MD      allopurinol  100 mg Oral BID Kimberly Aguilera MD      aspirin  81 mg Oral Daily Kimberly Aguilera MD      cefTRIAXone  1,000 mg Intravenous Q24H Kimberly Aguilera MD Stopped (04/13/22 1704)    cholecalciferol  2,000 Units Oral Daily Kimberly Aguilera MD      diltiazem  180 mg Oral Daily Kimberly Aguilera MD      gabapentin  500 mg Oral HS Kimberly Aguilera MD      heparin (porcine)  5,000 Units Subcutaneous [facility] Marilee Kaur MD      HYDROmorphone  0 2 mg Intravenous Q6H PRN Argie Lowery, DO      ipratropium  0 5 mg Nebulization TID PRN Argie Lowery, DO      Labetalol HCl  20 mg Intravenous Q4H PRN Argie Lowery, DO      levalbuterol  1 25 mg Nebulization TID PRN Argie Lowery, DO      loratadine  10 mg Oral Daily Kimberly Aguilera MD      melatonin  3 mg Oral HS Marilee Kaur MD      metroNIDAZOLE  500 mg Oral [facility] [place], [address]      OLANZapine  2 5 mg Oral Q8H PRN Argie Lowery, DO      ondansetron  4 mg Intravenous Q6H PRN Karlene lGaser PA-C      pantoprazole  40 mg Oral Daily Before Breakfast Skylar Peña MD      phenol  1 spray Mouth/Throat Q2H PRN Argie Lowery, DO      saliva substitute  5 spray Mouth/Throat 4x Daily PRN Sylvia Salter MD      tamsulosin  0 4 mg Oral Daily With Shara Boothe MD          Today, Patient Was Seen By: Kevin Juarez MD    **Please Note: This note may have been constructed using a voice recognition system  **

## 2022-04-14 NOTE — PROGRESS NOTES
Progress Note - General Surgery   Gato Collado  [de-identified] y o  male MRN: [id number]  Unit/Bed#: W -01 Encounter: [id number]    Assessment:  81M with fevers and dilated SB loops, likely enteritis - resolving    Plan:  · Patient continues to tolerate a diet, denies abdominal pain, and his fever curve is improving  General surgery will follow along peripherally  Please do not hesitate to call with questions or concerns  · Diet as tolerated  · Appreciate ID recommendations, continue rocephin/flagyl, F/u HIV/hepatitis panel  · Trend WBC/fever curve  · OOB/ambulate  · DVT PPX    Subjective/Objective      Subjective:  No acute events overnight  Tolerating diet  Denies abdominal pain  Endorses flatus  Objective:     Blood pressure 133/90, pulse 74, temperature 99 3 °F (37 4 °C), resp  rate 18, height 5' 6" (1 676 m), weight 102 kg (225 lb 5 oz), SpO2 93 %  ,Body mass index is 36 37 kg/m²  Intake/Output Summary (Last 24 hours) at 4/14/2022 0600  Last data filed at 4/13/2022 1737  Gross per 24 hour   Intake 650 ml   Output --   Net 650 ml       Invasive Devices  Report    Peripheral Intravenous Line            Peripheral IV 04/11/22 Dorsal (posterior); Right Forearm 2 days                Physical Exam:  GEN: NAD  HEENT: MMM, nasal cannula  CV:  Warm/well perfused  Lung: normal effort  Ab: Soft, NT, distended/protuberant  Extrem: No CCE  Neuro:  A+Ox3, motor and sensation grossly intact

## 2022-04-14 NOTE — ASSESSMENT & PLAN NOTE
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

## 2022-04-15 VITALS
SYSTOLIC BLOOD PRESSURE: 118 MMHG | DIASTOLIC BLOOD PRESSURE: 77 MMHG | OXYGEN SATURATION: 93 % | TEMPERATURE: 98 F | HEART RATE: 63 BPM | RESPIRATION RATE: 17 BRPM | WEIGHT: 219 LBS | BODY MASS INDEX: 35.2 KG/M2 | HEIGHT: 66 IN

## 2022-04-15 PROBLEM — J81.0 ACUTE PULMONARY EDEMA (HCC): Status: ACTIVE | Noted: 2020-03-11

## 2022-04-15 LAB
ANION GAP SERPL CALCULATED.3IONS-SCNC: 7 MMOL/L (ref 4–13)
BACTERIA BLD CULT: NORMAL
BACTERIA BLD CULT: NORMAL
BUN SERPL-MCNC: 24 MG/DL (ref 5–25)
CALCIUM SERPL-MCNC: 8.2 MG/DL (ref 8.3–10.1)
CHLORIDE SERPL-SCNC: 100 MMOL/L (ref 100–108)
CO2 SERPL-SCNC: 28 MMOL/L (ref 21–32)
CREAT SERPL-MCNC: 1.17 MG/DL (ref 0.6–1.3)
ERYTHROCYTE [DISTWIDTH] IN BLOOD BY AUTOMATED COUNT: 15.8 % (ref 11.6–15.1)
GFR SERPL CREATININE-BSD FRML MDRD: 58 ML/MIN/1.73SQ M
GLUCOSE SERPL-MCNC: 116 MG/DL (ref 65–140)
HCT VFR BLD AUTO: 38.1 % (ref 36.5–49.3)
HGB BLD-MCNC: 12.5 G/DL (ref 12–17)
MCH RBC QN AUTO: 27.3 PG (ref 26.8–34.3)
MCHC RBC AUTO-ENTMCNC: 32.8 G/DL (ref 31.4–37.4)
MCV RBC AUTO: 83 FL (ref 82–98)
PLATELET # BLD AUTO: 161 THOUSANDS/UL (ref 149–390)
PMV BLD AUTO: 11.6 FL (ref 8.9–12.7)
POTASSIUM SERPL-SCNC: 3.2 MMOL/L (ref 3.5–5.3)
RBC # BLD AUTO: 4.58 MILLION/UL (ref 3.88–5.62)
SCAN RESULT: NORMAL
SODIUM SERPL-SCNC: 135 MMOL/L (ref 136–145)
WBC # BLD AUTO: 3.9 THOUSAND/UL (ref 4.31–10.16)

## 2022-04-15 PROCEDURE — 85027 COMPLETE CBC AUTOMATED: CPT | Performed by: INTERNAL MEDICINE

## 2022-04-15 PROCEDURE — 94761 N-INVAS EAR/PLS OXIMETRY MLT: CPT

## 2022-04-15 PROCEDURE — 99239 HOSP IP/OBS DSCHRG MGMT >30: CPT | Performed by: INTERNAL MEDICINE

## 2022-04-15 PROCEDURE — 80048 BASIC METABOLIC PNL TOTAL CA: CPT | Performed by: INTERNAL MEDICINE

## 2022-04-15 RX ORDER — AMOXICILLIN AND CLAVULANATE POTASSIUM 875; 125 MG/1; MG/1
1 TABLET, FILM COATED ORAL EVERY 12 HOURS SCHEDULED
Qty: 10 TABLET | Refills: 0 | Status: SHIPPED | OUTPATIENT
Start: 2022-04-15 | End: 2022-04-20

## 2022-04-15 RX ORDER — FUROSEMIDE 10 MG/ML
20 INJECTION INTRAMUSCULAR; INTRAVENOUS ONCE
Status: COMPLETED | OUTPATIENT
Start: 2022-04-15 | End: 2022-04-15

## 2022-04-15 RX ORDER — DILTIAZEM HYDROCHLORIDE 180 MG/1
180 CAPSULE, COATED, EXTENDED RELEASE ORAL DAILY
Qty: 30 CAPSULE | Refills: 0 | Status: SHIPPED | OUTPATIENT
Start: 2022-04-16 | End: 2022-05-10 | Stop reason: SDUPTHER

## 2022-04-15 RX ORDER — POTASSIUM CHLORIDE 20 MEQ/1
40 TABLET, EXTENDED RELEASE ORAL 2 TIMES DAILY
Status: DISCONTINUED | OUTPATIENT
Start: 2022-04-15 | End: 2022-04-15 | Stop reason: HOSPADM

## 2022-04-15 RX ORDER — POTASSIUM CHLORIDE 20 MEQ/1
40 TABLET, EXTENDED RELEASE ORAL ONCE
Status: DISCONTINUED | OUTPATIENT
Start: 2022-04-15 | End: 2022-04-15

## 2022-04-15 RX ADMIN — PANTOPRAZOLE SODIUM 40 MG: 40 TABLET, DELAYED RELEASE ORAL at 05:54

## 2022-04-15 RX ADMIN — METRONIDAZOLE 500 MG: 500 TABLET ORAL at 05:54

## 2022-04-15 RX ADMIN — FUROSEMIDE 20 MG: 10 INJECTION, SOLUTION INTRAMUSCULAR; INTRAVENOUS at 09:36

## 2022-04-15 RX ADMIN — DILTIAZEM HYDROCHLORIDE 180 MG: 180 CAPSULE, COATED, EXTENDED RELEASE ORAL at 09:35

## 2022-04-15 RX ADMIN — ASPIRIN 81 MG: 81 TABLET, COATED ORAL at 09:34

## 2022-04-15 RX ADMIN — ALLOPURINOL 100 MG: 100 TABLET ORAL at 09:35

## 2022-04-15 RX ADMIN — POTASSIUM CHLORIDE 40 MEQ: 1500 TABLET, EXTENDED RELEASE ORAL at 09:34

## 2022-04-15 RX ADMIN — HEPARIN SODIUM 5000 UNITS: 5000 INJECTION INTRAVENOUS; SUBCUTANEOUS at 05:54

## 2022-04-15 RX ADMIN — LORATADINE 10 MG: 10 TABLET ORAL at 09:35

## 2022-04-15 RX ADMIN — Medication 2000 UNITS: at 09:35

## 2022-04-15 NOTE — ASSESSMENT & PLAN NOTE
· Blood pressure reviewed and acceptable  · Will continue Cardizem 180mg daily on discharge given HR in the 93U and systolic blood pressure in the 110s    · Recommend outpatient follow-up PCP

## 2022-04-15 NOTE — INCIDENTAL FINDINGS
The following findings require follow up:  Radiographic finding   Finding:  Thyroid nodule   Follow up required:  Nonemergent ultrasound   Follow up should be done within 12 month(s)    Please notify the following clinician to assist with the follow up:   Dr Festus Betancourt

## 2022-04-15 NOTE — RESPIRATORY THERAPY NOTE
Home Oxygen Qualifying Test     Patient name: Bebeto Clark        : 1940   Date of Test:  April 15, 2022  Diagnosis:    Home Oxygen Test:    **Medicare Guidelines require item(s) 1-5 on all ambulatory patients or 1 and 2 on non-ambulatory patients  1  Baseline SPO2 on Room Air at rest 92 %     1  SPO2 during exertion on Room Air 90  %  a  During exertion monitor SPO2  If SPO2 increases >=89%, do not add supplemental oxygen    2  Test performed during exertion activity  []  Supplemental Home Oxygen is indicated  [x]  Client does not qualify for home oxygen  Respiratory Additional Notes-  Pt was found on RA 94% at rest on chair, walked to the end of hallway and back  HR was stable throughout test  SpO2 initially improved to 96%, decreased to 90% by the time came back to the room  SpO2 improved to 91~92% on RA when he sat down  Pt states the walk is about or a little less than what he normally walk at home  No signs or symptoms of PERES or SOB    Delene Shoulders, RT

## 2022-04-15 NOTE — ASSESSMENT & PLAN NOTE
Lab Results   Component Value Date    EGFR 58 04/15/2022    EGFR 51 04/14/2022    EGFR 47 04/13/2022    CREATININE 1 17 04/15/2022    CREATININE 1 31 (H) 04/14/2022    CREATININE 1 39 (H) 04/13/2022     Estimated Creatinine Clearance: 55 6 mL/min (by C-G formula based on SCr of 1 17 mg/dL)  · Baseline Cr around 1 10   · Cr mildly elevated at 1 36 on arrival    · Creatinine currently at baseline

## 2022-04-15 NOTE — ASSESSMENT & PLAN NOTE
· Patient with intermittent episodes of fever with associated leukopenia and lactic acidosis  · Blood cultures with no growth to date  · COVID/Flu/RSV negative  · CT chest with no evidence of active disease or pulmonary embolism  · Etiology likely secondary to enteritis vs sialoadenitis  · Resolved, continue with p o  Antibiotics  · Appreciate infectious disease recommendations

## 2022-04-15 NOTE — ASSESSMENT & PLAN NOTE
· Presented with abdominal pain, diarrhea x 1 day  Patient also report 1 episode of vomiting  · CT A/P preliminary report with "multiple mildly dilated loops of small bowel in the abdomen without a clear transition point, findings may represent small bowel enteritis however early obstruction not entirely excluded "  · stool studies and C diff neg   · Resolved  · Was treated with IV ceftriaxone and Flagyl in the hospital, continue Augmentin daily for 5 days  · Appreciate Infectious Disease recommendations

## 2022-04-15 NOTE — DISCHARGE SUMMARY
[facility]  [facility]  1940, [de-identified] y o  male MRN: [id number]  Unit/Bed#: W -01 Encounter: [id number]  Primary Care Provider: Jack Saenz MD   Date and time admitted to hospital: 4/8/2022 10:41 PM    * Enteritis  Assessment & Plan  · Presented with abdominal pain, diarrhea x 1 day  Patient also report 1 episode of vomiting  · CT A/P preliminary report with "multiple mildly dilated loops of small bowel in the abdomen without a clear transition point, findings may represent small bowel enteritis however early obstruction not entirely excluded "  · stool studies and C diff neg   · Resolved  · Was treated with IV ceftriaxone and Flagyl in the hospital, continue Augmentin daily for 5 days  · Appreciate Infectious Disease recommendations  Small bowel obstruction ([address] )  Assessment & Plan  · Gen surg appreciated  · NGT removed 4/11  · Tolerating diet  · Resolved    Fever  Assessment & Plan  · Patient with intermittent episodes of fever with associated leukopenia and lactic acidosis  · Blood cultures with no growth to date  · COVID/Flu/RSV negative  · CT chest with no evidence of active disease or pulmonary embolism  · Etiology likely secondary to enteritis vs sialoadenitis  · Resolved, continue with p o  Antibiotics  · Appreciate infectious disease recommendations  Acute pulmonary edema (HCC)  Assessment & Plan  Patient with sudden increase in oxygen requirement  Required a maximal 4 L of oxygen via nasal cannula, currently on room air at rest  Reviewed history with mild interstitial lung disease  Chest x-ray with no acute cardiopulmonary disease  CTA performed due to an elevated D-dimer with no evidence of pulmonary embolism but mild pulmonary congestion  Echo from 2020 with grade 1 diastolic dysfunction and EF of 60%  Received IV diuresis with resolution of symptoms  Home O2 eval with no home oxygen requirements    Etiology likely secondary to pulmonary congestion in the setting of interstitial lung disease  Resolved    Sialoadenitis  Assessment & Plan  · Patient complaining of left sided jaw pain with associated swelling  · Tender to palpation  · 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  · CT soft tissue notable for 3 2 irregular lobulated mass in the left parotid gland, 1 1 small lesion in the left parotid gland and findings suggestive of acute sialadenitis  · Appreciate ENT evaluation and recommendations  · Oral hydration, warm compression, lemon drops, antibiotics  · Patient to follow up with Dr Mylene Gr as soon as possible to plan for surgery  Electrolyte abnormality  Assessment & Plan  · Keep K at 4, P at 3, Mag at 2  · Resolved    Anxiety  Assessment & Plan  · 2/2 NGT, resolved    Chronic kidney disease  Assessment & Plan  Lab Results   Component Value Date    EGFR 58 04/15/2022    EGFR 51 04/14/2022    EGFR 47 04/13/2022    CREATININE 1 17 04/15/2022    CREATININE 1 31 (H) 04/14/2022    CREATININE 1 39 (H) 04/13/2022     Estimated Creatinine Clearance: 55 6 mL/min (by C-G formula based on SCr of 1 17 mg/dL)  · Baseline Cr around 1 10   · Cr mildly elevated at 1 36 on arrival    · Creatinine currently at baseline  Idiopathic peripheral neuropathy  Assessment & Plan  · Continue Gabapentin 500 mg qhs    Benign essential hypertension  Assessment & Plan    · Blood pressure reviewed and acceptable  · Will continue Cardizem 180mg daily on discharge given HR in the 31L and systolic blood pressure in the 110s    · Recommend outpatient follow-up PCP        Medical Problems             Resolved Problems  Date Reviewed: 4/9/2022    None              Discharging Resident: Jeannie Lyle MD  Discharging Attending: Santy Hayes MD  PCP: Rain Riggins MD  Admission Date:   Admission Orders (From admission, onward)     Ordered        04/09/22 0947  Inpatient Admission  Once            04/09/22 0153  Place in Observation  Once                      Discharge Date: 04/15/22    Consultations During Hospital Stay:  · ENT  · Infectious Disease  · General surgery    Procedures Performed:   · None    Significant Findings / Test Results:   XR chest portable  Result Date: 4/11/2022  Impression: No acute cardiopulmonary disease  XR chest 1 view portable  Result Date: 4/9/2022  Impression: No acute cardiopulmonary disease  XR abdomen 1 view kub  Result Date: 4/9/2022  Impression: Nasogastric tube is slightly coiled overlying the proximal stomach  CTA chest pe study  Result Date: 4/11/2022  Impression: No pulmonary embolism  Scattered fairly symmetric bilateral groundglass opacity suspicious for pulmonary edema  Infection can appear similar but is less likely  Fatty liver  CT abdomen pelvis with contrast  Result Date: 4/9/2022  Impression: 1  Dilated loops of small bowel without focal transition point  There is a gradual tapering of the distal small bowel, which may represent a partial or early small bowel obstruction  2   Postsurgical changes of gastrojejunal anastomosis, with distended stomach, and decompression distal to the anastomosis  Correlate for gastric outlet obstruction  Incidental Findings:   · None    Test Results Pending at Discharge (will require follow up): · None     Outpatient Tests Requested:  · None    Complications:  None    Reason for Admission:  Enteritis    Hospital Course:   Caesar Thapa  is a [de-identified] y o  male patient with history of partial gastrectomy who originally presented to the hospital on 4/8/2022 due to diarrhea and abdominal pain  He has been managed for small-bowel obstruction with general surgery assistance  He required NG tube decompression, with  Improvement of symptoms with slow transition to PO diet  Infectious Disease was consulted due to intermittent episodes of fever and the patient started on antibiotics for enteritis    He developed left jaw swelling with CT soft tissue notable for sialoadenitis  ENT was consulted with recommendation for outpatient follow-up with ENT for surgical planning in addition to supportive treatment  Patient to be discharged on Augmentin for additional 5 days of antibiotics  Please see above list of diagnoses and related plan for additional information  Condition at Discharge: stable    Discharge Day Visit / Exam:   Subjective:  Patient denies any abdominal pain, nausea, vomiting  Tolerating p o  Diet  Overall, feels well and is looking for to going home  Vitals: Blood Pressure: 118/77 (04/15/22 0735)  Pulse: 63 (04/15/22 0735)  Temperature: 98 °F (36 7 °C) (04/15/22 0735)  Temp Source: Oral (04/15/22 0735)  Respirations: 17 (04/15/22 0735)  Height: 5' 6" (167 6 cm) (04/09/22 0359)  Weight - Scale: 99 3 kg (219 lb) (04/15/22 0710)  SpO2: 93 % (04/15/22 0735)  Exam:   Physical Exam  Vitals and nursing note reviewed  Constitutional:       General: He is not in acute distress  Appearance: He is well-developed  He is obese  He is not ill-appearing or diaphoretic  HENT:      Head: Normocephalic and atraumatic  Comments: Left-sided chiquis of the jaw swelling about 3 inches in size with tenderness to palpation     Ears:      Comments: Hard of hearing  Cardiovascular:      Rate and Rhythm: Normal rate and regular rhythm  Heart sounds: Normal heart sounds  No murmur heard  Pulmonary:      Effort: Pulmonary effort is normal       Breath sounds: Normal breath sounds  No wheezing, rhonchi or rales  Abdominal:      General: Bowel sounds are normal  There is no distension  Palpations: Abdomen is soft  Tenderness: There is no abdominal tenderness  Musculoskeletal:      Right lower leg: No edema  Left lower leg: No edema  Skin:     General: Skin is warm and dry  Neurological:      Mental Status: He is alert and oriented to person, place, and time     Psychiatric:         Mood and Affect: Mood normal  Behavior: Behavior normal           Discussion with Family: Updated  (wife) at bedside  Discharge instructions/Information to patient and family:   See after visit summary for information provided to patient and family  Provisions for Follow-Up Care:  See after visit summary for information related to follow-up care and any pertinent home health orders  Disposition:   Home    Planned Readmission:  None    Discharge Medications:  See after visit summary for reconciled discharge medications provided to patient and/or family        **Please Note: This note may have been constructed using a voice recognition system**

## 2022-04-15 NOTE — ASSESSMENT & PLAN NOTE
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

## 2022-04-15 NOTE — ASSESSMENT & PLAN NOTE
Patient with sudden increase in oxygen requirement  Required a maximal 4 L of oxygen via nasal cannula, currently on room air at rest  Reviewed history with mild interstitial lung disease  Chest x-ray with no acute cardiopulmonary disease  CTA performed due to an elevated D-dimer with no evidence of pulmonary embolism but mild pulmonary congestion  Echo from 2020 with grade 1 diastolic dysfunction and EF of 60%  Received IV diuresis with resolution of symptoms  Home O2 eval with no home oxygen requirements    Etiology likely secondary to pulmonary congestion in the setting of interstitial lung disease  Resolved

## 2022-04-15 NOTE — DISCHARGE INSTR - AVS FIRST PAGE
Dear Denia Jones ,     It was our pleasure to care for you here at [facility]  It is our hope that we were always able to exceed the expected standards for your care during your stay  You were hospitalized due to enteritis  You were cared for on the 4th floor by Hattie Caballero MD under the service of Hansel Bey MD with the [facility] who covers for your primary care physician (PCP), Lorena Marquez MD, while you were hospitalized  If you have any questions or concerns related to this hospitalization, you may contact us at [phone]  For follow up as well as any medication refills, we recommend that you follow up with your primary care physician  A registered nurse will reach out to you by phone within a few days after your discharge to answer any additional questions that you may have after going home  However, at this time we provide for you here, the most important instructions / recommendations at discharge:     Notable Medication Adjustments -   Continue taking augmentin twice a day for 5 days  Please note that we have decreased the dose of Cardizem from 360 to 180 mg daily  Testing Required after Discharge -   None  Important follow up information -   Please follow-up with your PCP and ENT doctor  Please review this entire after visit summary as additional general instructions including medication list, appointments, activity, diet, any pertinent wound care, and other additional recommendations from your care team that may be provided for you        Sincerely,     Hattie Caballero MD

## 2022-04-16 LAB
BACTERIA BLD CULT: NORMAL
BACTERIA BLD CULT: NORMAL

## 2022-04-17 ENCOUNTER — TELEPHONE (OUTPATIENT)
Dept: OTHER | Facility: OTHER | Age: 82
End: 2022-04-17

## 2022-04-17 ENCOUNTER — NURSE TRIAGE (OUTPATIENT)
Dept: OTHER | Facility: OTHER | Age: 82
End: 2022-04-17

## 2022-04-17 NOTE — TELEPHONE ENCOUNTER
Reason for Disposition   [1] Uses laxative (e g , PEG / Miralax  Milk of magnesia) or enema AND [2] > once a month    Answer Assessment - Initial Assessment Questions  1  STOOL PATTERN OR FREQUENCY: "How often do you pass bowel movements (BMs)?"  (Normal range: tid to q 3 days)  "When was the last BM passed?"        Last bowel movement was 4/11 when he was admitted to hospital and had NG tube    2  STRAINING: "Do you have to strain to have a BM?"       Patient states that he feels like he has to go but it wont come out    3  RECTAL PAIN: "Does your rectum hurt when the stool comes out?" If Yes, ask: "Do you have hemorrhoids? How bad is the pain?"  (Scale 1-10; or mild, moderate, severe)      Patient denies pain    4  STOOL COMPOSITION: "Are the stools hard?"       Denies    5  BLOOD ON STOOLS: "Has there been any blood on the toilet tissue or on the surface of the BM?" If Yes, ask: "When was the last time?"       Denies    6  CHRONIC CONSTIPATION: "Is this a new problem for you?"  If no, ask: "How long have you had this problem?" (days, weeks, months)       Denies    7  CHANGES IN DIET OR HYDRATION: "Have there been any recent changes in your diet?" "How much fluids are you drinking consuming on a daily basis?"  "How much have you had to drink today?"      Had NG tube and then was on clear liquids and then soft foods    8  MEDICATIONS: "Have you been taking any new medications?" "Are you taking any narcotic pain medications?" (e g , Vicoden, Percocet, morphine, dilaudid)      Tramadol    9  LAXATIVES: "Have you been using any stool softeners, laxatives, or enemas?"  If yes, ask "What, how often, and when was the last time?"  Miralax yesterday and today    10  ACTIVITY:  "How much walking do you do every day? on a daily basis?"  "Has your activity level decreased in the past week?"         Walking    11  CAUSE: "What do you think is causing the constipation?"         Unsure    12   OTHER SYMPTOMS: "Do you have any other symptoms?" (e g , abdominal pain, bloating, fever, vomiting)        Denies    13   MEDICAL HISTORY: "Do you have a history of hemorrhoids, rectal fissures, or rectal surgery or rectal abscess?"          Was just in ED and    Protocols used: CONSTIPATION-ADULT-AH

## 2022-04-17 NOTE — TELEPHONE ENCOUNTER
Regarding: No Bowel Movement-What Can She Give Him  ----- Message from [facility] sent at 4/17/2022  1:32 PM EDT -----  Wife is requesting a 2nd call back at the mobile number

## 2022-04-17 NOTE — TELEPHONE ENCOUNTER
Regarding: No Bowel Movement-What Can She Give Him  ----- Message from Tim Parsons sent at 4/17/2022  1:02 PM EDT -----  " I am not sure what to give him he has not had a bowel movement, since he was discharged Enteritis 4/8/2022 - 4/15/2022 (7 days) [facility]  "

## 2022-04-18 ENCOUNTER — TRANSITIONAL CARE MANAGEMENT (OUTPATIENT)
Dept: INTERNAL MEDICINE CLINIC | Facility: OTHER | Age: 82
End: 2022-04-18

## 2022-04-18 NOTE — UTILIZATION REVIEW
Notification of Discharge   This is a Notification of Discharge from our facility [address]  Please be advised that this patient has been discharge from our facility  Below you will find the admission and discharge date and time including the patients disposition  UTILIZATION REVIEW CONTACT:  Saul Saint, MA  Utilization   Network Utilization Review Department  Phone: [phone] x carefully listen to the prompts  All voicemails are confidential   Email: [email or web address]     PHYSICIAN ADVISORY SERVICES:  FOR UDGZ-RJ-DMCP REVIEW - MEDICAL NECESSITY DENIAL  Phone: [phone]  Fax: [phone]  Email: [email or web address]     PRESENTATION DATE: 4/8/2022 10:41 PM  OBERVATION ADMISSION DATE:   INPATIENT ADMISSION DATE: 4/9/22  9:47 AM   DISCHARGE DATE: 4/15/2022 12:46 PM  DISPOSITION: Home/Self Care Home/Self Care      IMPORTANT INFORMATION:  Send all requests for admission clinical reviews, approved or denied determinations and any other requests to dedicated fax number below belonging to the campus where the patient is receiving treatment   List of dedicated fax numbers:  [address] DENIALS (Administrative/Medical Necessity) [phone]   [address] (Maternity/NICU/Pediatrics) [phone]   [facility] [phone]   [address] [place] [address] [phone]   [address] [place] [address] [phone]   [address] [place] [address] [address] [place] [address],[address] [phone]   [facility]  [phone]   [address] [facility]  [address] [facility] 1000 [facility] [phone]

## 2022-04-20 ENCOUNTER — APPOINTMENT (OUTPATIENT)
Dept: LAB | Facility: IMAGING CENTER | Age: 82
End: 2022-04-20
Payer: COMMERCIAL

## 2022-04-20 ENCOUNTER — OFFICE VISIT (OUTPATIENT)
Dept: INTERNAL MEDICINE CLINIC | Facility: OTHER | Age: 82
End: 2022-04-20
Payer: COMMERCIAL

## 2022-04-20 VITALS
TEMPERATURE: 98.6 F | HEART RATE: 73 BPM | WEIGHT: 221.8 LBS | BODY MASS INDEX: 33.62 KG/M2 | RESPIRATION RATE: 20 BRPM | OXYGEN SATURATION: 99 % | HEIGHT: 68 IN | SYSTOLIC BLOOD PRESSURE: 110 MMHG | DIASTOLIC BLOOD PRESSURE: 68 MMHG

## 2022-04-20 DIAGNOSIS — K56.609 SMALL BOWEL OBSTRUCTION (HCC): ICD-10-CM

## 2022-04-20 DIAGNOSIS — E87.6 HYPOKALEMIA: ICD-10-CM

## 2022-04-20 DIAGNOSIS — K11.20 SIALOADENITIS: ICD-10-CM

## 2022-04-20 DIAGNOSIS — Z12.11 SCREENING FOR COLON CANCER: Primary | ICD-10-CM

## 2022-04-20 DIAGNOSIS — N18.31 STAGE 3A CHRONIC KIDNEY DISEASE (HCC): ICD-10-CM

## 2022-04-20 DIAGNOSIS — D75.1 POLYCYTHEMIA: ICD-10-CM

## 2022-04-20 DIAGNOSIS — N17.9 AKI (ACUTE KIDNEY INJURY) (HCC): ICD-10-CM

## 2022-04-20 PROBLEM — E87.8 ELECTROLYTE ABNORMALITY: Status: RESOLVED | Noted: 2022-04-10 | Resolved: 2022-04-20

## 2022-04-20 LAB
ANION GAP SERPL CALCULATED.3IONS-SCNC: 4 MMOL/L (ref 4–13)
BUN SERPL-MCNC: 18 MG/DL (ref 5–25)
CALCIUM SERPL-MCNC: 9.4 MG/DL (ref 8.3–10.1)
CHLORIDE SERPL-SCNC: 107 MMOL/L (ref 100–108)
CO2 SERPL-SCNC: 28 MMOL/L (ref 21–32)
CREAT SERPL-MCNC: 1.04 MG/DL (ref 0.6–1.3)
GFR SERPL CREATININE-BSD FRML MDRD: 67 ML/MIN/1.73SQ M
GLUCOSE SERPL-MCNC: 91 MG/DL (ref 65–140)
POTASSIUM SERPL-SCNC: 4.5 MMOL/L (ref 3.5–5.3)
SODIUM SERPL-SCNC: 139 MMOL/L (ref 136–145)

## 2022-04-20 PROCEDURE — 1111F DSCHRG MED/CURRENT MED MERGE: CPT | Performed by: INTERNAL MEDICINE

## 2022-04-20 PROCEDURE — 36415 COLL VENOUS BLD VENIPUNCTURE: CPT

## 2022-04-20 PROCEDURE — 80048 BASIC METABOLIC PNL TOTAL CA: CPT

## 2022-04-20 PROCEDURE — 99495 TRANSJ CARE MGMT MOD F2F 14D: CPT | Performed by: INTERNAL MEDICINE

## 2022-04-20 NOTE — PROGRESS NOTES
Assessment/Plan:  TCM Call (since 3/20/2022)     Date and time call was made  4/18/2022 10:51 AM    Hospital care reviewed  Records reviewed        Patient was hospitialized at  [address] [place] [address]        Date of Admission  04/08/22    Date of discharge  04/15/22    Diagnosis  enteritis    Disposition  Home    Current Symptoms  Fatigue; Constipation  pt has not had bowel movement since hosp  Called CRNP on call and was todl to take senokot and ducolax suppository      TCM Call (since 3/20/2022)     Post hospital issues  Reduced activity    Scheduled for follow up? Yes    Did you obtain your prescribed medications  Yes    Do you need help managing your prescriptions or medications  No    I have advised the patient to call PCP with any new or worsening symptoms  Mervin Richardson [facility]           Diagnoses and all orders for this visit:    Screening for colon cancer    Sialoadenitis   Continue with the present care  Followed up by the ENT  Small bowel obstruction (HCC)  Resolved  Stage 3a chronic kidney disease ([facility] [address] )  Follow-up the BMP  Polycythemia    resolved         M*Modal software was used to dictate this note  It may contain errors with dictating incorrect words or incorrect spelling  Please contact the provider directly with any questions  Subjective:   Chief Complaint   Patient presents with    Transition of Care Management     tcm d/c stephanie corona enteritis 4/15   health maintenance     colonoscopy    Mass     left side under ear, painful  saw Dr Mylene Gr on Monday the Dr who operated on the right side    Sore Throat        Patient ID: Colby Perez  is a [de-identified] y o  male  Patient was admitted to the [facility] in [address] [place] [address] on April 8 and was discharged on April 15 was admitted with diarrhea and nausea vomiting was the found to have small-bowel obstruction or gastroenteritis    Symptoms  resolved after NG tube insertion and suction, infectious disease consultation was done and patient was started on antibiotic also he had a very tender and painful left parotid gland was diagnosed with the sialoadenitis ENT consultation was obtained after the discharge patient was seen by the ENT he had previously right parotid gland resection  Patient is complaining of some constipation now he took the Dulcolax suppository and taking Senokot  Constipation is better he is having bowel movements every day right now on these medication  Denying any  Nausea vomiting abdominal pain fever or chills, finishing up his course of Augmentin today I am little bit concerned about his left parotid mass and the tenderness over it there is no redness I told the patient and his wife that if he continued to have the pain and swelling does not improve then he should be followed up by the ENT doctor earlier than is schedule appointment  Veronika Machuca  is a [de-identified] y o  male patient with history of partial gastrectomy who originally presented to the hospital on 4/8/2022 due to diarrhea and abdominal pain  Gregory Jasmine has been managed for small-bowel obstruction with general surgery assistance  He required NG tube decompression, with  Improvement of symptoms with slow transition to PO diet  Infectious Disease was consulted due to intermittent episodes of fever and the patient started on antibiotics for enteritis  He developed left jaw swelling with CT soft tissue notable for sialoadenitis  ENT was consulted with recommendation for outpatient follow-up with ENT for surgical planning in addition to supportive treatment  Patient to be discharged on Augmentin for additional 5 days of antibiotics          The following portions of the patient's history were reviewed and updated as appropriate: allergies, current medications, past family history, past medical history, past social history, past surgical history and problem list     Review of Systems   Constitutional: Positive for fatigue  Negative for appetite change and fever     HENT: Negative for congestion, ear pain, hearing loss, nosebleeds, sneezing, tinnitus and voice change  Eyes: Negative for pain, discharge and redness  Respiratory: Positive for shortness of breath (Shortness of breath on exertion)  Negative for cough, chest tightness and wheezing  Cardiovascular: Negative for chest pain, palpitations and leg swelling  Gastrointestinal: Positive for constipation  Negative for abdominal pain, blood in stool, diarrhea, nausea and vomiting  Genitourinary: Negative for difficulty urinating, dysuria, hematuria and urgency  Musculoskeletal: Negative for arthralgias, back pain, gait problem and joint swelling  Skin: Negative for rash and wound  Allergic/Immunologic: Negative for environmental allergies  Neurological: Negative for dizziness, tremors, seizures, weakness, light-headedness and numbness  Hematological: Negative for adenopathy  Does not bruise/bleed easily  Psychiatric/Behavioral: Negative for behavioral problems and confusion  The patient is not nervous/anxious  Past Medical History:   Diagnosis Date    Abnormal electrocardiogram     Last assessed: Oct 11, 2013    Allergic rhinitis     last assessed: Oct 11, 2013    Allergic rhinitis due to pollen     last assessed: Oct 10, 2013    Allergic sinusitis     Last assessed: May 11, 2015    Allergy     resolved: July 22, 2015    Benign essential hypertension     Last assessed/resolved: May 31, 2017    BPH (benign prostatic hyperplasia)     Cancer [facility])     prostate    Colitis     Last assessed: May 24, 2016    Cough with hemoptysis 11/17/2020    Generalized osteoarthritis     Last assessed: Oct 11, 2013    GERD without esophagitis     Last assessed/resolved:  May 31, 2017    Hearing loss     Hiatal hernia     resolved: July 22, 2015    History of gastroesophageal reflux (GERD)     History of stomach ulcers     last assessed: May 11, 2015    Hypertension     Incomplete bladder emptying     Kidney stone     Nodular prostate with lower urinary tract symptoms     Nontoxic single thyroid nodule     last assessed: April 16, 2014    Orchalgia     Overweight     last assessed: Oct 31, 2013    Poor urinary stream     Pulmonary embolism [facility])     Salivary gland disorder     last assessed: April 16, 2014    Seasonal allergies     last assessed: May 15, 2015    Spermatocele     Straining to void     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:  May 7, 2014         Current Outpatient Medications:     allopurinol (ZYLOPRIM) 100 mg tablet, Take 1 tablet (100 mg total) by mouth 2 (two) times a day, Disp: 180 tablet, Rfl: 1    amoxicillin-clavulanate (AUGMENTIN) 875-125 mg per tablet, Take 1 tablet by mouth every 12 (twelve) hours for 5 days, Disp: 10 tablet, Rfl: 0    aspirin (ECOTRIN LOW STRENGTH) 81 mg EC tablet, Take 81 mg by mouth daily, Disp: , Rfl:     Cholecalciferol (VITAMIN D3 PO), Take by mouth 2000 IU, Disp: , Rfl:     diltiazem (CARDIZEM CD) 180 mg 24 hr capsule, Take 1 capsule (180 mg total) by mouth daily, Disp: 30 capsule, Rfl: 0    gabapentin (Neurontin) 100 mg capsule, Take 1 capsule (100 mg total) by mouth daily at bedtime, Disp: 90 capsule, Rfl: 1    gabapentin (Neurontin) 400 mg capsule, Take 1 capsule (400 mg total) by mouth daily, Disp: 90 capsule, Rfl: 1    levocetirizine (XYZAL) 5 MG tablet, Take 1 tablet (5 mg total) by mouth every evening, Disp: 90 tablet, Rfl: 4    tamsulosin (FLOMAX) 0 4 mg, Take 1 capsule (0 4 mg total) by mouth daily with dinner, Disp: 90 capsule, Rfl: 1    traMADol-acetaminophen (ULTRACET) 37 5-325 mg per tablet, Take 2 tablets by mouth every 6 (six) hours as needed for moderate pain, Disp: 60 tablet, Rfl: 0    Allergies   Allergen Reactions    Ace Inhibitors Hyperactivity       Social History   Past Surgical History:   Procedure Laterality Date    BLADDER SURGERY      CHOLECYSTECTOMY  2000    laparoscopic     FLAP LOCAL TRUNK Left 10/28/2021    Procedure: EXCISION OF FLANK MASS;  Surgeon: David Fajardo DO;  [facility];  Service: Plastics    HEMORRHOID SURGERY      pilionidal cyst removal     HERNIA REPAIR Left 01/17/2008    inguinal     KNEE ARTHROSCOPY Left 1974    KNEE CARTILAGE SURGERY      NASAL SEPTUM SURGERY      Deviation repair     DC EXC PAROTD,LAT LOBE,DISSECT 5TH NERV Right 6/9/2021    Procedure: SUPERFICIAL PAROTIDECTOMY WITH FACIAL NERVE MONITORING;  Surgeon: Ra Ocasio MD;  [facility];  Service: ENT    PROSTATE BIOPSY  2017    STOMACH SURGERY      TONSILLECTOMY AND ADENOIDECTOMY      at age 37   [facility] - right 01/04 0 left 01/95    US GUIDED THYROID BIOPSY  1/19/2022    VASECTOMY       Family History   Problem Relation Age of Onset    Hypertension Mother     Stroke Mother     Stomach cancer Father     Hypertension Father     Hypertension Family     Stroke Family         syndrome     Heart attack Son        Objective:  /68 (BP Location: Left arm, Patient Position: Sitting, Cuff Size: Standard)   Pulse 73   Temp 98 6 °F (37 °C) (Temporal)   Resp 20   Ht 5' 8" (1 727 m)   Wt 101 kg (221 lb 12 8 oz)   SpO2 99%   BMI 33 72 kg/m²        Physical Exam  Constitutional:       Appearance: He is well-developed  HENT:      Right Ear: External ear normal    Eyes:      Conjunctiva/sclera: Conjunctivae normal       Pupils: Pupils are equal, round, and reactive to light  Neck:      Thyroid: No thyromegaly  Vascular: No JVD  Comments: Tender left parotid gland which is firm to touch patient was seen by the ENT 2 days ago  Cardiovascular:      Rate and Rhythm: Normal rate and regular rhythm  Heart sounds: Normal heart sounds  Pulmonary:      Breath sounds: Normal breath sounds  Abdominal:      General: Bowel sounds are normal       Palpations: Abdomen is soft  Musculoskeletal:         General: Normal range of motion        Cervical back: Normal range of motion  Lymphadenopathy:      Cervical: No cervical adenopathy  Skin:     General: Skin is dry  Neurological:      Mental Status: He is alert and oriented to person, place, and time  Deep Tendon Reflexes: Reflexes are normal and symmetric     Psychiatric:         Behavior: Behavior normal

## 2022-05-03 ENCOUNTER — TELEPHONE (OUTPATIENT)
Dept: CARDIAC SURGERY | Facility: CLINIC | Age: 82
End: 2022-05-03

## 2022-05-03 NOTE — TELEPHONE ENCOUNTER
Left message for patient to call back and r/s appt with Dr Iain Campbell since he will be on vacation

## 2022-05-05 ENCOUNTER — TELEPHONE (OUTPATIENT)
Dept: HEMATOLOGY ONCOLOGY | Facility: CLINIC | Age: 82
End: 2022-05-05

## 2022-05-05 DIAGNOSIS — N13.8 BPH WITH OBSTRUCTION/LOWER URINARY TRACT SYMPTOMS: ICD-10-CM

## 2022-05-05 DIAGNOSIS — N40.1 BPH WITH OBSTRUCTION/LOWER URINARY TRACT SYMPTOMS: ICD-10-CM

## 2022-05-05 RX ORDER — TAMSULOSIN HYDROCHLORIDE 0.4 MG/1
0.4 CAPSULE ORAL
Qty: 90 CAPSULE | Refills: 1 | Status: SHIPPED | OUTPATIENT
Start: 2022-05-05

## 2022-05-05 RX ORDER — TAMSULOSIN HYDROCHLORIDE 0.4 MG/1
CAPSULE ORAL
Qty: 90 CAPSULE | Refills: 1 | OUTPATIENT
Start: 2022-05-05

## 2022-05-05 NOTE — TELEPHONE ENCOUNTER
Appointment Cancellation Or Reschedule     Person calling in Spouse    Provider Dr Paula Alberts   Office Visit Date and Time 6/7/22 @ 1m   Office Visit Location [facility]   Did patient want to reschedule their office appointment? If so, when was it scheduled to? Yes 6/21/22 10am @ [place]   Is this patient calling to reschedule an infusion appointment? no   When is their next infusion appointment? na   Is this patient a Chemo patient? no   Reason for Cancellation or Reschedule Patient r/s Dr Paula Alberts will be on vacation     If the patient is a treatment patient, please route this to the office nurse  If the patient is not on treatment, please route to the office MA

## 2022-05-10 DIAGNOSIS — I10 BENIGN ESSENTIAL HYPERTENSION: Chronic | ICD-10-CM

## 2022-05-10 NOTE — ED PROVIDER NOTES
Pt Name: Dolores Jones  MRN: [id number]  YOB: 1940  Age/Sex: 67 y o  male  Date of evaluation: 3/2/2020  PCP: Anamaria Kline MD    [address] [place] [address]    Chief Complaint   Patient presents with    Nose Bleed     pt reports two episodes of epitaxis this night  bleeding controlled  HPI    Bereket Arzola presents to the Emergency Department complaining of nosebleed  History provided by:  Patient and spouse   used: No    Nose Bleed   Location:  Bilateral  Severity:  Mild  Timing:  Intermittent  Progression:  Waxing and waning  Chronicity:  Recurrent  Context: aspirin use and hypertension    Context: not anticoagulants, not BiPAP, not bleeding disorder, not CPAP, not drug use, not elevation change, not foreign body, not home oxygen, not nose picking, not recent infection, not thrombocytopenia, not trauma and not weather change    Relieved by:  Nothing  Worsened by:  Nothing  Ineffective treatments:  None tried  Associated symptoms: sneezing    Associated symptoms: no blood in oropharynx, no congestion, no cough, no dizziness, no facial pain, no fever, no headaches, no sinus pain and no sore throat          Past Medical and Surgical History    Past Medical History:   Diagnosis Date    Abnormal electrocardiogram     Last assessed: Oct 11, 2013    Allergic rhinitis     last assessed: Oct 11, 2013    Allergic rhinitis due to pollen     last assessed: Oct 10, 2013    Allergic sinusitis     Last assessed: May 11, 2015    Allergy     resolved: July 22, 2015    Benign essential hypertension     Last assessed/resolved: May 31, 2017    BPH (benign prostatic hyperplasia)     Colitis     Last assessed: May 24, 2016    Generalized osteoarthritis     Last assessed: Oct 11, 2013    GERD without esophagitis     Last assessed/resolved:  May 31, 2017    Hearing loss     Hiatal hernia     resolved: July 22, 2015    History of gastroesophageal reflux (GERD)     History of stomach ulcers Spoke with pt's wife via phone. Answered questions about STD through pt's employer.  Referred wife to contact Medical Records Disability Desk to assist with completion of forms.  Wife voiced understanding and agreement.    last assessed: May 11, 2015    Hypertension     Incomplete bladder emptying     Kidney stone     Nodular prostate with lower urinary tract symptoms     Nontoxic single thyroid nodule     last assessed: 2014    Orchalgia     Overweight     last assessed: Oct 31, 2013    Poor urinary stream     Pulmonary embolism [facility])     Salivary gland disorder     last assessed: 2014    Seasonal allergies     last assessed: May 15, 2015    Spermatocele     Straining to void     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, 2014       Past Surgical History:   Procedure Laterality Date    BLADDER SURGERY      CHOLECYSTECTOMY      laparoscopic     HEMORRHOID SURGERY      pilionidal cyst removal     HERNIA REPAIR Left 2008    inguinal     KNEE ARTHROSCOPY Left     KNEE CARTILAGE SURGERY      NASAL SEPTUM SURGERY      Deviation repair     PROSTATE BIOPSY      STOMACH SURGERY      TONSILLECTOMY AND ADENOIDECTOMY      at age 37   [place] Candido TOTAL SHOULDER ARTHROPLASTY      SHOULDER JOINT REPLACEMENT - right  0 left     VASECTOMY         Family History   Problem Relation Age of Onset    Hypertension Mother     Stroke Mother     Stomach cancer Father     Hypertension Father     Hypertension Family     Stroke Family         syndrome        Social History     Tobacco Use    Smoking status: Former Smoker     Packs/day: 1 00     Years: 25 00     Pack years: 25 00     Types: Cigarettes     Last attempt to quit:      Years since quittin 1    Smokeless tobacco: Never Used   Substance Use Topics    Alcohol use: Yes     Comment: rare    Drug use: No       Allergies    Allergies   Allergen Reactions    Ace Inhibitors     Molds & Smuts     Other     Pollen Extract     Short Ragweed Pollen Ext     Tree Extract        Home Medications:    Prior to Admission medications    Medication Sig Start Date End Date Taking?  Authorizing Provider   aspirin 81 mg chewable tablet Chew 81 mg daily   Yes Historical Provider, MD   diltiazem (CARDIZEM CD) 180 mg 24 hr capsule TAKE 1 CAPSULE BY MOUTH EVERY DAY 10/31/19  Yes Dayron Mckenna MD   finasteride (PROSCAR) 5 mg tablet TAKE 1 TABLET BY MOUTH EVERY DAY 2/10/20  Yes Merline Games, CRNP   gabapentin (NEURONTIN) 300 mg capsule Take 1 capsule (300 mg total) by mouth 2 (two) times a day 2/12/20  Yes Dayron Mckenna MD   HYDROCHLOROTHIAZIDE PO Take by mouth   Yes Historical Provider, MD   montelukast (SINGULAIR) 10 mg tablet Take 10 mg by mouth daily at bedtime   Yes Historical Provider, MD   tamsulosin (FLOMAX) 0 4 mg Take 1 capsule (0 4 mg total) by mouth daily with dinner 3/4/19  Yes Merline Games, CRNP   Triamcinolone Acetonide (NASACORT AQ NA) into each nostril 2 (two) times a day as needed   Yes Historical Provider, MD   amoxicillin-clavulanate (AUGMENTIN) 875-125 mg per tablet Take 1 tablet by mouth every 12 (twelve) hours for 5 days 3/2/20 3/7/20  Guadalupe Ladd PA-C   omeprazole (PriLOSEC) 40 MG capsule Take 40 mg by mouth daily    Historical Provider, MD           Review of Systems    Review of Systems   Constitutional: Negative for activity change, appetite change, chills, diaphoresis, fatigue and fever  HENT: Positive for nosebleeds and sneezing  Negative for congestion, drooling, ear discharge, ear pain, facial swelling, postnasal drip, rhinorrhea, sinus pressure, sinus pain, sore throat, trouble swallowing and voice change  Eyes: Negative for discharge  Respiratory: Negative for apnea, cough, choking, chest tightness, shortness of breath, wheezing and stridor  Cardiovascular: Negative for chest pain  Gastrointestinal: Negative for abdominal pain  Musculoskeletal: Negative for arthralgias and joint swelling  Skin: Negative for rash  Neurological: Negative for dizziness, light-headedness and headaches  Psychiatric/Behavioral: The patient is not nervous/anxious  All other systems reviewed and are negative      All other systems reviewed and negative  Physical Exam      ED Triage Vitals   Temperature Pulse Respirations Blood Pressure SpO2   03/02/20 0109 03/02/20 0108 03/02/20 0108 03/02/20 0108 03/02/20 0108   98 7 °F (37 1 °C) 87 18 170/86 96 %      Temp Source Heart Rate Source Patient Position - Orthostatic VS BP Location FiO2 (%)   03/02/20 0109 03/02/20 0108 03/02/20 0108 03/02/20 0108 --   Oral Monitor Lying Right arm       Pain Score       03/02/20 0108       No Pain               Physical Exam   Constitutional: He is oriented to person, place, and time  He appears well-developed and well-nourished  No distress  HENT:   Head: Normocephalic and atraumatic  Nose: No septal deviation or nasal septal hematoma  Epistaxis (bilateral septum without wound) is observed  No foreign bodies  Mouth/Throat: Uvula is midline, oropharynx is clear and moist and mucous membranes are normal    No blood in oropharynx   Eyes: Pupils are equal, round, and reactive to light  Conjunctivae and EOM are normal    Neck: Normal range of motion  Neck supple  Cardiovascular: Normal rate, regular rhythm, normal heart sounds and intact distal pulses  Exam reveals no gallop and no friction rub  No murmur heard  Pulmonary/Chest: Effort normal and breath sounds normal  No respiratory distress  He has no wheezes  He has no rales  He exhibits no tenderness  Musculoskeletal: Normal range of motion  Neurological: He is alert and oriented to person, place, and time  Skin: Skin is warm  Capillary refill takes less than 2 seconds  He is not diaphoretic  No pallor  Nursing note and vitals reviewed  Diagnostic Results      Labs:    Results for orders placed or performed in visit on 02/22/20   Sputum culture and Gram stain   Result Value Ref Range    Sputum Culture 3+ Growth of      Sputum Culture       Commensal respiratory khang only; No significant growth of Staph aureus/MRSA or Pseudomonas aeruginosa      Gram Stain Result 3+ Polys (A)     Gram Stain Result (A)      4+ Gram positive cocci in pairs, chains and clusters    Gram Stain Result 1+ Gram negative rods (A)        All labs reviewed and utilized in the medical decision making process    Radiology:    No orders to display       All radiology studies independently viewed by me and interpreted by the radiologist     Procedure    Procedures      Assessment and Plan    MDM  Number of Diagnoses or Management Options  Epistaxis: new, no workup     Amount and/or Complexity of Data Reviewed  Obtain history from someone other than the patient: yes  Review and summarize past medical records: yes    Risk of Complications, Morbidity, and/or Mortality  Presenting problems: moderate  Diagnostic procedures: moderate  Management options: moderate    Patient Progress  Patient progress: improved      Initial ED assessment:  Zeynep Whyte  is a 67 y o  male with significant PMH for ASA therapy who presents with nosebleed  Vitals signs reviewed and wnl  Physical examination remarkable for bilateral septum without wound  Initial Ddx  includes but is not limited to:   anterior epistaxis, posterior epistaxis, anemia, bleeding diathesis, coagulopathy, hypertensive urgency  Initial ED plan:   Plan will be to perform and treat symptomatically with nasal packing  Final ED summary/disposition:  Home care recommendations given with discharge paperwork  Return to ED instructions given if new/worsening sxs      MDM  Reviewed: previous chart, nursing note and vitals        ED Course of Care and Re-Assessments                           Medications   lidocaine-epinephrine (XYLOCAINE/EPINEPHRINE) 1 %-1:100,000 injection 10 mL (10 mL Infiltration Given by Other 3/2/20 0114)   oxymetazoline (AFRIN) 0 05 % nasal spray 2 spray (2 sprays Each Nare Given by Other 3/2/20 0114)   tranexamic acid 100mg/mL (for epistaxis) 500 mg (500 mg Nasal Given by Other 3/2/20 0113)   silver nitrate-potassium nitrate (ARZOL SILVER NITRATE) 96-88 % applicator **ADS Override Pull** (1 applicator  Given by Other 3/2/20 0114)         FINAL IMPRESSION    Final diagnoses:   Epistaxis - bilateral septum         DISPOSITION/PLAN  Time reflects when diagnosis was documented in both MDM as applicable and the Disposition within this note     Time User Action Codes Description Comment    3/2/2020  2:05 AM [place] Begin Add [R04 0] Epistaxis     3/2/2020  2:05 AM [place] Begin Modify [R04 0] Epistaxis bilateral septum      ED Disposition     ED Disposition Condition Date/Time Comment    Discharge Stable Mon Mar 2, 2020  2:05 AM Zaid Castaneda  discharge to home/self care              Follow-up Information     Follow up With Specialties Details Why Contact Info Additional Information    [facility] [address]  Call today For follow up Offices of Ashlee Edge MD and Forrest Clemons MD    [address] [place] [address]   Phone: [phone]     Susana Carpenter MD Internal Medicine Go to  For follow up [address] [facility]        [address] [facility] Department Emergency Medicine Go to  If symptoms worsen [address] [facility] [address] [facility] ED,  [address], [place], [place], [address]              PATIENT REFERRED TO:    [facility] of Ashlee Edge MD and Forrest Clemons MD    [address] [place] [address]   Phone: [phone]  Call today  For follow up    Susana Carpenter MD  [place] [address] [address] [facility]  [phone]    Go to   For follow up    [address] [facility] [address]  [phone]  Go to   If symptoms worsen      DISCHARGE MEDICATIONS:    Discharge Medication List as of 3/2/2020  2:05 AM      CONTINUE these medications which have NOT CHANGED    Details   aspirin 81 mg chewable tablet Chew 81 mg daily, Historical Med      diltiazem (CARDIZEM CD) 180 mg 24 hr capsule TAKE 1 CAPSULE BY MOUTH EVERY DAY, Normal      finasteride (PROSCAR) 5 mg tablet TAKE 1 TABLET BY MOUTH EVERY DAY, Normal      gabapentin (NEURONTIN) 300 mg capsule Take 1 capsule (300 mg total) by mouth 2 (two) times a day, Starting Wed 2/12/2020, Normal      HYDROCHLOROTHIAZIDE PO Take by mouth, Historical Med      montelukast (SINGULAIR) 10 mg tablet Take 10 mg by mouth daily at bedtime, Historical Med      omeprazole (PriLOSEC) 40 MG capsule Take 40 mg by mouth daily, Historical Med      tamsulosin (FLOMAX) 0 4 mg Take 1 capsule (0 4 mg total) by mouth daily with dinner, Starting Mon 3/4/2019, Normal      Triamcinolone Acetonide (NASACORT AQ NA) into each nostril 2 (two) times a day as needed, Historical Med             No discharge procedures on file           VINAY Callaway PA-C  03/04/20 4416

## 2022-05-11 RX ORDER — DILTIAZEM HYDROCHLORIDE 180 MG/1
180 CAPSULE, COATED, EXTENDED RELEASE ORAL DAILY
Qty: 90 CAPSULE | Refills: 0 | Status: SHIPPED | OUTPATIENT
Start: 2022-05-11 | End: 2022-06-01

## 2022-05-17 ENCOUNTER — OFFICE VISIT (OUTPATIENT)
Dept: INTERNAL MEDICINE CLINIC | Facility: OTHER | Age: 82
End: 2022-05-17
Payer: COMMERCIAL

## 2022-05-17 VITALS
HEART RATE: 62 BPM | HEIGHT: 68 IN | WEIGHT: 222 LBS | DIASTOLIC BLOOD PRESSURE: 92 MMHG | TEMPERATURE: 98.2 F | BODY MASS INDEX: 33.65 KG/M2 | SYSTOLIC BLOOD PRESSURE: 150 MMHG | OXYGEN SATURATION: 99 %

## 2022-05-17 DIAGNOSIS — N18.2 STAGE 2 CHRONIC KIDNEY DISEASE: ICD-10-CM

## 2022-05-17 DIAGNOSIS — G60.9 IDIOPATHIC PERIPHERAL NEUROPATHY: ICD-10-CM

## 2022-05-17 DIAGNOSIS — Z12.11 SCREENING FOR COLON CANCER: Primary | ICD-10-CM

## 2022-05-17 DIAGNOSIS — J84.9 INTERSTITIAL LUNG DISEASE (HCC): ICD-10-CM

## 2022-05-17 PROCEDURE — 99214 OFFICE O/P EST MOD 30 MIN: CPT | Performed by: INTERNAL MEDICINE

## 2022-05-17 PROCEDURE — 1160F RVW MEDS BY RX/DR IN RCRD: CPT | Performed by: INTERNAL MEDICINE

## 2022-05-17 PROCEDURE — 1036F TOBACCO NON-USER: CPT | Performed by: INTERNAL MEDICINE

## 2022-05-17 RX ORDER — GABAPENTIN 400 MG/1
400 CAPSULE ORAL 2 TIMES DAILY
Qty: 90 CAPSULE | Refills: 1 | Status: SHIPPED | OUTPATIENT
Start: 2022-05-17

## 2022-05-17 NOTE — PROGRESS NOTES
Assessment/Plan:     Diagnoses and all orders for this visit:    Screening for colon cancer  -     Ambulatory referral for colonoscopy; Future    Idiopathic peripheral neuropathy  -     gabapentin (Neurontin) 400 mg capsule; Take 1 capsule (400 mg total) by mouth in the morning and 1 capsule (400 mg total) in the evening  Interstitial lung disease (HCC)  Stable interstitial lung disease no progression of shortness of breath  Stage 2 chronic kidney disease  -     Basic metabolic panel; Future    follow-up renal function renal functions were improved this time as compared to before         M*Modal software was used to dictate this note  It may contain errors with dictating incorrect words or incorrect spelling  Please contact the provider directly with any questions  Subjective:      Chief Complaint   Patient presents with    Follow-up     3 month follow up Pain in feet and ankles          Patient ID: Brandi Robert  is a 81 y o  male  HPI   This is a very pleasant 81 years young gentleman who is here today for the regular he is doing good no new complaint except for the numbness and tingling in the leg pain which are better as compared to before since he increase the dose of his Neurontin I will further increase the medication and see how he does  He has a lot of varicose veins specially on the left leg that could be contributing to his leg pain and the leg cramps but overall his leg pain and leg cramps are better but he has significant problem with the numbness and tingling and sometimes the problems with the walking because of that  Today the hypertension is poorly controlled with systolic blood pressure 348 and diastolic 92 with the heart rate 62     No change in his weight I will observe and follow-up his blood pressure in about 3 months and he will be advised to check his blood pressure at home and see how the numbers goes his blood pressure always in a good range before The following portions of the patient's history were reviewed and updated as appropriate: allergies, current medications, past family history, past medical history, past social history, past surgical history and problem list     Review of Systems   Constitutional: Positive for fatigue  Negative for appetite change and fever  HENT: Negative for congestion, ear pain, hearing loss, nosebleeds, sneezing, tinnitus and voice change  Eyes: Negative for pain, discharge and redness  Respiratory: Negative for cough, chest tightness and wheezing  Cardiovascular: Negative for chest pain, palpitations and leg swelling  Gastrointestinal: Negative for abdominal pain, blood in stool, constipation, diarrhea, nausea and vomiting  Genitourinary: Negative for difficulty urinating, dysuria, hematuria and urgency  Musculoskeletal: Negative for arthralgias, back pain, gait problem and joint swelling  Skin: Negative for rash and wound  Allergic/Immunologic: Negative for environmental allergies  Neurological: Positive for numbness (Numbness and pain in both feet secondary to neuropathy  500 mg helped more than 400 mg high recommend him to increase his the gabapentin to 400 mg p o  b i d  and see how he does)  Negative for dizziness, tremors, seizures, weakness and light-headedness  Hematological: Negative for adenopathy  Does not bruise/bleed easily  Psychiatric/Behavioral: Negative for behavioral problems and confusion  The patient is not nervous/anxious  Past Medical History:   Diagnosis Date    Abnormal electrocardiogram     Last assessed: Oct 11, 2013    Allergic rhinitis     last assessed: Oct 11, 2013    Allergic rhinitis due to pollen     last assessed: Oct 10, 2013    Allergic sinusitis     Last assessed: May 11, 2015    Allergy     resolved: July 22, 2015    Benign essential hypertension     Last assessed/resolved:  May 31, 2017    BPH (benign prostatic hyperplasia)     Cancer [facility])     prostate    Colitis Last assessed: May 24, 2016    Cough with hemoptysis 11/17/2020    Generalized osteoarthritis     Last assessed: Oct 11, 2013    GERD without esophagitis     Last assessed/resolved: May 31, 2017    Hearing loss     Hiatal hernia     resolved: July 22, 2015    History of gastroesophageal reflux (GERD)     History of stomach ulcers     last assessed: May 11, 2015    Hypertension     Incomplete bladder emptying     Kidney stone     Nodular prostate with lower urinary tract symptoms     Nontoxic single thyroid nodule     last assessed: April 16, 2014    Orchalgia     Overweight     last assessed: Oct 31, 2013    Poor urinary stream     Pulmonary embolism [facility])     Salivary gland disorder     last assessed: April 16, 2014    Seasonal allergies     last assessed: May 15, 2015    Spermatocele     Straining to void     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:  May 7, 2014         Current Outpatient Medications:     allopurinol (ZYLOPRIM) 100 mg tablet, Take 1 tablet (100 mg total) by mouth 2 (two) times a day, Disp: 180 tablet, Rfl: 1    aspirin (ECOTRIN LOW STRENGTH) 81 mg EC tablet, Take 81 mg by mouth daily, Disp: , Rfl:     Cholecalciferol (VITAMIN D3 PO), Take by mouth 2000 IU, Disp: , Rfl:     diltiazem (CARDIZEM CD) 180 mg 24 hr capsule, Take 1 capsule (180 mg total) by mouth in the morning , Disp: 90 capsule, Rfl: 0    gabapentin (Neurontin) 100 mg capsule, Take 1 capsule (100 mg total) by mouth daily at bedtime, Disp: 90 capsule, Rfl: 1    gabapentin (Neurontin) 400 mg capsule, Take 1 capsule (400 mg total) by mouth daily, Disp: 90 capsule, Rfl: 1    levocetirizine (XYZAL) 5 MG tablet, Take 1 tablet (5 mg total) by mouth every evening, Disp: 90 tablet, Rfl: 4    tamsulosin (FLOMAX) 0 4 mg, Take 1 capsule (0 4 mg total) by mouth daily with dinner, Disp: 90 capsule, Rfl: 1    traMADol-acetaminophen (ULTRACET) 37 5-325 mg per tablet, Take 2 tablets by mouth every 6 (six) hours as needed for moderate pain, Disp: 60 tablet, Rfl: 0    Allergies   Allergen Reactions    Ace Inhibitors Hyperactivity       Social History   Past Surgical History:   Procedure Laterality Date    BLADDER SURGERY      CHOLECYSTECTOMY  2000    laparoscopic     FLAP LOCAL TRUNK Left 10/28/2021    Procedure: EXCISION OF FLANK MASS;  Surgeon: Antelmo Ace DO;  [facility];  Service: Plastics    HEMORRHOID SURGERY      pilionidal cyst removal     HERNIA REPAIR Left 01/17/2008    inguinal     KNEE ARTHROSCOPY Left 1974    KNEE CARTILAGE SURGERY      NASAL SEPTUM SURGERY      Deviation repair     [place] EXC PAROTD,LAT LOBE,DISSECT 5TH NERV Right 6/9/2021    Procedure: SUPERFICIAL PAROTIDECTOMY WITH FACIAL NERVE MONITORING;  Surgeon: Jayjay Pina MD;  [facility];  Service: ENT    PROSTATE BIOPSY  2017    STOMACH SURGERY      TONSILLECTOMY AND ADENOIDECTOMY      at age 37   Rodney Bones Luis Angel Raudel Don 89 - right 01/04 0 left 01/95    US GUIDED THYROID BIOPSY  1/19/2022    VASECTOMY       Family History   Problem Relation Age of Onset    Hypertension Mother     Stroke Mother     Stomach cancer Father     Hypertension Father     Hypertension Family     Stroke Family         syndrome     Heart attack Son        Objective:  /92 (BP Location: Left arm, Patient Position: Sitting, Cuff Size: Adult)   Pulse 62   Temp 98 2 °F (36 8 °C)   Ht 5' 8" (1 727 m)   Wt 101 kg (222 lb)   SpO2 99%   BMI 33 75 kg/m²        Physical Exam  Constitutional:       Appearance: He is well-developed  HENT:      Right Ear: External ear normal    Eyes:      Conjunctiva/sclera: Conjunctivae normal       Pupils: Pupils are equal, round, and reactive to light  Neck:      Thyroid: No thyromegaly  Vascular: No JVD  Cardiovascular:      Rate and Rhythm: Normal rate and regular rhythm  Heart sounds: Normal heart sounds        Comments: Bilateral varicose vein much more on the left than on the right side  Pulmonary:      Breath sounds: Normal breath sounds  Abdominal:      General: Bowel sounds are normal       Palpations: Abdomen is soft  Musculoskeletal:         General: Normal range of motion  Cervical back: Normal range of motion  Right lower le+ Pitting Edema present  Left lower le+ Pitting Edema present  Lymphadenopathy:      Cervical: No cervical adenopathy  Skin:     General: Skin is dry  Neurological:      Mental Status: He is alert and oriented to person, place, and time  Deep Tendon Reflexes: Reflexes are normal and symmetric     Psychiatric:         Behavior: Behavior normal

## 2022-05-18 NOTE — NURSING NOTE
Attempted to contact patient to discuss upcoming ultrasound guided thyroid biopsy with Carlos at [facility]  Message left  Will attempt to call back at a later date

## 2022-05-23 ENCOUNTER — TELEPHONE (OUTPATIENT)
Dept: INTERNAL MEDICINE CLINIC | Facility: OTHER | Age: 82
End: 2022-05-23

## 2022-05-23 DIAGNOSIS — I10 PRIMARY HYPERTENSION: Primary | ICD-10-CM

## 2022-05-23 RX ORDER — LOSARTAN POTASSIUM 100 MG/1
100 TABLET ORAL DAILY
Qty: 90 TABLET | Refills: 1 | Status: SHIPPED | OUTPATIENT
Start: 2022-05-23 | End: 2022-11-14 | Stop reason: SDUPTHER

## 2022-05-23 NOTE — TELEPHONE ENCOUNTER
I called in the prescription for losartan 100 mg once a day I left a message for her she did not the picked up the phone

## 2022-05-23 NOTE — TELEPHONE ENCOUNTER
Patient bp has been elevated, its been 161/108 this morning        177/103 yesterday     5/21/22 sat--- 160/98                       133/86                       161/98    Friday--- 144/98                164/95    Thursday--- 156/106                      173/99    Please call wife cell # [phone] she is thinking that his medication needs to be adjusted

## 2022-05-24 NOTE — NURSING NOTE
Spoke to patient's wife Ingrid Schilder to discuss patient's upcoming ultrasound guided thyroid biopsy with Carlos at [address] [place] [address] Radiology  Allergies reviewed  Verified with patient's wife patient's current anticoagulant medication of ASA 81 mg daily, but not required to stop per Periprocedural Management of Coagulation Status and Hemostasis Risk in Percutaneous Image Guided Procedures  Pre procedure instructions including diet and taking own medications discussed  Explained patient may eat normally and take medications as usual before the procedure  Procedure and post procedure expectations and instructions reviewed  Patient's wife verbalizes understanding  Patient had this procedure before and denies any questions at this time  Reminded of the location, date and time of the expected procedure

## 2022-05-25 ENCOUNTER — TELEPHONE (OUTPATIENT)
Dept: INTERNAL MEDICINE CLINIC | Age: 82
End: 2022-05-25

## 2022-05-26 ENCOUNTER — HOSPITAL ENCOUNTER (OUTPATIENT)
Dept: ULTRASOUND IMAGING | Facility: HOSPITAL | Age: 82
Discharge: HOME | End: 2022-05-26
Attending: SURGERY | Admitting: RADIOLOGY
Payer: COMMERCIAL

## 2022-05-26 ENCOUNTER — CONSULT (OUTPATIENT)
Dept: GASTROENTEROLOGY | Facility: CLINIC | Age: 82
End: 2022-05-26
Payer: COMMERCIAL

## 2022-05-26 VITALS
OXYGEN SATURATION: 94 % | HEIGHT: 68 IN | BODY MASS INDEX: 33.8 KG/M2 | SYSTOLIC BLOOD PRESSURE: 130 MMHG | HEART RATE: 90 BPM | TEMPERATURE: 97 F | DIASTOLIC BLOOD PRESSURE: 78 MMHG | WEIGHT: 223 LBS

## 2022-05-26 DIAGNOSIS — K56.609 SMALL BOWEL OBSTRUCTION (HCC): Primary | ICD-10-CM

## 2022-05-26 DIAGNOSIS — Z12.11 SCREENING FOR COLON CANCER: ICD-10-CM

## 2022-05-26 DIAGNOSIS — K31.1 GASTRIC OUTLET OBSTRUCTION: ICD-10-CM

## 2022-05-26 DIAGNOSIS — E04.1 THYROID NODULE: ICD-10-CM

## 2022-05-26 PROCEDURE — 10005 FNA BX W/US GDN 1ST LES: CPT

## 2022-05-26 PROCEDURE — 88173 CYTOPATH EVAL FNA REPORT: CPT | Performed by: PATHOLOGY

## 2022-05-26 PROCEDURE — 88172 CYTP DX EVAL FNA 1ST EA SITE: CPT | Performed by: PATHOLOGY

## 2022-05-26 PROCEDURE — 99204 OFFICE O/P NEW MOD 45 MIN: CPT | Performed by: PHYSICIAN ASSISTANT

## 2022-05-26 RX ORDER — LIDOCAINE HYDROCHLORIDE 10 MG/ML
5 INJECTION, SOLUTION EPIDURAL; INFILTRATION; INTRACAUDAL; PERINEURAL ONCE
Status: COMPLETED | OUTPATIENT
Start: 2022-05-26 | End: 2022-05-26

## 2022-05-26 RX ADMIN — LIDOCAINE HYDROCHLORIDE 5 ML: 10 INJECTION, SOLUTION EPIDURAL; INFILTRATION; INTRACAUDAL at 10:21

## 2022-05-26 NOTE — PROGRESS NOTES
[facility] - Outpatient Consultation  Colby Perez  81 y o  male MRN: [id number]  Encounter: [id number]          ASSESSMENT AND PLAN:       Elida Arnold is an 82 y/o male with hx of PUD s/p partial gastrectomy, HTN, CKD, interstitial lung disease who presents for colon cancer screening  1  Abnormal abdominal imaging  2  Abdominal Pain  Pt presented to the ED on 4/8 with abdominal pain and diarrhea x 1 day; CT without PO contrast showed: Dilated loops of small bowel without focal transition point and gradual tapering of the distal small bowel, suggesting SBO, and postsurgical changes of GJ anastomosis, with distended stomach up to point of anastomosis, concerning for GOO  Pt was tx conservatively with NGT per surgery team and was also given antibiotics for persistent fever  Last EGD was in 2015 that depicted normal post-surgical changes with gastritis in remnant; I do not have pathology from this  Today, pt says the pain that initially brought him into the ED is resolved but he has some generalized "residual soreness" at times  -UGI with small bowel follow through ordered to be done as I do not want to proceed with EGD until this is done ans resulted to further analyze his anatomy  -EGD ordered and to be scheduled AFTER the UGI    3  Colon cancer screening   4  Alternating bowel habits  Pt was referred to [facility] today from PCP for colonoscopy  Last colon 2015 depicted diverticulosis but as he has hx of colon polyps, he was recommended to have this repeated in 2020 but has not yet done this  Pt says that when his above symptoms began, he had diarrhea but on a normal basis, he usually interchanges between thew two at random  Pt says that over the last 3-4 weeks however, he has been mainly having constipation and is only using senna once every few days; pt says he is moving his bowels once every 2 days or so     -increase daily water intake   -start colace daily: if this is too much, switch to every other day vs PRN  -imodium PRN diarrhea but pt says he has not had this recently   -colonoscopy ordered; instructions given; risks and benefits reviewed   ______________________________________________________________________    HPI:  Steve Flores is an 82 y/o male with hx of PUD s/p partial gastrectomy, HTN, CKD, interstitial lung disease who presents for colon cancer screening  Pt presents to the ED with one day of abdominal pain and diarrhea, with CT showing potential SBO vs GOO  Pt was given antibiotics and NGT was placed, and pt was d/c about 7 days later after having BMs and tolerating diet  Pt was referred from PCP for colon cancer screening  Today, pt notes that the abdominal pain that brought him to the ED has resolved but he still gets some "residual soreness" generalized throughout his abdomen at times  Pt also notes that when he started feeling the pain, he had diarrhea but since then, he has been constipated in that he is only moving his bowels once every 2 days or so  Pt says that he has been taking senna intermittently  Pt denies family hx of colon cancer, unintentional weight loss, fevers, chills, night sweats, bloody or black BMs  Pt denies NSAID use  Pt has hx of partial gastrectomy due to PUD, but he is unsure exactly "what happened" as this was 10+ yrs ago  REVIEW OF SYSTEMS:    CONSTITUTIONAL: Denies any fever, chills, rigors, and weight loss  HEENT: No earache or tinnitus  Denies hearing loss or visual disturbances  CARDIOVASCULAR: No chest pain or palpitations  RESPIRATORY: Denies any cough, hemoptysis, shortness of breath or dyspnea on exertion  GASTROINTESTINAL: As noted in the History of Present Illness  GENITOURINARY: No problems with urination  Denies any hematuria or dysuria  NEUROLOGIC: No dizziness or vertigo, denies headaches  MUSCULOSKELETAL: Denies any muscle or joint pain  SKIN: Denies skin rashes or itching     ENDOCRINE: Denies excessive thirst  Denies intolerance to heat or cold  PSYCHOSOCIAL: Denies depression or anxiety  Denies any recent memory loss  Historical Information   Past Medical History:   Diagnosis Date    Abnormal electrocardiogram     Last assessed: Oct 11, 2013    Allergic rhinitis     last assessed: Oct 11, 2013    Allergic rhinitis due to pollen     last assessed: Oct 10, 2013    Allergic sinusitis     Last assessed: May 11, 2015    Allergy     resolved: July 22, 2015    Benign essential hypertension     Last assessed/resolved: May 31, 2017    BPH (benign prostatic hyperplasia)     Cancer [facility])     prostate    Colitis     Last assessed: May 24, 2016    Cough with hemoptysis 11/17/2020    Generalized osteoarthritis     Last assessed: Oct 11, 2013    GERD without esophagitis     Last assessed/resolved: May 31, 2017    Hearing loss     Hiatal hernia     resolved: July 22, 2015    History of gastroesophageal reflux (GERD)     History of stomach ulcers     last assessed: May 11, 2015    Hypertension     Incomplete bladder emptying     Kidney stone     Nodular prostate with lower urinary tract symptoms     Nontoxic single thyroid nodule     last assessed: April 16, 2014    Orchalgia     Overweight     last assessed: Oct 31, 2013    Poor urinary stream     Pulmonary embolism [facility])     Salivary gland disorder     last assessed: April 16, 2014    Seasonal allergies     last assessed: May 15, 2015    Spermatocele     Straining to void     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:  May 7, 2014     Past Surgical History:   Procedure Laterality Date    BLADDER SURGERY      CHOLECYSTECTOMY  2000    laparoscopic     FLAP LOCAL TRUNK Left 10/28/2021    Procedure: EXCISION OF FLANK MASS;  Surgeon: John Baez DO;  Location: [address] [place] [address];  Service: Plastics    HEMORRHOID SURGERY      pilionidal cyst removal     HERNIA REPAIR Left 01/17/2008    inguinal     KNEE ARTHROSCOPY Left 1974    KNEE CARTILAGE SURGERY      NASAL SEPTUM SURGERY Deviation repair     [place] EXC PAROTD,LAT LOBE,DISSECT 5TH NERV Right 2021    Procedure: SUPERFICIAL PAROTIDECTOMY WITH FACIAL NERVE MONITORING;  Surgeon: Gallo Heredia MD;  [facility];  Service: ENT    PROSTATE BIOPSY  2017    STOMACH SURGERY      TONSILLECTOMY AND ADENOIDECTOMY      at age 37   [address] - right  0 left     US GUIDED THYROID BIOPSY  2022    VASECTOMY       Social History   Social History     Substance and Sexual Activity   Alcohol Use Yes    Comment: rare     Social History     Substance and Sexual Activity   Drug Use No     Social History     Tobacco Use   Smoking Status Former Smoker    Packs/day: 1 00    Years: 30 00    Pack years: 30     Types: Cigarettes    Start date:     Quit date: 5    Years since quittin 4   Smokeless Tobacco Never Used     Family History   Problem Relation Age of Onset    Hypertension Mother     Stroke Mother     Stomach cancer Father     Hypertension Father     Hypertension Family     Stroke Family         syndrome     Heart attack Son        Meds/Allergies       Current Outpatient Medications:     allopurinol (ZYLOPRIM) 100 mg tablet    aspirin (ECOTRIN LOW STRENGTH) 81 mg EC tablet    Cholecalciferol (VITAMIN D3 PO)    diltiazem (CARDIZEM CD) 180 mg 24 hr capsule    gabapentin (Neurontin) 400 mg capsule    levocetirizine (XYZAL) 5 MG tablet    losartan (COZAAR) 100 MG tablet    tamsulosin (FLOMAX) 0 4 mg    traMADol-acetaminophen (ULTRACET) 37 5-325 mg per tablet  No current facility-administered medications for this visit  Allergies   Allergen Reactions    Ace Inhibitors Hyperactivity           Objective     There were no vitals taken for this visit  There is no height or weight on file to calculate BMI          PHYSICAL EXAM:      General Appearance:   Alert, cooperative, no distress   HEENT:   Normocephalic, atraumatic, anicteric      Neck:  Supple, symmetrical, trachea midline   Lungs:   Clear to auscultation bilaterally; no rales, rhonchi or wheezing; respirations unlabored    Heart[de-identified]   Regular rate and rhythm; no murmur, rub, or gallop  Abdomen:   Soft, non-tender, non-distended; normal bowel sounds; no masses, no organomegaly    Genitalia:   Deferred    Rectal:   Deferred    Extremities:  No cyanosis, clubbing or edema    Pulses:  2+ and symmetric    Skin:  No jaundice, rashes, or lesions    Lymph nodes:  No palpable cervical lymphadenopathy        Lab Results:   No visits with results within 1 Day(s) from this visit  Latest known visit with results is:   Appointment on 04/20/2022   Component Date Value    Sodium 04/20/2022 139     Potassium 04/20/2022 4 5     Chloride 04/20/2022 107     CO2 04/20/2022 28     ANION GAP 04/20/2022 4     BUN 04/20/2022 18     Creatinine 04/20/2022 1 04     Glucose 04/20/2022 91     Calcium 04/20/2022 9 4     eGFR 04/20/2022 67          Radiology Results:   No results found

## 2022-05-26 NOTE — PATIENT INSTRUCTIONS
Scheduled date of EGD/colonoscopy (as of today):09 22 22  Physician performing EGD/colonoscopy:DR JACINTO  Location of EGD[facility]colonoscopy[facility]  Desired bowel prep reviewed with patient:CAROL/JOON  Instructions reviewed with patient by:LIANA  Clearances:   N/A  UGI scheduled 06 30 22 @Ja

## 2022-06-03 PROBLEM — J31.0 GUSTATORY RHINITIS: Status: ACTIVE | Noted: 2022-06-03

## 2022-06-03 PROBLEM — Z96.619 H/O TOTAL SHOULDER REPLACEMENT: Status: ACTIVE | Noted: 2022-01-10

## 2022-06-06 ENCOUNTER — HOSPITAL ENCOUNTER (OUTPATIENT)
Dept: RADIOLOGY | Age: 82
Discharge: HOME | End: 2022-06-06
Payer: COMMERCIAL

## 2022-06-06 DIAGNOSIS — E04.1 THYROID NODULE: ICD-10-CM

## 2022-06-06 PROCEDURE — G1004 CDSM NDSC: HCPCS

## 2022-06-06 PROCEDURE — 71250 CT THORAX DX C-: CPT

## 2022-06-20 PROBLEM — E04.2 MULTIPLE THYROID NODULES: Status: ACTIVE | Noted: 2021-12-14

## 2022-06-21 ENCOUNTER — OFFICE VISIT (OUTPATIENT)
Dept: SURGICAL ONCOLOGY | Facility: CLINIC | Age: 82
End: 2022-06-21
Payer: COMMERCIAL

## 2022-06-21 VITALS
DIASTOLIC BLOOD PRESSURE: 80 MMHG | HEART RATE: 64 BPM | RESPIRATION RATE: 16 BRPM | BODY MASS INDEX: 34.71 KG/M2 | WEIGHT: 229 LBS | OXYGEN SATURATION: 99 % | HEIGHT: 68 IN | SYSTOLIC BLOOD PRESSURE: 134 MMHG | TEMPERATURE: 98.3 F

## 2022-06-21 DIAGNOSIS — E04.2 MULTIPLE THYROID NODULES: Primary | ICD-10-CM

## 2022-06-21 PROCEDURE — 3075F SYST BP GE 130 - 139MM HG: CPT | Performed by: SURGERY

## 2022-06-21 PROCEDURE — 99213 OFFICE O/P EST LOW 20 MIN: CPT | Performed by: SURGERY

## 2022-06-21 PROCEDURE — 3079F DIAST BP 80-89 MM HG: CPT | Performed by: SURGERY

## 2022-06-21 NOTE — PROGRESS NOTES
Surgical Oncology Follow Up       [address] [facility] [place] [address]  [facility] [address]  [address]   1940  [id number]  [address] [facility] [address]  [facility] [id number] [phone]    Diagnoses and all orders for this visit:    Multiple thyroid nodules  -     US thyroid; Future        Chief Complaint   Patient presents with    Follow-up       Return in about 1 year (around 6/21/2023) for Office Visit, Imaging - See orders, with Amanda  Oncology History    No history exists  Staging:    Treatment history:  Left thyroid biopsy, January 2022: Follicular lesion of undetermined significance  Re-biopsy, May 2022 atypia of undetermined significance, Afirma benign  Current treatment:  Observation  Disease status:      History of Present Illness:  Patient returns in follow-up  He underwent a repeat biopsy with Afirma  This revealed atypia of undetermined significance, but Afirma was benign  He has no dysphagia or hoarseness  He is swallowing without difficulty  The lesion was under 3 cm size    Review of Systems  Complete ROS Surg Onc:   Complete ROS Surg Onc:   Constitutional: The patient denies new or recent history of general fatigue, no recent weight loss, no change in appetite  Eyes: No complaints of visual problems, no scleral icterus  ENT: no complaints of ear pain, no hoarseness, no difficulty swallowing,  no tinnitus and no new masses in head, oral cavity, or neck  Cardiovascular: No complaints of chest pain, no palpitations, no ankle edema  Respiratory: No complaints of shortness of breath, no cough  Gastrointestinal: No complaints of jaundice, no bloody stools, no pale stools  Genitourinary: No complaints of dysuria, no hematuria, no nocturia, no frequent urination, no urethral discharge  Musculoskeletal: No complaints of weakness, paralysis, joint stiffness or arthralgias  Integumentary: No complaints of rash, no new lesions  Neurological: No complaints of convulsions, no seizures, no dizziness  Hematologic/Lymphatic: No complaints of easy bruising  Endocrine:  No hot or cold intolerance  No polydipsia, polyphagia, or polyuria  Allergy/immunology:  No environmental allergies  No food allergies  Not immunocompromised  Skin:  No pallor or rash  No wound          Patient Active Problem List   Diagnosis    Benign essential hypertension    Benign prostatic hyperplasia (BPH) with straining on urination    Gastroesophageal reflux disease without esophagitis    Prostate cancer (HCC)    Seasonal allergic rhinitis due to pollen    Truncal obesity    KAYLI [facility]acute kidney injury) ([facility] [address] [facility]    Luetscher's syndrome    Benign 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    Obesity, morbid (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 [address] )    Disorder of adrenal gland, unspecified ([facility] [address] )    Stage 3a chronic kidney disease ([facility] [address] )    Acute idiopathic gout of left foot    Multiple thyroid nodules    Enteritis    Fever    Anxiety    Sialoadenitis    Hypokalemia    H/O total shoulder replacement    Gustatory rhinitis     Past Medical History: Diagnosis Date    Abnormal electrocardiogram     Last assessed: Oct 11, 2013    Allergic rhinitis     last assessed: Oct 11, 2013    Allergic rhinitis due to pollen     last assessed: Oct 10, 2013    Allergic sinusitis     Last assessed: May 11, 2015    Allergy     resolved: July 22, 2015    Benign essential hypertension     Last assessed/resolved: May 31, 2017    BPH (benign prostatic hyperplasia)     Cancer [facility])     prostate    Colitis     Last assessed: May 24, 2016    Cough with hemoptysis 11/17/2020    Generalized osteoarthritis     Last assessed: Oct 11, 2013    GERD without esophagitis     Last assessed/resolved: May 31, 2017    Hearing loss     Hiatal hernia     resolved: July 22, 2015    History of gastroesophageal reflux (GERD)     History of stomach ulcers     last assessed: May 11, 2015    Hypertension     Incomplete bladder emptying     Kidney stone     Nodular prostate with lower urinary tract symptoms     Nontoxic single thyroid nodule     last assessed: April 16, 2014    Orchalgia     Overweight     last assessed: Oct 31, 2013    Poor urinary stream     Pulmonary embolism [facility])     Salivary gland disorder     last assessed: April 16, 2014    Seasonal allergies     last assessed: May 15, 2015    Spermatocele     Straining to void     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:  May 7, 2014     Past Surgical History:   Procedure Laterality Date    BLADDER SURGERY      CHOLECYSTECTOMY  2000    laparoscopic     FLAP LOCAL TRUNK Left 10/28/2021    Procedure: EXCISION OF FLANK MASS;  Surgeon: Maria G Guerra DO;  Location: [address] [place] [address] OR;  Service: Plastics    HEMORRHOID SURGERY      pilionidal cyst removal     HERNIA REPAIR Left 01/17/2008    inguinal     KNEE ARTHROSCOPY Left 1974    KNEE CARTILAGE SURGERY      NASAL SEPTUM SURGERY      Deviation repair     VT EXC PAROTD,LAT LOBE,DISSECT 5TH NERV Right 6/9/2021    Procedure: SUPERFICIAL PAROTIDECTOMY WITH FACIAL NERVE MONITORING;  Surgeon: Zach Ferrara MD;  [facility];  Service: ENT    PROSTATE BIOPSY  2017    STOMACH SURGERY      TONSILLECTOMY AND ADENOIDECTOMY      at age 37   [address] - right  0 left     US GUIDED THYROID BIOPSY  2022    US GUIDED THYROID BIOPSY  2022    VASECTOMY       Family History   Problem Relation Age of Onset    Hypertension Mother     Stroke Mother     Stomach cancer Father     Hypertension Father     Hypertension Family     Stroke Family         syndrome     Heart attack Son      Social History     Socioeconomic History    Marital status: /Civil Union     Spouse name: [address] [place] [address] Number of children: 1    Years of education: Not on file    Highest education level: Not on file   Occupational History    Occupation: Retired     Occupation: securtity      Comment: [address] Use    Smoking status: Former Smoker     Packs/day:      Years:      Pack years:      Types: Cigarettes     Start date:      Quit date:      Years since quittin 4    Smokeless tobacco: Never Used   Vaping Use    Vaping Use: Never used   Substance and Sexual Activity    Alcohol use: Yes     Comment: rare    Drug use: No    Sexual activity: Yes     Partners: Female   Other Topics Concern    Not on file   Social History Narrative    Who lives in your home: wife     What type of home do you live in: Single house    Age of your home: Built [birth year]     How long have you been living there:     Type of heat: Baseboard    Type of fuel: Electric    What type of elaine is in your bedroom: Carpet     Do you have the following in or near your home:    Air products: Central air    Pests: None    Pets: None    Are pets allowed in bedroom: N/A    Open fields, wooded areas nearby: Open fields and Wooded areas    Basement: Finished    Exposure to second hand smoke: No        Habits: Caffeine: Coffee 1 cup of coffee -peach snapple few a week     Chocolate: rare     Other:     Social Determinants of Health     Financial Resource Strain: Not on file   Food Insecurity: Not on file   Transportation Needs: No Transportation Needs    Lack of Transportation (Medical): No    Lack of Transportation (Non-Medical):  No   Physical Activity: Not on file   Stress: Not on file   Social Connections: Not on file   Intimate Partner Violence: Not on file   Housing Stability: Unknown    Unable to Pay for Housing in the Last Year: No    Number of Places Lived in the Last Year: Not on file    Unstable Housing in the Last Year: No       Current Outpatient Medications:     allopurinol (ZYLOPRIM) 100 mg tablet, Take 1 tablet (100 mg total) by mouth 2 (two) times a day, Disp: 180 tablet, Rfl: 1    aspirin (ECOTRIN LOW STRENGTH) 81 mg EC tablet, Take 81 mg by mouth daily, Disp: , Rfl:     Cholecalciferol (VITAMIN D3 PO), Take by mouth 2000 IU, Disp: , Rfl:     gabapentin (Neurontin) 400 mg capsule, Take 1 capsule (400 mg total) by mouth in the morning and 1 capsule (400 mg total) in the evening , Disp: 90 capsule, Rfl: 1    levocetirizine (XYZAL) 5 MG tablet, Take 1 tablet (5 mg total) by mouth every evening, Disp: 90 tablet, Rfl: 4    losartan (COZAAR) 100 MG tablet, Take 1 tablet (100 mg total) by mouth in the morning , Disp: 90 tablet, Rfl: 1    polyethylene glycol (GOLYTELY) 4000 mL solution, Take 4,000 mL by mouth once for 1 dose, Disp: 4000 mL, Rfl: 0    tamsulosin (FLOMAX) 0 4 mg, Take 1 capsule (0 4 mg total) by mouth daily with dinner, Disp: 90 capsule, Rfl: 1    traMADol-acetaminophen (ULTRACET) 37 5-325 mg per tablet, Take 2 tablets by mouth every 6 (six) hours as needed for moderate pain, Disp: 60 tablet, Rfl: 0  Allergies   Allergen Reactions    Ace Inhibitors Hyperactivity     Vitals:    06/21/22 0957   BP: 134/80   Pulse: 64   Resp: 16   Temp: 98 3 °F (36 8 °C)   SpO2: 99%       Physical Exam  Constitutional: General appearance: The Patient is well-developed and well-nourished who appears the stated age in no acute distress  Patient is pleasant and talkative  HEENT:  Normocephalic  Sclerae are anicteric  Mucous membranes are moist  Neck is supple without adenopathy  No JVD  Extremities: There is no clubbing or cyanosis  There is no edema  Symmetric  Neuro: Grossly nonfocal  Gait is normal      Lymphatic: No evidence of cervical adenopathy bilaterally  Skin: Warm, anicteric  Psych:  Patient is pleasant and talkative  Breasts:        Pathology:  Final Diagnosis   A  B  Thyroid, Left, upper pole  ( ThinPrep and smear preparations ):  Atypia of undetermined significance ([place] Category III) - See note  Atypical follicular cells with nucleoli and some with nuclear enlargement, present in crowded   groups and microfollicles  Some colloid is present      Satisfactory for evaluation, but limited by clotting artifact      Note:  (1) As reported in the [facility] for Reporting Thyroid Cytopathology*, this diagnostic category has demonstrated anywhere from 10-30% risk of malignancy being found in subsequent resections and/or FNA  This risk of malignancy is expected to change due to the usage of the surgical pathology diagnosis of non-invasive follicular thyroid neoplasm with papillary-like nuclear features (NIFTP)    The anticipated risk of malignancy secondary to NIFTP is 6-18%  The manual reports that the usual management following this diagnosis is repeat FNA, molecular testing, or lobectomy   Ultimately, clinical/imaging correlation for this patient is needed in arriving at the actual management plan      *The [place] System for Reporting Thyroid Cytopathology, Eber Payton ), 2018 (2nd ed )   Electronically signed by Lakeisha Hurd MD on 5/31/2022 at 12:00 PM     A femoral was benign    Labs:      Imaging  CT chest high resolution    Addendum Date: 6/10/2022 Addendum:   ADDENDUM: Mild bilateral gynecomastia  Stable benign bone islands in the lateral right 9th rib and posterior left 2nd rib  Result Date: 6/10/2022  Narrative: CT CHEST WITH HIGH RESOLUTION SLICES - WITHOUT CONTRAST INDICATION:   E04 1: Nontoxic single thyroid nodule  "Interstitial lung disease " Per my review of the medical record, the patient  had an abnormal chest CT in June 2021  For follow-up  COMPARISON:  7 prior chest CTs, the most recent dated 4/11/2022 and the most remote dated 3/15/2019  TECHNIQUE: CT of the chest without intravenous contrast   Contiguous 1 25 mm axial images and 1 x 10 mm transverse images in supine position  Thin section images at 10 mm intervals, supine and prone in inspiration and supine in expiration  Sagittal and  coronal reformats  Transverse and coronal MIP  Coronal minIP  Radiation dose length product (DLP):  005 mGy-cm   Radiation dose exposure minimized using iterative reconstruction and automated exposure control  FINDINGS: LUNGS:  Resolution of consolidation/groundglass opacity in the right lower lobe since June 2021  Mild bilateral lower lobe juxtapleural reticulation, present since March 2019, with mild cylindrical bronchiectasis in both lower lobes and the medial segment right middle lobe  No honeycombing  Mild air trapping on expiration  AIRWAYS: No significant filling defects  PLEURA:  Unremarkable  HEART/GREAT VESSELS:  Normal heart size  Mild coronary artery calcification indicating atherosclerotic heart disease  Pulmonary artery enlargement  MEDIASTINUM AND ARIADNA:  Stable prominent cisterna chyli  CHEST WALL AND LOWER NECK: Enlarged nodular left thyroid lobe, one nodule with rim calcification, status post biopsy on 5/26/2022  Posterolateral left flank lipoma, partially imaged  UPPER ABDOMEN:  Clips about the GE junction and along the antrum with history of partial gastrectomy  Right adrenal adenoma, stable since March 2019  Hepatic cysts  OSSEOUS STRUCTURES: Mild degenerative disease in the spine  Bilateral shoulder arthroplasty  Impression: Resolution of the groundglass opacity and consolidation in the right lower lobe since June 2021  Mild bilateral lower lobe juxtapleural reticulation and mild cylindrical bronchiectasis in both lower lobes and the right middle lobe, present since March 2019  Given lower lobe groundglass opacity and consolidation intermittently over serial exams dating back to March 2019, question recurrent aspiration  Mild air trapping on expiration suggestive of small airways disease  Pulmonary artery enlargement which can be seen with pulmonary hypertension  Workstation performed: [id number]     US guided thyroid biopsy with [facility]    Result Date: 5/26/2022  Narrative: ULTRASOUND-GUIDED THYROID BIOPSY HISTORY: 81year-old with history of recent ultrasound-guided thyroid nodule biopsy resulting in category 3 [place] pathology results  Patient presents with prescription for ultrasound-guided thyroid nodule biopsy with Afrima sampling  COMPARISON: Ultrasound thyroid 10/22/2021  Ultrasound-guided thyroid nodule biopsy 1/19/2022  FINDINGS: Prior ultrasound images were reviewed  The previous described and previously biopsied  Nodule #4  Image 80  LEFT upper pole nodule measuring 2 2 x 1 7 x 1 9 was targeted for today's biopsy  The procedure was explained to the patient, including risks of hemorrhage, infection and local injury  Informed consent was freely obtained  Final standard "time-out" procedure performed  PROCEDURE: The neck was prepped and draped in normal sterile fashion  Under real-time ultrasound guidance and local anesthesia 5 passes with a 5 gauge needle were made through the left upper pole nodule today measuring 1 7 x 2 1 x 2 1 cm  Cytopathology was present and deemed these initial passes inadequate, therefore 4 additional passes with a 25-gauge needle were performed   A postprocedural hematoma was appreciated  Patient and wife were educated on its presence and home management  Patient was educated on any concern to call number provided or  present to the emergency department  Patient and wife admitted to understanding and agreement  The patient tolerated the procedure well  Postprocedure instructions were provided for the patient  Impression: Status post technically successful ultrasound-guided thyroid biopsy pending pathology results  Procedure was performed by DISHA Montes PA-C under the direct supervision of Dr Henrry Sherman  Workstation performed: [id number]     I reviewed the above laboratory and imaging data  Discussion/Summary:  44-year-old male with a left thyroid nodule  Biopsy was [place] 3  Femoral was benign  I would recommend observation  We discussed that there is a 4% false negative rate  Since he is asymptomatic and the lesion is under 4 cm in size I would not recommend any surgical intervention  We will plan on repeating his ultrasound in 1 year  I will see him again at that time for another clinical exam   He is agreeable to this  All his questions were answered

## 2022-06-23 ENCOUNTER — OFFICE VISIT (OUTPATIENT)
Dept: PULMONOLOGY | Facility: CLINIC | Age: 82
End: 2022-06-23
Payer: COMMERCIAL

## 2022-06-23 VITALS
DIASTOLIC BLOOD PRESSURE: 90 MMHG | BODY MASS INDEX: 34.71 KG/M2 | OXYGEN SATURATION: 99 % | TEMPERATURE: 98.5 F | HEART RATE: 80 BPM | SYSTOLIC BLOOD PRESSURE: 160 MMHG | WEIGHT: 229 LBS | HEIGHT: 68 IN

## 2022-06-23 DIAGNOSIS — J84.9 INTERSTITIAL LUNG DISEASE (HCC): Primary | ICD-10-CM

## 2022-06-23 DIAGNOSIS — E66.01 OBESITY, MORBID (HCC): ICD-10-CM

## 2022-06-23 PROBLEM — J81.0 ACUTE PULMONARY EDEMA (HCC): Status: RESOLVED | Noted: 2020-03-11 | Resolved: 2022-06-23

## 2022-06-23 PROCEDURE — 99213 OFFICE O/P EST LOW 20 MIN: CPT | Performed by: INTERNAL MEDICINE

## 2022-06-23 PROCEDURE — 1160F RVW MEDS BY RX/DR IN RCRD: CPT | Performed by: INTERNAL MEDICINE

## 2022-06-23 PROCEDURE — 1036F TOBACCO NON-USER: CPT | Performed by: INTERNAL MEDICINE

## 2022-06-23 NOTE — ASSESSMENT & PLAN NOTE
· Changes have waxed and waned which are not typical of IPF  · Does not report significant respiratory symptoms at this time  · Overall has less activity however in does report less overall exertional tolerance  Predominant decline in activity is due to osteoarthritis in his knees    This has resulted in decline cardiovascular conditioning however  · Recommended increase in activity using non weight-bearing exercise such as stationary bicycle

## 2022-06-23 NOTE — PROGRESS NOTES
Progress note - Pulmonary Medicine   Uday Martinez  81 y o  male MRN: [id number]       Impression & Plan:     Interstitial lung disease ([facility] [address] )  · Changes have waxed and waned which are not typical of IPF  · Does not report significant respiratory symptoms at this time  · Overall has less activity however in does report less overall exertional tolerance  Predominant decline in activity is due to osteoarthritis in his knees  This has resulted in decline cardiovascular conditioning however  · Recommended increase in activity using non weight-bearing exercise such as stationary bicycle    Obesity, morbid ([facility] [address] )  · Records to close to 25 lb weight gain  · Has been more sedentary as noted above  · Discussed dietary modifications and exercise strategies    He will follow-up with me in 6 months to re-evaluate his shortness of breath symptom  ______________________________________________________________________    HPI:    Uday Martinez  presents today for follow-up of abnormal CT scan of the chest consistent with interstitial lung disease  He reports no new symptoms  He feels his breathing has been stable  He has had less activity however and has less stamina  He has gained weight and reduced his activity predominantly on the basis of his osteoarthritis in his knees  He used to walk close to 3 miles daily but can no longer do this    He has no chest pain  He denies any wheezing or chest tightness  He does tell me he recently had bowel obstruction requiring hospitalization but conservative management with nasogastric decompression  He does not report having any symptoms of aspiration  No coughing or choking while eating  Does not report significant regurgitation of food  He does tell me that he has gained weight  He has gained about 25-30 lb  He does tend to snack late at night and thinks the biggest change has been decline in activity      Current Medications:    Current Outpatient Medications:    allopurinol (ZYLOPRIM) 100 mg tablet, Take 1 tablet (100 mg total) by mouth 2 (two) times a day, Disp: 180 tablet, Rfl: 1    aspirin (ECOTRIN LOW STRENGTH) 81 mg EC tablet, Take 81 mg by mouth daily, Disp: , Rfl:     Cholecalciferol (VITAMIN D3 PO), Take by mouth 2000 IU, Disp: , Rfl:     gabapentin (Neurontin) 400 mg capsule, Take 1 capsule (400 mg total) by mouth in the morning and 1 capsule (400 mg total) in the evening , Disp: 90 capsule, Rfl: 1    levocetirizine (XYZAL) 5 MG tablet, Take 1 tablet (5 mg total) by mouth every evening, Disp: 90 tablet, Rfl: 4    losartan (COZAAR) 100 MG tablet, Take 1 tablet (100 mg total) by mouth in the morning , Disp: 90 tablet, Rfl: 1    polyethylene glycol (GOLYTELY) 4000 mL solution, Take 4,000 mL by mouth once for 1 dose, Disp: 4000 mL, Rfl: 0    tamsulosin (FLOMAX) 0 4 mg, Take 1 capsule (0 4 mg total) by mouth daily with dinner, Disp: 90 capsule, Rfl: 1    traMADol-acetaminophen (ULTRACET) 37 5-325 mg per tablet, Take 2 tablets by mouth every 6 (six) hours as needed for moderate pain, Disp: 60 tablet, Rfl: 0    Review of Systems:    Aside from what is mentioned in the HPI, the review of systems is otherwise negative    Past medical history, surgical history, and family history were reviewed and updated as appropriate    Social history updates:  Social History     Tobacco Use   Smoking Status Former Smoker    Packs/day:  00    Years: 30 00    Pack years: 30 00    Types: Cigarettes    Start date:     Quit date: 5    Years since quittin 4   Smokeless Tobacco Never Used       PhysicalExamination:  Vitals:   /90 (BP Location: Left arm, Patient Position: Sitting, Cuff Size: Standard)   Pulse 80   Temp 98 5 °F (36 9 °C) (Tympanic)   Ht 5' 8" (1 727 m)   Wt 104 kg (229 lb)   SpO2 99%   BMI 34 82 kg/m²   Gen:  Obese  Comfortable on room air  No conversational dyspnea  HEENT:  Conjugate gaze  sclerae anicteric    Oropharynx moist  Neck: Trachea is midline  No JVD  No adenopathy  Chest:  Symmetric but limited chest wall excursion  No wheeze or crackles  Cardiac:  Regular, distant heart tones  no murmur  Abdomen:  Obese, benign  Extremities: No edema  Neuro:  Normal speech and mentation in    Diagnostic Data:  Labs: I personally reviewed the most recent laboratory data pertinent to today's visit    Lab Results   Component Value Date    WBC 3 90 (L) 04/15/2022    HGB 12 5 04/15/2022    HCT 38 1 04/15/2022    MCV 83 04/15/2022     04/15/2022     Lab Results   Component Value Date    SODIUM 139 04/20/2022    K 4 5 04/20/2022    CO2 28 04/20/2022     04/20/2022    BUN 18 04/20/2022    CREATININE 1 04 04/20/2022    CALCIUM 9 4 04/20/2022       Imaging:  I personally reviewed the images on the [facility] system pertinent to today's visit  CT scan of the chest does show resolution of right middle lobe consolidation  Only some mild residual changes remain in the bilateral lower lobes  Mild air trapping noted    This was reviewed with the patient and his wife    Arlette Lopez MD

## 2022-06-23 NOTE — ASSESSMENT & PLAN NOTE
· Records to close to 25 lb weight gain  · Has been more sedentary as noted above  · Discussed dietary modifications and exercise strategies

## 2022-06-30 ENCOUNTER — HOSPITAL ENCOUNTER (OUTPATIENT)
Dept: RADIOLOGY | Facility: HOSPITAL | Age: 82
Discharge: HOME | End: 2022-06-30
Payer: COMMERCIAL

## 2022-06-30 DIAGNOSIS — K31.1 GASTRIC OUTLET OBSTRUCTION: ICD-10-CM

## 2022-06-30 DIAGNOSIS — K56.609 SMALL BOWEL OBSTRUCTION (HCC): ICD-10-CM

## 2022-06-30 PROCEDURE — 74248 X-RAY SM INT F-THRU STD: CPT

## 2022-06-30 PROCEDURE — 74246 X-RAY XM UPR GI TRC 2CNTRST: CPT

## 2022-08-17 ENCOUNTER — LAB (OUTPATIENT)
Dept: LAB | Facility: IMAGING CENTER | Age: 82
End: 2022-08-17
Payer: COMMERCIAL

## 2022-08-17 ENCOUNTER — TELEPHONE (OUTPATIENT)
Dept: INTERNAL MEDICINE CLINIC | Facility: OTHER | Age: 82
End: 2022-08-17

## 2022-08-17 DIAGNOSIS — C61 PROSTATE CANCER (HCC): ICD-10-CM

## 2022-08-17 DIAGNOSIS — G60.9 IDIOPATHIC PERIPHERAL NEUROPATHY: ICD-10-CM

## 2022-08-17 DIAGNOSIS — E04.1 THYROID NODULE: ICD-10-CM

## 2022-08-17 DIAGNOSIS — E79.0 HYPERURICEMIA: ICD-10-CM

## 2022-08-17 DIAGNOSIS — N18.2 STAGE 2 CHRONIC KIDNEY DISEASE: ICD-10-CM

## 2022-08-17 DIAGNOSIS — I10 BENIGN ESSENTIAL HYPERTENSION: Chronic | ICD-10-CM

## 2022-08-17 DIAGNOSIS — R25.2 LEG CRAMP: ICD-10-CM

## 2022-08-17 LAB
ANION GAP SERPL CALCULATED.3IONS-SCNC: 2 MMOL/L (ref 4–13)
BASOPHILS # BLD AUTO: 0.03 THOUSANDS/ΜL (ref 0–0.1)
BASOPHILS NFR BLD AUTO: 1 % (ref 0–1)
BUN SERPL-MCNC: 22 MG/DL (ref 5–25)
CALCIUM SERPL-MCNC: 8.8 MG/DL (ref 8.3–10.1)
CHLORIDE SERPL-SCNC: 109 MMOL/L (ref 96–108)
CO2 SERPL-SCNC: 29 MMOL/L (ref 21–32)
CREAT SERPL-MCNC: 1.05 MG/DL (ref 0.6–1.3)
EOSINOPHIL # BLD AUTO: 0.03 THOUSAND/ΜL (ref 0–0.61)
EOSINOPHIL NFR BLD AUTO: 1 % (ref 0–6)
ERYTHROCYTE [DISTWIDTH] IN BLOOD BY AUTOMATED COUNT: 15.2 % (ref 11.6–15.1)
GFR SERPL CREATININE-BSD FRML MDRD: 66 ML/MIN/1.73SQ M
GLUCOSE P FAST SERPL-MCNC: 89 MG/DL (ref 65–99)
HCT VFR BLD AUTO: 45.1 % (ref 36.5–49.3)
HGB BLD-MCNC: 14.2 G/DL (ref 12–17)
IMM GRANULOCYTES # BLD AUTO: 0.05 THOUSAND/UL (ref 0–0.2)
IMM GRANULOCYTES NFR BLD AUTO: 1 % (ref 0–2)
LYMPHOCYTES # BLD AUTO: 1.14 THOUSANDS/ΜL (ref 0.6–4.47)
LYMPHOCYTES NFR BLD AUTO: 30 % (ref 14–44)
MAGNESIUM SERPL-MCNC: 2.2 MG/DL (ref 1.6–2.6)
MCH RBC QN AUTO: 28 PG (ref 26.8–34.3)
MCHC RBC AUTO-ENTMCNC: 31.5 G/DL (ref 31.4–37.4)
MCV RBC AUTO: 89 FL (ref 82–98)
MONOCYTES # BLD AUTO: 0.39 THOUSAND/ΜL (ref 0.17–1.22)
MONOCYTES NFR BLD AUTO: 10 % (ref 4–12)
NEUTROPHILS # BLD AUTO: 2.14 THOUSANDS/ΜL (ref 1.85–7.62)
NEUTS SEG NFR BLD AUTO: 57 % (ref 43–75)
NRBC BLD AUTO-RTO: 0 /100 WBCS
PLATELET # BLD AUTO: 154 THOUSANDS/UL (ref 149–390)
PMV BLD AUTO: 11.3 FL (ref 8.9–12.7)
POTASSIUM SERPL-SCNC: 4.3 MMOL/L (ref 3.5–5.3)
PSA SERPL-MCNC: 1.1 NG/ML (ref 0–4)
RBC # BLD AUTO: 5.07 MILLION/UL (ref 3.88–5.62)
SODIUM SERPL-SCNC: 140 MMOL/L (ref 135–147)
TSH SERPL DL<=0.05 MIU/L-ACNC: 1.51 UIU/ML (ref 0.45–4.5)
URATE SERPL-MCNC: 3.9 MG/DL (ref 3.5–8.5)
WBC # BLD AUTO: 3.78 THOUSAND/UL (ref 4.31–10.16)

## 2022-08-17 PROCEDURE — 84550 ASSAY OF BLOOD/URIC ACID: CPT

## 2022-08-17 PROCEDURE — 36415 COLL VENOUS BLD VENIPUNCTURE: CPT

## 2022-08-17 PROCEDURE — 80048 BASIC METABOLIC PNL TOTAL CA: CPT

## 2022-08-17 PROCEDURE — 84153 ASSAY OF PSA TOTAL: CPT

## 2022-08-17 PROCEDURE — 83735 ASSAY OF MAGNESIUM: CPT

## 2022-08-17 PROCEDURE — 85025 COMPLETE CBC W/AUTO DIFF WBC: CPT

## 2022-08-17 PROCEDURE — 84443 ASSAY THYROID STIM HORMONE: CPT

## 2022-08-17 NOTE — TELEPHONE ENCOUNTER
Spoke to pharmacist  They have an entirely new rx avail for him to pickup  It's only #90 with 1 refill but still available  Left message for pt

## 2022-08-17 NOTE — TELEPHONE ENCOUNTER
Patient needs a prescription for gabapentin 400 mg 1 in the am 1 in the pm  #180    [facility] [place] [facility]    Patient needs a new prescription, [facility] says [facility] told [facility] there was [facility] refills

## 2022-08-19 ENCOUNTER — TELEMEDICINE (OUTPATIENT)
Dept: INTERNAL MEDICINE CLINIC | Facility: OTHER | Age: 82
End: 2022-08-19
Payer: COMMERCIAL

## 2022-08-19 VITALS — HEIGHT: 68 IN | TEMPERATURE: 96.2 F | BODY MASS INDEX: 34.71 KG/M2 | WEIGHT: 229 LBS | OXYGEN SATURATION: 93 %

## 2022-08-19 DIAGNOSIS — U07.1 COVID-19: Primary | ICD-10-CM

## 2022-08-19 DIAGNOSIS — B34.9 VIRAL INFECTION, UNSPECIFIED: ICD-10-CM

## 2022-08-19 LAB
SARS-COV-2 AG UPPER RESP QL IA: POSITIVE
VALID CONTROL: ABNORMAL

## 2022-08-19 PROCEDURE — 99442 PR PHYS/QHP TELEPHONE EVALUATION 11-20 MIN: CPT | Performed by: NURSE PRACTITIONER

## 2022-08-19 PROCEDURE — 87811 SARS-COV-2 COVID19 W/OPTIC: CPT | Performed by: NURSE PRACTITIONER

## 2022-08-19 RX ORDER — NIRMATRELVIR AND RITONAVIR 300-100 MG
3 KIT ORAL 2 TIMES DAILY
Qty: 30 TABLET | Refills: 0 | Status: SHIPPED | OUTPATIENT
Start: 2022-08-19 | End: 2022-08-24

## 2022-08-19 RX ORDER — AMOXICILLIN 500 MG/1
CAPSULE ORAL
COMMUNITY
Start: 2022-07-27 | End: 2022-09-23

## 2022-08-19 NOTE — PROGRESS NOTES
COVID-19 Outpatient Progress Note    Assessment/Plan:    Problem List Items Addressed This Visit        Other    COVID-19 - Primary     - start Paxlovid as prescribed   - advised he can use Mucinex DM  - continue vitamin D3  - Hold tamsulosin while on medication  - discussed CDC isolation criteria with patient         Relevant Medications    nirmatrelvir & ritonavir (Paxlovid, 300/100,) tablet therapy pack      Other Visit Diagnoses     Viral infection, unspecified        Relevant Medications    nirmatrelvir & ritonavir (Paxlovid, 300/100,) tablet therapy pack    Other Relevant Orders    Poct Covid 19 Rapid Antigen Test (Completed)         Disposition:     Patient has asymptomatic or mild COVID-19 infection  Based off CDC guidelines, they were recommended to isolate for 5 days  If they are asymptomatic or symptoms are improving with no fevers in the past 24 hours, isolation may be ended followed by 5 days of wearing a mask when around othes to minimize risk of infecting others  If still have a fever or other symptoms have not improved, continue to isolate until they improve  Regardless of when they end isolation, avoid being around people who are more likely to get very sick from COVID-19 until at least day 11  Discussed symptom directed medication options with patient  Discussed vitamin D, vitamin C, and/or zinc supplementation with patient  Patient meets criteria for PAXLOVID and they have been counseled appropriately according to EUA documentation released by the FDA  After discussion, patient agrees to treatment  Clover Mantle is an investigational medicine used to treat mild-to-moderate COVID-19 in adults and children (15years of age and older weighing at least 80 pounds (40 kg)) with positive results of direct SARS-CoV-2 viral testing, and who are at high risk for progression to severe COVID-19, including hospitalization or death  PAXLOVID is investigational because it is still being studied   There is limited information about the safety and effectiveness of using PAXLOVID to treat people with mild-to-moderate COVID-19  The FDA has authorized the emergency use of PAXLOVID for the treatment of mild-tomoderate COVID-19 in adults and children (15years of age and older weighing at least 80 pounds (40 kg)) with a positive test for the virus that causes COVID-19, and who are at high risk for progression to severe COVID-19, including hospitalization or death, under an EUA  What should I tell my healthcare provider before I take PAXLOVID? Tell your healthcare provider if you:  - Have any allergies  - Have liver or kidney disease  - Are pregnant or plan to become pregnant  - Are breastfeeding a child  - Have any serious illnesses    Tell your healthcare provider about all the medicines you take, including prescription and over-the-counter medicines, vitamins, and herbal supplements  Some medicines may interact with PAXLOVID and may cause serious side effects  Keep a list of your medicines to show your healthcare provider and pharmacist when you get a new medicine  You can ask your healthcare provider or pharmacist for a list of medicines that interact with PAXLOVID  Do not start taking a new medicine without telling your healthcare provider  Your healthcare provider can tell you if it is safe to take PAXLOVID with other medicines  Tell your healthcare provider if you are taking combined hormonal contraceptive  PAXLOVID may affect how your birth control pills work  Females who are able to become pregnant should use another effective alternative form of contraception or an additional barrier method of contraception  Talk to your healthcare provider if you have any questions about contraceptive methods that might be right for you  How do I take PAXLOVID? PAXLOVID consists of 2 medicines: nirmatrelvir and ritonavir    - Take 2 pink tablets of nirmatrelvir with 1 white tablet of ritonavir by mouth 2 times each day (in the morning and in the evening) for 5 days  For each dose, take all 3 tablets at the same time  - If you have kidney disease, talk to your healthcare provider  You may need a different dose  - Swallow the tablets whole  Do not chew, break, or crush the tablets  - Take PAXLOVID with or without food  - Do not stop taking PAXLOVID without talking to your healthcare provider, even if you feel better  - If you miss a dose of PAXLOVID within 8 hours of the time it is usually taken, take it as soon as you remember  If you miss a dose by more than 8 hours, skip the missed dose and take the next dose at your regular time  Do not take 2 doses of PAXLOVID at the same time  - If you take too much PAXLOVID, call your healthcare provider or go to the nearest hospital emergency room right away  - If you are taking a ritonavir- or cobicistat-containing medicine to treat hepatitis C or Human Immunodeficiency Virus (HIV), you should continue to take your medicine as prescribed by your healthcare provider   - Talk to your healthcare provider if you do not feel better or if you feel worse after 5 days  Who should generally not take PAXLOVID? Do not take PAXLOVID if:  You are allergic to nirmatrelvir, ritonavir, or any of the ingredients in PAXLOVID  You are taking any of the following medicines:  - Alfuzosin  - Pethidine, piroxicam, propoxyphene  - Ranolazine  - Amiodarone, dronedarone, flecainide, propafenone, quinidine  - Colchicine  - Lurasidone, pimozide, clozapine  - Dihydroergotamine, ergotamine, methylergonovine  - Lovastatin, simvastatin  - Sildenafil (Revatio®) for pulmonary arterial hypertension (PAH)  - Triazolam, oral midazolam  - Apalutamide  - Carbamazepine, phenobarbital, phenytoin  - Rifampin  - St  Walters Wort (hypericum perforatum)    What are the important possible side effects of PAXLOVID? Possible side effects of PAXLOVID are:  - Liver Problems   Tell your healthcare provider right away if you have any of these signs and symptoms of liver problems: loss of appetite, yellowing of your skin and the whites of eyes (jaundice), dark-colored urine, pale colored stools and itchy skin, stomach area (abdominal) pain  - Resistance to HIV Medicines  If you have untreated HIV infection, PAXLOVID may lead to some HIV medicines not working as well in the future  - Other possible side effects include: altered sense of taste, diarrhea, high blood pressure, or muscle aches    These are not all the possible side effects of PAXLOVID  Not many people have taken PAXLOVID  Serious and unexpected side effects may happen  [address] is still being studied, so it is possible that all of the risks are not known at this time  What other treatment choices are there? Like Favio Blake may allow for the emergency use of other medicines to treat people with COVID-19  Go to https://[employer]/ for information on the emergency use of other medicines that are authorized by FDA to treat people with COVID-19  Your healthcare provider may talk with you about clinical trials for which you may be eligible  It is your choice to be treated or not to be treated with PAXLOVID  Should you decide not to receive it or for your child not to receive it, it will not change your standard medical care  What if I am pregnant or breastfeeding? There is no experience treating pregnant women or breastfeeding mothers with PAXLOVID  For a mother and unborn baby, the benefit of taking PAXLOVID may be greater than the risk from the treatment  If you are pregnant, discuss your options and specific situation with your healthcare provider  It is recommended that you use effective barrier contraception or do not have sexual activity while taking PAXLOVID      If you are breastfeeding, discuss your options and specific situation with your healthcare provider  How do I report side effects with PAXLOVID? Contact your healthcare provider if you have any side effects that bother you or do not go away  Report side effects to FDA MedWatch at www fda gov/medwatch or call 8-326-OSU7412 or you can report side effects to [facility]  at the contact information provided below  Website Fax number Telephone number   [place] [phone]     How should I store Connie Flynnbert? Store PAXLOVID tablets at room temperature between 68°F to 77°F (20°C to 25°C)  Full fact sheet for patients, parents, and caregivers can be found at: [employer]    I have spent 15 minutes directly with the patient  Encounter provider: Renie Najjar, CRNP     Provider located at: [address] [place] [id number]     Recent Visits  Date Type Provider Dept   08/17/22 Telephone Celine Boone [facility]   Showing recent visits within past 7 days and meeting all other requirements  Today's Visits  Date Type Provider Dept   08/19/22 CARLOS Brooks  [employer] [facility] [place]   Showing today's visits and meeting all other requirements  Future Appointments  No visits were found meeting these conditions  Showing future appointments within next 150 days and meeting all other requirements     This virtual check-in was done via telephone and he agrees to proceed  Patient agrees to participate in a virtual check in via telephone or video visit instead of presenting to the office to address urgent/immediate medical needs  Patient is aware this is a billable service  He acknowledged consent and understanding of privacy and security of the video platform   The patient has agreed to participate and understands they can discontinue the visit at any time  After connecting through Telephone, the patient was identified by name and date of birth  Mae Etienne  was informed that this was a telemedicine visit and that the exam was being conducted confidentially over secure lines  My office door was closed  No one else was in the room  Mae Etienne  acknowledged consent and understanding of privacy and security of the telemedicine visit  I informed the patient that I have reviewed his record in Epic and presented the opportunity for him to ask any questions regarding the visit today  The patient agreed to participate  It was my intent to perform this visit via video technology but the patient was not able to do a video connection so the visit was completed via audio telephone only  Verification of patient location:  Patient is located in the following state in which I hold an active license: [place]    Subjective:   Mae Etienne  is a 81 y o  male who has been screened for COVID-19  Patient's symptoms include nasal congestion, rhinorrhea, sore throat and cough (intermittent productive )  Patient denies fever (98 2), chills, anosmia, loss of taste, shortness of breath, chest tightness, abdominal pain, nausea, vomiting, diarrhea, myalgias and headaches  - Date of symptom onset: 8/17/2022  - Date of positive COVID-19 test: 8/19/2022  Type of test: Rapid antigen  COVID-19 vaccination status: Fully vaccinated with booster    Garfield Severino has been staying home and has isolated themselves in his home  He is taking care to not share personal items and is cleaning all surfaces that are touched often, like counters, tabletops, and doorknobs using household cleaning sprays or wipes  He is wearing a mask when he leaves his room  Earache, he does wear 2 hearing aids       Lab Results   Component Value Date    SARSCOV2 Negative 04/10/2022    SARSCOV2 Not Detected 03/24/2020    SARSCOVAG Positive (A) 08/19/2022     Past Medical History:   Diagnosis Date    Abnormal electrocardiogram     Last assessed: Oct 11, 2013    Allergic rhinitis     last assessed: Oct 11, 2013    Allergic rhinitis due to pollen     last assessed: Oct 10, 2013    Allergic sinusitis     Last assessed: May 11, 2015    Allergy     resolved: July 22, 2015    Benign essential hypertension     Last assessed/resolved: May 31, 2017    BPH (benign prostatic hyperplasia)     Cancer [facility])     prostate    Colitis     Last assessed: May 24, 2016    Cough with hemoptysis 11/17/2020    COVID-19 08/18/2022    Generalized osteoarthritis     Last assessed: Oct 11, 2013    GERD without esophagitis     Last assessed/resolved: May 31, 2017    Hearing loss     Hiatal hernia     resolved: July 22, 2015    History of gastroesophageal reflux (GERD)     History of stomach ulcers     last assessed: May 11, 2015    Hypertension     Incomplete bladder emptying     Kidney stone     Nodular prostate with lower urinary tract symptoms     Nontoxic single thyroid nodule     last assessed: April 16, 2014    Orchalgia     Overweight     last assessed: Oct 31, 2013    Poor urinary stream     Pulmonary embolism [facility]     Salivary gland disorder     last assessed: April 16, 2014    Seasonal allergies     last assessed: May 15, 2015    Spermatocele     Straining to void     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:  May 7, 2014     Past Surgical History:   Procedure Laterality Date    BLADDER SURGERY      CHOLECYSTECTOMY  2000    laparoscopic     FLAP LOCAL TRUNK Left 10/28/2021    Procedure: EXCISION OF FLANK MASS;  Surgeon: Carter Acosta DO;  Location: [address] [place] [address] OR;  Service: Plastics    HEMORRHOID SURGERY      pilionidal cyst removal     HERNIA REPAIR Left 01/17/2008    inguinal     KNEE ARTHROSCOPY Left 1974    KNEE CARTILAGE SURGERY      NASAL SEPTUM SURGERY      Deviation repair     CT EXC PAROTD,LAT LOBE,DISSECT 5TH NERV Right 6/9/2021    Procedure: SUPERFICIAL PAROTIDECTOMY WITH FACIAL NERVE MONITORING;  Surgeon: Lenard Hines MD;  [facility];  Service: ENT    PROSTATE BIOPSY  2017    STOMACH SURGERY      TONSILLECTOMY AND ADENOIDECTOMY      at age 37   [address] - right 01/04 0 left 01/95    US GUIDED THYROID BIOPSY  1/19/2022    US GUIDED THYROID BIOPSY  5/26/2022    VASECTOMY       Current Outpatient Medications   Medication Sig Dispense Refill    allopurinol (ZYLOPRIM) 100 mg tablet Take 1 tablet (100 mg total) by mouth 2 (two) times a day 180 tablet 1    amoxicillin (AMOXIL) 500 mg capsule TAKE 4 CAPSULES 1 HOUR PRIOR TO APPOINTMENT      aspirin (ECOTRIN LOW STRENGTH) 81 mg EC tablet Take 81 mg by mouth daily      Cholecalciferol (VITAMIN D3 PO) Take by mouth 2000 IU      gabapentin (Neurontin) 400 mg capsule Take 1 capsule (400 mg total) by mouth in the morning and 1 capsule (400 mg total) in the evening  90 capsule 1    levocetirizine (XYZAL) 5 MG tablet Take 1 tablet (5 mg total) by mouth every evening 90 tablet 4    losartan (COZAAR) 100 MG tablet Take 1 tablet (100 mg total) by mouth in the morning  90 tablet 1    nirmatrelvir & ritonavir (Paxlovid, 300/100,) tablet therapy pack Take 3 tablets by mouth 2 (two) times a day for 5 days Take 2 nirmatrelvir tablets + 1 ritonavir tablet together per dose 30 tablet 0    tamsulosin (FLOMAX) 0 4 mg Take 1 capsule (0 4 mg total) by mouth daily with dinner 90 capsule 1    traMADol-acetaminophen (ULTRACET) 37 5-325 mg per tablet Take 2 tablets by mouth every 6 (six) hours as needed for moderate pain 60 tablet 0    polyethylene glycol (GOLYTELY) 4000 mL solution Take 4,000 mL by mouth once for 1 dose 4000 mL 0     No current facility-administered medications for this visit  Allergies   Allergen Reactions    Ace Inhibitors Hyperactivity     Review of Systems   Constitutional: Negative for chills and fever (98 2)     HENT: Positive for congestion, rhinorrhea and sore throat  Respiratory: Positive for cough (intermittent productive )  Negative for chest tightness and shortness of breath  Gastrointestinal: Negative for abdominal pain, diarrhea, nausea and vomiting  Musculoskeletal: Negative for myalgias  Neurological: Negative for headaches       Objective:    Vitals:    08/19/22 1433   Temp: (!) 96 2 °F (35 7 °C)   SpO2: 93%   Weight: 104 kg (229 lb)   Height: 5' 8" (1 727 m)

## 2022-08-19 NOTE — ASSESSMENT & PLAN NOTE
- start Paxlovid as prescribed   - advised he can use Mucinex DM  - continue vitamin D3  - Hold tamsulosin while on medication  - discussed CDC isolation criteria with patient

## 2022-08-20 ENCOUNTER — NURSE TRIAGE (OUTPATIENT)
Dept: OTHER | Facility: OTHER | Age: 82
End: 2022-08-20

## 2022-08-20 NOTE — TELEPHONE ENCOUNTER
Reason for Disposition   Caller has medicine question only, adult not sick, AND triager answers question    Answer Assessment - Initial Assessment Questions  1  NAME of MEDICATION: "What medicine are you calling about?"      paxlovid    2   QUESTION: "What is your question?" (e g , medication refill, side effect)      Administration instructions    Protocols used: [facility]ST[facility]

## 2022-08-20 NOTE — TELEPHONE ENCOUNTER
Regarding: COVID medication question   ----- Message from Farzana Romano sent at 8/20/2022  6:07 PM EDT -----  "my  tested positive for covid, the doctor called in a script  when i went to pick them up there were 5 packs with 6 pills in each pack, one marked AM/PM  i just want to make sure we shouldnt be taking like 6 pills a day (paxlovid) "

## 2022-08-29 ENCOUNTER — OFFICE VISIT (OUTPATIENT)
Dept: INTERNAL MEDICINE CLINIC | Facility: CLINIC | Age: 82
End: 2022-08-29
Payer: COMMERCIAL

## 2022-08-29 VITALS
HEIGHT: 67 IN | WEIGHT: 233 LBS | OXYGEN SATURATION: 97 % | BODY MASS INDEX: 36.57 KG/M2 | SYSTOLIC BLOOD PRESSURE: 142 MMHG | HEART RATE: 80 BPM | TEMPERATURE: 98.5 F | DIASTOLIC BLOOD PRESSURE: 88 MMHG

## 2022-08-29 DIAGNOSIS — U07.1 COVID-19: Primary | ICD-10-CM

## 2022-08-29 DIAGNOSIS — J20.8 ACUTE BRONCHITIS DUE TO OTHER SPECIFIED ORGANISMS: ICD-10-CM

## 2022-08-29 PROCEDURE — 1160F RVW MEDS BY RX/DR IN RCRD: CPT | Performed by: INTERNAL MEDICINE

## 2022-08-29 PROCEDURE — 3079F DIAST BP 80-89 MM HG: CPT | Performed by: INTERNAL MEDICINE

## 2022-08-29 PROCEDURE — 3725F SCREEN DEPRESSION PERFORMED: CPT | Performed by: INTERNAL MEDICINE

## 2022-08-29 PROCEDURE — 99213 OFFICE O/P EST LOW 20 MIN: CPT | Performed by: INTERNAL MEDICINE

## 2022-08-29 PROCEDURE — 3077F SYST BP >= 140 MM HG: CPT | Performed by: INTERNAL MEDICINE

## 2022-08-29 RX ORDER — BENZONATATE 100 MG/1
100 CAPSULE ORAL 3 TIMES DAILY PRN
Qty: 30 CAPSULE | Refills: 0 | Status: SHIPPED | OUTPATIENT
Start: 2022-08-29 | End: 2022-09-23

## 2022-08-29 NOTE — ASSESSMENT & PLAN NOTE
Acute bronchitis associated with COVID-19 infection  Patient is 10 days out from diagnosis  No clinical evidence of pneumonia  Patient is afebrile oxygen saturation 97% on room air  Will continue with mucolytic therapy and antitussive therapy

## 2022-08-29 NOTE — PROGRESS NOTES
Assessment/Plan:    Acute bronchitis due to other specified organisms  Acute bronchitis associated with COVID-19 infection  Patient is 10 days out from diagnosis  No clinical evidence of pneumonia  Patient is afebrile oxygen saturation 97% on room air  Will continue with mucolytic therapy and antitussive therapy  COVID-19  Patient is 10 days out from diagnosis  Continues with a nonproductive cough  No associated dyspnea  O2 sat% is satisfactory  No evidence of bronchospasm  Patient to continue Mucinex 600 mg 2 tablets b i d  And we will add benzonatate capsules 100 mg every 8 hours with an option to increase to 200 for severe cough       Diagnoses and all orders for this visit:    COVID-19  -     benzonatate (TESSALON PERLES) 100 mg capsule; Take 1 capsule (100 mg total) by mouth 3 (three) times a day as needed for cough (May take 1 extra tablet as needed if cough is severe)    Acute bronchitis due to other specified organisms  -     benzonatate (TESSALON PERLES) 100 mg capsule; Take 1 capsule (100 mg total) by mouth 3 (three) times a day as needed for cough (May take 1 extra tablet as needed if cough is severe)          Subjective:      Patient ID: Luwanna Goltz  is a 81 y o  male  Patient presents to the office for follow-up visit  He was diagnosed with COVID-19 10 days ago and is concerned about the continuing cough he is experiencing  He is also experiencing some rhinorrhea but no sinus pain  He denies headache  He has had no fever or chills  Cough is nonproductive for minimally productive and has been affecting the patient's ability to sleep  He denies any dyspnea or wheezing  He has been taking Mucinex tablets 2 tablets every 12 hours  He denies any diaphoresis, nausea, vomiting, diarrhea  Has had no change in his sense of taste and smell        Family History   Problem Relation Age of Onset    Hypertension Mother     Stroke Mother     Stomach cancer Father     Hypertension Father  Hypertension Family     Stroke Family         syndrome     Heart attack Son      Social History     Socioeconomic History    Marital status: /Civil Union     Spouse name: Bassam Ceron Number of children: 1    Years of education: Not on file    Highest education level: Not on file   Occupational History    Occupation: Retired     Occupation: securtity      Comment: [employer]    Tobacco Use    Smoking status: Former Smoker     Packs/day: 1 00     Years: 30 00     Pack years: 30 00     Types: Cigarettes     Start date:      Quit date:      Years since quittin 6    Smokeless tobacco: Never Used   Vaping Use    Vaping Use: Never used   Substance and Sexual Activity    Alcohol use: Yes     Comment: rare    Drug use: No    Sexual activity: Yes     Partners: Female   Other Topics Concern    Not on file   Social History Narrative    Who lives in your home: wife     What type of home do you live in: Single house    Age of your home: Built [birth year]     How long have you been living there:     Type of heat: Baseboard    Type of fuel: Electric    What type of elaine is in your bedroom: [address]     Do you have the following in or near your home:    Air products: Central air    Pests: None    Pets: None    Are pets allowed in bedroom: N/A    Open fields, wooded areas nearby: Open fields and Wooded areas    Basement: Finished    Exposure to second hand smoke: No        Habits:    Caffeine: Coffee 1 cup of coffee -peach snapple few a week     Chocolate: rare     Other:     Social Determinants of Health     Financial Resource Strain: Not on file   Food Insecurity: Not on file   Transportation Needs: No Transportation Needs    Lack of Transportation (Medical): No    Lack of Transportation (Non-Medical):  No   Physical Activity: Not on file   Stress: Not on file   Social Connections: Not on file   Intimate Partner Violence: Not on file   Housing Stability: Unknown    Unable to Pay for Housing in the Last Year: No    Number of Places Lived in the Last Year: Not on file    Unstable Housing in the Last Year: No     Past Medical History:   Diagnosis Date    Abnormal electrocardiogram     Last assessed: Oct 11, 2013    Allergic rhinitis     last assessed: Oct 11, 2013    Allergic rhinitis due to pollen     last assessed: Oct 10, 2013    Allergic sinusitis     Last assessed: May 11, 2015    Allergy     resolved: July 22, 2015    Benign essential hypertension     Last assessed/resolved: May 31, 2017    BPH (benign prostatic hyperplasia)     Cancer [facility])     prostate    Colitis     Last assessed: May 24, 2016    Cough with hemoptysis 11/17/2020    COVID-19 08/18/2022    Generalized osteoarthritis     Last assessed: Oct 11, 2013    GERD without esophagitis     Last assessed/resolved: May 31, 2017    Hearing loss     Hiatal hernia     resolved: July 22, 2015    History of gastroesophageal reflux (GERD)     History of stomach ulcers     last assessed: May 11, 2015    Hypertension     Incomplete bladder emptying     Kidney stone     Nodular prostate with lower urinary tract symptoms     Nontoxic single thyroid nodule     last assessed: April 16, 2014    Orchalgia     Overweight     last assessed: Oct 31, 2013    Poor urinary stream     Pulmonary embolism [facility])     Salivary gland disorder     last assessed: April 16, 2014    Seasonal allergies     last assessed: May 15, 2015    Spermatocele     Straining to void     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:  May 7, 2014       Current Outpatient Medications:     allopurinol (ZYLOPRIM) 100 mg tablet, Take 1 tablet (100 mg total) by mouth 2 (two) times a day, Disp: 180 tablet, Rfl: 1    aspirin (ECOTRIN LOW STRENGTH) 81 mg EC tablet, Take 81 mg by mouth daily, Disp: , Rfl:     benzonatate (TESSALON PERLES) 100 mg capsule, Take 1 capsule (100 mg total) by mouth 3 (three) times a day as needed for cough (May take 1 extra tablet as needed if cough is severe), Disp: 30 capsule, Rfl: 0    Cholecalciferol (VITAMIN D3 PO), Take by mouth 2000 IU, Disp: , Rfl:     gabapentin (Neurontin) 400 mg capsule, Take 1 capsule (400 mg total) by mouth in the morning and 1 capsule (400 mg total) in the evening , Disp: 90 capsule, Rfl: 1    levocetirizine (XYZAL) 5 MG tablet, Take 1 tablet (5 mg total) by mouth every evening, Disp: 90 tablet, Rfl: 4    tamsulosin (FLOMAX) 0 4 mg, Take 1 capsule (0 4 mg total) by mouth daily with dinner, Disp: 90 capsule, Rfl: 1    traMADol-acetaminophen (ULTRACET) 37 5-325 mg per tablet, Take 2 tablets by mouth every 6 (six) hours as needed for moderate pain, Disp: 60 tablet, Rfl: 0    amoxicillin (AMOXIL) 500 mg capsule, TAKE 4 CAPSULES 1 HOUR PRIOR TO APPOINTMENT (Patient not taking: Reported on 8/29/2022), Disp: , Rfl:     losartan (COZAAR) 100 MG tablet, Take 1 tablet (100 mg total) by mouth in the morning , Disp: 90 tablet, Rfl: 1    polyethylene glycol (GOLYTELY) 4000 mL solution, Take 4,000 mL by mouth once for 1 dose (Patient not taking: Reported on 8/29/2022), Disp: 4000 mL, Rfl: 0  Allergies   Allergen Reactions    Ace Inhibitors Hyperactivity     Past Surgical History:   Procedure Laterality Date    BLADDER SURGERY      CHOLECYSTECTOMY  2000    laparoscopic     FLAP LOCAL TRUNK Left 10/28/2021    Procedure: EXCISION OF FLANK MASS;  Surgeon: Diane Lima DO;  Location[facility] MAIN OR;  Service: Plastics    HEMORRHOID SURGERY      pilionidal cyst removal     HERNIA REPAIR Left 01/17/2008    inguinal     KNEE ARTHROSCOPY Left 1974    KNEE CARTILAGE SURGERY      NASAL SEPTUM SURGERY      Deviation repair     [place] EXC PAROTD,LAT LOBE,DISSECT 5TH NERV Right 6/9/2021    Procedure: SUPERFICIAL PAROTIDECTOMY WITH FACIAL NERVE MONITORING;  Surgeon: Benita Rodriguez MD;  [facility];  Service: ENT    PROSTATE BIOPSY  2017    STOMACH SURGERY      TONSILLECTOMY AND ADENOIDECTOMY      at age 37   [facility] Agustin TOTAL SHOULDER ARTHROPLASTY      SHOULDER JOINT REPLACEMENT - right 01/04 0 left 01/95    US GUIDED THYROID BIOPSY  1/19/2022    US GUIDED THYROID BIOPSY  5/26/2022    VASECTOMY           Review of Systems   Constitutional: Negative  Negative for activity change, appetite change, chills, diaphoresis, fatigue and fever  HENT: Positive for congestion and hearing loss (Uses hearing aids bilaterally)  Negative for postnasal drip, sneezing, sore throat and trouble swallowing  Eyes: Negative  Respiratory: Positive for cough  Negative for shortness of breath  Cardiovascular: Negative  Gastrointestinal: Negative  Endocrine: Negative  Genitourinary: Negative  Musculoskeletal: Negative  Skin: Negative  Allergic/Immunologic: Negative  Neurological: Negative  Hematological: Negative  Psychiatric/Behavioral: Negative  Objective:      /88 (BP Location: Right arm, Patient Position: Sitting, Cuff Size: Standard)   Pulse 80   Temp 98 5 °F (36 9 °C)   Ht 5' 6 54" (1 69 m)   Wt 106 kg (233 lb)   SpO2 97%   BMI 37 00 kg/m²          Physical Exam  Vitals reviewed  Constitutional:       General: He is not in acute distress  Appearance: Normal appearance  He is obese  He is not ill-appearing, toxic-appearing or diaphoretic  HENT:      Head: Normocephalic and atraumatic  Right Ear: External ear normal       Left Ear: External ear normal    Eyes:      General: No scleral icterus  Conjunctiva/sclera: Conjunctivae normal       Pupils: Pupils are equal, round, and reactive to light  Neck:      Vascular: No carotid bruit  Cardiovascular:      Rate and Rhythm: Normal rate and regular rhythm  Pulses: Normal pulses  Heart sounds: Normal heart sounds  No murmur heard  Pulmonary:      Effort: Pulmonary effort is normal  No respiratory distress  Breath sounds: Normal breath sounds  No stridor  No wheezing, rhonchi or rales     Abdominal:      General: Abdomen is flat  There is no distension  Musculoskeletal:         General: No swelling  Cervical back: Neck supple  No rigidity  Right lower leg: No edema  Left lower leg: No edema  Lymphadenopathy:      Cervical: No cervical adenopathy  Skin:     General: Skin is warm and dry  Capillary Refill: Capillary refill takes less than 2 seconds  Coloration: Skin is not jaundiced  Findings: No bruising, erythema or rash  Neurological:      General: No focal deficit present  Mental Status: He is alert and oriented to person, place, and time  Mental status is at baseline     Psychiatric:         Behavior: Behavior normal

## 2022-08-29 NOTE — ASSESSMENT & PLAN NOTE
Patient is 10 days out from diagnosis  Continues with a nonproductive cough  No associated dyspnea  O2 sat% is satisfactory  No evidence of bronchospasm  Patient to continue Mucinex 600 mg 2 tablets b i d   And we will add benzonatate capsules 100 mg every 8 hours with an option to increase to 200 for severe cough

## 2022-09-22 ENCOUNTER — ANESTHESIA (OUTPATIENT)
Dept: GASTROENTEROLOGY | Facility: HOSPITAL | Age: 82
End: 2022-09-22

## 2022-09-22 ENCOUNTER — HOSPITAL ENCOUNTER (OUTPATIENT)
Dept: GASTROENTEROLOGY | Facility: HOSPITAL | Age: 82
Setting detail: OUTPATIENT SURGERY
End: 2022-09-22
Attending: STUDENT IN AN ORGANIZED HEALTH CARE EDUCATION/TRAINING PROGRAM
Payer: COMMERCIAL

## 2022-09-22 ENCOUNTER — ANESTHESIA EVENT (OUTPATIENT)
Dept: GASTROENTEROLOGY | Facility: HOSPITAL | Age: 82
End: 2022-09-22

## 2022-09-22 VITALS
TEMPERATURE: 97.8 F | BODY MASS INDEX: 35.63 KG/M2 | WEIGHT: 227 LBS | HEIGHT: 67 IN | SYSTOLIC BLOOD PRESSURE: 159 MMHG | HEART RATE: 69 BPM | RESPIRATION RATE: 16 BRPM | OXYGEN SATURATION: 95 % | DIASTOLIC BLOOD PRESSURE: 105 MMHG

## 2022-09-22 DIAGNOSIS — K29.60 EROSIVE GASTRITIS: Primary | ICD-10-CM

## 2022-09-22 DIAGNOSIS — Z12.11 SCREENING FOR COLON CANCER: ICD-10-CM

## 2022-09-22 DIAGNOSIS — K56.609 SMALL BOWEL OBSTRUCTION (HCC): ICD-10-CM

## 2022-09-22 DIAGNOSIS — K31.1 GASTRIC OUTLET OBSTRUCTION: ICD-10-CM

## 2022-09-22 PROCEDURE — 45380 COLONOSCOPY AND BIOPSY: CPT | Performed by: STUDENT IN AN ORGANIZED HEALTH CARE EDUCATION/TRAINING PROGRAM

## 2022-09-22 PROCEDURE — 45385 COLONOSCOPY W/LESION REMOVAL: CPT | Performed by: STUDENT IN AN ORGANIZED HEALTH CARE EDUCATION/TRAINING PROGRAM

## 2022-09-22 PROCEDURE — 43239 EGD BIOPSY SINGLE/MULTIPLE: CPT | Performed by: STUDENT IN AN ORGANIZED HEALTH CARE EDUCATION/TRAINING PROGRAM

## 2022-09-22 PROCEDURE — 88305 TISSUE EXAM BY PATHOLOGIST: CPT | Performed by: PATHOLOGY

## 2022-09-22 RX ORDER — OMEPRAZOLE 40 MG/1
40 CAPSULE, DELAYED RELEASE ORAL DAILY
Qty: 30 CAPSULE | Refills: 11 | Status: SHIPPED | OUTPATIENT
Start: 2022-09-22 | End: 2022-10-14

## 2022-09-22 RX ORDER — PROPOFOL 10 MG/ML
INJECTION, EMULSION INTRAVENOUS AS NEEDED
Status: DISCONTINUED | OUTPATIENT
Start: 2022-09-22 | End: 2022-09-22

## 2022-09-22 RX ORDER — SODIUM CHLORIDE 9 MG/ML
125 INJECTION, SOLUTION INTRAVENOUS CONTINUOUS
Status: DISCONTINUED | OUTPATIENT
Start: 2022-09-22 | End: 2022-09-26 | Stop reason: HOSPADM

## 2022-09-22 RX ORDER — LABETALOL HYDROCHLORIDE 5 MG/ML
INJECTION, SOLUTION INTRAVENOUS AS NEEDED
Status: DISCONTINUED | OUTPATIENT
Start: 2022-09-22 | End: 2022-09-22

## 2022-09-22 RX ADMIN — PROPOFOL 50 MG: 10 INJECTION, EMULSION INTRAVENOUS at 16:11

## 2022-09-22 RX ADMIN — PROPOFOL 20 MG: 10 INJECTION, EMULSION INTRAVENOUS at 15:36

## 2022-09-22 RX ADMIN — PROPOFOL 50 MG: 10 INJECTION, EMULSION INTRAVENOUS at 16:00

## 2022-09-22 RX ADMIN — PROPOFOL 30 MG: 10 INJECTION, EMULSION INTRAVENOUS at 16:23

## 2022-09-22 RX ADMIN — LABETALOL HYDROCHLORIDE 10 MG: 5 INJECTION, SOLUTION INTRAVENOUS at 16:29

## 2022-09-22 RX ADMIN — PROPOFOL 50 MG: 10 INJECTION, EMULSION INTRAVENOUS at 15:48

## 2022-09-22 RX ADMIN — PROPOFOL 50 MG: 10 INJECTION, EMULSION INTRAVENOUS at 15:55

## 2022-09-22 RX ADMIN — LABETALOL HYDROCHLORIDE 10 MG: 5 INJECTION, SOLUTION INTRAVENOUS at 15:30

## 2022-09-22 RX ADMIN — SODIUM CHLORIDE: 0.9 INJECTION, SOLUTION INTRAVENOUS at 16:19

## 2022-09-22 RX ADMIN — SODIUM CHLORIDE 125 ML/HR: 0.9 INJECTION, SOLUTION INTRAVENOUS at 14:10

## 2022-09-22 RX ADMIN — PROPOFOL 50 MG: 10 INJECTION, EMULSION INTRAVENOUS at 16:05

## 2022-09-22 RX ADMIN — PROPOFOL 50 MG: 10 INJECTION, EMULSION INTRAVENOUS at 15:41

## 2022-09-22 RX ADMIN — PROPOFOL 30 MG: 10 INJECTION, EMULSION INTRAVENOUS at 16:18

## 2022-09-22 RX ADMIN — PROPOFOL 20 MG: 10 INJECTION, EMULSION INTRAVENOUS at 15:31

## 2022-09-22 RX ADMIN — PROPOFOL 100 MG: 10 INJECTION, EMULSION INTRAVENOUS at 15:26

## 2022-09-22 RX ADMIN — PROPOFOL 30 MG: 10 INJECTION, EMULSION INTRAVENOUS at 15:29

## 2022-09-22 RX ADMIN — PROPOFOL 30 MG: 10 INJECTION, EMULSION INTRAVENOUS at 15:34

## 2022-09-22 RX ADMIN — PROPOFOL 20 MG: 10 INJECTION, EMULSION INTRAVENOUS at 16:30

## 2022-09-22 NOTE — H&P
History and Physical - [facility]  Adam Ibarra  81 y o  male MRN: [id number]                  HPI: Adam Ibarra  is a 81y o  year old male who presents for abdominal pain, diarrhea  REVIEW OF SYSTEMS: Per the HPI, and otherwise unremarkable  Historical Information   Past Medical History:   Diagnosis Date    Abnormal electrocardiogram     Last assessed: Oct 11, 2013    Allergic rhinitis     last assessed: Oct 11, 2013    Allergic rhinitis due to pollen     last assessed: Oct 10, 2013    Allergic sinusitis     Last assessed: May 11, 2015    Allergy     resolved: July 22, 2015    Benign essential hypertension     Last assessed/resolved: May 31, 2017    BPH (benign prostatic hyperplasia)     Cancer [facility])     prostate    Colitis     Last assessed: May 24, 2016    Cough with hemoptysis 11/17/2020    COVID-19 08/18/2022    Generalized osteoarthritis     Last assessed: Oct 11, 2013    GERD without esophagitis     Last assessed/resolved: May 31, 2017    Hearing loss     Hiatal hernia     resolved: July 22, 2015    History of gastroesophageal reflux (GERD)     History of stomach ulcers     last assessed: May 11, 2015    Hypertension     Incomplete bladder emptying     Kidney stone     Nodular prostate with lower urinary tract symptoms     Nontoxic single thyroid nodule     last assessed: April 16, 2014    Orchalgia     Overweight     last assessed: Oct 31, 2013    Poor urinary stream     Pulmonary embolism [facility])     Salivary gland disorder     last assessed: April 16, 2014    Seasonal allergies     last assessed: May 15, 2015    Spermatocele     Straining to void     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:  May 7, 2014     Past Surgical History:   Procedure Laterality Date    BLADDER SURGERY      CHOLECYSTECTOMY  2000    laparoscopic     FLAP LOCAL TRUNK Left 10/28/2021    Procedure: EXCISION OF FLANK MASS;  Surgeon: Seda Mcconnell DO;  Location:  MAIN OR; Service: Plastics    HEMORRHOID SURGERY      pilionidal cyst removal     HERNIA REPAIR Left 2008    inguinal     KNEE ARTHROSCOPY Left     KNEE CARTILAGE SURGERY      NASAL SEPTUM SURGERY      Deviation repair     [place] EXC PAROTD,LAT LOBE,DISSECT 5TH NERV Right 2021    Procedure: SUPERFICIAL PAROTIDECTOMY WITH FACIAL NERVE MONITORING;  Surgeon: Xochitl Oliveira MD;  [facility];  Service: ENT    PROSTATE BIOPSY  2017    STOMACH SURGERY      TONSILLECTOMY AND ADENOIDECTOMY      at age 37   [address] [facility] - right  0 left     US GUIDED THYROID BIOPSY  2022    US GUIDED THYROID BIOPSY  2022    VASECTOMY       Social History   Social History     Substance and Sexual Activity   Alcohol Use Yes    Comment: rare     Social History     Substance and Sexual Activity   Drug Use No     Social History     Tobacco Use   Smoking Status Former Smoker    Packs/day: 1     Years: 30     Pack years: 30     Types: Cigarettes    Start date:     Quit date: 5    Years since quittin 7   Smokeless Tobacco Never Used     Family History   Problem Relation Age of Onset    Hypertension Mother     Stroke Mother     Stomach cancer Father     Hypertension Father     Hypertension Family     Stroke Family         syndrome     Heart attack Son        Meds/Allergies       Current Outpatient Medications:     allopurinol (ZYLOPRIM) 100 mg tablet    aspirin (ECOTRIN LOW STRENGTH) 81 mg EC tablet    Cholecalciferol (VITAMIN D3 PO)    gabapentin (Neurontin) 400 mg capsule    levocetirizine (XYZAL) 5 MG tablet    losartan (COZAAR) 100 MG tablet    tamsulosin (FLOMAX) 0 4 mg    amoxicillin (AMOXIL) 500 mg capsule    benzonatate (TESSALON PERLES) 100 mg capsule    polyethylene glycol (GOLYTELY) 4000 mL solution    traMADol-acetaminophen (ULTRACET) 37 5-325 mg per tablet    Current Facility-Administered Medications:    sodium chloride 0 9 % infusion, 125 mL/hr, Intravenous, Continuous, 125 mL/hr at 09/22/22 1410    Allergies   Allergen Reactions    Ace Inhibitors Hyperactivity       Objective     BP (!) 232/131 Comment: checked manually as well, this is accurate  Pulse 76   Temp 98 6 °F (37 °C) (Tympanic)   Resp 18   Ht 5' 7" (1 702 m)   Wt 103 kg (227 lb)   SpO2 96%   BMI 35 55 kg/m²       PHYSICAL EXAM    Gen: NAD  Head: NCAT  CV: RRR  CHEST: Clear  ABD: soft, NT/ND  EXT: no edema      ASSESSMENT/PLAN:  This is a 81y o  year old male here for EGD/colonoscopy, and he is stable and optimized for his procedure

## 2022-09-22 NOTE — ANESTHESIA POSTPROCEDURE EVALUATION
Post-Op Assessment Note    CV Status:  Stable  Pain Score: 0    Pain management: adequate     Mental Status:  Alert and awake   Hydration Status:  Euvolemic   PONV Controlled:  Controlled   Airway Patency:  Patent      Post Op Vitals Reviewed: Yes      Staff: CRNA, Anesthesiologist         No complications documented      /92 (09/22/22 1637)    Temp 97 8 °F (36 6 °C) (09/22/22 1637)    Pulse 69 (09/22/22 1637)   Resp 20 (09/22/22 1637)    SpO2 95 % (09/22/22 1637)

## 2022-09-23 ENCOUNTER — OFFICE VISIT (OUTPATIENT)
Dept: INTERNAL MEDICINE CLINIC | Facility: OTHER | Age: 82
End: 2022-09-23
Payer: COMMERCIAL

## 2022-09-23 VITALS
SYSTOLIC BLOOD PRESSURE: 158 MMHG | HEART RATE: 67 BPM | TEMPERATURE: 98.4 F | BODY MASS INDEX: 36.47 KG/M2 | WEIGHT: 232.4 LBS | HEIGHT: 67 IN | OXYGEN SATURATION: 99 % | DIASTOLIC BLOOD PRESSURE: 100 MMHG

## 2022-09-23 DIAGNOSIS — I10 BENIGN ESSENTIAL HYPERTENSION: Primary | Chronic | ICD-10-CM

## 2022-09-23 PROCEDURE — 3080F DIAST BP >= 90 MM HG: CPT | Performed by: NURSE PRACTITIONER

## 2022-09-23 PROCEDURE — 3077F SYST BP >= 140 MM HG: CPT | Performed by: NURSE PRACTITIONER

## 2022-09-23 PROCEDURE — 99214 OFFICE O/P EST MOD 30 MIN: CPT | Performed by: NURSE PRACTITIONER

## 2022-09-23 PROCEDURE — 1160F RVW MEDS BY RX/DR IN RCRD: CPT | Performed by: NURSE PRACTITIONER

## 2022-09-23 RX ORDER — DILTIAZEM HYDROCHLORIDE 180 MG/1
180 CAPSULE, COATED, EXTENDED RELEASE ORAL DAILY
Qty: 30 CAPSULE | Refills: 2 | Status: SHIPPED | OUTPATIENT
Start: 2022-09-23

## 2022-09-23 NOTE — ASSESSMENT & PLAN NOTE
- discussed case with Dr Gabe Hummel  - continue losartan 100mg daily and start Diltiazem 180mg daily  - monitor BP at home and record in log  - follow up in 2 weeks  - discussed red flag symptoms of when to go to ER

## 2022-09-23 NOTE — PROGRESS NOTES
Assessment/Plan:    Problem List Items Addressed This Visit        Cardiovascular and Mediastinum    Benign essential hypertension - Primary (Chronic)     - discussed case with Dr Nova Said  - continue losartan 100mg daily and start Diltiazem 180mg daily  - monitor BP at home and record in log  - follow up in 2 weeks  - discussed red flag symptoms of when to go to ER         Relevant Medications    diltiazem (CARDIZEM CD) 180 mg 24 hr capsule        M*Modal software was used to dictate this note  It may contain errors with dictating incorrect words or incorrect spelling  Please contact the provider directly with any questions  Subjective:      Patient ID: Mae Etienne  is a 81 y o  male  HPI     Patient presents today accompanied by his wife with concern for elevated blood pressure at his colonoscopy yesterday  Upon arrival his BP was 232/131  His wife reports that they told him they would give him BP medication through the IV  Going into the colonoscopy she reports his BP was 202/123  Per the OR records in post op his BP was 152/92  There is also a recorded BP of 159/105  He is asymptomatic denies any headaches, vision changes, chest pain or LE edema  He was previously on diltiazem 180mg until May 2022 when his BP was uncontrolled and he states he was advised to discontinue and start losartan 100mg daily  He has not been monitoring his BP at home  Reviewing his previous office visits it does not appear his BP as been controlled  The following portions of the patient's history were reviewed and updated as appropriate: allergies, current medications, past family history, past medical history, past social history, past surgical history and problem list     Review of Systems   Constitutional: Negative for chills, fever and unexpected weight change  Eyes: Negative for visual disturbance  Respiratory: Negative for chest tightness and shortness of breath      Cardiovascular: Negative for chest pain, palpitations and leg swelling  Neurological: Negative for dizziness, syncope, speech difficulty, weakness, light-headedness and headaches  Psychiatric/Behavioral: Negative for confusion  Past Medical History:   Diagnosis Date    Abnormal electrocardiogram     Last assessed: Oct 11, 2013    Allergic rhinitis     last assessed: Oct 11, 2013    Allergic rhinitis due to pollen     last assessed: Oct 10, 2013    Allergic sinusitis     Last assessed: May 11, 2015    Allergy     resolved: July 22, 2015    Benign essential hypertension     Last assessed/resolved: May 31, 2017    BPH (benign prostatic hyperplasia)     Cancer [facility])     prostate    Colitis     Last assessed: May 24, 2016    Cough with hemoptysis 11/17/2020    COVID-19 08/18/2022    Generalized osteoarthritis     Last assessed: Oct 11, 2013    GERD without esophagitis     Last assessed/resolved: May 31, 2017    Hearing loss     Hiatal hernia     resolved: July 22, 2015    History of gastroesophageal reflux (GERD)     History of stomach ulcers     last assessed: May 11, 2015    Hypertension     Incomplete bladder emptying     Kidney stone     Nodular prostate with lower urinary tract symptoms     Nontoxic single thyroid nodule     last assessed: April 16, 2014    Orchalgia     Overweight     last assessed: Oct 31, 2013    Poor urinary stream     Pulmonary embolism [facility])     Salivary gland disorder     last assessed: April 16, 2014    Seasonal allergies     last assessed: May 15, 2015    Spermatocele     Straining to void     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:  May 7, 2014         Current Outpatient Medications:     allopurinol (ZYLOPRIM) 100 mg tablet, Take 1 tablet (100 mg total) by mouth 2 (two) times a day, Disp: 180 tablet, Rfl: 1    aspirin (ECOTRIN LOW STRENGTH) 81 mg EC tablet, Take 81 mg by mouth daily, Disp: , Rfl:     Cholecalciferol (VITAMIN D3 PO), Take by mouth 2000 IU, Disp: , Rfl:     diltiazem (CARDIZEM CD) 180 mg 24 hr capsule, Take 1 capsule (180 mg total) by mouth daily, Disp: 30 capsule, Rfl: 2    gabapentin (Neurontin) 400 mg capsule, Take 1 capsule (400 mg total) by mouth in the morning and 1 capsule (400 mg total) in the evening , Disp: 90 capsule, Rfl: 1    levocetirizine (XYZAL) 5 MG tablet, Take 1 tablet (5 mg total) by mouth every evening, Disp: 90 tablet, Rfl: 4    losartan (COZAAR) 100 MG tablet, Take 1 tablet (100 mg total) by mouth in the morning , Disp: 90 tablet, Rfl: 1    omeprazole (PriLOSEC) 40 MG capsule, Take 1 capsule (40 mg total) by mouth daily, Disp: 30 capsule, Rfl: 11    tamsulosin (FLOMAX) 0 4 mg, Take 1 capsule (0 4 mg total) by mouth daily with dinner, Disp: 90 capsule, Rfl: 1    traMADol-acetaminophen (ULTRACET) 37 5-325 mg per tablet, Take 2 tablets by mouth every 6 (six) hours as needed for moderate pain, Disp: 60 tablet, Rfl: 0  No current facility-administered medications for this visit      Facility-Administered Medications Ordered in Other Visits:     sodium chloride 0 9 % infusion, 125 mL/hr, Intravenous, Continuous, Latanya Felix MD, Last Rate: 125 mL/hr at 09/22/22 1522, New Bag at 09/22/22 1619    Allergies   Allergen Reactions    Ace Inhibitors Hyperactivity       Social History   Past Surgical History:   Procedure Laterality Date    BLADDER SURGERY      CHOLECYSTECTOMY  2000    laparoscopic     COLONOSCOPY      FLAP LOCAL TRUNK Left 10/28/2021    Procedure: EXCISION OF FLANK MASS;  Surgeon: Alma Delia Fairbanks DO;  Location: [address] [place] [address] MAIN OR;  Service: Plastics    HEMORRHOID SURGERY      pilionidal cyst removal     HERNIA REPAIR Left 01/17/2008    inguinal     KNEE ARTHROSCOPY Left 1974    KNEE CARTILAGE SURGERY      NASAL SEPTUM SURGERY      Deviation repair     [place] EXC PAROTD,LAT LOBE,DISSECT 5TH NERV Right 06/09/2021    Procedure: SUPERFICIAL PAROTIDECTOMY WITH FACIAL NERVE MONITORING;  Surgeon: Brad Titus MD;  [facility];  Service: ENT  PROSTATE BIOPSY  2017    STOMACH SURGERY      TONSILLECTOMY AND ADENOIDECTOMY      at age 37   [address] - right 01/04 0 left 01/95    US GUIDED THYROID BIOPSY  01/19/2022    US GUIDED THYROID BIOPSY  05/26/2022    VASECTOMY       Family History   Problem Relation Age of Onset    Hypertension Mother     Stroke Mother     Stomach cancer Father     Hypertension Father     Hypertension Family     Stroke Family         syndrome     Heart attack Son        Objective:  /100 (BP Location: Right arm, Patient Position: Sitting, Cuff Size: Adult)   Pulse 67   Temp 98 4 °F (36 9 °C) (Temporal)   Ht 5' 7" (1 702 m)   Wt 105 kg (232 lb 6 4 oz)   SpO2 99% Comment: ra  BMI 36 40 kg/m²      Physical Exam  Vitals reviewed  Constitutional:       General: He is not in acute distress  Appearance: Normal appearance  He is obese  He is not diaphoretic  HENT:      Head: Normocephalic and atraumatic  Eyes:      Extraocular Movements: Extraocular movements intact  Conjunctiva/sclera: Conjunctivae normal       Pupils: Pupils are equal, round, and reactive to light  Neck:      Vascular: No carotid bruit  Cardiovascular:      Rate and Rhythm: Normal rate and regular rhythm  Heart sounds: Normal heart sounds  No murmur heard  Pulmonary:      Effort: Pulmonary effort is normal  No respiratory distress  Breath sounds: Normal breath sounds  No wheezing, rhonchi or rales  Musculoskeletal:      Right lower leg: No edema  Left lower leg: No edema  Neurological:      Mental Status: He is alert and oriented to person, place, and time  Mental status is at baseline  Cranial Nerves: No dysarthria or facial asymmetry  Motor: No weakness  Coordination: Coordination is intact  Romberg sign negative   Coordination normal  Finger-Nose-Finger Test and Heel to [facility] Test normal  Rapid alternating movements normal       Gait: Gait is intact   Gait normal    Psychiatric:         Mood and Affect: Mood normal          Behavior: Behavior normal

## 2022-09-26 DIAGNOSIS — G60.9 IDIOPATHIC PERIPHERAL NEUROPATHY: ICD-10-CM

## 2022-09-27 PROCEDURE — 88305 TISSUE EXAM BY PATHOLOGIST: CPT | Performed by: PATHOLOGY

## 2022-09-28 NOTE — ANESTHESIA PREPROCEDURE EVALUATION
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   (+) Localized primary osteoarthritis of wrist      NEURO/PSYCH   (+) Anxiety   (+) Continuous opioid dependence (HCC)   (+) History of prostate cancer        Physical Exam    Airway    Mallampati score: II  TM Distance: >3 FB  Neck ROM: full     Dental   No notable dental hx     Cardiovascular  Rhythm: regular, Rate: normal, Cardiovascular exam normal    Pulmonary  Pulmonary exam normal Breath sounds clear to auscultation,     Other Findings        Anesthesia Plan  ASA Score- 3     Anesthesia Type- IV sedation with anesthesia with ASA Monitors  Additional Monitors:   Airway Plan: NTT  Plan Factors-Exercise tolerance (METS): >4 METS  Chart reviewed  EKG reviewed  Imaging results reviewed  Existing labs reviewed  Patient summary reviewed  Patient is not a current smoker  Patient did not smoke on day of surgery  Obstructive sleep apnea risk education given perioperatively  Induction- intravenous  Postoperative Plan- Plan for postoperative opioid use  Informed Consent- Anesthetic plan and risks discussed with patient  I personally reviewed this patient with the CRNA  Discussed and agreed on the Anesthesia Plan with the CRNA  Chidi Tate

## 2022-09-29 ENCOUNTER — OFFICE VISIT (OUTPATIENT)
Dept: GASTROENTEROLOGY | Facility: CLINIC | Age: 82
End: 2022-09-29
Payer: COMMERCIAL

## 2022-09-29 VITALS
WEIGHT: 232 LBS | HEIGHT: 67 IN | DIASTOLIC BLOOD PRESSURE: 88 MMHG | SYSTOLIC BLOOD PRESSURE: 140 MMHG | BODY MASS INDEX: 36.41 KG/M2 | TEMPERATURE: 98.9 F

## 2022-09-29 DIAGNOSIS — D12.6 TUBULAR ADENOMA OF COLON: ICD-10-CM

## 2022-09-29 DIAGNOSIS — K64.9 HEMORRHOIDS, UNSPECIFIED HEMORRHOID TYPE: ICD-10-CM

## 2022-09-29 DIAGNOSIS — M25.562 LEFT KNEE PAIN, UNSPECIFIED CHRONICITY: ICD-10-CM

## 2022-09-29 DIAGNOSIS — K57.30 DIVERTICULOSIS LARGE INTESTINE W/O PERFORATION OR ABSCESS W/O BLEEDING: ICD-10-CM

## 2022-09-29 DIAGNOSIS — E66.09 CLASS 2 OBESITY DUE TO EXCESS CALORIES WITHOUT SERIOUS COMORBIDITY WITH BODY MASS INDEX (BMI) OF 36.0 TO 36.9 IN ADULT: ICD-10-CM

## 2022-09-29 DIAGNOSIS — K21.9 GASTROESOPHAGEAL REFLUX DISEASE WITHOUT ESOPHAGITIS: Primary | Chronic | ICD-10-CM

## 2022-09-29 PROBLEM — E66.812 CLASS 2 OBESITY DUE TO EXCESS CALORIES WITHOUT SERIOUS COMORBIDITY WITH BODY MASS INDEX (BMI) OF 36.0 TO 36.9 IN ADULT: Status: ACTIVE | Noted: 2022-09-29

## 2022-09-29 PROBLEM — K52.9 ENTERITIS: Status: RESOLVED | Noted: 2022-04-09 | Resolved: 2022-09-29

## 2022-09-29 PROBLEM — E66.01 OBESITY, MORBID (HCC): Status: RESOLVED | Noted: 2018-02-28 | Resolved: 2022-09-29

## 2022-09-29 PROCEDURE — 99214 OFFICE O/P EST MOD 30 MIN: CPT | Performed by: INTERNAL MEDICINE

## 2022-09-29 NOTE — PROGRESS NOTES
[facility] - Outpatient Follow-up Note  Staci Lynn  81 y o  male MRN: [id number]  Encounter: [id number]    ASSESSMENT AND PLAN:  81year old male here for follow up  1  Gastroesophageal reflux disease without esophagitis  -currently just started on Prilosec 40 mg daily so would recommend continuing this as symptoms are improving and recent EGD with non erosive disease   -lifestyle modifications    2  Class 2 obesity due to excess calories without serious comorbidity with body mass index (BMI) of 36 0 to 36 9 in adult    3  Diverticulosis large intestine w/o perforation or abscess w/o bleeding      4  Hemorrhoids, unspecified hemorrhoid type    5  Increase gas  -trial of gasx or beano    Follow up in a few months time      ____________________________________________    SUBJECTIVE:  Pt here for follow up  Just had egd and colonoscopy by Dr Abe Galeas  BP was recently elevated at the day of his procedures and had another antihypertensive added  Pt and wife stated that in May he had diarrhea and pain and had a partial SBO which was treated with an NG tube  He had a history of PUD with severe projectile vomiting with bright red bleeding and had partial gastrectomy 40 years ago  Recently started on PPI 40 mg daily and reports GI symptoms are now much improved  Having increased gas currently  Prior was straining to have a BM but now its better  REVIEW OF SYSTEMS IS OTHERWISE NEGATIVE  Historical Information   Past Medical History:   Diagnosis Date    Abnormal electrocardiogram     Last assessed: Oct 11, 2013    Allergic rhinitis     last assessed: Oct 11, 2013    Allergic rhinitis due to pollen     last assessed: Oct 10, 2013    Allergic sinusitis     Last assessed: May 11, 2015    Allergy     resolved: July 22, 2015    Benign essential hypertension     Last assessed/resolved:  May 31, 2017    BPH (benign prostatic hyperplasia)     Cancer [facility])     prostate    Colitis     Last assessed: May 24, 2016    Cough with hemoptysis 11/17/2020    COVID-19 08/18/2022    Generalized osteoarthritis     Last assessed: Oct 11, 2013    GERD without esophagitis     Last assessed/resolved: May 31, 2017    Hearing loss     Hiatal hernia     resolved: July 22, 2015    History of gastroesophageal reflux (GERD)     History of stomach ulcers     last assessed: May 11, 2015    Hypertension     Incomplete bladder emptying     Kidney stone     Nodular prostate with lower urinary tract symptoms     Nontoxic single thyroid nodule     last assessed: April 16, 2014    Orchalgia     Overweight     last assessed: Oct 31, 2013    Poor urinary stream     Pulmonary embolism [facility])     Salivary gland disorder     last assessed: April 16, 2014    Seasonal allergies     last assessed: May 15, 2015    Spermatocele     Straining to void     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:  May 7, 2014     Past Surgical History:   Procedure Laterality Date    BLADDER SURGERY      CHOLECYSTECTOMY  2000    laparoscopic     COLONOSCOPY      FLAP LOCAL TRUNK Left 10/28/2021    Procedure: EXCISION OF FLANK MASS;  Surgeon: Bora Doll DO;  Location: [address] [place] [address] MAIN OR;  Service: Plastics    HEMORRHOID SURGERY      pilionidal cyst removal     HERNIA REPAIR Left 01/17/2008    inguinal     KNEE ARTHROSCOPY Left 1974    KNEE CARTILAGE SURGERY      NASAL SEPTUM SURGERY      Deviation repair     IL EXC PAROTD,LAT LOBE,DISSECT 5TH NERV Right 06/09/2021    Procedure: SUPERFICIAL PAROTIDECTOMY WITH FACIAL NERVE MONITORING;  Surgeon: Napoleon Tate MD;  [facility];  Service: ENT    PROSTATE BIOPSY  2017    STOMACH SURGERY      TONSILLECTOMY AND ADENOIDECTOMY      at age 37   [address] [address] [address] - right 01/04 0 left 01/95    UPPER GASTROINTESTINAL ENDOSCOPY      US GUIDED THYROID BIOPSY  01/19/2022    US GUIDED THYROID BIOPSY  05/26/2022  VASECTOMY       Social History   Social History     Substance and Sexual Activity   Alcohol Use Yes    Comment: rare     Social History     Substance and Sexual Activity   Drug Use No     Social History     Tobacco Use   Smoking Status Former Smoker    Packs/day: 1 00    Years: 30 00    Pack years: 30 00    Types: Cigarettes    Start date:     Quit date: 5    Years since quittin 7   Smokeless Tobacco Never Used     Family History   Problem Relation Age of Onset    Hypertension Mother     Stroke Mother     Stomach cancer Father     Hypertension Father     Hypertension Family     Stroke Family         syndrome     Heart attack Son        Meds/Allergies       Current Outpatient Medications:     allopurinol (ZYLOPRIM) 100 mg tablet    aspirin (ECOTRIN LOW STRENGTH) 81 mg EC tablet    Cholecalciferol (VITAMIN D3 PO)    diltiazem (CARDIZEM CD) 180 mg 24 hr capsule    gabapentin (Neurontin) 400 mg capsule    levocetirizine (XYZAL) 5 MG tablet    losartan (COZAAR) 100 MG tablet    omeprazole (PriLOSEC) 40 MG capsule    tamsulosin (FLOMAX) 0 4 mg    traMADol-acetaminophen (ULTRACET) 37 5-325 mg per tablet    Allergies   Allergen Reactions    Ace Inhibitors Hyperactivity           Objective     Blood pressure 140/88, temperature 98 9 °F (37 2 °C), temperature source Tympanic, height 5' 7" (1 702 m), weight 105 kg (232 lb)  Body mass index is 36 34 kg/m²  PHYSICAL EXAM:      General Appearance:   Alert, cooperative, no distress   HEENT:   Normocephalic, atraumatic, anicteric      Neck:  Supple, symmetrical, trachea midline   Lungs:   Clear to auscultation bilaterally; no rales, rhonchi or wheezing; respirations unlabored    Heart[de-identified]   Regular rate and rhythm; no murmur, rub, or gallop     Abdomen:   Soft, non-tender, non-distended; normal bowel sounds; no masses, no organomegaly    Genitalia:   Deferred    Rectal:   Deferred    Extremities:  No cyanosis, clubbing or edema    Pulses:  2+ and symmetric    Skin:  No jaundice, rashes, or lesions    Lymph nodes:  No palpable cervical lymphadenopathy        Lab Results:   No visits with results within 1 Day(s) from this visit  Latest known visit with results is:   Hospital Outpatient Visit on 09/22/2022   Component Date Value    Case Report 09/22/2022                      Value:Surgical Pathology Report                         Case: [id number]                                   Authorizing Provider:  Jonathon De La Fuente MD          Collected:           09/22/2022 1529              Ordering Location:     [address] [place] [address]      Received:            09/22/2022 [facility]                                                           Pathologist:           Behzad Michel MD                                                                 Specimens:   A) - Duodenum, r/o celiac                                                                           B) - Stomach, r/o h pylori                                                                          C) - Esophagus, bx to r/o candida                                                                   D) - Polyp, Colorectal, sigmoid polyp, hot snare                                                                              E) - Polyp, Colorectal, Ascending polyp - cold snare                                                F) - Polyp, Colorectal, Transverse polyp- x 3                                                       G) - Colon, Random colon bx                                                                         H) - Polyp, Colorectal, Sigmoid polyp x 2- cold snare                                      Final Diagnosis 09/22/2022                      Value: This result contains rich text formatting which cannot be displayed here      Additional Information 09/22/2022 Value:This result contains rich text formatting which cannot be displayed here   Synoptic Checklist 09/22/2022                      Value:                            COLON/RECTUM POLYP FORM - GI - All Specimens                                                                                     :    Adenoma(s)      Gross Description 09/22/2022                      Value: This result contains rich text formatting which cannot be displayed here  Radiology Results:   EGD    Result Date: 9/22/2022  Narrative: [address] Endoscopy [address] [address] [phone] DATE OF SERVICE: 9/22/22 PHYSICIAN(S): Attending: Magdalena Richey MD Fellow: Nathanial Cushing, DO INDICATION: Gastric outlet obstruction, Small bowel obstruction ([facility] [address] ) POST-OP DIAGNOSIS: See the impression below  PREPROCEDURE: Informed consent was obtained for the procedure, including sedation  Risks of perforation, hemorrhage, adverse drug reaction and aspiration were discussed  The patient was placed in the left lateral decubitus position  Patient was explained about the risks and benefits of the procedure  Risks including but not limited to bleeding, infection, and perforation were explained in detail  Also explained about less than 100% sensitivity with the exam and other alternatives  DETAILS OF PROCEDURE: Patient was taken to the procedure room where a time out was performed to confirm correct patient and correct procedure  The patient underwent monitored anesthesia care, which was administered by an anesthesia professional  The patient's blood pressure, heart rate, level of consciousness, respirations, oxygen and ETCO2 were monitored throughout the procedure  The scope was advanced to the second part of the duodenum  Retroflexion was performed in the fundus  The patient experienced no blood loss  The procedure was not difficult  The patient tolerated the procedure well   There were no apparent complications  ANESTHESIA INFORMATION: ASA: ASA status not filed in the log  Anesthesia Type: Anesthesia type not filed in the log  MEDICATIONS: No administrations occurring from 1521 to 1539 on 09/22/22 FINDINGS: Previous Billroth II procedure The gastrojejunal anastomosis and jejunum appeared normal  Performed random biopsy using biopsy forceps to rule out celiac disease  Severe, generalized erythematous mucosa with erosion in the stomach; performed cold forceps biopsy to rule out H  pylori  Severe erosive gastritis throughout the gastric body  2 cm hiatal hernia - GE junction 42 cm from the incisors, diaphragmatic impression 44 cm from the incisors Mild abnormal mucosa with plaque in the esophagus; performed cold forceps biopsy  Rule out candida SPECIMENS: ID Type Source Tests Collected by Time Destination 1 : r/o celiac Tissue Duodenum TISSUE EXAM Suzie Johnson MD 9/22/2022  3:29 PM  2 : r/o h pylori Tissue Stomach TISSUE EXAM Suzie Johnson MD 9/22/2022  3:34 PM  3 : bx to r/o candida Tissue Esophagus TISSUE EXAM Suzie Johnson MD 9/22/2022  3:37 PM      Impression: Previous Billroth II procedure The gastrojejunal anastomosis and jejunum appeared normal  Performed random biopsy using biopsy forceps to rule out celiac disease  Severe, generalized erythematous mucosa with erosion in the stomach; performed cold forceps biopsy to rule out H  pylori  Severe erosive gastritis throughout the gastric body  2 cm hiatal hernia - GE junction 42 cm from the incisors, diaphragmatic impression 44 cm from the incisors Mild abnormal mucosa with plaque in the esophagus; performed cold forceps biopsy  Rule out candida RECOMMENDATION: Await pathology results Start omeprazole 40 mg daily on an empty stomach  ATTENDING ATTESTATION:  I was present throughout the entire procedure from insertion to complete withdrawal of the scope  I performed all interventions myself or oversaw the fellow     Suzie Johnson MD Colonoscopy    Result Date: 9/22/2022  Narrative: 15[address] Endoscopy [address] [address] [phone] DATE OF SERVICE: 9/22/22 PHYSICIAN(S): Attending: Behzad Villa MD Fellow: Dana Arreguin DO INDICATION: Screening for colon cancer POST-OP DIAGNOSIS: See the impression below  HISTORY: Prior colonoscopy: 7 years ago  BOWEL PREPARATION: Miralax/Dulcolax PREPROCEDURE: Informed consent was obtained for the procedure, including sedation  Risks including but not limited to bleeding, infection, perforation, adverse drug reaction and aspiration were explained in detail  Also explained about less than 100% sensitivity with the exam and other alternatives  The patient was placed in the left lateral decubitus position  DETAILS OF PROCEDURE: Patient was taken to the procedure room where a time out was performed to confirm correct patient and correct procedure  The patient underwent monitored anesthesia care, which was administered by an anesthesia professional  The patient's blood pressure, heart rate, level of consciousness, oxygen, respirations and ETCO2 were monitored throughout the procedure  A digital rectal exam was performed  The scope was introduced through the anus and advanced to the cecum  Retroflexion was performed in the rectum  The quality of bowel preparation was evaluated using the Aditya Bowel Preparation Scale with scores of: right colon = 2, transverse colon = 2, left colon = 2  The total BBPS score was 6  Bowel prep was adequate  The patient experienced no blood loss  The procedure was not difficult  The patient tolerated the procedure well  There were no apparent complications  ANESTHESIA INFORMATION: ASA: ASA status not filed in the log  Anesthesia Type: Anesthesia type not filed in the log   MEDICATIONS: sodium chloride 0 9 % infusion 600 mL*  *From user-documented volume (Totals for administrations occurring from 1521 to 1633 on 09/22/22) FINDINGS: Prep was fair after aggressive lavage  Seven polyps measuring from 3 mm up to 18 mm in the ascending colon, transverse colon and sigmoid colon; performed complete piecemeal removal by cold forceps biopsy; completely removed en bloc by cold snare and retrieved specimen; completely removed en bloc by hot snare and retrieved specimen Performed forceps biopsies to rule out colitis in the sigmoid colon and rectum Severe diverticula in the sigmoid colon Internal hemorrhoids EVENTS: Procedure Events Event Event Time ENDO SCOPE OUT TIME 9/22/2022  3:38 PM ENDO CECUM REACHED 9/22/2022  4:03 PM ENDO SCOPE OUT TIME 9/22/2022  4:30 PM SPECIMENS: ID Type Source Tests Collected by Time Destination 1 : r/o celiac Tissue Duodenum TISSUE EXAM Patti Osorio MD 9/22/2022  3:29 PM  2 : r/o h pylori Tissue Stomach TISSUE EXAM Patti Osorio MD 9/22/2022  3:34 PM  3 : bx to r/o candida Tissue Esophagus TISSUE EXAM Patti Osorio MD 9/22/2022  3:37 PM  4 : sigmoid polyp, hot snare Tissue Polyp, Colorectal TISSUE EXAM Patti Osorio MD 9/22/2022  3:46 PM  5 : Ascending polyp - cold snare Tissue Polyp, Colorectal TISSUE EXAM Patti Osorio MD 9/22/2022  4:05 PM  6 : Transverse polyp- x 3 Tissue Polyp, Colorectal TISSUE EXAM Patti Osorio MD 9/22/2022  4:08 PM  7 : Random colon bx Tissue Colon TISSUE EXAM Patti Osorio MD 9/22/2022  4:13 PM  8 : Sigmoid polyp x 2- cold snare Tissue Polyp, Colorectal TISSUE EXAM Patti Osorio MD 9/22/2022  4:18 PM  EQUIPMENT: Colonoscope -[id number]     Impression: Prep was fair after aggressive lavage   Seven polyps measuring from 3 mm up to 18 mm in the ascending colon, transverse colon and sigmoid colon; performed complete piecemeal removal by cold forceps biopsy; completely removed en bloc by cold snare and retrieved specimen; completely removed en bloc by hot snare and retrieved specimen Performed forceps biopsies to rule out colitis in the sigmoid colon and rectum Severe diverticula in the sigmoid colon Internal hemorrhoids RECOMMENDATION: No further screening colonoscopies necessary due to age (age = 77 or greater) ATTENDING ATTESTATION:  I was present throughout the entire procedure from insertion to complete withdrawal of the scope  I performed all interventions myself or oversaw the fellow     Eden Calderon MD

## 2022-10-04 ENCOUNTER — EVALUATION (OUTPATIENT)
Dept: PHYSICAL THERAPY | Age: 82
End: 2022-10-04
Payer: COMMERCIAL

## 2022-10-04 DIAGNOSIS — M25.562 LEFT KNEE PAIN, UNSPECIFIED CHRONICITY: ICD-10-CM

## 2022-10-04 PROCEDURE — 97112 NEUROMUSCULAR REEDUCATION: CPT | Performed by: PHYSICAL THERAPIST

## 2022-10-04 PROCEDURE — 97161 PT EVAL LOW COMPLEX 20 MIN: CPT | Performed by: PHYSICAL THERAPIST

## 2022-10-04 NOTE — PROGRESS NOTES
PT Evaluation     Today's date: 10/4/2022  Patient name: Sanchez Strickland  : 1940  MRN: [id number]  Referring provider: Taqueria Martínez,*  Dx:   Encounter Diagnosis     ICD-10-CM    1  Left knee pain, unspecified chronicity  M25 562 Ambulatory Referral to Physical Therapy                  Assessment  Assessment details: Patient presents complaining of L knee pain, which has been ongoing for awhile per patient  He does report tearing his meniscus in  and had a menisectomy shortly after  He reports feeling like the pain gets worse and worse and has no AVELINA that caused his pain to begin  He had his knee drained of fluid a couple weeks ago  He reports he did get a knee brace which gave his knee some relief, but he felt like it hurt his leg with how tight it was  He reports the pain is worse with end ranges of motion, but reports limited pain with prolonged ambulation     He locates his pain to his anterior L knee  He has no recent imaging available     He is currently retired     Patient presents with limitations in L knee range of motion and strength consistent with L knee OA  Patient would benefit from skilled physical therapy to address limitations and deficits  Patient provided with HEP  Patient made aware of condition as well as the proposed treatment plan, including risks, benefits and alternatives  Impairments: abnormal or restricted ROM, activity intolerance, impaired physical strength, lacks appropriate home exercise program and pain with function     Prognosis: good    Goals  ST- demonstrate compliancy with HEP in 1 week     Decrease reported pain to 4/10 at worst and with activity, to improve functional capacity, within 3 weeks   Improve L knee range of motion to 0-120 with no complaints of pain, within 3 weeks     LT- Improve FOTO score to specified value to improve patients perceived benefit of therapy, in 8 weeks   Improve L hip and knee strength to 4+/5, to improve ability to complete ADL's at home, within 8 weeks     Plan  Plan details: Physical therapy with focus on there ex and manual therapy to improve ability to complete tasks around the house and complete functional activities, use of modalities as needed     Patient would benefit from: skilled physical therapy  Referral necessary: No  Planned modality interventions: cryotherapy, TENS and thermotherapy: hydrocollator packs  Planned therapy interventions: ADL training, balance, balance/weight bearing training, gait training, manual therapy, joint mobilization, neuromuscular re-education, strengthening, stretching, therapeutic activities and therapeutic exercise  Frequency: 2x week  Duration in weeks: 12  Plan of Care beginning date: 10/4/2022  Plan of Care expiration date: 2022  Treatment plan discussed with: patient        Subjective Evaluation    History of Present Illness  Mechanism of injury: Chronic onset   Pain  Current pain ratin  At best pain ratin  At worst pain ratin  Location: L knee   Quality: sharp  Aggravating factors: sitting  Progression: no change    Treatments  No previous or current treatments  Patient Goals  Patient goals for therapy: decreased pain and independence with ADLs/IADLs          Objective     General Comments:      Knee Comments  Ttp along medial and lateral patella     rom  L knee 6-111*   R knee WFL    mmt  Hip flexion L=4- R=4  Hip abduction L=4 R=4  Hip adduction L=4 R=4  Knee extension L=4* R=4+  Knee flexion L=4 R=4+    Special tests  (-) anterior drawer, mcmurrays     Joint play slightly limited patellar mobility              Precautions: HTN, hx Ca      Manuals             L knee ROM             L patellar ROM                                       Neuro Re-Ed             QS             Bridge              S/L clamshell              LAQ             SLR nv            Hip 3 way              Side steps              Ther Ex             Bike/Nustep Ther Activity                                       Gait Training                                       Modalities

## 2022-10-06 ENCOUNTER — OFFICE VISIT (OUTPATIENT)
Dept: PHYSICAL THERAPY | Age: 82
End: 2022-10-06
Payer: COMMERCIAL

## 2022-10-06 DIAGNOSIS — M25.562 LEFT KNEE PAIN, UNSPECIFIED CHRONICITY: Primary | ICD-10-CM

## 2022-10-06 PROCEDURE — 97110 THERAPEUTIC EXERCISES: CPT | Performed by: PHYSICAL THERAPIST

## 2022-10-06 PROCEDURE — 97140 MANUAL THERAPY 1/> REGIONS: CPT | Performed by: PHYSICAL THERAPIST

## 2022-10-06 PROCEDURE — 97112 NEUROMUSCULAR REEDUCATION: CPT | Performed by: PHYSICAL THERAPIST

## 2022-10-06 NOTE — PROGRESS NOTES
Daily Note     Today's date: 10/6/2022  Patient name: Eun Lynch  : 1940  MRN: [id number]  Referring provider: Carmen Diaz,*  Dx:   Encounter Diagnosis     ICD-10-CM    1  Left knee pain, unspecified chronicity  M25 562                   Subjective: Reports feeling good today with minimal complaints of pain  Objective: See treatment diary below      Assessment: Tolerated treatment well  Patient would benefit from continued PT, patient did well with first tx and was able to demonstrate HEP with no cueing needed  Patient fitted for compression sleeve he brought in  Plan: Continue per plan of care        Precautions: HTN, hx Ca, hard of hearing      Manuals 10/6            L knee ROM CW            L patellar ROM CW                                       Neuro Re-Ed             QS 20x5"             Bridge  2x10x5"            S/L clamshell  20x3" ea            LAQ 2x15x3" L            SLR nv            Hip 3 way              Side steps              Ther Ex             Bike/Nustep  8'                                                                                                        Ther Activity                                       Gait Training                                       Modalities

## 2022-10-07 ENCOUNTER — OFFICE VISIT (OUTPATIENT)
Dept: INTERNAL MEDICINE CLINIC | Facility: OTHER | Age: 82
End: 2022-10-07
Payer: COMMERCIAL

## 2022-10-07 VITALS
WEIGHT: 229 LBS | OXYGEN SATURATION: 97 % | DIASTOLIC BLOOD PRESSURE: 80 MMHG | SYSTOLIC BLOOD PRESSURE: 128 MMHG | HEIGHT: 67 IN | TEMPERATURE: 98 F | HEART RATE: 57 BPM | BODY MASS INDEX: 35.94 KG/M2

## 2022-10-07 DIAGNOSIS — Z13.220 SCREENING FOR LIPID DISORDERS: ICD-10-CM

## 2022-10-07 DIAGNOSIS — M1A.0310 IDIOPATHIC CHRONIC GOUT OF RIGHT WRIST WITHOUT TOPHUS: ICD-10-CM

## 2022-10-07 DIAGNOSIS — K21.9 GASTROESOPHAGEAL REFLUX DISEASE WITHOUT ESOPHAGITIS: Chronic | ICD-10-CM

## 2022-10-07 DIAGNOSIS — N18.31 STAGE 3A CHRONIC KIDNEY DISEASE (HCC): ICD-10-CM

## 2022-10-07 DIAGNOSIS — I10 BENIGN ESSENTIAL HYPERTENSION: Primary | Chronic | ICD-10-CM

## 2022-10-07 DIAGNOSIS — N18.9 CHRONIC KIDNEY DISEASE, UNSPECIFIED CKD STAGE: ICD-10-CM

## 2022-10-07 DIAGNOSIS — E04.2 MULTIPLE THYROID NODULES: ICD-10-CM

## 2022-10-07 PROBLEM — J20.8 ACUTE BRONCHITIS DUE TO OTHER SPECIFIED ORGANISMS: Status: RESOLVED | Noted: 2022-08-29 | Resolved: 2022-10-07

## 2022-10-07 PROCEDURE — 99214 OFFICE O/P EST MOD 30 MIN: CPT | Performed by: NURSE PRACTITIONER

## 2022-10-07 NOTE — PROGRESS NOTES
Assessment/Plan:    Problem List Items Addressed This Visit        Digestive    Gastroesophageal reflux disease without esophagitis (Chronic)     - following with GI  - currently on omeprazole 40mg daily             Endocrine    Multiple thyroid nodules    Relevant Orders    TSH, 3rd generation with Free T4 reflex       Cardiovascular and Mediastinum    Benign essential hypertension - Primary (Chronic)     - BP well controlled in the office today 128/80 left arm, 126/80 right arm  - I am concerned his home BP machine is inaccurate and recommend bringing his machine in to be evaluated against a manual cuff in the office  - continue current regimen of diltiazem 180mg daily and losartan 100mg daily               Musculoskeletal and Integument    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     - uric acid within normal limits  - continue allopurinol 100mg bid          Relevant Orders    Uric acid       Genitourinary    Stage 3a chronic kidney disease ([facility] [address] )     Lab Results   Component Value Date    EGFR 66 08/17/2022    EGFR 67 04/20/2022    EGFR 58 04/15/2022    CREATININE 1 05 08/17/2022    CREATININE 1 04 04/20/2022    CREATININE 1 17 04/15/2022     - at baseline  - continue to trend  - avoid nephrotoxins          Relevant Orders    CBC and differential    Comprehensive metabolic panel    RESOLVED: Chronic kidney disease    Relevant Orders    CBC and differential    Comprehensive metabolic panel      Other Visit Diagnoses     Screening for lipid disorders        Relevant Orders    Lipid Panel with Direct LDL reflex        [employer] software was used to dictate this note  It may contain errors with dictating incorrect words or incorrect spelling  Please contact the provider directly with any questions  Subjective:      Patient ID: Joshua Spears  is a 81 y o  male  HPI     Patient presents today for 2 weeks follow up HTN  We added back his Diltiazem 180mg daily and continued his losartan 100mg daily  He is monitoring his blood pressure at home  Him home readings are labile, ranging from 128-172 over  in the last week  They did not bring his cuff with today  At the GI office last week, his BP was similar to today's readings 140/88 and today 144/86  He states he feels well overall  Occasionally some "fogginess" or off balance but denies any chest pain, palpitations, SOB, headaches or vision changes  Uric acid 3 9  he is on allopurinol 100mg twice daily  They report he has had recent gout flares in his wrist and toe at the beginning of September  CBC chronically low wbc 3 78 otherwise normal     Cr 1 05, egfr 66 at baseline    TSH normal  Mag normal  PSA normal      The following portions of the patient's history were reviewed and updated as appropriate: allergies, current medications, past family history, past medical history, past social history, past surgical history and problem list     Review of Systems   Constitutional: Negative for activity change, appetite change, chills and fever  Eyes: Negative for visual disturbance  Respiratory: Negative for cough, chest tightness and shortness of breath  Cardiovascular: Negative for chest pain and palpitations  Neurological: Negative for dizziness and headaches  Past Medical History:   Diagnosis Date    Abnormal electrocardiogram     Last assessed: Oct 11, 2013    KAYLI (acute kidney injury) [facility]) 12/24/2015    Allergic rhinitis     last assessed: Oct 11, 2013    Allergic rhinitis due to pollen     last assessed: Oct 10, 2013    Allergic sinusitis     Last assessed: May 11, 2015    Allergy     resolved: July 22, 2015    Benign essential hypertension     Last assessed/resolved: May 31, 2017    BPH (benign prostatic hyperplasia)     Cancer [facility])     prostate    Colitis     Last assessed: May 24, 2016    Cough with hemoptysis 11/17/2020    COVID-19 08/18/2022    Generalized osteoarthritis     Last assessed:  Oct 11, 2013  GERD without esophagitis     Last assessed/resolved: May 31, 2017    Hearing loss     Hiatal hernia     resolved: July 22, 2015    History of gastroesophageal reflux (GERD)     History of stomach ulcers     last assessed: May 11, 2015    Hypertension     Incomplete bladder emptying     Kidney stone     Nodular prostate with lower urinary tract symptoms     Nontoxic single thyroid nodule     last assessed: April 16, 2014    Orchalgia     Overweight     last assessed: Oct 31, 2013    Poor urinary stream     Pulmonary embolism [facility])     Salivary gland disorder     last assessed: April 16, 2014    Seasonal allergies     last assessed: May 15, 2015    Spermatocele     Straining to void     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:  May 7, 2014         Current Outpatient Medications:     allopurinol (ZYLOPRIM) 100 mg tablet, Take 1 tablet (100 mg total) by mouth 2 (two) times a day, Disp: 180 tablet, Rfl: 1    aspirin (ECOTRIN LOW STRENGTH) 81 mg EC tablet, Take 81 mg by mouth daily, Disp: , Rfl:     Cholecalciferol (VITAMIN D3 PO), Take by mouth 2000 IU, Disp: , Rfl:     diltiazem (CARDIZEM CD) 180 mg 24 hr capsule, Take 1 capsule (180 mg total) by mouth daily, Disp: 30 capsule, Rfl: 2    gabapentin (Neurontin) 400 mg capsule, Take 1 capsule (400 mg total) by mouth in the morning and 1 capsule (400 mg total) in the evening , Disp: 90 capsule, Rfl: 1    levocetirizine (XYZAL) 5 MG tablet, Take 1 tablet (5 mg total) by mouth every evening, Disp: 90 tablet, Rfl: 4    losartan (COZAAR) 100 MG tablet, Take 1 tablet (100 mg total) by mouth in the morning , Disp: 90 tablet, Rfl: 1    omeprazole (PriLOSEC) 40 MG capsule, Take 1 capsule (40 mg total) by mouth daily, Disp: 30 capsule, Rfl: 11    tamsulosin (FLOMAX) 0 4 mg, Take 1 capsule (0 4 mg total) by mouth daily with dinner, Disp: 90 capsule, Rfl: 1    traMADol-acetaminophen (ULTRACET) 37 5-325 mg per tablet, Take 2 tablets by mouth every 6 (six) hours as needed for moderate pain, Disp: 60 tablet, Rfl: 0    Allergies   Allergen Reactions    Ace Inhibitors Hyperactivity       Social History   Past Surgical History:   Procedure Laterality Date    BLADDER SURGERY      CHOLECYSTECTOMY  2000    laparoscopic     COLONOSCOPY      FLAP LOCAL TRUNK Left 10/28/2021    Procedure: EXCISION OF FLANK MASS;  Surgeon: Bora Doll DO;  Location: [facility] MAIN OR;  Service: Plastics    HEMORRHOID SURGERY      pilionidal cyst removal     HERNIA REPAIR Left 01/17/2008    inguinal     KNEE ARTHROSCOPY Left 1974    KNEE CARTILAGE SURGERY      NASAL SEPTUM SURGERY      Deviation repair     CT EXC PAROTD,LAT LOBE,DISSECT 5TH NERV Right 06/09/2021    Procedure: SUPERFICIAL PAROTIDECTOMY WITH FACIAL NERVE MONITORING;  Surgeon: Napoleon Tate MD;  [facility];  Service: ENT    PROSTATE BIOPSY  2017    STOMACH SURGERY      TONSILLECTOMY AND ADENOIDECTOMY      at age 37   [address] right 01/04 0 left 01/95    UPPER GASTROINTESTINAL ENDOSCOPY      US GUIDED THYROID BIOPSY  01/19/2022    US GUIDED THYROID BIOPSY  05/26/2022    VASECTOMY       Family History   Problem Relation Age of Onset    Hypertension Mother     Stroke Mother     Stomach cancer Father     Hypertension Father     Hypertension Family     Stroke Family         syndrome     Heart attack Son        Objective:  /80 (BP Location: Left arm, Patient Position: Sitting, Cuff Size: Adult)   Pulse 57   Temp 98 °F (36 7 °C) (Temporal)   Ht 5' 7" (1 702 m)   Wt 104 kg (229 lb)   SpO2 97%   BMI 35 87 kg/m²      Physical Exam  Vitals reviewed  Constitutional:       General: He is not in acute distress  Appearance: Normal appearance  He is obese  He is not diaphoretic  HENT:      Head: Normocephalic and atraumatic        Right Ear: Tympanic membrane and external ear normal       Left Ear: Tympanic membrane and external ear normal    Eyes: Extraocular Movements: Extraocular movements intact  Conjunctiva/sclera: Conjunctivae normal       Pupils: Pupils are equal, round, and reactive to light  Neck:      Vascular: No carotid bruit  Cardiovascular:      Rate and Rhythm: Normal rate and regular rhythm  Heart sounds: Normal heart sounds  Pulmonary:      Effort: Pulmonary effort is normal  No respiratory distress  Breath sounds: Normal breath sounds  No wheezing, rhonchi or rales  Neurological:      Mental Status: He is alert and oriented to person, place, and time  Mental status is at baseline     Psychiatric:         Mood and Affect: Mood normal          Behavior: Behavior normal

## 2022-10-07 NOTE — ASSESSMENT & PLAN NOTE
- BP well controlled in the office today 128/80 left arm, 126/80 right arm  - I am concerned his home BP machine is inaccurate and recommend bringing his machine in to be evaluated against a manual cuff in the office  - continue current regimen of diltiazem 180mg daily and losartan 100mg daily

## 2022-10-07 NOTE — ASSESSMENT & PLAN NOTE
Lab Results   Component Value Date    EGFR 66 08/17/2022    EGFR 67 04/20/2022    EGFR 58 04/15/2022    CREATININE 1 05 08/17/2022    CREATININE 1 04 04/20/2022    CREATININE 1 17 04/15/2022     - at baseline  - continue to trend  - avoid nephrotoxins

## 2022-10-11 ENCOUNTER — OFFICE VISIT (OUTPATIENT)
Dept: PHYSICAL THERAPY | Age: 82
End: 2022-10-11
Payer: COMMERCIAL

## 2022-10-11 DIAGNOSIS — M25.562 LEFT KNEE PAIN, UNSPECIFIED CHRONICITY: Primary | ICD-10-CM

## 2022-10-11 PROBLEM — R50.9 FEVER: Status: RESOLVED | Noted: 2022-04-10 | Resolved: 2022-10-11

## 2022-10-11 PROCEDURE — 97112 NEUROMUSCULAR REEDUCATION: CPT | Performed by: PHYSICAL THERAPIST

## 2022-10-11 PROCEDURE — 97110 THERAPEUTIC EXERCISES: CPT | Performed by: PHYSICAL THERAPIST

## 2022-10-11 NOTE — PROGRESS NOTES
Daily Note     Today's date: 10/11/2022  Patient name: Alfred Becker  : 1940  MRN: [id number]  Referring provider: Susanna Foster,*  Dx: No diagnosis found  Subjective: ***      Objective: See treatment diary below      Assessment: Tolerated treatment {Tolerated treatment :}   Patient {assessment:}      Plan: {PLAN:}     Precautions: HTN, hx Ca, hard of hearing      Manuals 10/6 10/10           L knee ROM CW            L patellar ROM CW                                       Neuro Re-Ed             QS 20x5"             Bridge  2x10x5"            S/L clamshell  20x3" ea            LAQ 2x15x3" L            SLR nv            Hip 3 way              Side steps              Ther Ex             Bike/Nustep  8'                                                                                                        Ther Activity                                       Gait Training                                       Modalities

## 2022-10-11 NOTE — PROGRESS NOTES
Daily Note     Today's date: 10/11/2022  Patient name: Javi Bartlett  : 1940  MRN: [id number]  Referring provider: Marcus Shipley,*  Dx:   Encounter Diagnosis     ICD-10-CM    1  Left knee pain, unspecified chronicity  M25 562                   Subjective: patient reports he is tired today, he thinks he overdid it yesterday  Objective: See treatment diary below      Assessment: Patient tolerated treatment well  Able to complete program as noted below  Requires cuing throughout the session for proper technique  Added SLR today  He would benefit from continued PT    Plan: Continue per plan of care        Precautions: HTN, hx Ca, hard of hearing      Manuals 10/6 10/10           L knee ROM CW SK           L patellar ROM CW  SK                                     Neuro Re-Ed             QS 20x5"  20x5" ea           Bridge  2x10x5" 2x10x5"           S/L clamshell  20x3" ea 20x3" ea           LAQ 2x15x3" L 2x15x3" hammad           SLR nv x10 ea           Hip 3 way              Side steps              Ther Ex             Bike/Nustep  8'  8'                                                                                                      Ther Activity                                       Gait Training                                       Modalities

## 2022-10-13 ENCOUNTER — APPOINTMENT (OUTPATIENT)
Dept: PHYSICAL THERAPY | Age: 82
End: 2022-10-13

## 2022-10-14 ENCOUNTER — OFFICE VISIT (OUTPATIENT)
Dept: PHYSICAL THERAPY | Age: 82
End: 2022-10-14
Payer: COMMERCIAL

## 2022-10-14 DIAGNOSIS — M25.562 LEFT KNEE PAIN, UNSPECIFIED CHRONICITY: Primary | ICD-10-CM

## 2022-10-14 PROCEDURE — 97140 MANUAL THERAPY 1/> REGIONS: CPT | Performed by: PHYSICAL THERAPIST

## 2022-10-14 PROCEDURE — 97110 THERAPEUTIC EXERCISES: CPT | Performed by: PHYSICAL THERAPIST

## 2022-10-14 PROCEDURE — 97112 NEUROMUSCULAR REEDUCATION: CPT | Performed by: PHYSICAL THERAPIST

## 2022-10-14 NOTE — PROGRESS NOTES
Daily Note     Today's date: 10/14/2022  Patient name: Rachel Jacobson  : 1940  MRN: [id number]  Referring provider: Digna Manuel,*  Dx:   Encounter Diagnosis     ICD-10-CM    1  Left knee pain, unspecified chronicity  M25 562                   Subjective: Patient stated pain level /10 prior to treatment session  Objective: See treatment diary below      Assessment: Patient performed additions as listed without significant c/o pain; moderate pain with manual knee PROM  Plan: Continue per plan of care        Precautions: HTN, hx Ca, hard of hearing      Manuals 10/6 10/10 10/14          L knee ROM CW SK KK          L patellar ROM CW  SK KK                                    Neuro Re-Ed             QS 20x5"  20x5" ea 20x5" ea          Bridge  2x10x5" 2x10x5" 20x5" ea          S/L clamshell  20x3" ea 20x3" ea 20x3" ea          LAQ 2x15x3" L 2x15x3" hammad 2x15x3" hammad          SLR nv x10 ea 20x ea          Hip 3 way    20x ea          Side steps              Ther Ex             Bike/Nustep  8'  8' 8'                       Leg press   75# 2x10                                                                           Ther Activity                                       Gait Training                                       Modalities

## 2022-10-17 ENCOUNTER — IMMUNIZATIONS (OUTPATIENT)
Dept: INTERNAL MEDICINE CLINIC | Facility: OTHER | Age: 82
End: 2022-10-17
Payer: COMMERCIAL

## 2022-10-17 DIAGNOSIS — Z23 FLU VACCINE NEED: Primary | ICD-10-CM

## 2022-10-17 PROCEDURE — 90662 IIV NO PRSV INCREASED AG IM: CPT

## 2022-10-17 PROCEDURE — G0008 ADMIN INFLUENZA VIRUS VAC: HCPCS

## 2022-10-18 ENCOUNTER — OFFICE VISIT (OUTPATIENT)
Dept: PHYSICAL THERAPY | Age: 82
End: 2022-10-18
Payer: COMMERCIAL

## 2022-10-18 DIAGNOSIS — M25.562 LEFT KNEE PAIN, UNSPECIFIED CHRONICITY: Primary | ICD-10-CM

## 2022-10-18 PROCEDURE — 97140 MANUAL THERAPY 1/> REGIONS: CPT

## 2022-10-18 PROCEDURE — 97112 NEUROMUSCULAR REEDUCATION: CPT

## 2022-10-18 PROCEDURE — 97110 THERAPEUTIC EXERCISES: CPT

## 2022-10-18 NOTE — PROGRESS NOTES
Daily Note     Today's date: 10/18/2022  Patient name: Beatrice Lockwood  : 1940  MRN: [id number]  Referring provider: Emma Brown,*  Dx:   Encounter Diagnosis     ICD-10-CM    1  Left knee pain, unspecified chronicity  M25 562                   Subjective: pt reports L knee feeling better but still pain/stiffness at times PAS 4/10, pt reports increased pain with increased WB L LE      Objective: See treatment diary below  Pt reports feeling slightly dizzy after ex which he stated he has on/off at times  BP (small cuff 180/104, (large cuff) 140/100    HR 60  SaO2 95%    Assessment: pt's vitals improved somewhat with rest, will monitor vitals pre/post ex next session, pt raya there ex well with min discomfort       Plan: Continue per plan of care  Progress treatment as tolerated       MONITOR VITALS PRE/POST EX      Precautions: HTN, hx Ca, hard of hearing      Manuals 10/6 10/10 10/14 10/18/22         L knee ROM CW SK KK VK         L patellar ROM CW  SK KK VK                                   Neuro Re-Ed             QS 20x5"  20x5" ea 20x5" ea 25x5" ea B/L         Bridge  2x10x5" 2x10x5" 20x5" ea 3x10x3"         S/L clamshell  20x3" ea 20x3" ea 20x3" ea 2x10x3"         LAQ 2x15x3" L 2x15x3" hammad 2x15x3" hammad 2x15x3" ea B/L         SLR nv x10 ea 20x ea 2x10         Hip 3 way    20x ea np         Side steps              Ther Ex             Bike/Nustep  8'  8' 8' 10'                      Leg press   75# 2x10 np                                                                          Ther Activity                                       Gait Training                                       Modalities

## 2022-10-20 ENCOUNTER — APPOINTMENT (OUTPATIENT)
Dept: PHYSICAL THERAPY | Age: 82
End: 2022-10-20

## 2022-10-25 ENCOUNTER — APPOINTMENT (OUTPATIENT)
Dept: PHYSICAL THERAPY | Age: 82
End: 2022-10-25

## 2022-10-25 NOTE — PROGRESS NOTES
Daily Note     Today's date: 10/25/2022  Patient name: Woo Salinas  : 1940  MRN: [id number]  Referring provider: Obdulio Lopez,*  Dx: No diagnosis found  Subjective: ***      Objective: See treatment diary below      Assessment: Tolerated treatment {Tolerated treatment :}   Patient {assessment:}      Plan: {PLAN:}     MONITOR VITALS PRE/POST EX      Precautions: HTN, hx Ca, hard of hearing      Manuals 10/6 10/10 10/14 10/18/22         L knee ROM CW SK KK VK         L patellar ROM CW  SK KK VK                                   Neuro Re-Ed             QS 20x5"  20x5" ea 20x5" ea 25x5" ea B/L         Bridge  2x10x5" 2x10x5" 20x5" ea 3x10x3"         S/L clamshell  20x3" ea 20x3" ea 20x3" ea 2x10x3"         LAQ 2x15x3" L 2x15x3" hammad 2x15x3" hammad 2x15x3" ea B/L         SLR nv x10 ea 20x ea 2x10         Hip 3 way    20x ea np         Side steps              Ther Ex             Bike/Nustep  8'  8' 8' 10'                      Leg press   75# 2x10 np                                                                          Ther Activity                                       Gait Training                                       Modalities

## 2022-10-27 ENCOUNTER — APPOINTMENT (OUTPATIENT)
Dept: PHYSICAL THERAPY | Age: 82
End: 2022-10-27

## 2022-11-10 ENCOUNTER — TELEPHONE (OUTPATIENT)
Dept: SURGICAL ONCOLOGY | Facility: CLINIC | Age: 82
End: 2022-11-10

## 2022-11-10 NOTE — TELEPHONE ENCOUNTER
----- Message from [facility]  sent at 11/10/2022  9:06 AM EST -----  Pt's US was never scheduled  Please call pt to assist    Also, his next appt with me needs to be r/s'd since I won't be in [place] on Wednesdays anymore    Thanks

## 2022-11-14 DIAGNOSIS — I10 PRIMARY HYPERTENSION: ICD-10-CM

## 2022-11-14 RX ORDER — LOSARTAN POTASSIUM 100 MG/1
100 TABLET ORAL DAILY
Qty: 90 TABLET | Refills: 1 | Status: SHIPPED | OUTPATIENT
Start: 2022-11-14

## 2022-11-21 DIAGNOSIS — G60.9 IDIOPATHIC PERIPHERAL NEUROPATHY: ICD-10-CM

## 2022-11-21 RX ORDER — GABAPENTIN 400 MG/1
400 CAPSULE ORAL 2 TIMES DAILY
Qty: 90 CAPSULE | Refills: 1 | Status: SHIPPED | OUTPATIENT
Start: 2022-11-21

## 2022-12-02 ENCOUNTER — TELEPHONE (OUTPATIENT)
Dept: PULMONOLOGY | Facility: CLINIC | Age: 82
End: 2022-12-02

## 2022-12-02 NOTE — TELEPHONE ENCOUNTER
Lm for pt to call back and schedule follow up with Dr Liyah Manuel in the [facility] office for January

## 2022-12-06 ENCOUNTER — TELEPHONE (OUTPATIENT)
Dept: SURGICAL ONCOLOGY | Facility: CLINIC | Age: 82
End: 2022-12-06

## 2022-12-07 DIAGNOSIS — N13.8 BPH WITH OBSTRUCTION/LOWER URINARY TRACT SYMPTOMS: ICD-10-CM

## 2022-12-07 DIAGNOSIS — N40.1 BPH WITH OBSTRUCTION/LOWER URINARY TRACT SYMPTOMS: ICD-10-CM

## 2022-12-07 RX ORDER — TAMSULOSIN HYDROCHLORIDE 0.4 MG/1
0.4 CAPSULE ORAL
Qty: 90 CAPSULE | Refills: 1 | Status: SHIPPED | OUTPATIENT
Start: 2022-12-07

## 2022-12-10 ENCOUNTER — RA CDI HCC (OUTPATIENT)
Dept: OTHER | Facility: HOSPITAL | Age: 82
End: 2022-12-10

## 2022-12-10 NOTE — PROGRESS NOTES
[facility]  coding opportunities       Chart reviewed, no opportunity found:   [address]        Patients Insurance     Medicare Insurance: [employer]

## 2022-12-12 ENCOUNTER — OFFICE VISIT (OUTPATIENT)
Dept: INTERNAL MEDICINE CLINIC | Facility: OTHER | Age: 82
End: 2022-12-12

## 2022-12-12 VITALS
BODY MASS INDEX: 37.45 KG/M2 | SYSTOLIC BLOOD PRESSURE: 118 MMHG | OXYGEN SATURATION: 95 % | TEMPERATURE: 98.7 F | WEIGHT: 238.6 LBS | HEART RATE: 73 BPM | DIASTOLIC BLOOD PRESSURE: 80 MMHG | HEIGHT: 67 IN

## 2022-12-12 DIAGNOSIS — R60.0 BILATERAL LOWER EXTREMITY EDEMA: Primary | ICD-10-CM

## 2022-12-12 DIAGNOSIS — I10 BENIGN ESSENTIAL HYPERTENSION: Chronic | ICD-10-CM

## 2022-12-12 DIAGNOSIS — I73.9 PERIPHERAL VASCULAR DISEASE (HCC): ICD-10-CM

## 2022-12-12 DIAGNOSIS — E87.6 HYPOKALEMIA: ICD-10-CM

## 2022-12-12 DIAGNOSIS — N18.31 STAGE 3A CHRONIC KIDNEY DISEASE (HCC): ICD-10-CM

## 2022-12-12 PROBLEM — U07.1 COVID-19: Status: RESOLVED | Noted: 2022-08-19 | Resolved: 2022-12-12

## 2022-12-12 RX ORDER — FUROSEMIDE 20 MG/1
20 TABLET ORAL DAILY
Qty: 30 TABLET | Refills: 0 | Status: SHIPPED | OUTPATIENT
Start: 2022-12-12 | End: 2022-12-12

## 2022-12-12 RX ORDER — POTASSIUM CHLORIDE 20 MEQ/1
20 TABLET, EXTENDED RELEASE ORAL DAILY PRN
Qty: 30 TABLET | Refills: 0 | Status: SHIPPED | OUTPATIENT
Start: 2022-12-12

## 2022-12-12 RX ORDER — FUROSEMIDE 20 MG/1
20 TABLET ORAL DAILY PRN
Qty: 30 TABLET | Refills: 0 | Status: SHIPPED | OUTPATIENT
Start: 2022-12-12

## 2022-12-12 NOTE — ASSESSMENT & PLAN NOTE
Lab Results   Component Value Date    EGFR 66 08/17/2022    EGFR 67 04/20/2022    EGFR 58 04/15/2022    CREATININE 1 05 08/17/2022    CREATININE 1 04 04/20/2022    CREATININE 1 17 04/15/2022   Continue to trend kidney function

## 2022-12-12 NOTE — PROGRESS NOTES
Assessment/Plan:    Bilateral lower extremity edema  Will start Lasix 20 mg daily PRN  Will follow up BMP to monitor kidney function and electrolytes  Hypokalemia  Will start potassium 20 mEq daily whenever taking Lasix  Benign essential hypertension  Controlled, continue current hypertensive regimen  Recommended he check his blood pressure when he takes Lasix to monitor for hypotension  Stage 3a chronic kidney disease [facility])  Lab Results   Component Value Date    EGFR 66 08/17/2022    EGFR 67 04/20/2022    EGFR 58 04/15/2022    CREATININE 1 05 08/17/2022    CREATININE 1 04 04/20/2022    CREATININE 1 17 04/15/2022   Continue to trend kidney function  Diagnoses and all orders for this visit:    Bilateral lower extremity edema  -     Discontinue: furosemide (LASIX) 20 mg tablet; Take 1 tablet (20 mg total) by mouth daily  -     furosemide (LASIX) 20 mg tablet; Take 1 tablet (20 mg total) by mouth daily as needed (leg swelling)  -     potassium chloride (K-DUR,KLOR-CON) 20 mEq tablet; Take 1 tablet (20 mEq total) by mouth daily as needed (when you take Lasix )    Peripheral vascular disease (HCC)    Hypokalemia  -     potassium chloride (K-DUR,KLOR-CON) 20 mEq tablet; Take 1 tablet (20 mEq total) by mouth daily as needed (when you take Lasix )    Benign essential hypertension    Stage 3a chronic kidney disease (HCC)                Subjective:      Patient ID: Cally Ellsworth  is a 81 y o  male  Chief Complaint   Patient presents with   • Foot Swelling     PT c/o bilateral leg and ankle swelling that is causing difficult gait  Sxs since beginning of the year  81year old male is seen today with concern for chronic bilateral lower extremity edema  Recent labs reviewed today  He has been compliant with his medication regimen  He has been experiencing chronic bilateral lower extremity edema which is worse in the evening hours    He does not incorporate salt into his diet other than when he eats steak which is rare  He denies any shortness of breath/dyspnea on exertion, or chest pain  Hypertension  This is a chronic problem  The current episode started more than 1 year ago  The problem is unchanged  The problem is controlled  Pertinent negatives include no chest pain, headaches, palpitations or shortness of breath  Past treatments include calcium channel blockers and angiotensin blockers  The current treatment provides moderate improvement  There are no compliance problems  The following portions of the patient's history were reviewed and updated as appropriate: allergies, current medications, past family history, past medical history, past social history, past surgical history and problem list     Review of Systems   Constitutional: Negative for activity change, appetite change, chills, diaphoresis, fatigue and fever  HENT: Negative for congestion, postnasal drip, rhinorrhea, sinus pressure, sinus pain, sneezing and sore throat  Eyes: Negative for visual disturbance  Respiratory: Negative for apnea, cough, choking, chest tightness, shortness of breath and wheezing  Cardiovascular: Negative for chest pain, palpitations and leg swelling  Gastrointestinal: Negative for abdominal distention, abdominal pain, anal bleeding, blood in stool, constipation, diarrhea, nausea and vomiting  Endocrine: Negative for cold intolerance and heat intolerance  Genitourinary: Negative for difficulty urinating, dysuria and hematuria  Musculoskeletal: Negative  Skin: Negative  Neurological: Negative for dizziness, weakness, light-headedness, numbness and headaches  Hematological: Negative for adenopathy  Psychiatric/Behavioral: Negative for agitation, sleep disturbance and suicidal ideas  All other systems reviewed and are negative  Past Medical History:   Diagnosis Date   • Abnormal electrocardiogram     Last assessed:  Oct 11, 2013   • [facility] 12/24/2015   • Allergic rhinitis     last assessed: Oct 11, 2013   • Allergic rhinitis due to pollen     last assessed: Oct 10, 2013   • Allergic sinusitis     Last assessed: May 11, 2015   • Allergy     resolved: July 22, 2015   • Benign essential hypertension     Last assessed/resolved: May 31, 2017   • BPH (benign prostatic hyperplasia)    • Cancer [facility])     prostate   • Colitis     Last assessed: May 24, 2016   • Cough with hemoptysis 11/17/2020   • COVID-19 08/18/2022   • Generalized osteoarthritis     Last assessed: Oct 11, 2013   • GERD without esophagitis     Last assessed/resolved: May 31, 2017   • Hearing loss    • Hiatal hernia     resolved: July 22, 2015   • History of gastroesophageal reflux (GERD)    • History of stomach ulcers     last assessed: May 11, 2015   • Hypertension    • Incomplete bladder emptying    • Kidney stone    • Nodular prostate with lower urinary tract symptoms    • Nontoxic single thyroid nodule     last assessed: April 16, 2014   • Orchalgia    • Overweight     last assessed: Oct 31, 2013   • Poor urinary stream    • Pulmonary embolism [facility])    • Salivary gland disorder     last assessed: April 16, 2014   • Seasonal allergies     last assessed: May 15, 2015   • Spermatocele    • Straining to void    • 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:  May 7, 2014         Current Outpatient Medications:   •  allopurinol (ZYLOPRIM) 100 mg tablet, Take 1 tablet (100 mg total) by mouth 2 (two) times a day, Disp: 180 tablet, Rfl: 1  •  aspirin (ECOTRIN LOW STRENGTH) 81 mg EC tablet, Take 81 mg by mouth daily, Disp: , Rfl:   •  Cholecalciferol (VITAMIN D3 PO), Take by mouth 2000 IU, Disp: , Rfl:   •  diltiazem (CARDIZEM CD) 180 mg 24 hr capsule, Take 1 capsule (180 mg total) by mouth daily, Disp: 30 capsule, Rfl: 2  •  furosemide (LASIX) 20 mg tablet, Take 1 tablet (20 mg total) by mouth daily as needed (leg swelling), Disp: 30 tablet, Rfl: 0  •  gabapentin (Neurontin) 400 mg capsule, Take 1 capsule (400 mg total) by mouth 2 (two) times a day, Disp: 90 capsule, Rfl: 1  •  levocetirizine (XYZAL) 5 MG tablet, Take 1 tablet (5 mg total) by mouth every evening, Disp: 90 tablet, Rfl: 4  •  losartan (COZAAR) 100 MG tablet, Take 1 tablet (100 mg total) by mouth daily, Disp: 90 tablet, Rfl: 1  •  omeprazole (PriLOSEC) 40 MG capsule, TAKE 1 CAPSULE (40 MG TOTAL) BY MOUTH DAILY  , Disp: 90 capsule, Rfl: 3  •  potassium chloride (K-DUR,KLOR-CON) 20 mEq tablet, Take 1 tablet (20 mEq total) by mouth daily as needed (when you take Lasix ), Disp: 30 tablet, Rfl: 0  •  tamsulosin (FLOMAX) 0 4 mg, Take 1 capsule (0 4 mg total) by mouth daily with dinner, Disp: 90 capsule, Rfl: 1  •  traMADol-acetaminophen (ULTRACET) 37 5-325 mg per tablet, Take 2 tablets by mouth every 6 (six) hours as needed for moderate pain, Disp: 60 tablet, Rfl: 0    Allergies   Allergen Reactions   • Ace Inhibitors Hyperactivity       Social History   Past Surgical History:   Procedure Laterality Date   • BLADDER SURGERY     • CHOLECYSTECTOMY  2000    laparoscopic    • COLONOSCOPY     • FLAP LOCAL TRUNK Left 10/28/2021    Procedure: EXCISION OF FLANK MASS;  Surgeon: Sasha Ramos DO;  Location: [address] [place] [address] MAIN OR;  Service: Plastics   • HEMORRHOID SURGERY      pilionidal cyst removal    • HERNIA REPAIR Left 01/17/2008    inguinal    • KNEE ARTHROSCOPY Left 1974   • KNEE CARTILAGE SURGERY     • NASAL SEPTUM SURGERY      Deviation repair    • WA EXC PRTD BARBARA/PRTD GLND LAT DSJ&PRSRV FACIAL NR Right 06/09/2021    Procedure: SUPERFICIAL PAROTIDECTOMY WITH FACIAL NERVE MONITORING;  Surgeon: Claude Calkins, MD;  [facility];  Service: ENT   • PROSTATE BIOPSY  2017   • STOMACH SURGERY     • TONSILLECTOMY AND ADENOIDECTOMY      at age 37   • TOTAL SHOULDER ARTHROPLASTY      SHOULDER JOINT REPLACEMENT - right 01/04 0 left 01/95   • UPPER GASTROINTESTINAL ENDOSCOPY     • US GUIDED THYROID BIOPSY  01/19/2022   • US GUIDED THYROID BIOPSY  05/26/2022   • VASECTOMY Family History   Problem Relation Age of Onset   • Hypertension Mother    • Stroke Mother    • Stomach cancer Father    • Hypertension Father    • Hypertension Family    • Stroke Family         syndrome    • Heart attack Son        Objective:  /80 (BP Location: Left arm, Patient Position: Sitting, Cuff Size: Large)   Pulse 73   Temp 98 7 °F (37 1 °C) (Tympanic)   Ht 5' 7" (1 702 m)   Wt 108 kg (238 lb 9 6 oz)   SpO2 95% Comment: ra  BMI 37 37 kg/m²     No results found for this or any previous visit (from the past 1344 hour(s))  Physical Exam  Vitals and nursing note reviewed  Constitutional:       General: He is not in acute distress  Appearance: He is well-developed  He is not diaphoretic  HENT:      Head: Normocephalic and atraumatic  Eyes:      General: No scleral icterus  Right eye: No discharge  Left eye: No discharge  Conjunctiva/sclera: Conjunctivae normal       Pupils: Pupils are equal, round, and reactive to light  Neck:      Thyroid: No thyromegaly  Vascular: No JVD  Cardiovascular:      Rate and Rhythm: Normal rate and regular rhythm  Heart sounds: Normal heart sounds  No murmur heard  No friction rub  No gallop  Pulmonary:      Effort: Pulmonary effort is normal  No respiratory distress  Breath sounds: Normal breath sounds  No wheezing or rales  Chest:      Chest wall: No tenderness  Abdominal:      General: Bowel sounds are normal  There is no distension  Palpations: Abdomen is soft  There is no mass  Tenderness: There is no abdominal tenderness  There is no guarding or rebound  Musculoskeletal:         General: No tenderness or deformity  Normal range of motion  Cervical back: Normal range of motion and neck supple  Right lower le+ Pitting Edema present  Left lower le+ Pitting Edema present  Lymphadenopathy:      Cervical: No cervical adenopathy     Skin:     General: Skin is warm and dry       Coloration: Skin is not pale  Findings: No erythema or rash  Neurological:      Mental Status: He is alert and oriented to person, place, and time  Cranial Nerves: No cranial nerve deficit  Coordination: Coordination normal       Deep Tendon Reflexes: Reflexes are normal and symmetric  Psychiatric:         Behavior: Behavior normal          Thought Content:  Thought content normal          Judgment: Judgment normal

## 2022-12-12 NOTE — ASSESSMENT & PLAN NOTE
Controlled, continue current hypertensive regimen  Recommended he check his blood pressure when he takes Lasix to monitor for hypotension

## 2022-12-23 ENCOUNTER — TELEPHONE (OUTPATIENT)
Dept: HEMATOLOGY ONCOLOGY | Facility: CLINIC | Age: 82
End: 2022-12-23

## 2022-12-23 NOTE — TELEPHONE ENCOUNTER
Patient wanted to know if they should change appt for June to next to there ultrasound appt   I advised the appts should be ok

## 2022-12-30 ENCOUNTER — OFFICE VISIT (OUTPATIENT)
Dept: GASTROENTEROLOGY | Facility: CLINIC | Age: 82
End: 2022-12-30

## 2022-12-30 VITALS
BODY MASS INDEX: 37.98 KG/M2 | SYSTOLIC BLOOD PRESSURE: 118 MMHG | DIASTOLIC BLOOD PRESSURE: 70 MMHG | HEIGHT: 67 IN | WEIGHT: 242 LBS | TEMPERATURE: 98.4 F

## 2022-12-30 DIAGNOSIS — K21.9 GASTROESOPHAGEAL REFLUX DISEASE WITHOUT ESOPHAGITIS: Primary | Chronic | ICD-10-CM

## 2022-12-30 NOTE — PROGRESS NOTES
[facility] - Outpatient Follow-up Note  Macel Gottron  81 y o  male MRN: [id number]  Encounter: [id number]    ASSESSMENT AND PLAN:  81year old male here for follow up    1  Gastroesophageal reflux disease without esophagitis  -continue omeprazole    2  Ankle swelling  -long standing; dependent in nature as it's worse with movement; in the am it's normal  -recommended light compression stockings and follow up with PCP as it's scheduled in late January    3  Constipation  -reports drinking a large amount of mint tea daily; discussed adding water; can use miralax as needed for constipation     Follow up in six months time     ___________________________________________________    SUBJECTIVE:  Pt here for follow up  Reports starting on lasix and K but then developed abdominal pain which has improved  Main complaint is lower extremity; leg swelling  Pt reports having a BM once daily and reports       REVIEW OF SYSTEMS IS OTHERWISE NEGATIVE  Historical Information   Past Medical History:   Diagnosis Date   • Abnormal electrocardiogram     Last assessed: Oct 11, 2013   • KAYLI (acute kidney injury) ([facility] ) 12/24/2015   • Allergic rhinitis     last assessed: Oct 11, 2013   • Allergic rhinitis due to pollen     last assessed: Oct 10, 2013   • Allergic sinusitis     Last assessed: May 11, 2015   • Allergy     resolved: July 22, 2015   • Benign essential hypertension     Last assessed/resolved: May 31, 2017   • BPH (benign prostatic hyperplasia)    • Cancer [facility])     prostate   • Colitis     Last assessed: May 24, 2016   • Cough with hemoptysis 11/17/2020   • COVID-19 08/18/2022   • Generalized osteoarthritis     Last assessed: Oct 11, 2013   • GERD without esophagitis     Last assessed/resolved:  May 31, 2017   • Hearing loss    • Hiatal hernia     resolved: July 22, 2015   • History of gastroesophageal reflux (GERD)    • History of stomach ulcers     last assessed: May 11, 2015   • Hypertension    • Incomplete bladder emptying    • Kidney stone    • Nodular prostate with lower urinary tract symptoms    • Nontoxic single thyroid nodule     last assessed: 2014   • Orchalgia    • Overweight     last assessed: Oct 31, 2013   • Poor urinary stream    • Pulmonary embolism [facility])    • Salivary gland disorder     last assessed: 2014   • Seasonal allergies     last assessed: May 15, 2015   • Spermatocele    • Straining to void    • 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:  May 7, 2014     Past Surgical History:   Procedure Laterality Date   • BLADDER SURGERY     • CHOLECYSTECTOMY      laparoscopic    • COLONOSCOPY     • FLAP LOCAL TRUNK Left 10/28/2021    Procedure: EXCISION OF FLANK MASS;  Surgeon: Nathan Pryor DO;  Location: [address] [place] [address] MAIN OR;  Service: Plastics   • HEMORRHOID SURGERY      pilionidal cyst removal    • HERNIA REPAIR Left 2008    inguinal    • KNEE ARTHROSCOPY Left    • KNEE CARTILAGE SURGERY     • NASAL SEPTUM SURGERY      Deviation repair    • LA EXC PRTD BARBARA/PRTD GLND LAT DSJ&PRSRV FACIAL NR Right 2021    Procedure: SUPERFICIAL PAROTIDECTOMY WITH FACIAL NERVE MONITORING;  Surgeon: Ngoc Barrera MD;  Location[facility];  Service: ENT   • PROSTATE BIOPSY     • STOMACH SURGERY     • TONSILLECTOMY AND ADENOIDECTOMY      at age 37   • [address] - right  0 left    • UPPER GASTROINTESTINAL ENDOSCOPY     • US GUIDED THYROID BIOPSY  2022   • US GUIDED THYROID BIOPSY  2022   • VASECTOMY       Social History   Social History     Substance and Sexual Activity   Alcohol Use Yes    Comment: rare     Social History     Substance and Sexual Activity   Drug Use No     Social History     Tobacco Use   Smoking Status Former   • Packs/day: 1 00   • Years: 30 00   • Pack years: 30 00   • Types: Cigarettes   • Start date:    • Quit date:    • Years since quittin 0   Smokeless Tobacco Never     Family History   Problem Relation Age of Onset   • Hypertension Mother    • Stroke Mother    • Stomach cancer Father    • Hypertension Father    • Hypertension Family    • Stroke Family         syndrome    • Heart attack Son        Meds/Allergies       Current Outpatient Medications:   •  allopurinol (ZYLOPRIM) 100 mg tablet  •  aspirin (ECOTRIN LOW STRENGTH) 81 mg EC tablet  •  Cholecalciferol (VITAMIN D3 PO)  •  diltiazem (CARDIZEM CD) 180 mg 24 hr capsule  •  gabapentin (Neurontin) 400 mg capsule  •  levocetirizine (XYZAL) 5 MG tablet  •  losartan (COZAAR) 100 MG tablet  •  omeprazole (PriLOSEC) 40 MG capsule  •  tamsulosin (FLOMAX) 0 4 mg  •  traMADol-acetaminophen (ULTRACET) 37 5-325 mg per tablet    Allergies   Allergen Reactions   • Ace Inhibitors Hyperactivity           Objective     Blood pressure 118/70, temperature 98 4 °F (36 9 °C), temperature source Tympanic, height 5' 7" (1 702 m), weight 110 kg (242 lb)  Body mass index is 37 9 kg/m²  PHYSICAL EXAM:      General Appearance:   Alert, cooperative, no distress   HEENT:   Normocephalic, atraumatic, anicteric      Neck:  Supple, symmetrical, trachea midline   Lungs:   Clear to auscultation bilaterally; no rales, rhonchi or wheezing; respirations unlabored    Heart[de-identified]   Regular rate and rhythm; no murmur, rub, or gallop  Abdomen:   Soft, non-tender, non-distended; normal bowel sounds; no masses, no organomegaly    Genitalia:   Deferred    Rectal:   Deferred    Extremities:  No cyanosis, clubbing or edema    Pulses:  2+ and symmetric    Skin:  No jaundice, rashes, or lesions    Lymph nodes:  No palpable cervical lymphadenopathy        Lab Results:   No visits with results within 1 Day(s) from this visit     Latest known visit with results is:   Hospital Outpatient Visit on 09/22/2022   Component Date Value   • Case Report 09/22/2022                      Value:Surgical Pathology Report                         Case: [id number] Authorizing Provider:  Natalia Rosa MD          Collected:           09/22/2022 1529              Ordering Location:     [address] [place] [address]      Received:            09/22/2022 [facility] End[facility]                                                           Pathologist:           Shakira Michel MD                                                                 Specimens:   A) - Duodenum, r/o celiac                                                                           B) - Stomach, r/o h pylori                                                                          C) - Esophagus, bx to r/o candida                                                                   D) - Polyp, Colorectal, sigmoid polyp, hot snare                                                                              E) - Polyp, Colorectal, Ascending polyp - cold snare                                                F) - Polyp, Colorectal, Transverse polyp- x 3                                                       G) - Colon, Random colon bx                                                                         H) - Polyp, Colorectal, Sigmoid polyp x 2- cold snare                                     • Final Diagnosis 09/22/2022                      Value: This result contains rich text formatting which cannot be displayed here  • Additional Information 09/22/2022                      Value: This result contains rich text formatting which cannot be displayed here  • Synoptic Checklist 09/22/2022                      Value:                            COLON/RECTUM POLYP FORM - GI - All Specimens                                                                                     :    Adenoma(s)     • Gross Description 09/22/2022                      Value: This result contains rich text formatting which cannot be displayed here  Radiology Results:   No results found  Detail Level: Detailed

## 2023-01-17 ENCOUNTER — APPOINTMENT (OUTPATIENT)
Dept: LAB | Facility: IMAGING CENTER | Age: 83
End: 2023-01-17

## 2023-01-17 DIAGNOSIS — E04.2 MULTIPLE THYROID NODULES: ICD-10-CM

## 2023-01-17 DIAGNOSIS — M1A.0310 IDIOPATHIC CHRONIC GOUT OF RIGHT WRIST WITHOUT TOPHUS: ICD-10-CM

## 2023-01-17 DIAGNOSIS — C61 PROSTATE CANCER (HCC): Primary | ICD-10-CM

## 2023-01-17 DIAGNOSIS — Z13.220 SCREENING FOR LIPID DISORDERS: ICD-10-CM

## 2023-01-17 DIAGNOSIS — N18.31 STAGE 3A CHRONIC KIDNEY DISEASE (HCC): ICD-10-CM

## 2023-01-17 LAB
ALBUMIN SERPL BCP-MCNC: 3.8 G/DL (ref 3.5–5)
ALP SERPL-CCNC: 92 U/L (ref 46–116)
ALT SERPL W P-5'-P-CCNC: 18 U/L (ref 12–78)
ANION GAP SERPL CALCULATED.3IONS-SCNC: 4 MMOL/L (ref 4–13)
AST SERPL W P-5'-P-CCNC: 15 U/L (ref 5–45)
BASOPHILS # BLD AUTO: 0.04 THOUSANDS/ÂΜL (ref 0–0.1)
BASOPHILS NFR BLD AUTO: 1 % (ref 0–1)
BILIRUB SERPL-MCNC: 0.66 MG/DL (ref 0.2–1)
BUN SERPL-MCNC: 25 MG/DL (ref 5–25)
CALCIUM SERPL-MCNC: 9.1 MG/DL (ref 8.3–10.1)
CHLORIDE SERPL-SCNC: 107 MMOL/L (ref 96–108)
CHOLEST SERPL-MCNC: 148 MG/DL
CO2 SERPL-SCNC: 29 MMOL/L (ref 21–32)
CREAT SERPL-MCNC: 1.16 MG/DL (ref 0.6–1.3)
EOSINOPHIL # BLD AUTO: 0.07 THOUSAND/ÂΜL (ref 0–0.61)
EOSINOPHIL NFR BLD AUTO: 2 % (ref 0–6)
ERYTHROCYTE [DISTWIDTH] IN BLOOD BY AUTOMATED COUNT: 14 % (ref 11.6–15.1)
GFR SERPL CREATININE-BSD FRML MDRD: 58 ML/MIN/1.73SQ M
GLUCOSE P FAST SERPL-MCNC: 95 MG/DL (ref 65–99)
HCT VFR BLD AUTO: 47.3 % (ref 36.5–49.3)
HDLC SERPL-MCNC: 57 MG/DL
HGB BLD-MCNC: 15.6 G/DL (ref 12–17)
IMM GRANULOCYTES # BLD AUTO: 0.05 THOUSAND/UL (ref 0–0.2)
IMM GRANULOCYTES NFR BLD AUTO: 1 % (ref 0–2)
LDLC SERPL CALC-MCNC: 78 MG/DL (ref 0–100)
LYMPHOCYTES # BLD AUTO: 1.59 THOUSANDS/ÂΜL (ref 0.6–4.47)
LYMPHOCYTES NFR BLD AUTO: 33 % (ref 14–44)
MCH RBC QN AUTO: 29.4 PG (ref 26.8–34.3)
MCHC RBC AUTO-ENTMCNC: 33 G/DL (ref 31.4–37.4)
MCV RBC AUTO: 89 FL (ref 82–98)
MONOCYTES # BLD AUTO: 0.32 THOUSAND/ÂΜL (ref 0.17–1.22)
MONOCYTES NFR BLD AUTO: 7 % (ref 4–12)
NEUTROPHILS # BLD AUTO: 2.69 THOUSANDS/ÂΜL (ref 1.85–7.62)
NEUTS SEG NFR BLD AUTO: 56 % (ref 43–75)
NRBC BLD AUTO-RTO: 0 /100 WBCS
PLATELET # BLD AUTO: 193 THOUSANDS/UL (ref 149–390)
PMV BLD AUTO: 10.8 FL (ref 8.9–12.7)
POTASSIUM SERPL-SCNC: 4.5 MMOL/L (ref 3.5–5.3)
PROT SERPL-MCNC: 6.5 G/DL (ref 6.4–8.4)
RBC # BLD AUTO: 5.31 MILLION/UL (ref 3.88–5.62)
SODIUM SERPL-SCNC: 140 MMOL/L (ref 135–147)
TRIGL SERPL-MCNC: 64 MG/DL
TSH SERPL DL<=0.05 MIU/L-ACNC: 1.09 UIU/ML (ref 0.45–4.5)
URATE SERPL-MCNC: 3.6 MG/DL (ref 3.5–8.5)
WBC # BLD AUTO: 4.76 THOUSAND/UL (ref 4.31–10.16)

## 2023-01-18 ENCOUNTER — OFFICE VISIT (OUTPATIENT)
Dept: INTERNAL MEDICINE CLINIC | Facility: OTHER | Age: 83
End: 2023-01-18

## 2023-01-18 VITALS
WEIGHT: 238.4 LBS | DIASTOLIC BLOOD PRESSURE: 86 MMHG | SYSTOLIC BLOOD PRESSURE: 140 MMHG | HEART RATE: 73 BPM | BODY MASS INDEX: 37.42 KG/M2 | OXYGEN SATURATION: 95 % | TEMPERATURE: 98.5 F | HEIGHT: 67 IN

## 2023-01-18 DIAGNOSIS — J84.9 INTERSTITIAL LUNG DISEASE (HCC): ICD-10-CM

## 2023-01-18 DIAGNOSIS — K21.9 GASTROESOPHAGEAL REFLUX DISEASE WITHOUT ESOPHAGITIS: Chronic | ICD-10-CM

## 2023-01-18 DIAGNOSIS — F11.20 CONTINUOUS OPIOID DEPENDENCE (HCC): ICD-10-CM

## 2023-01-18 DIAGNOSIS — C61 PROSTATE CANCER (HCC): Primary | ICD-10-CM

## 2023-01-18 DIAGNOSIS — D75.1 POLYCYTHEMIA: ICD-10-CM

## 2023-01-18 DIAGNOSIS — I73.9 PERIPHERAL VASCULAR DISEASE (HCC): ICD-10-CM

## 2023-01-18 DIAGNOSIS — E27.9 DISORDER OF ADRENAL GLAND, UNSPECIFIED (HCC): ICD-10-CM

## 2023-01-18 DIAGNOSIS — N18.31 STAGE 3A CHRONIC KIDNEY DISEASE (HCC): ICD-10-CM

## 2023-01-18 DIAGNOSIS — E66.09 CLASS 2 OBESITY DUE TO EXCESS CALORIES WITHOUT SERIOUS COMORBIDITY WITH BODY MASS INDEX (BMI) OF 36.0 TO 36.9 IN ADULT: ICD-10-CM

## 2023-01-18 PROBLEM — K11.20 SIALOADENITIS: Status: RESOLVED | Noted: 2022-04-14 | Resolved: 2023-01-18

## 2023-01-18 PROBLEM — E04.2 MULTIPLE THYROID NODULES: Status: RESOLVED | Noted: 2021-12-14 | Resolved: 2023-01-18

## 2023-01-18 PROBLEM — H81.13 BENIGN PAROXYSMAL POSITIONAL VERTIGO DUE TO BILATERAL VESTIBULAR DISORDER: Status: RESOLVED | Noted: 2018-02-28 | Resolved: 2023-01-18

## 2023-01-18 PROBLEM — E87.6 HYPOKALEMIA: Status: RESOLVED | Noted: 2022-04-20 | Resolved: 2023-01-18

## 2023-01-18 RX ORDER — POTASSIUM CHLORIDE 20 MEQ/1
TABLET, EXTENDED RELEASE ORAL
COMMUNITY
End: 2023-01-18

## 2023-01-18 RX ORDER — FUROSEMIDE 20 MG/1
TABLET ORAL
COMMUNITY

## 2023-01-18 NOTE — PROGRESS NOTES
Assessment/Plan:     Diagnoses and all orders for this visit:    Prostate cancer ([facility] [place] [address] )  Followed up by the urology  [facility]  Followed up by the pulmonary  Stage 3a chronic kidney disease ([email or web address] )  Stable chronic kidney disease  Polycythemia  -     Comprehensive metabolic panel; Future  -     CBC and differential; Future  -     UA w Reflex to Microscopic w Reflex to Culture  Polycythemia we will follow the CBC it is a stable  Gastroesophageal reflux disease without esophagitis    Continuous opioid dependence (HCC)  for back pain  Peripheral vascular disease (HCC)    Disorder of adrenal gland, unspecified (HCC)    Class 2 obesity due to excess calories without serious comorbidity with body mass index (BMI) of 36 0 to 36 9 in adult  -     Ambulatory Referral to Nutrition Services; Future    Other orders  -     furosemide (LASIX) 20 mg tablet; furosemide 20 mg tablet   TAKE 1 TABLET BY MOUTH DAILY AS NEEDED FOR LEG SWELLING  -     Discontinue: potassium chloride (K-DUR,KLOR-CON) 20 mEq tablet; Klor-Con M20 mEq tablet,extended release   TAKE 1 TABLET (20 MEQ TOTAL) BY MOUTH DAILY AS NEEDED (WHEN YOU TAKE LASIX )        BMI Counseling: Body mass index is 37 34 kg/m²  The BMI is above normal  Nutrition recommendations include decreasing portion sizes, encouraging healthy choices of fruits and vegetables and moderation in carbohydrate intake  Exercise recommendations include moderate physical activity 150 minutes/week  Rationale for BMI follow-up plan is due to patient being overweight or obese  M*Modal software was used to dictate this note  It may contain errors with dictating incorrect words or incorrect spelling  Please contact the provider directly with any questions      Subjective:      Chief Complaint   Patient presents with   • Follow-up     Pt here for 3 month f/u    • Edema     Bilateral ankle swelling intermittent for the last few months   Pt was seen for appt and started on water pill/potassium  Pt has been wearing compression socks for 1x week    • Results     Pt had blood work done for this visit         Patient ID: Anamika Winston  is a 81 y o  male  HPI  Patient has a problem with the polyneuropathy also with the knee pain he takes tramadol for the pain 2 pills when he have to go to work he is not overusing the medication he cannot use any other medication that his age because of the chronic kidney disease also gastroesophageal reflux problem  Patient is here today for the follow-up  Hypertension  I reviewed antihypertensive medication, patient does not have any side effects of  medications, no signs or symptoms of hypertension ,hypotension or orthostatic hypotension  Patient is compliant with medications  Blood workup related to hypertensive diagnosis reviewed  Plan is to continue with the present management  We will follow-up as a scheduled and adjust the doses of the medication as indicated  It was high when he went for the colonoscopy he is on a Lasix for peripheral edema I told him to be careful because of his history of gout  History of prostate cancer followed up by the urologist     Obesity diet exercise and weight loss  Hyperuricemia continue with the allopurinol  The following portions of the patient's history were reviewed and updated as appropriate: allergies, current medications, past family history, past medical history, past social history, past surgical history and problem list     Review of Systems   Constitutional: Positive for unexpected weight change (wait gain)  Negative for appetite change, fatigue and fever  HENT: Negative for congestion, ear pain, hearing loss, nosebleeds, sneezing, tinnitus and voice change  Eyes: Negative for pain, discharge and redness  Respiratory: Positive for shortness of breath  Negative for cough, chest tightness and wheezing  Cardiovascular: Positive for leg swelling  Negative for chest pain and palpitations  Gastrointestinal: Negative for abdominal pain, blood in stool, constipation, diarrhea, nausea and vomiting  Genitourinary: Negative for difficulty urinating, dysuria, hematuria and urgency  Musculoskeletal: Positive for back pain  Negative for arthralgias, gait problem and joint swelling  Skin: Negative for rash and wound  Allergic/Immunologic: Negative for environmental allergies  Neurological: Positive for weakness  Negative for dizziness, tremors, seizures, light-headedness and numbness  Hematological: Negative for adenopathy  Does not bruise/bleed easily  Psychiatric/Behavioral: Negative for behavioral problems and confusion  The patient is nervous/anxious  Past Medical History:   Diagnosis Date   • Abnormal electrocardiogram     Last assessed: Oct 11, 2013   • KAYLI (acute kidney injury) ([facility] [address] ) 12/24/2015   • Allergic rhinitis     last assessed: Oct 11, 2013   • Allergic rhinitis due to pollen     last assessed: Oct 10, 2013   • Allergic sinusitis     Last assessed: May 11, 2015   • Allergy     resolved: July 22, 2015   • Benign essential hypertension     Last assessed/resolved: May 31, 2017   • BPH (benign prostatic hyperplasia)    • Cancer [facility])     prostate   • Colitis     Last assessed: May 24, 2016   • Cough with hemoptysis 11/17/2020   • COVID-19 08/18/2022   • Generalized osteoarthritis     Last assessed: Oct 11, 2013   • GERD without esophagitis     Last assessed/resolved: May 31, 2017   • Hearing loss    • Hiatal hernia     resolved: July 22, 2015   • History of gastroesophageal reflux (GERD)    • History of stomach ulcers     last assessed: May 11, 2015   • Hypertension    • Incomplete bladder emptying    • Kidney stone    • Nodular prostate with lower urinary tract symptoms    • Nontoxic single thyroid nodule     last assessed: April 16, 2014   • Orchalgia    • Overweight     last assessed:  Oct 31, 2013   • Poor urinary stream    • Pulmonary embolism [facility])    • Salivary gland disorder     last assessed: April 16, 2014   • Seasonal allergies     last assessed: May 15, 2015   • Spermatocele    • Straining to void    • 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, 2014         Current Outpatient Medications:   •  allopurinol (ZYLOPRIM) 100 mg tablet, Take 1 tablet (100 mg total) by mouth 2 (two) times a day, Disp: 180 tablet, Rfl: 1  •  aspirin (ECOTRIN LOW STRENGTH) 81 mg EC tablet, Take 81 mg by mouth daily, Disp: , Rfl:   •  Cholecalciferol (VITAMIN D3 PO), Take by mouth 2000 IU, Disp: , Rfl:   •  diltiazem (CARDIZEM CD) 180 mg 24 hr capsule, Take 1 capsule (180 mg total) by mouth daily, Disp: 30 capsule, Rfl: 2  •  gabapentin (Neurontin) 400 mg capsule, Take 1 capsule (400 mg total) by mouth 2 (two) times a day, Disp: 90 capsule, Rfl: 1  •  levocetirizine (XYZAL) 5 MG tablet, Take 1 tablet (5 mg total) by mouth every evening, Disp: 90 tablet, Rfl: 4  •  losartan (COZAAR) 100 MG tablet, Take 1 tablet (100 mg total) by mouth daily, Disp: 90 tablet, Rfl: 1  •  omeprazole (PriLOSEC) 40 MG capsule, TAKE 1 CAPSULE (40 MG TOTAL) BY MOUTH DAILY  , Disp: 90 capsule, Rfl: 3  •  tamsulosin (FLOMAX) 0 4 mg, Take 1 capsule (0 4 mg total) by mouth daily with dinner, Disp: 90 capsule, Rfl: 1  •  traMADol-acetaminophen (ULTRACET) 37 5-325 mg per tablet, Take 2 tablets by mouth every 6 (six) hours as needed for moderate pain, Disp: 60 tablet, Rfl: 0  •  furosemide (LASIX) 20 mg tablet, furosemide 20 mg tablet  TAKE 1 TABLET BY MOUTH DAILY AS NEEDED FOR LEG SWELLING, Disp: , Rfl:     Allergies   Allergen Reactions   • Ace Inhibitors Hyperactivity       Social History   Past Surgical History:   Procedure Laterality Date   • BLADDER SURGERY     • CHOLECYSTECTOMY  2000    laparoscopic    • COLONOSCOPY     • FLAP LOCAL TRUNK Left 10/28/2021    Procedure: EXCISION OF FLANK MASS;  Surgeon: Sampson Dick DO;  Location: [facility];  Service: Plastics   • HEMORRHOID SURGERY      pilionidal cyst removal    • HERNIA REPAIR Left 01/17/2008    inguinal    • KNEE ARTHROSCOPY Left 1974   • KNEE CARTILAGE SURGERY     • NASAL SEPTUM SURGERY      Deviation repair    • AR EXC PRTD BARBARA/PRTD GLND LAT DSJ&PRSRV FACIAL NR Right 06/09/2021    Procedure: SUPERFICIAL PAROTIDECTOMY WITH FACIAL NERVE MONITORING;  Surgeon: Latonia Kinsey MD;  Location: [facility];  Service: ENT   • PROSTATE BIOPSY  2017   • STOMACH SURGERY     • TONSILLECTOMY AND ADENOIDECTOMY      at age 37   • [address] - right 01/04 0 left 01/95   • UPPER GASTROINTESTINAL ENDOSCOPY     • US GUIDED THYROID BIOPSY  01/19/2022   • US GUIDED THYROID BIOPSY  05/26/2022   • VASECTOMY       Family History   Problem Relation Age of Onset   • Hypertension Mother    • Stroke Mother    • Stomach cancer Father    • Hypertension Father    • Hypertension Family    • Stroke Family         syndrome    • Heart attack Son        Objective:  /86 (BP Location: Left arm, Patient Position: Sitting, Cuff Size: Standard)   Pulse 73   Temp 98 5 °F (36 9 °C) (Temporal)   Ht 5' 7" (1 702 m)   Wt 108 kg (238 lb 6 4 oz)   SpO2 95% Comment: room air  BMI 37 34 kg/m²        Physical Exam  Constitutional:       Appearance: He is well-developed  He is obese  HENT:      Head: Normocephalic  Right Ear: External ear normal       Nose: Nose normal    Eyes:      Pupils: Pupils are equal, round, and reactive to light  Neck:      Thyroid: No thyromegaly  Vascular: No JVD  Trachea: No tracheal deviation  Cardiovascular:      Rate and Rhythm: Normal rate and regular rhythm  Heart sounds: Normal heart sounds  Pulmonary:      Breath sounds: Decreased breath sounds present  Abdominal:      General: Abdomen is protuberant  Bowel sounds are normal       Palpations: Abdomen is soft  Musculoskeletal:         General: No deformity  Cervical back: Normal range of motion and neck supple  Right lower leg: Edema present  Left lower leg: Edema present  Lymphadenopathy:      Cervical: No cervical adenopathy  Skin:     General: Skin is warm and dry  Neurological:      Mental Status: He is alert and oriented to person, place, and time  Psychiatric:         Behavior: Behavior normal          Thought Content:  Thought content normal          Judgment: Judgment normal

## 2023-02-03 DIAGNOSIS — I50.9 CONGESTIVE HEART FAILURE, UNSPECIFIED HF CHRONICITY, UNSPECIFIED HEART FAILURE TYPE (HCC): Primary | ICD-10-CM

## 2023-02-03 DIAGNOSIS — M79.89 LEG SWELLING: ICD-10-CM

## 2023-02-03 RX ORDER — FUROSEMIDE 20 MG/1
20 TABLET ORAL DAILY
Qty: 30 TABLET | Refills: 1 | Status: SHIPPED | OUTPATIENT
Start: 2023-02-03

## 2023-02-03 NOTE — TELEPHONE ENCOUNTER
Patient states she left a message on the script line on Monday and now patient is out of medication for the last 2 days

## 2023-02-06 ENCOUNTER — TELEPHONE (OUTPATIENT)
Dept: HEMATOLOGY ONCOLOGY | Facility: CLINIC | Age: 83
End: 2023-02-06

## 2023-02-06 NOTE — TELEPHONE ENCOUNTER
CALL RETURN FORM   Reason for patient call? Patients wife Sarah Perez)  is calling in regards to the patients appointment with Jenna Galicia 06/21/2023 @10AM  Chey Smith would like to know why this appointment is being rescheduled and who she an talk to about this  Patient's primary oncologist?  Jenna Galicia    Name of person the patient was calling for? Amanda Franco's team   Any additional information to add, if applicable? Chey Smith states the patient does not hear well and she picks up all the calls but she works  Chey Smith asks if we can leave her a detailed message as to why this appointment needs to be rescheduled on her voicemail if she does not   Informed patient that the message will be forwarded to the team and someone will get back to them as soon as possible    Did you relay this information to the patient? YES

## 2023-02-07 NOTE — TELEPHONE ENCOUNTER
LM for patient's wife, Claire Sheldon, that Maynor Mandujano will be out of the office on 6/21/23 and the appointment will need to be rescheduled  Left the [facility] number for her to call back

## 2023-02-17 DIAGNOSIS — G60.9 IDIOPATHIC PERIPHERAL NEUROPATHY: ICD-10-CM

## 2023-02-22 NOTE — TELEPHONE ENCOUNTER
Spoke with Insurance- Qty allowed is 64  [facility] ran through prescription for a quantity of 56 and mediation was approved

## 2023-02-28 ENCOUNTER — LAB REQUISITION (OUTPATIENT)
Dept: LAB | Facility: HOSPITAL | Age: 83
End: 2023-02-28

## 2023-02-28 DIAGNOSIS — D23.111 OTHER BENIGN NEOPLASM OF SKIN OF RIGHT UPPER EYELID, INCLUDING CANTHUS: ICD-10-CM

## 2023-03-02 ENCOUNTER — OFFICE VISIT (OUTPATIENT)
Dept: INTERNAL MEDICINE CLINIC | Facility: OTHER | Age: 83
End: 2023-03-02

## 2023-03-02 VITALS
OXYGEN SATURATION: 78 % | WEIGHT: 235 LBS | SYSTOLIC BLOOD PRESSURE: 128 MMHG | HEART RATE: 78 BPM | DIASTOLIC BLOOD PRESSURE: 74 MMHG | TEMPERATURE: 98.4 F | BODY MASS INDEX: 36.88 KG/M2 | HEIGHT: 67 IN

## 2023-03-02 DIAGNOSIS — R25.2 LEG CRAMPING: ICD-10-CM

## 2023-03-02 DIAGNOSIS — Z00.00 MEDICARE ANNUAL WELLNESS VISIT, SUBSEQUENT: Primary | ICD-10-CM

## 2023-03-02 DIAGNOSIS — G60.9 IDIOPATHIC PERIPHERAL NEUROPATHY: ICD-10-CM

## 2023-03-02 DIAGNOSIS — I83.813 VARICOSE VEINS OF BOTH LOWER EXTREMITIES WITH PAIN: ICD-10-CM

## 2023-03-02 DIAGNOSIS — R60.0 BILATERAL LOWER EXTREMITY EDEMA: ICD-10-CM

## 2023-03-02 RX ORDER — GABAPENTIN 400 MG/1
400 CAPSULE ORAL 2 TIMES DAILY
Qty: 90 CAPSULE | Refills: 1 | Status: SHIPPED | OUTPATIENT
Start: 2023-03-02

## 2023-03-02 NOTE — PROGRESS NOTES
Assessment and Plan:     Problem List Items Addressed This Visit        Cardiovascular and Mediastinum    Varicose veins of both lower extremities with pain    Relevant Orders    VAS lower limb arterial duplex, complete bilateral    Ambulatory Referral to Vascular Surgery       Nervous and Auditory    Idiopathic peripheral neuropathy    Relevant Medications    gabapentin (Neurontin) 400 mg capsule       Other    Bilateral lower extremity edema     - evaluated with Dr Suzi Payton  - chronic, takes lasix 20mg daily   - he has skin color changes suggestive of chronic venous stasis   - he is requesting a referral to vascular  - will check arterial duplex  - he has significant varicose veins and complains of chronic leg heaviness  - follow up with vascular          Relevant Orders    VAS lower limb arterial duplex, complete bilateral    Ambulatory Referral to Vascular Surgery    Leg cramping    Relevant Orders    VAS lower limb arterial duplex, complete bilateral    Ambulatory Referral to Vascular Surgery   Other Visit Diagnoses     Medicare annual wellness visit, subsequent    -  Primary    -up to date on preventative care  -advised to bring copy of living will into office to be scanned into chart            Preventive health issues were discussed with patient, and age appropriate screening tests were ordered as noted in patient's After Visit Summary  Personalized health advice and appropriate referrals for health education or preventive services given if needed, as noted in patient's After Visit Summary  History of Present Illness:     Patient presents for a Medicare Wellness Visit      Last week he was seen by [facility] last week for knee pain  He had 30 cc of fluid drained from his knee and a steroid injection  He asked about seeing an ankle doctor  He was seen bu Dr Yenni Rucker  He was advised he should be seeing a vascular doctor  He states he gets severe cramps in his right inner thigh while he is resting   No cramping while walking  He gets cramping in his calves  Cramping is primarily with sitting or sleeping  He estimates he drinks about 38 oz of water a day  HPI   Patient Care Team:  Deidre Adames MD as PCP - MD Lin Williamson MD as Consulting Physician (Allergy)  Urmila Chowdhury MD as Consulting Physician (Pulmonary Disease)  Simon Espinal DO (Plastic Surgery)  Vielka Vera MD (Surgical Oncology)     Review of Systems:     Review of Systems   Constitutional: Negative for unexpected weight change  Respiratory: Negative for cough and chest tightness  Cardiovascular: Positive for leg swelling  Negative for chest pain and palpitations          Problem List:     Patient Active Problem List   Diagnosis   • Benign essential hypertension   • Benign prostatic hyperplasia (BPH) with straining on urination   • Gastroesophageal reflux disease without esophagitis   • Prostate cancer (HCC)   • Seasonal allergic rhinitis due to pollen   • Truncal obesity   • Luetscher's syndrome   • Need for shingles vaccine   • Glaucoma screening   • Screening for osteoporosis   • Screening for AAA (abdominal aortic aneurysm)   • Interstitial lung disease ([facility] [address] )   • Localized primary osteoarthritis of wrist   • Current tear of medial cartilage or meniscus of knee   • Idiopathic peripheral neuropathy   • Swelling of right thumb   • Right elbow pain   • Back pain   • Pain in both lower extremities   • Radiculopathy of leg   • Vasomotor rhinitis   • Post-nasal drip   • 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   • Polycythemia   • History of prostate cancer   • Continuous opioid dependence (HCC)   • Disorder of adrenal gland, unspecified ([facility] [address] )   • Stage 3a chronic kidney disease ([facility] [address] )   • Acute idiopathic gout of left foot   • Anxiety   • H/O total shoulder replacement   • Gustatory rhinitis   • Hemorrhoids   • Diverticulosis large intestine w/o perforation or abscess w/o bleeding   • Class 2 obesity due to excess calories without serious comorbidity with body mass index (BMI) of 36 0 to 36 9 in adult   • Peripheral vascular disease (HCC)   • Bilateral lower extremity edema   • Varicose veins of both lower extremities with pain   • Leg cramping      Past Medical and Surgical History:     Past Medical History:   Diagnosis Date   • Abnormal electrocardiogram     Last assessed: Oct 11, 2013   • KAYLI (acute kidney injury) ([facility] [address] ) 12/24/2015   • Allergic rhinitis     last assessed: Oct 11, 2013   • Allergic rhinitis due to pollen     last assessed: Oct 10, 2013   • Allergic sinusitis     Last assessed: May 11, 2015   • Allergy     resolved: July 22, 2015   • Benign essential hypertension     Last assessed/resolved: May 31, 2017   • BPH (benign prostatic hyperplasia)    • Cancer [facility])     prostate   • Colitis     Last assessed: May 24, 2016   • Cough with hemoptysis 11/17/2020   • COVID-19 08/18/2022   • Generalized osteoarthritis     Last assessed: Oct 11, 2013   • GERD without esophagitis     Last assessed/resolved: May 31, 2017   • Hearing loss    • Hiatal hernia     resolved: July 22, 2015   • History of gastroesophageal reflux (GERD)    • History of stomach ulcers     last assessed: May 11, 2015   • Hypertension    • Incomplete bladder emptying    • Kidney stone    • Nodular prostate with lower urinary tract symptoms    • Nontoxic single thyroid nodule     last assessed: April 16, 2014   • Orchalgia    • Overweight     last assessed: Oct 31, 2013   • Poor urinary stream    • Pulmonary embolism [facility])    • Salivary gland disorder     last assessed: April 16, 2014   • Seasonal allergies     last assessed: May 15, 2015   • Spermatocele    • Straining to void    • 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:  May 7, 2014     Past Surgical History:   Procedure Laterality Date   • BLADDER SURGERY     • CHOLECYSTECTOMY  2000    laparoscopic    • COLONOSCOPY     • FLAP LOCAL TRUNK Left 10/28/2021    Procedure: EXCISION OF FLANK MASS;  Surgeon: Good Rangel DO;  Location: [facility] MAIN OR;  Service: Plastics   • HEMORRHOID SURGERY      pilionidal cyst removal    • HERNIA REPAIR Left 2008    inguinal    • KNEE ARTHROSCOPY Left    • KNEE CARTILAGE SURGERY     • NASAL SEPTUM SURGERY      Deviation repair    • AZ EXC PRTD BARBARA/PRTD GLND LAT DSJ&PRSRV FACIAL NR Right 2021    Procedure: SUPERFICIAL PAROTIDECTOMY WITH FACIAL NERVE MONITORING;  Surgeon: Grisel Sol MD;  Location: [facility] Main [facility];  Service: ENT   • PROSTATE BIOPSY     • STOMACH SURGERY     • TONSILLECTOMY AND ADENOIDECTOMY      at age 37   • [address] - right  0 left    • UPPER GASTROINTESTINAL ENDOSCOPY     • US GUIDED THYROID BIOPSY  2022   • US GUIDED THYROID BIOPSY  2022   • VASECTOMY        Family History:     Family History   Problem Relation Age of Onset   • Hypertension Mother    • Stroke Mother    • Stomach cancer Father    • Hypertension Father    • Hypertension Family    • Stroke Family         syndrome    • Heart attack Son       Social History:     Social History     Socioeconomic History   • Marital status: /Civil Union     Spouse name: Gauri Griffith    • Number of children: 1   • Years of education: None   • Highest education level: None   Occupational History   • Occupation: Retired    • Occupation: securtity      Comment: [employer]    Tobacco Use   • Smoking status: Former     Packs/day: 1 00     Years: 30 00     Pack years: 30 00     Types: Cigarettes     Start date:      Quit date:      Years since quittin 1   • Smokeless tobacco: Never   Vaping Use   • Vaping Use: Never used   Substance and Sexual Activity   • Alcohol use: Yes     Comment: rare   • Drug use: No   • Sexual activity: Yes     Partners: Female   Other Topics Concern   • None   Social History Narrative    Who lives in your home: wife     What type of home do you live in: Single house Age of your home: Built [birth year]     How long have you been living there: 1983    Type of heat: Baseboard    Type of fuel: Electric    What type of elaine is in your bedroom: Carpet     Do you have the following in or near your home:    Air products: Central air    Pests: None    Pets: None    Are pets allowed in bedroom: N/A    Open fields, wooded areas nearby: Open fields and Wooded areas    Basement: Finished    Exposure to second hand smoke: No        Habits:    Caffeine: Coffee 1 cup of coffee -peach snapple few a week     Chocolate: rare     Other:     Social Determinants of Health     Financial Resource Strain: Low Risk    • Difficulty of Paying Living Expenses: Not hard at all   Food Insecurity: Not on file   Transportation Needs: No Transportation Needs   • Lack of Transportation (Medical): No   • Lack of Transportation (Non-Medical):  No   Physical Activity: Not on file   Stress: Not on file   Social Connections: Not on file   Intimate Partner Violence: Not on file   Housing Stability: Unknown   • Unable to Pay for Housing in the Last Year: No   • Number of Places Lived in the Last Year: Not on file   • Unstable Housing in the Last Year: No      Medications and Allergies:     Current Outpatient Medications   Medication Sig Dispense Refill   • allopurinol (ZYLOPRIM) 100 mg tablet Take 1 tablet (100 mg total) by mouth 2 (two) times a day 180 tablet 1   • aspirin (ECOTRIN LOW STRENGTH) 81 mg EC tablet Take 81 mg by mouth daily     • Cholecalciferol (VITAMIN D3 PO) Take by mouth 2000 IU     • diltiazem (CARDIZEM CD) 180 mg 24 hr capsule Take 1 capsule (180 mg total) by mouth daily 30 capsule 2   • furosemide (LASIX) 20 mg tablet Take 1 tablet (20 mg total) by mouth daily 30 tablet 1   • gabapentin (Neurontin) 400 mg capsule Take 1 capsule (400 mg total) by mouth 2 (two) times a day 90 capsule 1   • levocetirizine (XYZAL) 5 MG tablet Take 1 tablet (5 mg total) by mouth every evening 90 tablet 4   • losartan (COZAAR) 100 MG tablet Take 1 tablet (100 mg total) by mouth daily 90 tablet 1   • omeprazole (PriLOSEC) 40 MG capsule TAKE 1 CAPSULE (40 MG TOTAL) BY MOUTH DAILY  90 capsule 3   • tamsulosin (FLOMAX) 0 4 mg Take 1 capsule (0 4 mg total) by mouth daily with dinner 90 capsule 1   • traMADol-acetaminophen (ULTRACET) 37 5-325 mg per tablet Take 2 tablets by mouth every 6 (six) hours as needed for moderate pain 60 tablet 0     No current facility-administered medications for this visit  Allergies   Allergen Reactions   • Ace Inhibitors Hyperactivity      Immunizations:     Immunization History   Administered Date(s) Administered   • COVID-19 PFIZER VACCINE 0 3 ML IM 02/11/2021, 03/02/2021, 12/07/2021   • COVID-19 Pfizer vac (Alex-sucrose, gray cap) 12 yr+ IM 05/13/2022   • INFLUENZA 12/12/2005, 10/20/2006, 11/02/2007, 10/17/2008, 09/17/2009, 11/13/2012, 10/08/2014, 10/23/2015, 10/25/2016, 10/17/2017, 10/08/2021, 10/17/2022   • Influenza Split High Dose Preservative Free IM 10/23/2015, 10/25/2016, 10/25/2019   • Influenza, high dose seasonal 0 7 mL 09/28/2018, 10/10/2020, 10/17/2022   • Influenza, seasonal, injectable 09/27/2010, 10/05/2011, 10/01/2013, 10/08/2014   • Pneumococcal Conjugate 13-Valent 09/06/2018   • Pneumococcal Polysaccharide PPV23 11/01/2006   • Tuberculin Skin Test-PPD Intradermal 07/28/2008, 06/03/2015   • Zoster 10/07/2013   • Zoster Vaccine Recombinant 02/11/2020, 07/24/2020      Health Maintenance:         Topic Date Due   • Hepatitis C Screening  Completed   • Colorectal Cancer Screening  Discontinued         Topic Date Due   • COVID-19 Vaccine (5 - Booster for [employer] series) 07/08/2022      Medicare Screening Tests and Risk Assessments:     Douglas Chatterjee is here for his Subsequent Wellness visit  Health Risk Assessment:   Patient rates overall health as very good  Patient feels that their physical health rating is slightly better  Patient is very satisfied with their life   Eyesight was rated as slightly worse  Hearing was rated as slightly worse  Patient feels that their emotional and mental health rating is slightly better  Patients states they are sometimes angry  Patient states they are sometimes unusually tired/fatigued  Pain experienced in the last 7 days has been none  Patient states that he has experienced weight loss or gain in last 6 months  Depression Screening:   PHQ-2 Score: 0      Fall Risk Screening: In the past year, patient has experienced: no history of falling in past year      Home Safety:  Patient has trouble with stairs inside or outside of their home  Patient has working smoke alarms and has no working carbon monoxide detector  Home safety hazards include: none  Nutrition:   Current diet is Regular  And no salt diet    Medications:   Patient is currently taking over-the-counter supplements  OTC medications include: see medication list  Patient is able to manage medications  Activities of Daily Living (ADLs)/Instrumental Activities of Daily Living (IADLs):   Walk and transfer into and out of bed and chair?: Yes  Dress and groom yourself?: Yes    Bathe or shower yourself?: Yes    Feed yourself? Yes  Do your laundry/housekeeping?: Yes  Manage your money, pay your bills and track your expenses?: Yes  Make your own meals?: Yes    Do your own shopping?: Yes    Previous Hospitalizations:   Any hospitalizations or ED visits within the last 12 months?: No      Advance Care Planning:   Living will: Yes    Durable POA for healthcare:  Yes    Advanced directive: Yes    Five wishes given: Yes      PREVENTIVE SCREENINGS      Cardiovascular Screening:    General: Screening Current      Diabetes Screening:     General: Screening Current      Colorectal Cancer Screening:     General: Screening Current      Prostate Cancer Screening:    General: History Prostate Cancer and Screening Not Indicated      Osteoporosis Screening:    General: Screening Not Indicated      Abdominal Aortic Aneurysm (AAA) Screening:    Risk factors include: tobacco use        General: Screening Not Indicated      Lung Cancer Screening:     General: Screening Not Indicated      Hepatitis C Screening:    General: Screening Current    Screening, Brief Intervention, and Referral to Treatment (SBIRT)    Screening  Typical number of drinks in a day: 0  Typical number of drinks in a week: 0  Interpretation: Low risk drinking behavior  Single Item Drug Screening:  How often have you used an illegal drug (including marijuana) or a prescription medication for non-medical reasons in the past year? never    Single Item Drug Screen Score: 0  Interpretation: Negative screen for possible drug use disorder    Review of Current Opioid Use    Opioid Risk Tool (ORT) Interpretation: Complete Opioid Risk Tool (ORT)    No results found  Physical Exam:     /74 (BP Location: Left arm, Patient Position: Sitting, Cuff Size: Large)   Pulse 78   Temp 98 4 °F (36 9 °C) (Temporal)   Ht 5' 7" (1 702 m)   Wt 107 kg (235 lb)   SpO2 (!) 78%   BMI 36 81 kg/m²     Physical Exam  Vitals reviewed  Constitutional:       General: He is not in acute distress  Appearance: Normal appearance  He is obese  He is not diaphoretic  HENT:      Head: Normocephalic and atraumatic  Eyes:      Extraocular Movements: Extraocular movements intact  Conjunctiva/sclera: Conjunctivae normal       Pupils: Pupils are equal, round, and reactive to light  Neck:      Vascular: No carotid bruit  Cardiovascular:      Rate and Rhythm: Normal rate and regular rhythm  Pulses:           Dorsalis pedis pulses are 1+ on the right side and 1+ on the left side  Heart sounds: Normal heart sounds  Comments: Bilateral LE varicose veins   Pulmonary:      Effort: Pulmonary effort is normal  No respiratory distress  Breath sounds: Normal breath sounds  No wheezing, rhonchi or rales     Musculoskeletal:      Right lower leg: Edema (+1 ankle edema ) present  Left lower leg: Edema (+1 ankle edema) present  Comments: Brown discoloration around ankles and lower extremities   Skin:     General: Skin is warm and dry  Capillary Refill: Capillary refill takes 2 to 3 seconds  Neurological:      Mental Status: He is alert and oriented to person, place, and time  Mental status is at baseline     Psychiatric:         Mood and Affect: Mood normal          Behavior: Behavior normal           Dufm Show, CRNP

## 2023-03-02 NOTE — PATIENT INSTRUCTIONS
Medicare Preventive Visit Patient Instructions  Thank you for completing your Welcome to Medicare Visit or Medicare Annual Wellness Visit today  Your next wellness visit will be due in one year (3/2/2024)  The screening/preventive services that you may require over the next 5-10 years are detailed below  Some tests may not apply to you based off risk factors and/or age  Screening tests ordered at today's visit but not completed yet may show as past due  Also, please note that scanned in results may not display below  Preventive Screenings:  Service Recommendations Previous Testing/Comments   Colorectal Cancer Screening  · Colonoscopy    · Fecal Occult Blood Test (FOBT)/Fecal Immunochemical Test (FIT)  · Fecal DNA/Cologuard Test  · Flexible Sigmoidoscopy Age: 39-70 years old   Colonoscopy: every 10 years (May be performed more frequently if at higher risk)  OR  FOBT/FIT: every 1 year  OR  Cologuard: every 3 years  OR  Sigmoidoscopy: every 5 years  Screening may be recommended earlier than age 39 if at higher risk for colorectal cancer  Also, an individualized decision between you and your healthcare provider will decide whether screening between the ages of 74-80 would be appropriate   Colonoscopy: 09/22/2022  FOBT/FIT: Not on file  Cologuard: Not on file  Sigmoidoscopy: Not on file    Screening Current     Prostate Cancer Screening Individualized decision between patient and health care provider in men between ages of 53-78   Medicare will cover every 12 months beginning on the day after your 50th birthday PSA: 1 1 ng/mL     History Prostate Cancer  Screening Not Indicated     Hepatitis C Screening Once for adults born between 1945 and 1965  More frequently in patients at high risk for Hepatitis C Hep C Antibody: 04/14/2022    Screening Current   Diabetes Screening 1-2 times per year if you're at risk for diabetes or have pre-diabetes Fasting glucose: 95 mg/dL (1/17/2023)  A1C: No results in last 5 years (No results in last 5 years)  Screening Current   Cholesterol Screening Once every 5 years if you don't have a lipid disorder  May order more often based on risk factors  Lipid panel: 01/17/2023  Screening Current      Other Preventive Screenings Covered by Medicare:  1  Abdominal Aortic Aneurysm (AAA) Screening: covered once if your at risk  You're considered to be at risk if you have a family history of AAA or a male between the age of 73-68 who smoking at least 100 cigarettes in your lifetime  2  Lung Cancer Screening: covers low dose CT scan once per year if you meet all of the following conditions: (1) Age 50-69; (2) No signs or symptoms of lung cancer; (3) Current smoker or have quit smoking within the last 15 years; (4) You have a tobacco smoking history of at least 20 pack years (packs per day x number of years you smoked); (5) You get a written order from a healthcare provider  3  Glaucoma Screening: covered annually if you're considered high risk: (1) You have diabetes OR (2) Family history of glaucoma OR (3)  aged 48 and older OR (3)  American aged 72 and older  3  Osteoporosis Screening: covered every 2 years if you meet one of the following conditions: (1) Have a vertebral abnormality; (2) On glucocorticoid therapy for more than 3 months; (3) Have primary hyperparathyroidism; (4) On osteoporosis medications and need to assess response to drug therapy  5  HIV Screening: covered annually if you're between the age of 12-76  Also covered annually if you are younger than 13 and older than 72 with risk factors for HIV infection  For pregnant patients, it is covered up to 3 times per pregnancy      Immunizations:  Immunization Recommendations   Influenza Vaccine Annual influenza vaccination during flu season is recommended for all persons aged >= 6 months who do not have contraindications   Pneumococcal Vaccine   * Pneumococcal conjugate vaccine = PCV13 (Prevnar 13), PCV15 (Vaxneuvance), PCV20 (Prevnar 20)  * Pneumococcal polysaccharide vaccine = PPSV23 (Pneumovax) Adults 2364 years old: 1-3 doses may be recommended based on certain risk factors  Adults 72 years old: 1-2 doses may be recommended based off what pneumonia vaccine you previously received   Hepatitis B Vaccine 3 dose series if at intermediate or high risk (ex: diabetes, end stage renal disease, liver disease)   Tetanus (Td) Vaccine - COST NOT COVERED BY MEDICARE PART B Following completion of primary series, a booster dose should be given every 10 years to maintain immunity against tetanus  Td may also be given as tetanus wound prophylaxis  Tdap Vaccine - COST NOT COVERED BY MEDICARE PART B Recommended at least once for all adults  For pregnant patients, recommended with each pregnancy  Shingles Vaccine (Shingrix) - COST NOT COVERED BY MEDICARE PART B  2 shot series recommended in those aged 48 and above     Health Maintenance Due:      Topic Date Due   • Hepatitis C Screening  Completed   • Colorectal Cancer Screening  Discontinued     Immunizations Due:      Topic Date Due   • COVID-19 Vaccine (5 - Booster for [place] series) 07/08/2022     Advance Directives   What are advance directives? Advance directives are legal documents that state your wishes and plans for medical care  These plans are made ahead of time in case you lose your ability to make decisions for yourself  Advance directives can apply to any medical decision, such as the treatments you want, and if you want to donate organs  What are the types of advance directives? There are many types of advance directives, and each state has rules about how to use them  You may choose a combination of any of the following:  · Living will: This is a written record of the treatment you want  You can also choose which treatments you do not want, which to limit, and which to stop at a certain time  This includes surgery, medicine, IV fluid, and tube feedings     · Durable power of  for healthcare [facility]): This is a written record that states who you want to make healthcare choices for you when you are unable to make them for yourself  This person, called a proxy, is usually a family member or a friend  You may choose more than 1 proxy  · Do not resuscitate (DNR) order:  A DNR order is used in case your heart stops beating or you stop breathing  It is a request not to have certain forms of treatment, such as CPR  A DNR order may be included in other types of advance directives  · Medical directive: This covers the care that you want if you are in a coma, near death, or unable to make decisions for yourself  You can list the treatments you want for each condition  Treatment may include pain medicine, surgery, blood transfusions, dialysis, IV or tube feedings, and a ventilator (breathing machine)  · Values history: This document has questions about your views, beliefs, and how you feel and think about life  This information can help others choose the care that you would choose  Why are advance directives important? An advance directive helps you control your care  Although spoken wishes may be used, it is better to have your wishes written down  Spoken wishes can be misunderstood, or not followed  Treatments may be given even if you do not want them  An advance directive may make it easier for your family to make difficult choices about your care  Weight Management   Why it is important to manage your weight:  Being overweight increases your risk of health conditions such as heart disease, high blood pressure, type 2 diabetes, and certain types of cancer  It can also increase your risk for osteoarthritis, sleep apnea, and other respiratory problems  Aim for a slow, steady weight loss  Even a small amount of weight loss can lower your risk of health problems    How to lose weight safely:  A safe and healthy way to lose weight is to eat fewer calories and get regular exercise  You can lose up about 1 pound a week by decreasing the number of calories you eat by 500 calories each day  Healthy meal plan for weight management:  A healthy meal plan includes a variety of foods, contains fewer calories, and helps you stay healthy  A healthy meal plan includes the following:  · Eat whole-grain foods more often  A healthy meal plan should contain fiber  Fiber is the part of grains, fruits, and vegetables that is not broken down by your body  Whole-grain foods are healthy and provide extra fiber in your diet  Some examples of whole-grain foods are whole-wheat breads and pastas, oatmeal, brown rice, and bulgur  · Eat a variety of vegetables every day  Include dark, leafy greens such as spinach, kale, crispin greens, and mustard greens  Eat yellow and orange vegetables such as carrots, sweet potatoes, and winter squash  · Eat a variety of fruits every day  Choose fresh or canned fruit (canned in its own juice or light syrup) instead of juice  Fruit juice has very little or no fiber  · Eat low-fat dairy foods  Drink fat-free (skim) milk or 1% milk  Eat fat-free yogurt and low-fat cottage cheese  Try low-fat cheeses such as mozzarella and other reduced-fat cheeses  · Choose meat and other protein foods that are low in fat  Choose beans or other legumes such as split peas or lentils  Choose fish, skinless poultry (chicken or turkey), or lean cuts of red meat (beef or pork)  Before you cook meat or poultry, cut off any visible fat  · Use less fat and oil  Try baking foods instead of frying them  Add less fat, such as margarine, sour cream, regular salad dressing and mayonnaise to foods  Eat fewer high-fat foods  Some examples of high-fat foods include french fries, doughnuts, ice cream, and cakes  · Eat fewer sweets  Limit foods and drinks that are high in sugar  This includes candy, cookies, regular soda, and sweetened drinks    Exercise:  Exercise at least 30 minutes per day on most days of the week  Some examples of exercise include walking, biking, dancing, and swimming  You can also fit in more physical activity by taking the stairs instead of the elevator or parking farther away from stores  Ask your healthcare provider about the best exercise plan for you  Narcotic (Opioid) Safety    Use narcotics safely:  · Take prescribed narcotics exactly as directed  · Do not give narcotics to others or take narcotics that belong to someone else  · Do not mix narcotics without medicines or alcohol  · Do not drive or operate heavy machinery after you take the narcotic  · Monitor for side effects and notify your healthcare provider if you experienced side effects such as nausea, sleepiness, itching, or trouble thinking clearly  Manage constipation:    Constipation is the most common side effect of narcotic medicine  Constipation is when you have hard, dry bowel movements, or you go longer than usual between bowel movements  Tell your healthcare provider about all changes in your bowel movements while you are taking narcotics  He or she may recommend laxative medicine to help you have a bowel movement  He or she may also change the kind of narcotic you are taking, or change when you take it  The following are more ways you can prevent or relieve constipation:    · Drink liquids as directed  You may need to drink extra liquids to help soften and move your bowels  Ask how much liquid to drink each day and which liquids are best for you  · Eat high-fiber foods  This may help decrease constipation by adding bulk to your bowel movements  High-fiber foods include fruits, vegetables, whole-grain breads and cereals, and beans  Your healthcare provider or dietitian can help you create a high-fiber meal plan  Your provider may also recommend a fiber supplement if you cannot get enough fiber from food  · Exercise regularly  Regular physical activity can help stimulate your intestines   Walking is a good exercise to prevent or relieve constipation  Ask which exercises are best for you  · Schedule a time each day to have a bowel movement  This may help train your body to have regular bowel movements  Bend forward while you are on the toilet to help move the bowel movement out  Sit on the toilet for at least 10 minutes, even if you do not have a bowel movement  Store narcotics safely:   · Store narcotics where others cannot easily get them  Keep them in a locked cabinet or secure area  Do not  keep them in a purse or other bag you carry with you  A person may be looking for something else and find the narcotics  · Make sure narcotics are stored out of the reach of children  A child can easily overdose on narcotics  Narcotics may look like candy to a small child  The best way to dispose of narcotics: The laws vary by country and area  In the [place], the best way is to return the narcotics through a take-back program  This program is offered by the [employer])  The following are options for using the program:  · Take the narcotics to a ALEJANDRA collection site  The site is often a law enforcement center  Call your local law enforcement center for scheduled take-back days in your area  You will be given information on where to go if the collection site is in a different location  · Take the narcotics to an approved pharmacy or hospital   A pharmacy or hospital may be set up as a collection site  You will need to ask if it is a ALEJANDRA collection site if you were not directed there  A pharmacy or doctor's office may not be able to take back narcotics unless it is a ALEJANDRA site  · Use a mail-back system  This means you are given containers to put the narcotics into  You will then mail them in the containers  · Use a take-back drop box  This is a place to leave the narcotics at any time  People and animals will not be able to get into the box   Your local law enforcement agency can tell you where to find a drop box in your area  Other ways to manage pain:   · Ask your healthcare provider about non-narcotic medicines to control pain  Nonprescription medicines include NSAIDs (such as ibuprofen) and acetaminophen  Prescription medicines include muscle relaxers, antidepressants, and steroids  · Pain may be managed without any medicines  Some ways to relieve pain include massage, aromatherapy, or meditation  Physical or occupational therapy may also help  For more information:   · Drug Enforcement Administration  [address] [facility] [address]  Phone: 9- [phone] - [phone]  Web Address: [facility]/drug_disposal/    · [address]  and Drug Administration  [facility] , [address] [facility]  Phone: 5- [phone] - [phone]  Web Address: http://[employer]/     © Copyright [employer] 2018 Information is for End User's use only and may not be sold, redistributed or otherwise used for commercial purposes   All illustrations and images included in CareNotes® are the copyrighted property of A D A M , Inc  or [address] [place] [address]

## 2023-03-02 NOTE — ASSESSMENT & PLAN NOTE
- evaluated with Dr Perla Berg  - chronic, takes lasix 20mg daily   - he has skin color changes suggestive of chronic venous stasis   - he is requesting a referral to vascular    - will check arterial duplex  - he has significant varicose veins and complains of chronic leg heaviness  - follow up with vascular

## 2023-03-06 ENCOUNTER — APPOINTMENT (OUTPATIENT)
Dept: LAB | Facility: IMAGING CENTER | Age: 83
End: 2023-03-06

## 2023-03-06 DIAGNOSIS — C61 PROSTATE CANCER (HCC): ICD-10-CM

## 2023-03-06 DIAGNOSIS — D75.1 POLYCYTHEMIA: ICD-10-CM

## 2023-03-06 LAB
ALBUMIN SERPL BCP-MCNC: 4 G/DL (ref 3.5–5)
ALP SERPL-CCNC: 84 U/L (ref 46–116)
ALT SERPL W P-5'-P-CCNC: 18 U/L (ref 12–78)
ANION GAP SERPL CALCULATED.3IONS-SCNC: 2 MMOL/L (ref 4–13)
AST SERPL W P-5'-P-CCNC: 14 U/L (ref 5–45)
BACTERIA UR QL AUTO: ABNORMAL /HPF
BASOPHILS # BLD AUTO: 0.05 THOUSANDS/ÂΜL (ref 0–0.1)
BASOPHILS NFR BLD AUTO: 1 % (ref 0–1)
BILIRUB SERPL-MCNC: 1.09 MG/DL (ref 0.2–1)
BILIRUB UR QL STRIP: NEGATIVE
BUN SERPL-MCNC: 23 MG/DL (ref 5–25)
CALCIUM SERPL-MCNC: 9.7 MG/DL (ref 8.3–10.1)
CHLORIDE SERPL-SCNC: 106 MMOL/L (ref 96–108)
CLARITY UR: CLEAR
CO2 SERPL-SCNC: 31 MMOL/L (ref 21–32)
COLOR UR: ABNORMAL
CREAT SERPL-MCNC: 1.28 MG/DL (ref 0.6–1.3)
EOSINOPHIL # BLD AUTO: 0.02 THOUSAND/ÂΜL (ref 0–0.61)
EOSINOPHIL NFR BLD AUTO: 0 % (ref 0–6)
ERYTHROCYTE [DISTWIDTH] IN BLOOD BY AUTOMATED COUNT: 14.4 % (ref 11.6–15.1)
GFR SERPL CREATININE-BSD FRML MDRD: 52 ML/MIN/1.73SQ M
GLUCOSE SERPL-MCNC: 96 MG/DL (ref 65–140)
GLUCOSE UR STRIP-MCNC: NEGATIVE MG/DL
HCT VFR BLD AUTO: 47.3 % (ref 36.5–49.3)
HGB BLD-MCNC: 15.3 G/DL (ref 12–17)
HGB UR QL STRIP.AUTO: NEGATIVE
HYALINE CASTS #/AREA URNS LPF: ABNORMAL /LPF
IMM GRANULOCYTES # BLD AUTO: 0.14 THOUSAND/UL (ref 0–0.2)
IMM GRANULOCYTES NFR BLD AUTO: 2 % (ref 0–2)
KETONES UR STRIP-MCNC: NEGATIVE MG/DL
LEUKOCYTE ESTERASE UR QL STRIP: ABNORMAL
LYMPHOCYTES # BLD AUTO: 1.4 THOUSANDS/ÂΜL (ref 0.6–4.47)
LYMPHOCYTES NFR BLD AUTO: 20 % (ref 14–44)
MCH RBC QN AUTO: 29.2 PG (ref 26.8–34.3)
MCHC RBC AUTO-ENTMCNC: 32.3 G/DL (ref 31.4–37.4)
MCV RBC AUTO: 90 FL (ref 82–98)
MONOCYTES # BLD AUTO: 0.43 THOUSAND/ÂΜL (ref 0.17–1.22)
MONOCYTES NFR BLD AUTO: 6 % (ref 4–12)
NEUTROPHILS # BLD AUTO: 5.04 THOUSANDS/ÂΜL (ref 1.85–7.62)
NEUTS SEG NFR BLD AUTO: 71 % (ref 43–75)
NITRITE UR QL STRIP: NEGATIVE
NON-SQ EPI CELLS URNS QL MICRO: ABNORMAL /HPF
NRBC BLD AUTO-RTO: 0 /100 WBCS
PH UR STRIP.AUTO: 6 [PH]
PLATELET # BLD AUTO: 183 THOUSANDS/UL (ref 149–390)
PMV BLD AUTO: 11.6 FL (ref 8.9–12.7)
POTASSIUM SERPL-SCNC: 4.3 MMOL/L (ref 3.5–5.3)
PROT SERPL-MCNC: 6.5 G/DL (ref 6.4–8.4)
PROT UR STRIP-MCNC: NEGATIVE MG/DL
PSA SERPL-MCNC: 0.9 NG/ML (ref 0–4)
RBC # BLD AUTO: 5.24 MILLION/UL (ref 3.88–5.62)
RBC #/AREA URNS AUTO: ABNORMAL /HPF
SODIUM SERPL-SCNC: 139 MMOL/L (ref 135–147)
SP GR UR STRIP.AUTO: 1.01 (ref 1–1.03)
UROBILINOGEN UR STRIP-ACNC: <2 MG/DL
WBC # BLD AUTO: 7.08 THOUSAND/UL (ref 4.31–10.16)
WBC #/AREA URNS AUTO: ABNORMAL /HPF

## 2023-03-16 ENCOUNTER — OFFICE VISIT (OUTPATIENT)
Dept: UROLOGY | Facility: MEDICAL CENTER | Age: 83
End: 2023-03-16

## 2023-03-16 VITALS
HEIGHT: 67 IN | WEIGHT: 229 LBS | BODY MASS INDEX: 35.94 KG/M2 | SYSTOLIC BLOOD PRESSURE: 130 MMHG | DIASTOLIC BLOOD PRESSURE: 80 MMHG | HEART RATE: 42 BPM

## 2023-03-16 DIAGNOSIS — N40.1 BPH WITH URINARY OBSTRUCTION: ICD-10-CM

## 2023-03-16 DIAGNOSIS — N50.82 SCROTAL PAIN: ICD-10-CM

## 2023-03-16 DIAGNOSIS — C61 PROSTATE CANCER (HCC): Primary | ICD-10-CM

## 2023-03-16 DIAGNOSIS — N13.8 BPH WITH URINARY OBSTRUCTION: ICD-10-CM

## 2023-03-16 RX ORDER — PREDNISONE 10 MG/1
TABLET ORAL AS NEEDED
COMMUNITY

## 2023-03-16 NOTE — PROGRESS NOTES
HISTORY:    Follow-up for low-grade prostate cancer on active surveillance since 2017         biopsy information:   Initial biopsy in October 2017 showed one core of [place] six cancer        May 2018, repeat biopsy showed several cores "suspicious for cancer, not diagnostic"     July 2019, one core [place] six cancer        PSAs:   0 9 in March 2023 March 2022, 1 0  January 2020, PSA 0 4   June 2019, 0 5   November 2018, 0 6   September 2017, 1 8     Moderate BPH symptoms, on tamsulosin with overall good results  No significant incontinence, he has nocturia x1 or so  No hematuria infections stones  ASSESSMENT / PLAN:    Doing well    Continue to monitor PSA regularly    Follow-up 1 year    The following portions of the patient's history were reviewed and updated as appropriate: allergies, current medications, past family history, past medical history, past social history, past surgical history and problem list     Review of Systems      Objective:     Physical Exam  Genitourinary:     Comments: Penis testes normal    Prostate minimally enlarged no nodules          0   Lab Value Date/Time    PSA 0 9 03/06/2023 0929    PSA 1 1 08/17/2022 0825    PSA 1 0 03/08/2022 0847   ]  BUN   Date Value Ref Range Status   03/06/2023 23 5 - 25 mg/dL Final   08/09/2019 20 7 - 25 mg/dL Final     Creatinine   Date Value Ref Range Status   03/06/2023 1 28 0 60 - 1 30 mg/dL Final     Comment:     Standardized to IDMS reference method     No components found for: CBC      Patient Active Problem List   Diagnosis   • Benign essential hypertension   • Benign prostatic hyperplasia (BPH) with straining on urination   • Gastroesophageal reflux disease without esophagitis   • Prostate cancer (HCC)   • Seasonal allergic rhinitis due to pollen   • Truncal obesity   • Luetscher's syndrome   • Need for shingles vaccine   • Glaucoma screening   • Screening for osteoporosis   • Screening for AAA (abdominal aortic aneurysm)   • Interstitial lung disease (HCC)   • Localized primary osteoarthritis of wrist   • Current tear of medial cartilage or meniscus of knee   • Idiopathic peripheral neuropathy   • Swelling of right thumb   • Right elbow pain   • Back pain   • Pain in both lower extremities   • Radiculopathy of leg   • Vasomotor rhinitis   • Post-nasal drip   • 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   • Polycythemia   • History of prostate cancer   • Continuous opioid dependence (HCC)   • Disorder of adrenal gland, unspecified ([facility] )   • Stage 3a chronic kidney disease (HCC)   • Acute idiopathic gout of left foot   • Anxiety   • H/O total shoulder replacement   • Gustatory rhinitis   • Hemorrhoids   • Diverticulosis large intestine w/o perforation or abscess w/o bleeding   • Class 2 obesity due to excess calories without serious comorbidity with body mass index (BMI) of 36 0 to 36 9 in adult   • Peripheral vascular disease (HCC)   • Bilateral lower extremity edema   • Varicose veins of both lower extremities with pain   • Leg cramping        Diagnoses and all orders for this visit:    Prostate cancer ([facility] )  -     PSA Total, Diagnostic; Future    BPH with urinary obstruction    Scrotal pain    Other orders  -     predniSONE 10 mg tablet; Take by mouth if needed (onlyl as needed with gout)  -     Levocetirizine Dihydrochloride (XYZAL PO); Xyzal           Patient ID: Mala Freeman  is a 81 y o  male        Current Outpatient Medications:   •  allopurinol (ZYLOPRIM) 100 mg tablet, Take 1 tablet (100 mg total) by mouth 2 (two) times a day, Disp: 180 tablet, Rfl: 1  •  aspirin (ECOTRIN LOW STRENGTH) 81 mg EC tablet, Take 81 mg by mouth daily, Disp: , Rfl:   •  Cholecalciferol (VITAMIN D3 PO), Take by mouth 2000 IU, Disp: , Rfl:   •  diltiazem (CARDIZEM CD) 180 mg 24 hr capsule, Take 1 capsule (180 mg total) by mouth daily, Disp: 30 capsule, Rfl: 2  •  furosemide (LASIX) 20 mg tablet, Take 1 tablet (20 mg total) by mouth daily, Disp: 30 tablet, Rfl: 1  •  gabapentin (Neurontin) 400 mg capsule, Take 1 capsule (400 mg total) by mouth 2 (two) times a day, Disp: 90 capsule, Rfl: 1  •  levocetirizine (XYZAL) 5 MG tablet, Take 1 tablet (5 mg total) by mouth every evening, Disp: 90 tablet, Rfl: 4  •  losartan (COZAAR) 100 MG tablet, Take 1 tablet (100 mg total) by mouth daily, Disp: 90 tablet, Rfl: 1  •  omeprazole (PriLOSEC) 40 MG capsule, TAKE 1 CAPSULE (40 MG TOTAL) BY MOUTH DAILY  , Disp: 90 capsule, Rfl: 3  •  predniSONE 10 mg tablet, Take by mouth if needed (onlyl as needed with gout), Disp: , Rfl:   •  tamsulosin (FLOMAX) 0 4 mg, Take 1 capsule (0 4 mg total) by mouth daily with dinner, Disp: 90 capsule, Rfl: 1  •  traMADol-acetaminophen (ULTRACET) 37 5-325 mg per tablet, Take 2 tablets by mouth every 6 (six) hours as needed for moderate pain, Disp: 60 tablet, Rfl: 0    Past Medical History:   Diagnosis Date   • Abnormal electrocardiogram     Last assessed: Oct 11, 2013   • KAYLI (acute kidney injury) ([facility] ) 12/24/2015   • Allergic rhinitis     last assessed: Oct 11, 2013   • Allergic rhinitis due to pollen     last assessed: Oct 10, 2013   • Allergic sinusitis     Last assessed: May 11, 2015   • Allergy     resolved: July 22, 2015   • Benign essential hypertension     Last assessed/resolved: May 31, 2017   • BPH (benign prostatic hyperplasia)    • Cancer [facility])     prostate   • Colitis     Last assessed: May 24, 2016   • Cough with hemoptysis 11/17/2020   • COVID-19 08/18/2022   • Generalized osteoarthritis     Last assessed: Oct 11, 2013   • GERD without esophagitis     Last assessed/resolved:  May 31, 2017   • Hearing loss    • Hiatal hernia     resolved: July 22, 2015   • History of gastroesophageal reflux (GERD)    • History of stomach ulcers     last assessed: May 11, 2015   • Hypertension    • Incomplete bladder emptying    • Kidney stone    • Nodular prostate with lower urinary tract symptoms    • Nontoxic single thyroid nodule     last assessed: April 16, 2014   • Orchalgia    • Overweight     last assessed: Oct 31, 2013   • Poor urinary stream    • Pulmonary embolism [facility])    • Salivary gland disorder     last assessed: April 16, 2014   • Seasonal allergies     last assessed: May 15, 2015   • Spermatocele    • Straining to void    • 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:  May 7, 2014       Past Surgical History:   Procedure Laterality Date   • BLADDER SURGERY     • CHOLECYSTECTOMY  2000    laparoscopic    • COLONOSCOPY     • FLAP LOCAL TRUNK Left 10/28/2021    Procedure: EXCISION OF FLANK MASS;  Surgeon: Alonzo Zayas DO;  Location: [address] [place] [address] MAIN OR;  Service: Plastics   • HEMORRHOID SURGERY      pilionidal cyst removal    • HERNIA REPAIR Left 01/17/2008    inguinal    • KNEE ARTHROSCOPY Left 1974   • KNEE CARTILAGE SURGERY     • NASAL SEPTUM SURGERY      Deviation repair    • MN EXC PRTD BARBARA/PRTD GLND LAT DSJ&PRSRV FACIAL NR Right 06/09/2021    Procedure: SUPERFICIAL PAROTIDECTOMY WITH FACIAL NERVE MONITORING;  Surgeon: Edy Brian MD;  [facility] Main [facility];  Service: ENT   • PROSTATE BIOPSY  2017   • SKIN TAG REMOVAL  03/2023    on his eye   • STOMACH SURGERY     • TONSILLECTOMY AND ADENOIDECTOMY      at age 37   • TOTAL SHOULDER ARTHROPLASTY      SHOULDER JOINT REPLACEMENT - right 01/04 0 left 01/95   • UPPER GASTROINTESTINAL ENDOSCOPY     • US GUIDED THYROID BIOPSY  01/19/2022   • US GUIDED THYROID BIOPSY  05/26/2022   • VASECTOMY         Social History

## 2023-03-21 ENCOUNTER — TELEPHONE (OUTPATIENT)
Dept: HEMATOLOGY ONCOLOGY | Facility: CLINIC | Age: 83
End: 2023-03-21

## 2023-03-21 ENCOUNTER — TELEPHONE (OUTPATIENT)
Dept: VASCULAR SURGERY | Facility: CLINIC | Age: 83
End: 2023-03-21

## 2023-03-21 NOTE — TELEPHONE ENCOUNTER
Patient Call Form   Reason for patient call? Patient and his spouse calling in regarding an appointment the patient has scheduled in June for a one year follow up  The patient states he is going to think about whether he is going to keep this appointment and then call back with his decision  The patient states he sees another doctor for the same thing that is closer to home  I informed the patient  to give us a call back once they have made their decision  Patient's primary oncologist? Dr Unique Neal    Name of person the patient was calling for? Does the patient issue require a call back? No   If call back required, inform patient that the message will be forwarded to the team and someone will get back to them as soon as possible    Did you relay this information to the patient?  N/A

## 2023-03-21 NOTE — TELEPHONE ENCOUNTER
Appointment Cancellation or Reschedule     Person calling in Patient   If someone other than patient calling, are they listed on the communication consent form? N/A   Provider Dr Hola Conway and Annetta Whitten, [facility]   Office Visit Date and Time  6/28/23 10:00AM    Office Visit Location [facility]   Did patient want to reschedule their visit? If so, when was it scheduled to? N/A   Did the patient have STAR scheduled for this appointment? No   Does the patient need STAR scheduled for their new appointment? No   Is this patient calling to reschedule an infusion appointment? No   When is their next infusion appointment? N/A   Is this patient a Chemo patient? No   Reason for Cancellation or Reschedule Patient states that he will be going with another provider  Patient states that it is nothing against Dr Hola Conway this doctor is closer to their home and more accessable  Was the No show policy reviewed with patient if patient is canceling within 24 hours? No     If the patient is cancelling an appointment and needs their STAR Transport cancelled, please route to [facility]  If the patient is a treatment patient, please route this to the office nurse  If the patient is not on treatment, please route to the Clerical pool based on location  If the patient is a surgical oncology patient, please route to surg/onc clinical pool  Route message as high priority if same day cancellation

## 2023-03-30 ENCOUNTER — OFFICE VISIT (OUTPATIENT)
Dept: PULMONOLOGY | Facility: CLINIC | Age: 83
End: 2023-03-30

## 2023-03-30 VITALS
WEIGHT: 234.5 LBS | SYSTOLIC BLOOD PRESSURE: 138 MMHG | OXYGEN SATURATION: 99 % | HEART RATE: 87 BPM | TEMPERATURE: 97.4 F | RESPIRATION RATE: 13 BRPM | BODY MASS INDEX: 36.81 KG/M2 | HEIGHT: 67 IN | DIASTOLIC BLOOD PRESSURE: 98 MMHG

## 2023-03-30 DIAGNOSIS — J84.9 INTERSTITIAL LUNG DISEASE (HCC): Primary | ICD-10-CM

## 2023-03-30 NOTE — ASSESSMENT & PLAN NOTE
· Imaging has been stable, no new symptoms reported  · Last CT scan was June 2022, would only recommend follow-up if there is a change in symptoms  · We will also defer pulmonary function testing unless change in symptoms

## 2023-03-30 NOTE — PROGRESS NOTES
Progress note - Pulmonary Medicine   George Simms  81 y o  male MRN: [id number]       Impression & Plan:     Interstitial lung disease [facility])  · Imaging has been stable, no new symptoms reported  · Last CT scan was June 2022, would only recommend follow-up if there is a change in symptoms  · We will also defer pulmonary function testing unless change in symptoms    He will follow-up in 6 months in the office  ______________________________________________________________________    HPI:    George Simms  presents today for follow-up of interstitial lung disease  He tells me he has been doing well  No new symptoms  No worsening cough or shortness of breath  No chest pain or chest tightness  He is not on any pulmonary medications  No fever or chills  No sick contacts  He is obese  He has not had any significant weight changes however  No new arthralgias or myalgias but has reported some increase in bilateral lower extremity edema  This is being addressed and has a ultrasound of his legs scheduled    He has had prior echocardiography that only shows mild, likely age-related diastolic dysfunction        Current Medications:    Current Outpatient Medications:   •  allopurinol (ZYLOPRIM) 100 mg tablet, Take 1 tablet (100 mg total) by mouth 2 (two) times a day, Disp: 180 tablet, Rfl: 1  •  aspirin (ECOTRIN LOW STRENGTH) 81 mg EC tablet, Take 81 mg by mouth daily, Disp: , Rfl:   •  Cholecalciferol (VITAMIN D3 PO), Take by mouth 2000 IU, Disp: , Rfl:   •  diltiazem (CARDIZEM CD) 180 mg 24 hr capsule, Take 1 capsule (180 mg total) by mouth daily, Disp: 30 capsule, Rfl: 2  •  furosemide (LASIX) 20 mg tablet, Take 1 tablet (20 mg total) by mouth daily, Disp: 30 tablet, Rfl: 1  •  gabapentin (Neurontin) 400 mg capsule, Take 1 capsule (400 mg total) by mouth 2 (two) times a day, Disp: 90 capsule, Rfl: 1  •  levocetirizine (XYZAL) 5 MG tablet, Take 1 tablet (5 mg total) by mouth every evening, Disp: 90 tablet, "Rfl: 4  •  Levocetirizine Dihydrochloride (XYZAL PO), Xyzal, Disp: , Rfl:   •  losartan (COZAAR) 100 MG tablet, Take 1 tablet (100 mg total) by mouth daily, Disp: 90 tablet, Rfl: 1  •  omeprazole (PriLOSEC) 40 MG capsule, TAKE 1 CAPSULE (40 MG TOTAL) BY MOUTH DAILY  , Disp: 90 capsule, Rfl: 3  •  predniSONE 10 mg tablet, Take by mouth if needed (onlyl as needed with gout), Disp: , Rfl:   •  tamsulosin (FLOMAX) 0 4 mg, Take 1 capsule (0 4 mg total) by mouth daily with dinner, Disp: 90 capsule, Rfl: 1  •  traMADol-acetaminophen (ULTRACET) 37 5-325 mg per tablet, Take 2 tablets by mouth every 6 (six) hours as needed for moderate pain, Disp: 60 tablet, Rfl: 0    Review of Systems:  Aside from what is mentioned in the HPI, the review of systems is otherwise negative    Past medical history, surgical history, and family history were reviewed and updated as appropriate    Social history updates:  Social History     Tobacco Use   Smoking Status Former   • Packs/day: 0 25   • Years: 30 00   • Pack years: 7 50   • Types: Cigarettes   • Start date:    • Quit date:    • Years since quittin 2   Smokeless Tobacco Never       PhysicalExamination:  Vitals:   /98 (BP Location: Right arm, Patient Position: Sitting, Cuff Size: Standard)   Pulse 87   Temp (!) 97 4 °F (36 3 °C) (Tympanic)   Resp 13   Ht 5' 7\" (1 702 m)   Wt 106 kg (234 lb 8 oz)   SpO2 99%   BMI 36 73 kg/m²   Gen: Cherri Adames Obese  Comfortable on room air  No conversational dyspnea  HEENT:  Conjugate gaze  sclerae anicteric  Oropharynx moist  Neck: Trachea is midline  No JVD  No adenopathy  Chest: Slightly limited chest wall excursion  Breath sounds are clear bilaterally  No wheezes or crackles  Cardiac: Regular  no murmur  Abdomen:  benign  Extremities: No edema  Neuro:  Normal speech and mentation    Diagnostic Data:  Labs:   I personally reviewed the most recent laboratory data pertinent to today's visit    Lab Results   Component Value Date    " WBC 7 08 03/06/2023    HGB 15 3 03/06/2023    HCT 47 3 03/06/2023    MCV 90 03/06/2023     03/06/2023     Lab Results   Component Value Date    SODIUM 139 03/06/2023    K 4 3 03/06/2023    CO2 31 03/06/2023     03/06/2023    BUN 23 03/06/2023    CREATININE 1 28 03/06/2023    CALCIUM 9 7 03/06/2023         Imaging:  I personally reviewed the images on the [facility] system pertinent to today's visit  CT scan from June showed resolution of prior consolidation  He does have some mild bronchiectasis    Otherwise unremarkable study    Viktoria Suresh MD

## 2023-03-31 ENCOUNTER — HOSPITAL ENCOUNTER (OUTPATIENT)
Dept: NON INVASIVE DIAGNOSTICS | Facility: CLINIC | Age: 83
Discharge: HOME | End: 2023-03-31

## 2023-03-31 DIAGNOSIS — R60.0 BILATERAL LOWER EXTREMITY EDEMA: ICD-10-CM

## 2023-03-31 DIAGNOSIS — R25.2 LEG CRAMPING: ICD-10-CM

## 2023-03-31 DIAGNOSIS — I83.813 VARICOSE VEINS OF BOTH LOWER EXTREMITIES WITH PAIN: ICD-10-CM

## 2023-04-03 DIAGNOSIS — M79.89 LEG SWELLING: ICD-10-CM

## 2023-04-03 DIAGNOSIS — I50.9 CONGESTIVE HEART FAILURE, UNSPECIFIED HF CHRONICITY, UNSPECIFIED HEART FAILURE TYPE (HCC): ICD-10-CM

## 2023-04-03 RX ORDER — FUROSEMIDE 20 MG/1
20 TABLET ORAL DAILY
Qty: 90 TABLET | Refills: 0 | Status: SHIPPED | OUTPATIENT
Start: 2023-04-03

## 2023-04-20 PROBLEM — I87.2 CHRONIC VENOUS INSUFFICIENCY OF LOWER EXTREMITY: Status: ACTIVE | Noted: 2023-04-20

## 2023-05-11 DIAGNOSIS — I10 PRIMARY HYPERTENSION: ICD-10-CM

## 2023-05-11 RX ORDER — LOSARTAN POTASSIUM 100 MG/1
100 TABLET ORAL DAILY
Qty: 90 TABLET | Refills: 1 | Status: SHIPPED | OUTPATIENT
Start: 2023-05-11

## 2023-05-21 ENCOUNTER — OFFICE VISIT (OUTPATIENT)
Dept: URGENT CARE | Age: 83
End: 2023-05-21

## 2023-05-21 VITALS
TEMPERATURE: 98 F | WEIGHT: 237 LBS | OXYGEN SATURATION: 96 % | HEART RATE: 107 BPM | RESPIRATION RATE: 18 BRPM | BODY MASS INDEX: 37.12 KG/M2

## 2023-05-21 DIAGNOSIS — R11.2 NAUSEA AND VOMITING, UNSPECIFIED VOMITING TYPE: Primary | ICD-10-CM

## 2023-05-21 RX ORDER — ONDANSETRON 4 MG/1
4 TABLET, ORALLY DISINTEGRATING ORAL ONCE
Status: COMPLETED | OUTPATIENT
Start: 2023-05-21 | End: 2023-05-21

## 2023-05-21 RX ADMIN — ONDANSETRON 4 MG: 4 TABLET, ORALLY DISINTEGRATING ORAL at 17:12

## 2023-05-21 NOTE — PROGRESS NOTES
[facility]        NAME: Kym Griffith  is a 81 y o  male  : 1940    MRN: [id number]  DATE: May 21, 2023  TIME: 5:28 PM    Assessment and Plan   Nausea and vomiting, unspecified vomiting type [R11 2]  1  Nausea and vomiting, unspecified vomiting type  ondansetron (ZOFRAN-ODT) dispersible tablet 4 mg        Zofran administered in clinic today  Patient's abdominal pain and headaches resolved after episode of vomiting  Discussed symptomatic treatment, ER precautions  Patient Instructions     Drinking plenty of fluids is the most important thing  If you are unable to keep food down, you can supplement with fluids such as Pedialyte, Gatorade, or half juice/half water mixture  Make sure to drink plenty of water as well  BRAT (bananas, applesauce, rice toast) diet/bland foods while feeling unwell, then slowly introduce regular foods back into your diet  OTC Tylenol and ibuprofen for fever and abdominal pain  Follow up with PCP in 3-5 days  Proceed to  ER if symptoms worsen  Chief Complaint     Chief Complaint   Patient presents with   • Abdominal Pain     Abdominal pain, headache, nausea began yesterday         History of Present Illness       Abdominal Pain  This is a new problem  The current episode started yesterday (patient states he did have shrimp, rice, and broccoli about an hour prior to feeling unwell)  The onset quality is gradual  Progression since onset: improved after episode of vomiting  Associated symptoms include headaches (denies pain after episode of vomiting), nausea and vomiting (2 small episodes earlier today, one larger episode in the office today)  Pertinent negatives include no constipation, diarrhea, fever or myalgias  Relieved by: episode of vomiting in office - patient states feels much better afterwards  Review of Systems   Review of Systems   Constitutional: Negative for chills and fever     Gastrointestinal: Positive for abdominal pain (denies pain after episode of vomiting), nausea and vomiting (2 small episodes earlier today, one larger episode in the office today)  Negative for constipation and diarrhea  Musculoskeletal: Negative for myalgias  Neurological: Positive for headaches (denies pain after episode of vomiting)  Current Medications       Current Outpatient Medications:   •  allopurinol (ZYLOPRIM) 100 mg tablet, Take 1 tablet (100 mg total) by mouth 2 (two) times a day, Disp: 180 tablet, Rfl: 1  •  aspirin (ECOTRIN LOW STRENGTH) 81 mg EC tablet, Take 81 mg by mouth daily, Disp: , Rfl:   •  Cholecalciferol (VITAMIN D3 PO), Take by mouth 2000 IU, Disp: , Rfl:   •  diltiazem (CARDIZEM CD) 180 mg 24 hr capsule, Take 1 capsule (180 mg total) by mouth daily, Disp: 30 capsule, Rfl: 2  •  furosemide (LASIX) 20 mg tablet, Take 1 tablet (20 mg total) by mouth daily, Disp: 90 tablet, Rfl: 0  •  gabapentin (Neurontin) 400 mg capsule, Take 1 capsule (400 mg total) by mouth 2 (two) times a day, Disp: 90 capsule, Rfl: 1  •  levocetirizine (XYZAL) 5 MG tablet, Take 1 tablet (5 mg total) by mouth every evening, Disp: 90 tablet, Rfl: 4  •  Levocetirizine Dihydrochloride (XYZAL PO), Xyzal, Disp: , Rfl:   •  losartan (COZAAR) 100 MG tablet, Take 1 tablet (100 mg total) by mouth daily, Disp: 90 tablet, Rfl: 1  •  omeprazole (PriLOSEC) 40 MG capsule, TAKE 1 CAPSULE (40 MG TOTAL) BY MOUTH DAILY  , Disp: 90 capsule, Rfl: 3  •  predniSONE 10 mg tablet, Take by mouth if needed (onlyl as needed with gout), Disp: , Rfl:   •  tamsulosin (FLOMAX) 0 4 mg, Take 1 capsule (0 4 mg total) by mouth daily with dinner, Disp: 90 capsule, Rfl: 1  •  traMADol-acetaminophen (ULTRACET) 37 5-325 mg per tablet, Take 2 tablets by mouth every 6 (six) hours as needed for moderate pain, Disp: 60 tablet, Rfl: 0  No current facility-administered medications for this visit      Current Allergies     Allergies as of 05/21/2023 - Reviewed 05/21/2023   Allergen Reaction Noted   • Ace inhibitors Hyperactivity 10/11/2013            The following portions of the patient's history were reviewed and updated as appropriate: allergies, current medications, past family history, past medical history, past social history, past surgical history and problem list      Past Medical History:   Diagnosis Date   • Abnormal electrocardiogram     Last assessed: Oct 11, 2013   • KAYLI (acute kidney injury) ([facility] [address] ) 12/24/2015   • Allergic rhinitis     last assessed: Oct 11, 2013   • Allergic rhinitis due to pollen     last assessed: Oct 10, 2013   • Allergic sinusitis     Last assessed: May 11, 2015   • Allergy     resolved: July 22, 2015   • Benign essential hypertension     Last assessed/resolved: May 31, 2017   • BPH (benign prostatic hyperplasia)    • Cancer [facility])     prostate   • Colitis     Last assessed: May 24, 2016   • Cough with hemoptysis 11/17/2020   • COVID-19 08/18/2022   • Generalized osteoarthritis     Last assessed: Oct 11, 2013   • GERD without esophagitis     Last assessed/resolved: May 31, 2017   • Hearing loss    • Hiatal hernia     resolved: July 22, 2015   • History of gastroesophageal reflux (GERD)    • History of stomach ulcers     last assessed: May 11, 2015   • Hypertension    • Incomplete bladder emptying    • Kidney stone    • Nodular prostate with lower urinary tract symptoms    • Nontoxic single thyroid nodule     last assessed: April 16, 2014   • Orchalgia    • Overweight     last assessed: Oct 31, 2013   • Poor urinary stream    • Pulmonary embolism [facility])    • Salivary gland disorder     last assessed: April 16, 2014   • Seasonal allergies     last assessed: May 15, 2015   • Spermatocele    • Straining to void    • 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:  May 7, 2014       Past Surgical History:   Procedure Laterality Date   • BLADDER SURGERY     • CHOLECYSTECTOMY  2000    laparoscopic    • COLONOSCOPY     • FLAP LOCAL TRUNK Left 10/28/2021    Procedure: EXCISION OF FLANK MASS;  Surgeon: Salo Rueda DO;  Location: [address] [place] [address] MAIN OR;  Service: Plastics   • HEMORRHOID SURGERY      pilionidal cyst removal    • HERNIA REPAIR Left 01/17/2008    inguinal    • KNEE ARTHROSCOPY Left 1974   • KNEE CARTILAGE SURGERY     • NASAL SEPTUM SURGERY      Deviation repair    • IN EXC PRTD BARBARA/PRTD GLND LAT DSJ&PRSRV FACIAL NR Right 06/09/2021    Procedure: SUPERFICIAL PAROTIDECTOMY WITH FACIAL NERVE MONITORING;  Surgeon: Arlen Prasad MD;  Location: [facility] Main [facility];  Service: ENT   • PROSTATE BIOPSY  2017   • SKIN TAG REMOVAL  03/2023    on his eye   • STOMACH SURGERY     • TONSILLECTOMY AND ADENOIDECTOMY      at age 37   • TOTAL SHOULDER ARTHROPLASTY      SHOULDER JOINT REPLACEMENT - right 01/04 0 left 01/95   • UPPER GASTROINTESTINAL ENDOSCOPY     • US GUIDED THYROID BIOPSY  01/19/2022   • US GUIDED THYROID BIOPSY  05/26/2022   • VASECTOMY         Family History   Problem Relation Age of Onset   • Hypertension Mother    • Stroke Mother    • Stomach cancer Father    • Hypertension Father    • Hypertension Family    • Stroke Family         syndrome    • Heart attack Son          Medications have been verified  Objective   Pulse (!) 107   Temp 98 °F (36 7 °C)   Resp 18   Wt 108 kg (237 lb)   SpO2 96%   BMI 37 12 kg/m²        Physical Exam     Physical Exam  Vitals and nursing note reviewed  Constitutional:       General: He is not in acute distress  Appearance: Normal appearance  He is not ill-appearing  HENT:      Mouth/Throat:      Mouth: Mucous membranes are moist    Cardiovascular:      Rate and Rhythm: Normal rate and regular rhythm  Pulses: Normal pulses  Heart sounds: Normal heart sounds  Pulmonary:      Effort: Pulmonary effort is normal       Breath sounds: Normal breath sounds  Abdominal:      General: Abdomen is flat  Bowel sounds are normal  There is no distension  Palpations: Abdomen is soft  Tenderness:  There is abdominal tenderness (minimal tenderness, patient states this is normal for him due to hernia)  There is no guarding  Hernia: A hernia is present  Neurological:      Mental Status: He is alert

## 2023-05-21 NOTE — PATIENT INSTRUCTIONS
Drinking plenty of fluids is the most important thing  If you are unable to keep food down, you can supplement with fluids such as Pedialyte, Gatorade, or half juice/half water mixture  Make sure to drink plenty of water as well  BRAT (bananas, applesauce, rice toast) diet/bland foods while feeling unwell, then slowly introduce regular foods back into your diet  OTC Tylenol and ibuprofen for fever and abdominal pain  Follow up with PCP in 3-5 days  Proceed to  ER if symptoms worsen  Acute Nausea and Vomiting   AMBULATORY CARE:   Acute nausea and vomiting  means the nausea and vomiting starts suddenly, gets worse quickly, and lasts a short time  There are many possible causes of acute nausea and vomiting  Call your local emergency number (911 in the [address] [facility]) if:   You have chest pain  You have severe pain or cramping in your abdomen  Your vision is blurred  You are confused, have a high fever, or a stiff neck  You have bright red blood coming from your rectum  Your vomit smells like bowel movement  Seek care immediately if:   You have a severe headache or pain  You are dizzy, cold, and thirsty, and your eyes and mouth are dry  You are urinating very little or not at all  You are dizzy or lightheaded when you stand up  You see blood or material that looks like coffee grounds in your vomit  Call your doctor if:   You continue to vomit for more than 48 hours  Your nausea and vomiting does not get better or go away after you use medicine  You have questions or concerns about your condition or care  Treatment:  The first goal of treatment for nausea and vomiting is to prevent or treat dehydration  Treatment also depends on the cause of the nausea and vomiting  Any medical condition causing your nausea and vomiting will also be treated  You may need one or more of the following:  Medicines  may be given to calm your stomach and stop your vomiting   You may also need medicines to help empty your stomach and bowels  IV fluids  may be given to replace lost fluids and electrolytes  This may be needed it you cannot drink liquids  A nasogastric (NG) tube  may be needed if your nausea and vomiting is severe  The NG tube is put into your nose and moved down your throat until it reaches your stomach  Liquid, nutrition, or medicine may be given through an NG tube  The tube may instead be attached to suction if healthcare providers need to keep your stomach empty  Manage your symptoms:   Rest as much as you can  Too much activity can make your nausea worse  Drink more liquids to prevent dehydration  Take small sips  Try drinks such as ginger ale, lemonade, water, or tea  Your provider may recommend that you drink an oral rehydration solution (ORS)  ORS contains water, salts, and sugar that are needed to replace the lost body fluids  Eat smaller meals, more often  Try bland foods and avoid spices or strong flavors    Do not drink alcohol  Alcohol may upset or irritate your stomach  Follow up with your doctor as directed:  Write down your questions so you remember to ask them during your visits  © Copyright [employer] 2022 Information is for End User's use only and may not be sold, redistributed or otherwise used for commercial purposes  The above information is an  only  It is not intended as medical advice for individual conditions or treatments  Talk to your doctor, nurse or pharmacist before following any medical regimen to see if it is safe and effective for you

## 2023-05-23 ENCOUNTER — OFFICE VISIT (OUTPATIENT)
Dept: INTERNAL MEDICINE CLINIC | Age: 83
End: 2023-05-23

## 2023-05-23 VITALS
TEMPERATURE: 97.2 F | DIASTOLIC BLOOD PRESSURE: 74 MMHG | OXYGEN SATURATION: 96 % | BODY MASS INDEX: 36.38 KG/M2 | HEART RATE: 60 BPM | WEIGHT: 231.8 LBS | SYSTOLIC BLOOD PRESSURE: 104 MMHG | HEIGHT: 67 IN

## 2023-05-23 DIAGNOSIS — M79.89 LEG SWELLING: ICD-10-CM

## 2023-05-23 DIAGNOSIS — E79.0 HYPERURICEMIA: ICD-10-CM

## 2023-05-23 DIAGNOSIS — N13.8 BPH WITH OBSTRUCTION/LOWER URINARY TRACT SYMPTOMS: ICD-10-CM

## 2023-05-23 DIAGNOSIS — I50.9 CONGESTIVE HEART FAILURE, UNSPECIFIED HF CHRONICITY, UNSPECIFIED HEART FAILURE TYPE (HCC): ICD-10-CM

## 2023-05-23 DIAGNOSIS — G60.9 IDIOPATHIC PERIPHERAL NEUROPATHY: ICD-10-CM

## 2023-05-23 DIAGNOSIS — I10 BENIGN ESSENTIAL HYPERTENSION: Chronic | ICD-10-CM

## 2023-05-23 DIAGNOSIS — N40.1 BPH WITH OBSTRUCTION/LOWER URINARY TRACT SYMPTOMS: ICD-10-CM

## 2023-05-23 RX ORDER — GABAPENTIN 400 MG/1
400 CAPSULE ORAL 2 TIMES DAILY
Qty: 90 CAPSULE | Refills: 1 | Status: SHIPPED | OUTPATIENT
Start: 2023-05-23 | End: 2023-05-23 | Stop reason: SDUPTHER

## 2023-05-23 RX ORDER — FUROSEMIDE 20 MG/1
20 TABLET ORAL DAILY
Qty: 90 TABLET | Refills: 1 | Status: SHIPPED | OUTPATIENT
Start: 2023-05-23

## 2023-05-23 RX ORDER — TAMSULOSIN HYDROCHLORIDE 0.4 MG/1
0.4 CAPSULE ORAL
Qty: 90 CAPSULE | Refills: 1 | Status: SHIPPED | OUTPATIENT
Start: 2023-05-23

## 2023-05-23 RX ORDER — ALLOPURINOL 100 MG/1
100 TABLET ORAL 2 TIMES DAILY
Qty: 180 TABLET | Refills: 1 | Status: SHIPPED | OUTPATIENT
Start: 2023-05-23

## 2023-05-23 RX ORDER — DILTIAZEM HYDROCHLORIDE 180 MG/1
180 CAPSULE, COATED, EXTENDED RELEASE ORAL DAILY
Qty: 90 CAPSULE | Refills: 1 | Status: SHIPPED | OUTPATIENT
Start: 2023-05-23

## 2023-05-23 RX ORDER — GABAPENTIN 400 MG/1
400 CAPSULE ORAL
Qty: 90 CAPSULE | Refills: 1
Start: 2023-05-23

## 2023-05-23 NOTE — PROGRESS NOTES
Assessment/Plan:     Diagnoses and all orders for this visit:    Hyperuricemia  -     allopurinol (ZYLOPRIM) 100 mg tablet; Take 1 tablet (100 mg total) by mouth 2 (two) times a day    Benign essential hypertension  -     diltiazem (CARDIZEM CD) 180 mg 24 hr capsule; Take 1 capsule (180 mg total) by mouth daily    Congestive heart failure, unspecified HF chronicity, unspecified heart failure type (HCC)  -     furosemide (LASIX) 20 mg tablet; Take 1 tablet (20 mg total) by mouth daily    Leg swelling  -     furosemide (LASIX) 20 mg tablet; Take 1 tablet (20 mg total) by mouth daily    Idiopathic peripheral neuropathy  -     Discontinue: gabapentin (Neurontin) 400 mg capsule; Take 1 capsule (400 mg total) by mouth 2 (two) times a day  -     gabapentin (Neurontin) 400 mg capsule; Take 1 capsule (400 mg total) by mouth daily at bedtime    BPH with obstruction/lower urinary tract symptoms  -     tamsulosin (FLOMAX) 0 4 mg; Take 1 capsule (0 4 mg total) by mouth daily with dinner             M*Modal software was used to dictate this note  It may contain errors with dictating incorrect words or incorrect spelling  Please contact the provider directly with any questions  Subjective:   Chief Complaint   Patient presents with   • Follow-up     3 month- labs done 3/6-  [facility]- Patient had 1 episode of vomiting no other episodes- Patient wife concerned about his hernia        Patient ID: Teressa Alfonso  is a 81 y o  male      HPI      29 years young gentleman is here today for the regular follow-up he is doing well except for few problems 1 is that he is having some problem with the leg swelling which is better with the compression stockings he does get the swelling at the end of the day when he wake up in the morning it is much better    History of gout since he was a started on allopurinol the gout symptoms are resolved no problems with the gouty episodes I will follow-up with his uric acid level  Gastroesophageal reflux disease is a stable  Chronic lower extremity pain secondary to obesity he is not getting any benefit with gabapentin  Ivy Starr is very well controlled  Morbid obesity he was looking for some medication to lose weight but I do not think so with all his medical problem and his age of 81 any medication can help him decide the diet and exercise as much as he can do diet will be the most important part in control of his weight unfortunately he is not able to lose any weight for last 1 year  The following portions of the patient's history were reviewed and updated as appropriate: allergies, current medications, past family history, past medical history, past social history, past surgical history and problem list     Review of Systems   Constitutional: Positive for fatigue  Negative for appetite change and fever  HENT: Positive for hearing loss  Negative for congestion, ear pain, nosebleeds, sneezing, tinnitus and voice change  Eyes: Negative for pain, discharge and redness  Respiratory: Negative for cough, chest tightness and wheezing  Cardiovascular: Positive for leg swelling  Negative for chest pain and palpitations  Gastrointestinal: Negative for abdominal pain, blood in stool, constipation, diarrhea, nausea and vomiting  Complaining of ventral incisional hernia which is easily reducible   Genitourinary: Negative for difficulty urinating, dysuria, hematuria and urgency  Musculoskeletal: Negative for arthralgias, back pain, gait problem and joint swelling  Bilateral leg pain   Skin: Negative for rash and wound  Allergic/Immunologic: Negative for environmental allergies  Neurological: Negative for dizziness, tremors, seizures, weakness, light-headedness and numbness  Hematological: Negative for adenopathy  Does not bruise/bleed easily  Psychiatric/Behavioral: Negative for behavioral problems and confusion  The patient is not nervous/anxious  Past Medical History:   Diagnosis Date   • Abnormal electrocardiogram     Last assessed: Oct 11, 2013   • KAYLI (acute kidney injury) ([facility] [address] ) 12/24/2015   • Allergic rhinitis     last assessed: Oct 11, 2013   • Allergic rhinitis due to pollen     last assessed: Oct 10, 2013   • Allergic sinusitis     Last assessed: May 11, 2015   • Allergy     resolved: July 22, 2015   • Benign essential hypertension     Last assessed/resolved: May 31, 2017   • BPH (benign prostatic hyperplasia)    • Cancer [facility])     prostate   • Colitis     Last assessed: May 24, 2016   • Cough with hemoptysis 11/17/2020   • COVID-19 08/18/2022   • Generalized osteoarthritis     Last assessed: Oct 11, 2013   • GERD without esophagitis     Last assessed/resolved: May 31, 2017   • Hearing loss    • Hiatal hernia     resolved: July 22, 2015   • History of gastroesophageal reflux (GERD)    • History of stomach ulcers     last assessed: May 11, 2015   • Hypertension    • Incomplete bladder emptying    • Kidney stone    • Nodular prostate with lower urinary tract symptoms    • Nontoxic single thyroid nodule     last assessed: April 16, 2014   • Orchalgia    • Overweight     last assessed: Oct 31, 2013   • Poor urinary stream    • Pulmonary embolism [facility])    • Salivary gland disorder     last assessed: April 16, 2014   • Seasonal allergies     last assessed: May 15, 2015   • Spermatocele    • Straining to void    • 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:  May 7, 2014         Current Outpatient Medications:   •  allopurinol (ZYLOPRIM) 100 mg tablet, Take 1 tablet (100 mg total) by mouth 2 (two) times a day, Disp: 180 tablet, Rfl: 1  •  aspirin (ECOTRIN LOW STRENGTH) 81 mg EC tablet, Take 81 mg by mouth daily, Disp: , Rfl:   •  Cholecalciferol (VITAMIN D3 PO), Take by mouth 2000 IU, Disp: , Rfl:   •  diltiazem (CARDIZEM CD) 180 mg 24 hr capsule, Take 1 capsule (180 mg total) by mouth daily, Disp: 90 capsule, Rfl: 1  •  furosemide (LASIX) 20 mg tablet, Take 1 tablet (20 mg total) by mouth daily, Disp: 90 tablet, Rfl: 1  •  gabapentin (Neurontin) 400 mg capsule, Take 1 capsule (400 mg total) by mouth 2 (two) times a day, Disp: 90 capsule, Rfl: 1  •  levocetirizine (XYZAL) 5 MG tablet, Take 1 tablet (5 mg total) by mouth every evening, Disp: 90 tablet, Rfl: 4  •  losartan (COZAAR) 100 MG tablet, Take 1 tablet (100 mg total) by mouth daily, Disp: 90 tablet, Rfl: 1  •  omeprazole (PriLOSEC) 40 MG capsule, TAKE 1 CAPSULE (40 MG TOTAL) BY MOUTH DAILY  , Disp: 90 capsule, Rfl: 3  •  predniSONE 10 mg tablet, Take by mouth if needed (onlyl as needed with gout), Disp: , Rfl:   •  tamsulosin (FLOMAX) 0 4 mg, Take 1 capsule (0 4 mg total) by mouth daily with dinner, Disp: 90 capsule, Rfl: 1  •  traMADol-acetaminophen (ULTRACET) 37 5-325 mg per tablet, Take 2 tablets by mouth every 6 (six) hours as needed for moderate pain, Disp: 60 tablet, Rfl: 0  •  Levocetirizine Dihydrochloride (XYZAL PO), Xyzal, Disp: , Rfl:     Allergies   Allergen Reactions   • Ace Inhibitors Hyperactivity       Social History   Past Surgical History:   Procedure Laterality Date   • BLADDER SURGERY     • CHOLECYSTECTOMY  2000    laparoscopic    • COLONOSCOPY     • FLAP LOCAL TRUNK Left 10/28/2021    Procedure: EXCISION OF FLANK MASS;  Surgeon: Narda Caldera DO;  Location: [address] [place] [address] MAIN OR;  Service: Plastics   • HEMORRHOID SURGERY      pilionidal cyst removal    • HERNIA REPAIR Left 01/17/2008    inguinal    • KNEE ARTHROSCOPY Left 1974   • KNEE CARTILAGE SURGERY     • NASAL SEPTUM SURGERY      Deviation repair    • [place] EXC PRTD BARBARA/PRTD GLND LAT DSJ&PRSRV FACIAL NR Right 06/09/2021    Procedure: SUPERFICIAL PAROTIDECTOMY WITH FACIAL NERVE MONITORING;  Surgeon: Pako Hahn MD;  [facility]: [facility] Main OR;  Service: ENT   • PROSTATE BIOPSY  2017   • SKIN TAG REMOVAL  03/2023    on his eye   • STOMACH SURGERY     • TONSILLECTOMY AND ADENOIDECTOMY      at age 37   • [address] [place] [address] "REPLACEMENT - right 01/04 0 left 01/95   • UPPER GASTROINTESTINAL ENDOSCOPY     • US GUIDED THYROID BIOPSY  01/19/2022   • US GUIDED THYROID BIOPSY  05/26/2022   • VASECTOMY       Family History   Problem Relation Age of Onset   • Hypertension Mother    • Stroke Mother    • Stomach cancer Father    • Hypertension Father    • Hypertension Family    • Stroke Family         syndrome    • Heart attack Son        Objective:  /74 (BP Location: Left arm, Patient Position: Sitting, Cuff Size: Large)   Pulse 60   Temp (!) 97 2 °F (36 2 °C) (Temporal)   Ht 5' 7\" (1 702 m)   Wt 105 kg (231 lb 12 8 oz)   SpO2 96% Comment: room ai  BMI 36 31 kg/m²        Physical Exam  Vitals and nursing note reviewed  Constitutional:       Appearance: He is well-developed  He is obese  HENT:      Right Ear: External ear normal    Eyes:      Conjunctiva/sclera: Conjunctivae normal       Pupils: Pupils are equal, round, and reactive to light  Neck:      Thyroid: No thyromegaly  Vascular: No JVD  Cardiovascular:      Rate and Rhythm: Normal rate and regular rhythm  Heart sounds: Normal heart sounds  Pulmonary:      Breath sounds: Normal breath sounds  Abdominal:      General: Bowel sounds are normal       Palpations: Abdomen is soft  Hernia: A hernia is present  Hernia is present in the ventral area (Incisional hernia)  Musculoskeletal:         General: Normal range of motion  Cervical back: Normal range of motion  Lymphadenopathy:      Cervical: No cervical adenopathy  Skin:     General: Skin is dry  Neurological:      Mental Status: He is alert and oriented to person, place, and time  Deep Tendon Reflexes: Reflexes are normal and symmetric     Psychiatric:         Behavior: Behavior normal            "

## 2023-05-24 ENCOUNTER — HOSPITAL ENCOUNTER (OUTPATIENT)
Dept: RADIOLOGY | Facility: HOSPITAL | Age: 83
Discharge: HOME | End: 2023-05-24
Attending: SURGERY

## 2023-05-24 DIAGNOSIS — E04.2 MULTIPLE THYROID NODULES: ICD-10-CM

## 2023-05-31 ENCOUNTER — TELEPHONE (OUTPATIENT)
Dept: SURGICAL ONCOLOGY | Facility: CLINIC | Age: 83
End: 2023-05-31

## 2023-05-31 NOTE — TELEPHONE ENCOUNTER
I spoke with the patient's Wife  She stated they are not going to be seen by Dr [facility] Cox team due to parking and it is just to far from there house  They did like Dr [facility] but they will be sticking with Dr Devora Mills because he is closer to there house

## 2023-06-08 DIAGNOSIS — G60.9 IDIOPATHIC PERIPHERAL NEUROPATHY: ICD-10-CM

## 2023-06-08 RX ORDER — GABAPENTIN 400 MG/1
400 CAPSULE ORAL
Qty: 90 CAPSULE | Refills: 1 | Status: SHIPPED | OUTPATIENT
Start: 2023-06-08

## 2023-06-19 ENCOUNTER — HOSPITAL ENCOUNTER (OUTPATIENT)
Dept: RADIOLOGY | Facility: HOSPITAL | Age: 83
Discharge: HOME | End: 2023-06-19
Payer: COMMERCIAL

## 2023-06-19 DIAGNOSIS — R13.10 DYSPHAGIA, UNSPECIFIED TYPE: ICD-10-CM

## 2023-06-19 PROCEDURE — 74220 X-RAY XM ESOPHAGUS 1CNTRST: CPT

## 2023-08-17 ENCOUNTER — RA CDI HCC (OUTPATIENT)
Dept: OTHER | Facility: HOSPITAL | Age: 83
End: 2023-08-17

## 2023-08-17 NOTE — PROGRESS NOTES
[address] [facility] coding opportunities          Chart Reviewed number of suggestions sent to Provider: 1   I11.0    Patients Insurance     Medicare Insurance: [employer]

## 2023-08-18 ENCOUNTER — APPOINTMENT (OUTPATIENT)
Dept: LAB | Facility: IMAGING CENTER | Age: 83
End: 2023-08-18
Payer: COMMERCIAL

## 2023-08-18 DIAGNOSIS — C61 PROSTATE CANCER (HCC): ICD-10-CM

## 2023-08-18 DIAGNOSIS — I10 BENIGN ESSENTIAL HYPERTENSION: ICD-10-CM

## 2023-08-18 LAB
ANION GAP SERPL CALCULATED.3IONS-SCNC: 3 MMOL/L
BASOPHILS # BLD MANUAL: 0.05 THOUSAND/UL (ref 0–0.1)
BASOPHILS NFR MAR MANUAL: 1 % (ref 0–1)
BUN SERPL-MCNC: 27 MG/DL (ref 5–25)
CALCIUM SERPL-MCNC: 9.3 MG/DL (ref 8.3–10.1)
CHLORIDE SERPL-SCNC: 108 MMOL/L (ref 96–108)
CO2 SERPL-SCNC: 30 MMOL/L (ref 21–32)
CREAT SERPL-MCNC: 1.24 MG/DL (ref 0.6–1.3)
EOSINOPHIL # BLD MANUAL: 0 THOUSAND/UL (ref 0–0.4)
EOSINOPHIL NFR BLD MANUAL: 0 % (ref 0–6)
ERYTHROCYTE [DISTWIDTH] IN BLOOD BY AUTOMATED COUNT: 15.1 % (ref 11.6–15.1)
GFR SERPL CREATININE-BSD FRML MDRD: 53 ML/MIN/1.73SQ M
GLUCOSE P FAST SERPL-MCNC: 92 MG/DL (ref 65–99)
HCT VFR BLD AUTO: 46.8 % (ref 36.5–49.3)
HGB BLD-MCNC: 15.4 G/DL (ref 12–17)
LYMPHOCYTES # BLD AUTO: 1.19 THOUSAND/UL (ref 0.6–4.47)
LYMPHOCYTES # BLD AUTO: 21 % (ref 14–44)
MCH RBC QN AUTO: 30.1 PG (ref 26.8–34.3)
MCHC RBC AUTO-ENTMCNC: 32.9 G/DL (ref 31.4–37.4)
MCV RBC AUTO: 92 FL (ref 82–98)
MONOCYTES # BLD AUTO: 0.22 THOUSAND/UL (ref 0–1.22)
MONOCYTES NFR BLD: 4 % (ref 4–12)
MYELOCYTES NFR BLD MANUAL: 3 % (ref 0–1)
NEUTROPHILS # BLD MANUAL: 3.78 THOUSAND/UL (ref 1.85–7.62)
NEUTS BAND NFR BLD MANUAL: 1 % (ref 0–8)
NEUTS SEG NFR BLD AUTO: 69 % (ref 43–75)
NRBC BLD AUTO-RTO: 1 /100 WBC (ref 0–2)
PLATELET # BLD AUTO: 160 THOUSANDS/UL (ref 149–390)
PLATELET BLD QL SMEAR: ADEQUATE
PMV BLD AUTO: 11.5 FL (ref 8.9–12.7)
POTASSIUM SERPL-SCNC: 4.5 MMOL/L (ref 3.5–5.3)
PSA SERPL-MCNC: 0.93 NG/ML (ref 0–4)
RBC # BLD AUTO: 5.11 MILLION/UL (ref 3.88–5.62)
RBC MORPH BLD: NORMAL
SODIUM SERPL-SCNC: 141 MMOL/L (ref 135–147)
URATE SERPL-MCNC: 2.9 MG/DL (ref 3.5–8.5)
VARIANT LYMPHS # BLD AUTO: 1 %
WBC # BLD AUTO: 5.4 THOUSAND/UL (ref 4.31–10.16)

## 2023-08-18 PROCEDURE — 36415 COLL VENOUS BLD VENIPUNCTURE: CPT

## 2023-08-18 PROCEDURE — 84153 ASSAY OF PSA TOTAL: CPT

## 2023-08-18 PROCEDURE — 85027 COMPLETE CBC AUTOMATED: CPT

## 2023-08-18 PROCEDURE — 85007 BL SMEAR W/DIFF WBC COUNT: CPT

## 2023-08-18 PROCEDURE — 80048 BASIC METABOLIC PNL TOTAL CA: CPT

## 2023-08-18 PROCEDURE — 84550 ASSAY OF BLOOD/URIC ACID: CPT

## 2023-08-23 ENCOUNTER — OFFICE VISIT (OUTPATIENT)
Dept: INTERNAL MEDICINE CLINIC | Age: 83
End: 2023-08-23
Payer: COMMERCIAL

## 2023-08-23 VITALS
WEIGHT: 225 LBS | TEMPERATURE: 98 F | OXYGEN SATURATION: 97 % | BODY MASS INDEX: 35.31 KG/M2 | SYSTOLIC BLOOD PRESSURE: 144 MMHG | HEIGHT: 67 IN | HEART RATE: 72 BPM | DIASTOLIC BLOOD PRESSURE: 86 MMHG

## 2023-08-23 DIAGNOSIS — J84.9 INTERSTITIAL LUNG DISEASE (HCC): ICD-10-CM

## 2023-08-23 DIAGNOSIS — C61 PROSTATE CANCER (HCC): Primary | ICD-10-CM

## 2023-08-23 DIAGNOSIS — N18.31 STAGE 3A CHRONIC KIDNEY DISEASE (HCC): ICD-10-CM

## 2023-08-23 DIAGNOSIS — G60.9 IDIOPATHIC PERIPHERAL NEUROPATHY: ICD-10-CM

## 2023-08-23 DIAGNOSIS — D75.1 POLYCYTHEMIA: ICD-10-CM

## 2023-08-23 PROBLEM — J31.0 GUSTATORY RHINITIS: Status: RESOLVED | Noted: 2022-06-03 | Resolved: 2023-08-23

## 2023-08-23 PROBLEM — Z13.820 SCREENING FOR OSTEOPOROSIS: Status: RESOLVED | Noted: 2018-09-06 | Resolved: 2023-08-23

## 2023-08-23 PROBLEM — Z96.619 H/O TOTAL SHOULDER REPLACEMENT: Status: RESOLVED | Noted: 2022-01-10 | Resolved: 2023-08-23

## 2023-08-23 PROBLEM — Z13.5 GLAUCOMA SCREENING: Status: RESOLVED | Noted: 2018-09-06 | Resolved: 2023-08-23

## 2023-08-23 PROBLEM — Z13.6 SCREENING FOR AAA (ABDOMINAL AORTIC ANEURYSM): Status: RESOLVED | Noted: 2018-09-06 | Resolved: 2023-08-23

## 2023-08-23 PROBLEM — K64.9 HEMORRHOIDS: Status: RESOLVED | Noted: 2022-09-29 | Resolved: 2023-08-23

## 2023-08-23 PROBLEM — K57.30 DIVERTICULOSIS LARGE INTESTINE W/O PERFORATION OR ABSCESS W/O BLEEDING: Status: RESOLVED | Noted: 2022-09-29 | Resolved: 2023-08-23

## 2023-08-23 PROCEDURE — 99214 OFFICE O/P EST MOD 30 MIN: CPT | Performed by: INTERNAL MEDICINE

## 2023-08-23 RX ORDER — ERGOCALCIFEROL 1.25 MG/1
CAPSULE ORAL
COMMUNITY

## 2023-08-23 RX ORDER — POTASSIUM CHLORIDE 20 MEQ/1
TABLET, EXTENDED RELEASE ORAL
COMMUNITY

## 2023-08-23 RX ORDER — GABAPENTIN 400 MG/1
400 CAPSULE ORAL 2 TIMES DAILY
Qty: 90 CAPSULE | Refills: 1 | Status: SHIPPED | OUTPATIENT
Start: 2023-08-23

## 2023-08-23 NOTE — PROGRESS NOTES
Assessment/Plan:      Diagnoses and all orders for this visit:    Prostate cancer ([address])    Interstitial lung disease ([address])    Polycythemia    Stage 3a chronic kidney disease (HCC)    Idiopathic peripheral neuropathy  -     gabapentin (Neurontin) 400 mg capsule; Take 1 capsule (400 mg total) by mouth 2 (two) times a day    Other orders  -     ergocalciferol (ERGOCALCIFEROL) 1.25 MG (39636 UT) capsule; TAKE 1 CAPSULE BY MOUTH ONE TIME PER WEEK  -     potassium chloride (Klor-Con M20) 20 mEq tablet; TAKE 1 TABLET (20 MEQ TOTAL) BY MOUTH DAILY AS NEEDED (WHEN YOU TAKE LASIX.)             M*Modal software was used to dictate this note. It may contain errors with dictating incorrect words or incorrect spelling. Please contact the provider directly with any questions. Subjective:   Chief Complaint   Patient presents with   • Follow-up     3 MONTH FOLLOW UP 8/18 LABS , patient was on vacation he had fluid in his left knee 97 CC drained         Patient ID: Anjel Kumar. is a 81 y.o. male. This is a very pleasant 81 years young gentleman who is here today for the regular follow-up recently he went to [place] and during that vacation he started to have the swelling of his left knee and pain and he went to the orthopedic doctor they removed 90 cc of fluid it was not gout or pseudogout he was told that he has a very bad knee with the bone to bone and he should get total knee replacement patient is not very anxious about it.     Hypertension is poorly controlled with systolic blood pressure 279 and diastolic 86 he is not diabetic we will follow-up with his blood pressure in 4 months and if it is still high we may need to put him on some medication for the blood pressure he did stop taking his Lasix recently and I totally agree to, cause of his previous history of gout      The following portions of the patient's history were reviewed and updated as appropriate: allergies, current medications, past family history, past medical history, past social history, past surgical history and problem list.    Review of Systems   Constitutional: Negative for appetite change, fatigue and fever. HENT: Negative for congestion, ear pain, hearing loss, nosebleeds, sneezing, tinnitus and voice change. Eyes: Negative for pain, discharge and redness. Respiratory: Negative for cough, chest tightness and wheezing. Cardiovascular: Negative for chest pain, palpitations and leg swelling. Gastrointestinal: Negative for abdominal pain, blood in stool, constipation, diarrhea, nausea and vomiting. Genitourinary: Negative for difficulty urinating, dysuria, hematuria and urgency. Musculoskeletal: Negative for arthralgias, back pain, gait problem and joint swelling. Skin: Negative for rash and wound. Allergic/Immunologic: Negative for environmental allergies. Neurological: Negative for dizziness, tremors, seizures, weakness, light-headedness and numbness. Hematological: Negative for adenopathy. Does not bruise/bleed easily. Psychiatric/Behavioral: Negative for behavioral problems and confusion. The patient is not nervous/anxious. Past Medical History:   Diagnosis Date   • Abnormal electrocardiogram     Last assessed: Oct 11, 2013   • KAYLI (acute kidney injury) ([address]) 12/24/2015   • Allergic rhinitis     last assessed: Oct 11, 2013   • Allergic rhinitis due to pollen     last assessed: Oct 10, 2013   • Allergic sinusitis     Last assessed: May 11, 2015   • Allergy     resolved: July 22, 2015   • Benign essential hypertension     Last assessed/resolved: May 31, 2017   • BPH (benign prostatic hyperplasia)    • Cancer [facility])     prostate   • Colitis     Last assessed: May 24, 2016   • Cough with hemoptysis 11/17/2020   • COVID-19 08/18/2022   • Generalized osteoarthritis     Last assessed: Oct 11, 2013   • GERD without esophagitis     Last assessed/resolved:  May 31, 2017   • Hearing loss    • Hiatal hernia     resolved: July 22, 2015 • History of gastroesophageal reflux (GERD)    • History of stomach ulcers     last assessed: May 11, 2015   • Hypertension    • Incomplete bladder emptying    • Kidney stone    • Nodular prostate with lower urinary tract symptoms    • Nontoxic single thyroid nodule     last assessed: April 16, 2014   • Orchalgia    • Overweight     last assessed: Oct 31, 2013   • Poor urinary stream    • Pulmonary embolism [facility])    • Salivary gland disorder     last assessed: April 16, 2014   • Seasonal allergies     last assessed: May 15, 2015   • Spermatocele    • Straining to void    • 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:  May 7, 2014         Current Outpatient Medications:   •  allopurinol (ZYLOPRIM) 100 mg tablet, Take 1 tablet (100 mg total) by mouth 2 (two) times a day, Disp: 180 tablet, Rfl: 1  •  aspirin (ECOTRIN LOW STRENGTH) 81 mg EC tablet, Take 81 mg by mouth daily, Disp: , Rfl:   •  Cholecalciferol (VITAMIN D3 PO), Take by mouth 2000 IU, Disp: , Rfl:   •  diltiazem (CARDIZEM CD) 180 mg 24 hr capsule, Take 1 capsule (180 mg total) by mouth daily, Disp: 90 capsule, Rfl: 1  •  gabapentin (Neurontin) 400 mg capsule, Take 1 capsule (400 mg total) by mouth 2 (two) times a day, Disp: 90 capsule, Rfl: 1  •  levocetirizine (XYZAL) 5 MG tablet, Take 1 tablet (5 mg total) by mouth every evening, Disp: 90 tablet, Rfl: 4  •  losartan (COZAAR) 100 MG tablet, Take 1 tablet (100 mg total) by mouth daily, Disp: 90 tablet, Rfl: 1  •  tamsulosin (FLOMAX) 0.4 mg, Take 1 capsule (0.4 mg total) by mouth daily with dinner, Disp: 90 capsule, Rfl: 1  •  traMADol-acetaminophen (ULTRACET) 37.5-325 mg per tablet, Take 2 tablets by mouth every 6 (six) hours as needed for moderate pain, Disp: 60 tablet, Rfl: 0  •  ergocalciferol (ERGOCALCIFEROL) 1.25 MG (85745 UT) capsule, TAKE 1 CAPSULE BY MOUTH ONE TIME PER WEEK, Disp: , Rfl:   •  potassium chloride (Klor-Con M20) 20 mEq tablet, TAKE 1 TABLET (20 MEQ TOTAL) BY MOUTH DAILY AS NEEDED (WHEN YOU TAKE LASIX.), Disp: , Rfl:     Allergies   Allergen Reactions   • Ace Inhibitors Hyperactivity       Social History   Past Surgical History:   Procedure Laterality Date   • BLADDER SURGERY     • CHOLECYSTECTOMY  2000    laparoscopic    • COLONOSCOPY     • FLAP LOCAL TRUNK Left 10/28/2021    Procedure: EXCISION OF FLANK MASS;  Surgeon: Yuri Baptiste DO;  Location: [address] [place] [address] MAIN OR;  Service: Plastics   • HEMORRHOID SURGERY      pilionidal cyst removal    • HERNIA REPAIR Left 01/17/2008    inguinal    • KNEE ARTHROSCOPY Left 1974   • KNEE CARTILAGE SURGERY     • NASAL SEPTUM SURGERY      Deviation repair    • [place] EXC PRTD BARBARA/PRTD GLND LAT DSJ&PRSRV FACIAL NR Right 06/09/2021    Procedure: SUPERFICIAL PAROTIDECTOMY WITH FACIAL NERVE MONITORING;  Surgeon: Avi Liang MD;  Location: [facility] Main [facility];  Service: ENT   • PROSTATE BIOPSY  2017   • SKIN TAG REMOVAL  03/2023    on his eye   • STOMACH SURGERY     • TONSILLECTOMY AND ADENOIDECTOMY      at age 37   • [facility] - right 01/04 0 left 01/95   • UPPER GASTROINTESTINAL ENDOSCOPY     • US GUIDED THYROID BIOPSY  01/19/2022   • US GUIDED THYROID BIOPSY  05/26/2022   • VASECTOMY       Family History   Problem Relation Age of Onset   • Hypertension Mother    • Stroke Mother    • Stomach cancer Father    • Hypertension Father    • Hypertension Family    • Stroke Family         syndrome    • Heart attack Son        Objective:  /86 (BP Location: Left arm, Patient Position: Sitting, Cuff Size: Standard)   Pulse 72   Temp 98 °F (36.7 °C) (Temporal)   Ht 5' 7" (1.702 m)   Wt 102 kg (225 lb)   SpO2 97%   BMI 35.24 kg/m²        Physical Exam  Constitutional:       Appearance: He is well-developed. He is obese. HENT:      Right Ear: External ear normal.   Eyes:      Conjunctiva/sclera: Conjunctivae normal.      Pupils: Pupils are equal, round, and reactive to light. Neck:      Thyroid: No thyromegaly. Vascular: No JVD. Cardiovascular:      Rate and Rhythm: Normal rate and regular rhythm. Heart sounds: Normal heart sounds. Comments: Bilateral varicose vein left greater than right with chronic venous insufficiency and hyperpigmentation  Pulmonary:      Breath sounds: Normal breath sounds. Abdominal:      General: Abdomen is protuberant. Bowel sounds are normal.      Palpations: Abdomen is soft. Musculoskeletal:         General: Normal range of motion. Cervical back: Normal range of motion. Lymphadenopathy:      Cervical: No cervical adenopathy. Skin:     General: Skin is dry. Findings: Rash (Chronic venous insufficiency and hyperpigmentation both legs) present. Neurological:      Mental Status: He is alert and oriented to person, place, and time. Deep Tendon Reflexes: Reflexes are normal and symmetric.    Psychiatric:         Behavior: Behavior normal.

## 2023-09-16 ENCOUNTER — NURSE TRIAGE (OUTPATIENT)
Dept: OTHER | Facility: OTHER | Age: 83
End: 2023-09-16

## 2023-09-16 NOTE — TELEPHONE ENCOUNTER
Reason for Disposition  • Patient sounds very sick or weak to the triager    Answer Assessment - Initial Assessment Questions  1. LOCATION and RADIATION: "Where is the pain located?"       Right knee pain    2. QUALITY: "What does the pain feel like?"  (e.g., sharp, dull, aching, burning)      Aching    3. SEVERITY: "How bad is the pain?" "What does it keep you from doing?"   (Scale 1-10; or mild, moderate, severe)    -  MILD (1-3): doesn't interfere with normal activities     -  MODERATE (4-7): interferes with normal activities (e.g., work or school) or awakens from sleep, limping     -  SEVERE (8-10): excruciating pain, unable to do any normal activities, unable to walk      8-9/10 no improvement with OTC meds. Can't bend knee and difficulty walking/bearing weight    4. ONSET: "When did the pain start?" "Does it come and go, or is it there all the time?"      2 days ago- keeps getting worse    5. RECURRENT: "Have you had this pain before?" If Yes, ask: "When, and what happened then?"      Yes    6. SETTING: "Has there been any recent work, exercise or other activity that involved that part of the body?"       Denies    7. AGGRAVATING FACTORS: "What makes the knee pain worse?" (e.g., walking, climbing stairs, running)      Standing and bending    8. ASSOCIATED SYMPTOMS: "Is there any swelling or redness of the knee?"      "fluid in knee and needs tapped" has never had fluid in right knee prior to today    9.  OTHER SYMPTOMS: "Do you have any other symptoms?" (e.g., chest pain, difficulty breathing, fever, calf pain)      Warm to touch    Protocols used: KNEE PAIN-ADULT-

## 2023-09-16 NOTE — TELEPHONE ENCOUNTER
Regarding: fluid right knee  ----- Message from Porfirio Dallas sent at 9/16/2023  2:54 PM EDT -----  "My  has fluid on his right knee and needs it tapped today because he is having trouble walking; it has been swelling up for the past few days.   His orthopedic doc is [facility] but not in today so I have left my name and number with [facility] and am awaiting a call back to see if they will tap it for him."

## 2023-09-17 ENCOUNTER — APPOINTMENT (EMERGENCY)
Dept: RADIOLOGY | Facility: HOSPITAL | Age: 83
End: 2023-09-17
Payer: COMMERCIAL

## 2023-09-17 ENCOUNTER — HOSPITAL ENCOUNTER (EMERGENCY)
Facility: HOSPITAL | Age: 83
Discharge: HOME | End: 2023-09-17
Attending: EMERGENCY MEDICINE
Payer: COMMERCIAL

## 2023-09-17 VITALS
OXYGEN SATURATION: 98 % | HEART RATE: 80 BPM | SYSTOLIC BLOOD PRESSURE: 150 MMHG | TEMPERATURE: 98.3 F | DIASTOLIC BLOOD PRESSURE: 75 MMHG | RESPIRATION RATE: 18 BRPM

## 2023-09-17 DIAGNOSIS — Z87.39 HISTORY OF GOUT: ICD-10-CM

## 2023-09-17 DIAGNOSIS — M25.561 ACUTE PAIN OF RIGHT KNEE: ICD-10-CM

## 2023-09-17 DIAGNOSIS — M25.461 EFFUSION OF RIGHT KNEE JOINT: Primary | ICD-10-CM

## 2023-09-17 LAB
ALBUMIN SERPL BCP-MCNC: 4.1 G/DL (ref 3.5–5)
ALP SERPL-CCNC: 70 U/L (ref 34–104)
ALT SERPL W P-5'-P-CCNC: 10 U/L (ref 7–52)
ANION GAP SERPL CALCULATED.3IONS-SCNC: 5 MMOL/L
APPEARANCE FLD: ABNORMAL
APTT PPP: 27 SECONDS (ref 23–37)
AST SERPL W P-5'-P-CCNC: 15 U/L (ref 13–39)
B BURGDOR IGG+IGM SER QL IA: NEGATIVE
BASOPHILS # BLD AUTO: 0.04 THOUSANDS/ÂΜL (ref 0–0.1)
BASOPHILS NFR BLD AUTO: 1 % (ref 0–1)
BILIRUB SERPL-MCNC: 1.06 MG/DL (ref 0.2–1)
BUN SERPL-MCNC: 19 MG/DL (ref 5–25)
CALCIUM SERPL-MCNC: 9.1 MG/DL (ref 8.4–10.2)
CHLORIDE SERPL-SCNC: 105 MMOL/L (ref 96–108)
CO2 SERPL-SCNC: 29 MMOL/L (ref 21–32)
COLOR FLD: YELLOW
CREAT SERPL-MCNC: 1.07 MG/DL (ref 0.6–1.3)
CRP SERPL QL: 23 MG/L
CRYSTALS SNV QL MICRO: NORMAL
EOSINOPHIL # BLD AUTO: 0.01 THOUSAND/ÂΜL (ref 0–0.61)
EOSINOPHIL NFR BLD AUTO: 0 % (ref 0–6)
ERYTHROCYTE [DISTWIDTH] IN BLOOD BY AUTOMATED COUNT: 15 % (ref 11.6–15.1)
ERYTHROCYTE [SEDIMENTATION RATE] IN BLOOD: 11 MM/HOUR (ref 0–19)
GFR SERPL CREATININE-BSD FRML MDRD: 64 ML/MIN/1.73SQ M
GLUCOSE SERPL-MCNC: 107 MG/DL (ref 65–140)
GRAM STN SPEC: NORMAL
HCT VFR BLD AUTO: 47.9 % (ref 36.5–49.3)
HGB BLD-MCNC: 15.6 G/DL (ref 12–17)
HISTIOCYTES NFR FLD: 11 %
IMM GRANULOCYTES # BLD AUTO: 0.11 THOUSAND/UL (ref 0–0.2)
IMM GRANULOCYTES NFR BLD AUTO: 2 % (ref 0–2)
INR PPP: 0.97 (ref 0.84–1.19)
LYMPHOCYTES # BLD AUTO: 1.05 THOUSANDS/ÂΜL (ref 0.6–4.47)
LYMPHOCYTES NFR BLD AUTO: 1 %
LYMPHOCYTES NFR BLD AUTO: 19 % (ref 14–44)
MCH RBC QN AUTO: 29.7 PG (ref 26.8–34.3)
MCHC RBC AUTO-ENTMCNC: 32.6 G/DL (ref 31.4–37.4)
MCV RBC AUTO: 91 FL (ref 82–98)
MONOCYTES # BLD AUTO: 0.4 THOUSAND/ÂΜL (ref 0.17–1.22)
MONOCYTES NFR BLD AUTO: 7 % (ref 4–12)
NEUTROPHILS # BLD AUTO: 4.05 THOUSANDS/ÂΜL (ref 1.85–7.62)
NEUTS SEG NFR BLD AUTO: 71 % (ref 43–75)
NEUTS SEG NFR BLD AUTO: 88 %
NRBC BLD AUTO-RTO: 0 /100 WBCS
PLATELET # BLD AUTO: 175 THOUSANDS/UL (ref 149–390)
PMV BLD AUTO: 10.7 FL (ref 8.9–12.7)
POTASSIUM SERPL-SCNC: 4.5 MMOL/L (ref 3.5–5.3)
PROT SERPL-MCNC: 6.4 G/DL (ref 6.4–8.4)
PROTHROMBIN TIME: 13.5 SECONDS (ref 11.6–14.5)
RBC # BLD AUTO: 5.25 MILLION/UL (ref 3.88–5.62)
SITE: ABNORMAL
SODIUM SERPL-SCNC: 139 MMOL/L (ref 135–147)
TOTAL CELLS COUNTED SPEC: 100
WBC # BLD AUTO: 5.66 THOUSAND/UL (ref 4.31–10.16)
WBC # FLD MANUAL: 9842 /UL (ref 0–200)

## 2023-09-17 PROCEDURE — 99285 EMERGENCY DEPT VISIT HI MDM: CPT | Performed by: PHYSICIAN ASSISTANT

## 2023-09-17 PROCEDURE — 96376 TX/PRO/DX INJ SAME DRUG ADON: CPT

## 2023-09-17 PROCEDURE — 89051 BODY FLUID CELL COUNT: CPT | Performed by: PHYSICIAN ASSISTANT

## 2023-09-17 PROCEDURE — 36415 COLL VENOUS BLD VENIPUNCTURE: CPT | Performed by: PHYSICIAN ASSISTANT

## 2023-09-17 PROCEDURE — 85652 RBC SED RATE AUTOMATED: CPT | Performed by: PHYSICIAN ASSISTANT

## 2023-09-17 PROCEDURE — 86140 C-REACTIVE PROTEIN: CPT | Performed by: PHYSICIAN ASSISTANT

## 2023-09-17 PROCEDURE — 20610 DRAIN/INJ JOINT/BURSA W/O US: CPT | Performed by: PHYSICIAN ASSISTANT

## 2023-09-17 PROCEDURE — 89060 EXAM SYNOVIAL FLUID CRYSTALS: CPT | Performed by: PHYSICIAN ASSISTANT

## 2023-09-17 PROCEDURE — 86618 LYME DISEASE ANTIBODY: CPT | Performed by: PHYSICIAN ASSISTANT

## 2023-09-17 PROCEDURE — 87070 CULTURE OTHR SPECIMN AEROBIC: CPT | Performed by: PHYSICIAN ASSISTANT

## 2023-09-17 PROCEDURE — 73560 X-RAY EXAM OF KNEE 1 OR 2: CPT

## 2023-09-17 PROCEDURE — 85025 COMPLETE CBC W/AUTO DIFF WBC: CPT | Performed by: PHYSICIAN ASSISTANT

## 2023-09-17 PROCEDURE — 85610 PROTHROMBIN TIME: CPT | Performed by: PHYSICIAN ASSISTANT

## 2023-09-17 PROCEDURE — 85730 THROMBOPLASTIN TIME PARTIAL: CPT | Performed by: PHYSICIAN ASSISTANT

## 2023-09-17 PROCEDURE — 87040 BLOOD CULTURE FOR BACTERIA: CPT | Performed by: PHYSICIAN ASSISTANT

## 2023-09-17 PROCEDURE — 96374 THER/PROPH/DIAG INJ IV PUSH: CPT

## 2023-09-17 PROCEDURE — 87205 SMEAR GRAM STAIN: CPT | Performed by: PHYSICIAN ASSISTANT

## 2023-09-17 PROCEDURE — 99283 EMERGENCY DEPT VISIT LOW MDM: CPT

## 2023-09-17 PROCEDURE — 80053 COMPREHEN METABOLIC PANEL: CPT | Performed by: PHYSICIAN ASSISTANT

## 2023-09-17 RX ORDER — LIDOCAINE HYDROCHLORIDE 20 MG/ML
10 INJECTION, SOLUTION EPIDURAL; INFILTRATION; INTRACAUDAL; PERINEURAL ONCE
Status: COMPLETED | OUTPATIENT
Start: 2023-09-17 | End: 2023-09-17

## 2023-09-17 RX ORDER — HYDROMORPHONE HCL IN WATER/PF 6 MG/30 ML
0.2 PATIENT CONTROLLED ANALGESIA SYRINGE INTRAVENOUS ONCE
Status: COMPLETED | OUTPATIENT
Start: 2023-09-17 | End: 2023-09-17

## 2023-09-17 RX ORDER — CEPHALEXIN 500 MG/1
500 CAPSULE ORAL EVERY 12 HOURS SCHEDULED
Qty: 6 CAPSULE | Refills: 0 | Status: SHIPPED | OUTPATIENT
Start: 2023-09-17 | End: 2023-09-20

## 2023-09-17 RX ADMIN — HYDROMORPHONE HYDROCHLORIDE 0.2 MG: 0.2 INJECTION, SOLUTION INTRAMUSCULAR; INTRAVENOUS; SUBCUTANEOUS at 05:11

## 2023-09-17 RX ADMIN — HYDROMORPHONE HYDROCHLORIDE 0.2 MG: 0.2 INJECTION, SOLUTION INTRAMUSCULAR; INTRAVENOUS; SUBCUTANEOUS at 03:47

## 2023-09-17 RX ADMIN — LIDOCAINE HYDROCHLORIDE 10 ML: 20 INJECTION, SOLUTION EPIDURAL; INFILTRATION; INTRACAUDAL; PERINEURAL at 04:00

## 2023-09-17 NOTE — ED PROVIDER NOTES
History  Chief Complaint   Patient presents with   • Knee Pain     Pt states he is having increased bilateral knee pain d/t fluid build up. Pt reports hx of arthrocentesis, most recent in L knee in August. Pt denies other sx at this time. Patient is an 27-year-old male with a history of prostate cancer, GERD, hypertension, left knee arthroscopy presents emerged from with aching persistent worsening right knee pain and swelling for 1 day. Patient with a history of left knee pain and multiple "knee taps to right knee in the past."  Patient states that he has exquisite pain on right lower extremity weightbearing and reports exquisite pain on flexion extension of right knee. Patient denies noneffective treatment. Patient denies palliative factors. Patient denies fevers, chills, nausea, vomiting, diarrhea, constipation and urinary symptoms. Patient denies recent fall and recent trauma. Patient denies sick contacts and recent travel. Patient denies recent antibiotic use. Patient denies chest pain, shortness of breath, and abdominal pain. History provided by:  Patient   used: No    Knee Pain  Associated symptoms: no back pain, no fever and no neck pain        Prior to Admission Medications   Prescriptions Last Dose Informant Patient Reported? Taking?    Cholecalciferol (VITAMIN D3 PO)  Spouse/Significant Other, Self Yes No   Sig: Take by mouth 2000 IU   allopurinol (ZYLOPRIM) 100 mg tablet  Self, Spouse/Significant Other No No   Sig: Take 1 tablet (100 mg total) by mouth 2 (two) times a day   aspirin (ECOTRIN LOW STRENGTH) 81 mg EC tablet  Spouse/Significant Other, Self Yes No   Sig: Take 81 mg by mouth daily   diltiazem (CARDIZEM CD) 180 mg 24 hr capsule  Self, Spouse/Significant Other No No   Sig: Take 1 capsule (180 mg total) by mouth daily   ergocalciferol (ERGOCALCIFEROL) 1.25 MG (03873 UT) capsule  Self, Spouse/Significant Other Yes No   Sig: TAKE 1 CAPSULE BY MOUTH ONE TIME PER WEEK   gabapentin (Neurontin) 400 mg capsule   No No   Sig: Take 1 capsule (400 mg total) by mouth 2 (two) times a day   levocetirizine (XYZAL) 5 MG tablet  Self, Spouse/Significant Other No No   Sig: Take 1 tablet (5 mg total) by mouth every evening   losartan (COZAAR) 100 MG tablet  Self, Spouse/Significant Other No No   Sig: Take 1 tablet (100 mg total) by mouth daily   potassium chloride (Klor-Con M20) 20 mEq tablet  Self, Spouse/Significant Other Yes No   Sig: TAKE 1 TABLET (20 MEQ TOTAL) BY MOUTH DAILY AS NEEDED (WHEN YOU TAKE LASIX.)   tamsulosin (FLOMAX) 0.4 mg  Self, Spouse/Significant Other No No   Sig: Take 1 capsule (0.4 mg total) by mouth daily with dinner   traMADol-acetaminophen (ULTRACET) 37.5-325 mg per tablet  Self, Spouse/Significant Other No No   Sig: Take 2 tablets by mouth every 6 (six) hours as needed for moderate pain      Facility-Administered Medications: None       Past Medical History:   Diagnosis Date   • Abnormal electrocardiogram     Last assessed: Oct 11, 2013   • KAYLI (acute kidney injury) ([address]) 12/24/2015   • Allergic rhinitis     last assessed: Oct 11, 2013   • Allergic rhinitis due to pollen     last assessed: Oct 10, 2013   • Allergic sinusitis     Last assessed: May 11, 2015   • Allergy     resolved: July 22, 2015   • Benign essential hypertension     Last assessed/resolved: May 31, 2017   • BPH (benign prostatic hyperplasia)    • Cancer [facility])     prostate   • Colitis     Last assessed: May 24, 2016   • Cough with hemoptysis 11/17/2020   • COVID-19 08/18/2022   • Generalized osteoarthritis     Last assessed: Oct 11, 2013   • GERD without esophagitis     Last assessed/resolved:  May 31, 2017   • Hearing loss    • Hiatal hernia     resolved: July 22, 2015   • History of gastroesophageal reflux (GERD)    • History of stomach ulcers     last assessed: May 11, 2015   • Hypertension    • Incomplete bladder emptying    • Kidney stone    • Nodular prostate with lower urinary tract symptoms    • Nontoxic single thyroid nodule     last assessed: April 16, 2014   • Orchalgia    • Overweight     last assessed: Oct 31, 2013   • Poor urinary stream    • Pulmonary embolism [facility])    • Salivary gland disorder     last assessed: April 16, 2014   • Seasonal allergies     last assessed: May 15, 2015   • Spermatocele    • Straining to void    • 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, 2014       Past Surgical History:   Procedure Laterality Date   • BLADDER SURGERY     • CHOLECYSTECTOMY  2000    laparoscopic    • COLONOSCOPY     • FLAP LOCAL TRUNK Left 10/28/2021    Procedure: EXCISION OF FLANK MASS;  Surgeon: Susan Evans DO;  Location: [address] [place] [address] MAIN OR;  Service: Plastics   • HEMORRHOID SURGERY      pilionidal cyst removal    • HERNIA REPAIR Left 01/17/2008    inguinal    • KNEE ARTHROSCOPY Left 1974   • KNEE CARTILAGE SURGERY     • NASAL SEPTUM SURGERY      Deviation repair    • AL EXC PRTD BARBARA/PRTD GLND LAT DSJ&PRSRV FACIAL NR Right 06/09/2021    Procedure: SUPERFICIAL PAROTIDECTOMY WITH FACIAL NERVE MONITORING;  Surgeon: Alex Lowery MD;  [facility];  Service: ENT   • PROSTATE BIOPSY  2017   • SKIN TAG REMOVAL  03/2023    on his eye   • STOMACH SURGERY     • TONSILLECTOMY AND ADENOIDECTOMY      at age 37   • TOTAL SHOULDER ARTHROPLASTY      SHOULDER JOINT REPLACEMENT - right 01/04 0 left 01/95   • UPPER GASTROINTESTINAL ENDOSCOPY     • US GUIDED THYROID BIOPSY  01/19/2022   • US GUIDED THYROID BIOPSY  05/26/2022   • VASECTOMY         Family History   Problem Relation Age of Onset   • Hypertension Mother    • Stroke Mother    • Stomach cancer Father    • Hypertension Father    • Hypertension Family    • Stroke Family         syndrome    • Heart attack Son      I have reviewed and agree with the history as documented.     E-Cigarette/Vaping   • E-Cigarette Use Never User      E-Cigarette/Vaping Substances   • Nicotine No    • THC No    • CBD No    • Flavoring No    • Other No    • Unknown No Social History     Tobacco Use   • Smoking status: Former     Packs/day: 0.25     Years: 30.00     Total pack years: 7.50     Types: Cigarettes     Start date: 5     Quit date:      Years since quittin.7   • Smokeless tobacco: Never   Vaping Use   • Vaping Use: Never used   Substance Use Topics   • Alcohol use: Not Currently     Comment: rare   • Drug use: No       Review of Systems   Constitutional: Negative for activity change, appetite change, chills and fever. HENT: Negative for congestion, postnasal drip, rhinorrhea, sinus pressure, sinus pain, sore throat and tinnitus. Eyes: Negative for photophobia and visual disturbance. Respiratory: Negative for cough, chest tightness and shortness of breath. Cardiovascular: Negative for chest pain and palpitations. Gastrointestinal: Negative for abdominal pain, constipation, diarrhea, nausea and vomiting. Genitourinary: Negative for difficulty urinating, dysuria, flank pain, frequency and urgency. Musculoskeletal: Positive for arthralgias. Negative for back pain, gait problem, neck pain and neck stiffness. Skin: Negative for pallor and rash. Allergic/Immunologic: Negative for environmental allergies and food allergies. Neurological: Negative for dizziness, weakness, numbness and headaches. Psychiatric/Behavioral: Negative for confusion. All other systems reviewed and are negative. Physical Exam  Physical Exam  Vitals and nursing note reviewed. Constitutional:       General: He is awake. Appearance: Normal appearance. He is well-developed. He is not ill-appearing, toxic-appearing or diaphoretic. HENT:      Head: Normocephalic and atraumatic. Right Ear: Hearing and external ear normal. No decreased hearing noted. No drainage, swelling or tenderness. No mastoid tenderness. Left Ear: Hearing and external ear normal. No decreased hearing noted. No drainage, swelling or tenderness. No mastoid tenderness. Nose: Nose normal.      Mouth/Throat:      Lips: Pink. Mouth: Mucous membranes are moist.      Pharynx: Oropharynx is clear. Uvula midline. Eyes:      General: Lids are normal. Vision grossly intact. Right eye: No discharge. Left eye: No discharge. Extraocular Movements: Extraocular movements intact. Conjunctiva/sclera: Conjunctivae normal.      Pupils: Pupils are equal, round, and reactive to light. Neck:      Vascular: No JVD. Trachea: Trachea and phonation normal. No tracheal tenderness or tracheal deviation. Cardiovascular:      Rate and Rhythm: Normal rate and regular rhythm. Pulses: Normal pulses. Radial pulses are 2+ on the right side and 2+ on the left side. Dorsalis pedis pulses are 2+ on the right side and 2+ on the left side. Posterior tibial pulses are 2+ on the right side and 2+ on the left side. Heart sounds: Normal heart sounds. Pulmonary:      Effort: Pulmonary effort is normal.      Breath sounds: Normal breath sounds. No stridor. No decreased breath sounds, wheezing, rhonchi or rales. Chest:      Chest wall: No tenderness. Abdominal:      General: Abdomen is flat. Bowel sounds are normal. There is no distension. Palpations: Abdomen is soft. Abdomen is not rigid. Tenderness: There is no abdominal tenderness. There is no guarding or rebound. Musculoskeletal:      Cervical back: Full passive range of motion without pain, normal range of motion and neck supple. No rigidity. No spinous process tenderness or muscular tenderness. Normal range of motion. Right hip: Normal.      Left hip: Normal.      Right upper leg: Normal.      Left upper leg: Normal.      Right knee: Swelling and effusion present. Decreased range of motion. Tenderness present over the lateral joint line. No medial joint line tenderness.       Left knee: Normal.      Right ankle: Normal.      Left ankle: Normal.      Comments: Passive ROM intact  Upper extremity 5/5 bilaterally  Left lower extremity 5/5  Right lower extremity; hip 5/5, knee 4/5, ankle 5/5  Neurovascularly intact  No grinding or clicking of joints     Lymphadenopathy:      Head:      Right side of head: No submental, submandibular, tonsillar, preauricular, posterior auricular or occipital adenopathy. Left side of head: No submental, submandibular, tonsillar, preauricular, posterior auricular or occipital adenopathy. Cervical: No cervical adenopathy. Right cervical: No superficial, deep or posterior cervical adenopathy. Left cervical: No superficial, deep or posterior cervical adenopathy. Skin:     General: Skin is warm. Capillary Refill: Capillary refill takes less than 2 seconds. Neurological:      General: No focal deficit present. Mental Status: He is alert and oriented to person, place, and time. GCS: GCS eye subscore is 4. GCS verbal subscore is 5. GCS motor subscore is 6. Sensory: No sensory deficit. Deep Tendon Reflexes: Reflexes are normal and symmetric. Reflex Scores:       Patellar reflexes are 2+ on the right side and 2+ on the left side. Psychiatric:         Mood and Affect: Mood normal.         Speech: Speech normal.         Behavior: Behavior normal. Behavior is cooperative. Thought Content:  Thought content normal.         Judgment: Judgment normal.         Vital Signs  ED Triage Vitals   Temperature Pulse Respirations Blood Pressure SpO2   09/17/23 0254 09/17/23 0254 09/17/23 0254 09/17/23 0254 09/17/23 0254   98.3 °F (36.8 °C) 80 18 (!) 180/105 98 %      Temp Source Heart Rate Source Patient Position - Orthostatic VS BP Location FiO2 (%)   09/17/23 0254 09/17/23 0254 09/17/23 0254 09/17/23 0254 --   Oral Monitor Lying Right arm       Pain Score       09/17/23 0347       10 - Worst Possible Pain           Vitals:    09/17/23 0254 09/17/23 0700   BP: (!) 180/105 150/75   Pulse: 80 80   Patient Position - Orthostatic VS: Lying          Visual Acuity      ED Medications  Medications   lidocaine (PF) (XYLOCAINE-MPF) 2 % injection 10 mL (10 mL Infiltration Given by Other 9/17/23 0400)   HYDROmorphone HCl (DILAUDID) injection 0.2 mg (0.2 mg Intravenous Given 9/17/23 0347)   HYDROmorphone HCl (DILAUDID) injection 0.2 mg (0.2 mg Intravenous Given 9/17/23 0511)       Diagnostic Studies  Results Reviewed     Procedure Component Value Units Date/Time    STAT Gram Stain [[id number]] Collected: 09/17/23 1292    Lab Status: Final result Specimen: Other from Joint, Right Knee Updated: 09/17/23 0639     Gram Stain Result 4+ Polys      Rare Mononuclear Cells      No organisms seen    Body Fluid Diff [[id number]] Collected: 09/17/23 3194    Lab Status: Final result Specimen: Body Fluid from Joint, Other Updated: 09/17/23 0605     Total Counted 100     Neutrophils % (Fluid) 88 %      Lymphs % (Fluid) 1 %      Histiocyte % (Fluid) 11 %     Body fluid white cell count with differential [[id number]]  (Abnormal) Collected: 09/17/23 0508    Lab Status: Final result Specimen: Body Fluid from Joint, Other Updated: 09/17/23 0601     Site Right knee     Color, Fluid Yellow     Clarity, Fluid Slightly Cloudy     WBC, Fluid 9,842 /ul     Body fluid culture and Gram stain [[id number]] Collected: 09/17/23 0508    Lab Status: In process Specimen: Synovial Fluid from Joint, Right Knee Updated: 09/17/23 0514    Synovial fluid, crystal [[id number]] Collected: 09/17/23 0508    Lab Status:  In process Specimen: Synovial Fluid from Joint, Right Knee Updated: 09/17/23 0514    Sedimentation rate, automated [[id number]]  (Normal) Collected: 09/17/23 0350    Lab Status: Final result Specimen: Blood from Arm, Left Updated: 09/17/23 0423     Sed Rate 11 mm/hour     Comprehensive metabolic panel [[id number]]  (Abnormal) Collected: 09/17/23 0350    Lab Status: Final result Specimen: Blood from Arm, Left Updated: 09/17/23 0420     Sodium 139 mmol/L      Potassium 4.5 mmol/L      Chloride 105 mmol/L      CO2 29 mmol/L      ANION GAP 5 mmol/L      BUN 19 mg/dL      Creatinine 1.07 mg/dL      Glucose 107 mg/dL      Calcium 9.1 mg/dL      AST 15 U/L      ALT 10 U/L      Alkaline Phosphatase 70 U/L      Total Protein 6.4 g/dL      Albumin 4.1 g/dL      Total Bilirubin 1.06 mg/dL      eGFR 64 ml/min/1.73sq m     Narrative:      [facility] guidelines for Chronic Kidney Disease (CKD):   •  Stage 1 with normal or high GFR (GFR > 90 mL/min/1.73 square meters)  •  Stage 2 Mild CKD (GFR = 60-89 mL/min/1.73 square meters)  •  Stage 3A Moderate CKD (GFR = 45-59 mL/min/1.73 square meters)  •  Stage 3B Moderate CKD (GFR = 30-44 mL/min/1.73 square meters)  •  Stage 4 Severe CKD (GFR = 15-29 mL/min/1.73 square meters)  •  Stage 5 End Stage CKD (GFR <15 mL/min/1.73 square meters)  Note: GFR calculation is accurate only with a steady state creatinine    C-reactive protein [[id number]]  (Abnormal) Collected: 09/17/23 0350    Lab Status: Final result Specimen: Blood from Arm, Left Updated: 09/17/23 0420     CRP 23.0 mg/L     Protime-INR [[id number]]  (Normal) Collected: 09/17/23 0350    Lab Status: Final result Specimen: Blood from Arm, Left Updated: 09/17/23 0415     Protime 13.5 seconds      INR 0.97    APTT [[id number]]  (Normal) Collected: 09/17/23 0350    Lab Status: Final result Specimen: Blood from Arm, Left Updated: 09/17/23 0415     PTT 27 seconds     CBC and differential [[id number]] Collected: 09/17/23 0350    Lab Status: Final result Specimen: Blood from Arm, Left Updated: 09/17/23 0403     WBC 5.66 Thousand/uL      RBC 5.25 Million/uL      Hemoglobin 15.6 g/dL      Hematocrit 47.9 %      MCV 91 fL      MCH 29.7 pg      MCHC 32.6 g/dL      RDW 15.0 %      MPV 10.7 fL      Platelets 745 Thousands/uL      nRBC 0 /100 WBCs      Neutrophils Relative 71 %      Immat GRANS % 2 %      Lymphocytes Relative 19 %      Monocytes Relative 7 %      Eosinophils Relative 0 % Basophils Relative 1 %      Neutrophils Absolute 4.05 Thousands/µL      Immature Grans Absolute 0.11 Thousand/uL      Lymphocytes Absolute 1.05 Thousands/µL      Monocytes Absolute 0.40 Thousand/µL      Eosinophils Absolute 0.01 Thousand/µL      Basophils Absolute 0.04 Thousands/µL     Blood culture #1 [[id number]] Collected: 09/17/23 0350    Lab Status: In process Specimen: Blood from Arm, Left Updated: 09/17/23 0402    Blood culture #2 [[id number]] Collected: 09/17/23 0350    Lab Status: In process Specimen: Blood from Arm, Right Updated: 09/17/23 0402    Lyme Total AB W Reflex to IGM/IGG [[id number]] Collected: 09/17/23 0350    Lab Status: In process Specimen: Blood from Arm, Left Updated: 09/17/23 0400                 XR knee 1 or 2 vw left    (Results Pending)   XR knee 1 or 2 vw right    (Results Pending)              Procedures  Arthrocentesis    Date/Time: 9/17/2023 4:54 AM    Performed by: Isabel Villasenor PA-C  Authorized by: Isabel Villasenor PA-C  Universal Protocol:  Procedure performed by:  Consent: Verbal consent obtained. Risks and benefits: risks, benefits and alternatives were discussed  Consent given by: patient  Time out: Immediately prior to procedure a "time out" was called to verify the correct patient, procedure, equipment, support staff and site/side marked as required.   Timeout called at: 9/17/2023 4:54 AM.  Patient understanding: patient states understanding of the procedure being performed  Patient consent: the patient's understanding of the procedure matches consent given  Required items: required blood products, implants, devices, and special equipment available  Patient identity confirmed: verbally with patient and arm band      Location:  Bedside  Indications:  Joint swelling, pain and possible septic joint  Body area:  Knee  Joint:  Right knee  Local anesthesia used?: Yes    Local anesthetic:  Lidocaine 2% without epinephrine  Anesthetic total (ml):  4  Preparation: Patient was prepped and draped in usual sterile fashion    Needle size:  18 G  Ultrasound guidance: No    Approach:  Lateral  Aspirate amount (ml):  65  Aspirate:  Blood-tinged and cloudy  Patient tolerance:  Patient tolerated the procedure well with no immediate complications             ED Course  ED Course as of 09/17/23 0904   Sun Sep 17, 2023   0605 WBC, Fluid(!): 7,616                                             Medical Decision Making  Patient is an 67-year-old male with a history of prostate cancer, GERD, hypertension, left knee arthroscopy presents emerged from with aching persistent worsening right knee pain and swelling for 1 day. Patient with a history of left knee pain and multiple "knee taps to right knee in the past."  Patient states that he has exquisite pain on right lower extremity weightbearing and reports exquisite pain on flexion extension of right knee. Hemodynamically stable and afebrile  No SIRS  No leukocytosis, no banding  Right knee x-ray with severe degenerative changes noted, effusion-no acute osseous abnormality and initial read  Left knee x-ray with severe degenerative changes noted, no acute osseous abnormality on initial read. Sedimentation rate 11, CRP 23.0; arthrocentesis of right knee with body fluid white cell count 9842; doubt septic arthritis; with aspirated right knee joint fluid more consistent with gout as patient has a history of gout, but in the left knee never in the right knee. We will have patient follow-up with orthopedics for further evaluation as necessary  Prophylactic postprocedure dose of antibiotics sent to patient's pharmacy;  Keflex  Patient also reports that he has a home dose/prescribed regimen of prednisone and I counseled patient on taking prednisone as his physician prescribed  Follow-up with orthopedics, PCP and emergency department should symptoms persist or exacerbate  Patient with self ambulation with no difficulty  Patient with verbal understanding of all clinical laboratory and imaging findings, discharge instructions follow-up and verbalized agreement with patient current treatment plan. Amount and/or Complexity of Data Reviewed  Labs: ordered. Decision-making details documented in ED Course. Radiology: ordered and independent interpretation performed. Decision-making details documented in ED Course. Risk  Prescription drug management. Disposition  Final diagnoses:   Effusion of right knee joint   Acute pain of right knee   History of gout     Time reflects when diagnosis was documented in both MDM as applicable and the Disposition within this note     Time User Action Codes Description Comment    9/17/2023  6:52 AM Javier Mayer Add [M25.461] Effusion of right knee joint     9/17/2023  7:01 AM Javier Mayer Add [M25.561] Acute pain of right knee     9/17/2023  7:01 AM Javier Mayer Add [Z87.39] History of gout       ED Disposition     ED Disposition   Discharge    Condition   Stable    Date/Time   Sun Sep 17, 2023  6:52 AM    Comment   Charly Enrique. discharge to home/self care.                Follow-up Information     Follow up With Specialties Details Why Contact Info Additional [address] [facility] MD [facility]   [address] [facility]  [phone]       [address] [place] [address] Emergency Department Emergency Medicine   [phone]  [address] [facility] Emergency Department, [place], [place] [facility] [place], [place], [address] [place] [address] [facility] Orthopedic Surgery   [address] [place] [phone]  [facility], [address] [place] [address], [address] [place] [address], [address] [facility]          Discharge Medication List as of 9/17/2023  7:03 AM      START taking these medications    Details   cephalexin (KEFLEX) 500 mg capsule Take 1 capsule (500 mg total) by mouth every 12 (twelve) hours for 3 days, Starting Sun 9/17/2023, Until Wed 9/20/2023, Normal         CONTINUE these medications which have NOT CHANGED    Details   allopurinol (ZYLOPRIM) 100 mg tablet Take 1 tablet (100 mg total) by mouth 2 (two) times a day, Starting Tue 5/23/2023, Normal      aspirin (ECOTRIN LOW STRENGTH) 81 mg EC tablet Take 81 mg by mouth daily, Historical Med      Cholecalciferol (VITAMIN D3 PO) Take by mouth 2000 IU, Historical Med      diltiazem (CARDIZEM CD) 180 mg 24 hr capsule Take 1 capsule (180 mg total) by mouth daily, Starting Tue 5/23/2023, Normal      ergocalciferol (ERGOCALCIFEROL) 1.25 MG (36500 UT) capsule TAKE 1 CAPSULE BY MOUTH ONE TIME PER WEEK, Historical Med      gabapentin (Neurontin) 400 mg capsule Take 1 capsule (400 mg total) by mouth 2 (two) times a day, Starting Wed 8/23/2023, Normal      levocetirizine (XYZAL) 5 MG tablet Take 1 tablet (5 mg total) by mouth every evening, Starting Wed 5/31/2023, Normal      losartan (COZAAR) 100 MG tablet Take 1 tablet (100 mg total) by mouth daily, Starting Thu 5/11/2023, Normal      potassium chloride (Klor-Con M20) 20 mEq tablet TAKE 1 TABLET (20 MEQ TOTAL) BY MOUTH DAILY AS NEEDED (WHEN YOU TAKE LASIX.), Historical Med      tamsulosin (FLOMAX) 0.4 mg Take 1 capsule (0.4 mg total) by mouth daily with dinner, Starting Tue 5/23/2023, Normal      traMADol-acetaminophen (ULTRACET) 37.5-325 mg per tablet Take 2 tablets by mouth every 6 (six) hours as needed for moderate pain, Starting Fri 2/17/2023, Normal             No discharge procedures on file.     PDMP Review       Value Time User    PDMP Reviewed  Yes 4/8/2022 10:47 PM Maurilio Navarrete MD          ED Provider  Electronically Signed by           Xu Simmons PA-C  09/17/23 6841

## 2023-09-18 ENCOUNTER — OFFICE VISIT (OUTPATIENT)
Dept: INTERNAL MEDICINE CLINIC | Facility: OTHER | Age: 83
End: 2023-09-18
Payer: COMMERCIAL

## 2023-09-18 VITALS
TEMPERATURE: 98.2 F | WEIGHT: 231.2 LBS | HEIGHT: 67 IN | DIASTOLIC BLOOD PRESSURE: 74 MMHG | BODY MASS INDEX: 36.29 KG/M2 | SYSTOLIC BLOOD PRESSURE: 136 MMHG | RESPIRATION RATE: 20 BRPM | HEART RATE: 68 BPM | OXYGEN SATURATION: 96 %

## 2023-09-18 DIAGNOSIS — M25.461 PAIN AND SWELLING OF RIGHT KNEE: Primary | ICD-10-CM

## 2023-09-18 DIAGNOSIS — G60.9 IDIOPATHIC PERIPHERAL NEUROPATHY: ICD-10-CM

## 2023-09-18 DIAGNOSIS — M25.561 PAIN AND SWELLING OF RIGHT KNEE: Primary | ICD-10-CM

## 2023-09-18 PROCEDURE — 99213 OFFICE O/P EST LOW 20 MIN: CPT | Performed by: INTERNAL MEDICINE

## 2023-09-18 RX ORDER — PREDNISONE 10 MG/1
TABLET ORAL
Qty: 30 TABLET | Refills: 0
Start: 2023-09-18 | End: 2023-09-30

## 2023-09-18 RX ORDER — PREDNISONE 10 MG/1
10 TABLET ORAL 2 TIMES DAILY PRN
COMMUNITY

## 2023-09-18 NOTE — ASSESSMENT & PLAN NOTE
Defer on starting antibiotics. We will treat with prednisone 40 mg daily for 3 days with taper. He is to contact our office for worsening symptoms or recurrence of effusion.

## 2023-09-18 NOTE — PROGRESS NOTES
Assessment/Plan:    Pain and swelling of right knee  Defer on starting antibiotics. We will treat with prednisone 40 mg daily for 3 days with taper. He is to contact our office for worsening symptoms or recurrence of effusion. Diagnoses and all orders for this visit:    Pain and swelling of right knee  -     predniSONE 10 mg tablet; Take 4 tablets (40 mg total) by mouth daily for 3 days, THEN 3 tablets (30 mg total) daily for 3 days, THEN 2 tablets (20 mg total) daily for 3 days, THEN 1 tablet (10 mg total) daily for 3 days. Idiopathic peripheral neuropathy  -     traMADol-acetaminophen (ULTRACET) 37.5-325 mg per tablet; Take 2 tablets by mouth every 6 (six) hours as needed for moderate pain    Other orders  -     predniSONE 10 mg tablet; Take 10 mg by mouth 2 (two) times a day as needed                  Subjective:      Patient ID: Anjel Kumar. is a 81 y.o. male. Chief Complaint   Patient presents with   • Follow-up     ED 9/17/23 [facility] right knee pain fluid was taken out and also found gout crystals in his knee. Pain 2/10       81year old male is seen today for ER follow up. He was at the ER yesterday for acute right knee pain and swelling. Labs reviewed today CBC including absolute controlled or within normal range. Aspiration/fluid analysis was inconclusive other than leukocytosis. Negative for crystals or bacteria. Blood cultures are negative thus far. Lyme antibody is negative. CRP is elevated at 23. He was given Augmentin for possible bacterial infection however did not take it as it was not felt that his knee swelling was due to bacterial septic arthritis. He started to take prednisone 20 mg yesterday and 20 mg today. 65 cc of fluid was aspirated from the knee. He has follow-up scheduled with his orthopedic surgeon.       The following portions of the patient's history were reviewed and updated as appropriate: allergies, current medications, past family history, past medical history, past social history, past surgical history and problem list.    Review of Systems   Constitutional: Negative for activity change, appetite change, chills, diaphoresis, fatigue and fever. HENT: Negative for congestion, postnasal drip, rhinorrhea, sinus pressure, sinus pain, sneezing and sore throat. Eyes: Negative for visual disturbance. Respiratory: Negative for apnea, cough, choking, chest tightness, shortness of breath and wheezing. Cardiovascular: Negative for chest pain, palpitations and leg swelling. Gastrointestinal: Negative for abdominal distention, abdominal pain, anal bleeding, blood in stool, constipation, diarrhea, nausea and vomiting. Endocrine: Negative for cold intolerance and heat intolerance. Genitourinary: Negative for difficulty urinating, dysuria and hematuria. Musculoskeletal: Negative. Skin: Negative. Neurological: Negative for dizziness, weakness, light-headedness, numbness and headaches. Hematological: Negative for adenopathy. Psychiatric/Behavioral: Negative for agitation, sleep disturbance and suicidal ideas. All other systems reviewed and are negative. Past Medical History:   Diagnosis Date   • Abnormal electrocardiogram     Last assessed: Oct 11, 2013   • KAYLI (acute kidney injury) ([address]) 12/24/2015   • Allergic rhinitis     last assessed: Oct 11, 2013   • Allergic rhinitis due to pollen     last assessed: Oct 10, 2013   • Allergic sinusitis     Last assessed: May 11, 2015   • Allergy     resolved: July 22, 2015   • Benign essential hypertension     Last assessed/resolved: May 31, 2017   • BPH (benign prostatic hyperplasia)    • Cancer [facility])     prostate   • Colitis     Last assessed: May 24, 2016   • Cough with hemoptysis 11/17/2020   • COVID-19 08/18/2022   • Generalized osteoarthritis     Last assessed: Oct 11, 2013   • GERD without esophagitis     Last assessed/resolved:  May 31, 2017   • Hearing loss    • Hiatal hernia     resolved: July 22, 2015   • History of gastroesophageal reflux (GERD)    • History of stomach ulcers     last assessed: May 11, 2015   • Hypertension    • Incomplete bladder emptying    • Kidney stone    • Nodular prostate with lower urinary tract symptoms    • Nontoxic single thyroid nodule     last assessed: April 16, 2014   • Orchalgia    • Overweight     last assessed: Oct 31, 2013   • Poor urinary stream    • Pulmonary embolism [facility])    • Salivary gland disorder     last assessed: April 16, 2014   • Seasonal allergies     last assessed: May 15, 2015   • Spermatocele    • Straining to void    • 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, 2014         Current Outpatient Medications:   •  allopurinol (ZYLOPRIM) 100 mg tablet, Take 1 tablet (100 mg total) by mouth 2 (two) times a day, Disp: 180 tablet, Rfl: 1  •  Cholecalciferol (VITAMIN D3 PO), Take by mouth 2000 IU, Disp: , Rfl:   •  diltiazem (CARDIZEM CD) 180 mg 24 hr capsule, Take 1 capsule (180 mg total) by mouth daily, Disp: 90 capsule, Rfl: 1  •  ergocalciferol (ERGOCALCIFEROL) 1.25 MG (62442 UT) capsule, TAKE 1 CAPSULE BY MOUTH ONE TIME PER WEEK, Disp: , Rfl:   •  gabapentin (Neurontin) 400 mg capsule, Take 1 capsule (400 mg total) by mouth 2 (two) times a day, Disp: 90 capsule, Rfl: 1  •  levocetirizine (XYZAL) 5 MG tablet, Take 1 tablet (5 mg total) by mouth every evening, Disp: 90 tablet, Rfl: 4  •  losartan (COZAAR) 100 MG tablet, Take 1 tablet (100 mg total) by mouth daily, Disp: 90 tablet, Rfl: 1  •  potassium chloride (Klor-Con M20) 20 mEq tablet, TAKE 1 TABLET (20 MEQ TOTAL) BY MOUTH DAILY AS NEEDED (WHEN YOU TAKE LASIX.), Disp: , Rfl:   •  predniSONE 10 mg tablet, Take 10 mg by mouth 2 (two) times a day as needed, Disp: , Rfl:   •  predniSONE 10 mg tablet, Take 4 tablets (40 mg total) by mouth daily for 3 days, THEN 3 tablets (30 mg total) daily for 3 days, THEN 2 tablets (20 mg total) daily for 3 days, THEN 1 tablet (10 mg total) daily for 3 days. , Disp: 30 tablet, Rfl: 0  •  tamsulosin (FLOMAX) 0.4 mg, Take 1 capsule (0.4 mg total) by mouth daily with dinner, Disp: 90 capsule, Rfl: 1  •  traMADol-acetaminophen (ULTRACET) 37.5-325 mg per tablet, Take 2 tablets by mouth every 6 (six) hours as needed for moderate pain, Disp: 60 tablet, Rfl: 0  •  aspirin (ECOTRIN LOW STRENGTH) 81 mg EC tablet, Take 81 mg by mouth daily (Patient not taking: Reported on 9/18/2023), Disp: , Rfl:   •  cephalexin (KEFLEX) 500 mg capsule, Take 1 capsule (500 mg total) by mouth every 12 (twelve) hours for 3 days, Disp: 6 capsule, Rfl: 0    Allergies   Allergen Reactions   • Ace Inhibitors Hyperactivity       Social History   Past Surgical History:   Procedure Laterality Date   • BLADDER SURGERY     • CHOLECYSTECTOMY  2000    laparoscopic    • COLONOSCOPY     • FLAP LOCAL TRUNK Left 10/28/2021    Procedure: EXCISION OF FLANK MASS;  Surgeon: Zada Brittle, DO;  Location: [address] [place] [address] MAIN OR;  Service: Plastics   • HEMORRHOID SURGERY      pilionidal cyst removal    • HERNIA REPAIR Left 01/17/2008    inguinal    • KNEE ARTHROSCOPY Left 1974   • KNEE CARTILAGE SURGERY     • NASAL SEPTUM SURGERY      Deviation repair    • CO EXC PRTD BARBARA/PRTD GLND LAT DSJ&PRSRV FACIAL NR Right 06/09/2021    Procedure: SUPERFICIAL PAROTIDECTOMY WITH FACIAL NERVE MONITORING;  Surgeon: Dank Osman MD;  Location: [facility] Main [facility];  Service: ENT   • PROSTATE BIOPSY  2017   • SKIN TAG REMOVAL  03/2023    on his eye   • STOMACH SURGERY     • TONSILLECTOMY AND ADENOIDECTOMY      at age 37   • TOTAL SHOULDER ARTHROPLASTY      SHOULDER JOINT REPLACEMENT - right 01/04 0 left 01/95   • UPPER GASTROINTESTINAL ENDOSCOPY     • US GUIDED THYROID BIOPSY  01/19/2022   • US GUIDED THYROID BIOPSY  05/26/2022   • VASECTOMY       Family History   Problem Relation Age of Onset   • Hypertension Mother    • Stroke Mother    • Stomach cancer Father    • Hypertension Father    • Hypertension Family    • Stroke Family         syndrome    • Heart attack Son        Objective:  /74 (BP Location: Left arm, Patient Position: Sitting, Cuff Size: Standard)   Pulse 68   Temp 98.2 °F (36.8 °C) (Temporal)   Resp 20   Ht 5' 7" (1.702 m)   Wt 105 kg (231 lb 3.2 oz)   SpO2 96%   BMI 36.21 kg/m²     Recent Results (from the past 1344 hour(s))   PSA Total, Diagnostic    Collection Time: 08/18/23  7:44 AM   Result Value Ref Range    PSA, Diagnostic 0.93 0.00 - 4.00 ng/mL   Basic metabolic panel    Collection Time: 08/18/23  7:44 AM   Result Value Ref Range    Sodium 141 135 - 147 mmol/L    Potassium 4.5 3.5 - 5.3 mmol/L    Chloride 108 96 - 108 mmol/L    CO2 30 21 - 32 mmol/L    ANION GAP 3 mmol/L    BUN 27 (H) 5 - 25 mg/dL    Creatinine 1.24 0.60 - 1.30 mg/dL    Glucose, Fasting 92 65 - 99 mg/dL    Calcium 9.3 8.3 - 10.1 mg/dL    eGFR 53 ml/min/1.73sq m   Uric acid    Collection Time: 08/18/23  7:44 AM   Result Value Ref Range    Uric Acid 2.9 (L) 3.5 - 8.5 mg/dL   CBC and differential    Collection Time: 08/18/23  7:44 AM   Result Value Ref Range    WBC 5.40 4.31 - 10.16 Thousand/uL    RBC 5.11 3.88 - 5.62 Million/uL    Hemoglobin 15.4 12.0 - 17.0 g/dL    Hematocrit 46.8 36.5 - 49.3 %    MCV 92 82 - 98 fL    MCH 30.1 26.8 - 34.3 pg    MCHC 32.9 31.4 - 37.4 g/dL    RDW 15.1 11.6 - 15.1 %    MPV 11.5 8.9 - 12.7 fL    Platelets 978 133 - 858 Thousands/uL   Manual Differential(PHLEBS Do Not Order)    Collection Time: 08/18/23  7:44 AM   Result Value Ref Range    Segmented % 69 43 - 75 %    Bands % 1 0 - 8 %    Lymphocytes % 21 14 - 44 %    Monocytes % 4 4 - 12 %    Eosinophils, % 0 0 - 6 %    Basophils % 1 0 - 1 %    Myelocytes % 3 (H) 0 - 1 %    Atypical Lymphocytes % 1 (H) <=0 %    Absolute Neutrophils 3.78 1.85 - 7.62 Thousand/uL    Lymphocytes Absolute 1.19 0.60 - 4.47 Thousand/uL    Monocytes Absolute 0.22 0.00 - 1.22 Thousand/uL    Eosinophils Absolute 0.00 0.00 - 0.40 Thousand/uL    Basophils Absolute 0.05 0.00 - 0.10 Thousand/uL    Total Counted      nRBC 1 0 - 2 /100 WBC    RBC Morphology Normal     Platelet Estimate Adequate Adequate   CBC and differential    Collection Time: 09/17/23  3:50 AM   Result Value Ref Range    WBC 5.66 4.31 - 10.16 Thousand/uL    RBC 5.25 3.88 - 5.62 Million/uL    Hemoglobin 15.6 12.0 - 17.0 g/dL    Hematocrit 47.9 36.5 - 49.3 %    MCV 91 82 - 98 fL    MCH 29.7 26.8 - 34.3 pg    MCHC 32.6 31.4 - 37.4 g/dL    RDW 15.0 11.6 - 15.1 %    MPV 10.7 8.9 - 12.7 fL    Platelets 776 481 - 085 Thousands/uL    nRBC 0 /100 WBCs    Neutrophils Relative 71 43 - 75 %    Immat GRANS % 2 0 - 2 %    Lymphocytes Relative 19 14 - 44 %    Monocytes Relative 7 4 - 12 %    Eosinophils Relative 0 0 - 6 %    Basophils Relative 1 0 - 1 %    Neutrophils Absolute 4.05 1.85 - 7.62 Thousands/µL    Immature Grans Absolute 0.11 0.00 - 0.20 Thousand/uL    Lymphocytes Absolute 1.05 0.60 - 4.47 Thousands/µL    Monocytes Absolute 0.40 0.17 - 1.22 Thousand/µL    Eosinophils Absolute 0.01 0.00 - 0.61 Thousand/µL    Basophils Absolute 0.04 0.00 - 0.10 Thousands/µL   Comprehensive metabolic panel    Collection Time: 09/17/23  3:50 AM   Result Value Ref Range    Sodium 139 135 - 147 mmol/L    Potassium 4.5 3.5 - 5.3 mmol/L    Chloride 105 96 - 108 mmol/L    CO2 29 21 - 32 mmol/L    ANION GAP 5 mmol/L    BUN 19 5 - 25 mg/dL    Creatinine 1.07 0.60 - 1.30 mg/dL    Glucose 107 65 - 140 mg/dL    Calcium 9.1 8.4 - 10.2 mg/dL    AST 15 13 - 39 U/L    ALT 10 7 - 52 U/L    Alkaline Phosphatase 70 34 - 104 U/L    Total Protein 6.4 6.4 - 8.4 g/dL    Albumin 4.1 3.5 - 5.0 g/dL    Total Bilirubin 1.06 (H) 0.20 - 1.00 mg/dL    eGFR 64 ml/min/1.73sq m   Protime-INR    Collection Time: 09/17/23  3:50 AM   Result Value Ref Range    Protime 13.5 11.6 - 14.5 seconds    INR 0.97 0.84 - 1.19   APTT    Collection Time: 09/17/23  3:50 AM   Result Value Ref Range    PTT 27 23 - 37 seconds   Sedimentation rate, automated    Collection Time: 09/17/23  3:50 AM   Result Value Ref Range    Sed Rate 11 0 - 19 mm/hour   C-reactive protein    Collection Time: 09/17/23  3:50 AM   Result Value Ref Range    CRP 23.0 (H) <3.0 mg/L   Lyme Total AB W Reflex to IGM/IGG    Collection Time: 09/17/23  3:50 AM   Result Value Ref Range    Lyme Total Antibodies Negative Negative   Blood culture #1    Collection Time: 09/17/23  3:50 AM    Specimen: Arm, Left; Blood   Result Value Ref Range    Blood Culture Received in Microbiology Lab. Culture in Progress. Blood culture #2    Collection Time: 09/17/23  3:50 AM    Specimen: Arm, Right; Blood   Result Value Ref Range    Blood Culture Received in Microbiology Lab. Culture in Progress. Body fluid culture and Gram stain    Collection Time: 09/17/23  5:08 AM    Specimen: Joint, Right Knee; Synovial Fluid   Result Value Ref Range    Gram Stain Result 1+ Polys     Gram Stain Result No bacteria seen    Body fluid white cell count with differential    Collection Time: 09/17/23  5:08 AM   Result Value Ref Range    Site Right knee     Color, Fluid Yellow Clear, Colorless,Yellow    Clarity, Fluid Slightly Cloudy Clear    WBC, Fluid 9,842 (H) 0 - 200 /ul   Synovial fluid, crystal    Collection Time: 09/17/23  5:08 AM   Result Value Ref Range    Crystals, Synovial Fluid No Crystals Seen No Crystals Seen   STAT Gram Stain    Collection Time: 09/17/23  5:08 AM    Specimen: Joint, Right Knee; Other   Result Value Ref Range    Gram Stain Result 4+ Polys     Gram Stain Result Rare Mononuclear Cells     Gram Stain Result No organisms seen    Body Fluid Diff    Collection Time: 09/17/23  5:08 AM   Result Value Ref Range    Total Counted 100     Neutrophils % (Fluid) 88 %    Lymphs % (Fluid) 1 %    Histiocyte % (Fluid) 11 %            Physical Exam  Vitals and nursing note reviewed. Constitutional:       General: He is not in acute distress. Appearance: He is well-developed. He is not diaphoretic. HENT:      Head: Normocephalic and atraumatic. Eyes:      General: No scleral icterus. Right eye: No discharge. Left eye: No discharge. Conjunctiva/sclera: Conjunctivae normal.      Pupils: Pupils are equal, round, and reactive to light. Neck:      Thyroid: No thyromegaly. Vascular: No JVD. Cardiovascular:      Rate and Rhythm: Normal rate and regular rhythm. Heart sounds: Normal heart sounds. No murmur heard. No friction rub. No gallop. Pulmonary:      Effort: Pulmonary effort is normal. No respiratory distress. Breath sounds: Normal breath sounds. No wheezing or rales. Chest:      Chest wall: No tenderness. Abdominal:      General: Bowel sounds are normal. There is no distension. Palpations: Abdomen is soft. There is no mass. Tenderness: There is no abdominal tenderness. There is no guarding or rebound. Musculoskeletal:         General: No tenderness or deformity. Normal range of motion. Cervical back: Normal range of motion and neck supple. Lymphadenopathy:      Cervical: No cervical adenopathy. Skin:     General: Skin is warm and dry. Coloration: Skin is not pale. Findings: No erythema or rash. Neurological:      Mental Status: He is alert and oriented to person, place, and time. Cranial Nerves: No cranial nerve deficit. Coordination: Coordination normal.      Deep Tendon Reflexes: Reflexes are normal and symmetric. Psychiatric:         Behavior: Behavior normal.         Thought Content:  Thought content normal.         Judgment: Judgment normal.

## 2023-09-20 LAB
BACTERIA SPEC BFLD CULT: NO GROWTH
GRAM STN SPEC: NORMAL
GRAM STN SPEC: NORMAL

## 2023-09-22 LAB
BACTERIA BLD CULT: NORMAL
BACTERIA BLD CULT: NORMAL

## 2023-10-11 DIAGNOSIS — R25.2 LEG CRAMPING: Primary | ICD-10-CM

## 2023-10-11 RX ORDER — QUININE SULFATE 324 MG/1
648 CAPSULE ORAL EVERY 8 HOURS SCHEDULED
Qty: 30 CAPSULE | Refills: 1 | Status: SHIPPED | OUTPATIENT
Start: 2023-10-11 | End: 2023-10-30 | Stop reason: SDUPTHER

## 2023-10-16 ENCOUNTER — TELEPHONE (OUTPATIENT)
Dept: INTERNAL MEDICINE CLINIC | Age: 83
End: 2023-10-16

## 2023-10-16 NOTE — TELEPHONE ENCOUNTER
Received prior authorization for medication:     quiNINE (QUALAQUIN) 324 mg capsule     Scanning into media and sending to [facility]

## 2023-10-24 NOTE — TELEPHONE ENCOUNTER
90-day supply is only $19.78 on good Rx they can try going to the GoodRx and if this does not help then we will try something else

## 2023-10-26 NOTE — TELEPHONE ENCOUNTER
DARIANOM for pt to call me at [facility] [place] office

## 2023-10-31 ENCOUNTER — TELEPHONE (OUTPATIENT)
Dept: INTERNAL MEDICINE CLINIC | Age: 83
End: 2023-10-31

## 2023-10-31 NOTE — TELEPHONE ENCOUNTER
Medication continues to be denied alternative medication:    Hydroxychloroquine. Doxycycline. Chloroquine. Mefloquine. Malarone. Plaquenil.     Please advise

## 2023-10-31 NOTE — TELEPHONE ENCOUNTER
Received prior authorization for medication:        quiNINE (QUALAQUIN) 324 mg capsule     Scanning into media and sending to [employer]

## 2023-11-06 ENCOUNTER — OFFICE VISIT (OUTPATIENT)
Dept: INTERNAL MEDICINE CLINIC | Age: 83
End: 2023-11-06
Payer: COMMERCIAL

## 2023-11-06 VITALS
WEIGHT: 229 LBS | OXYGEN SATURATION: 95 % | TEMPERATURE: 98.3 F | HEART RATE: 60 BPM | BODY MASS INDEX: 35.94 KG/M2 | DIASTOLIC BLOOD PRESSURE: 68 MMHG | HEIGHT: 67 IN | SYSTOLIC BLOOD PRESSURE: 128 MMHG

## 2023-11-06 DIAGNOSIS — J06.9 URTI (ACUTE UPPER RESPIRATORY INFECTION): Primary | ICD-10-CM

## 2023-11-06 DIAGNOSIS — J02.8 PHARYNGITIS DUE TO OTHER ORGANISM: ICD-10-CM

## 2023-11-06 PROCEDURE — 99213 OFFICE O/P EST LOW 20 MIN: CPT | Performed by: INTERNAL MEDICINE

## 2023-11-06 RX ORDER — CEFDINIR 300 MG/1
300 CAPSULE ORAL EVERY 12 HOURS SCHEDULED
Qty: 20 CAPSULE | Refills: 0 | Status: SHIPPED | OUTPATIENT
Start: 2023-11-06 | End: 2023-11-16

## 2023-11-06 RX ORDER — FLUTICASONE PROPIONATE 50 MCG
1 SPRAY, SUSPENSION (ML) NASAL DAILY
Qty: 16 G | Refills: 0 | Status: SHIPPED | OUTPATIENT
Start: 2023-11-06

## 2023-11-06 NOTE — PROGRESS NOTES
Assessment/Plan:    Upper respiratory tract infection with sinusitis and pharyngitis  - likely viral with bacterial superinfection vs bacterial  - we will start pt on cefdinir 300 mg twice daily for 10 days, Flonase nasal spray for rhinitis and Mucinex for productive cough  - Pt was counseled to use OTC tylenol or ibuprofen with meals for aches and pains, warm salt water gargles for sore throat and was encouraged to drink lots of fluids, get plenty of rest and wash his hands liberally. - pt was also counseled to take antibiotics with food and also to use a probiotic like yogurt daily as long as he is taking antibiotics  - He should return to the clinic if his symptoms worsen or do not improve. Diagnoses and all orders for this visit:    URTI (acute upper respiratory infection)  -     cefdinir (OMNICEF) 300 mg capsule; Take 1 capsule (300 mg total) by mouth every 12 (twelve) hours for 10 days  -     fluticasone (FLONASE) 50 mcg/act nasal spray; 1 spray into each nostril daily    Pharyngitis due to other organism  -     cefdinir (OMNICEF) 300 mg capsule; Take 1 capsule (300 mg total) by mouth every 12 (twelve) hours for 10 days  -     fluticasone (FLONASE) 50 mcg/act nasal spray; 1 spray into each nostril daily             Subjective:      Patient ID: Litzy Mohan. is a 81 y.o. male. HPI  Patient presents with complaints that he has been having runny nose with pnd and cough for the past 6 days. He states that his nose burns and he believes he has sinus infection. He also admits to sneezing, and chest tightness. He states that his cough is productive of greenish phlegm. He denies fever, chills, night sweats, headache, dizziness, nasal congestion, earache, sore throat, chest pain, shortness of breath, palpitations, nausea, vomiting, abdominal pain, diarrhea, constipation, myalgias, problems. He denies any history of contact  Quit smoking 50 years ago.       The following portions of the patient's history were reviewed and updated as appropriate: He  has a past medical history of Abnormal electrocardiogram, KAYLI (acute kidney injury) ([address]) (12/24/2015), Allergic rhinitis, Allergic rhinitis due to pollen, Allergic sinusitis, Allergy, Benign essential hypertension, BPH (benign prostatic hyperplasia), Cancer ([address]), Colitis, Cough with hemoptysis (11/17/2020), COVID-19 (08/18/2022), Generalized osteoarthritis, GERD without esophagitis, Hearing loss, Hiatal hernia, History of gastroesophageal reflux (GERD), History of stomach ulcers, Hypertension, Incomplete bladder emptying, Kidney stone, Nodular prostate with lower urinary tract symptoms, Nontoxic single thyroid nodule, Orchalgia, Overweight, Poor urinary stream, Pulmonary embolism ([address]), Salivary gland disorder, Seasonal allergies, Spermatocele, Straining to void, and Warthin tumor.   He   Patient Active Problem List    Diagnosis Date Noted   • Chronic venous insufficiency of lower extremity 04/20/2023   • Varicose veins of both lower extremities with pain 03/02/2023   • Leg cramping 03/02/2023   • Peripheral vascular disease ([address]) 12/12/2022   • Bilateral lower extremity edema 12/12/2022   • Class 2 obesity due to excess calories without serious comorbidity with body mass index (BMI) of 36.0 to 36.9 in adult 09/29/2022   • Anxiety 04/10/2022   • Acute idiopathic gout of left foot 11/24/2021   • Continuous opioid dependence ([address]) 11/18/2021   • Disorder of adrenal gland, unspecified ([address]) 11/18/2021   • Stage 3a chronic kidney disease ([address]) 11/18/2021   • Polycythemia 03/11/2021   • History of prostate cancer 03/11/2021   • 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 08/26/2020   • Vasomotor rhinitis 06/11/2020   • Post-nasal drip 06/11/2020   • Radiculopathy of leg 03/10/2020   • Pain in both lower extremities 03/09/2020   • Back pain 03/07/2020   • Swelling of right thumb 12/27/2019   • Right elbow pain 12/27/2019   • Idiopathic peripheral neuropathy 10/22/2019   • Localized primary osteoarthritis of wrist 06/03/2019   • Pain and swelling of right knee 06/03/2019   • Current tear of medial cartilage or meniscus of knee 06/03/2019   • Interstitial lung disease ([address]) 03/26/2019   • Need for shingles vaccine 09/06/2018   • Prostate cancer ([address]) 11/01/2017   • Benign prostatic hyperplasia (BPH) with straining on urination 09/13/2017   • Benign essential hypertension 05/31/2017   • Gastroesophageal reflux disease without esophagitis 05/31/2017   • Seasonal allergic rhinitis due to pollen 05/31/2017   • Truncal obesity 01/13/2017   • 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     He  has a past surgical history that includes Knee cartilage surgery; Hemorrhoid surgery; Hernia repair (Left, 01/17/2008); Bladder surgery; Stomach surgery; Tonsillectomy and adenoidectomy; Nasal septum surgery; Total shoulder arthroplasty; Knee arthroscopy (Left, 1974); Cholecystectomy (2000); Vasectomy; Prostate biopsy (2017); pr exc prtd dena/prtd glnd lat dsj&prsrv facial nr (Right, 06/09/2021); FLAP LOCAL TRUNK (Left, 10/28/2021); US guided thyroid biopsy (01/19/2022); US guided thyroid biopsy (05/26/2022); Colonoscopy; Upper gastrointestinal endoscopy; and Skin tag removal (03/2023). His family history includes Heart attack in his son; Hypertension in his family, father, and mother; Stomach cancer in his father; Stroke in his family and mother. He  reports that he quit smoking about 38 years ago. His smoking use included cigarettes. He started smoking about 68 years ago. He has a 7.50 pack-year smoking history. He has never used smokeless tobacco. He reports that he does not currently use alcohol. He reports that he does not use drugs.   Current Outpatient Medications   Medication Sig Dispense Refill   • allopurinol (ZYLOPRIM) 100 mg tablet Take 1 tablet (100 mg total) by mouth 2 (two) times a day 180 tablet 1   • cefdinir (OMNICEF) 300 mg capsule Take 1 capsule (300 mg total) by mouth every 12 (twelve) hours for 10 days 20 capsule 0   • Cholecalciferol (VITAMIN D3 PO) Take by mouth 2000 IU     • diltiazem (CARDIZEM CD) 180 mg 24 hr capsule Take 1 capsule (180 mg total) by mouth daily 90 capsule 1   • fluticasone (FLONASE) 50 mcg/act nasal spray 1 spray into each nostril daily 16 g 0   • gabapentin (Neurontin) 400 mg capsule Take 1 capsule (400 mg total) by mouth 2 (two) times a day 90 capsule 1   • levocetirizine (XYZAL) 5 MG tablet Take 1 tablet (5 mg total) by mouth every evening 90 tablet 4   • losartan (COZAAR) 100 MG tablet Take 1 tablet (100 mg total) by mouth daily 90 tablet 1   • potassium chloride (Klor-Con M20) 20 mEq tablet TAKE 1 TABLET (20 MEQ TOTAL) BY MOUTH DAILY AS NEEDED (WHEN YOU TAKE LASIX.)     • predniSONE 10 mg tablet Take 10 mg by mouth 2 (two) times a day as needed     • quiNINE (QUALAQUIN) 324 mg capsule Take 2 capsules (648 mg total) by mouth every 8 (eight) hours 30 capsule 1   • tamsulosin (FLOMAX) 0.4 mg Take 1 capsule (0.4 mg total) by mouth daily with dinner 90 capsule 1   • traMADol-acetaminophen (ULTRACET) 37.5-325 mg per tablet Take 2 tablets by mouth every 6 (six) hours as needed for moderate pain 60 tablet 0   • aspirin (ECOTRIN LOW STRENGTH) 81 mg EC tablet Take 81 mg by mouth daily (Patient not taking: Reported on 9/18/2023)     • ergocalciferol (ERGOCALCIFEROL) 1.25 MG (90592 UT) capsule TAKE 1 CAPSULE BY MOUTH ONE TIME PER WEEK (Patient not taking: Reported on 11/6/2023)       No current facility-administered medications for this visit.      Current Outpatient Medications on File Prior to Visit   Medication Sig   • allopurinol (ZYLOPRIM) 100 mg tablet Take 1 tablet (100 mg total) by mouth 2 (two) times a day   • Cholecalciferol (VITAMIN D3 PO) Take by mouth 2000 IU   • diltiazem (CARDIZEM CD) 180 mg 24 hr capsule Take 1 capsule (180 mg total) by mouth daily   • gabapentin (Neurontin) 400 mg capsule Take 1 capsule (400 mg total) by mouth 2 (two) times a day   • levocetirizine (XYZAL) 5 MG tablet Take 1 tablet (5 mg total) by mouth every evening   • losartan (COZAAR) 100 MG tablet Take 1 tablet (100 mg total) by mouth daily   • potassium chloride (Klor-Con M20) 20 mEq tablet TAKE 1 TABLET (20 MEQ TOTAL) BY MOUTH DAILY AS NEEDED (WHEN YOU TAKE LASIX.)   • predniSONE 10 mg tablet Take 10 mg by mouth 2 (two) times a day as needed   • quiNINE (QUALAQUIN) 324 mg capsule Take 2 capsules (648 mg total) by mouth every 8 (eight) hours   • tamsulosin (FLOMAX) 0.4 mg Take 1 capsule (0.4 mg total) by mouth daily with dinner   • traMADol-acetaminophen (ULTRACET) 37.5-325 mg per tablet Take 2 tablets by mouth every 6 (six) hours as needed for moderate pain   • aspirin (ECOTRIN LOW STRENGTH) 81 mg EC tablet Take 81 mg by mouth daily (Patient not taking: Reported on 9/18/2023)   • ergocalciferol (ERGOCALCIFEROL) 1.25 MG (23008 UT) capsule TAKE 1 CAPSULE BY MOUTH ONE TIME PER WEEK (Patient not taking: Reported on 11/6/2023)   • [DISCONTINUED] polyethylene glycol (GOLYTELY) 4000 mL solution Take 4,000 mL by mouth once for 1 dose (Patient not taking: Reported on 8/29/2022)     No current facility-administered medications on file prior to visit. He is allergic to other and ace inhibitors. .    Review of Systems   Constitutional:  Negative for activity change, chills, fatigue, fever and unexpected weight change. HENT:  Positive for postnasal drip, rhinorrhea and sneezing. Negative for ear pain, sinus pressure and sore throat. Eyes:  Negative for pain. Respiratory:  Positive for cough (dry cough mostly but occasionally productive of green phlegm) and chest tightness. Negative for choking, shortness of breath and wheezing. Cardiovascular:  Negative for chest pain, palpitations and leg swelling. Gastrointestinal:  Negative for abdominal pain, constipation, diarrhea, nausea and vomiting. Genitourinary:  Negative for dysuria and hematuria.    Musculoskeletal:  Negative for arthralgias, back pain, gait problem, joint swelling, myalgias and neck stiffness. Skin:  Negative for pallor and rash. Neurological:  Negative for dizziness, tremors, seizures, syncope, light-headedness and headaches. Hematological:  Negative for adenopathy. Psychiatric/Behavioral:  Negative for behavioral problems. Objective:      /68 (BP Location: Left arm, Patient Position: Sitting, Cuff Size: Standard)   Pulse 60   Temp 98.3 °F (36.8 °C) (Temporal)   Ht 5' 7" (1.702 m)   Wt 104 kg (229 lb)   SpO2 95%   BMI 35.87 kg/m²          Physical Exam  Constitutional:       General: He is not in acute distress. Appearance: He is well-developed. He is not diaphoretic. HENT:      Head: Normocephalic and atraumatic. Right Ear: External ear normal.      Left Ear: External ear normal.      Nose: Mucosal edema and congestion present. Right Sinus: Maxillary sinus tenderness present. Mouth/Throat:      Mouth: Mucous membranes are dry. Pharynx: Posterior oropharyngeal erythema present. No oropharyngeal exudate. Eyes:      General: No scleral icterus. Right eye: No discharge. Left eye: No discharge. Conjunctiva/sclera: Conjunctivae normal.      Pupils: Pupils are equal, round, and reactive to light. Neck:      Thyroid: No thyromegaly. Vascular: No JVD. Trachea: No tracheal deviation. Cardiovascular:      Rate and Rhythm: Normal rate and regular rhythm. Heart sounds: Normal heart sounds. No murmur heard. No friction rub. No gallop. Pulmonary:      Effort: Pulmonary effort is normal. No respiratory distress. Breath sounds: Examination of the right-lower field reveals rales. Examination of the left-lower field reveals rales. Rales present. No wheezing. Chest:      Chest wall: No tenderness. Abdominal:      General: Bowel sounds are normal. There is no distension. Palpations: Abdomen is soft. There is no mass. Tenderness: There is no abdominal tenderness. There is no guarding or rebound. Musculoskeletal:         General: No tenderness or deformity. Normal range of motion. Cervical back: Normal range of motion and neck supple. Lymphadenopathy:      Cervical: No cervical adenopathy. Skin:     General: Skin is warm and dry. Coloration: Skin is not pale. Findings: No erythema or rash. Neurological:      Mental Status: He is alert and oriented to person, place, and time. Cranial Nerves: No cranial nerve deficit. Motor: No abnormal muscle tone. Coordination: Coordination normal.      Deep Tendon Reflexes: Reflexes are normal and symmetric.    Psychiatric:         Behavior: Behavior normal.           Admission on 09/17/2023, Discharged on 09/17/2023   Component Date Value Ref Range Status   • WBC 09/17/2023 5.66  4.31 - 10.16 Thousand/uL Final   • RBC 09/17/2023 5.25  3.88 - 5.62 Million/uL Final   • Hemoglobin 09/17/2023 15.6  12.0 - 17.0 g/dL Final   • Hematocrit 09/17/2023 47.9  36.5 - 49.3 % Final   • MCV 09/17/2023 91  82 - 98 fL Final   • MCH 09/17/2023 29.7  26.8 - 34.3 pg Final   • MCHC 09/17/2023 32.6  31.4 - 37.4 g/dL Final   • RDW 09/17/2023 15.0  11.6 - 15.1 % Final   • MPV 09/17/2023 10.7  8.9 - 12.7 fL Final   • Platelets 73/11/2325 175  149 - 390 Thousands/uL Final   • nRBC 09/17/2023 0  /100 WBCs Final   • Neutrophils Relative 09/17/2023 71  43 - 75 % Final   • Immat GRANS % 09/17/2023 2  0 - 2 % Final   • Lymphocytes Relative 09/17/2023 19  14 - 44 % Final   • Monocytes Relative 09/17/2023 7  4 - 12 % Final   • Eosinophils Relative 09/17/2023 0  0 - 6 % Final   • Basophils Relative 09/17/2023 1  0 - 1 % Final   • Neutrophils Absolute 09/17/2023 4.05  1.85 - 7.62 Thousands/µL Final   • Immature Grans Absolute 09/17/2023 0.11  0.00 - 0.20 Thousand/uL Final   • Lymphocytes Absolute 09/17/2023 1.05  0.60 - 4.47 Thousands/µL Final   • Monocytes Absolute 09/17/2023 0.40  0.17 - 1.22 Thousand/µL Final   • Eosinophils Absolute 09/17/2023 0.01  0.00 - 0.61 Thousand/µL Final   • Basophils Absolute 09/17/2023 0.04  0.00 - 0.10 Thousands/µL Final   • Sodium 09/17/2023 139  135 - 147 mmol/L Final   • Potassium 09/17/2023 4.5  3.5 - 5.3 mmol/L Final   • Chloride 09/17/2023 105  96 - 108 mmol/L Final   • CO2 09/17/2023 29  21 - 32 mmol/L Final   • ANION GAP 09/17/2023 5  mmol/L Final   • BUN 09/17/2023 19  5 - 25 mg/dL Final   • Creatinine 09/17/2023 1.07  0.60 - 1.30 mg/dL Final    Standardized to IDMS reference method   • Glucose 09/17/2023 107  65 - 140 mg/dL Final    If the patient is fasting, the ADA then defines impaired fasting glucose as > 100 mg/dL and diabetes as > or equal to 123 mg/dL. • Calcium 09/17/2023 9.1  8.4 - 10.2 mg/dL Final   • AST 09/17/2023 15  13 - 39 U/L Final   • ALT 09/17/2023 10  7 - 52 U/L Final    Specimen collection should occur prior to Sulfasalazine administration due to the potential for falsely depressed results. • Alkaline Phosphatase 09/17/2023 70  34 - 104 U/L Final   • Total Protein 09/17/2023 6.4  6.4 - 8.4 g/dL Final   • Albumin 09/17/2023 4.1  3.5 - 5.0 g/dL Final   • Total Bilirubin 09/17/2023 1.06 (H)  0.20 - 1.00 mg/dL Final    Use of this assay is not recommended for patients undergoing treatment with eltrombopag due to the potential for falsely elevated results. N-acetyl-p-benzoquinone imine (metabolite of Acetaminophen) will generate erroneously low results in samples for patients that have taken an overdose of Acetaminophen.    • eGFR 09/17/2023 64  ml/min/1.73sq m Final   • Protime 09/17/2023 13.5  11.6 - 14.5 seconds Final   • INR 09/17/2023 0.97  0.84 - 1.19 Final   • PTT 09/17/2023 27  23 - 37 seconds Final    Therapeutic Heparin Range =  60-90 seconds   • Sed Rate 09/17/2023 11  0 - 19 mm/hour Final   • CRP 09/17/2023 23.0 (H)  <3.0 mg/L Final   • Lyme Total Antibodies 09/17/2023 Negative  Negative Final   • Blood Culture 09/17/2023 No Growth After 5 Days. Final   • Blood Culture 09/17/2023 No Growth After 5 Days. Final   • Body Fluid Culture, Sterile 09/17/2023 No growth   Final   • Gram Stain Result 09/17/2023 1+ Polys   Final   • Gram Stain Result 09/17/2023 No bacteria seen   Final   • Site 09/17/2023 Right knee   Final   • Color, Fluid 09/17/2023 Yellow  Clear, Colorless,Yellow Final   • Clarity, Fluid 09/17/2023 Slightly Cloudy  Clear Final   • WBC, Fluid 09/17/2023 9,842 (H)  0 - 200 /ul Final    The reference range and other method performance specifications have not been established for this body fluid. The test result must be integrated into the clinical context for interpretation. • Crystals, Synovial Fluid 09/17/2023 No Crystals Seen  No Crystals Seen Final   • Gram Stain Result 09/17/2023 4+ Polys   Final   • Gram Stain Result 09/17/2023 Rare Mononuclear Cells   Final   • Gram Stain Result 09/17/2023 No organisms seen   Final   • Total Counted 09/17/2023 100   Final   • Neutrophils % (Fluid) 09/17/2023 88  % Final   • Lymphs % (Fluid) 09/17/2023 1  % Final   • Histiocyte % (Fluid) 09/17/2023 11  % Final   Lab on 08/18/2023   Component Date Value Ref Range Status   • PSA, Diagnostic 08/18/2023 0.93  0.00 - 4.00 ng/mL Final    [employer] chemiluminescent immunoassay. Confirm baseline values for patients being serially monitored. • Sodium 08/18/2023 141  135 - 147 mmol/L Final   • Potassium 08/18/2023 4.5  3.5 - 5.3 mmol/L Final   • Chloride 08/18/2023 108  96 - 108 mmol/L Final   • CO2 08/18/2023 30  21 - 32 mmol/L Final   • ANION GAP 08/18/2023 3  mmol/L Final   • BUN 08/18/2023 27 (H)  5 - 25 mg/dL Final   • Creatinine 08/18/2023 1.24  0.60 - 1.30 mg/dL Final    Standardized to IDMS reference method   • Glucose, Fasting 08/18/2023 92  65 - 99 mg/dL Final    Specimen collection should occur prior to Sulfasalazine administration due to the potential for falsely depressed results.  Specimen collection should occur prior to Sulfapyridine administration due to the potential for falsely elevated results. • Calcium 08/18/2023 9.3  8.3 - 10.1 mg/dL Final   • eGFR 08/18/2023 53  ml/min/1.73sq m Final   • Uric Acid 08/18/2023 2.9 (L)  3.5 - 8.5 mg/dL Final    Specimen collection should occur prior to Metamizole administration due to the potential for falsely depressed results.    • WBC 08/18/2023 5.40  4.31 - 10.16 Thousand/uL Final   • RBC 08/18/2023 5.11  3.88 - 5.62 Million/uL Final   • Hemoglobin 08/18/2023 15.4  12.0 - 17.0 g/dL Final   • Hematocrit 08/18/2023 46.8  36.5 - 49.3 % Final   • MCV 08/18/2023 92  82 - 98 fL Final   • MCH 08/18/2023 30.1  26.8 - 34.3 pg Final   • MCHC 08/18/2023 32.9  31.4 - 37.4 g/dL Final   • RDW 08/18/2023 15.1  11.6 - 15.1 % Final   • MPV 08/18/2023 11.5  8.9 - 12.7 fL Final   • Platelets 20/66/1354 160  149 - 390 Thousands/uL Final   • Segmented % 08/18/2023 69  43 - 75 % Final   • Bands % 08/18/2023 1  0 - 8 % Final   • Lymphocytes % 08/18/2023 21  14 - 44 % Final   • Monocytes % 08/18/2023 4  4 - 12 % Final   • Eosinophils, % 08/18/2023 0  0 - 6 % Final   • Basophils % 08/18/2023 1  0 - 1 % Final   • Myelocytes % 08/18/2023 3 (H)  0 - 1 % Final   • Atypical Lymphocytes % 08/18/2023 1 (H)  <=0 % Final   • Absolute Neutrophils 08/18/2023 3.78  1.85 - 7.62 Thousand/uL Final   • Lymphocytes Absolute 08/18/2023 1.19  0.60 - 4.47 Thousand/uL Final   • Monocytes Absolute 08/18/2023 0.22  0.00 - 1.22 Thousand/uL Final   • Eosinophils Absolute 08/18/2023 0.00  0.00 - 0.40 Thousand/uL Final   • Basophils Absolute 08/18/2023 0.05  0.00 - 0.10 Thousand/uL Final   • nRBC 08/18/2023 1  0 - 2 /100 WBC Final   • RBC Morphology 08/18/2023 Normal   Final   • Platelet Estimate 46/90/5777 Adequate  Adequate Final   Appointment on 03/06/2023   Component Date Value Ref Range Status   • PSA, Diagnostic 03/06/2023 0.9  0.0 - 4.0 ng/mL Final    American Urological Association Guidelines define biochemical recurrence of prostate cancer as a detectable or rising PSA value post-radical prostatectomy that is greater than or equal to 0.2 ng/mL with a second confirmatory level of greater than or equal to 0.2 ng/mL. • Sodium 03/06/2023 139  135 - 147 mmol/L Final   • Potassium 03/06/2023 4.3  3.5 - 5.3 mmol/L Final   • Chloride 03/06/2023 106  96 - 108 mmol/L Final   • CO2 03/06/2023 31  21 - 32 mmol/L Final   • ANION GAP 03/06/2023 2 (L)  4 - 13 mmol/L Final   • BUN 03/06/2023 23  5 - 25 mg/dL Final   • Creatinine 03/06/2023 1.28  0.60 - 1.30 mg/dL Final    Standardized to IDMS reference method   • Glucose 03/06/2023 96  65 - 140 mg/dL Final    If the patient is fasting, the ADA then defines impaired fasting glucose as > 100 mg/dL and diabetes as > or equal to 123 mg/dL. Specimen collection should occur prior to Sulfasalazine administration due to the potential for falsely depressed results. Specimen collection should occur prior to Sulfapyridine administration due to the potential for falsely elevated results. • Calcium 03/06/2023 9.7  8.3 - 10.1 mg/dL Final   • AST 03/06/2023 14  5 - 45 U/L Final    Specimen collection should occur prior to Sulfasalazine administration due to the potential for falsely depressed results. • ALT 03/06/2023 18  12 - 78 U/L Final    Specimen collection should occur prior to Sulfasalazine and/or Sulfapyridine administration due to the potential for falsely depressed results. • Alkaline Phosphatase 03/06/2023 84  46 - 116 U/L Final   • Total Protein 03/06/2023 6.5  6.4 - 8.4 g/dL Final   • Albumin 03/06/2023 4.0  3.5 - 5.0 g/dL Final   • Total Bilirubin 03/06/2023 1.09 (H)  0.20 - 1.00 mg/dL Final    Use of this assay is not recommended for patients undergoing treatment with eltrombopag due to the potential for falsely elevated results.    • eGFR 03/06/2023 52  ml/min/1.73sq m Final   • WBC 03/06/2023 7.08  4.31 - 10.16 Thousand/uL Final   • RBC 03/06/2023 5.24  3.88 - 5.62 Million/uL Final   • Hemoglobin 03/06/2023 15.3  12.0 - 17.0 g/dL Final   • Hematocrit 03/06/2023 47.3  36.5 - 49.3 % Final   • MCV 03/06/2023 90  82 - 98 fL Final   • MCH 03/06/2023 29.2  26.8 - 34.3 pg Final   • MCHC 03/06/2023 32.3  31.4 - 37.4 g/dL Final   • RDW 03/06/2023 14.4  11.6 - 15.1 % Final   • MPV 03/06/2023 11.6  8.9 - 12.7 fL Final   • Platelets 16/88/8569 183  149 - 390 Thousands/uL Final   • nRBC 03/06/2023 0  /100 WBCs Final   • Neutrophils Relative 03/06/2023 71  43 - 75 % Final   • Immat GRANS % 03/06/2023 2  0 - 2 % Final   • Lymphocytes Relative 03/06/2023 20  14 - 44 % Final   • Monocytes Relative 03/06/2023 6  4 - 12 % Final   • Eosinophils Relative 03/06/2023 0  0 - 6 % Final   • Basophils Relative 03/06/2023 1  0 - 1 % Final   • Neutrophils Absolute 03/06/2023 5.04  1.85 - 7.62 Thousands/µL Final   • Immature Grans Absolute 03/06/2023 0.14  0.00 - 0.20 Thousand/uL Final   • Lymphocytes Absolute 03/06/2023 1.40  0.60 - 4.47 Thousands/µL Final   • Monocytes Absolute 03/06/2023 0.43  0.17 - 1.22 Thousand/µL Final   • Eosinophils Absolute 03/06/2023 0.02  0.00 - 0.61 Thousand/µL Final   • Basophils Absolute 03/06/2023 0.05  0.00 - 0.10 Thousands/µL Final   Lab Requisition on 02/28/2023   Component Date Value Ref Range Status   • Case Report 02/28/2023    Final                    Value:Surgical Pathology Report                         Case: [id number]                                   Authorizing Provider:  Florencia Godoy MD              Collected:           02/28/2023                   Ordering Location:     [facility]      Received:            02/28/2023 [address] [facility]                                                                                  Laboratory                                                                   Pathologist:           Lexx Mccallum MD                                                             Specimen:    Skin, Other, Right upper lid • Final Diagnosis 02/28/2023    Final                    Value: This result contains rich text formatting which cannot be displayed here. • Additional Information 02/28/2023    Final                    Value: This result contains rich text formatting which cannot be displayed here. • Gross Description 02/28/2023    Final                    Value: This result contains rich text formatting which cannot be displayed here.    Office Visit on 01/18/2023   Component Date Value Ref Range Status   • Color, UA 03/06/2023 Light Yellow   Final   • Clarity, UA 03/06/2023 Clear   Final   • Specific Gravity, UA 03/06/2023 1.014  1.003 - 1.030 Final   • pH, UA 03/06/2023 6.0  4.5, 5.0, 5.5, 6.0, 6.5, 7.0, 7.5, 8.0 Final   • Leukocytes, UA 03/06/2023 Trace (A)  Negative Final   • Nitrite, UA 03/06/2023 Negative  Negative Final   • Protein, UA 03/06/2023 Negative  Negative mg/dl Final   • Glucose, UA 03/06/2023 Negative  Negative mg/dl Final   • Ketones, UA 03/06/2023 Negative  Negative mg/dl Final   • Urobilinogen, UA 03/06/2023 <2.0  <2.0 mg/dl mg/dl Final   • Bilirubin, UA 03/06/2023 Negative  Negative Final   • Occult Blood, UA 03/06/2023 Negative  Negative Final   • RBC, UA 03/06/2023 2-4 (A)  None Seen, 1-2 /hpf Final   • WBC, UA 03/06/2023 1-2  None Seen, 1-2 /hpf Final   • Epithelial Cells 03/06/2023 None Seen  None Seen, Occasional /hpf Final   • Bacteria, UA 03/06/2023 None Seen  None Seen, Occasional /hpf Final   • Hyaline Casts, UA 03/06/2023 0-3 (A)  None Seen /lpf Final   Appointment on 01/17/2023   Component Date Value Ref Range Status   • WBC 01/17/2023 4.76  4.31 - 10.16 Thousand/uL Final   • RBC 01/17/2023 5.31  3.88 - 5.62 Million/uL Final   • Hemoglobin 01/17/2023 15.6  12.0 - 17.0 g/dL Final   • Hematocrit 01/17/2023 47.3  36.5 - 49.3 % Final   • MCV 01/17/2023 89  82 - 98 fL Final   • MCH 01/17/2023 29.4  26.8 - 34.3 pg Final   • MCHC 01/17/2023 33.0 31.4 - 37.4 g/dL Final   • RDW 01/17/2023 14.0  11.6 - 15.1 % Final   • MPV 01/17/2023 10.8  8.9 - 12.7 fL Final   • Platelets 12/30/5995 193  149 - 390 Thousands/uL Final   • nRBC 01/17/2023 0  /100 WBCs Final   • Neutrophils Relative 01/17/2023 56  43 - 75 % Final   • Immat GRANS % 01/17/2023 1  0 - 2 % Final   • Lymphocytes Relative 01/17/2023 33  14 - 44 % Final   • Monocytes Relative 01/17/2023 7  4 - 12 % Final   • Eosinophils Relative 01/17/2023 2  0 - 6 % Final   • Basophils Relative 01/17/2023 1  0 - 1 % Final   • Neutrophils Absolute 01/17/2023 2.69  1.85 - 7.62 Thousands/µL Final   • Immature Grans Absolute 01/17/2023 0.05  0.00 - 0.20 Thousand/uL Final   • Lymphocytes Absolute 01/17/2023 1.59  0.60 - 4.47 Thousands/µL Final   • Monocytes Absolute 01/17/2023 0.32  0.17 - 1.22 Thousand/µL Final   • Eosinophils Absolute 01/17/2023 0.07  0.00 - 0.61 Thousand/µL Final   • Basophils Absolute 01/17/2023 0.04  0.00 - 0.10 Thousands/µL Final   • Sodium 01/17/2023 140  135 - 147 mmol/L Final   • Potassium 01/17/2023 4.5  3.5 - 5.3 mmol/L Final   • Chloride 01/17/2023 107  96 - 108 mmol/L Final   • CO2 01/17/2023 29  21 - 32 mmol/L Final   • ANION GAP 01/17/2023 4  4 - 13 mmol/L Final   • BUN 01/17/2023 25  5 - 25 mg/dL Final   • Creatinine 01/17/2023 1.16  0.60 - 1.30 mg/dL Final    Standardized to IDMS reference method   • Glucose, Fasting 01/17/2023 95  65 - 99 mg/dL Final    Specimen collection should occur prior to Sulfasalazine administration due to the potential for falsely depressed results. Specimen collection should occur prior to Sulfapyridine administration due to the potential for falsely elevated results. • Calcium 01/17/2023 9.1  8.3 - 10.1 mg/dL Final   • AST 01/17/2023 15  5 - 45 U/L Final    Specimen collection should occur prior to Sulfasalazine administration due to the potential for falsely depressed results.     • ALT 01/17/2023 18  12 - 78 U/L Final    Specimen collection should occur prior to Sulfasalazine and/or Sulfapyridine administration due to the potential for falsely depressed results. • Alkaline Phosphatase 01/17/2023 92  46 - 116 U/L Final   • Total Protein 01/17/2023 6.5  6.4 - 8.4 g/dL Final   • Albumin 01/17/2023 3.8  3.5 - 5.0 g/dL Final   • Total Bilirubin 01/17/2023 0.66  0.20 - 1.00 mg/dL Final    Use of this assay is not recommended for patients undergoing treatment with eltrombopag due to the potential for falsely elevated results. • eGFR 01/17/2023 58  ml/min/1.73sq m Final   • Cholesterol 01/17/2023 148  See Comment mg/dL Final    Cholesterol:         Pediatric <18 Years        Desirable          <170 mg/dL      Borderline High    170-199 mg/dL      High               >=200 mg/dL        Adult >=18 Years            Desirable         <200 mg/dL      Borderline High   200-239 mg/dL      High              >239 mg/dL     • Triglycerides 01/17/2023 64  See Comment mg/dL Final    Triglyceride:     0-9Y            <75mg/dL     10Y-17Y         <90 mg/dL       >=18Y     Normal          <150 mg/dL     Borderline High 150-199 mg/dL     High            200-499 mg/dL        Very High       >499 mg/dL    Specimen collection should occur prior to N-Acetylcysteine or Metamizole administration due to the potential for falsely depressed results. • HDL, Direct 01/17/2023 57  >=40 mg/dL Final    Specimen collection should occur prior to Metamizole administration due to the potential for falsley depressed results. • LDL Calculated 01/17/2023 78  0 - 100 mg/dL Final    LDL Cholesterol:     Optimal           <100 mg/dl     Near Optimal      100-129 mg/dl     Above Optimal       Borderline High 130-159 mg/dl       High            160-189 mg/dl       Very High       >189 mg/dl         This screening LDL is a calculated result. It does not have the accuracy of the Direct Measured LDL in the monitoring of patients with hyperlipidemia and/or statin therapy.    Direct Measure LDL ([id number]) must be ordered separately in these patients. • Uric Acid 01/17/2023 3.6  3.5 - 8.5 mg/dL Final    Specimen collection should occur prior to Metamizole administration due to the potential for falsely depressed results. • TSH 3RD GENERATON 01/17/2023 1.090  0.450 - 4.500 uIU/mL Final    Adult TSH (3rd generation) reference range follows the recommended guidelines of the [employer], January, 2020.

## 2023-11-11 DIAGNOSIS — I10 PRIMARY HYPERTENSION: ICD-10-CM

## 2023-11-11 DIAGNOSIS — I10 BENIGN ESSENTIAL HYPERTENSION: Chronic | ICD-10-CM

## 2023-11-13 RX ORDER — LOSARTAN POTASSIUM 100 MG/1
100 TABLET ORAL DAILY
Qty: 90 TABLET | Refills: 1 | OUTPATIENT
Start: 2023-11-13

## 2023-11-14 RX ORDER — DILTIAZEM HYDROCHLORIDE 180 MG/1
180 CAPSULE, COATED, EXTENDED RELEASE ORAL DAILY
Qty: 90 CAPSULE | Refills: 1 | Status: SHIPPED | OUTPATIENT
Start: 2023-11-14

## 2023-11-14 RX ORDER — LOSARTAN POTASSIUM 100 MG/1
100 TABLET ORAL DAILY
Qty: 90 TABLET | Refills: 1 | Status: SHIPPED | OUTPATIENT
Start: 2023-11-14

## 2023-12-12 ENCOUNTER — OFFICE VISIT (OUTPATIENT)
Dept: PULMONOLOGY | Facility: CLINIC | Age: 83
End: 2023-12-12
Payer: COMMERCIAL

## 2023-12-12 VITALS
TEMPERATURE: 99.6 F | DIASTOLIC BLOOD PRESSURE: 76 MMHG | SYSTOLIC BLOOD PRESSURE: 142 MMHG | HEART RATE: 69 BPM | OXYGEN SATURATION: 93 %

## 2023-12-12 DIAGNOSIS — G60.9 IDIOPATHIC PERIPHERAL NEUROPATHY: ICD-10-CM

## 2023-12-12 DIAGNOSIS — J84.9 INTERSTITIAL LUNG DISEASE (HCC): Primary | ICD-10-CM

## 2023-12-12 PROCEDURE — 99213 OFFICE O/P EST LOW 20 MIN: CPT | Performed by: INTERNAL MEDICINE

## 2023-12-12 RX ORDER — GABAPENTIN 400 MG/1
400 CAPSULE ORAL 2 TIMES DAILY
Qty: 90 CAPSULE | Refills: 1 | Status: SHIPPED | OUTPATIENT
Start: 2023-12-12

## 2023-12-12 NOTE — ASSESSMENT & PLAN NOTE
Symptoms are controlled, no new shortness of breath, cough, or wheezing  Not on any inhalers  Not on steroids or other pulmonary agents  It has been 18 months since his last CAT scan but due to lack of symptoms, he wishes to continue to defer further imaging  Will consider repeat CAT scan at the 24-month interval with possibly office-based spirometry to reevaluate  Given long-term stability of symptoms, doubt IPF or progressive ILD

## 2023-12-12 NOTE — PROGRESS NOTES
Progress note - Pulmonary Medicine   Omer Alfaro. 81 y.o. male MRN: [id number]       Impression & Plan:     Interstitial lung disease ([address])  Symptoms are controlled, no new shortness of breath, cough, or wheezing  Not on any inhalers  Not on steroids or other pulmonary agents  It has been 18 months since his last CAT scan but due to lack of symptoms, he wishes to continue to defer further imaging  Will consider repeat CAT scan at the 24-month interval with possibly office-based spirometry to reevaluate  Given long-term stability of symptoms, doubt IPF or progressive ILD    Return in about 6 months (around 6/12/2024). ______________________________________________________________________    HPI:    Omer Alfaro. presents today for follow-up of interstitial lung disease with mild peripheral and basilar scarring. He has not had any progressive symptoms. No increased cough, no worsening shortness of breath, no chest pain or pleurisy. He does have a bit of a chronic cough which he is seeing ENT for since he does have nasal drip. There is concern about possible GERD versus chronic rhinitis. Sinus evaluation has apparently been fairly clear. He has no chest pains. No lightheadedness or dizziness. No leg swelling or signs or symptoms of congestive heart failure. No significant weight or appetite change. He does have occasional gout flares. Otherwise is doing well from a medical standpoint.       Current Medications:    Current Outpatient Medications:     allopurinol (ZYLOPRIM) 100 mg tablet, Take 1 tablet (100 mg total) by mouth 2 (two) times a day, Disp: 180 tablet, Rfl: 1    aspirin (ECOTRIN LOW STRENGTH) 81 mg EC tablet, Take 81 mg by mouth daily, Disp: , Rfl:     Cholecalciferol (VITAMIN D3 PO), Take by mouth 2000 IU, Disp: , Rfl:     diltiazem (CARDIZEM CD) 180 mg 24 hr capsule, Take 1 capsule (180 mg total) by mouth daily, Disp: 90 capsule, Rfl: 1    gabapentin (Neurontin) 400 mg capsule, Take 1 capsule (400 mg total) by mouth 2 (two) times a day, Disp: 90 capsule, Rfl: 1    levocetirizine (XYZAL) 5 MG tablet, Take 1 tablet (5 mg total) by mouth every evening, Disp: 90 tablet, Rfl: 4    losartan (COZAAR) 100 MG tablet, Take 1 tablet (100 mg total) by mouth daily, Disp: 90 tablet, Rfl: 1    potassium chloride (Klor-Con M20) 20 mEq tablet, TAKE 1 TABLET (20 MEQ TOTAL) BY MOUTH DAILY AS NEEDED (WHEN YOU TAKE LASIX.), Disp: , Rfl:     quiNINE (QUALAQUIN) 324 mg capsule, Take 2 capsules (648 mg total) by mouth every 8 (eight) hours, Disp: 30 capsule, Rfl: 1    quiNINE (QUALAQUIN) 324 mg capsule, Take 648 mg by mouth in the morning, Disp: , Rfl:     tamsulosin (FLOMAX) 0.4 mg, Take 1 capsule (0.4 mg total) by mouth daily with dinner, Disp: 90 capsule, Rfl: 1    traMADol-acetaminophen (ULTRACET) 37.5-325 mg per tablet, Take 2 tablets by mouth every 6 (six) hours as needed for moderate pain, Disp: 60 tablet, Rfl: 0    amoxicillin (AMOXIL) 500 mg capsule, Take 500 mg by mouth every 8 (eight) hours, Disp: , Rfl:     Review of Systems:    Aside from what is mentioned in the HPI, the review of systems is otherwise negative    Past medical history, surgical history, and family history were reviewed and updated as appropriate    Social history updates:  Social History     Tobacco Use   Smoking Status Former    Packs/day: 0.25    Years: 30.00    Total pack years: 7.50    Types: Cigarettes    Start date:     Quit date:     Years since quittin.9   Smokeless Tobacco Never       PhysicalExamination:  Vitals:   /76 (BP Location: Left arm, Patient Position: Sitting, Cuff Size: Standard)   Pulse 69   Temp 99.6 °F (37.6 °C) (Tympanic)   SpO2 93%     Gen:  Comfortable on room air. No conversational dyspnea  HEENT:  Conjugate gaze. sclerae anicteric. Oropharynx moist  Neck: Trachea is midline. No JVD. No adenopathy  Chest: Symmetric chest wall excursion, minor squeak at the right base.   No fibrotic rales or wheezing  Cardiac: Regular.  no murmur  Abdomen:  benign  Extremities: No edema  Neuro:  Normal speech and mentation    Diagnostic Data:    Last spirometry 2019  Last CT imaging, high-resolution, June 2022    Joaquin Mcclain MD

## 2023-12-26 ENCOUNTER — OFFICE VISIT (OUTPATIENT)
Dept: INTERNAL MEDICINE CLINIC | Age: 83
End: 2023-12-26
Payer: COMMERCIAL

## 2023-12-26 VITALS
HEART RATE: 63 BPM | SYSTOLIC BLOOD PRESSURE: 124 MMHG | WEIGHT: 231.1 LBS | TEMPERATURE: 98.1 F | DIASTOLIC BLOOD PRESSURE: 72 MMHG | OXYGEN SATURATION: 94 % | BODY MASS INDEX: 36.2 KG/M2

## 2023-12-26 DIAGNOSIS — R73.01 IMPAIRED FASTING GLUCOSE: ICD-10-CM

## 2023-12-26 DIAGNOSIS — R71.8 LOW MEAN CORPUSCULAR VOLUME (MCV): ICD-10-CM

## 2023-12-26 DIAGNOSIS — Z23 ENCOUNTER FOR IMMUNIZATION: ICD-10-CM

## 2023-12-26 DIAGNOSIS — G60.9 IDIOPATHIC PERIPHERAL NEUROPATHY: Primary | ICD-10-CM

## 2023-12-26 DIAGNOSIS — R25.2 LEG CRAMPING: ICD-10-CM

## 2023-12-26 PROCEDURE — G0009 ADMIN PNEUMOCOCCAL VACCINE: HCPCS

## 2023-12-26 PROCEDURE — 90677 PCV20 VACCINE IM: CPT

## 2023-12-26 PROCEDURE — 99214 OFFICE O/P EST MOD 30 MIN: CPT

## 2023-12-26 RX ORDER — QUININE SULFATE 324 MG/1
648 CAPSULE ORAL
Qty: 30 CAPSULE | Refills: 4 | Status: SHIPPED | OUTPATIENT
Start: 2023-12-26 | End: 2024-06-23

## 2023-12-26 RX ORDER — QUININE SULFATE 324 MG/1
648 CAPSULE ORAL EVERY 8 HOURS SCHEDULED
Qty: 30 CAPSULE | Refills: 1 | Status: CANCELLED | OUTPATIENT
Start: 2023-12-26 | End: 2024-01-25

## 2023-12-26 NOTE — PROGRESS NOTES
[facility]  INTERNAL MEDICINE   [place], [place]  Clinic Visit Note  Moreno Razadragan Venegas 83 y.o. male   MRN: [id number]    Assessment and Plan    Diagnoses and all orders for this visit:    Idiopathic peripheral neuropathy  No hx of medications that would lead to neuropathy; currently on gabapentin without full relief. Will investigate for further possible causes, such as diabetes and B12 deficiency. Patient has no overt risk factors for B12 deficiency such as PPI or metformin use, but will check to rule out as a contributing factor  -     HEMOGLOBIN A1C W/ EAG ESTIMATION; Future - last checked 6 years ago, in the 5's  -     Vitamin B12; Future    Leg cramping  Possibly iron deficiency vs. Restless leg syndrome. Will rule out iron deficiency. No anemia, but MCV is on lower side at 91 this past year in labwork  Quinine continues to help pt's sx - will continue  -    REFILLED: quiNINE (QUALAQUIN) 324 mg capsule; Take 2 capsules (648 mg total) by mouth daily at bedtime  -   ORDERED  Iron Panel (Includes Ferritin, Iron Sat%, Iron, and TIBC); Future    Encounter for immunization  Patient at particularly increased risk b/c of ILD. Received both 13 and 23 variant vaccines, but >5 yrs ago so he qualifies for PCV20 today  -  Administered:   Pneumococcal Conjugate Vaccine 20-valent (Pcv20)    Impaired fasting glucose  Noted on previous AM fasted labs.  -  Check:   HEMOGLOBIN A1C W/ EAG ESTIMATION; Future    Low mean corpuscular volume (MCV)  -     Check: Iron Panel (Includes Ferritin, Iron Sat%, Iron, and TIBC); Future    Venous stasis dermatitis, varicose veins  Suspect the legs welling is contributing a significant amount to pt's difficulty w/ambulation due to pronounced leg swelling  No shortness of breath to prompt cardiac w/u at this time  Educated on use of compression stockings during daytime  Educated on moisturizing skin to prevent skin breakdown/poor wound healing  Patient agreeable to these  "interventions    Health Maintenance/Care gaps addressed today:  PHQ-2/9 Depression Screening    Little interest or pleasure in doing things: 0 - not at all  Feeling down, depressed, or hopeless: 0 - not at all  PHQ-2 Score: 0  PHQ-2 Interpretation: Negative depression screen      The ASCVD Risk score (Marija PAYAN, et al., 2019) failed to calculate for the following reasons:    The 2019 ASCVD risk score is only valid for ages 40 to 79  Lab Results   Component Value Date    HGBA1C 5.3 08/10/2017      Lab Results   Component Value Date    HEPCAB Non-reactive 04/14/2022      Most Recent Immunizations   Administered Date(s) Administered   • COVID-19 PFIZER VACCINE 0.3 ML IM 12/07/2021   • COVID-19 Pfizer vac (Alex-sucrose, gray cap) 12 yr+ IM 05/13/2022   • INFLUENZA 10/09/2023   • Influenza Split High Dose Preservative Free IM 10/25/2019   • Influenza, high dose seasonal 0.7 mL 10/17/2022   • Influenza, seasonal, injectable 10/08/2014   • Pneumococcal Conjugate 13-Valent 09/06/2018, 09/06/2018   • Pneumococcal Conjugate Vaccine 20-valent (Pcv20), Polysace 12/26/2023   • Pneumococcal Polysaccharide PPV23 11/01/2006   • Tuberculin Skin Test-PPD Intradermal 06/03/2015, 06/03/2015, 06/03/2015   • Zoster 10/07/2013   • Zoster Vaccine Recombinant 07/24/2020, 07/24/2020 09/22/2022 01/17/2023 No components found for: \"HIV1X2\"      Up to date on flu shot, zoster.   Has been >5 years for pneumonia vaccine; given PCV20 today.       Depression Screening and Follow-up Plan: Patient was screened for depression during today's encounter. They screened negative with a PHQ-2 score of 0.       Follow up in our clinic in March 2024 for medicare AWV.    Subjective   CHIEF COMPLAINT:   Chief Complaint   Patient presents with   • Follow-up     4 month follow up   •      DEPRESSION SCREENING, FALL RISK      HISTORY OF PRESENT ILLNESS:  Moreno Fournier Jr. is a 83 y.o. male with pmhx of Obesity, HTN, Leutscher syndrome (hyperaldo), CKD3a, " "medial meniscus tear, gout R wrist, ILD, prostate ca, adrenal gland disorder,   AWV in march 2024 presenting to clinic today for routine follow-up.    Patient reports problems with his feet. He feels like the bottom of his feet feel swollen and \"round\"/curved, which make it difficult to feel a good  on the floor while walking. He also complains of leg cramps that are ongoing, which has been somewhat controlled by AM gabapentin and qHS quinine. He also feels that his lower legs get swollen from time-to-time.    Prostate cancer has been non-aggressive, and pt has routine surveillance via PSA measurements. He feels no sx and states flomax helps him urinate regularly w/o needing to get up at night for extra bathroom visits.  Re: wrist gout, Allopurinol and reducing alcohol and red meat, seafood have helped prevent further flares.        Review of Systems   Constitutional:  Negative for chills, fatigue and fever.   Respiratory:  Negative for cough and shortness of breath.    Cardiovascular:  Negative for chest pain and palpitations.   Gastrointestinal:  Negative for abdominal pain, constipation, diarrhea, nausea and vomiting.   Musculoskeletal:         Bilateral leg pain distal to knees, cramping, and puffy-feeling feet   Skin:  Negative for rash.   Neurological:  Negative for weakness and numbness.     Objective     Vitals:    12/26/23 1359   BP: 124/72   BP Location: Left arm   Patient Position: Sitting   Cuff Size: Large   Pulse: 63   Temp: 98.1 °F (36.7 °C)   TempSrc: Temporal   SpO2: 94%   Weight: 105 kg (231 lb 1.6 oz)     Physical Exam  Vitals reviewed.   Constitutional:       General: He is not in acute distress.     Appearance: He is obese.   HENT:      Head: Normocephalic and atraumatic.      Mouth/Throat:      Mouth: Mucous membranes are moist.      Pharynx: Oropharynx is clear.   Eyes:      General: No scleral icterus.     Extraocular Movements: Extraocular movements intact.   Cardiovascular:      Rate " and Rhythm: Normal rate and regular rhythm.      Heart sounds: No murmur heard.     No friction rub. No gallop.   Pulmonary:      Effort: Pulmonary effort is normal. No respiratory distress.      Breath sounds: No stridor. No wheezing or rales.   Abdominal:      General: There is no distension.      Palpations: There is no mass.      Tenderness: There is no abdominal tenderness. There is no guarding.   Musculoskeletal:         General: Normal range of motion.      Right lower leg: Edema (2+) present.      Left lower leg: Edema (2+) present.      Comments: BLE edema distal to knees b/l; most prominent at ankles and sparing feet, likely due to very tight-fitting shoes (difficult to replace once removed due to ankle swelling). Non-pitting. Venous stasis dermatitis changes: hyperpigmented, dry skin. No wounds or other rashes   Skin:     General: Skin is warm and dry.      Findings: No rash.   Neurological:      Mental Status: He is alert and oriented to person, place, and time.      Sensory: No sensory deficit.   Psychiatric:         Mood and Affect: Mood normal.         Behavior: Behavior normal.         Thought Content: Thought content normal.       History     Current Outpatient Medications:   •  allopurinol (ZYLOPRIM) 100 mg tablet, Take 1 tablet (100 mg total) by mouth 2 (two) times a day, Disp: 180 tablet, Rfl: 1  •  amoxicillin (AMOXIL) 500 mg capsule, Take 500 mg by mouth every 8 (eight) hours, Disp: , Rfl:   •  Cholecalciferol (VITAMIN D3 PO), Take by mouth 2000 IU, Disp: , Rfl:   •  diltiazem (CARDIZEM CD) 180 mg 24 hr capsule, Take 1 capsule (180 mg total) by mouth daily, Disp: 90 capsule, Rfl: 1  •  gabapentin (Neurontin) 400 mg capsule, Take 1 capsule (400 mg total) by mouth 2 (two) times a day, Disp: 90 capsule, Rfl: 1  •  levocetirizine (XYZAL) 5 MG tablet, Take 1 tablet (5 mg total) by mouth every evening, Disp: 90 tablet, Rfl: 4  •  losartan (COZAAR) 100 MG tablet, Take 1 tablet (100 mg total) by mouth  daily, Disp: 90 tablet, Rfl: 1  •  quiNINE (QUALAQUIN) 324 mg capsule, Take 2 capsules (648 mg total) by mouth every 8 (eight) hours, Disp: 30 capsule, Rfl: 1  •  quiNINE (QUALAQUIN) 324 mg capsule, Take 2 capsules (648 mg total) by mouth daily at bedtime, Disp: 30 capsule, Rfl: 4  •  tamsulosin (FLOMAX) 0.4 mg, Take 1 capsule (0.4 mg total) by mouth daily with dinner, Disp: 90 capsule, Rfl: 1  •  traMADol-acetaminophen (ULTRACET) 37.5-325 mg per tablet, Take 2 tablets by mouth every 6 (six) hours as needed for moderate pain, Disp: 60 tablet, Rfl: 0  •  aspirin (ECOTRIN LOW STRENGTH) 81 mg EC tablet, Take 81 mg by mouth daily (Patient not taking: Reported on 12/26/2023), Disp: , Rfl:   •  potassium chloride (Klor-Con M20) 20 mEq tablet, TAKE 1 TABLET (20 MEQ TOTAL) BY MOUTH DAILY AS NEEDED (WHEN YOU TAKE LASIX.) (Patient not taking: Reported on 12/26/2023), Disp: , Rfl:   Past Medical History:   Diagnosis Date   • Abnormal electrocardiogram     Last assessed: Oct 11, 2013   • KAYLI (acute kidney injury) (HCC) 12/24/2015   • Allergic rhinitis     last assessed: Oct 11, 2013   • Allergic rhinitis due to pollen     last assessed: Oct 10, 2013   • Allergic sinusitis     Last assessed: May 11, 2015   • Allergy     resolved: July 22, 2015   • Benign essential hypertension     Last assessed/resolved: May 31, 2017   • BPH (benign prostatic hyperplasia)    • Cancer (HCC)     prostate   • Colitis     Last assessed: May 24, 2016   • Cough with hemoptysis 11/17/2020   • COVID-19 08/18/2022   • Generalized osteoarthritis     Last assessed: Oct 11, 2013   • GERD without esophagitis     Last assessed/resolved: May 31, 2017   • Hearing loss    • Hiatal hernia     resolved: July 22, 2015   • History of gastroesophageal reflux (GERD)    • History of stomach ulcers     last assessed: May 11, 2015   • Hypertension    • Incomplete bladder emptying    • Kidney stone    • Nodular prostate with lower urinary tract symptoms    • Nontoxic  single thyroid nodule     last assessed: April 16, 2014   • Orchalgia    • Overweight     last assessed: Oct 31, 2013   • Poor urinary stream    • Pulmonary embolism (HCC)    • Salivary gland disorder     last assessed: April 16, 2014   • Seasonal allergies     last assessed: May 15, 2015   • Spermatocele    • Straining to void    • 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, 2014     Past Surgical History:   Procedure Laterality Date   • BLADDER SURGERY     • CHOLECYSTECTOMY  2000    laparoscopic    • COLONOSCOPY     • FLAP LOCAL TRUNK Left 10/28/2021    Procedure: EXCISION OF FLANK MASS;  Surgeon: Hemalatha Kirkpatrick DO;  Location:  MAIN OR;  Service: Plastics   • HEMORRHOID SURGERY      pilionidal cyst removal    • HERNIA REPAIR Left 01/17/2008    inguinal    • KNEE ARTHROSCOPY Left 1974   • KNEE CARTILAGE SURGERY     • NASAL SEPTUM SURGERY      Deviation repair    • CT EXC PRTD BARBARA/PRTD GLND LAT DSJ&PRSRV FACIAL NR Right 06/09/2021    Procedure: SUPERFICIAL PAROTIDECTOMY WITH FACIAL NERVE MONITORING;  Surgeon: Omkar Dobbins MD;  Location: [facility];  Service: ENT   • PROSTATE BIOPSY  2017   • SKIN TAG REMOVAL  03/2023    on his eye   • STOMACH SURGERY     • TONSILLECTOMY AND ADENOIDECTOMY      at age 41   • TOTAL SHOULDER ARTHROPLASTY      SHOULDER JOINT REPLACEMENT - right 01/04 0 left 01/95   • UPPER GASTROINTESTINAL ENDOSCOPY     • US GUIDED THYROID BIOPSY  01/19/2022   • US GUIDED THYROID BIOPSY  05/26/2022   • VASECTOMY       Social History     Socioeconomic History   • Marital status: /Civil Union     Spouse name: Ivy    • Number of children: 1   • Years of education: Not on file   • Highest education level: Not on file   Occupational History   • Occupation: Retired    • Occupation: securtity      Comment: [employer]    Tobacco Use   • Smoking status: Former     Current packs/day: 0.00     Average packs/day: 0.3 packs/day for 30.0 years (7.5 ttl pk-yrs)     Types: Cigarettes     Start  date:      Quit date:      Years since quittin.0   • Smokeless tobacco: Never   Vaping Use   • Vaping status: Never Used   Substance and Sexual Activity   • Alcohol use: Not Currently     Comment: rare   • Drug use: No   • Sexual activity: Yes     Partners: Female   Other Topics Concern   • Not on file   Social History Narrative    Who lives in your home: wife     What type of home do you live in: Single house    Age of your home: Built [birth year]     How long have you been living there:     Type of heat: Baseboard    Type of fuel: Electric    What type of elaine is in your bedroom: Carpet     Do you have the following in or near your home:    Air products: Central air    Pests: None    Pets: None    Are pets allowed in bedroom: N/A    Open fields, wooded areas nearby: Open fields and Wooded areas    Basement: Finished    Exposure to second hand smoke: No        Habits:    Caffeine: Coffee 1 cup of coffee -peach snapple few a week     Chocolate: rare     Other:     Social Determinants of Health     Financial Resource Strain: Low Risk  (3/2/2023)    Overall Financial Resource Strain (CARDIA)    • Difficulty of Paying Living Expenses: Not hard at all   Food Insecurity: Not on file   Transportation Needs: No Transportation Needs (3/2/2023)    PRAPARE - Transportation    • Lack of Transportation (Medical): No    • Lack of Transportation (Non-Medical): No   Physical Activity: Sufficiently Active (2020)    Exercise Vital Sign    • Days of Exercise per Week: 7 days    • Minutes of Exercise per Session: 60 min   Stress: Not on file   Social Connections: Not on file   Intimate Partner Violence: Not on file   Housing Stability: Unknown (2022)    Housing Stability Vital Sign    • Unable to Pay for Housing in the Last Year: No    • Number of Places Lived in the Last Year: Not on file    • Unstable Housing in the Last Year: No     Family History   Problem Relation Age of Onset   • Hypertension Mother    •  Stroke Mother    • Stomach cancer Father    • Hypertension Father    • Hypertension Family    • Stroke Family         syndrome    • Heart attack Son          Renuka Cabrera MD  [facility] Internal Medicine PGY-3  [address] [facility]  [address] [place] [address]  [phone]      PLEASE NOTE:  This encounter was completed utilizing the [employer] Speech Voice Recognition Software. Grammatical errors, random word insertions, pronoun errors and incomplete sentences are occasional consequences of the system due to software limitations, ambient noise and hardware issues.These may be missed by proof reading prior to affixing electronic signature. Any questions or concerns about the content, text or information contained within the body of this dictation should be directly addressed to the physician for clarification. Please do not hesitate to call me directly if you have any any questions or concerns.

## 2024-01-10 DIAGNOSIS — N13.8 BPH WITH OBSTRUCTION/LOWER URINARY TRACT SYMPTOMS: ICD-10-CM

## 2024-01-10 DIAGNOSIS — N40.1 BPH WITH OBSTRUCTION/LOWER URINARY TRACT SYMPTOMS: ICD-10-CM

## 2024-01-10 RX ORDER — TAMSULOSIN HYDROCHLORIDE 0.4 MG/1
0.4 CAPSULE ORAL
Qty: 90 CAPSULE | Refills: 1 | Status: SHIPPED | OUTPATIENT
Start: 2024-01-10

## 2024-01-29 ENCOUNTER — CONSULT (OUTPATIENT)
Dept: INTERNAL MEDICINE CLINIC | Age: 84
End: 2024-01-29
Payer: COMMERCIAL

## 2024-01-29 VITALS
OXYGEN SATURATION: 98 % | BODY MASS INDEX: 35.94 KG/M2 | TEMPERATURE: 98.1 F | SYSTOLIC BLOOD PRESSURE: 130 MMHG | WEIGHT: 229 LBS | HEART RATE: 60 BPM | DIASTOLIC BLOOD PRESSURE: 78 MMHG | HEIGHT: 67 IN

## 2024-01-29 DIAGNOSIS — G60.9 IDIOPATHIC PERIPHERAL NEUROPATHY: ICD-10-CM

## 2024-01-29 DIAGNOSIS — I10 BENIGN ESSENTIAL HYPERTENSION: Chronic | ICD-10-CM

## 2024-01-29 DIAGNOSIS — K21.9 GASTROESOPHAGEAL REFLUX DISEASE WITHOUT ESOPHAGITIS: Chronic | ICD-10-CM

## 2024-01-29 DIAGNOSIS — I73.9 PERIPHERAL VASCULAR DISEASE (HCC): ICD-10-CM

## 2024-01-29 DIAGNOSIS — Z01.810 PREOP CARDIOVASCULAR EXAM: Primary | ICD-10-CM

## 2024-01-29 DIAGNOSIS — N18.31 STAGE 3A CHRONIC KIDNEY DISEASE (HCC): ICD-10-CM

## 2024-01-29 DIAGNOSIS — H25.9 SENILE CATARACT OF RIGHT EYE, UNSPECIFIED AGE-RELATED CATARACT TYPE: ICD-10-CM

## 2024-01-29 PROCEDURE — 99214 OFFICE O/P EST MOD 30 MIN: CPT

## 2024-01-29 RX ORDER — BROMFENAC SODIUM 0.7 MG/ML
SOLUTION/ DROPS OPHTHALMIC
COMMUNITY
Start: 2024-01-24

## 2024-01-29 NOTE — PROGRESS NOTES
[facility]  INTERNAL MEDICINE OFFICE VISIT     PATIENT INFORMATION     Moreno Fournier Jr.   83 y.o. male   MRN: [id number]    ASSESSMENT/PLAN     Diagnoses and all orders for this visit:    Preop cardiovascular exam  Mr. Fournier is intermediate risk for low risk surgery. His RCRI score is 6% (risk of cardiac arrest, death, MI 30 days post-procedure). His METS are about 7-8. He is completely independent at baseline. He denies angina or dyspnea at rest and with activities. No further testing or medication adjustments warranted prior to surgery.     Peripheral neuropathy  Patient was counseled on completing prior ordered lab work prior to next office visit in March.     Other orders  -     bromfenac sodium (Prolensa) 0.07 % SOLN; instill 1 drop by ophthalmic route once a day into left eye starting when you get home from surgery for 28 days      Risk Factor Score (Yes=1, No=0)   Hx of TIA/CVA N   Hx of prior ischemic heart disease (AMI, unstable angina, Q waves on EKG, CABG) N   Hx of congestive heart failure Y   Serum Creatinine >2 mg/dl N   Insulin dependent diabetes mellitus N   Total Score 1     Risk of MACE (30-day)     Points Risk % (95% CI), Kobe, 2017 Risk % (95% CI) Evaristo, 1999   0 3.9 (2.8-5.4) 0.4 (0.05-1.5)   1 6 (4.9-7.4) 0.9 (0.3-2.1)   2 10.1 (8.1-12.6) 6.6 (3.9-10.3)   3 or more 15 (11.1-20) >11 (5.8-18.4)     RCRI: 30 day risk of death, MI, cardiac arrest is 6%. No further testing or medication adjustments are warranted prior to procedure.     Advice    In patients with 3 or more RCRI risk factors (e.g., diabetes mellitus, HF, coronary artery disease, renal insufficiency, cerebrovascular accident), it may be reasonable to begin beta blockers before surgery. (Level of Evidence: B)    Beta-blocker therapy should not be started on the day of surgery (Level of Evidence: B)    In patients with a compelling long-term indication for betablocker therapy but no other RCRI risk factors, initiating beta blockers in the  perioperative setting as an approach to reduce perioperative risk is of uncertain benefit  (Level of Evidence: B)      Schedule a follow-up appointment in, already scheduled.    HEALTH MAINTENANCE     Immunization History   Administered Date(s) Administered    COVID-19 PFIZER VACCINE 0.3 ML IM 02/11/2021, 02/11/2021, 03/02/2021, 03/02/2021, 12/07/2021    COVID-19 Pfizer vac (Alex-sucrose, gray cap) 12 yr+ IM 05/13/2022    INFLUENZA 12/12/2005, 10/20/2006, 11/02/2007, 10/17/2008, 09/17/2009, 11/13/2012, 10/08/2014, 10/08/2014, 10/08/2014, 10/23/2015, 10/23/2015, 10/23/2015, 10/25/2016, 10/25/2016, 10/25/2016, 10/17/2017, 10/17/2017, 10/17/2017, 10/08/2021, 10/08/2021, 10/17/2022, 10/17/2022, 10/09/2023    Influenza Split High Dose Preservative Free IM 10/23/2015, 10/25/2016, 10/25/2019    Influenza, high dose seasonal 0.7 mL 09/28/2018, 10/10/2020, 10/17/2022    Influenza, seasonal, injectable 09/27/2010, 10/05/2011, 10/01/2013, 10/08/2014    Pneumococcal Conjugate 13-Valent 09/06/2018, 09/06/2018    Pneumococcal Conjugate Vaccine 20-valent (Pcv20), Polysace 12/26/2023, 12/26/2023    Pneumococcal Polysaccharide PPV23 11/01/2006    Tuberculin Skin Test-PPD Intradermal 07/28/2008, 06/03/2015, 06/03/2015, 06/03/2015    Zoster 10/07/2013    Zoster Vaccine Recombinant 02/11/2020, 02/11/2020, 07/24/2020, 07/24/2020     CHIEF COMPLAINT     Chief Complaint   Patient presents with    Pre-op Exam     cataracts surgery 02/01      HISTORY OF PRESENT ILLNESS     83-year-old male with past medical history of hypertension, hyperaldosteronism, CKD 3A, gout, interstitial lung disease, prostate cancer, who presents for preoperative evaluation.    Patient is going for bilateral cataract removal surgery next week and in 2 weeks.  At baseline he is fully independent and able to complete all ADLs.  He is able to walk up and down stairs and on flat surfaces without angina or dyspnea.  He does housework as well as yard work without issue.   "He has never had an MI or cardiac procedure, he does have a history of HFpEF on prior TTE.  He has never had a CVA.  He does not use insulin.  His baseline creatinine is 1.10-1.20.     Patient has multiple chronic conditions which appear stable at this time.  He was recently seen in our office for peripheral neuropathy which is ongoing for 2 to 3 years.  Prior lab work was ordered for evaluation, however was not completed yet as this appointment was scheduled slowly for preoperative evaluation.  I counseled the patient and his wife on completing the blood work prior to his next visit which is already scheduled for March.    REVIEW OF SYSTEMS     Review of Systems   Constitutional:  Negative for chills and fever.   HENT:  Positive for hearing loss. Negative for ear pain and sore throat.    Eyes:  Positive for visual disturbance. Negative for pain.   Respiratory:  Negative for cough and shortness of breath.    Cardiovascular:  Negative for chest pain and palpitations.   Gastrointestinal:  Negative for abdominal pain and vomiting.   Genitourinary:  Negative for dysuria and hematuria.   Musculoskeletal:  Negative for arthralgias and back pain.   Skin:  Negative for color change and rash.   Neurological:  Negative for seizures and syncope.   All other systems reviewed and are negative.    OBJECTIVE     Vitals:    01/29/24 1538   BP: 130/78   BP Location: Left arm   Patient Position: Sitting   Cuff Size: Standard   Pulse: 60   Temp: 98.1 °F (36.7 °C)   TempSrc: Temporal   SpO2: 98%   Weight: 104 kg (229 lb)   Height: 5' 7\" (1.702 m)     Physical Exam  Vitals and nursing note reviewed.   Constitutional:       General: He is not in acute distress.     Appearance: He is well-developed.   HENT:      Head: Normocephalic and atraumatic.   Eyes:      Conjunctiva/sclera: Conjunctivae normal.   Cardiovascular:      Rate and Rhythm: Normal rate and regular rhythm.      Heart sounds: No murmur heard.  Pulmonary:      Effort: " Pulmonary effort is normal. No respiratory distress.      Breath sounds: Normal breath sounds.   Abdominal:      Palpations: Abdomen is soft.      Tenderness: There is no abdominal tenderness.   Musculoskeletal:         General: No swelling.      Cervical back: Neck supple.      Right lower leg: Edema present.      Left lower leg: No edema.   Skin:     General: Skin is warm and dry.      Capillary Refill: Capillary refill takes less than 2 seconds.      Comments: Stasis dermatitis present over the bilateral lower extremities   Neurological:      Mental Status: He is alert and oriented to person, place, and time.   Psychiatric:         Mood and Affect: Mood normal.       CURRENT MEDICATIONS     Current Outpatient Medications:     allopurinol (ZYLOPRIM) 100 mg tablet, Take 1 tablet (100 mg total) by mouth 2 (two) times a day, Disp: 180 tablet, Rfl: 1    amoxicillin (AMOXIL) 500 mg capsule, Take 500 mg by mouth every 8 (eight) hours, Disp: , Rfl:     bromfenac sodium (Prolensa) 0.07 % SOLN, instill 1 drop by ophthalmic route once a day into left eye starting when you get home from surgery for 28 days, Disp: , Rfl:     Cholecalciferol (VITAMIN D3 PO), Take by mouth 2000 IU, Disp: , Rfl:     diltiazem (CARDIZEM CD) 180 mg 24 hr capsule, Take 1 capsule (180 mg total) by mouth daily, Disp: 90 capsule, Rfl: 1    gabapentin (Neurontin) 400 mg capsule, Take 1 capsule (400 mg total) by mouth 2 (two) times a day, Disp: 90 capsule, Rfl: 1    levocetirizine (XYZAL) 5 MG tablet, Take 1 tablet (5 mg total) by mouth every evening, Disp: 90 tablet, Rfl: 4    losartan (COZAAR) 100 MG tablet, Take 1 tablet (100 mg total) by mouth daily, Disp: 90 tablet, Rfl: 1    quiNINE (QUALAQUIN) 324 mg capsule, Take 2 capsules (648 mg total) by mouth every 8 (eight) hours, Disp: 30 capsule, Rfl: 1    quiNINE (QUALAQUIN) 324 mg capsule, Take 2 capsules (648 mg total) by mouth daily at bedtime, Disp: 30 capsule, Rfl: 4    tamsulosin (FLOMAX) 0.4 mg,  Take 1 capsule (0.4 mg total) by mouth daily with dinner, Disp: 90 capsule, Rfl: 1    traMADol-acetaminophen (ULTRACET) 37.5-325 mg per tablet, Take 2 tablets by mouth every 6 (six) hours as needed for moderate pain, Disp: 60 tablet, Rfl: 0    aspirin (ECOTRIN LOW STRENGTH) 81 mg EC tablet, Take 81 mg by mouth daily (Patient not taking: Reported on 12/26/2023), Disp: , Rfl:     potassium chloride (Klor-Con M20) 20 mEq tablet, TAKE 1 TABLET (20 MEQ TOTAL) BY MOUTH DAILY AS NEEDED (WHEN YOU TAKE LASIX.) (Patient not taking: Reported on 12/26/2023), Disp: , Rfl:     PAST MEDICAL & SURGICAL HISTORY     Past Medical History:   Diagnosis Date    Abnormal electrocardiogram     Last assessed: Oct 11, 2013    KAYLI (acute kidney injury) (HCC) 12/24/2015    Allergic rhinitis     last assessed: Oct 11, 2013    Allergic rhinitis due to pollen     last assessed: Oct 10, 2013    Allergic sinusitis     Last assessed: May 11, 2015    Allergy     resolved: July 22, 2015    Benign essential hypertension     Last assessed/resolved: May 31, 2017    BPH (benign prostatic hyperplasia)     Cancer (HCC)     prostate    Colitis     Last assessed: May 24, 2016    Cough with hemoptysis 11/17/2020    COVID-19 08/18/2022    Generalized osteoarthritis     Last assessed: Oct 11, 2013    GERD without esophagitis     Last assessed/resolved: May 31, 2017    Hearing loss     Hiatal hernia     resolved: July 22, 2015    History of gastroesophageal reflux (GERD)     History of stomach ulcers     last assessed: May 11, 2015    Hypertension     Incomplete bladder emptying     Kidney stone     Nodular prostate with lower urinary tract symptoms     Nontoxic single thyroid nodule     last assessed: April 16, 2014    Orchalgia     Overweight     last assessed: Oct 31, 2013    Poor urinary stream     Pulmonary embolism (HCC)     Salivary gland disorder     last assessed: April 16, 2014    Seasonal allergies     last assessed: May 15, 2015    Spermatocele      Straining to void     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, 2014     Past Surgical History:   Procedure Laterality Date    BLADDER SURGERY      CHOLECYSTECTOMY      laparoscopic     COLONOSCOPY      FLAP LOCAL TRUNK Left 10/28/2021    Procedure: EXCISION OF FLANK MASS;  Surgeon: Hemalatha Kirkpatrick DO;  Location:  MAIN OR;  Service: Plastics    HEMORRHOID SURGERY      pilionidal cyst removal     HERNIA REPAIR Left 2008    inguinal     KNEE ARTHROSCOPY Left     KNEE CARTILAGE SURGERY      NASAL SEPTUM SURGERY      Deviation repair     MN EXC PRTD BARBARA/PRTD GLND LAT [id number]&PRSRV FACIAL NR Right 2021    Procedure: SUPERFICIAL PAROTIDECTOMY WITH FACIAL NERVE MONITORING;  Surgeon: Omkar Dobbins MD;  Location: [facility];  Service: ENT    PROSTATE BIOPSY  2017    SKIN TAG REMOVAL  2023    on his eye    STOMACH SURGERY      TONSILLECTOMY AND ADENOIDECTOMY      at age 41    TOTAL SHOULDER ARTHROPLASTY      SHOULDER JOINT REPLACEMENT - right  0 left     UPPER GASTROINTESTINAL ENDOSCOPY      US GUIDED THYROID BIOPSY  2022    US GUIDED THYROID BIOPSY  2022    VASECTOMY       SOCIAL & FAMILY HISTORY     Social History     Socioeconomic History    Marital status: /Civil Union     Spouse name: Ivy     Number of children: 1    Years of education: Not on file    Highest education level: Not on file   Occupational History    Occupation: Retired     Occupation: securtity      Comment: [employer]    Tobacco Use    Smoking status: Former     Current packs/day: 0.00     Average packs/day: 0.3 packs/day for 30.0 years (7.5 ttl pk-yrs)     Types: Cigarettes     Start date:      Quit date:      Years since quittin.1    Smokeless tobacco: Never   Vaping Use    Vaping status: Never Used   Substance and Sexual Activity    Alcohol use: Not Currently     Comment: rare    Drug use: No    Sexual activity: Yes     Partners: Female   Other Topics Concern    Not on file    Social History Narrative    Who lives in your home: wife     What type of home do you live in: Single house    Age of your home: Built [birth year]     How long have you been living there: 1983    Type of heat: Baseboard    Type of fuel: Electric    What type of elaine is in your bedroom: Carpet     Do you have the following in or near your home:    Air products: Central air    Pests: None    Pets: None    Are pets allowed in bedroom: N/A    Open fields, wooded areas nearby: Open fields and Wooded areas    Basement: Finished    Exposure to second hand smoke: No        Habits:    Caffeine: Coffee 1 cup of coffee -peach snapple few a week     Chocolate: rare     Other:     Social Determinants of Health     Financial Resource Strain: Low Risk  (3/2/2023)    Overall Financial Resource Strain (CARDIA)     Difficulty of Paying Living Expenses: Not hard at all   Food Insecurity: Not on file   Transportation Needs: No Transportation Needs (3/2/2023)    PRAPARE - Transportation     Lack of Transportation (Medical): No     Lack of Transportation (Non-Medical): No   Physical Activity: Sufficiently Active (6/11/2020)    Exercise Vital Sign     Days of Exercise per Week: 7 days     Minutes of Exercise per Session: 60 min   Stress: Not on file   Social Connections: Not on file   Intimate Partner Violence: Not on file   Housing Stability: Unknown (4/11/2022)    Housing Stability Vital Sign     Unable to Pay for Housing in the Last Year: No     Number of Places Lived in the Last Year: Not on file     Unstable Housing in the Last Year: No     Social History     Substance and Sexual Activity   Alcohol Use Not Currently    Comment: rare       Social History     Substance and Sexual Activity   Drug Use No     Social History     Tobacco Use   Smoking Status Former    Current packs/day: 0.00    Average packs/day: 0.3 packs/day for 30.0 years (7.5 ttl pk-yrs)    Types: Cigarettes    Start date: 1955    Quit date: 1985    Years since quitting:  39.1   Smokeless Tobacco Never     Family History   Problem Relation Age of Onset    Hypertension Mother     Stroke Mother     Stomach cancer Father     Hypertension Father     Hypertension Family     Stroke Family         syndrome     Heart attack Son              ==  Curry Crooks MD  [facility]

## 2024-02-16 ENCOUNTER — OFFICE VISIT (OUTPATIENT)
Dept: URGENT CARE | Age: 84
End: 2024-02-16
Payer: COMMERCIAL

## 2024-02-16 VITALS — RESPIRATION RATE: 18 BRPM | HEART RATE: 98 BPM | OXYGEN SATURATION: 96 % | TEMPERATURE: 103 F

## 2024-02-16 DIAGNOSIS — U07.1 COVID: Primary | ICD-10-CM

## 2024-02-16 DIAGNOSIS — G60.9 IDIOPATHIC PERIPHERAL NEUROPATHY: ICD-10-CM

## 2024-02-16 DIAGNOSIS — R05.1 ACUTE COUGH: ICD-10-CM

## 2024-02-16 LAB
SARS-COV-2 AG UPPER RESP QL IA: POSITIVE
VALID CONTROL: ABNORMAL

## 2024-02-16 PROCEDURE — 87811 SARS-COV-2 COVID19 W/OPTIC: CPT

## 2024-02-16 PROCEDURE — 99213 OFFICE O/P EST LOW 20 MIN: CPT | Performed by: EMERGENCY MEDICINE

## 2024-02-16 PROCEDURE — S9083 URGENT CARE CENTER GLOBAL: HCPCS | Performed by: EMERGENCY MEDICINE

## 2024-02-16 RX ORDER — BENZONATATE 200 MG/1
200 CAPSULE ORAL 3 TIMES DAILY PRN
Qty: 20 CAPSULE | Refills: 0 | Status: SHIPPED | OUTPATIENT
Start: 2024-02-16 | End: 2024-02-22

## 2024-02-16 RX ORDER — AZITHROMYCIN 250 MG/1
TABLET, FILM COATED ORAL
Qty: 6 TABLET | Refills: 0 | Status: SHIPPED | OUTPATIENT
Start: 2024-02-16 | End: 2024-02-16

## 2024-02-18 ENCOUNTER — APPOINTMENT (EMERGENCY)
Dept: CT IMAGING | Facility: HOSPITAL | Age: 84
DRG: 177 | End: 2024-02-18
Payer: COMMERCIAL

## 2024-02-18 ENCOUNTER — APPOINTMENT (EMERGENCY)
Dept: RADIOLOGY | Facility: HOSPITAL | Age: 84
DRG: 177 | End: 2024-02-18
Payer: COMMERCIAL

## 2024-02-18 ENCOUNTER — HOSPITAL ENCOUNTER (INPATIENT)
Facility: HOSPITAL | Age: 84
LOS: 2 days | Discharge: HOME | DRG: 177 | End: 2024-02-20
Attending: EMERGENCY MEDICINE | Admitting: HOSPITALIST
Payer: COMMERCIAL

## 2024-02-18 DIAGNOSIS — R09.02 HYPOXIA: ICD-10-CM

## 2024-02-18 DIAGNOSIS — R10.9 ABDOMINAL PAIN: ICD-10-CM

## 2024-02-18 DIAGNOSIS — U07.1 COVID: Primary | ICD-10-CM

## 2024-02-18 PROBLEM — J96.01 ACUTE HYPOXIC RESPIRATORY FAILURE (HCC): Status: ACTIVE | Noted: 2024-02-18

## 2024-02-18 PROBLEM — K59.00 CONSTIPATION: Status: ACTIVE | Noted: 2024-02-18

## 2024-02-18 LAB
ALBUMIN SERPL BCP-MCNC: 3.8 G/DL (ref 3.5–5)
ALP SERPL-CCNC: 55 U/L (ref 34–104)
ALT SERPL W P-5'-P-CCNC: 9 U/L (ref 7–52)
ANION GAP SERPL CALCULATED.3IONS-SCNC: 7 MMOL/L
AST SERPL W P-5'-P-CCNC: 19 U/L (ref 13–39)
ATRIAL RATE: 84 BPM
BACTERIA UR QL AUTO: ABNORMAL /HPF
BASOPHILS # BLD MANUAL: 0 THOUSAND/UL (ref 0–0.1)
BASOPHILS NFR MAR MANUAL: 0 % (ref 0–1)
BILIRUB SERPL-MCNC: 1.26 MG/DL (ref 0.2–1)
BILIRUB UR QL STRIP: NEGATIVE
BUN SERPL-MCNC: 24 MG/DL (ref 5–25)
CALCIUM SERPL-MCNC: 8.7 MG/DL (ref 8.4–10.2)
CHLORIDE SERPL-SCNC: 98 MMOL/L (ref 96–108)
CLARITY UR: CLEAR
CO2 SERPL-SCNC: 27 MMOL/L (ref 21–32)
COLOR UR: YELLOW
CREAT SERPL-MCNC: 1.39 MG/DL (ref 0.6–1.3)
D DIMER PPP FEU-MCNC: 0.83 UG/ML FEU
EOSINOPHIL # BLD MANUAL: 0 THOUSAND/UL (ref 0–0.4)
EOSINOPHIL NFR BLD MANUAL: 0 % (ref 0–6)
ERYTHROCYTE [DISTWIDTH] IN BLOOD BY AUTOMATED COUNT: 14.5 % (ref 11.6–15.1)
FLUAV RNA RESP QL NAA+PROBE: NEGATIVE
FLUBV RNA RESP QL NAA+PROBE: NEGATIVE
GFR SERPL CREATININE-BSD FRML MDRD: 46 ML/MIN/1.73SQ M
GLUCOSE SERPL-MCNC: 96 MG/DL (ref 65–140)
GLUCOSE UR STRIP-MCNC: NEGATIVE MG/DL
HCT VFR BLD AUTO: 46.3 % (ref 36.5–49.3)
HGB BLD-MCNC: 15.5 G/DL (ref 12–17)
HGB UR QL STRIP.AUTO: NEGATIVE
KETONES UR STRIP-MCNC: ABNORMAL MG/DL
LEUKOCYTE ESTERASE UR QL STRIP: NEGATIVE
LIPASE SERPL-CCNC: <6 U/L (ref 11–82)
LYMPHOCYTES # BLD AUTO: 0.18 THOUSAND/UL (ref 0.6–4.47)
LYMPHOCYTES # BLD AUTO: 3 % (ref 14–44)
MCH RBC QN AUTO: 30.2 PG (ref 26.8–34.3)
MCHC RBC AUTO-ENTMCNC: 33.5 G/DL (ref 31.4–37.4)
MCV RBC AUTO: 90 FL (ref 82–98)
MONOCYTES # BLD AUTO: 0.06 THOUSAND/UL (ref 0–1.22)
MONOCYTES NFR BLD: 1 % (ref 4–12)
NEUTROPHILS # BLD MANUAL: 5.74 THOUSAND/UL (ref 1.85–7.62)
NEUTS BAND NFR BLD MANUAL: 3 % (ref 0–8)
NEUTS SEG NFR BLD AUTO: 93 % (ref 43–75)
NITRITE UR QL STRIP: NEGATIVE
NON-SQ EPI CELLS URNS QL MICRO: ABNORMAL /HPF
P AXIS: 32 DEGREES
PH UR STRIP.AUTO: 6 [PH]
PLATELET # BLD AUTO: 129 THOUSANDS/UL (ref 149–390)
PLATELET BLD QL SMEAR: ABNORMAL
PMV BLD AUTO: 11 FL (ref 8.9–12.7)
POTASSIUM SERPL-SCNC: 4.1 MMOL/L (ref 3.5–5.3)
PR INTERVAL: 170 MS
PROCALCITONIN SERPL-MCNC: 0.19 NG/ML
PROT SERPL-MCNC: 6.4 G/DL (ref 6.4–8.4)
PROT UR STRIP-MCNC: ABNORMAL MG/DL
QRS AXIS: 4 DEGREES
QRSD INTERVAL: 86 MS
QT INTERVAL: 350 MS
QTC INTERVAL: 408 MS
RBC # BLD AUTO: 5.13 MILLION/UL (ref 3.88–5.62)
RBC #/AREA URNS AUTO: ABNORMAL /HPF
RBC MORPH BLD: NORMAL
RSV RNA RESP QL NAA+PROBE: NEGATIVE
SARS-COV-2 RNA RESP QL NAA+PROBE: POSITIVE
SODIUM SERPL-SCNC: 132 MMOL/L (ref 135–147)
SP GR UR STRIP.AUTO: >=1.05 (ref 1–1.03)
T WAVE AXIS: 50 DEGREES
UROBILINOGEN UR STRIP-ACNC: >=12 MG/DL
VENTRICULAR RATE: 82 BPM
WBC # BLD AUTO: 5.98 THOUSAND/UL (ref 4.31–10.16)
WBC #/AREA URNS AUTO: ABNORMAL /HPF

## 2024-02-18 PROCEDURE — 84145 PROCALCITONIN (PCT): CPT | Performed by: HOSPITALIST

## 2024-02-18 PROCEDURE — 36415 COLL VENOUS BLD VENIPUNCTURE: CPT

## 2024-02-18 PROCEDURE — G1004 CDSM NDSC: HCPCS

## 2024-02-18 PROCEDURE — 71045 X-RAY EXAM CHEST 1 VIEW: CPT

## 2024-02-18 PROCEDURE — 0241U HB NFCT DS VIR RESP RNA 4 TRGT: CPT

## 2024-02-18 PROCEDURE — 99285 EMERGENCY DEPT VISIT HI MDM: CPT

## 2024-02-18 PROCEDURE — 83690 ASSAY OF LIPASE: CPT

## 2024-02-18 PROCEDURE — 96361 HYDRATE IV INFUSION ADD-ON: CPT

## 2024-02-18 PROCEDURE — 85007 BL SMEAR W/DIFF WBC COUNT: CPT

## 2024-02-18 PROCEDURE — 93005 ELECTROCARDIOGRAM TRACING: CPT

## 2024-02-18 PROCEDURE — 80053 COMPREHEN METABOLIC PANEL: CPT

## 2024-02-18 PROCEDURE — 93010 ELECTROCARDIOGRAM REPORT: CPT | Performed by: INTERNAL MEDICINE

## 2024-02-18 PROCEDURE — 99223 1ST HOSP IP/OBS HIGH 75: CPT | Performed by: HOSPITALIST

## 2024-02-18 PROCEDURE — 99285 EMERGENCY DEPT VISIT HI MDM: CPT | Performed by: EMERGENCY MEDICINE

## 2024-02-18 PROCEDURE — 85027 COMPLETE CBC AUTOMATED: CPT

## 2024-02-18 PROCEDURE — 96360 HYDRATION IV INFUSION INIT: CPT

## 2024-02-18 PROCEDURE — 74177 CT ABD & PELVIS W/CONTRAST: CPT

## 2024-02-18 PROCEDURE — XW033E5 INTRODUCTION OF REMDESIVIR ANTI-INFECTIVE INTO PERIPHERAL VEIN, PERCUTANEOUS APPROACH, NEW TECHNOLOGY GROUP 5: ICD-10-PCS | Performed by: INTERNAL MEDICINE

## 2024-02-18 PROCEDURE — 85379 FIBRIN DEGRADATION QUANT: CPT | Performed by: HOSPITALIST

## 2024-02-18 PROCEDURE — 81001 URINALYSIS AUTO W/SCOPE: CPT

## 2024-02-18 RX ORDER — DILTIAZEM HYDROCHLORIDE 180 MG/1
180 CAPSULE, COATED, EXTENDED RELEASE ORAL DAILY
Status: DISCONTINUED | OUTPATIENT
Start: 2024-02-19 | End: 2024-02-20 | Stop reason: HOSPADM

## 2024-02-18 RX ORDER — TRAMADOL HYDROCHLORIDE 50 MG/1
25 TABLET ORAL EVERY 6 HOURS PRN
Status: DISCONTINUED | OUTPATIENT
Start: 2024-02-18 | End: 2024-02-20 | Stop reason: HOSPADM

## 2024-02-18 RX ORDER — HYDRALAZINE HYDROCHLORIDE 20 MG/ML
5 INJECTION INTRAMUSCULAR; INTRAVENOUS EVERY 6 HOURS PRN
Status: DISCONTINUED | OUTPATIENT
Start: 2024-02-18 | End: 2024-02-20 | Stop reason: HOSPADM

## 2024-02-18 RX ORDER — ALLOPURINOL 100 MG/1
100 TABLET ORAL 2 TIMES DAILY
Status: DISCONTINUED | OUTPATIENT
Start: 2024-02-18 | End: 2024-02-20 | Stop reason: HOSPADM

## 2024-02-18 RX ORDER — BENZONATATE 100 MG/1
200 CAPSULE ORAL 3 TIMES DAILY PRN
Status: DISCONTINUED | OUTPATIENT
Start: 2024-02-18 | End: 2024-02-20 | Stop reason: HOSPADM

## 2024-02-18 RX ORDER — DEXAMETHASONE SODIUM PHOSPHATE 10 MG/ML
6 INJECTION, SOLUTION INTRAMUSCULAR; INTRAVENOUS EVERY 24 HOURS
Status: DISCONTINUED | OUTPATIENT
Start: 2024-02-18 | End: 2024-02-20

## 2024-02-18 RX ORDER — TAMSULOSIN HYDROCHLORIDE 0.4 MG/1
0.4 CAPSULE ORAL
Status: DISCONTINUED | OUTPATIENT
Start: 2024-02-18 | End: 2024-02-20 | Stop reason: HOSPADM

## 2024-02-18 RX ORDER — POLYETHYLENE GLYCOL 3350 17 G/17G
17 POWDER, FOR SOLUTION ORAL DAILY
Status: DISCONTINUED | OUTPATIENT
Start: 2024-02-19 | End: 2024-02-20 | Stop reason: HOSPADM

## 2024-02-18 RX ORDER — TRAMADOL HYDROCHLORIDE 50 MG/1
50 TABLET ORAL EVERY 6 HOURS PRN
Status: DISCONTINUED | OUTPATIENT
Start: 2024-02-18 | End: 2024-02-20 | Stop reason: HOSPADM

## 2024-02-18 RX ORDER — SODIUM CHLORIDE, SODIUM LACTATE, POTASSIUM CHLORIDE, CALCIUM CHLORIDE 600; 310; 30; 20 MG/100ML; MG/100ML; MG/100ML; MG/100ML
125 INJECTION, SOLUTION INTRAVENOUS CONTINUOUS
Status: DISPENSED | OUTPATIENT
Start: 2024-02-18 | End: 2024-02-19

## 2024-02-18 RX ORDER — ACETAMINOPHEN 325 MG/1
650 TABLET ORAL EVERY 6 HOURS PRN
Status: DISCONTINUED | OUTPATIENT
Start: 2024-02-18 | End: 2024-02-20 | Stop reason: HOSPADM

## 2024-02-18 RX ORDER — ACETAMINOPHEN 325 MG/1
975 TABLET ORAL ONCE
Status: COMPLETED | OUTPATIENT
Start: 2024-02-18 | End: 2024-02-18

## 2024-02-18 RX ORDER — GABAPENTIN 400 MG/1
400 CAPSULE ORAL 2 TIMES DAILY
Status: DISCONTINUED | OUTPATIENT
Start: 2024-02-18 | End: 2024-02-20 | Stop reason: HOSPADM

## 2024-02-18 RX ORDER — ONDANSETRON 2 MG/ML
4 INJECTION INTRAMUSCULAR; INTRAVENOUS EVERY 6 HOURS PRN
Status: DISCONTINUED | OUTPATIENT
Start: 2024-02-18 | End: 2024-02-20 | Stop reason: HOSPADM

## 2024-02-18 RX ORDER — HEPARIN SODIUM 5000 [USP'U]/ML
5000 INJECTION, SOLUTION INTRAVENOUS; SUBCUTANEOUS EVERY 8 HOURS SCHEDULED
Status: DISCONTINUED | OUTPATIENT
Start: 2024-02-18 | End: 2024-02-20 | Stop reason: HOSPADM

## 2024-02-18 RX ORDER — SENNOSIDES 8.6 MG
1 TABLET ORAL DAILY
Status: DISCONTINUED | OUTPATIENT
Start: 2024-02-19 | End: 2024-02-20 | Stop reason: HOSPADM

## 2024-02-18 RX ADMIN — HEPARIN SODIUM 5000 UNITS: 5000 INJECTION INTRAVENOUS; SUBCUTANEOUS at 20:31

## 2024-02-18 RX ADMIN — SODIUM CHLORIDE 1000 ML: 0.9 INJECTION, SOLUTION INTRAVENOUS at 11:42

## 2024-02-18 RX ADMIN — DEXAMETHASONE SODIUM PHOSPHATE 6 MG: 10 INJECTION INTRAMUSCULAR; INTRAVENOUS at 18:39

## 2024-02-18 RX ADMIN — ACETAMINOPHEN 975 MG: 325 TABLET ORAL at 14:07

## 2024-02-18 RX ADMIN — GABAPENTIN 400 MG: 400 CAPSULE ORAL at 16:46

## 2024-02-18 RX ADMIN — ALLOPURINOL 100 MG: 100 TABLET ORAL at 16:47

## 2024-02-18 RX ADMIN — TAMSULOSIN HYDROCHLORIDE 0.4 MG: 0.4 CAPSULE ORAL at 16:45

## 2024-02-18 RX ADMIN — SODIUM CHLORIDE, SODIUM LACTATE, POTASSIUM CHLORIDE, AND CALCIUM CHLORIDE 125 ML/HR: .6; .31; .03; .02 INJECTION, SOLUTION INTRAVENOUS at 18:44

## 2024-02-18 RX ADMIN — REMDESIVIR 200 MG: 100 INJECTION, POWDER, LYOPHILIZED, FOR SOLUTION INTRAVENOUS at 16:53

## 2024-02-18 RX ADMIN — IOHEXOL 85 ML: 350 INJECTION, SOLUTION INTRAVENOUS at 11:05

## 2024-02-18 RX ADMIN — HEPARIN SODIUM 5000 UNITS: 5000 INJECTION INTRAVENOUS; SUBCUTANEOUS at 16:44

## 2024-02-18 RX ADMIN — CEFTRIAXONE SODIUM 2000 MG: 10 INJECTION, POWDER, FOR SOLUTION INTRAVENOUS at 20:31

## 2024-02-18 NOTE — ASSESSMENT & PLAN NOTE
Lab Results   Component Value Date    EGFR 46 02/18/2024    EGFR 64 09/17/2023    EGFR 53 08/18/2023    CREATININE 1.39 (H) 02/18/2024    CREATININE 1.07 09/17/2023    CREATININE 1.24 08/18/2023   Mild elevation from baseline; does not meet KAYLI criteria  IVF and monitor for improvement  Avoid nephrotoxins

## 2024-02-18 NOTE — ED PROVIDER NOTES
History  Chief Complaint   Patient presents with    Abdominal Pain     Per ems, pt comes from home c/o RUQ pain intermittent x3 days. Dx w covid 3 days ago. Denies chest pain/sob. Hx hernia.      83-year-old male with history of BPH, GERD, presents today with abdominal pain for approximately 3 days.  Recently diagnosed with COVID.  Does have a minor cough and minor weakness.  He describes abdominal pain as diffuse and achy worse in the right upper quadrant and right lower quadrant.  Denies any nausea/vomiting, fever or chills.        Prior to Admission Medications   Prescriptions Last Dose Informant Patient Reported? Taking?   Cholecalciferol (VITAMIN D3 PO)  Self Yes No   Sig: Take by mouth 2000 IU   allopurinol (ZYLOPRIM) 100 mg tablet  Self No No   Sig: Take 1 tablet (100 mg total) by mouth 2 (two) times a day   amoxicillin (AMOXIL) 500 mg capsule  Self Yes No   Sig: Take 500 mg by mouth every 8 (eight) hours   aspirin (ECOTRIN LOW STRENGTH) 81 mg EC tablet  Self Yes No   Sig: Take 81 mg by mouth daily   Patient not taking: Reported on 12/26/2023   benzonatate (TESSALON) 200 MG capsule   No No   Sig: Take 1 capsule (200 mg total) by mouth 3 (three) times a day as needed for cough   bromfenac sodium (Prolensa) 0.07 % SOLN  Self Yes No   Sig: instill 1 drop by ophthalmic route once a day into left eye starting when you get home from surgery for 28 days   diltiazem (CARDIZEM CD) 180 mg 24 hr capsule  Self No No   Sig: Take 1 capsule (180 mg total) by mouth daily   gabapentin (Neurontin) 400 mg capsule  Self No No   Sig: Take 1 capsule (400 mg total) by mouth 2 (two) times a day   levocetirizine (XYZAL) 5 MG tablet  Self No No   Sig: Take 1 tablet (5 mg total) by mouth every evening   losartan (COZAAR) 100 MG tablet  Self No No   Sig: Take 1 tablet (100 mg total) by mouth daily   potassium chloride (Klor-Con M20) 20 mEq tablet  Self Yes No   Sig: TAKE 1 TABLET (20 MEQ TOTAL) BY MOUTH DAILY AS NEEDED (WHEN YOU TAKE  LASIX.)   Patient not taking: Reported on 12/26/2023   quiNINE (QUALAQUIN) 324 mg capsule  Self No No   Sig: Take 2 capsules (648 mg total) by mouth every 8 (eight) hours   quiNINE (QUALAQUIN) 324 mg capsule  Self No No   Sig: Take 2 capsules (648 mg total) by mouth daily at bedtime   tamsulosin (FLOMAX) 0.4 mg  Self No No   Sig: Take 1 capsule (0.4 mg total) by mouth daily with dinner   traMADol-acetaminophen (ULTRACET) 37.5-325 mg per tablet   No No   Sig: Take 2 tablets by mouth every 6 (six) hours as needed for moderate pain   Patient not taking: Reported on 2/16/2024      Facility-Administered Medications: None       Past Medical History:   Diagnosis Date    Abnormal electrocardiogram     Last assessed: Oct 11, 2013    KAYLI (acute kidney injury) (HCC) 12/24/2015    Allergic rhinitis     last assessed: Oct 11, 2013    Allergic rhinitis due to pollen     last assessed: Oct 10, 2013    Allergic sinusitis     Last assessed: May 11, 2015    Allergy     resolved: July 22, 2015    Benign essential hypertension     Last assessed/resolved: May 31, 2017    BPH (benign prostatic hyperplasia)     Cancer (HCC)     prostate    Colitis     Last assessed: May 24, 2016    Cough with hemoptysis 11/17/2020    COVID-19 08/18/2022    Generalized osteoarthritis     Last assessed: Oct 11, 2013    GERD without esophagitis     Last assessed/resolved: May 31, 2017    Hearing loss     Hiatal hernia     resolved: July 22, 2015    History of gastroesophageal reflux (GERD)     History of stomach ulcers     last assessed: May 11, 2015    Hypertension     Incomplete bladder emptying     Kidney stone     Nodular prostate with lower urinary tract symptoms     Nontoxic single thyroid nodule     last assessed: April 16, 2014    Orchalgia     Overweight     last assessed: Oct 31, 2013    Poor urinary stream     Pulmonary embolism (HCC)     Salivary gland disorder     last assessed: April 16, 2014    Seasonal allergies     last assessed: May 15,  2015    Spermatocele     Straining to void     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, 2014       Past Surgical History:   Procedure Laterality Date    BLADDER SURGERY      CHOLECYSTECTOMY      laparoscopic     COLONOSCOPY      FLAP LOCAL TRUNK Left 10/28/2021    Procedure: EXCISION OF FLANK MASS;  Surgeon: Hemalatha Kirkpatrick DO;  Location:  MAIN OR;  Service: Plastics    HEMORRHOID SURGERY      pilionidal cyst removal     HERNIA REPAIR Left 2008    inguinal     KNEE ARTHROSCOPY Left     KNEE CARTILAGE SURGERY      NASAL SEPTUM SURGERY      Deviation repair     WI EXC PRTD BARBARA/PRTD GLND LAT DSJ&PRSRV FACIAL NR Right 2021    Procedure: SUPERFICIAL PAROTIDECTOMY WITH FACIAL NERVE MONITORING;  Surgeon: Omkar Dobbins MD;  Location: [facility];  Service: ENT    PROSTATE BIOPSY  2017    SKIN TAG REMOVAL  2023    on his eye    STOMACH SURGERY      TONSILLECTOMY AND ADENOIDECTOMY      at age 41    TOTAL SHOULDER ARTHROPLASTY      SHOULDER JOINT REPLACEMENT - right  0 left     UPPER GASTROINTESTINAL ENDOSCOPY      US GUIDED THYROID BIOPSY  2022    US GUIDED THYROID BIOPSY  2022    VASECTOMY         Family History   Problem Relation Age of Onset    Hypertension Mother     Stroke Mother     Stomach cancer Father     Hypertension Father     Hypertension Family     Stroke Family         syndrome     Heart attack Son      I have reviewed and agree with the history as documented.    E-Cigarette/Vaping    E-Cigarette Use Never User      E-Cigarette/Vaping Substances    Nicotine No     THC No     CBD No     Flavoring No     Other No     Unknown No      Social History     Tobacco Use    Smoking status: Former     Current packs/day: 0.00     Average packs/day: 0.3 packs/day for 30.0 years (7.5 ttl pk-yrs)     Types: Cigarettes     Start date:      Quit date:      Years since quittin.1    Smokeless tobacco: Never   Vaping Use    Vaping status: Never Used   Substance Use Topics     Alcohol use: Not Currently     Comment: rare    Drug use: No        Review of Systems   Constitutional:  Negative for chills and fever.   HENT:  Positive for congestion.    Eyes:  Negative for visual disturbance.   Respiratory:  Positive for cough.    Cardiovascular:  Negative for chest pain.   Gastrointestinal:  Positive for abdominal pain. Negative for diarrhea, nausea and vomiting.   Genitourinary:  Negative for difficulty urinating.   Musculoskeletal:  Positive for myalgias.   Skin:  Negative for rash.   Neurological:  Positive for weakness. Negative for headaches.   Psychiatric/Behavioral:  The patient is not nervous/anxious.        Physical Exam  ED Triage Vitals   Temperature Pulse Respirations Blood Pressure SpO2   02/18/24 0908 02/18/24 0905 02/18/24 0905 02/18/24 0905 02/18/24 0905   98.8 °F (37.1 °C) 81 20 125/90 94 %      Temp Source Heart Rate Source Patient Position - Orthostatic VS BP Location FiO2 (%)   02/18/24 0908 02/18/24 0905 02/18/24 0905 02/18/24 0905 --   Oral Monitor Sitting Right arm       Pain Score       02/18/24 1407       5             Orthostatic Vital Signs  Vitals:    02/18/24 1245 02/18/24 1400 02/18/24 1500 02/18/24 1607   BP: 167/100 (!) 196/109 (!) 185/94 137/100   Pulse: 71 78 70    Patient Position - Orthostatic VS: Lying Sitting Lying        Physical Exam  Vitals and nursing note reviewed.   Constitutional:       General: He is not in acute distress.     Appearance: He is well-developed. He is obese. He is not ill-appearing.   HENT:      Head: Normocephalic and atraumatic.      Right Ear: External ear normal.      Left Ear: External ear normal.      Nose: Nose normal.      Mouth/Throat:      Mouth: Mucous membranes are moist.   Eyes:      Extraocular Movements: Extraocular movements intact.   Cardiovascular:      Rate and Rhythm: Normal rate and regular rhythm.      Pulses: Normal pulses.      Heart sounds: Normal heart sounds. No murmur heard.  Pulmonary:      Effort:  Pulmonary effort is normal. No respiratory distress.      Breath sounds: Normal breath sounds.   Abdominal:      Palpations: Abdomen is soft.      Tenderness: There is generalized abdominal tenderness and tenderness in the right upper quadrant and right lower quadrant.   Musculoskeletal:         General: Normal range of motion.      Cervical back: Neck supple.   Skin:     General: Skin is warm and dry.   Neurological:      General: No focal deficit present.      Mental Status: He is alert.   Psychiatric:         Mood and Affect: Mood normal.         ED Medications  Medications   allopurinol (ZYLOPRIM) tablet 100 mg ( Oral Canceled Entry 2/18/24 1724)   benzonatate (TESSALON PERLES) capsule 200 mg (has no administration in time range)   diltiazem (CARDIZEM CD) 24 hr capsule 180 mg (has no administration in time range)   gabapentin (NEURONTIN) capsule 400 mg ( Oral Canceled Entry 2/18/24 1724)   tamsulosin (FLOMAX) capsule 0.4 mg (0.4 mg Oral Given 2/18/24 1645)   acetaminophen (TYLENOL) tablet 650 mg (has no administration in time range)   polyethylene glycol (MIRALAX) packet 17 g (has no administration in time range)   senna (SENOKOT) tablet 8.6 mg (has no administration in time range)   ondansetron (ZOFRAN) injection 4 mg (has no administration in time range)   dexamethasone (PF) (DECADRON) injection 6 mg (has no administration in time range)   heparin (porcine) subcutaneous injection 5,000 Units (5,000 Units Subcutaneous Given 2/18/24 1644)   remdesivir (Veklury) 200 mg in sodium chloride 0.9 % 290 mL IVPB (200 mg Intravenous New Bag 2/18/24 1653)     Followed by   remdesivir (Veklury) 100 mg in sodium chloride 0.9 % 270 mL IVPB (has no administration in time range)   ceftriaxone (ROCEPHIN) 1 g/50 mL in dextrose IVPB (has no administration in time range)   lactated ringers infusion (has no administration in time range)   traMADol (ULTRAM) tablet 25 mg (has no administration in time range)   traMADol (ULTRAM)  tablet 50 mg (has no administration in time range)   hydrALAZINE (APRESOLINE) injection 5 mg (has no administration in time range)   sodium chloride 0.9 % bolus 1,000 mL (0 mL Intravenous Stopped 2/18/24 1400)   iohexol (OMNIPAQUE) 350 MG/ML injection (MULTI-DOSE) 85 mL (85 mL Intravenous Given 2/18/24 1105)   acetaminophen (TYLENOL) tablet 975 mg (975 mg Oral Given 2/18/24 1407)       Diagnostic Studies  Results Reviewed       Procedure Component Value Units Date/Time    D-dimer, quantitative [[id number]]  (Abnormal) Collected: 02/18/24 1509    Lab Status: Final result Specimen: Blood from Arm, Right Updated: 02/18/24 1533     D-Dimer, Quant 0.83 ug/ml FEU     Narrative:      In the evaluation for possible pulmonary embolism, in the appropriate (Well's Score of 4 or less) patient, the age adjusted d-dimer cutoff for this patient can be calculated as:    Age x 0.01 (in ug/mL) for Age-adjusted D-dimer exclusion threshold for a patient over 50 years.    Procalcitonin [[id number]]  (Normal) Collected: 02/18/24 1006    Lab Status: Final result Specimen: Blood from Arm, Right Updated: 02/18/24 1530     Procalcitonin 0.19 ng/ml     Urine Microscopic [[id number]]  (Abnormal) Collected: 02/18/24 1141    Lab Status: Final result Specimen: Urine, Clean Catch Updated: 02/18/24 1308     RBC, UA 2-4 /hpf      WBC, UA 1-2 /hpf      Epithelial Cells None Seen /hpf      Bacteria, UA None Seen /hpf     UA w Reflex to Microscopic w Reflex to Culture [[id number]]  (Abnormal) Collected: 02/18/24 1141    Lab Status: Final result Specimen: Urine, Clean Catch Updated: 02/18/24 1211     Color, UA Yellow     Clarity, UA Clear     Specific Gravity, UA >=1.050     pH, UA 6.0     Leukocytes, UA Negative     Nitrite, UA Negative     Protein, UA Trace mg/dl      Glucose, UA Negative mg/dl      Ketones, UA Trace mg/dl      Urobilinogen, UA >=12.0 mg/dl      Bilirubin, UA Negative     Occult Blood, UA Negative    RBC Morphology Reflex Test [[id number]]  Collected: 02/18/24 1006    Lab Status: Final result Specimen: Blood from Arm, Right Updated: 02/18/24 1201    FLU/RSV/COVID - if FLU/RSV clinically relevant [[id number]]  (Abnormal) Collected: 02/18/24 1006    Lab Status: Final result Specimen: Nares from Nose Updated: 02/18/24 1121     SARS-CoV-2 Positive     INFLUENZA A PCR Negative     INFLUENZA B PCR Negative     RSV PCR Negative    Narrative:      FOR PEDIATRIC PATIENTS - copy/paste COVID Guidelines URL to browser: [email or web address]    SARS-CoV-2 assay is a Nucleic Acid Amplification assay intended for the  qualitative detection of nucleic acid from SARS-CoV-2 in nasopharyngeal  swabs. Results are for the presumptive identification of SARS-CoV-2 RNA.    Positive results are indicative of infection with SARS-CoV-2, the virus  causing COVID-19, but do not rule out bacterial infection or co-infection  with other viruses. Laboratories within the [place] and its  territories are required to report all positive results to the appropriate  public health authorities. Negative results do not preclude SARS-CoV-2  infection and should not be used as the sole basis for treatment or other  patient management decisions. Negative results must be combined with  clinical observations, patient history, and epidemiological information.  This test has not been FDA cleared or approved.    This test has been authorized by FDA under an Emergency Use Authorization  (EUA). This test is only authorized for the duration of time the  declaration that circumstances exist justifying the authorization of the  emergency use of an in vitro diagnostic tests for detection of SARS-CoV-2  virus and/or diagnosis of COVID-19 infection under section 564(b)(1) of  the Act, 21 U.S.C. 360bbb-3(b)(1), unless the authorization is terminated  or revoked sooner. The test has been validated but independent review by FDA  and CLIA is  pending.    Test performed using [employer] GeneXpert: This RT-PCR assay targets N2,  a region unique to SARS-CoV-2. A conserved region in the E-gene was chosen  for pan-Sarbecovirus detection which includes SARS-CoV-2.    According to CMS-2020-01-R, this platform meets the definition of high-throughput technology.    CBC and differential [[id number]]  (Abnormal) Collected: 02/18/24 1006    Lab Status: Final result Specimen: Blood from Arm, Right Updated: 02/18/24 1102     WBC 5.98 Thousand/uL      RBC 5.13 Million/uL      Hemoglobin 15.5 g/dL      Hematocrit 46.3 %      MCV 90 fL      MCH 30.2 pg      MCHC 33.5 g/dL      RDW 14.5 %      MPV 11.0 fL      Platelets 129 Thousands/uL     Manual Differential(PHLEBS Do Not Order) [[id number]]  (Abnormal) Collected: 02/18/24 1006    Lab Status: Final result Specimen: Blood from Arm, Right Updated: 02/18/24 1102     Segmented % 93 %      Bands % 3 %      Lymphocytes % 3 %      Monocytes % 1 %      Eosinophils, % 0 %      Basophils % 0 %      Absolute Neutrophils 5.74 Thousand/uL      Lymphocytes Absolute 0.18 Thousand/uL      Monocytes Absolute 0.06 Thousand/uL      Eosinophils Absolute 0.00 Thousand/uL      Basophils Absolute 0.00 Thousand/uL      Total Counted --     RBC Morphology Normal     Platelet Estimate Borderline    CMP [[id number]]  (Abnormal) Collected: 02/18/24 1006    Lab Status: Final result Specimen: Blood from Arm, Right Updated: 02/18/24 1046     Sodium 132 mmol/L      Potassium 4.1 mmol/L      Chloride 98 mmol/L      CO2 27 mmol/L      ANION GAP 7 mmol/L      BUN 24 mg/dL      Creatinine 1.39 mg/dL      Glucose 96 mg/dL      Calcium 8.7 mg/dL      AST 19 U/L      ALT 9 U/L      Alkaline Phosphatase 55 U/L      Total Protein 6.4 g/dL      Albumin 3.8 g/dL      Total Bilirubin 1.26 mg/dL      eGFR 46 ml/min/1.73sq m     Narrative:      National Kidney Disease Foundation guidelines for Chronic Kidney Disease (CKD):     Stage 1 with normal or high GFR (GFR > 90  mL/min/1.73 square meters)    Stage 2 Mild CKD (GFR = 60-89 mL/min/1.73 square meters)    Stage 3A Moderate CKD (GFR = 45-59 mL/min/1.73 square meters)    Stage 3B Moderate CKD (GFR = 30-44 mL/min/1.73 square meters)    Stage 4 Severe CKD (GFR = 15-29 mL/min/1.73 square meters)    Stage 5 End Stage CKD (GFR <15 mL/min/1.73 square meters)  Note: GFR calculation is accurate only with a steady state creatinine    Lipase [[id number]]  (Abnormal) Collected: 02/18/24 1006    Lab Status: Final result Specimen: Blood from Arm, Right Updated: 02/18/24 1046     Lipase <6 u/L                    CT abdomen pelvis with contrast   Final Result by Berna Stephens MD (02/18 1253)   Peribronchial groundglass opacities in the right lung, nonspecific, correlate to exclude symptoms of pneumonia.   No acute intra-abdominal pathology. No focal findings to account for right lower quadrant pain.   Diverticulosis coli. No evidence of diverticulitis.   Fat-containing left lumbar hernia.   Other findings, as per the body of the report.         Workstation performed: [id number]         X-ray chest 1 view portable    (Results Pending)         Procedures  ECG 12 Lead Documentation Only    Date/Time: 2/18/2024 1:11 PM    Performed by: Ronny uLong DO  Authorized by: Ronny Luong DO    Indications / Diagnosis:  Upper abd pain  Patient location:  ED  Interpretation:     Interpretation: normal    Rate:     ECG rate:  82    ECG rate assessment: normal    Rhythm:     Rhythm: sinus rhythm    Ectopy:     Ectopy: PAC    QRS:     QRS axis:  Normal    QRS intervals:  Normal  Conduction:     Conduction: normal    ST segments:     ST segments:  Normal  T waves:     T waves: normal          ED Course  ED Course as of 02/18/24 1737   Sun Feb 18, 2024   1048 CMP(!)  KAYLI, slight hyponatremia.  T. bili elevated.  Fluids given   1228 SARS-COV-2(!): Positive                             SBIRT 20yo+      Flowsheet Row Most Recent Value    Initial Alcohol Screen: US AUDIT-C     1. How often do you have a drink containing alcohol? 0 Filed at: 02/18/2024 0932   2. How many drinks containing alcohol do you have on a typical day you are drinking?  0 Filed at: 02/18/2024 0932   3b. FEMALE Any Age, or MALE 65+: How often do you have 4 or more drinks on one occassion? 0 Filed at: 02/18/2024 0932   Audit-C Score 0 Filed at: 02/18/2024 0932   JEANMARIE: How many times in the past year have you...    Used an illegal drug or used a prescription medication for non-medical reasons? Never Filed at: 02/18/2024 0932                  Medical Decision Making  Darin came to the ER due to abdominal pain, weakness soon after being diagnosed with COVID.  On arrival, he was in no obvious distress but uncomfortable.  Patient had tenderness to palpation on exam to his right side of his abdomen.  Due to this CT was ordered, negative for any acute intra-abdominal abnormalities.  Patient was given a liter of fluids due to an KAYLI found on metabolic panel.  Otherwise lab work was mostly unremarkable.  When attempting to ambulate patient, he got hypoxic in the low 80s, and needed 3 L nasal cannula to feel comfortable again.  He was  informed that symptoms are likely due to COVID since negative workup.  Patient was not trialed off and due to this he was admitted to [facility] for further evaluation and treatment.  Patient was admitted in stable condition.    Amount and/or Complexity of Data Reviewed  Labs: ordered. Decision-making details documented in ED Course.  Radiology: ordered.    Risk  OTC drugs.  Prescription drug management.  Decision regarding hospitalization.          Disposition  Final diagnoses:   COVID   Hypoxia   Abdominal pain     Time reflects when diagnosis was documented in both MDM as applicable and the Disposition within this note       Time User Action Codes Description Comment    2/18/2024  2:32 PM Ronny Luong Add [U07.1] COVID     2/18/2024  2:33 PM Ag  Ronny AMADOR Add [R09.02] Hypoxia     2/18/2024  2:33 PM Ronny Luong Add [R10.9] Abdominal pain           ED Disposition       ED Disposition   Admit    Condition   Stable    Date/Time   Sun Feb 18, 2024 1432    Comment   Case was discussed with SERINA and the patient's admission status was agreed to be Admission Status: inpatient status to the service of Dr. Verdugo.               Follow-up Information    None         Current Discharge Medication List        CONTINUE these medications which have NOT CHANGED    Details   allopurinol (ZYLOPRIM) 100 mg tablet Take 1 tablet (100 mg total) by mouth 2 (two) times a day  Qty: 180 tablet, Refills: 1    Associated Diagnoses: Hyperuricemia      amoxicillin (AMOXIL) 500 mg capsule Take 500 mg by mouth every 8 (eight) hours      aspirin (ECOTRIN LOW STRENGTH) 81 mg EC tablet Take 81 mg by mouth daily      benzonatate (TESSALON) 200 MG capsule Take 1 capsule (200 mg total) by mouth 3 (three) times a day as needed for cough  Qty: 20 capsule, Refills: 0    Associated Diagnoses: Acute cough      bromfenac sodium (Prolensa) 0.07 % SOLN instill 1 drop by ophthalmic route once a day into left eye starting when you get home from surgery for 28 days      Cholecalciferol (VITAMIN D3 PO) Take by mouth 2000 IU      diltiazem (CARDIZEM CD) 180 mg 24 hr capsule Take 1 capsule (180 mg total) by mouth daily  Qty: 90 capsule, Refills: 1    Associated Diagnoses: Benign essential hypertension      gabapentin (Neurontin) 400 mg capsule Take 1 capsule (400 mg total) by mouth 2 (two) times a day  Qty: 90 capsule, Refills: 1    Associated Diagnoses: Idiopathic peripheral neuropathy      levocetirizine (XYZAL) 5 MG tablet Take 1 tablet (5 mg total) by mouth every evening  Qty: 90 tablet, Refills: 4    Associated Diagnoses: Allergic rhinitis, unspecified seasonality, unspecified trigger      losartan (COZAAR) 100 MG tablet Take 1 tablet (100 mg total) by mouth daily  Qty: 90 tablet, Refills: 1     Associated Diagnoses: Primary hypertension      potassium chloride (Klor-Con M20) 20 mEq tablet TAKE 1 TABLET (20 MEQ TOTAL) BY MOUTH DAILY AS NEEDED (WHEN YOU TAKE LASIX.)      quiNINE (QUALAQUIN) 324 mg capsule Take 2 capsules (648 mg total) by mouth daily at bedtime  Qty: 30 capsule, Refills: 4    Associated Diagnoses: Leg cramping      tamsulosin (FLOMAX) 0.4 mg Take 1 capsule (0.4 mg total) by mouth daily with dinner  Qty: 90 capsule, Refills: 1    Associated Diagnoses: BPH with obstruction/lower urinary tract symptoms      traMADol-acetaminophen (ULTRACET) 37.5-325 mg per tablet Take 2 tablets by mouth every 6 (six) hours as needed for moderate pain  Qty: 60 tablet, Refills: 0    Associated Diagnoses: Idiopathic peripheral neuropathy           No discharge procedures on file.    PDMP Review         Value Time User    PDMP Reviewed  Yes 9/18/2023 11:46 AM Martín Latif MD             ED Provider  Attending physically available and evaluated Moreno Fournier Jr.. I managed the patient along with the ED Attending.    Electronically Signed by           Ronny Luong DO  02/18/24 9350

## 2024-02-18 NOTE — ASSESSMENT & PLAN NOTE
POA with cough, weakness and abdominal; noted to be hypoxic in the ED  Tested positive on 2/16/24. Fevers (103), malaise, cough and poor appetite  Mild pathway  Remdesivir x5 days  Decadron 6mg QD  Ceftriaxone QD  Trend procal and consider dc if negative x2 (first elevated to 0.19)

## 2024-02-18 NOTE — ASSESSMENT & PLAN NOTE
Has been passing hard stool intermittently over the last few days.  Feels bloated  Bowel regimen ordered

## 2024-02-18 NOTE — H&P
[facility]  H&P  Name: Moreno Fournier Jr. 83 y.o. male I MRN: [id number]  Unit/Bed#: BRIAN I Date of Admission: 2/18/2024   Date of Service: 2/18/2024 I Hospital Day: 0      Assessment/Plan   * COVID  Assessment & Plan  POA with cough, weakness and abdominal; noted to be hypoxic in the ED  Tested positive on 2/16/24. Fevers (103), malaise, cough and poor appetite  Mild pathway  Remdesivir x5 days  Decadron 6mg QD  Ceftriaxone QD  Trend procal and consider dc if negative x2 (first elevated to 0.19)      Acute hypoxic respiratory failure (HCC)  Assessment & Plan  D/t problem 1  Wean as able     Interstitial lung disease (HCC)  Assessment & Plan  Follows with pulm  No recent complications  Does not require daily inhalers.     Constipation  Assessment & Plan  Has been passing hard stool intermittently over the last few days.  Feels bloated  Bowel regimen ordered     Benign essential hypertension  Assessment & Plan  Hold ARB; resume when Cr normalizes   Cont diltiazem   Add hydralazin prn    Stage 3a chronic kidney disease (HCC) with elevated Cr.  Assessment & Plan  Lab Results   Component Value Date    EGFR 46 02/18/2024    EGFR 64 09/17/2023    EGFR 53 08/18/2023    CREATININE 1.39 (H) 02/18/2024    CREATININE 1.07 09/17/2023    CREATININE 1.24 08/18/2023   Mild elevation from baseline; does not meet KAYLI criteria  IVF and monitor for improvement  Avoid nephrotoxins          VTE Pharmacologic Prophylaxis: VTE Score: 5 High Risk (Score >/= 5) - Pharmacological DVT Prophylaxis Ordered: heparin. Sequential Compression Devices Ordered.  Code Status: FULL   Discussion with family: Updated  (wife) at bedside.    Anticipated Length of Stay: Patient will be admitted on an inpatient basis with an anticipated length of stay of greater than 2 midnights secondary to COVID with hypoxic respiratory failure .    Total Time Spent on Date of Encounter in care of patient: 60 mins. This time was spent  on one or more of the following: performing physical exam; counseling and coordination of care; obtaining or reviewing history; documenting in the medical record; reviewing/ordering tests, medications or procedures; communicating with other healthcare professionals and discussing with patient's family/caregivers.    Chief Complaint: Fevers, chills and abdominal pain    History of Present Illness:  Moreno Fournier Jr. is a 83 y.o. male with a PMH of prostate cancer, hypertension, gout  who presents with several days of fevers, chills and no abdominal pain.  Patient was seen in urgent care on Friday diagnosed with COVID.  At that time had a Tmax of 103.  Since that time he continues to feel unwell with worsening shortness of breath and cough that is productive.  Also complaining of abdominal pain and difficulty moving his bowels. Passed hard stool while in the ED.  Reports he generally moves his bowels every other day but recently has had even more trouble than usual.  No nausea or vomiting..    Review of Systems:  Review of Systems   Constitutional:  Positive for chills, fatigue and fever.   Respiratory:  Positive for cough and shortness of breath.    Gastrointestinal:  Positive for abdominal pain and constipation. Negative for vomiting.       Past Medical and Surgical History:   Past Medical History:   Diagnosis Date    Abnormal electrocardiogram     Last assessed: Oct 11, 2013    KAYLI (acute kidney injury) (HCC) 12/24/2015    Allergic rhinitis     last assessed: Oct 11, 2013    Allergic rhinitis due to pollen     last assessed: Oct 10, 2013    Allergic sinusitis     Last assessed: May 11, 2015    Allergy     resolved: July 22, 2015    Benign essential hypertension     Last assessed/resolved: May 31, 2017    BPH (benign prostatic hyperplasia)     Cancer (HCC)     prostate    Colitis     Last assessed: May 24, 2016    Cough with hemoptysis 11/17/2020    COVID-19 08/18/2022    Generalized osteoarthritis     Last assessed:  Oct 11, 2013    GERD without esophagitis     Last assessed/resolved: May 31, 2017    Hearing loss     Hiatal hernia     resolved: July 22, 2015    History of gastroesophageal reflux (GERD)     History of stomach ulcers     last assessed: May 11, 2015    Hypertension     Incomplete bladder emptying     Kidney stone     Nodular prostate with lower urinary tract symptoms     Nontoxic single thyroid nodule     last assessed: April 16, 2014    Orchalgia     Overweight     last assessed: Oct 31, 2013    Poor urinary stream     Pulmonary embolism (HCC)     Salivary gland disorder     last assessed: April 16, 2014    Seasonal allergies     last assessed: May 15, 2015    Spermatocele     Straining to void     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, 2014       Past Surgical History:   Procedure Laterality Date    BLADDER SURGERY      CHOLECYSTECTOMY  2000    laparoscopic     COLONOSCOPY      FLAP LOCAL TRUNK Left 10/28/2021    Procedure: EXCISION OF FLANK MASS;  Surgeon: Hemalatha Kirkpatrikc DO;  Location:  MAIN OR;  Service: Plastics    HEMORRHOID SURGERY      pilionidal cyst removal     HERNIA REPAIR Left 01/17/2008    inguinal     KNEE ARTHROSCOPY Left 1974    KNEE CARTILAGE SURGERY      NASAL SEPTUM SURGERY      Deviation repair     NV EXC PRTD BARBARA/PRTD GLND LAT DSJ&PRSRV FACIAL NR Right 06/09/2021    Procedure: SUPERFICIAL PAROTIDECTOMY WITH FACIAL NERVE MONITORING;  Surgeon: Omkar Dobbins MD;  Location: [facility];  Service: ENT    PROSTATE BIOPSY  2017    SKIN TAG REMOVAL  03/2023    on his eye    STOMACH SURGERY      TONSILLECTOMY AND ADENOIDECTOMY      at age 41    TOTAL SHOULDER ARTHROPLASTY      SHOULDER JOINT REPLACEMENT - right 01/04 0 left 01/95    UPPER GASTROINTESTINAL ENDOSCOPY      US GUIDED THYROID BIOPSY  01/19/2022    US GUIDED THYROID BIOPSY  05/26/2022    VASECTOMY         Meds/Allergies:  Prior to Admission medications    Medication Sig Start Date End Date Taking? Authorizing Provider   allopurinol  (ZYLOPRIM) 100 mg tablet Take 1 tablet (100 mg total) by mouth 2 (two) times a day 5/23/23   Laltiha Ty MD   amoxicillin (AMOXIL) 500 mg capsule Take 500 mg by mouth every 8 (eight) hours    Historical Provider, MD   aspirin (ECOTRIN LOW STRENGTH) 81 mg EC tablet Take 81 mg by mouth daily  Patient not taking: Reported on 12/26/2023    Historical Provider, MD   benzonatate (TESSALON) 200 MG capsule Take 1 capsule (200 mg total) by mouth 3 (three) times a day as needed for cough 2/16/24   CARLOS Kee   bromfenac sodium (Prolensa) 0.07 % SOLN instill 1 drop by ophthalmic route once a day into left eye starting when you get home from surgery for 28 days 1/24/24   Historical Provider, MD   Cholecalciferol (VITAMIN D3 PO) Take by mouth 2000 IU    Historical Provider, MD   diltiazem (CARDIZEM CD) 180 mg 24 hr capsule Take 1 capsule (180 mg total) by mouth daily 11/14/23   Lalitha Ty MD   gabapentin (Neurontin) 400 mg capsule Take 1 capsule (400 mg total) by mouth 2 (two) times a day 12/12/23   Lalitha Ty MD   levocetirizine (XYZAL) 5 MG tablet Take 1 tablet (5 mg total) by mouth every evening 5/31/23   Jailyn Leyva DO   losartan (COZAAR) 100 MG tablet Take 1 tablet (100 mg total) by mouth daily 11/14/23   Lalitha Ty MD   potassium chloride (Klor-Con M20) 20 mEq tablet TAKE 1 TABLET (20 MEQ TOTAL) BY MOUTH DAILY AS NEEDED (WHEN YOU TAKE LASIX.)  Patient not taking: Reported on 12/26/2023    Historical Provider, MD   quiNINE (QUALAQUIN) 324 mg capsule Take 2 capsules (648 mg total) by mouth every 8 (eight) hours 10/30/23 1/29/24  Lalitha Ty MD   quiNINE (QUALAQUIN) 324 mg capsule Take 2 capsules (648 mg total) by mouth daily at bedtime 12/26/23 6/23/24  Renuka Cabrera MD   tamsulosin (FLOMAX) 0.4 mg Take 1 capsule (0.4 mg total) by mouth daily with dinner 1/10/24   Lalitha Ty MD   traMADol-acetaminophen (ULTRACET) 37.5-325 mg per tablet Take 2 tablets by mouth every 6 (six)  hours as needed for moderate pain  Patient not taking: Reported on 2024   Prem Estrada DO     I have reviewed home medications using recent Epic encounter.    Allergies:   Allergies   Allergen Reactions    Other Anaphylaxis    Ace Inhibitors Hyperactivity       Social History:  Marital Status: /Civil Union   Occupation:   Patient Pre-hospital Living Situation: home   Patient Pre-hospital Level of Mobility: walks  Patient Pre-hospital Diet Restrictions:    Substance Use History:   Social History     Substance and Sexual Activity   Alcohol Use Not Currently    Comment: rare     Social History     Tobacco Use   Smoking Status Former    Current packs/day: 0.00    Average packs/day: 0.3 packs/day for 30.0 years (7.5 ttl pk-yrs)    Types: Cigarettes    Start date:     Quit date:     Years since quittin.1   Smokeless Tobacco Never     Social History     Substance and Sexual Activity   Drug Use No       Family History:  Family History   Problem Relation Age of Onset    Hypertension Mother     Stroke Mother     Stomach cancer Father     Hypertension Father     Hypertension Family     Stroke Family         syndrome     Heart attack Son        Physical Exam:     Vitals:   Blood Pressure: (!) 185/94 (24 1500)  Pulse: 70 (24 1500)  Temperature: 98.8 °F (37.1 °C) (24 0908)  Temp Source: Oral (24 0908)  Respirations: 20 (24 1500)  Weight - Scale: 105 kg (232 lb 9.4 oz) (24 0905)  SpO2: 95 % (24 1500)    Physical Exam  Vitals and nursing note reviewed.   Constitutional:       General: He is not in acute distress.     Appearance: Normal appearance. He is ill-appearing. He is not toxic-appearing or diaphoretic.   HENT:      Head: Normocephalic and atraumatic.   Cardiovascular:      Rate and Rhythm: Normal rate and regular rhythm.      Heart sounds: No murmur heard.  Pulmonary:      Effort: Pulmonary effort is normal. No respiratory distress.      Breath  sounds: No wheezing or rales.   Chest:      Chest wall: No tenderness.   Abdominal:      General: Abdomen is flat.      Palpations: Abdomen is soft.   Musculoskeletal:      Right lower leg: No edema.      Left lower leg: No edema.   Neurological:      General: No focal deficit present.      Mental Status: He is alert.      Cranial Nerves: No cranial nerve deficit.   Psychiatric:         Mood and Affect: Mood normal.         Behavior: Behavior normal.          Additional Data:     Lab Results:  Results from last 7 days   Lab Units 02/18/24  1006   WBC Thousand/uL 5.98   HEMOGLOBIN g/dL 15.5   HEMATOCRIT % 46.3   PLATELETS Thousands/uL 129*   BANDS PCT % 3   LYMPHO PCT % 3*   MONO PCT % 1*   EOS PCT % 0     Results from last 7 days   Lab Units 02/18/24  1006   SODIUM mmol/L 132*   POTASSIUM mmol/L 4.1   CHLORIDE mmol/L 98   CO2 mmol/L 27   BUN mg/dL 24   CREATININE mg/dL 1.39*   ANION GAP mmol/L 7   CALCIUM mg/dL 8.7   ALBUMIN g/dL 3.8   TOTAL BILIRUBIN mg/dL 1.26*   ALK PHOS U/L 55   ALT U/L 9   AST U/L 19   GLUCOSE RANDOM mg/dL 96                 Results from last 7 days   Lab Units 02/18/24  1006   PROCALCITONIN ng/ml 0.19       Lines/Drains:  Invasive Devices       Peripheral Intravenous Line  Duration             Peripheral IV 02/18/24 Right Antecubital <1 day                        Imaging: Reviewed radiology reports from this admission including: abdominal/pelvic CT  CT abdomen pelvis with contrast   Final Result by Berna Stephens MD (02/18 1253)   Peribronchial groundglass opacities in the right lung, nonspecific, correlate to exclude symptoms of pneumonia.   No acute intra-abdominal pathology. No focal findings to account for right lower quadrant pain.   Diverticulosis coli. No evidence of diverticulitis.   Fat-containing left lumbar hernia.   Other findings, as per the body of the report.         Workstation performed: [id number]         X-ray chest 1 view portable    (Results Pending)       EKG and Other  Studies Reviewed on Admission:   EKG: NSR. HR 82.    ** Please Note: This note has been constructed using a voice recognition system. **

## 2024-02-19 LAB
ALBUMIN SERPL BCP-MCNC: 3.8 G/DL (ref 3.5–5)
ALP SERPL-CCNC: 59 U/L (ref 34–104)
ALT SERPL W P-5'-P-CCNC: 7 U/L (ref 7–52)
ANION GAP SERPL CALCULATED.3IONS-SCNC: 8 MMOL/L
AST SERPL W P-5'-P-CCNC: 27 U/L (ref 13–39)
BASOPHILS # BLD AUTO: 0.03 THOUSANDS/ÂΜL (ref 0–0.1)
BASOPHILS NFR BLD AUTO: 1 % (ref 0–1)
BILIRUB SERPL-MCNC: 0.75 MG/DL (ref 0.2–1)
BUN SERPL-MCNC: 21 MG/DL (ref 5–25)
CALCIUM SERPL-MCNC: 8.7 MG/DL (ref 8.4–10.2)
CHLORIDE SERPL-SCNC: 104 MMOL/L (ref 96–108)
CO2 SERPL-SCNC: 22 MMOL/L (ref 21–32)
CREAT SERPL-MCNC: 1.05 MG/DL (ref 0.6–1.3)
EOSINOPHIL # BLD AUTO: 0 THOUSAND/ÂΜL (ref 0–0.61)
EOSINOPHIL NFR BLD AUTO: 0 % (ref 0–6)
ERYTHROCYTE [DISTWIDTH] IN BLOOD BY AUTOMATED COUNT: 14.1 % (ref 11.6–15.1)
GFR SERPL CREATININE-BSD FRML MDRD: 65 ML/MIN/1.73SQ M
GLUCOSE SERPL-MCNC: 129 MG/DL (ref 65–140)
HCT VFR BLD AUTO: 50.2 % (ref 36.5–49.3)
HGB BLD-MCNC: 16.7 G/DL (ref 12–17)
IMM GRANULOCYTES # BLD AUTO: 0.24 THOUSAND/UL (ref 0–0.2)
IMM GRANULOCYTES NFR BLD AUTO: 6 % (ref 0–2)
LYMPHOCYTES # BLD AUTO: 0.38 THOUSANDS/ÂΜL (ref 0.6–4.47)
LYMPHOCYTES NFR BLD AUTO: 9 % (ref 14–44)
MCH RBC QN AUTO: 30.4 PG (ref 26.8–34.3)
MCHC RBC AUTO-ENTMCNC: 33.3 G/DL (ref 31.4–37.4)
MCV RBC AUTO: 91 FL (ref 82–98)
MONOCYTES # BLD AUTO: 0.06 THOUSAND/ÂΜL (ref 0.17–1.22)
MONOCYTES NFR BLD AUTO: 1 % (ref 4–12)
NEUTROPHILS # BLD AUTO: 3.68 THOUSANDS/ÂΜL (ref 1.85–7.62)
NEUTS SEG NFR BLD AUTO: 83 % (ref 43–75)
NRBC BLD AUTO-RTO: 0 /100 WBCS
PLATELET # BLD AUTO: 117 THOUSANDS/UL (ref 149–390)
PMV BLD AUTO: 10.6 FL (ref 8.9–12.7)
POTASSIUM SERPL-SCNC: 5.1 MMOL/L (ref 3.5–5.3)
PROT SERPL-MCNC: 6.8 G/DL (ref 6.4–8.4)
RBC # BLD AUTO: 5.49 MILLION/UL (ref 3.88–5.62)
SODIUM SERPL-SCNC: 134 MMOL/L (ref 135–147)
WBC # BLD AUTO: 4.39 THOUSAND/UL (ref 4.31–10.16)

## 2024-02-19 PROCEDURE — 87205 SMEAR GRAM STAIN: CPT | Performed by: PHYSICIAN ASSISTANT

## 2024-02-19 PROCEDURE — 85027 COMPLETE CBC AUTOMATED: CPT | Performed by: HOSPITALIST

## 2024-02-19 PROCEDURE — 80053 COMPREHEN METABOLIC PANEL: CPT | Performed by: HOSPITALIST

## 2024-02-19 PROCEDURE — 87070 CULTURE OTHR SPECIMN AEROBIC: CPT | Performed by: PHYSICIAN ASSISTANT

## 2024-02-19 PROCEDURE — 99232 SBSQ HOSP IP/OBS MODERATE 35: CPT | Performed by: PHYSICIAN ASSISTANT

## 2024-02-19 RX ADMIN — POLYETHYLENE GLYCOL 3350 17 G: 17 POWDER, FOR SOLUTION ORAL at 09:03

## 2024-02-19 RX ADMIN — ALLOPURINOL 100 MG: 100 TABLET ORAL at 09:03

## 2024-02-19 RX ADMIN — HEPARIN SODIUM 5000 UNITS: 5000 INJECTION INTRAVENOUS; SUBCUTANEOUS at 05:06

## 2024-02-19 RX ADMIN — SENNOSIDES 8.6 MG: 8.6 TABLET, FILM COATED ORAL at 09:04

## 2024-02-19 RX ADMIN — REMDESIVIR 100 MG: 100 INJECTION, POWDER, LYOPHILIZED, FOR SOLUTION INTRAVENOUS at 16:46

## 2024-02-19 RX ADMIN — CEFTRIAXONE SODIUM 2000 MG: 10 INJECTION, POWDER, FOR SOLUTION INTRAVENOUS at 19:58

## 2024-02-19 RX ADMIN — HEPARIN SODIUM 5000 UNITS: 5000 INJECTION INTRAVENOUS; SUBCUTANEOUS at 22:19

## 2024-02-19 RX ADMIN — DILTIAZEM HYDROCHLORIDE 180 MG: 180 CAPSULE, COATED, EXTENDED RELEASE ORAL at 09:04

## 2024-02-19 RX ADMIN — TAMSULOSIN HYDROCHLORIDE 0.4 MG: 0.4 CAPSULE ORAL at 16:46

## 2024-02-19 RX ADMIN — ALLOPURINOL 100 MG: 100 TABLET ORAL at 17:02

## 2024-02-19 RX ADMIN — DEXAMETHASONE SODIUM PHOSPHATE 6 MG: 10 INJECTION INTRAMUSCULAR; INTRAVENOUS at 16:13

## 2024-02-19 RX ADMIN — HEPARIN SODIUM 5000 UNITS: 5000 INJECTION INTRAVENOUS; SUBCUTANEOUS at 14:26

## 2024-02-19 RX ADMIN — GABAPENTIN 400 MG: 400 CAPSULE ORAL at 09:04

## 2024-02-19 RX ADMIN — GABAPENTIN 400 MG: 400 CAPSULE ORAL at 17:02

## 2024-02-19 NOTE — UTILIZATION REVIEW
Initial Clinical Review    Admission: Date/Time/Statement:   Admission Orders (From admission, onward)       Ordered        02/18/24 1433  INPATIENT ADMISSION  Once                          Orders Placed This Encounter   Procedures    INPATIENT ADMISSION     Standing Status:   Standing     Number of Occurrences:   1     Order Specific Question:   Level of Care     Answer:   Med Surg [16]     Order Specific Question:   Estimated length of stay     Answer:   More than 2 Midnights     Order Specific Question:   Certification     Answer:   I certify that inpatient services are medically necessary for this patient for a duration of greater than two midnights. See H&P and MD Progress Notes for additional information about the patient's course of treatment.     ED Arrival Information       Expected   -    Arrival   2/18/2024 08:59    Acuity   Urgent              Means of arrival   Ambulance    Escorted by   [facility]    Service   Hospitalist    Admission type   Emergency              Arrival complaint   Covid/ Malaise             Chief Complaint   Patient presents with    Abdominal Pain     Per ems, pt comes from home c/o RUQ pain intermittent x3 days. Dx w covid 3 days ago. Denies chest pain/sob. Hx hernia.        Initial Presentation: 83 y.o. male presents to ed from home on 2-18 -24 via ems for evaluation and treatment of  abdominal pain.  Recently diagnosed with covid with current symptoms of cough and weakness. PMHX: KAYLI, HTN, prostate cancer.   Clinical assessment significant for hypertension.  D-DIMER 0.83, + COVID, , T BILI 1.26, CR 1.39.  Imaging shows ground glass opacities in R Lung.   Initially treated with iv .9% ns bolus, po tylenol x1.  Admit to inpatient med surg for Covid 19.  Start  sq heparin, iv ceftriaxone and  iv decadron, continue iv LR, 2L O2nc.    Date: 2-19-24    Day 2: inpatient med surg  IV LR completed at 0507.  Continue sq heparin,  iv ceftriaxone and iv decadron.   Start iv remdesivir daily.  Creatinine wnl.  Sodium improved to 134. Wean oxygen to room air. Monitor pulse oximetry. Collect sputum culture.  Give prn hydralazine for hypertension SBP> 180     Date: 2-20-24     Day 3: Has surpassed a 2nd midnight with active treatments and services, which include transition iv decadron to po, iv ceftriaxone to po cefdinir.            ED Triage Vitals   02/18/24 0908 02/18/24 0905 02/18/24 0905 02/18/24 0905 02/18/24 0905   98.8 °F (37.1 °C) 81 20 125/90 94 %      Oral Monitor         Pain Score       5          02/18/24 105 kg (232 lb 9.4 oz)     Additional Vital Signs:     Date/Time Temp Pulse Resp BP MAP (mmHg) SpO2 Nasal Cannula O2 Flow Rate (L/min)   02/20/24 09:44:07 -- -- -- 156/106 Abnormal  123 -- --   02/20/24 07:22:31 98.4 °F (36.9 °C) -- 18 173/108 Abnormal  130 -- --   02/20/24 00:25:24 -- -- -- 166/101 Abnormal  123 -- --   02/19/24 23:08:06 98.2 °F (36.8 °C) -- -- 163/100 121 -- --   02/19/24 15:04:55 99 °F (37.2 °C) -- 17 145/93 110 -- --   02/19/24 09:11:07 -- -- -- 139/103 Abnormal  115 -- --   02/19/24 0900 -- -- -- -- -- -- 2 L/min   02/19/24 08:38:10 97.9 °F (36.6 °C) -- 18 157/106 Abnormal  123 -- --   02/18/24 21:56:36 97.6 °F (36.4 °C) -- 16 155/97 116 -- --   02/18/24 20:34:57 -- -- -- 141/89 106 -- --   02/18/24 1607 98.8 °F (37.1 °C) -- -- 137/100 112 -- --   02/18/24 1500 -- 70 20 185/94 Abnormal  127 95 % 2 L/min   02/18/24 1400 -- 78 20 196/109 Abnormal  146 95 % 2 L/min    02/18/24 1245 -- 71 20 167/100 -- 96 % --   02/18/24 1030 -- 70 20 101/68 80 93 % --   02/18/24 0908 98.8 °F (37.1 °C) -- -- -- -- -- --   02/18/24 0905 -- 81 20 125/90 -- 94 % --       Pertinent Labs/Diagnostic Test Results:     CT abdomen pelvis with contrast   Final  (02/18 1253)   Peribronchial groundglass opacities in the right lung, nonspecific, correlate to exclude symptoms of pneumonia.   No acute intra-abdominal pathology. No focal findings to account for right lower  quadrant pain.   Diverticulosis coli. No evidence of diverticulitis.   Fat-containing left lumbar hernia.   X-ray chest 1 view portable   Final  (02/19 0911)      Cardiomegaly      No acute cardiopulmonary disease.        Results from last 7 days   Lab Units 02/18/24  1006   SARS-COV-2  Positive*     Results from last 7 days   Lab Units 02/19/24  0506 02/18/24  1006   WBC Thousand/uL 4.39 5.98   HEMOGLOBIN g/dL 16.7 15.5   HEMATOCRIT % 50.2* 46.3   PLATELETS Thousands/uL 117* 129*   NEUTROS ABS Thousands/µL 3.68  --    BANDS PCT %  --  3         Results from last 7 days   Lab Units 02/19/24  0506 02/18/24  1006   SODIUM mmol/L 134* 132*   POTASSIUM mmol/L 5.1 4.1   CHLORIDE mmol/L 104 98   CO2 mmol/L 22 27   ANION GAP mmol/L 8 7   BUN mg/dL 21 24   CREATININE mg/dL 1.05 1.39*   EGFR ml/min/1.73sq m 65 46   CALCIUM mg/dL 8.7 8.7     Results from last 7 days   Lab Units 02/19/24  0506 02/18/24  1006   AST U/L 27 19   ALT U/L 7 9   ALK PHOS U/L 59 55   TOTAL PROTEIN g/dL 6.8 6.4   ALBUMIN g/dL 3.8 3.8   TOTAL BILIRUBIN mg/dL 0.75 1.26*         Results from last 7 days   Lab Units 02/19/24  0506 02/18/24  1006   GLUCOSE RANDOM mg/dL 129 96     Results from last 7 days   Lab Units 02/18/24  1509   D-DIMER QUANTITATIVE ug/ml FEU 0.83*             Results from last 7 days   Lab Units 02/18/24  1006   PROCALCITONIN ng/ml 0.19     Results from last 7 days   Lab Units 02/18/24  1006   LIPASE u/L <6*       Results from last 7 days   Lab Units 02/18/24  1141   CLARITY UA  Clear   COLOR UA  Yellow   SPEC GRAV UA  >=1.050*   PH UA  6.0   GLUCOSE UA mg/dl Negative   KETONES UA mg/dl Trace*   BLOOD UA  Negative   PROTEIN UA mg/dl Trace*   NITRITE UA  Negative   BILIRUBIN UA  Negative   UROBILINOGEN UA (BE) mg/dl >=12.0*   LEUKOCYTES UA  Negative   WBC UA /hpf 1-2   RBC UA /hpf 2-4*   BACTERIA UA /hpf None Seen   EPITHELIAL CELLS WET PREP /hpf None Seen     Results from last 7 days   Lab Units 02/18/24  1006   INFLUENZA A PCR   Negative   INFLUENZA B PCR  Negative   RSV PCR  Negative       ED Treatment:   Medication Administration from 02/18/2024 0859 to 02/18/2024 1613         Date/Time Order Dose Route Action     02/18/2024 1142 EST sodium chloride 0.9 % bolus 1,000 mL 1,000 mL Intravenous New Bag     02/18/2024 1407 EST acetaminophen (TYLENOL) tablet 975 mg 975 mg Oral Given          Past Medical History:   Diagnosis Date    Abnormal electrocardiogram     Last assessed: Oct 11, 2013    KAYLI (acute kidney injury) (HCC) 12/24/2015    Allergic rhinitis     last assessed: Oct 11, 2013    Allergic rhinitis due to pollen     last assessed: Oct 10, 2013    Allergic sinusitis     Last assessed: May 11, 2015    Allergy     resolved: July 22, 2015    Benign essential hypertension     Last assessed/resolved: May 31, 2017    BPH (benign prostatic hyperplasia)     Cancer ([facility])     prostate    Colitis     Last assessed: May 24, 2016    Cough with hemoptysis 11/17/2020    COVID-19 08/18/2022    Generalized osteoarthritis     Last assessed: Oct 11, 2013    GERD without esophagitis     Last assessed/resolved: May 31, 2017    Hearing loss     Hiatal hernia     resolved: July 22, 2015    History of gastroesophageal reflux (GERD)     History of stomach ulcers     last assessed: May 11, 2015    Hypertension     Incomplete bladder emptying     Kidney stone     Nodular prostate with lower urinary tract symptoms     Nontoxic single thyroid nodule     last assessed: April 16, 2014    Orchalgia     Overweight     last assessed: Oct 31, 2013    Poor urinary stream     Pulmonary embolism (HCC)     Salivary gland disorder     last assessed: April 16, 2014    Seasonal allergies     last assessed: May 15, 2015    Spermatocele     Straining to void     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   Interstitial lung disease (HCC)   Acute hypoxic  respiratory failure (HCC)   COVID      Admitting Diagnosis:     Malaise [R53.81]  Abdominal pain [R10.9]  Hypoxia [R09.02]  COVID [U07.1]    Age/Sex: 83 y.o. male    Scheduled Medications:    allopurinol, 100 mg, Oral, BID  cefTRIAXone, 2,000 mg, Intravenous, Q24H  dexamethasone, 6 mg, Intravenous, Q24H  diltiazem, 180 mg, Oral, Daily  gabapentin, 400 mg, Oral, BID  heparin (porcine), 5,000 Units, Subcutaneous, Q8H YAMILEX  polyethylene glycol, 17 g, Oral, Daily  remdesivir, 100 mg, Intravenous, Q24H  senna, 1 tablet, Oral, Daily  tamsulosin, 0.4 mg, Oral, Daily With Dinner      Continuous IV Infusions:     PRN Meds:  acetaminophen, 650 mg, Oral, Q6H PRN  benzonatate, 200 mg, Oral, TID PRN  hydrALAZINE, 5 mg, Intravenous, Q6H PRN  ondansetron, 4 mg, Intravenous, Q6H PRN  traMADol, 25 mg, Oral, Q6H PRN  traMADol, 50 mg, Oral, Q6H PRN        None    Network Utilization Review Department  ATTENTION: Please call with any questions or concerns to [phone] and carefully listen to the prompts so that you are directed to the right person. All voicemails are confidential.   For Discharge needs, contact Care Management DC Support Team at [phone] opt. [phone]  Send all requests for admission clinical reviews, approved or denied determinations and any other requests to dedicated fax number below belonging to the campus where the patient is receiving treatment. List of dedicated fax numbers for the Facilities:  FACILITY NAME UR FAX NUMBER   ADMISSION DENIALS (Administrative/Medical Necessity) [phone]   DISCHARGE SUPPORT TEAM (NETWORK) [phone]   PARENT CHILD HEALTH (Maternity/NICU/Pediatrics) [phone]   [facility] [phone]   [facility] [phone]   [facility] [phone]   [facility] [phone]   [facility], [facility] [phone]   [facility] [phone]   [facility]  [facility] [phone]   [facility] [phone]   [facility] [phone]   [facility] [phone]   [facility] [phone]   [facility] [phone]

## 2024-02-19 NOTE — PLAN OF CARE
Problem: Prexisting or High Potential for Compromised Skin Integrity  Goal: Skin integrity is maintained or improved  Description: INTERVENTIONS:  - Identify patients at risk for skin breakdown  - Assess and monitor skin integrity  - Assess and monitor nutrition and hydration status  - Monitor labs   - Assess for incontinence   - Turn and reposition patient  - Assist with mobility/ambulation  - Relieve pressure over bony prominences  - Avoid friction and shearing  - Provide appropriate hygiene as needed including keeping skin clean and dry  - Evaluate need for skin moisturizer/barrier cream  - Collaborate with interdisciplinary team   - Patient/family teaching  - Consider wound care consult   2/18/2024 2050 by Lio Avitia RN  Outcome: Progressing  2/18/2024 2050 by Lio Avitia RN  Outcome: Progressing     Problem: PAIN - ADULT  Goal: Verbalizes/displays adequate comfort level or baseline comfort level  Description: Interventions:  - Encourage patient to monitor pain and request assistance  - Assess pain using appropriate pain scale  - Administer analgesics based on type and severity of pain and evaluate response  - Implement non-pharmacological measures as appropriate and evaluate response  - Consider cultural and social influences on pain and pain management  - Notify physician/advanced practitioner if interventions unsuccessful or patient reports new pain  Outcome: Progressing     Problem: INFECTION - ADULT  Goal: Absence or prevention of progression during hospitalization  Description: INTERVENTIONS:  - Assess and monitor for signs and symptoms of infection  - Monitor lab/diagnostic results  - Monitor all insertion sites, i.e. indwelling lines, tubes, and drains  - Monitor endotracheal if appropriate and nasal secretions for changes in amount and color  - [place] appropriate cooling/warming therapies per order  - Administer medications as ordered  - Instruct and encourage patient and family to  use good hand hygiene technique  - Identify and instruct in appropriate isolation precautions for identified infection/condition  Outcome: Progressing  Goal: Absence of fever/infection during neutropenic period  Description: INTERVENTIONS:  - Monitor WBC    Outcome: Progressing     Problem: SAFETY ADULT  Goal: Patient will remain free of falls  Description: INTERVENTIONS:  - Educate patient/family on patient safety including physical limitations  - Instruct patient to call for assistance with activity   - Consult OT/PT to assist with strengthening/mobility   - Keep Call bell within reach  - Keep bed low and locked with side rails adjusted as appropriate  - Keep care items and personal belongings within reach  - Initiate and maintain comfort rounds  - Make Fall Risk Sign visible to staff  - Offer Toileting every  Hours, in advance of need  - Initiate/Maintain alarm  - Obtain necessary fall risk management equipment:   - Apply yellow socks and bracelet for high fall risk patients  - Consider moving patient to room near nurses station  Outcome: Progressing  Goal: Maintain or return to baseline ADL function  Description: INTERVENTIONS:  -  Assess patient's ability to carry out ADLs; assess patient's baseline for ADL function and identify physical deficits which impact ability to perform ADLs (bathing, care of mouth/teeth, toileting, grooming, dressing, etc.)  - Assess/evaluate cause of self-care deficits   - Assess range of motion  - Assess patient's mobility; develop plan if impaired  - Assess patient's need for assistive devices and provide as appropriate  - Encourage maximum independence but intervene and supervise when necessary  - Involve family in performance of ADLs  - Assess for home care needs following discharge   - Consider OT consult to assist with ADL evaluation and planning for discharge  - Provide patient education as appropriate  Outcome: Progressing  Goal: Maintains/Returns to pre admission functional  level  Description: INTERVENTIONS:  - Perform AM-PAC 6 Click Basic Mobility/ Daily Activity assessment daily.  - Set and communicate daily mobility goal to care team and patient/family/caregiver.   - Collaborate with rehabilitation services on mobility goals if consulted  - Perform Range of Motion  times a day.  - Reposition patient every  hours.  - Dangle patient  times a day  - Stand patient  times a day  - Ambulate patient  times a day  - Out of bed to chair  times a day   - Out of bed for meals times a day  - Out of bed for toileting  - Record patient progress and toleration of activity level   Outcome: Progressing     Problem: DISCHARGE PLANNING  Goal: Discharge to home or other facility with appropriate resources  Description: INTERVENTIONS:  - Identify barriers to discharge w/patient and caregiver  - Arrange for needed discharge resources and transportation as appropriate  - Identify discharge learning needs (meds, wound care, etc.)  - Arrange for interpretive services to assist at discharge as needed  - Refer to Case Management Department for coordinating discharge planning if the patient needs post-hospital services based on physician/advanced practitioner order or complex needs related to functional status, cognitive ability, or social support system  Outcome: Progressing     Problem: Knowledge Deficit  Goal: Patient/family/caregiver demonstrates understanding of disease process, treatment plan, medications, and discharge instructions  Description: Complete learning assessment and assess knowledge base.  Interventions:  - Provide teaching at level of understanding  - Provide teaching via preferred learning methods  Outcome: Progressing

## 2024-02-19 NOTE — PLAN OF CARE
Problem: Prexisting or High Potential for Compromised Skin Integrity  Goal: Skin integrity is maintained or improved  Description: INTERVENTIONS:  - Identify patients at risk for skin breakdown  - Assess and monitor skin integrity  - Assess and monitor nutrition and hydration status  - Monitor labs   - Assess for incontinence   - Turn and reposition patient  - Assist with mobility/ambulation  - Relieve pressure over bony prominences  - Avoid friction and shearing  - Provide appropriate hygiene as needed including keeping skin clean and dry  - Evaluate need for skin moisturizer/barrier cream  - Collaborate with interdisciplinary team   - Patient/family teaching  - Consider wound care consult   Outcome: Progressing     Problem: PAIN - ADULT  Goal: Verbalizes/displays adequate comfort level or baseline comfort level  Description: Interventions:  - Encourage patient to monitor pain and request assistance  - Assess pain using appropriate pain scale  - Administer analgesics based on type and severity of pain and evaluate response  - Implement non-pharmacological measures as appropriate and evaluate response  - Consider cultural and social influences on pain and pain management  - Notify physician/advanced practitioner if interventions unsuccessful or patient reports new pain  Outcome: Progressing     Problem: INFECTION - ADULT  Goal: Absence or prevention of progression during hospitalization  Description: INTERVENTIONS:  - Assess and monitor for signs and symptoms of infection  - Monitor lab/diagnostic results  - Monitor all insertion sites, i.e. indwelling lines, tubes, and drains  - Monitor endotracheal if appropriate and nasal secretions for changes in amount and color  - [place] appropriate cooling/warming therapies per order  - Administer medications as ordered  - Instruct and encourage patient and family to use good hand hygiene technique  - Identify and instruct in appropriate isolation precautions for  identified infection/condition  Outcome: Progressing  Goal: Absence of fever/infection during neutropenic period  Description: INTERVENTIONS:  - Monitor WBC    Outcome: Progressing

## 2024-02-20 VITALS
BODY MASS INDEX: 36.43 KG/M2 | RESPIRATION RATE: 18 BRPM | HEART RATE: 70 BPM | TEMPERATURE: 98.4 F | SYSTOLIC BLOOD PRESSURE: 156 MMHG | DIASTOLIC BLOOD PRESSURE: 106 MMHG | WEIGHT: 232.59 LBS | OXYGEN SATURATION: 95 %

## 2024-02-20 PROCEDURE — 99239 HOSP IP/OBS DSCHRG MGMT >30: CPT | Performed by: PHYSICIAN ASSISTANT

## 2024-02-20 RX ORDER — CEFDINIR 300 MG/1
300 CAPSULE ORAL EVERY 12 HOURS SCHEDULED
Status: DISCONTINUED | OUTPATIENT
Start: 2024-02-20 | End: 2024-02-20 | Stop reason: HOSPADM

## 2024-02-20 RX ORDER — CEFDINIR 300 MG/1
300 CAPSULE ORAL EVERY 12 HOURS SCHEDULED
Qty: 4 CAPSULE | Refills: 0 | Status: SHIPPED | OUTPATIENT
Start: 2024-02-20 | End: 2024-02-22

## 2024-02-20 RX ORDER — POLYETHYLENE GLYCOL 3350 17 G/17G
17 POWDER, FOR SOLUTION ORAL DAILY
Qty: 30 EACH | Refills: 0 | Status: SHIPPED | OUTPATIENT
Start: 2024-02-21

## 2024-02-20 RX ORDER — ACETAMINOPHEN 325 MG/1
650 TABLET ORAL EVERY 6 HOURS PRN
Start: 2024-02-20

## 2024-02-20 RX ORDER — DEXAMETHASONE 6 MG/1
6 TABLET ORAL
Qty: 2 TABLET | Refills: 0 | Status: SHIPPED | OUTPATIENT
Start: 2024-02-21 | End: 2024-02-23

## 2024-02-20 RX ADMIN — DEXAMETHASONE 6 MG: 2 TABLET ORAL at 10:30

## 2024-02-20 RX ADMIN — DILTIAZEM HYDROCHLORIDE 180 MG: 180 CAPSULE, COATED, EXTENDED RELEASE ORAL at 08:18

## 2024-02-20 RX ADMIN — SENNOSIDES 8.6 MG: 8.6 TABLET, FILM COATED ORAL at 08:18

## 2024-02-20 RX ADMIN — GABAPENTIN 400 MG: 400 CAPSULE ORAL at 08:18

## 2024-02-20 RX ADMIN — CEFDINIR 300 MG: 300 CAPSULE ORAL at 10:30

## 2024-02-20 RX ADMIN — ALLOPURINOL 100 MG: 100 TABLET ORAL at 08:18

## 2024-02-20 RX ADMIN — HEPARIN SODIUM 5000 UNITS: 5000 INJECTION INTRAVENOUS; SUBCUTANEOUS at 05:50

## 2024-02-20 RX ADMIN — POLYETHYLENE GLYCOL 3350 17 G: 17 POWDER, FOR SOLUTION ORAL at 08:18

## 2024-02-20 NOTE — ASSESSMENT & PLAN NOTE
Lab Results   Component Value Date    EGFR 65 02/19/2024    EGFR 46 02/18/2024    EGFR 64 09/17/2023    CREATININE 1.05 02/19/2024    CREATININE 1.39 (H) 02/18/2024    CREATININE 1.07 09/17/2023   Mild elevation from baseline POA; did not meet KAYLI criteria  IVF given and ARB held--now resolved    Avoid nephrotoxins

## 2024-02-20 NOTE — PLAN OF CARE
Problem: PAIN - ADULT  Goal: Verbalizes/displays adequate comfort level or baseline comfort level  Description: Interventions:  - Encourage patient to monitor pain and request assistance  - Assess pain using appropriate pain scale  - Administer analgesics based on type and severity of pain and evaluate response  - Implement non-pharmacological measures as appropriate and evaluate response  - Consider cultural and social influences on pain and pain management  - Notify physician/advanced practitioner if interventions unsuccessful or patient reports new pain  Outcome: Progressing     Problem: INFECTION - ADULT  Goal: Absence or prevention of progression during hospitalization  Description: INTERVENTIONS:  - Assess and monitor for signs and symptoms of infection  - Monitor lab/diagnostic results  - Monitor all insertion sites, i.e. indwelling lines, tubes, and drains  - Monitor endotracheal if appropriate and nasal secretions for changes in amount and color  - [place] appropriate cooling/warming therapies per order  - Administer medications as ordered  - Instruct and encourage patient and family to use good hand hygiene technique  - Identify and instruct in appropriate isolation precautions for identified infection/condition  Outcome: Progressing     Problem: CARDIOVASCULAR - ADULT  Goal: Maintains optimal cardiac output and hemodynamic stability  Description: INTERVENTIONS:  - Monitor I/O, vital signs and rhythm  - Monitor for S/S and trends of decreased cardiac output  - Administer and titrate ordered vasoactive medications to optimize hemodynamic stability  - Assess quality of pulses, skin color and temperature  - Assess for signs of decreased coronary artery perfusion  - Instruct patient to report change in severity of symptoms  Outcome: Progressing     Problem: RESPIRATORY - ADULT  Goal: Achieves optimal ventilation and oxygenation  Description: INTERVENTIONS:  - Assess for changes in respiratory status  -  Assess for changes in mentation and behavior  - Position to facilitate oxygenation and minimize respiratory effort  - Oxygen administered by appropriate delivery if ordered  - Initiate smoking cessation education as indicated  - Encourage broncho-pulmonary hygiene including cough, deep breathe, Incentive Spirometry  - Assess the need for suctioning and aspirate as needed  - Assess and instruct to report SOB or any respiratory difficulty  - Respiratory Therapy support as indicated  Outcome: Progressing     Problem: SAFETY ADULT  Goal: Patient will remain free of falls  Description: INTERVENTIONS:  - Educate patient/family on patient safety including physical limitations  - Instruct patient to call for assistance with activity   - Consult OT/PT to assist with strengthening/mobility   - Keep Call bell within reach  - Keep bed low and locked with side rails adjusted as appropriate  - Keep care items and personal belongings within reach  - Initiate and maintain comfort rounds  - Make Fall Risk Sign visible to staff  - Offer Toileting every two hours, in advance of need  - Initiate/Maintain bed alarm  - Obtain necessary fall risk management equipment:   - Apply yellow socks and bracelet for high fall risk patients  - Consider moving patient to room near nurses station  Outcome: Progressing

## 2024-02-20 NOTE — DISCHARGE SUMMARY
"[facility]  Discharge- Moreno Fournier Jr. 1940, 83 y.o. male MRN: [id number]  Unit/Bed#: W -01 Encounter: [id number]  Primary Care Provider: Lalitha Ty MD   Date and time admitted to hospital: 2/18/2024  9:00 AM    * COVID-19  Assessment & Plan  POA with cough, weakness and abdominal discomfort ongoing for several days  noted to be hypoxic in the ED  CT (abd/pelvis) \"Peribronchial groundglass opacities in the right lung, nonspecific, correlate to exclude symptoms of pneumonia.\"  Tested positive on 2/16/24. Fevers (103), malaise, cough and poor appetite  Mild pathway started  Remdesivir given x 2 days  Decadron 6mg QD change to PO to complete 5 day course (weaned off O2)  Ceftriaxone QD change to PO cefdinir to complete 5 day course  Procal neg x 1 at 0.19 and was not rechecked      Acute hypoxic respiratory failure (HCC)  Assessment & Plan  D/t covid  D-dimer age adjusted to be normal  Initially required 2 L nasal cannula oxygen but has been successfully weaned off    Stage 3a chronic kidney disease (HCC) with elevated Cr.  Assessment & Plan  Lab Results   Component Value Date    EGFR 65 02/19/2024    EGFR 46 02/18/2024    EGFR 64 09/17/2023    CREATININE 1.05 02/19/2024    CREATININE 1.39 (H) 02/18/2024    CREATININE 1.07 09/17/2023   Mild elevation from baseline POA; did not meet KAYLI criteria  IVF given and ARB held--now resolved    Avoid nephrotoxins     Constipation  Assessment & Plan  Reported passing hard stool and felt bloated  Resolved with bowel regimen     Interstitial lung disease (HCC)  Assessment & Plan  Follows with pulm  No recent complications  Does not require daily inhalers.     Benign essential hypertension  Assessment & Plan  Blood pressure noted to be elevated at this time.  Resume Cozaar  Cont diltiazem         Medical Problems       Resolved Problems  Date Reviewed: 2/20/2024   None       Discharging Physician / Practitioner: Stacy Rios PA-C  PCP: " Lalitha Ty MD  Admission Date:   Admission Orders (From admission, onward)       Ordered        02/18/24 1433  INPATIENT ADMISSION  Once                          Discharge Date: 02/20/24    Consultations During Hospital Stay:  none    Procedures Performed:   CT A/P  CXR    Significant Findings / Test Results:   As above    Incidental Findings:       Test Results Pending at Discharge (will require follow up):   none     Outpatient Tests Requested:  bmp    Complications:      Reason for Admission: weakness and cough    Hospital Course:   Moreno Fournier Jr. is a 83 y.o. male patient who originally presented to the hospital on 2/18/2024 due to cough and weakness.  He was found to test positive for COVID.  He was started on remdesivir as well as Decadron due to mild hypoxia in the ER plus ceftriaxone.  Imaging revealed possible evidence of pneumonia.  Procalcitonin was noted to be 0.19 and was not repeated.  Patient was successfully weaned off of oxygen.  It was felt to be judicious to provide a 5-day total course of antibiotics and steroids overall given his age.  Mildly elevated troponin not meeting criteria for KAYLI was noted on admission but resolved with IV fluids.  He does have underlying hypertension which should improve with resuming Cozaar.  In addition on admission he had significant constipation which was also evident on imaging and resolved with bowel regimen.    Please see above list of diagnoses and related plan for additional information.     Condition at Discharge: stable    Discharge Day Visit / Exam:   Subjective: Communicated with patient using eye hear koko due to significant hearing impairment.  Patient feeling better overall. Didn't sleep well but says that his unusual for him.  Not reporting any shortness of breath or severe cough and says he was weaned off of oxygen successfully.  He has been out of bed ambulating and eating and drinking well.  He feels ready to go home.  He reports that his  abdominal discomfort and bloating has improved since having a bowel movement.  Vitals: Blood Pressure: (!) 156/106 (02/20/24 0944)  Pulse: 70 (02/18/24 1500)  Temperature: 98.4 °F (36.9 °C) (02/20/24 0722)  Temp Source: Oral (02/18/24 1607)  Respirations: 18 (02/20/24 0722)  Weight - Scale: 105 kg (232 lb 9.4 oz) (02/18/24 0905)  SpO2: 95 % (02/18/24 1500)  Exam:   Physical Exam  Vitals reviewed.   Constitutional:       General: He is not in acute distress.     Appearance: Normal appearance. He is not ill-appearing, toxic-appearing or diaphoretic.   HENT:      Nose: No congestion or rhinorrhea.   Eyes:      General: No scleral icterus.        Right eye: No discharge.         Left eye: No discharge.   Cardiovascular:      Rate and Rhythm: Normal rate and regular rhythm.      Heart sounds: No murmur heard.  Pulmonary:      Effort: No respiratory distress.      Breath sounds: No stridor. No wheezing, rhonchi or rales.      Comments: No cough, dyspnea, tachypnea.  Not requiring any oxygen  Abdominal:      General: Bowel sounds are normal. There is no distension.      Palpations: Abdomen is soft.      Tenderness: There is no abdominal tenderness.   Musculoskeletal:      Right lower leg: No edema.      Left lower leg: No edema.   Skin:     General: Skin is warm and dry.      Coloration: Skin is not jaundiced or pale.      Findings: No bruising, erythema, lesion or rash.   Neurological:      General: No focal deficit present.      Mental Status: He is alert. Mental status is at baseline.      Comments: Awake alert and interactive he was seen sitting up in bedside chair, well-appearing with no confusion   Psychiatric:         Mood and Affect: Mood normal.         Thought Content: Thought content normal.          Discussion with Family: Updated  (wife) via phone.    Discharge instructions/Information to patient and family:   See after visit summary for information provided to patient and family.      Provisions  for Follow-Up Care:  See after visit summary for information related to follow-up care and any pertinent home health orders.      Mobility at time of Discharge:   Basic Mobility Inpatient Raw Score: 21  JH-HLM Goal: 6: Walk 10 steps or more  JH-HLM Achieved: 6: Walk 10 steps or more  HLM Goal achieved. Continue to encourage appropriate mobility.     Disposition:   Home    Planned Readmission: none     Discharge Statement:  I spent 35 minutes discharging the patient. This time was spent on the day of discharge. I had direct contact with the patient on the day of discharge. Greater than 50% of the total time was spent examining patient, answering all patient questions, arranging and discussing plan of care with patient as well as directly providing post-discharge instructions.  Additional time then spent on discharge activities. Spoke with RN and case mgmt    Discharge Medications:  See after visit summary for reconciled discharge medications provided to patient and/or family.      **Please Note: This note may have been constructed using a voice recognition system**

## 2024-02-20 NOTE — PLAN OF CARE
Problem: Prexisting or High Potential for Compromised Skin Integrity  Goal: Skin integrity is maintained or improved  Description: INTERVENTIONS:  - Identify patients at risk for skin breakdown  - Assess and monitor skin integrity  - Assess and monitor nutrition and hydration status  - Monitor labs   - Assess for incontinence   - Turn and reposition patient  - Assist with mobility/ambulation  - Relieve pressure over bony prominences  - Avoid friction and shearing  - Provide appropriate hygiene as needed including keeping skin clean and dry  - Evaluate need for skin moisturizer/barrier cream  - Collaborate with interdisciplinary team   - Patient/family teaching  - Consider wound care consult   Outcome: Adequate for Discharge     Problem: PAIN - ADULT  Goal: Verbalizes/displays adequate comfort level or baseline comfort level  Description: Interventions:  - Encourage patient to monitor pain and request assistance  - Assess pain using appropriate pain scale  - Administer analgesics based on type and severity of pain and evaluate response  - Implement non-pharmacological measures as appropriate and evaluate response  - Consider cultural and social influences on pain and pain management  - Notify physician/advanced practitioner if interventions unsuccessful or patient reports new pain  Outcome: Adequate for Discharge     Problem: INFECTION - ADULT  Goal: Absence or prevention of progression during hospitalization  Description: INTERVENTIONS:  - Assess and monitor for signs and symptoms of infection  - Monitor lab/diagnostic results  - Monitor all insertion sites, i.e. indwelling lines, tubes, and drains  - Monitor endotracheal if appropriate and nasal secretions for changes in amount and color  - [place] appropriate cooling/warming therapies per order  - Administer medications as ordered  - Instruct and encourage patient and family to use good hand hygiene technique  - Identify and instruct in appropriate isolation  precautions for identified infection/condition  Outcome: Adequate for Discharge  Goal: Absence of fever/infection during neutropenic period  Description: INTERVENTIONS:  - Monitor WBC    Outcome: Adequate for Discharge     Problem: SAFETY ADULT  Goal: Patient will remain free of falls  Description: INTERVENTIONS:  - Educate patient/family on patient safety including physical limitations  - Instruct patient to call for assistance with activity   - Consult OT/PT to assist with strengthening/mobility   - Keep Call bell within reach  - Keep bed low and locked with side rails adjusted as appropriate  - Keep care items and personal belongings within reach  - Initiate and maintain comfort rounds  - Make Fall Risk Sign visible to staff  - Offer Toileting every  Hours, in advance of need  - Initiate/Maintain alarm  - Obtain necessary fall risk management equipment:   - Apply yellow socks and bracelet for high fall risk patients  - Consider moving patient to room near nurses station  Outcome: Adequate for Discharge  Goal: Maintain or return to baseline ADL function  Description: INTERVENTIONS:  -  Assess patient's ability to carry out ADLs; assess patient's baseline for ADL function and identify physical deficits which impact ability to perform ADLs (bathing, care of mouth/teeth, toileting, grooming, dressing, etc.)  - Assess/evaluate cause of self-care deficits   - Assess range of motion  - Assess patient's mobility; develop plan if impaired  - Assess patient's need for assistive devices and provide as appropriate  - Encourage maximum independence but intervene and supervise when necessary  - Involve family in performance of ADLs  - Assess for home care needs following discharge   - Consider OT consult to assist with ADL evaluation and planning for discharge  - Provide patient education as appropriate  Outcome: Adequate for Discharge  Goal: Maintains/Returns to pre admission functional level  Description: INTERVENTIONS:  -  Perform AM-PAC 6 Click Basic Mobility/ Daily Activity assessment daily.  - Set and communicate daily mobility goal to care team and patient/family/caregiver.   - Collaborate with rehabilitation services on mobility goals if consulted  - Perform Range of Motion  times a day.  - Reposition patient every  hours.  - Dangle patient  times a day  - Stand patient  times a day  - Ambulate patient  times a day  - Out of bed to chair  times a day   - Out of bed for meals times a day  - Out of bed for toileting  - Record patient progress and toleration of activity level   Outcome: Adequate for Discharge     Problem: DISCHARGE PLANNING  Goal: Discharge to home or other facility with appropriate resources  Description: INTERVENTIONS:  - Identify barriers to discharge w/patient and caregiver  - Arrange for needed discharge resources and transportation as appropriate  - Identify discharge learning needs (meds, wound care, etc.)  - Arrange for interpretive services to assist at discharge as needed  - Refer to Case Management Department for coordinating discharge planning if the patient needs post-hospital services based on physician/advanced practitioner order or complex needs related to functional status, cognitive ability, or social support system  Outcome: Adequate for Discharge     Problem: Knowledge Deficit  Goal: Patient/family/caregiver demonstrates understanding of disease process, treatment plan, medications, and discharge instructions  Description: Complete learning assessment and assess knowledge base.  Interventions:  - Provide teaching at level of understanding  - Provide teaching via preferred learning methods  Outcome: Adequate for Discharge     Problem: CARDIOVASCULAR - ADULT  Goal: Maintains optimal cardiac output and hemodynamic stability  Description: INTERVENTIONS:  - Monitor I/O, vital signs and rhythm  - Monitor for S/S and trends of decreased cardiac output  - Administer and titrate ordered vasoactive  medications to optimize hemodynamic stability  - Assess quality of pulses, skin color and temperature  - Assess for signs of decreased coronary artery perfusion  - Instruct patient to report change in severity of symptoms  Outcome: Adequate for Discharge  Goal: Absence of cardiac dysrhythmias or at baseline rhythm  Description: INTERVENTIONS:  - Continuous cardiac monitoring, vital signs, obtain 12 lead EKG if ordered  - Administer antiarrhythmic and heart rate control medications as ordered  - Monitor electrolytes and administer replacement therapy as ordered  Outcome: Adequate for Discharge     Problem: RESPIRATORY - ADULT  Goal: Achieves optimal ventilation and oxygenation  Description: INTERVENTIONS:  - Assess for changes in respiratory status  - Assess for changes in mentation and behavior  - Position to facilitate oxygenation and minimize respiratory effort  - Oxygen administered by appropriate delivery if ordered  - Initiate smoking cessation education as indicated  - Encourage broncho-pulmonary hygiene including cough, deep breathe, Incentive Spirometry  - Assess the need for suctioning and aspirate as needed  - Assess and instruct to report SOB or any respiratory difficulty  - Respiratory Therapy support as indicated  Outcome: Adequate for Discharge

## 2024-02-20 NOTE — ASSESSMENT & PLAN NOTE
"POA with cough, weakness and abdominal discomfort ongoing for several days  noted to be hypoxic in the ED  CT (abd/pelvis) \"Peribronchial groundglass opacities in the right lung, nonspecific, correlate to exclude symptoms of pneumonia.\"  Tested positive on 2/16/24. Fevers (103), malaise, cough and poor appetite  Mild pathway started  Remdesivir given x 2 days  Decadron 6mg QD change to PO to complete 5 day course (weaned off O2)  Ceftriaxone QD change to PO cefdinir to complete 5 day course  Procal neg x 1 at 0.19 and was not rechecked    "

## 2024-02-20 NOTE — ASSESSMENT & PLAN NOTE
D/t covid  D-dimer age adjusted to be normal  Initially required 2 L nasal cannula oxygen but has been successfully weaned off

## 2024-02-20 NOTE — CASE MANAGEMENT
Case Management Discharge Planning Note    Patient name Moreno Fournier Jr.  Location W /W -01 MRN [id number]  : 1940 Date 2024       Current Admission Date: 2024  Current Admission Diagnosis:COVID   Patient Active Problem List    Diagnosis Date Noted    Acute hypoxic respiratory failure (HCC) 2024    Constipation 2024    Chronic venous insufficiency of lower extremity 2023    Varicose veins of both lower extremities with pain 2023    Leg cramping 2023    Peripheral vascular disease (HCC) 2022    Bilateral lower extremity edema 2022    Class 2 obesity due to excess calories without serious comorbidity with body mass index (BMI) of 36.0 to 36.9 in adult 2022    COVID 2022    Anxiety 04/10/2022    Acute idiopathic gout of left foot 2021    Continuous opioid dependence (HCC) 2021    Disorder of adrenal gland, unspecified (HCC) 2021    Stage 3a chronic kidney disease (HCC) with elevated Cr. 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    Vasomotor rhinitis 2020    Post-nasal drip 2020    Radiculopathy of leg 03/10/2020    Pain in both lower extremities 2020    Back pain 2020    Swelling of right thumb 2019    Right elbow pain 2019    Idiopathic peripheral neuropathy 10/22/2019    Localized primary osteoarthritis of wrist 2019    Pain and swelling of right knee 2019    Current tear of medial cartilage or meniscus of knee 2019    Interstitial lung disease (HCC) 2019    Need for shingles vaccine 2018    Prostate cancer (HCC) 2017    Benign prostatic hyperplasia (BPH) with straining on urination 2017    Benign essential hypertension 2017    Gastroesophageal reflux disease without esophagitis 2017    Seasonal allergic rhinitis due to  pollen 05/31/2017    Truncal obesity 01/13/2017    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 [address]  Phone: [phone] Fax: [phone]    [facility]/pharmacy #[id number] [address] [address]  Phone: [phone] Fax: [phone]    Primary Care Provider: Lalitha Ty MD    Primary Insurance: MARLIN CONRAD  Secondary Insurance:     DISCHARGE DETAILS:     IMM Given (Date):: 02/19/24 (original copy given within 48hrs of d/c)     Additional Comments: Patient admitted to [facility] due to COVID. Patient to d/c home today with no CM needs.

## 2024-02-20 NOTE — ASSESSMENT & PLAN NOTE
"POA with cough, weakness and abdominal discomfort ongoing for several days  noted to be hypoxic in the ED  CT (abd/pelvis) \"Peribronchial groundglass opacities in the right lung, nonspecific, correlate to exclude symptoms of pneumonia.\"  Tested positive on 2/16/24. Fevers (103), malaise, cough and poor appetite  Mild pathway started  Remdesivir given x 2 days  Decadron 6mg QD being provided  Ceftriaxone QD provided  Procal neg x 1 at 0.19     "

## 2024-02-20 NOTE — PROGRESS NOTES
"[facility]  Progress Note  Name: Moreno Venegas I  MRN: [id number]  Unit/Bed#: W -01 I Date of Admission: 2/18/2024   Date of Service: 2/20/2024 I Hospital Day: 2    Assessment/Plan   Constipation  Assessment & Plan  Reported passing hard stool and felt bloated  Resolved with bowel regimen     Acute hypoxic respiratory failure (HCC)  Assessment & Plan  D/t covid  D-dimer age adjusted to be normal  Initially required 2 L nasal cannula oxygen > weaned to 1L in my presence    Stage 3a chronic kidney disease (HCC) with elevated Cr.  Assessment & Plan  Lab Results   Component Value Date    EGFR 65 02/19/2024    EGFR 46 02/18/2024    EGFR 64 09/17/2023    CREATININE 1.05 02/19/2024    CREATININE 1.39 (H) 02/18/2024    CREATININE 1.07 09/17/2023   Mild elevation from baseline POA; did not meet KAYLI criteria  IVF given and ARB held--now resolved  Avoid nephrotoxins     Interstitial lung disease (HCC)  Assessment & Plan  Follows with pulm  No recent complications  Does not require daily inhalers.     Benign essential hypertension  Assessment & Plan  Cozaar was being held  Cont diltiazem       * COVID-19  Assessment & Plan  POA with cough, weakness and abdominal discomfort ongoing for several days  noted to be hypoxic in the ED  CT (abd/pelvis) \"Peribronchial groundglass opacities in the right lung, nonspecific, correlate to exclude symptoms of pneumonia.\"  Tested positive on 2/16/24. Fevers (103), malaise, cough and poor appetite  Mild pathway started  Remdesivir given x 2 days  Decadron 6mg QD being provided  Ceftriaxone QD provided  Procal neg x 1 at 0.19                  VTE Pharmacologic Prophylaxis: VTE Score: 5 High Risk (Score >/= 5) - Pharmacological DVT Prophylaxis Ordered: heparin. Sequential Compression Devices Ordered.    Mobility:   Basic Mobility Inpatient Raw Score: 21  JH-HLM Goal: 6: Walk 10 steps or more  JH-HLM Achieved: 6: Walk 10 steps or more  HLM Goal achieved. " Continue to encourage appropriate mobility.    Patient Centered Rounds: I performed bedside rounds with nursing staff today.   Discussions with Specialists or Other Care Team Provider: case management    Education and Discussions with Family / Patient: Patient declined call to .     Total Time Spent on Date of Encounter in care of patient: 40 mins. This time was spent on one or more of the following: performing physical exam; counseling and coordination of care; obtaining or reviewing history; documenting in the medical record; reviewing/ordering tests, medications or procedures; communicating with other healthcare professionals and discussing with patient's family/caregivers.    Current Length of Stay: 2 day(s)  Current Patient Status: Inpatient   Certification Statement: The patient will continue to require additional inpatient hospital stay due to covid medications, hypoxia  Discharge Plan: Anticipate discharge tomorrow to home.    Code Status: Level 1 - Full Code    Subjective:   Patient reports he feels significantly improved today. He does not normally wear oxygen therapy.     Objective:     Vitals:   Temp (24hrs), Av.5 °F (36.9 °C), Min:98.2 °F (36.8 °C), Max:99 °F (37.2 °C)    Temp:  [98.2 °F (36.8 °C)-99 °F (37.2 °C)] 98.4 °F (36.9 °C)  Resp:  [17-18] 18  BP: (145-173)/() 156/106  Body mass index is 36.43 kg/m².     Input and Output Summary (last 24 hours):     Intake/Output Summary (Last 24 hours) at 2024 1158  Last data filed at 2024 0500  Gross per 24 hour   Intake --   Output 750 ml   Net -750 ml       Physical Exam:   Physical Exam  Vitals and nursing note reviewed.   HENT:      Head: Normocephalic and atraumatic.   Cardiovascular:      Rate and Rhythm: Normal rate and regular rhythm.      Pulses: Normal pulses.      Heart sounds: Normal heart sounds.   Pulmonary:      Effort: Pulmonary effort is normal.      Breath sounds: Normal breath sounds.   Abdominal:       General: Bowel sounds are normal.      Palpations: Abdomen is soft.   Musculoskeletal:      Right lower leg: No edema.      Left lower leg: No edema.   Neurological:      Mental Status: He is alert and oriented to person, place, and time. Mental status is at baseline.          Additional Data:     Labs:  Results from last 7 days   Lab Units 02/19/24  0506 02/18/24  1006   WBC Thousand/uL 4.39 5.98   HEMOGLOBIN g/dL 16.7 15.5   HEMATOCRIT % 50.2* 46.3   PLATELETS Thousands/uL 117* 129*   BANDS PCT %  --  3   NEUTROS PCT % 83*  --    LYMPHS PCT % 9*  --    LYMPHO PCT %  --  3*   MONOS PCT % 1*  --    MONO PCT %  --  1*   EOS PCT % 0 0     Results from last 7 days   Lab Units 02/19/24  0506   SODIUM mmol/L 134*   POTASSIUM mmol/L 5.1   CHLORIDE mmol/L 104   CO2 mmol/L 22   BUN mg/dL 21   CREATININE mg/dL 1.05   ANION GAP mmol/L 8   CALCIUM mg/dL 8.7   ALBUMIN g/dL 3.8   TOTAL BILIRUBIN mg/dL 0.75   ALK PHOS U/L 59   ALT U/L 7   AST U/L 27   GLUCOSE RANDOM mg/dL 129                 Results from last 7 days   Lab Units 02/18/24  1006   PROCALCITONIN ng/ml 0.19       Lines/Drains:  Invasive Devices       None                         Imaging: No pertinent imaging reviewed.    Recent Cultures (last 7 days):         Last 24 Hours Medication List:   Current Facility-Administered Medications   Medication Dose Route Frequency Provider Last Rate    acetaminophen  650 mg Oral Q6H PRN Joseph Verdugo MD      allopurinol  100 mg Oral BID Joseph Verdugo MD      benzonatate  200 mg Oral TID PRN Joseph Verdugo MD      cefdinir  300 mg Oral Q12H [facility] Stacy Rios PA-C      diltiazem  180 mg Oral Daily Joseph Verdugo MD      gabapentin  400 mg Oral BID Joseph Verdugo MD      heparin (porcine)  5,000 Units Subcutaneous Q8H [facility] Joseph Verdugo MD      hydrALAZINE  5 mg Intravenous Q6H PRN Joseph Verdugo MD      ondansetron  4 mg Intravenous Q6H PRN Joseph Verdugo MD      polyethylene glycol  17 g Oral Daily Joseph Verdugo MD      remdesivir  100 mg  Intravenous Q24H Joseph Verdugo MD      senna  1 tablet Oral Daily Joseph Verdugo MD      tamsulosin  0.4 mg Oral Daily With Dinner Joseph Verdugo MD      traMADol  25 mg Oral Q6H PRN Joseph Verdugo MD      traMADol  50 mg Oral Q6H PRN Joseph Verdugo MD          Today, Patient Was Seen By: Sanaz Nolasco PA-C    **Please Note: This note may have been constructed using a voice recognition system.**

## 2024-02-20 NOTE — ASSESSMENT & PLAN NOTE
D/t covid  D-dimer age adjusted to be normal  Initially required 2 L nasal cannula oxygen > weaned to 1L in my presence

## 2024-02-20 NOTE — DISCHARGE INSTR - AVS FIRST PAGE
Dear Moreno Fournier Jr.,     [facility]  It is our hope that we were always able to exceed the expected standards for your care during your stay.  You were hospitalized due to COVID.  You were cared for on the West fourth floor by Stacy Rios PA-C under the service of Armida Arboleda MD with the [facility] who covers for your primary care physician (PCP), Lalitha Ty MD, while you were hospitalized.  If you have any questions or concerns related to this hospitalization, you may contact us at .  For follow up as well as any medication refills, we recommend that you follow up with your primary care physician.  A registered nurse will reach out to you by phone within a few days after your discharge to answer any additional questions that you may have after going home.  However, at this time we provide for you here, the most important instructions / recommendations at discharge:     Notable Medication Adjustments -   Finish course of steroids and antibiotics as directed  Recommend daily dose of MiraLAX to prevent constipation.  You can alter how much you take if a full dose is too much try taking half a dose  Testing Required after Discharge -   Family doctor may want to repeat a chest x-ray in 4 to 6 weeks to make sure pneumonia has completely resolved  Recheck blood pressure with pcp this week   ** Please contact your PCP to request testing orders for any of the testing recommended here **  Important follow up information -   Family doctor  Other Instructions -   Stay hydrated  Please review this entire after visit summary as additional general instructions including medication list, appointments, activity, diet, any pertinent wound care, and other additional recommendations from your care team that may be provided for you.      Sincerely,     Stacy Rios PA-C-

## 2024-02-20 NOTE — NURSING NOTE
Pt aox4 on ra denies pain. Pt is [place] so education was reviewed with patient and his spouse. Information documented on AVS. IV was already removed by primary nurse. Monitors disconnected no lines connected. Personal items collected by pt and spouse. Pt refused wheel chair and states he should walk. Pt gait in wnl. Pt dc via walking to car with spouse to home.

## 2024-02-21 ENCOUNTER — TRANSITIONAL CARE MANAGEMENT (OUTPATIENT)
Age: 84
End: 2024-02-21

## 2024-02-21 LAB
BACTERIA SPT RESP CULT: ABNORMAL
GRAM STN SPEC: ABNORMAL

## 2024-02-22 ENCOUNTER — OFFICE VISIT (OUTPATIENT)
Dept: INTERNAL MEDICINE CLINIC | Age: 84
End: 2024-02-22
Payer: COMMERCIAL

## 2024-02-22 VITALS
HEART RATE: 52 BPM | OXYGEN SATURATION: 98 % | WEIGHT: 243 LBS | SYSTOLIC BLOOD PRESSURE: 180 MMHG | BODY MASS INDEX: 38.06 KG/M2 | DIASTOLIC BLOOD PRESSURE: 100 MMHG | TEMPERATURE: 98 F

## 2024-02-22 DIAGNOSIS — J96.01 ACUTE HYPOXIC RESPIRATORY FAILURE (HCC): ICD-10-CM

## 2024-02-22 DIAGNOSIS — J84.9 INTERSTITIAL LUNG DISEASE (HCC): Primary | ICD-10-CM

## 2024-02-22 DIAGNOSIS — C61 PROSTATE CANCER (HCC): ICD-10-CM

## 2024-02-22 DIAGNOSIS — I10 BENIGN ESSENTIAL HYPERTENSION: Chronic | ICD-10-CM

## 2024-02-22 DIAGNOSIS — J18.9 PNEUMONIA OF RIGHT LOWER LOBE DUE TO INFECTIOUS ORGANISM: ICD-10-CM

## 2024-02-22 DIAGNOSIS — U07.1 COVID-19: ICD-10-CM

## 2024-02-22 PROBLEM — F11.20 CONTINUOUS OPIOID DEPENDENCE (HCC): Status: RESOLVED | Noted: 2021-11-18 | Resolved: 2024-02-22

## 2024-02-22 PROBLEM — E27.9 DISORDER OF ADRENAL GLAND, UNSPECIFIED (HCC): Status: RESOLVED | Noted: 2021-11-18 | Resolved: 2024-02-22

## 2024-02-22 PROCEDURE — 99496 TRANSJ CARE MGMT HIGH F2F 7D: CPT | Performed by: INTERNAL MEDICINE

## 2024-02-22 RX ORDER — AMLODIPINE BESYLATE 5 MG/1
5 TABLET ORAL DAILY
Qty: 30 TABLET | Refills: 1 | Status: SHIPPED | OUTPATIENT
Start: 2024-02-22

## 2024-02-22 NOTE — PROGRESS NOTES
Assessment/Plan:     Diagnoses and all orders for this visit:    Interstitial lung disease (HCC)    COVID-19    Acute hypoxic respiratory failure (HCC)    Pneumonia of right lower lobe due to infectious organism    Benign essential hypertension  -     amLODIPine (NORVASC) 5 mg tablet; Take 1 tablet (5 mg total) by mouth daily    Prostate cancer (HCC)           M*Modal software was used to dictate this note.  It may contain errors with dictating incorrect words or incorrect spelling. Please contact the provider directly with any questions.    Subjective:   Chief Complaint   Patient presents with    Transition of Care Management     NICK d/c stephanie corona 2/20 covid   must be seen on ro before 3/5           Patient ID: Moreno Fournier Jr. is a 83 y.o. male.    This is a very pleasant 83 years young gentleman who went to the urgent care and then subsequently was admitted to the hospital for COVID-pneumonia he was found to have hypoxia shortness of breath and cough and fever he was treated and discharged on antibiotic and the prednisone he is finishing up his prednisone and is still taking the antibiotic he is doing much better there is no fever or chills cough is better I reviewed his blood workup and his x-rays.  He was told bacterial pneumonia but what I see in the chest CT it looks like more COVID-pneumonia anyway he is doing much better.  His procalcitonin was normal and also sputum's were negative except for the growth of the respiratory khang I think he had a COVID pneumonia now he is getting better I will recommend him to get the booster shot of the COVID after 3 months he is immunized on his pneumonia vaccine    Controlled hypertension he is on diltiazem and also on losartan losartan is 100 mg once a day and his heart rate is 52 secondary to use of diltiazem I will not increase diltiazem but I will add amlodipine 5 mg once a day and go from there I will see him back in about 1 month and follow-up with the blood  pressure unless any problem only side effect of the new medication is swelling of the leg.  He has a history of gout so I really do not want to give him any diuretic            The following portions of the patient's history were reviewed and updated as appropriate: allergies, current medications, past family history, past medical history, past social history, past surgical history, and problem list.    Review of Systems   Constitutional:  Positive for fatigue. Negative for appetite change and fever.   HENT:  Negative for congestion, ear pain, hearing loss, nosebleeds, sneezing, tinnitus and voice change.    Eyes:  Negative for pain, discharge and redness.   Respiratory:  Negative for cough, chest tightness and wheezing.    Cardiovascular:  Negative for chest pain, palpitations and leg swelling.   Gastrointestinal:  Negative for abdominal pain, blood in stool, constipation, diarrhea, nausea and vomiting.   Genitourinary:  Negative for difficulty urinating, dysuria, hematuria and urgency.   Musculoskeletal:  Negative for arthralgias, back pain, gait problem and joint swelling.   Skin:  Negative for rash and wound.   Allergic/Immunologic: Negative for environmental allergies.   Neurological:  Negative for dizziness, tremors, seizures, weakness, light-headedness and numbness.   Hematological:  Negative for adenopathy. Does not bruise/bleed easily.   Psychiatric/Behavioral:  Negative for behavioral problems and confusion. The patient is not nervous/anxious.          Past Medical History:   Diagnosis Date    Abnormal electrocardiogram     Last assessed: Oct 11, 2013    KAYLI (acute kidney injury) ([facility]) 12/24/2015    Allergic rhinitis     last assessed: Oct 11, 2013    Allergic rhinitis due to pollen     last assessed: Oct 10, 2013    Allergic sinusitis     Last assessed: May 11, 2015    Allergy     resolved: July 22, 2015    Benign essential hypertension     Last assessed/resolved: May 31, 2017    BPH (benign prostatic  hyperplasia)     Cancer (HCC)     prostate    Colitis     Last assessed: May 24, 2016    Cough with hemoptysis 11/17/2020    COVID-19 08/18/2022    Generalized osteoarthritis     Last assessed: Oct 11, 2013    GERD without esophagitis     Last assessed/resolved: May 31, 2017    Hearing loss     Hiatal hernia     resolved: July 22, 2015    History of gastroesophageal reflux (GERD)     History of stomach ulcers     last assessed: May 11, 2015    Hypertension     Incomplete bladder emptying     Kidney stone     Nodular prostate with lower urinary tract symptoms     Nontoxic single thyroid nodule     last assessed: April 16, 2014    Orchalgia     Overweight     last assessed: Oct 31, 2013    Poor urinary stream     Pulmonary embolism (HCC)     Salivary gland disorder     last assessed: April 16, 2014    Seasonal allergies     last assessed: May 15, 2015    Spermatocele     Straining to void     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, 2014         Current Outpatient Medications:     acetaminophen (TYLENOL) 325 mg tablet, Take 2 tablets (650 mg total) by mouth every 6 (six) hours as needed for mild pain, Disp: , Rfl:     allopurinol (ZYLOPRIM) 100 mg tablet, Take 1 tablet (100 mg total) by mouth 2 (two) times a day, Disp: 180 tablet, Rfl: 1    bromfenac sodium (Prolensa) 0.07 % SOLN, instill 1 drop by ophthalmic route once a day into left eye starting when you get home from surgery for 28 days, Disp: , Rfl:     cefdinir (OMNICEF) 300 mg capsule, Take 1 capsule (300 mg total) by mouth every 12 (twelve) hours for 2 days, Disp: 4 capsule, Rfl: 0    Cholecalciferol (VITAMIN D3 PO), Take by mouth 2000 IU, Disp: , Rfl:     dexamethasone (DECADRON) 6 mg tablet, Take 1 tablet (6 mg total) by mouth daily with breakfast for 2 days Do not start before February 21, 2024., Disp: 2 tablet, Rfl: 0    diltiazem (CARDIZEM CD) 180 mg 24 hr capsule, Take 1 capsule (180 mg total) by mouth daily, Disp: 90 capsule, Rfl: 1    gabapentin  (Neurontin) 400 mg capsule, Take 1 capsule (400 mg total) by mouth 2 (two) times a day, Disp: 90 capsule, Rfl: 1    levocetirizine (XYZAL) 5 MG tablet, Take 1 tablet (5 mg total) by mouth every evening, Disp: 90 tablet, Rfl: 4    losartan (COZAAR) 100 MG tablet, Take 1 tablet (100 mg total) by mouth daily, Disp: 90 tablet, Rfl: 1    polyethylene glycol (MIRALAX) 17 g packet, Take 17 g by mouth daily, Disp: 30 each, Rfl: 0    quiNINE (QUALAQUIN) 324 mg capsule, Take 2 capsules (648 mg total) by mouth daily at bedtime, Disp: 30 capsule, Rfl: 4    tamsulosin (FLOMAX) 0.4 mg, Take 1 capsule (0.4 mg total) by mouth daily with dinner, Disp: 90 capsule, Rfl: 1    Allergies   Allergen Reactions    Other Anaphylaxis    Ace Inhibitors Hyperactivity       Social History   Past Surgical History:   Procedure Laterality Date    BLADDER SURGERY      CATARACT EXTRACTION      CHOLECYSTECTOMY  2000    laparoscopic     COLONOSCOPY      FLAP LOCAL TRUNK Left 10/28/2021    Procedure: EXCISION OF FLANK MASS;  Surgeon: Hemalatha Kirkpatrick DO;  Location:  MAIN OR;  Service: Plastics    HEMORRHOID SURGERY      pilionidal cyst removal     HERNIA REPAIR Left 01/17/2008    inguinal     KNEE ARTHROSCOPY Left 1974    KNEE CARTILAGE SURGERY      NASAL SEPTUM SURGERY      Deviation repair     NV EXC PRTD BARBARA/PRTD GLND LAT DSJ&PRSRV FACIAL NR Right 06/09/2021    Procedure: SUPERFICIAL PAROTIDECTOMY WITH FACIAL NERVE MONITORING;  Surgeon: Omkar Dobbins MD;  Location[facility];  Service: ENT    PROSTATE BIOPSY  2017    SKIN TAG REMOVAL  03/2023    on his eye    STOMACH SURGERY      TONSILLECTOMY AND ADENOIDECTOMY      at age 41    TOTAL SHOULDER ARTHROPLASTY      SHOULDER JOINT REPLACEMENT - right 01/04 0 left 01/95    UPPER GASTROINTESTINAL ENDOSCOPY      US GUIDED THYROID BIOPSY  01/19/2022    US GUIDED THYROID BIOPSY  05/26/2022    VASECTOMY       Family History   Problem Relation Age of Onset    Hypertension Mother     Stroke Mother     Stomach  cancer Father     Hypertension Father     Hypertension Family     Stroke Family         syndrome     Heart attack Son        Objective:  BP (!) 180/100 (BP Location: Right arm, Patient Position: Sitting, Cuff Size: Standard)   Pulse (!) 52   Temp 98 °F (36.7 °C) (Temporal)   Wt 110 kg (243 lb)   SpO2 98%   BMI 38.06 kg/m²        Physical Exam  Constitutional:       Appearance: He is well-developed. He is obese.   HENT:      Right Ear: Tympanic membrane and external ear normal.      Left Ear: Tympanic membrane normal.   Eyes:      Conjunctiva/sclera: Conjunctivae normal.      Pupils: Pupils are equal, round, and reactive to light.   Neck:      Thyroid: No thyromegaly.      Vascular: No JVD.   Cardiovascular:      Rate and Rhythm: Normal rate and regular rhythm.      Heart sounds: Normal heart sounds.   Pulmonary:      Breath sounds: Normal breath sounds.   Abdominal:      General: Bowel sounds are normal.      Palpations: Abdomen is soft.   Musculoskeletal:         General: Normal range of motion.      Cervical back: Normal range of motion.   Lymphadenopathy:      Cervical: No cervical adenopathy.   Skin:     General: Skin is dry.   Neurological:      General: No focal deficit present.      Mental Status: He is alert and oriented to person, place, and time. Mental status is at baseline.      Deep Tendon Reflexes: Reflexes are normal and symmetric.   Psychiatric:         Mood and Affect: Mood normal.         Behavior: Behavior normal.

## 2024-02-23 NOTE — UTILIZATION REVIEW
NOTIFICATION OF ADMISSION DISCHARGE   This is a Notification of Discharge from [facility]. Please be advised that this patient has been discharge from our facility. Below you will find the admission and discharge date and time including the patient’s disposition.   UTILIZATION REVIEW CONTACT:  Wagner Montes  Utilization   Network Utilization Review Department  Phone: [phone] x carefully listen to the prompts. All voicemails are confidential.  Email: [email or web address]     ADMISSION INFORMATION  PRESENTATION DATE: 2/18/2024  9:00 AM  OBERVATION ADMISSION DATE:   INPATIENT ADMISSION DATE: 2/18/24  2:33 PM   DISCHARGE DATE: 2/20/2024  1:13 PM   DISPOSITION:Home/Self Care    Network Utilization Review Department  ATTENTION: Please call with any questions or concerns to [phone] and carefully listen to the prompts so that you are directed to the right person. All voicemails are confidential.   For Discharge needs, contact Care Management DC Support Team at [phone] opt. 2  Send all requests for admission clinical reviews, approved or denied determinations and any other requests to dedicated fax number below belonging to the campus where the patient is receiving treatment. List of dedicated fax numbers for the Facilities:  FACILITY NAME UR FAX NUMBER   ADMISSION DENIALS (Administrative/Medical Necessity) [phone]   DISCHARGE SUPPORT TEAM ([facility]) [phone]   PARENT CHILD HEALTH (Maternity/NICU/Pediatrics) [phone]   [facility] [phone]   [facility] [phone]   [facility] [phone]   [facility] [phone]   [facility] [phone]   [facility] [phone]   [facility] [phone]   [facility] [phone]   [facility]  [facility] [phone]   [facility] [phone]   [facility] [phone]   [facility] [phone]

## 2024-02-26 NOTE — ED ATTENDING ATTESTATION
2/18/2024  IWellington DO, saw and evaluated the patient. I have discussed the patient with the resident/non-physician practitioner and agree with the resident's/non-physician practitioner's findings, Plan of Care, and MDM as documented in the resident's/non-physician practitioner's note, except where noted. All available labs and Radiology studies were reviewed.  I was present for key portions of any procedure(s) performed by the resident/non-physician practitioner and I was immediately available to provide assistance.       At this point I agree with the current assessment done in the Emergency Department.  I have conducted an independent evaluation of this patient a history and physical is as follows:    ED Course         Critical Care Time  Procedures

## 2024-03-03 ENCOUNTER — RA CDI HCC (OUTPATIENT)
Dept: OTHER | Facility: HOSPITAL | Age: 84
End: 2024-03-03

## 2024-03-03 NOTE — PROGRESS NOTES
I13.0  HCC coding opportunities          Chart Reviewed number of suggestions sent to Provider: 1     Patients Insurance     Medicare Insurance: [employer] Advantage

## 2024-03-11 ENCOUNTER — CONSULT (OUTPATIENT)
Dept: INTERNAL MEDICINE CLINIC | Age: 84
End: 2024-03-11
Payer: COMMERCIAL

## 2024-03-11 VITALS
HEIGHT: 67 IN | BODY MASS INDEX: 35.63 KG/M2 | TEMPERATURE: 97.8 F | OXYGEN SATURATION: 97 % | HEART RATE: 61 BPM | WEIGHT: 227 LBS

## 2024-03-11 DIAGNOSIS — I10 BENIGN ESSENTIAL HYPERTENSION: Primary | Chronic | ICD-10-CM

## 2024-03-11 DIAGNOSIS — E79.0 HYPERURICEMIA: ICD-10-CM

## 2024-03-11 DIAGNOSIS — H25.9 SENILE CATARACT OF LEFT EYE, UNSPECIFIED AGE-RELATED CATARACT TYPE: ICD-10-CM

## 2024-03-11 DIAGNOSIS — Z01.818 PRE-OPERATIVE CLEARANCE: ICD-10-CM

## 2024-03-11 DIAGNOSIS — C61 PROSTATE CANCER (HCC): ICD-10-CM

## 2024-03-11 DIAGNOSIS — J84.9 INTERSTITIAL LUNG DISEASE (HCC): ICD-10-CM

## 2024-03-11 DIAGNOSIS — D75.1 POLYCYTHEMIA: ICD-10-CM

## 2024-03-11 DIAGNOSIS — N18.31 STAGE 3A CHRONIC KIDNEY DISEASE (HCC): ICD-10-CM

## 2024-03-11 PROBLEM — U07.1 COVID-19: Status: RESOLVED | Noted: 2022-08-19 | Resolved: 2024-03-11

## 2024-03-11 PROBLEM — J30.0 VASOMOTOR RHINITIS: Status: RESOLVED | Noted: 2020-06-11 | Resolved: 2024-03-11

## 2024-03-11 PROBLEM — I87.2 CHRONIC VENOUS INSUFFICIENCY OF LOWER EXTREMITY: Status: RESOLVED | Noted: 2023-04-20 | Resolved: 2024-03-11

## 2024-03-11 PROBLEM — M10.072 ACUTE IDIOPATHIC GOUT OF LEFT FOOT: Status: RESOLVED | Noted: 2021-11-24 | Resolved: 2024-03-11

## 2024-03-11 PROBLEM — J18.9 PNEUMONIA OF RIGHT LOWER LOBE DUE TO INFECTIOUS ORGANISM: Status: RESOLVED | Noted: 2020-03-24 | Resolved: 2024-03-11

## 2024-03-11 PROBLEM — R09.82 POST-NASAL DRIP: Status: RESOLVED | Noted: 2020-06-11 | Resolved: 2024-03-11

## 2024-03-11 PROBLEM — J96.01 ACUTE HYPOXIC RESPIRATORY FAILURE (HCC): Status: RESOLVED | Noted: 2024-02-18 | Resolved: 2024-03-11

## 2024-03-11 PROCEDURE — 99214 OFFICE O/P EST MOD 30 MIN: CPT | Performed by: INTERNAL MEDICINE

## 2024-03-11 RX ORDER — ALLOPURINOL 100 MG/1
100 TABLET ORAL 2 TIMES DAILY
Qty: 180 TABLET | Refills: 1 | Status: SHIPPED | OUTPATIENT
Start: 2024-03-11

## 2024-03-11 RX ORDER — KETOROLAC TROMETHAMINE 5 MG/ML
SOLUTION OPHTHALMIC
COMMUNITY
Start: 2024-03-10

## 2024-03-11 NOTE — PROGRESS NOTES
.Presurgical Evaluation    Subjective:   Chief Complaint   Patient presents with    Pre-op Exam     surgery rescheduled to 3/19 cataract surgery Dr Pugh Needs new clearance other  (after 30 days) Pt will bring form        Patient ID: Moreno Fournier Jr. is a 83 y.o. male.    Chief Complaint   Patient presents with    Pre-op Exam     surgery rescheduled to 3/19 cataract surgery Dr Pugh Needs new clearance other  (after 30 days) Pt will bring form       Today    83 years young gentleman who already had left cataract surgery before he is going for the right cataract surgery he is doing well review of system is essentially unremarkable except for the recent episode of gout    Hypertension is very well-controlled the blood pressure is 130/80 today will continue with the same medication no changes.    History of gout 1 episode recently he took some prednisone and symptoms get much better    Polycythemia is a stable he is not on any medication for that.    History of very mild interstitial lung disease he does not have any symptoms of shortness of breath and he does not have any cough.    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    Procedure date: 2024    Surgeon:  Dr. PUGH  Planned procedure: Right cataract surgery  Diagnosis for procedure: Right eye cataract    Prior anesthesia: Yes   General; Complications:  None / Tolerated well  MAC; Complications:  None / Tolerated well  Local; Complications:  None / Tolerated well    CAD History: None   NOTE: Patient should see Cardiology if time available before surgery, and if appropriate.    Pulmonary History: None    Renal history: None    Diabetes History:  None     Neurological History: None     On Immunosuppressant  meds/biologics: No      Review of Systems   Constitutional:  Negative for appetite change, fatigue and fever.   HENT:  Negative for congestion, ear pain, hearing loss, nosebleeds, sneezing, tinnitus and voice change.    Eyes:  Positive for visual disturbance. Negative for pain, discharge and redness.   Respiratory:  Negative for cough, chest tightness and wheezing.    Cardiovascular:  Negative for chest pain, palpitations and leg swelling.   Gastrointestinal:  Negative for abdominal pain, blood in stool, constipation, diarrhea, nausea and vomiting.   Genitourinary:  Negative for difficulty urinating, dysuria, hematuria and urgency.   Musculoskeletal:  Negative for arthralgias, back pain, gait problem and joint swelling.   Skin:  Negative for rash and wound.   Allergic/Immunologic: Negative for environmental allergies.   Neurological:  Negative for dizziness, tremors, seizures, weakness, light-headedness and numbness.   Hematological:  Negative for adenopathy. Does not bruise/bleed easily.   Psychiatric/Behavioral:  Negative for behavioral problems and confusion. The patient is not nervous/anxious.          Current Outpatient Medications   Medication Sig Dispense Refill    acetaminophen (TYLENOL) 325 mg tablet Take 2 tablets (650 mg total) by mouth every 6 (six) hours as needed for mild pain      allopurinol (ZYLOPRIM) 100 mg tablet Take 1 tablet (100 mg total) by mouth 2 (two) times a day 180 tablet 1    amLODIPine (NORVASC) 5 mg tablet Take 1 tablet (5 mg total) by mouth daily 30 tablet 1    bromfenac sodium (Prolensa) 0.07 % SOLN instill 1 drop by ophthalmic route once a day into left eye starting when you get home from surgery for 28 days      Cholecalciferol (VITAMIN D3 PO) Take by mouth 2000 IU      diltiazem (CARDIZEM CD) 180 mg 24 hr capsule Take 1 capsule (180 mg total) by mouth daily 90 capsule 1    gabapentin (Neurontin) 400 mg capsule Take 1 capsule (400 mg total) by mouth 2 (two) times a day 90 capsule 1     ketorolac (ACULAR) 0.5 % ophthalmic solution       levocetirizine (XYZAL) 5 MG tablet Take 1 tablet (5 mg total) by mouth every evening 90 tablet 4    losartan (COZAAR) 100 MG tablet Take 1 tablet (100 mg total) by mouth daily 90 tablet 1    polyethylene glycol (MIRALAX) 17 g packet Take 17 g by mouth daily 30 each 0    quiNINE (QUALAQUIN) 324 mg capsule Take 2 capsules (648 mg total) by mouth daily at bedtime 30 capsule 4    tamsulosin (FLOMAX) 0.4 mg Take 1 capsule (0.4 mg total) by mouth daily with dinner 90 capsule 1     No current facility-administered medications for this visit.       Allergies on file:   Other and Ace inhibitors    Patient Active Problem List   Diagnosis    Benign essential hypertension    Benign prostatic hyperplasia (BPH) with straining on urination    Gastroesophageal reflux disease without esophagitis    Prostate cancer (HCC)    Seasonal allergic rhinitis due to pollen    Truncal obesity    Need for shingles vaccine    Interstitial lung disease (HCC)    Localized primary osteoarthritis of wrist    Pain and swelling of right knee    Current tear of medial cartilage or meniscus of knee    Idiopathic peripheral neuropathy    Swelling of right thumb    Right elbow pain    Back pain    Pain in both lower extremities    Radiculopathy of leg    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    Anxiety    Class 2 obesity due to excess calories without serious comorbidity with body mass index (BMI) of 36.0 to 36.9 in adult    Peripheral vascular disease (HCC)    Bilateral lower extremity edema    Varicose veins of both lower extremities with pain    Leg cramping    Constipation    Age-related cataract of right eye        Past Medical History:   Diagnosis Date    Abnormal electrocardiogram     Last assessed: Oct 11, 2013    KAYLI (acute kidney injury) (HCC) 12/24/2015    Allergic  rhinitis     last assessed: Oct 11, 2013    Allergic rhinitis due to pollen     last assessed: Oct 10, 2013    Allergic sinusitis     Last assessed: May 11, 2015    Allergy     resolved: July 22, 2015    Benign essential hypertension     Last assessed/resolved: May 31, 2017    BPH (benign prostatic hyperplasia)     Cancer (HCC)     prostate    Colitis     Last assessed: May 24, 2016    Cough with hemoptysis 11/17/2020    COVID-19 08/18/2022    Generalized osteoarthritis     Last assessed: Oct 11, 2013    GERD without esophagitis     Last assessed/resolved: May 31, 2017    Hearing loss     Hiatal hernia     resolved: July 22, 2015    History of gastroesophageal reflux (GERD)     History of stomach ulcers     last assessed: May 11, 2015    Hypertension     Incomplete bladder emptying     Kidney stone     Nodular prostate with lower urinary tract symptoms     Nontoxic single thyroid nodule     last assessed: April 16, 2014    Orchalgia     Overweight     last assessed: Oct 31, 2013    Poor urinary stream     Pulmonary embolism (HCC)     Salivary gland disorder     last assessed: April 16, 2014    Seasonal allergies     last assessed: May 15, 2015    Spermatocele     Straining to void     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, 2014       Past Surgical History:   Procedure Laterality Date    BLADDER SURGERY      CATARACT EXTRACTION      CHOLECYSTECTOMY  2000    laparoscopic     COLONOSCOPY      FLAP LOCAL TRUNK Left 10/28/2021    Procedure: EXCISION OF FLANK MASS;  Surgeon: Hemalatha Kirkpatrick DO;  Location:  MAIN OR;  Service: Plastics    HEMORRHOID SURGERY      pilionidal cyst removal     HERNIA REPAIR Left 01/17/2008    inguinal     KNEE ARTHROSCOPY Left 1974    KNEE CARTILAGE SURGERY      NASAL SEPTUM SURGERY      Deviation repair     OK EXC PRTD BARBARA/PRTD GLND LAT [id number] FACIAL NR Right 06/09/2021    Procedure: SUPERFICIAL PAROTIDECTOMY WITH FACIAL NERVE MONITORING;  Surgeon: Omkar Dobbins MD;  Location: [facility]  "OR;  Service: ENT    PROSTATE BIOPSY  2017    SKIN TAG REMOVAL  2023    on his eye    STOMACH SURGERY      TONSILLECTOMY AND ADENOIDECTOMY      at age 41    TOTAL SHOULDER ARTHROPLASTY      SHOULDER JOINT REPLACEMENT - right  0 left     UPPER GASTROINTESTINAL ENDOSCOPY      US GUIDED THYROID BIOPSY  2022    US GUIDED THYROID BIOPSY  2022    VASECTOMY         Family History   Problem Relation Age of Onset    Hypertension Mother     Stroke Mother     Stomach cancer Father     Hypertension Father     Hypertension Family     Stroke Family         syndrome     Heart attack Son        Social History     Tobacco Use    Smoking status: Former     Current packs/day: 0.00     Average packs/day: 0.3 packs/day for 30.0 years (7.5 ttl pk-yrs)     Types: Cigarettes     Start date:      Quit date:      Years since quittin.2    Smokeless tobacco: Never   Vaping Use    Vaping status: Never Used   Substance Use Topics    Alcohol use: Not Currently     Comment: rare    Drug use: No       Objective:    Vitals:    24 1435   Pulse: 61   Temp: 97.8 °F (36.6 °C)   TempSrc: Temporal   SpO2: 97%   Weight: 103 kg (227 lb)   Height: 5' 7\" (1.702 m)        Physical Exam  Constitutional:       Appearance: He is well-developed. He is obese.   HENT:      Right Ear: Tympanic membrane and external ear normal.      Left Ear: Tympanic membrane normal.      Ears:      Comments: Bilateral hearing aids  Eyes:      Conjunctiva/sclera: Conjunctivae normal.      Pupils: Pupils are equal, round, and reactive to light.   Neck:      Thyroid: No thyromegaly.      Vascular: No JVD.   Cardiovascular:      Rate and Rhythm: Normal rate and regular rhythm.      Heart sounds: Normal heart sounds.   Pulmonary:      Breath sounds: Normal breath sounds.   Abdominal:      General: Bowel sounds are normal.      Palpations: Abdomen is soft.   Musculoskeletal:         General: Normal range of motion.      Cervical back: Normal " range of motion.      Right lower le+ Pitting Edema present.      Left lower le+ Pitting Edema present.   Lymphadenopathy:      Cervical: No cervical adenopathy.   Skin:     General: Skin is dry.   Neurological:      General: No focal deficit present.      Mental Status: He is alert and oriented to person, place, and time. Mental status is at baseline.      Deep Tendon Reflexes: Reflexes are normal and symmetric.   Psychiatric:         Mood and Affect: Mood normal.         Behavior: Behavior normal.           Preop labs/testing available and reviewed: yes    eGFR   Date Value Ref Range Status   2024 65 ml/min/1.73sq m Final     WBC   Date Value Ref Range Status   2024 4.39 4.31 - 10.16 Thousand/uL Final     Hemoglobin   Date Value Ref Range Status   2024 16.7 12.0 - 17.0 g/dL Final     Hematocrit   Date Value Ref Range Status   2024 50.2 (H) 36.5 - 49.3 % Final     Platelets   Date Value Ref Range Status   2024 117 (L) 149 - 390 Thousands/uL Final          EKG no    Echo no    Stress test/cath no    PFT/Adrian no    Functional capacity: Climb stairs                        4 Mets   Pick the highest level patient can comfortably perform   4 mets or greater for surgery    RCRI  High Risk surgery?         1 Point  CAD History:         1 Point   MI; Positive Stress Test; CP due to Mi;  Nitrate Usage to control Angina; Pathologic Q wave on EKG  CHF Active:         1 Point   Pulm Edema; Paroxysmal Nocturnal Dyspnea;  Bibasilar Rales (crackles);S3; CHF on CXR  Cerebrovascular Disease (TIA or CVA):     1 Point  DM on Insulin:        1 Point  Serum Creat >2.0 mg/dl:       1 Point          Total Points: 0     Scorin: Class I, Very Low Risk (0.4%)     1: Class II, Low risk (0.9%)     2: Class III Moderate (6.6%)     3: Class IV High (>11%)      KAYLI Risk:  GFR:   eGFR   Date Value Ref Range Status   2024 65 ml/min/1.73sq m Final         For PCP:  If GFR>60, Hold ACE/ARB/Diuretic on  the day of surgery, and NSAIDS 10 days before.    If GFR<45, Consider PRE and POST op Nephrology Consult.    If 46 <GFR> 59 : Has Patient had KAYLI in last 6 Months? no   If YES: Preop Nephrology consult   If No:  Post Op Nephrology consult.           Assessment/Plan:    Patient is medically optimized (cleared) for the planned procedure.    Further testing/evaluation is not required.    Postop concerns: no    Problem List Items Addressed This Visit          Respiratory    Interstitial lung disease (HCC)       Cardiovascular and Mediastinum    Benign essential hypertension - Primary (Chronic)       Genitourinary    Prostate cancer (HCC)    Stage 3a chronic kidney disease (HCC) with elevated Cr.       Other    Polycythemia        Diagnoses and all orders for this visit:    Benign essential hypertension    Prostate cancer (HCC)    Interstitial lung disease (HCC)    Polycythemia    Stage 3a chronic kidney disease (HCC) with elevated Cr.    Other orders  -     ketorolac (ACULAR) 0.5 % ophthalmic solution      Pre-Surgery Instructions:   Medication Instructions    acetaminophen (TYLENOL) 325 mg tablet per anesthesia guidelines     allopurinol (ZYLOPRIM) 100 mg tablet per anesthesia guidelines     amLODIPine (NORVASC) 5 mg tablet Take morning of surgery    bromfenac sodium (Prolensa) 0.07 % SOLN per anesthesia guidelines     Cholecalciferol (VITAMIN D3 PO) per anesthesia guidelines     diltiazem (CARDIZEM CD) 180 mg 24 hr capsule Take morning of surgery    gabapentin (Neurontin) 400 mg capsule per anesthesia guidelines     ketorolac (ACULAR) 0.5 % ophthalmic solution per anesthesia guidelines     levocetirizine (XYZAL) 5 MG tablet Take morning of surgery    losartan (COZAAR) 100 MG tablet per anesthesia guidelines     polyethylene glycol (MIRALAX) 17 g packet per anesthesia guidelines     quiNINE (QUALAQUIN) 324 mg capsule per anesthesia guidelines     tamsulosin (FLOMAX) 0.4 mg per anesthesia guidelines         NOTE: Please  "use the above to review important meds for your specialty, the remainder \"per anesthesia Guidelines.\"    NOTE: Please place an Inbasket message for \"SOC\" pool for complicated patients.      "

## 2024-04-08 ENCOUNTER — RA CDI HCC (OUTPATIENT)
Dept: OTHER | Facility: HOSPITAL | Age: 84
End: 2024-04-08

## 2024-04-08 NOTE — PROGRESS NOTES
HCC coding opportunities          Chart Reviewed number of suggestions sent to Provider: 1     Patients Insurance     Medicare Insurance: [employer]vantage        [id number]

## 2024-04-15 ENCOUNTER — OFFICE VISIT (OUTPATIENT)
Dept: INTERNAL MEDICINE CLINIC | Age: 84
End: 2024-04-15
Payer: COMMERCIAL

## 2024-04-15 VITALS
SYSTOLIC BLOOD PRESSURE: 123 MMHG | TEMPERATURE: 98.3 F | WEIGHT: 225 LBS | HEIGHT: 67 IN | DIASTOLIC BLOOD PRESSURE: 79 MMHG | BODY MASS INDEX: 35.31 KG/M2 | OXYGEN SATURATION: 96 % | HEART RATE: 64 BPM

## 2024-04-15 DIAGNOSIS — M79.605 PAIN IN BOTH LOWER EXTREMITIES: ICD-10-CM

## 2024-04-15 DIAGNOSIS — D69.6 THROMBOCYTOPENIA (HCC): ICD-10-CM

## 2024-04-15 DIAGNOSIS — E66.01 SEVERE OBESITY (BMI 35.0-39.9) WITH COMORBIDITY (HCC): ICD-10-CM

## 2024-04-15 DIAGNOSIS — J84.9 INTERSTITIAL LUNG DISEASE (HCC): ICD-10-CM

## 2024-04-15 DIAGNOSIS — N18.31 STAGE 3A CHRONIC KIDNEY DISEASE (HCC): ICD-10-CM

## 2024-04-15 DIAGNOSIS — I10 BENIGN ESSENTIAL HYPERTENSION: Chronic | ICD-10-CM

## 2024-04-15 DIAGNOSIS — Z00.00 MEDICARE ANNUAL WELLNESS VISIT, SUBSEQUENT: Primary | ICD-10-CM

## 2024-04-15 DIAGNOSIS — M79.604 PAIN IN BOTH LOWER EXTREMITIES: ICD-10-CM

## 2024-04-15 PROCEDURE — 99214 OFFICE O/P EST MOD 30 MIN: CPT

## 2024-04-15 PROCEDURE — G0439 PPPS, SUBSEQ VISIT: HCPCS

## 2024-04-15 RX ORDER — AMLODIPINE BESYLATE 5 MG/1
5 TABLET ORAL DAILY
Qty: 90 TABLET | Refills: 1 | Status: SHIPPED | OUTPATIENT
Start: 2024-04-15

## 2024-04-15 NOTE — PATIENT INSTRUCTIONS
Medicare Preventive Visit Patient Instructions  Thank you for completing your Welcome to Medicare Visit or Medicare Annual Wellness Visit today. Your next wellness visit will be due in one year (4/16/2025).  The screening/preventive services that you may require over the next 5-10 years are detailed below. Some tests may not apply to you based off risk factors and/or age. Screening tests ordered at today's visit but not completed yet may show as past due. Also, please note that scanned in results may not display below.  Preventive Screenings:  Service Recommendations Previous Testing/Comments   Colorectal Cancer Screening  Colonoscopy    Fecal Occult Blood Test (FOBT)/Fecal Immunochemical Test (FIT)  Fecal DNA/Cologuard Test  Flexible Sigmoidoscopy Age: 45-75 years old   Colonoscopy: every 10 years (May be performed more frequently if at higher risk)  OR  FOBT/FIT: every 1 year  OR  Cologuard: every 3 years  OR  Sigmoidoscopy: every 5 years  Screening may be recommended earlier than age 45 if at higher risk for colorectal cancer. Also, an individualized decision between you and your healthcare provider will decide whether screening between the ages of 76-85 would be appropriate. Colonoscopy: 09/22/2022  FOBT/FIT: Not on file  Cologuard: Not on file  Sigmoidoscopy: Not on file    Screening Current     Prostate Cancer Screening Individualized decision between patient and health care provider in men between ages of 55-69   Medicare will cover every 12 months beginning on the day after your 50th birthday PSA: 0.93 ng/mL     History Prostate Cancer  Screening Not Indicated     Hepatitis C Screening Once for adults born between 1945 and 1965  More frequently in patients at high risk for Hepatitis C Hep C Antibody: 04/14/2022    Screening Current   Diabetes Screening 1-2 times per year if you're at risk for diabetes or have pre-diabetes Fasting glucose: 92 mg/dL (8/18/2023)  A1C: No results in last 5 years (No results in  last 5 years)  Screening Current   Cholesterol Screening Once every 5 years if you don't have a lipid disorder. May order more often based on risk factors. Lipid panel: 01/17/2023  Screening Current      Other Preventive Screenings Covered by Medicare:  Abdominal Aortic Aneurysm (AAA) Screening: covered once if your at risk. You're considered to be at risk if you have a family history of AAA or a male between the age of 65-75 who smoking at least 100 cigarettes in your lifetime.  Lung Cancer Screening: covers low dose CT scan once per year if you meet all of the following conditions: (1) Age 55-77; (2) No signs or symptoms of lung cancer; (3) Current smoker or have quit smoking within the last 15 years; (4) You have a tobacco smoking history of at least 20 pack years (packs per day x number of years you smoked); (5) You get a written order from a healthcare provider.  Glaucoma Screening: covered annually if you're considered high risk: (1) You have diabetes OR (2) Family history of glaucoma OR (3)  aged 50 and older OR (4)  American aged 65 and older  Osteoporosis Screening: covered every 2 years if you meet one of the following conditions: (1) Have a vertebral abnormality; (2) On glucocorticoid therapy for more than 3 months; (3) Have primary hyperparathyroidism; (4) On osteoporosis medications and need to assess response to drug therapy.  HIV Screening: covered annually if you're between the age of 15-65. Also covered annually if you are younger than 15 and older than 65 with risk factors for HIV infection. For pregnant patients, it is covered up to 3 times per pregnancy.    Immunizations:  Immunization Recommendations   Influenza Vaccine Annual influenza vaccination during flu season is recommended for all persons aged >= 6 months who do not have contraindications   Pneumococcal Vaccine   * Pneumococcal conjugate vaccine = PCV13 (Prevnar 13), PCV15 (Vaxneuvance), PCV20 (Prevnar 20)  *  Pneumococcal polysaccharide vaccine = PPSV23 (Pneumovax) Adults 19-65 yo with certain risk factors or if 65+ yo  If never received any pneumonia vaccine: recommend Prevnar 20 (PCV20)  Give PCV20 if previously received 1 dose of PCV13 or PPSV23   Hepatitis B Vaccine 3 dose series if at intermediate or high risk (ex: diabetes, end stage renal disease, liver disease)   Respiratory syncytial virus (RSV) Vaccine - COVERED BY MEDICARE PART D  * RSVPreF3 (Arexvy) CDC recommends that adults 60 years of age and older may receive a single dose of RSV vaccine using shared clinical decision-making (SCDM)   Tetanus (Td) Vaccine - COST NOT COVERED BY MEDICARE PART B Following completion of primary series, a booster dose should be given every 10 years to maintain immunity against tetanus. Td may also be given as tetanus wound prophylaxis.   Tdap Vaccine - COST NOT COVERED BY MEDICARE PART B Recommended at least once for all adults. For pregnant patients, recommended with each pregnancy.   Shingles Vaccine (Shingrix) - COST NOT COVERED BY MEDICARE PART B  2 shot series recommended in those 19 years and older who have or will have weakened immune systems or those 50 years and older     Health Maintenance Due:      Topic Date Due   • Hepatitis C Screening  Completed   • Colorectal Cancer Screening  Discontinued     Immunizations Due:      Topic Date Due   • COVID-19 Vaccine (7 - 2023-24 season) 09/01/2023     Advance Directives   What are advance directives?  Advance directives are legal documents that state your wishes and plans for medical care. These plans are made ahead of time in case you lose your ability to make decisions for yourself. Advance directives can apply to any medical decision, such as the treatments you want, and if you want to donate organs.   What are the types of advance directives?  There are many types of advance directives, and each state has rules about how to use them. You may choose a combination of any of  the following:  Living will:  This is a written record of the treatment you want. You can also choose which treatments you do not want, which to limit, and which to stop at a certain time. This includes surgery, medicine, IV fluid, and tube feedings.   Durable power of  for healthcare (DPAHC):  This is a written record that states who you want to make healthcare choices for you when you are unable to make them for yourself. This person, called a proxy, is usually a family member or a friend. You may choose more than 1 proxy.  Do not resuscitate (DNR) order:  A DNR order is used in case your heart stops beating or you stop breathing. It is a request not to have certain forms of treatment, such as CPR. A DNR order may be included in other types of advance directives.  Medical directive:  This covers the care that you want if you are in a coma, near death, or unable to make decisions for yourself. You can list the treatments you want for each condition. Treatment may include pain medicine, surgery, blood transfusions, dialysis, IV or tube feedings, and a ventilator (breathing machine).  Values history:  This document has questions about your views, beliefs, and how you feel and think about life. This information can help others choose the care that you would choose.  Why are advance directives important?  An advance directive helps you control your care. Although spoken wishes may be used, it is better to have your wishes written down. Spoken wishes can be misunderstood, or not followed. Treatments may be given even if you do not want them. An advance directive may make it easier for your family to make difficult choices about your care.   Weight Management   Why it is important to manage your weight:  Being overweight increases your risk of health conditions such as heart disease, high blood pressure, type 2 diabetes, and certain types of cancer. It can also increase your risk for osteoarthritis, sleep apnea,  and other respiratory problems. Aim for a slow, steady weight loss. Even a small amount of weight loss can lower your risk of health problems.  How to lose weight safely:  A safe and healthy way to lose weight is to eat fewer calories and get regular exercise. You can lose up about 1 pound a week by decreasing the number of calories you eat by 500 calories each day.   Healthy meal plan for weight management:  A healthy meal plan includes a variety of foods, contains fewer calories, and helps you stay healthy. A healthy meal plan includes the following:  Eat whole-grain foods more often.  A healthy meal plan should contain fiber. Fiber is the part of grains, fruits, and vegetables that is not broken down by your body. Whole-grain foods are healthy and provide extra fiber in your diet. Some examples of whole-grain foods are whole-wheat breads and pastas, oatmeal, brown rice, and bulgur.  Eat a variety of vegetables every day.  Include dark, leafy greens such as spinach, kale, crispin greens, and mustard greens. Eat yellow and orange vegetables such as carrots, sweet potatoes, and winter squash.   Eat a variety of fruits every day.  Choose fresh or canned fruit (canned in its own juice or light syrup) instead of juice. Fruit juice has very little or no fiber.  Eat low-fat dairy foods.  Drink fat-free (skim) milk or 1% milk. Eat fat-free yogurt and low-fat cottage cheese. Try low-fat cheeses such as mozzarella and other reduced-fat cheeses.  Choose meat and other protein foods that are low in fat.  Choose beans or other legumes such as split peas or lentils. Choose fish, skinless poultry (chicken or turkey), or lean cuts of red meat (beef or pork). Before you cook meat or poultry, cut off any visible fat.   Use less fat and oil.  Try baking foods instead of frying them. Add less fat, such as margarine, sour cream, regular salad dressing and mayonnaise to foods. Eat fewer high-fat foods. Some examples of high-fat foods  include french fries, doughnuts, ice cream, and cakes.  Eat fewer sweets.  Limit foods and drinks that are high in sugar. This includes candy, cookies, regular soda, and sweetened drinks.  Exercise:  Exercise at least 30 minutes per day on most days of the week. Some examples of exercise include walking, biking, dancing, and swimming. You can also fit in more physical activity by taking the stairs instead of the elevator or parking farther away from stores. Ask your healthcare provider about the best exercise plan for you.      © Copyright [employer] 2018 Information is for End User's use only and may not be sold, redistributed or otherwise used for commercial purposes. All illustrations and images included in CareNotes® are the copyrighted property of [employer] or [employer]    Medicare Preventive Visit Patient Instructions  Thank you for completing your Welcome to Medicare Visit or Medicare Annual Wellness Visit today. Your next wellness visit will be due in one year (4/16/2025).  The screening/preventive services that you may require over the next 5-10 years are detailed below. Some tests may not apply to you based off risk factors and/or age. Screening tests ordered at today's visit but not completed yet may show as past due. Also, please note that scanned in results may not display below.  Preventive Screenings:  Service Recommendations Previous Testing/Comments   Colorectal Cancer Screening  Colonoscopy    Fecal Occult Blood Test (FOBT)/Fecal Immunochemical Test (FIT)  Fecal DNA/Cologuard Test  Flexible Sigmoidoscopy Age: 45-75 years old   Colonoscopy: every 10 years (May be performed more frequently if at higher risk)  OR  FOBT/FIT: every 1 year  OR  Cologuard: every 3 years  OR  Sigmoidoscopy: every 5 years  Screening may be recommended earlier than age 45 if at higher risk for colorectal cancer. Also, an individualized decision between you and your healthcare provider will decide whether  screening between the ages of 76-85 would be appropriate. Colonoscopy: 09/22/2022  FOBT/FIT: Not on file  Cologuard: Not on file  Sigmoidoscopy: Not on file    Screening Current     Prostate Cancer Screening Individualized decision between patient and health care provider in men between ages of 55-69   Medicare will cover every 12 months beginning on the day after your 50th birthday PSA: 0.93 ng/mL     History Prostate Cancer  Screening Not Indicated     Hepatitis C Screening Once for adults born between 1945 and 1965  More frequently in patients at high risk for Hepatitis C Hep C Antibody: 04/14/2022    Screening Current   Diabetes Screening 1-2 times per year if you're at risk for diabetes or have pre-diabetes Fasting glucose: 92 mg/dL (8/18/2023)  A1C: No results in last 5 years (No results in last 5 years)  Screening Current   Cholesterol Screening Once every 5 years if you don't have a lipid disorder. May order more often based on risk factors. Lipid panel: 01/17/2023  Screening Current      Other Preventive Screenings Covered by Medicare:  Abdominal Aortic Aneurysm (AAA) Screening: covered once if your at risk. You're considered to be at risk if you have a family history of AAA or a male between the age of 65-75 who smoking at least 100 cigarettes in your lifetime.  Lung Cancer Screening: covers low dose CT scan once per year if you meet all of the following conditions: (1) Age 55-77; (2) No signs or symptoms of lung cancer; (3) Current smoker or have quit smoking within the last 15 years; (4) You have a tobacco smoking history of at least 20 pack years (packs per day x number of years you smoked); (5) You get a written order from a healthcare provider.  Glaucoma Screening: covered annually if you're considered high risk: (1) You have diabetes OR (2) Family history of glaucoma OR (3)  aged 50 and older OR (4)  American aged 65 and older  Osteoporosis Screening: covered every 2 years if  you meet one of the following conditions: (1) Have a vertebral abnormality; (2) On glucocorticoid therapy for more than 3 months; (3) Have primary hyperparathyroidism; (4) On osteoporosis medications and need to assess response to drug therapy.  HIV Screening: covered annually if you're between the age of 15-65. Also covered annually if you are younger than 15 and older than 65 with risk factors for HIV infection. For pregnant patients, it is covered up to 3 times per pregnancy.    Immunizations:  Immunization Recommendations   Influenza Vaccine Annual influenza vaccination during flu season is recommended for all persons aged >= 6 months who do not have contraindications   Pneumococcal Vaccine   * Pneumococcal conjugate vaccine = PCV13 (Prevnar 13), PCV15 (Vaxneuvance), PCV20 (Prevnar 20)  * Pneumococcal polysaccharide vaccine = PPSV23 (Pneumovax) Adults 19-63 yo with certain risk factors or if 65+ yo  If never received any pneumonia vaccine: recommend Prevnar 20 (PCV20)  Give PCV20 if previously received 1 dose of PCV13 or PPSV23   Hepatitis B Vaccine 3 dose series if at intermediate or high risk (ex: diabetes, end stage renal disease, liver disease)   Respiratory syncytial virus (RSV) Vaccine - COVERED BY MEDICARE PART D  * RSVPreF3 (Arexvy) CDC recommends that adults 60 years of age and older may receive a single dose of RSV vaccine using shared clinical decision-making (SCDM)   Tetanus (Td) Vaccine - COST NOT COVERED BY MEDICARE PART B Following completion of primary series, a booster dose should be given every 10 years to maintain immunity against tetanus. Td may also be given as tetanus wound prophylaxis.   Tdap Vaccine - COST NOT COVERED BY MEDICARE PART B Recommended at least once for all adults. For pregnant patients, recommended with each pregnancy.   Shingles Vaccine (Shingrix) - COST NOT COVERED BY MEDICARE PART B  2 shot series recommended in those 19 years and older who have or will have weakened  immune systems or those 50 years and older     Health Maintenance Due:      Topic Date Due   • Hepatitis C Screening  Completed   • Colorectal Cancer Screening  Discontinued     Immunizations Due:      Topic Date Due   • COVID-19 Vaccine (7 - 2023-24 season) 09/01/2023     Advance Directives   What are advance directives?  Advance directives are legal documents that state your wishes and plans for medical care. These plans are made ahead of time in case you lose your ability to make decisions for yourself. Advance directives can apply to any medical decision, such as the treatments you want, and if you want to donate organs.   What are the types of advance directives?  There are many types of advance directives, and each state has rules about how to use them. You may choose a combination of any of the following:  Living will:  This is a written record of the treatment you want. You can also choose which treatments you do not want, which to limit, and which to stop at a certain time. This includes surgery, medicine, IV fluid, and tube feedings.   Durable power of  for healthcare (DPAHC):  This is a written record that states who you want to make healthcare choices for you when you are unable to make them for yourself. This person, called a proxy, is usually a family member or a friend. You may choose more than 1 proxy.  Do not resuscitate (DNR) order:  A DNR order is used in case your heart stops beating or you stop breathing. It is a request not to have certain forms of treatment, such as CPR. A DNR order may be included in other types of advance directives.  Medical directive:  This covers the care that you want if you are in a coma, near death, or unable to make decisions for yourself. You can list the treatments you want for each condition. Treatment may include pain medicine, surgery, blood transfusions, dialysis, IV or tube feedings, and a ventilator (breathing machine).  Values history:  This document  has questions about your views, beliefs, and how you feel and think about life. This information can help others choose the care that you would choose.  Why are advance directives important?  An advance directive helps you control your care. Although spoken wishes may be used, it is better to have your wishes written down. Spoken wishes can be misunderstood, or not followed. Treatments may be given even if you do not want them. An advance directive may make it easier for your family to make difficult choices about your care.   Weight Management   Why it is important to manage your weight:  Being overweight increases your risk of health conditions such as heart disease, high blood pressure, type 2 diabetes, and certain types of cancer. It can also increase your risk for osteoarthritis, sleep apnea, and other respiratory problems. Aim for a slow, steady weight loss. Even a small amount of weight loss can lower your risk of health problems.  How to lose weight safely:  A safe and healthy way to lose weight is to eat fewer calories and get regular exercise. You can lose up about 1 pound a week by decreasing the number of calories you eat by 500 calories each day.   Healthy meal plan for weight management:  A healthy meal plan includes a variety of foods, contains fewer calories, and helps you stay healthy. A healthy meal plan includes the following:  Eat whole-grain foods more often.  A healthy meal plan should contain fiber. Fiber is the part of grains, fruits, and vegetables that is not broken down by your body. Whole-grain foods are healthy and provide extra fiber in your diet. Some examples of whole-grain foods are whole-wheat breads and pastas, oatmeal, brown rice, and bulgur.  Eat a variety of vegetables every day.  Include dark, leafy greens such as spinach, kale, crispin greens, and mustard greens. Eat yellow and orange vegetables such as carrots, sweet potatoes, and winter squash.   Eat a variety of fruits  every day.  Choose fresh or canned fruit (canned in its own juice or light syrup) instead of juice. Fruit juice has very little or no fiber.  Eat low-fat dairy foods.  Drink fat-free (skim) milk or 1% milk. Eat fat-free yogurt and low-fat cottage cheese. Try low-fat cheeses such as mozzarella and other reduced-fat cheeses.  Choose meat and other protein foods that are low in fat.  Choose beans or other legumes such as split peas or lentils. Choose fish, skinless poultry (chicken or turkey), or lean cuts of red meat (beef or pork). Before you cook meat or poultry, cut off any visible fat.   Use less fat and oil.  Try baking foods instead of frying them. Add less fat, such as margarine, sour cream, regular salad dressing and mayonnaise to foods. Eat fewer high-fat foods. Some examples of high-fat foods include french fries, doughnuts, ice cream, and cakes.  Eat fewer sweets.  Limit foods and drinks that are high in sugar. This includes candy, cookies, regular soda, and sweetened drinks.  Exercise:  Exercise at least 30 minutes per day on most days of the week. Some examples of exercise include walking, biking, dancing, and swimming. You can also fit in more physical activity by taking the stairs instead of the elevator or parking farther away from stores. Ask your healthcare provider about the best exercise plan for you.      © Copyright [employer] 2018 Information is for End User's use only and may not be sold, redistributed or otherwise used for commercial purposes. All illustrations and images included in CareNotes® are the copyrighted property of [employer] or [employer]

## 2024-04-15 NOTE — PROGRESS NOTES
[facility] [place]  Medicare Annual Wellness Visit  [address]  [address]  [place] [address]  [phone]  Moreno Fournier Jr.  [id number]   04/15/24       Assessment and Plan:     Problem List Items Addressed This Visit       Benign essential hypertension (Chronic)    Relevant Medications    amLODIPine (NORVASC) 5 mg tablet    Interstitial lung disease (HCC)    Pain in both lower extremities    Stage 3a chronic kidney disease (HCC) with elevated Cr.     Other Visit Diagnoses       Medicare annual wellness visit, subsequent    -  Primary    Thrombocytopenia (HCC)        Relevant Orders    CBC and differential    Severe obesity (BMI 35.0-39.9) with comorbidity (HCC)              #. Encounter for Medicare AWV  Up to date on vaccinations (Zoster, Prevnar, flu)   Discussed ACP; patient not interested in filling out documents for now    #. Thrombocytopenia  Most likely secondary to pneumonia   Check CBC to ensure resolution    #. Chronic pain, neuropathy in lower extremities  Pt states currently swelling improved but numbness of lower extremities varies but is persistent (previously unable to feel toes, but sensation has returned)  Has not obtained 12/2023 labs for workup yet; has B12 and iron panel ordered  Will follow up on this at next visit  Urged to comply more w/compression stockings to improve with periodic swelling  Continue 400 mg BID gabapentin  Continue quinine     #. Hypertension  Very well controlled today, confirmed on repeat manual BP. Blood Pressure: 123/79 down from 180/100 in prior visits this year. Still no working BP cuff at home since last one broke.   Pt reports some dizziness with sudden positional changes but has been less frequent than before.  Continue losartan 100 mg po qd, diltiazem 180 mg po qd, amlodipine 5 mg po qd    #. ILD  Follows with pulmonology; not on any chronic medications specific to this    #. Obesity  Counseled on importance of physical activity, but  not provided w/extensive dietary counseling today    #. AAA Screening  - In 2018 one-time screening was negative.        Preventive health issues were discussed with patient, and age appropriate screening tests were ordered as noted in patient's After Visit Summary.  Personalized health advice and appropriate referrals for health education or preventive services given if needed, as noted in patient's After Visit Summary.     History of Present Illness:     Patient presents for a Medicare Wellness Visit.        82 yo M pmhx: obesity 35, HTN, Leutscher syndrome (hyperaldo), CKD3a, medial meniscus tear, gout R wrist, ILD, prostate ca, adrenal gland disorder, here for AWV.     Patient reports ongoing L knee pain for which he follows w/ortho, is s/p three injections to knee and is planning for total knee replacement. He states his leg swelling is better than before (Dec 2023) when we last spoke. He is not very compliant with his compression stockings. He denies fevers/chills, chest pain, shortness of breath, heart palpitations, nausea, vomiting, abdominal pain, weakness.  His weight has fluctuated, but he has generally lost 6 lbs in last 4 months intentionally.     He reports staying very active around the house and has been running many errands as well as housework while his wife is preoccupied with increased work responsibilities as of late.     Patient is up to date on vaccinations. He has aged out of most screenings.   June 2024 will be when patient is next due for ILD-related CT lung follow-up; follows w pulmonary.          Patient Care Team:  Lalitha Ty MD as PCP - General  MD Lenard Britton MD as Consulting Physician (Allergy)  Altaf Braga MD as Consulting Physician (Pulmonary Disease)  Hemalatha Kirkpatrick DO (Plastic Surgery)  Carlo Cardenas MD (Surgical Oncology)     Review of Systems:     Review of Systems   Constitutional:  Negative for chills, fatigue and fever.   Respiratory:   Negative for cough and shortness of breath.    Cardiovascular:  Negative for chest pain and palpitations.   Gastrointestinal:  Negative for abdominal pain, constipation, diarrhea, nausea and vomiting.   Musculoskeletal:  Positive for arthralgias (L knee, chronic).   Skin:  Negative for rash.   Neurological:  Positive for numbness (chronic, BLE - stable/improved slightly). Negative for weakness.   Psychiatric/Behavioral:  Negative for sleep disturbance.         Problem List:     Patient Active Problem List   Diagnosis    Benign essential hypertension    Benign prostatic hyperplasia (BPH) with straining on urination    Gastroesophageal reflux disease without esophagitis    Prostate cancer (HCC)    Seasonal allergic rhinitis due to pollen    Truncal obesity    Need for shingles vaccine    Interstitial lung disease (HCC)    Localized primary osteoarthritis of wrist    Pain and swelling of right knee    Current tear of medial cartilage or meniscus of knee    Idiopathic peripheral neuropathy    Swelling of right thumb    Right elbow pain    Back pain    Pain in both lower extremities    Radiculopathy of leg    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    Anxiety    Class 2 obesity due to excess calories without serious comorbidity with body mass index (BMI) of 36.0 to 36.9 in adult    Peripheral vascular disease (HCC)    Bilateral lower extremity edema    Varicose veins of both lower extremities with pain    Leg cramping    Constipation    Age-related cataract of right eye      Past Medical and Surgical History:     Past Medical History:   Diagnosis Date    Abnormal electrocardiogram     Last assessed: Oct 11, 2013    KAYLI (acute kidney injury) ([facility]) 12/24/2015    Allergic rhinitis     last assessed: Oct 11, 2013    Allergic rhinitis due to pollen     last assessed: Oct 10, 2013    Allergic sinusitis      Last assessed: May 11, 2015    Allergy     resolved: July 22, 2015    Benign essential hypertension     Last assessed/resolved: May 31, 2017    BPH (benign prostatic hyperplasia)     Cancer (HCC)     prostate    Colitis     Last assessed: May 24, 2016    Cough with hemoptysis 11/17/2020    COVID-19 08/18/2022    Generalized osteoarthritis     Last assessed: Oct 11, 2013    GERD without esophagitis     Last assessed/resolved: May 31, 2017    Hearing loss     Hiatal hernia     resolved: July 22, 2015    History of gastroesophageal reflux (GERD)     History of stomach ulcers     last assessed: May 11, 2015    Hypertension     Incomplete bladder emptying     Kidney stone     Nodular prostate with lower urinary tract symptoms     Nontoxic single thyroid nodule     last assessed: April 16, 2014    Orchalgia     Overweight     last assessed: Oct 31, 2013    Poor urinary stream     Pulmonary embolism (HCC)     Salivary gland disorder     last assessed: April 16, 2014    Seasonal allergies     last assessed: May 15, 2015    Spermatocele     Straining to void     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, 2014     Past Surgical History:   Procedure Laterality Date    BLADDER SURGERY      CATARACT EXTRACTION      CHOLECYSTECTOMY  2000    laparoscopic     COLONOSCOPY      FLAP LOCAL TRUNK Left 10/28/2021    Procedure: EXCISION OF FLANK MASS;  Surgeon: Hemalatha Kirkpatrick DO;  Location:  MAIN OR;  Service: Plastics    HEMORRHOID SURGERY      pilionidal cyst removal     HERNIA REPAIR Left 01/17/2008    inguinal     KNEE ARTHROSCOPY Left 1974    KNEE CARTILAGE SURGERY      NASAL SEPTUM SURGERY      Deviation repair     WV EXC PRTD BARBARA/PRTD GLND LAT DSJ&PRSRV FACIAL NR Right 06/09/2021    Procedure: SUPERFICIAL PAROTIDECTOMY WITH FACIAL NERVE MONITORING;  Surgeon: Omkar Dobbins MD;  Location: [facility];  Service: ENT    PROSTATE BIOPSY  2017    SKIN TAG REMOVAL  03/2023    on his eye    STOMACH SURGERY      TONSILLECTOMY AND  ADENOIDECTOMY      at age 41    TOTAL SHOULDER ARTHROPLASTY      SHOULDER JOINT REPLACEMENT - right  0 left     UPPER GASTROINTESTINAL ENDOSCOPY      US GUIDED THYROID BIOPSY  2022    US GUIDED THYROID BIOPSY  2022    VASECTOMY        Family History:     Family History   Problem Relation Age of Onset    Hypertension Mother     Stroke Mother     Stomach cancer Father     Hypertension Father     Hypertension Family     Stroke Family         syndrome     Heart attack Son       Social History:     Social History     Socioeconomic History    Marital status: /Civil Union     Spouse name: Ivy     Number of children: 1    Years of education: None    Highest education level: None   Occupational History    Occupation: Retired     Occupation: [employer]      Comment: [employer]    Tobacco Use    Smoking status: Former     Current packs/day: 0.00     Average packs/day: 0.3 packs/day for 30.0 years (7.5 ttl pk-yrs)     Types: Cigarettes     Start date:      Quit date:      Years since quittin.3    Smokeless tobacco: Never   Vaping Use    Vaping status: Never Used   Substance and Sexual Activity    Alcohol use: Not Currently     Comment: rare    Drug use: No    Sexual activity: Yes     Partners: Female   Other Topics Concern    None   Social History Narrative    Who lives in your home: wife     What type of home do you live in: Single house    Age of your home: Built [birth year]     How long have you been living there:     Type of heat: Baseboard    Type of fuel: Electric    What type of elaine is in your bedroom: Carpet     Do you have the following in or near your home:    Air products: Central air    Pests: None    Pets: None    Are pets allowed in bedroom: N/A    Open fields, wooded areas nearby: Open fields and Wooded areas    Basement: Finished    Exposure to second hand smoke: No        Habits:    Caffeine: Coffee 1 cup of coffee -peach snapple few a week      Chocolate: rare     Other:     Social Determinants of Health     Financial Resource Strain: Low Risk  (3/2/2023)    Overall Financial Resource Strain (CARDIA)     Difficulty of Paying Living Expenses: Not hard at all   Food Insecurity: No Food Insecurity (4/15/2024)    Hunger Vital Sign     Worried About Running Out of Food in the Last Year: Never true     Ran Out of Food in the Last Year: Never true   Transportation Needs: No Transportation Needs (4/15/2024)    PRAPARE - Transportation     Lack of Transportation (Medical): No     Lack of Transportation (Non-Medical): No   Physical Activity: Sufficiently Active (6/11/2020)    Exercise Vital Sign     Days of Exercise per Week: 7 days     Minutes of Exercise per Session: 60 min   Stress: Not on file   Social Connections: Not on file   Intimate Partner Violence: Not on file   Housing Stability: Unknown (4/15/2024)    Housing Stability Vital Sign     Unable to Pay for Housing in the Last Year: No     Number of Places Lived in the Last Year: Not on file     Unstable Housing in the Last Year: No      Medications and Allergies:     Current Outpatient Medications   Medication Sig Dispense Refill    acetaminophen (TYLENOL) 325 mg tablet Take 2 tablets (650 mg total) by mouth every 6 (six) hours as needed for mild pain      allopurinol (ZYLOPRIM) 100 mg tablet Take 1 tablet (100 mg total) by mouth 2 (two) times a day 180 tablet 1    amLODIPine (NORVASC) 5 mg tablet Take 1 tablet (5 mg total) by mouth daily 90 tablet 1    bromfenac sodium (Prolensa) 0.07 % SOLN instill 1 drop by ophthalmic route once a day into left eye starting when you get home from surgery for 28 days      Cholecalciferol (VITAMIN D3 PO) Take by mouth 2000 IU      diltiazem (CARDIZEM CD) 180 mg 24 hr capsule Take 1 capsule (180 mg total) by mouth daily 90 capsule 1    gabapentin (Neurontin) 400 mg capsule Take 1 capsule (400 mg total) by mouth 2 (two) times a day 90 capsule 1    ketorolac (ACULAR) 0.5 %  ophthalmic solution       levocetirizine (XYZAL) 5 MG tablet Take 1 tablet (5 mg total) by mouth every evening 90 tablet 4    losartan (COZAAR) 100 MG tablet Take 1 tablet (100 mg total) by mouth daily 90 tablet 1    polyethylene glycol (MIRALAX) 17 g packet Take 17 g by mouth daily 30 each 0    quiNINE (QUALAQUIN) 324 mg capsule Take 2 capsules (648 mg total) by mouth daily at bedtime 30 capsule 4    tamsulosin (FLOMAX) 0.4 mg Take 1 capsule (0.4 mg total) by mouth daily with dinner 90 capsule 1     No current facility-administered medications for this visit.     Allergies   Allergen Reactions    Other Anaphylaxis    Ace Inhibitors Hyperactivity      Immunizations:     Immunization History   Administered Date(s) Administered    COVID-19 PFIZER VACCINE 0.3 ML IM 02/11/2021, 02/11/2021, 03/02/2021, 03/02/2021, 12/07/2021    COVID-19 Pfizer vac (Alex-sucrose, gray cap) 12 yr+ IM 05/13/2022    INFLUENZA 12/12/2005, 10/20/2006, 11/02/2007, 10/17/2008, 09/17/2009, 11/13/2012, 10/08/2014, 10/08/2014, 10/08/2014, 10/23/2015, 10/23/2015, 10/23/2015, 10/25/2016, 10/25/2016, 10/25/2016, 10/17/2017, 10/17/2017, 10/17/2017, 10/08/2021, 10/08/2021, 10/17/2022, 10/17/2022, 10/09/2023    Influenza Split High Dose Preservative Free IM 10/23/2015, 10/25/2016, 10/25/2019    Influenza, high dose seasonal 0.7 mL 09/28/2018, 10/10/2020, 10/17/2022    Influenza, seasonal, injectable 09/27/2010, 10/05/2011, 10/01/2013, 10/08/2014    Pneumococcal Conjugate 13-Valent 09/06/2018, 09/06/2018    Pneumococcal Conjugate Vaccine 20-valent (Pcv20), Polysace 12/26/2023, 12/26/2023    Pneumococcal Polysaccharide PPV23 11/01/2006    Tuberculin Skin Test-PPD Intradermal 07/28/2008, 06/03/2015, 06/03/2015, 06/03/2015    Zoster 10/07/2013    Zoster Vaccine Recombinant 02/11/2020, 02/11/2020, 07/24/2020, 07/24/2020      Health Maintenance:         Topic Date Due    Hepatitis C Screening  Completed    Colorectal Cancer Screening  Discontinued          Topic Date Due    COVID-19 Vaccine (7 - 2023-24 season) 09/01/2023      Medicare Screening Tests and Risk Assessments:     Moreno is here for his Subsequent Wellness visit. Last Medicare Wellness visit information reviewed, patient interviewed and updates made to the record today.      Health Risk Assessment:   Patient rates overall health as good. Patient feels that their physical health rating is same. Patient is very satisfied with their life. Eyesight was rated as same. Hearing was rated as slightly worse. Patient feels that their emotional and mental health rating is same. Patients states they are never, rarely angry. Patient states they are sometimes unusually tired/fatigued. Pain experienced in the last 7 days has been some. Patient's pain rating has been 3/10. Patient states that he has experienced no weight loss or gain in last 6 months.     Depression Screening:   PHQ-2 Score: 0      Fall Risk Screening:   In the past year, patient has experienced: no history of falling in past year      Home Safety:  Patient has trouble with stairs inside or outside of their home. Patient has working smoke alarms and has no working carbon monoxide detector. Home safety hazards include: none.     Nutrition:   Current diet is Regular.     Medications:   Patient is currently taking over-the-counter supplements. OTC medications include: see medication list. Patient is able to manage medications.     Activities of Daily Living (ADLs)/Instrumental Activities of Daily Living (IADLs):   Walk and transfer into and out of bed and chair?: Yes  Dress and groom yourself?: Yes    Bathe or shower yourself?: Yes    Feed yourself? Yes  Do your laundry/housekeeping?: Yes  Manage your money, pay your bills and track your expenses?: Yes  Make your own meals?: Yes    Do your own shopping?: Yes    Previous Hospitalizations:   Any hospitalizations or ED visits within the last 12 months?: Yes      Advance Care Planning:   Living will: No   "  Durable POA for healthcare: No    ACP document given: Yes      PREVENTIVE SCREENINGS      Cardiovascular Screening:    General: Screening Current      Diabetes Screening:     General: Screening Current      Colorectal Cancer Screening:     General: Screening Current      Prostate Cancer Screening:    General: History Prostate Cancer and Screening Not Indicated      Abdominal Aortic Aneurysm (AAA) Screening:    Risk factors include: tobacco use        Lung Cancer Screening:     General: Screening Not Indicated      Hepatitis C Screening:    General: Screening Current    No results found.     Physical Exam:     /79 (Cuff Size: Standard)   Pulse 64   Temp 98.3 °F (36.8 °C) (Temporal)   Ht 5' 7\" (1.702 m)   Wt 102 kg (225 lb)   SpO2 96%   BMI 35.24 kg/m²     Physical Exam  Vitals reviewed.   Constitutional:       General: He is not in acute distress.     Appearance: He is obese.   HENT:      Head: Normocephalic and atraumatic.      Mouth/Throat:      Mouth: Mucous membranes are moist.      Pharynx: Oropharynx is clear.   Eyes:      General: No scleral icterus.     Extraocular Movements: Extraocular movements intact.   Cardiovascular:      Rate and Rhythm: Normal rate and regular rhythm.      Heart sounds: No murmur heard.     No friction rub. No gallop.   Pulmonary:      Effort: Pulmonary effort is normal. No respiratory distress.      Breath sounds: No stridor. Wheezing (faint, in upper lobes b/l) and rales (fine crackles throughout) present. No rhonchi.   Abdominal:      General: Bowel sounds are normal. There is no distension.      Palpations: There is no mass.      Tenderness: There is no abdominal tenderness. There is no guarding.   Musculoskeletal:         General: Normal range of motion.      Right lower leg: Edema (1+) present.      Left lower leg: Edema (1+) present.      Comments: +BLE venous stasis dermatitis   Skin:     General: Skin is warm and dry.      Findings: Rash (venous stasis " dermatitis b/l) present.   Neurological:      Mental Status: He is alert.      Sensory: No sensory deficit.   Psychiatric:         Mood and Affect: Mood normal.         Behavior: Behavior normal.         Thought Content: Thought content normal.          Renuka Cabrera MD

## 2024-05-08 ENCOUNTER — OFFICE VISIT (OUTPATIENT)
Dept: INTERNAL MEDICINE CLINIC | Age: 84
End: 2024-05-08
Payer: COMMERCIAL

## 2024-05-08 VITALS
HEART RATE: 64 BPM | WEIGHT: 227 LBS | BODY MASS INDEX: 35.63 KG/M2 | DIASTOLIC BLOOD PRESSURE: 74 MMHG | TEMPERATURE: 98.4 F | SYSTOLIC BLOOD PRESSURE: 120 MMHG | OXYGEN SATURATION: 97 % | HEIGHT: 67 IN

## 2024-05-08 DIAGNOSIS — D22.9 ATYPICAL MOLE: Primary | ICD-10-CM

## 2024-05-08 DIAGNOSIS — C44.629 SQUAMOUS CELL CARCINOMA OF HAND, LEFT: ICD-10-CM

## 2024-05-08 PROCEDURE — 11301 SHAVE SKIN LESION 0.6-1.0 CM: CPT | Performed by: FAMILY MEDICINE

## 2024-05-08 NOTE — PROGRESS NOTES
Shave lesion    Date/Time: 5/8/2024 1:00 PM    Performed by: Prem Estrada DO  Authorized by: Prem Estrada DO  Universal Protocol:  Consent: Verbal consent obtained. Written consent obtained.  Risks and benefits: risks, benefits and alternatives were discussed  Consent given by: patient  Patient understanding: patient states understanding of the procedure being performed  Patient consent: the patient's understanding of the procedure matches consent given  Procedure consent: procedure consent matches procedure scheduled  Relevant documents: relevant documents present and verified  Patient identity confirmed: verbally with patient    Number of Lesions: 1  Lesion 1:     Body area: upper extremity    Upper extremity location: L wrist    Initial size (mm): 6    Final defect size (mm): 8    Malignancy: malignant lesion      Destruction method: shave removal      Comments:  Lesion sent for pathology.  Appears to be umbilicated suspect skin cancer

## 2024-05-15 DIAGNOSIS — I10 PRIMARY HYPERTENSION: ICD-10-CM

## 2024-05-15 DIAGNOSIS — I10 BENIGN ESSENTIAL HYPERTENSION: Chronic | ICD-10-CM

## 2024-05-16 RX ORDER — LOSARTAN POTASSIUM 100 MG/1
100 TABLET ORAL DAILY
Qty: 90 TABLET | Refills: 1 | Status: SHIPPED | OUTPATIENT
Start: 2024-05-16

## 2024-05-16 RX ORDER — DILTIAZEM HYDROCHLORIDE 180 MG/1
180 CAPSULE, COATED, EXTENDED RELEASE ORAL DAILY
Qty: 90 CAPSULE | Refills: 1 | Status: SHIPPED | OUTPATIENT
Start: 2024-05-16

## 2024-05-20 ENCOUNTER — OFFICE VISIT (OUTPATIENT)
Dept: INTERNAL MEDICINE CLINIC | Facility: OTHER | Age: 84
End: 2024-05-20
Payer: COMMERCIAL

## 2024-05-20 VITALS
TEMPERATURE: 98.3 F | OXYGEN SATURATION: 96 % | HEIGHT: 67 IN | BODY MASS INDEX: 36.29 KG/M2 | DIASTOLIC BLOOD PRESSURE: 70 MMHG | SYSTOLIC BLOOD PRESSURE: 122 MMHG | RESPIRATION RATE: 20 BRPM | HEART RATE: 69 BPM | WEIGHT: 231.2 LBS

## 2024-05-20 DIAGNOSIS — C44.629 SQUAMOUS CELL CARCINOMA OF HAND, LEFT: ICD-10-CM

## 2024-05-20 DIAGNOSIS — R06.2 WHEEZING: ICD-10-CM

## 2024-05-20 DIAGNOSIS — J84.9 INTERSTITIAL LUNG DISEASE (HCC): Primary | ICD-10-CM

## 2024-05-20 PROCEDURE — 99213 OFFICE O/P EST LOW 20 MIN: CPT

## 2024-05-20 PROCEDURE — G2211 COMPLEX E/M VISIT ADD ON: HCPCS

## 2024-05-20 RX ORDER — ALBUTEROL SULFATE 90 UG/1
2 AEROSOL, METERED RESPIRATORY (INHALATION) EVERY 6 HOURS PRN
Qty: 8.5 G | Refills: 0 | Status: SHIPPED | OUTPATIENT
Start: 2024-05-20

## 2024-05-20 RX ORDER — AZITHROMYCIN 250 MG/1
TABLET, FILM COATED ORAL
Qty: 6 TABLET | Refills: 0 | Status: SHIPPED | OUTPATIENT
Start: 2024-05-20 | End: 2024-05-28 | Stop reason: ALTCHOICE

## 2024-05-20 RX ORDER — PREDNISONE 10 MG/1
10 TABLET ORAL AS NEEDED
COMMUNITY

## 2024-05-20 RX ORDER — AMOXICILLIN 500 MG/1
500 CAPSULE ORAL AS NEEDED
COMMUNITY

## 2024-05-20 RX ORDER — METHYLPREDNISOLONE 4 MG/1
TABLET ORAL
Qty: 21 EACH | Refills: 0 | Status: SHIPPED | OUTPATIENT
Start: 2024-05-20

## 2024-05-20 NOTE — PATIENT INSTRUCTIONS
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

## 2024-05-20 NOTE — PROGRESS NOTES
Assessment/Plan:    1. Interstitial lung disease (HCC)  Assessment & Plan:  Acute exacerbation with mild wheezing of right upper lobe.  Will prescribe azithromycin and prednisone.  Advised patient to take to completion  Will also give albuterol inhaler to be used every 6 hours as needed for wheezing  Continue Xyzal daily  Will defer CXR at this time, however, if patient's symptoms worsen, recommend he present for reevaluation and possible CXR at that time  Patient advised to present to the ER with any shortness of breath, difficulty breathing, chest pain  2. Wheezing  -     azithromycin (ZITHROMAX) 250 mg tablet; Take 2 tablets today then 1 tablet daily x 4 days  -     methylPREDNISolone 4 MG tablet therapy pack; Use as directed on package  -     albuterol (ProAir HFA) 90 mcg/act inhaler; Inhale 2 puffs every 6 (six) hours as needed for wheezing  3. Squamous cell carcinoma of hand, left  Assessment & Plan:  Reviewed patient's pathology results today  Advised the need for additional procedure to ensure removal of entire lesion.  Patient is established patient of Dr. Frazier.  Advised to call ASAP for appointment.  Patient advised to call the office if he is unable to obtain an appointment within next few weeks  All questions answered at the time of this visit  Orders:  -     Ambulatory Referral to Dermatology; Future            M*Modal software was used to dictate this note.  It may contain errors with dictating incorrect words or incorrect spelling. Please contact the provider directly with any questions.    Subjective:      Patient ID: Moreno Fournier Jr. is a 84 y.o. male.    Patient is an 84-year-old male presenting to the office with wheezing x 6 days  Of note, patient has a past medical history of interstitial lung disease and follows with pulmonology    Tx: none    Wheezing  The current episode started in the past 7 days. Associated symptoms include coughing (productive) and wheezing. Pertinent negatives  include no chest pain, dizziness, fatigue, palpitations, rhinorrhea or sore throat.   Shortness of Breath  Associated symptoms include coughing (productive) and wheezing. Pertinent negatives include no chest pain, dizziness, fatigue, palpitations, rhinorrhea or sore throat.   Cough  Associated symptoms include shortness of breath and wheezing. Pertinent negatives include no chest pain, chills, fever, headaches, postnasal drip, rhinorrhea or sore throat.       The following portions of the patient's history were reviewed and updated as appropriate: allergies, current medications, past family history, past medical history, past social history, past surgical history, and problem list.    Review of Systems   Constitutional:  Negative for chills, fatigue and fever.   HENT:  Negative for congestion, postnasal drip, rhinorrhea, sinus pressure, sinus pain, sore throat and trouble swallowing.    Respiratory:  Positive for cough (productive), shortness of breath and wheezing. Negative for chest tightness.    Cardiovascular:  Negative for chest pain and palpitations.   Gastrointestinal:  Negative for abdominal pain, nausea and vomiting.   Neurological:  Negative for dizziness, light-headedness, numbness and headaches.         Past Medical History:   Diagnosis Date    Abnormal electrocardiogram     Last assessed: Oct 11, 2013    KAYLI (acute kidney injury) (Formerly [facility]) 12/24/2015    Allergic rhinitis     last assessed: Oct 11, 2013    Allergic rhinitis due to pollen     last assessed: Oct 10, 2013    Allergic sinusitis     Last assessed: May 11, 2015    Allergy     resolved: July 22, 2015    Benign essential hypertension     Last assessed/resolved: May 31, 2017    BPH (benign prostatic hyperplasia)     Cancer (Former[facility])     prostate    Colitis     Last assessed: May 24, 2016    Cough with hemoptysis 11/17/2020    COVID-19 08/18/2022    Generalized osteoarthritis     Last assessed: Oct 11, 2013    GERD without esophagitis     Last  assessed/resolved: May 31, 2017    Hearing loss     Hiatal hernia     resolved: July 22, 2015    History of gastroesophageal reflux (GERD)     History of stomach ulcers     last assessed: May 11, 2015    Hypertension     Incomplete bladder emptying     Kidney stone     Nodular prostate with lower urinary tract symptoms     Nontoxic single thyroid nodule     last assessed: April 16, 2014    Orchalgia     Overweight     last assessed: Oct 31, 2013    Poor urinary stream     Pulmonary embolism (HCC)     Salivary gland disorder     last assessed: April 16, 2014    Seasonal allergies     last assessed: May 15, 2015    Spermatocele     Straining to void     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, 2014         Current Outpatient Medications:     acetaminophen (TYLENOL) 325 mg tablet, Take 2 tablets (650 mg total) by mouth every 6 (six) hours as needed for mild pain, Disp: , Rfl:     albuterol (ProAir HFA) 90 mcg/act inhaler, Inhale 2 puffs every 6 (six) hours as needed for wheezing, Disp: 8.5 g, Rfl: 0    allopurinol (ZYLOPRIM) 100 mg tablet, Take 1 tablet (100 mg total) by mouth 2 (two) times a day, Disp: 180 tablet, Rfl: 1    amLODIPine (NORVASC) 5 mg tablet, Take 1 tablet (5 mg total) by mouth daily, Disp: 90 tablet, Rfl: 1    amoxicillin (AMOXIL) 500 mg capsule, Take 500 mg by mouth if needed 2000 mg before dental procedures, Disp: , Rfl:     azithromycin (ZITHROMAX) 250 mg tablet, Take 2 tablets today then 1 tablet daily x 4 days, Disp: 6 tablet, Rfl: 0    Cholecalciferol (VITAMIN D3 PO), Take by mouth 2000 IU, Disp: , Rfl:     diltiazem (CARDIZEM CD) 180 mg 24 hr capsule, Take 1 capsule (180 mg total) by mouth daily, Disp: 90 capsule, Rfl: 1    gabapentin (Neurontin) 400 mg capsule, Take 1 capsule (400 mg total) by mouth 2 (two) times a day (Patient taking differently: Take 400 mg by mouth in the morning), Disp: 90 capsule, Rfl: 1    levocetirizine (XYZAL) 5 MG tablet, Take 1 tablet (5 mg total) by mouth every  evening, Disp: 90 tablet, Rfl: 4    losartan (COZAAR) 100 MG tablet, Take 1 tablet (100 mg total) by mouth daily, Disp: 90 tablet, Rfl: 1    methylPREDNISolone 4 MG tablet therapy pack, Use as directed on package, Disp: 21 each, Rfl: 0    predniSONE 10 mg tablet, Take 10 mg by mouth if needed, Disp: , Rfl:     quiNINE (QUALAQUIN) 324 mg capsule, Take 2 capsules (648 mg total) by mouth daily at bedtime (Patient taking differently: Take 324 mg by mouth daily at bedtime), Disp: 30 capsule, Rfl: 4    traMADol-acetaminophen (ULTRACET) 37.5-325 mg per tablet, Take 1 tablet by mouth every 6 (six) hours as needed for moderate pain, Disp: , Rfl:     tamsulosin (FLOMAX) 0.4 mg, Take 1 capsule (0.4 mg total) by mouth daily with dinner (Patient not taking: Reported on 5/20/2024), Disp: 90 capsule, Rfl: 1    Allergies   Allergen Reactions    Other Anaphylaxis    Ace Inhibitors Hyperactivity       Social History   Past Surgical History:   Procedure Laterality Date    BLADDER SURGERY      CATARACT EXTRACTION      CHOLECYSTECTOMY  2000    laparoscopic     COLONOSCOPY      FLAP LOCAL TRUNK Left 10/28/2021    Procedure: EXCISION OF FLANK MASS;  Surgeon: Hemalatha Kirkpatrick DO;  Location:  MAIN OR;  Service: Plastics    HEMORRHOID SURGERY      pilionidal cyst removal     HERNIA REPAIR Left 01/17/2008    inguinal     KNEE ARTHROSCOPY Left 1974    KNEE CARTILAGE SURGERY      NASAL SEPTUM SURGERY      Deviation repair     DE EXC PRTD BARBARA/PRTD GLND LAT DSJ&PRSRV FACIAL NR Right 06/09/2021    Procedure: SUPERFICIAL PAROTIDECTOMY WITH FACIAL NERVE MONITORING;  Surgeon: Omkar Dobbins MD;  Location: [facility];  Service: ENT    PROSTATE BIOPSY  2017    SKIN TAG REMOVAL  03/2023    on his eye    STOMACH SURGERY      TONSILLECTOMY AND ADENOIDECTOMY      at age 41    TOTAL SHOULDER ARTHROPLASTY      SHOULDER JOINT REPLACEMENT - right 01/04 0 left 01/95    UPPER GASTROINTESTINAL ENDOSCOPY      US GUIDED THYROID BIOPSY  01/19/2022    US GUIDED  "THYROID BIOPSY  05/26/2022    VASECTOMY       Family History   Problem Relation Age of Onset    Hypertension Mother     Stroke Mother     Stomach cancer Father     Hypertension Father     Hypertension Family     Stroke Family         syndrome     Heart attack Son        Objective:  /70 (BP Location: Left arm, Patient Position: Sitting, Cuff Size: Large)   Pulse 69   Temp 98.3 °F (36.8 °C) (Temporal)   Resp 20   Ht 5' 7\" (1.702 m)   Wt 105 kg (231 lb 3.2 oz)   SpO2 96%   BMI 36.21 kg/m²      Physical Exam  Vitals and nursing note reviewed.   Constitutional:       General: He is not in acute distress.     Appearance: He is well-developed. He is not ill-appearing or diaphoretic.   HENT:      Head: Normocephalic and atraumatic.      Right Ear: Tympanic membrane, ear canal and external ear normal.      Left Ear: Tympanic membrane, ear canal and external ear normal.      Nose: Nose normal.      Mouth/Throat:      Mouth: Mucous membranes are moist.      Pharynx: Oropharynx is clear.   Eyes:      General: No scleral icterus.     Conjunctiva/sclera: Conjunctivae normal.   Cardiovascular:      Rate and Rhythm: Normal rate and regular rhythm.      Heart sounds: Normal heart sounds. No murmur heard.     No friction rub. No gallop.   Pulmonary:      Effort: Pulmonary effort is normal. No respiratory distress.      Breath sounds: Examination of the left-upper field reveals wheezing. Examination of the left-lower field reveals wheezing. Wheezing present. No rhonchi or rales.      Comments: No shortness of breath noted on examination  Chest:      Chest wall: No tenderness.   Musculoskeletal:      Cervical back: No tenderness.   Lymphadenopathy:      Cervical: No cervical adenopathy.   Skin:     General: Skin is warm and dry.      Coloration: Skin is not pale.      Findings: No erythema.   Neurological:      General: No focal deficit present.      Mental Status: He is alert and oriented to person, place, and time. " Mental status is at baseline.   Psychiatric:         Mood and Affect: Mood normal.         Behavior: Behavior normal.           Disclaimer: This note was generated with voice recognition software.  Phonetic, grammatical, and spelling errors may be present as a result.  Please contact provider with any concerns or questions

## 2024-05-20 NOTE — ASSESSMENT & PLAN NOTE
Reviewed patient's pathology results today  Advised the need for additional procedure to ensure removal of entire lesion.  Patient is established patient of Dr. Frazier.  Advised to call ASAP for appointment.  Patient advised to call the office if he is unable to obtain an appointment within next few weeks  All questions answered at the time of this visit

## 2024-05-20 NOTE — ASSESSMENT & PLAN NOTE
Acute exacerbation with mild wheezing of right upper lobe.  Will prescribe azithromycin and prednisone.  Advised patient to take to completion  Will also give albuterol inhaler to be used every 6 hours as needed for wheezing  Continue Xyzal daily  Will defer CXR at this time, however, if patient's symptoms worsen, recommend he present for reevaluation and possible CXR at that time  Patient advised to present to the ER with any shortness of breath, difficulty breathing, chest pain

## 2024-05-21 ENCOUNTER — TELEPHONE (OUTPATIENT)
Age: 84
End: 2024-05-21

## 2024-05-21 NOTE — TELEPHONE ENCOUNTER
Pt. Wife called and stated that her  was seen by Dr. Estrada on 5/8/2024 for Growth /Wart on his hand. They found out couple of days ago that the growth was cancerous. Dr. Estrada suggested to make an appointment with Dr. Frazier. Pt. Wife called them couple of times. She was on a hold for 15-20 minutes then someone told her that she needs to talk to Shanta. She called again for Shanta. Shanta was not available and she was told that Shanta will call her back. But she did not hear back from Shanta. Dr. Estrada told her if she has trouble making appointment with Dr. Frazier she should reach back to her. Pt. Wife  also said that Dr. Frazier does not do do surgery anymore but Dr. Aggarwal's does.  Please call back Pt. Or his wife to advice accordingly. Pt. Wife would like this message to be reached to Dr. Estrada.  Thank you!!

## 2024-05-21 NOTE — TELEPHONE ENCOUNTER
Pt. Wife called again stating that Shanta called back. She needs Ambulatory Referral  for dermatology to be faxed to [phone] attention to Shanta to Dr. Frazier's office.  Please advise pt. Or his wife with further questions.  Thank you!!

## 2024-05-22 ENCOUNTER — TELEPHONE (OUTPATIENT)
Age: 84
End: 2024-05-22

## 2024-05-22 NOTE — TELEPHONE ENCOUNTER
Linn from [facility] Dermatologist. Had called in requesting for patients Biopsy results to be faxed over.     Patient is being seen tomorrow for something on the cheek, lynn needed the results of the biopsy.     Please fax results over at [phone]     Patient has a consult tomorrow 05/23/2024, can't do consult until results are faxed.     For any additional questions please reach out to Linn at [phone] Ext: [phone]

## 2024-06-05 ENCOUNTER — TELEPHONE (OUTPATIENT)
Dept: PULMONOLOGY | Facility: CLINIC | Age: 84
End: 2024-06-05

## 2024-06-05 NOTE — TELEPHONE ENCOUNTER
Called patient to schedule appointment for the 6M Fu from  [employer] and left a voicemail message.

## 2024-06-06 DIAGNOSIS — G60.9 IDIOPATHIC PERIPHERAL NEUROPATHY: ICD-10-CM

## 2024-06-06 DIAGNOSIS — R25.2 LEG CRAMPING: ICD-10-CM

## 2024-06-06 NOTE — TELEPHONE ENCOUNTER
Medication: Gabapentin ([facility]); Quinine ([facility])    Dose/Frequency: 400 mg (2 times daily); 324 mg (Daily at bedtime)    Quantity: 90 day supply     Pharmacy: [facility]     Office:   [x] PCP/Provider -   [] Speciality/Provider -     Does the patient have enough for 3 days?   [x] Yes   [] No - Send as HP to POD

## 2024-06-07 ENCOUNTER — TELEPHONE (OUTPATIENT)
Dept: PULMONOLOGY | Facility: CLINIC | Age: 84
End: 2024-06-07

## 2024-06-07 RX ORDER — QUININE SULFATE 324 MG/1
324 CAPSULE ORAL
Qty: 30 CAPSULE | Refills: 2 | Status: SHIPPED | OUTPATIENT
Start: 2024-06-07 | End: 2024-09-05

## 2024-06-07 RX ORDER — GABAPENTIN 400 MG/1
400 CAPSULE ORAL 2 TIMES DAILY
Qty: 180 CAPSULE | Refills: 1 | Status: SHIPPED | OUTPATIENT
Start: 2024-06-07

## 2024-06-07 NOTE — TELEPHONE ENCOUNTER
Left VM for patient to call back and reschedule 6/11 1pm appt with Dr. Braga as Dr. Braga is not available at this time

## 2024-06-09 ENCOUNTER — TELEPHONE (OUTPATIENT)
Age: 84
End: 2024-06-09

## 2024-06-09 NOTE — TELEPHONE ENCOUNTER
PA for quiNINE (QUALAQUIN)     Submitted via    []CMM-KEY    [x][employer]-Case ID # [id number]   []Faxed to plan   []Other website    []Phone call Case ID #      Office notes sent, clinical questions answered. Awaiting determination    Turnaround time for your insurance to make a decision on your Prior Authorization can take 7-21 business days.

## 2024-06-10 DIAGNOSIS — R25.2 LEG CRAMPING: ICD-10-CM

## 2024-06-10 DIAGNOSIS — E79.0 HYPERURICEMIA: ICD-10-CM

## 2024-06-10 NOTE — TELEPHONE ENCOUNTER
Patient's wife called back to follow up. Patient needs the prior auth to get the medication. Wife called the insurance and was told there's nothing for them to approve. Please advise.

## 2024-06-10 NOTE — TELEPHONE ENCOUNTER
Medication: quiNINE (QUALAQUIN)     Dose/Frequency: : Take 1 capsule (324 mg total) by mouth daily at bedtime     Quantity: 30    Pharmacy: [facility] - [address]     Office:   [x] PCP/Provider -   [] Speciality/Provider -     Does the patient have enough for 3 days?   [] Yes   [x] No - Send as HP to POD       Diagnosis codes was denied due to it having lack of information. Needs more information on why this medication is needed and more in depth, and would need the doctor to call [phone]. Only has ONE appeal to be able to have this medication approved.     Please have the doctor reach out to that number, in order to get this appeal approved with the additional information of a more in depth description on why the patient needs this medication.     Please advise back to Patients wife with any additional information that is needed, or once this has been called in.

## 2024-06-12 ENCOUNTER — OFFICE VISIT (OUTPATIENT)
Dept: PULMONOLOGY | Facility: CLINIC | Age: 84
End: 2024-06-12
Payer: COMMERCIAL

## 2024-06-12 VITALS
SYSTOLIC BLOOD PRESSURE: 116 MMHG | TEMPERATURE: 98.2 F | HEART RATE: 62 BPM | DIASTOLIC BLOOD PRESSURE: 84 MMHG | OXYGEN SATURATION: 97 % | BODY MASS INDEX: 36.41 KG/M2 | WEIGHT: 232 LBS | HEIGHT: 67 IN

## 2024-06-12 DIAGNOSIS — J30.1 SEASONAL ALLERGIC RHINITIS DUE TO POLLEN: ICD-10-CM

## 2024-06-12 DIAGNOSIS — Z01.811 PREOP PULMONARY/RESPIRATORY EXAM: ICD-10-CM

## 2024-06-12 DIAGNOSIS — J84.9 INTERSTITIAL LUNG DISEASE (HCC): Primary | ICD-10-CM

## 2024-06-12 PROBLEM — Z86.718 HISTORY OF DEEP VEIN THROMBOSIS: Status: RESOLVED | Noted: 2024-06-07 | Resolved: 2024-06-12

## 2024-06-12 PROCEDURE — 99214 OFFICE O/P EST MOD 30 MIN: CPT | Performed by: INTERNAL MEDICINE

## 2024-06-12 PROCEDURE — G2211 COMPLEX E/M VISIT ADD ON: HCPCS | Performed by: INTERNAL MEDICINE

## 2024-06-12 NOTE — PROGRESS NOTES
Progress note - Pulmonary Medicine   Moreno Fournier Jr. 84 y.o. male MRN: [id number]       Impression & Plan:     Preop pulmonary/respiratory exam  Patient's respiratory status is stable and does not require any further workup prior to intended left knee replacement surgery  From a pulmonary perspective is low risk for the intended surgery and may proceed as planned    Interstitial lung disease (HCC)  Previous high-resolution CAT scan from 2022 showed some very mild peripheral fibrotic change  Abdominal CT performed recently shows the bases well and does not show any progression or new interstitial changes.  Had some mild groundglass but this was in the setting of hospitalization with respiratory infection  Currently lungs are clear.  No additional testing required at this time  Continue as needed albuterol    Seasonal allergic rhinitis due to pollen  Had significant allergy season symptoms this year with increased wheezing  Did see the primary care and was given a course of steroids, and antibiotic with amoxicillin, and an albuterol inhaler.  Symptoms did improve and he seems to be back to baseline  Does not carry a diagnostic history of asthma and not currently using the inhaler any longer.  Continue Xyzal antihistamine  If progressive respiratory symptoms, should call but currently appears to be back at baseline    Return in about 6 months (around 12/12/2024).      ______________________________________________________________________    HPI:    Moreno Fournier Jr. presents today for follow-up of interstitial lung disease with minimal chronic respiratory symptomatology.  He does not require inhalers.  He occasionally has a dry cough.  He has not had any progressive symptoms or increasing shortness of breath.  He did have illness over the winter which resulted in hospitalization.  He had COVID positive testing and symptoms of pneumonia.  He did improve after the hospitalization and was doing better.  During the  spring allergy season however he developed increased respiratory symptoms and some wheezing.  He saw primary care and was placed on a course of steroids, amoxicillin, and was given an albuterol inhaler.  The steroids did seem to help and he used the inhaler only for a few days during the steroid course.  He has not required the inhaler or had significant persistent wheezing since that time.  He is maintained on Xyzal however for his chronic seasonal allergies.    Currently he feels like he is at baseline.  No shortness of breath.  No chest pain.  No wheezing.  Has a dry cough intermittently but this has been stable.    He does tell me he is planning left knee replacement surgery in mid July.  This will be done by Dr. Underwood who has done several prior orthopedic surgeries for him.  This will be done at [facility].  Pulmonary clearance has been requested    Current Medications:    Current Outpatient Medications:     acetaminophen (TYLENOL) 325 mg tablet, Take 2 tablets (650 mg total) by mouth every 6 (six) hours as needed for mild pain, Disp: , Rfl:     albuterol (ProAir HFA) 90 mcg/act inhaler, Inhale 2 puffs every 6 (six) hours as needed for wheezing, Disp: 8.5 g, Rfl: 0    allopurinol (ZYLOPRIM) 100 mg tablet, Take 1 tablet (100 mg total) by mouth 2 (two) times a day, Disp: 180 tablet, Rfl: 1    amLODIPine (NORVASC) 5 mg tablet, Take 1 tablet (5 mg total) by mouth daily, Disp: 90 tablet, Rfl: 1    Cholecalciferol (VITAMIN D3 PO), Take by mouth 2000 IU, Disp: , Rfl:     diltiazem (CARDIZEM CD) 180 mg 24 hr capsule, Take 1 capsule (180 mg total) by mouth daily, Disp: 90 capsule, Rfl: 1    gabapentin (Neurontin) 400 mg capsule, Take 1 capsule (400 mg total) by mouth 2 (two) times a day, Disp: 180 capsule, Rfl: 1    levocetirizine (XYZAL) 5 MG tablet, Take 1 tablet (5 mg total) by mouth every evening, Disp: 90 tablet, Rfl: 4    losartan (COZAAR) 100 MG tablet, Take 1 tablet (100 mg total) by mouth daily,  "Disp: 90 tablet, Rfl: 1    quiNINE (QUALAQUIN) 324 mg capsule, Take 1 capsule (324 mg total) by mouth daily at bedtime, Disp: 30 capsule, Rfl: 2    tamsulosin (FLOMAX) 0.4 mg, Take 1 capsule (0.4 mg total) by mouth daily with dinner, Disp: 90 capsule, Rfl: 1    traMADol-acetaminophen (ULTRACET) 37.5-325 mg per tablet, Take 1 tablet by mouth every 6 (six) hours as needed for moderate pain, Disp: , Rfl:     Review of Systems:    Aside from what is mentioned in the HPI, the review of systems is otherwise negative    Past medical history, surgical history, and family history were reviewed and updated as appropriate    Social history updates:  Social History     Tobacco Use   Smoking Status Former    Current packs/day: 0.00    Average packs/day: 0.3 packs/day for 30.0 years (7.5 ttl pk-yrs)    Types: Cigarettes    Start date:     Quit date:     Years since quittin.4   Smokeless Tobacco Never       PhysicalExamination:  Vitals:   /84 (BP Location: Left arm, Patient Position: Sitting, Cuff Size: Large)   Pulse 62   Temp 98.2 °F (36.8 °C) (Tympanic)   Ht 5' 7\" (1.702 m)   Wt 105 kg (232 lb)   SpO2 97%   BMI 36.34 kg/m²     Gen: Overweight.  Comfortable on room air.  No conversational dyspnea  HEENT:  Conjugate gaze.  sclerae anicteric.  Oropharynx moist  Neck: Trachea is midline. No JVD. No adenopathy  Chest: Excursion is symmetric.  He has no wheezes or crackles today.  Breath sounds are clear  Cardiac: Regular. no murmur  Abdomen: BS, benign  Extremities: No edema  Neuro:  Normal speech and mentation    Diagnostic Data:  Labs:  I personally reviewed the most recent laboratory data pertinent to today's visit    Lab Results   Component Value Date    WBC 4.39 2024    HGB 16.7 2024    HCT 50.2 (H) 2024    MCV 91 2024     (L) 2024     Lab Results   Component Value Date    SODIUM 134 (L) 2024    K 5.1 2024    CO2 22 2024     2024    BUN " 21 02/19/2024    CREATININE 1.05 02/19/2024    CALCIUM 8.7 02/19/2024       PFT results:  Imaging:  I personally reviewed the images on the PAC system pertinent to today's visit  CT scan of the abdomen performed in February during his hospitalization did not show any significant abnormality in the lower chest but lung windows were quite limited.    Chest x-ray report from June 6 shows clear lung fields    Prior high-resolution CT scan from July 2022 showed mild air trapping and mild lower lobe reticulation and bronchiectasis        Altaf Braga MD

## 2024-06-12 NOTE — ASSESSMENT & PLAN NOTE
Patient's respiratory status is stable and does not require any further workup prior to intended left knee replacement surgery  From a pulmonary perspective is low risk for the intended surgery and may proceed as planned

## 2024-06-12 NOTE — ASSESSMENT & PLAN NOTE
Previous high-resolution CAT scan from 2022 showed some very mild peripheral fibrotic change  Abdominal CT performed recently shows the bases well and does not show any progression or new interstitial changes.  Had some mild groundglass but this was in the setting of hospitalization with respiratory infection  Currently lungs are clear.  No additional testing required at this time  Continue as needed albuterol

## 2024-06-12 NOTE — ASSESSMENT & PLAN NOTE
Had significant allergy season symptoms this year with increased wheezing  Did see the primary care and was given a course of steroids, and antibiotic with amoxicillin, and an albuterol inhaler.  Symptoms did improve and he seems to be back to baseline  Does not carry a diagnostic history of asthma and not currently using the inhaler any longer.  Continue Xyzal antihistamine  If progressive respiratory symptoms, should call but currently appears to be back at baseline

## 2024-06-13 NOTE — TELEPHONE ENCOUNTER
Patients wife is calling and stating he is out of this medication  quiNINE 324MG states it needed a prior auth was medication approved.

## 2024-06-13 NOTE — TELEPHONE ENCOUNTER
PA for qualaquin Denied    Reason         Message sent to office clinical pool Yes    Denial letter scanned into Media Yes    Appeal started No ( Provider will need to decide if appeal is warranted and send clinical documentation to PA team for initiation.)    **Please follow up with your patient regarding denial and next steps**

## 2024-06-14 RX ORDER — ALLOPURINOL 100 MG/1
100 TABLET ORAL 2 TIMES DAILY
Qty: 180 TABLET | Refills: 1 | Status: SHIPPED | OUTPATIENT
Start: 2024-06-14

## 2024-06-14 RX ORDER — QUININE SULFATE 324 MG/1
324 CAPSULE ORAL
Qty: 30 CAPSULE | Refills: 2 | Status: CANCELLED | OUTPATIENT
Start: 2024-06-14 | End: 2024-09-12

## 2024-06-14 NOTE — TELEPHONE ENCOUNTER
Medication:   allopurinol (ZYLOPRIM) 100 mg tablet     Dose/Frequency:  Take 1 tablet (100 mg total) by mouth 2 (two) times a day     Quantity: 180    Pharmacy: [id number] - [address] [[id number]]     Office:   [x] PCP/Provider -   [] Speciality/Provider -     Does the patient have enough for 3 days?   [] Yes   [x] No - Send as HP to POD

## 2024-06-17 ENCOUNTER — APPOINTMENT (OUTPATIENT)
Dept: LAB | Facility: IMAGING CENTER | Age: 84
End: 2024-06-17
Payer: COMMERCIAL

## 2024-06-17 DIAGNOSIS — D69.6 THROMBOCYTOPENIA (HCC): ICD-10-CM

## 2024-06-17 DIAGNOSIS — R25.2 LEG CRAMPING: ICD-10-CM

## 2024-06-17 DIAGNOSIS — Z01.812 PRE-OPERATIVE LABORATORY EXAMINATION: ICD-10-CM

## 2024-06-17 DIAGNOSIS — R71.8 LOW MEAN CORPUSCULAR VOLUME (MCV): ICD-10-CM

## 2024-06-17 DIAGNOSIS — R73.01 IMPAIRED FASTING GLUCOSE: ICD-10-CM

## 2024-06-17 DIAGNOSIS — G60.9 IDIOPATHIC PERIPHERAL NEUROPATHY: ICD-10-CM

## 2024-06-17 DIAGNOSIS — Z01.818 OTHER SPECIFIED PRE-OPERATIVE EXAMINATION: ICD-10-CM

## 2024-06-17 LAB
ALBUMIN SERPL BCP-MCNC: 3.9 G/DL (ref 3.5–5)
ALP SERPL-CCNC: 63 U/L (ref 34–104)
ALT SERPL W P-5'-P-CCNC: 9 U/L (ref 7–52)
ANION GAP SERPL CALCULATED.3IONS-SCNC: 4 MMOL/L (ref 4–13)
AST SERPL W P-5'-P-CCNC: 18 U/L (ref 13–39)
BACTERIA UR QL AUTO: ABNORMAL /HPF
BASOPHILS # BLD AUTO: 0.04 THOUSANDS/ÂΜL (ref 0–0.1)
BASOPHILS NFR BLD AUTO: 1 % (ref 0–1)
BILIRUB SERPL-MCNC: 0.64 MG/DL (ref 0.2–1)
BILIRUB UR QL STRIP: NEGATIVE
BUN SERPL-MCNC: 23 MG/DL (ref 5–25)
CALCIUM SERPL-MCNC: 8.7 MG/DL (ref 8.4–10.2)
CHLORIDE SERPL-SCNC: 105 MMOL/L (ref 96–108)
CLARITY UR: CLEAR
CO2 SERPL-SCNC: 31 MMOL/L (ref 21–32)
COLOR UR: YELLOW
CREAT SERPL-MCNC: 0.98 MG/DL (ref 0.6–1.3)
EOSINOPHIL # BLD AUTO: 0.05 THOUSAND/ÂΜL (ref 0–0.61)
EOSINOPHIL NFR BLD AUTO: 2 % (ref 0–6)
ERYTHROCYTE [DISTWIDTH] IN BLOOD BY AUTOMATED COUNT: 13.8 % (ref 11.6–15.1)
EST. AVERAGE GLUCOSE BLD GHB EST-MCNC: 103 MG/DL
FERRITIN SERPL-MCNC: 50 NG/ML (ref 24–336)
GFR SERPL CREATININE-BSD FRML MDRD: 71 ML/MIN/1.73SQ M
GLUCOSE P FAST SERPL-MCNC: 92 MG/DL (ref 65–99)
GLUCOSE UR STRIP-MCNC: NEGATIVE MG/DL
HBA1C MFR BLD: 5.2 %
HCT VFR BLD AUTO: 44.9 % (ref 36.5–49.3)
HGB BLD-MCNC: 14.8 G/DL (ref 12–17)
HGB UR QL STRIP.AUTO: NEGATIVE
IMM GRANULOCYTES # BLD AUTO: 0.04 THOUSAND/UL (ref 0–0.2)
IMM GRANULOCYTES NFR BLD AUTO: 1 % (ref 0–2)
IRON SATN MFR SERPL: 38 % (ref 15–50)
IRON SERPL-MCNC: 98 UG/DL (ref 50–212)
KETONES UR STRIP-MCNC: NEGATIVE MG/DL
LEUKOCYTE ESTERASE UR QL STRIP: NEGATIVE
LYMPHOCYTES # BLD AUTO: 0.96 THOUSANDS/ÂΜL (ref 0.6–4.47)
LYMPHOCYTES NFR BLD AUTO: 31 % (ref 14–44)
MCH RBC QN AUTO: 31.3 PG (ref 26.8–34.3)
MCHC RBC AUTO-ENTMCNC: 33 G/DL (ref 31.4–37.4)
MCV RBC AUTO: 95 FL (ref 82–98)
MONOCYTES # BLD AUTO: 0.25 THOUSAND/ÂΜL (ref 0.17–1.22)
MONOCYTES NFR BLD AUTO: 8 % (ref 4–12)
MUCOUS THREADS UR QL AUTO: ABNORMAL
NEUTROPHILS # BLD AUTO: 1.75 THOUSANDS/ÂΜL (ref 1.85–7.62)
NEUTS SEG NFR BLD AUTO: 57 % (ref 43–75)
NITRITE UR QL STRIP: NEGATIVE
NON-SQ EPI CELLS URNS QL MICRO: ABNORMAL /HPF
NRBC BLD AUTO-RTO: 0 /100 WBCS
PH UR STRIP.AUTO: 6 [PH]
PLATELET # BLD AUTO: 173 THOUSANDS/UL (ref 149–390)
PMV BLD AUTO: 10.7 FL (ref 8.9–12.7)
POTASSIUM SERPL-SCNC: 4.6 MMOL/L (ref 3.5–5.3)
PROT SERPL-MCNC: 5.8 G/DL (ref 6.4–8.4)
PROT UR STRIP-MCNC: ABNORMAL MG/DL
RBC # BLD AUTO: 4.73 MILLION/UL (ref 3.88–5.62)
RBC #/AREA URNS AUTO: ABNORMAL /HPF
SODIUM SERPL-SCNC: 140 MMOL/L (ref 135–147)
SP GR UR STRIP.AUTO: 1.02 (ref 1–1.03)
TIBC SERPL-MCNC: 259 UG/DL (ref 250–450)
UIBC SERPL-MCNC: 161 UG/DL (ref 155–355)
UROBILINOGEN UR STRIP-ACNC: 2 MG/DL
VIT B12 SERPL-MCNC: 215 PG/ML (ref 180–914)
WBC # BLD AUTO: 3.09 THOUSAND/UL (ref 4.31–10.16)
WBC #/AREA URNS AUTO: ABNORMAL /HPF

## 2024-06-17 PROCEDURE — 83540 ASSAY OF IRON: CPT

## 2024-06-17 PROCEDURE — 83550 IRON BINDING TEST: CPT

## 2024-06-17 PROCEDURE — 83036 HEMOGLOBIN GLYCOSYLATED A1C: CPT

## 2024-06-17 PROCEDURE — 82728 ASSAY OF FERRITIN: CPT

## 2024-06-17 PROCEDURE — 36415 COLL VENOUS BLD VENIPUNCTURE: CPT

## 2024-06-17 PROCEDURE — 85025 COMPLETE CBC W/AUTO DIFF WBC: CPT

## 2024-06-17 PROCEDURE — 81001 URINALYSIS AUTO W/SCOPE: CPT

## 2024-06-17 PROCEDURE — 82607 VITAMIN B-12: CPT

## 2024-06-17 PROCEDURE — 80053 COMPREHEN METABOLIC PANEL: CPT

## 2024-06-21 ENCOUNTER — OFFICE VISIT (OUTPATIENT)
Dept: UROLOGY | Facility: MEDICAL CENTER | Age: 84
End: 2024-06-21
Payer: COMMERCIAL

## 2024-06-21 VITALS
SYSTOLIC BLOOD PRESSURE: 114 MMHG | HEIGHT: 67 IN | WEIGHT: 224 LBS | BODY MASS INDEX: 35.16 KG/M2 | DIASTOLIC BLOOD PRESSURE: 80 MMHG

## 2024-06-21 DIAGNOSIS — N40.1 BPH WITH URINARY OBSTRUCTION: Primary | ICD-10-CM

## 2024-06-21 DIAGNOSIS — C61 PROSTATE CANCER (HCC): ICD-10-CM

## 2024-06-21 DIAGNOSIS — N13.8 BPH WITH URINARY OBSTRUCTION: Primary | ICD-10-CM

## 2024-06-21 LAB
SL AMB  POCT GLUCOSE, UA: ABNORMAL
SL AMB LEUKOCYTE ESTERASE,UA: ABNORMAL
SL AMB POCT BILIRUBIN,UA: ABNORMAL
SL AMB POCT BLOOD,UA: ABNORMAL
SL AMB POCT CLARITY,UA: CLEAR
SL AMB POCT COLOR,UA: ABNORMAL
SL AMB POCT KETONES,UA: ABNORMAL
SL AMB POCT NITRITE,UA: ABNORMAL
SL AMB POCT PH,UA: 5.5
SL AMB POCT SPECIFIC GRAVITY,UA: 1.02
SL AMB POCT URINE PROTEIN: 30
SL AMB POCT UROBILINOGEN: 1

## 2024-06-21 PROCEDURE — 99213 OFFICE O/P EST LOW 20 MIN: CPT | Performed by: UROLOGY

## 2024-06-21 PROCEDURE — 81003 URINALYSIS AUTO W/O SCOPE: CPT | Performed by: UROLOGY

## 2024-06-21 RX ORDER — POTASSIUM CHLORIDE 20 MEQ/1
20 TABLET, EXTENDED RELEASE ORAL DAILY
COMMUNITY

## 2024-06-21 NOTE — PROGRESS NOTES
"   HISTORY:    Follow-up for low-grade prostate cancer on active surveillance since 2017.        biopsy information:   Initial biopsy in October 2017 showed one core of Tremaine six cancer.       May 2018, repeat biopsy showed several cores \"suspicious for cancer, not diagnostic\"     July 2019, one core Tremaine six cancer.    Voiding well, nocturia x 2, no difficulty with stream, feels well in general         ASSESSMENT / PLAN:    Doing well    Continue to check PSA once per year    The following portions of the patient's history were reviewed and updated as appropriate: allergies, current medications, past family history, past medical history, past social history, past surgical history, and problem list.    Review of Systems      Objective:     Physical Exam  Genitourinary:     Comments: Penis testes some atrophy    Prostate minimally enlarged, no nodules          0   Lab Value Date/Time    PSA 0.93 08/18/2023 0744    PSA 0.9 03/06/2023 0929    PSA 1.1 08/17/2022 0825    PSA 1.0 03/08/2022 0847   ]  BUN   Date Value Ref Range Status   06/17/2024 23 5 - 25 mg/dL Final   03/16/2020 20 7 - 28 mg/dL Final     Creatinine   Date Value Ref Range Status   06/17/2024 0.98 0.60 - 1.30 mg/dL Final     Comment:     Standardized to IDMS reference method   03/16/2020 0.96 0.53 - 1.30 mg/dL Final     No components found for: \"CBC\"      Patient Active Problem List   Diagnosis    Benign essential hypertension    Benign prostatic hyperplasia (BPH) with straining on urination    Gastroesophageal reflux disease without esophagitis    Prostate cancer (HCC)    Seasonal allergic rhinitis due to pollen    Truncal obesity    Need for shingles vaccine    Interstitial lung disease (HCC)    Localized primary osteoarthritis of wrist    Pain and swelling of right knee    Current tear of medial cartilage or meniscus of knee    Idiopathic peripheral neuropathy    Swelling of right thumb    Right elbow pain    Back pain    Pain in both lower " extremities    Radiculopathy of leg    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    Anxiety    Class 2 obesity due to excess calories without serious comorbidity with body mass index (BMI) of 36.0 to 36.9 in adult    Peripheral vascular disease (HCC)    Bilateral lower extremity edema    Varicose veins of both lower extremities with pain    Leg cramping    Constipation    Age-related cataract of right eye    Squamous cell carcinoma of hand, left    Preop pulmonary/respiratory exam        Diagnoses and all orders for this visit:    BPH with urinary obstruction  -     POCT urine dip auto non-scope    Prostate cancer (HCC)  -     POCT urine dip auto non-scope  -     PSA Total, Diagnostic; Future           Patient ID: Moreno Fournier Jr. is a 84 y.o. male.      Current Outpatient Medications:     acetaminophen (TYLENOL) 325 mg tablet, Take 2 tablets (650 mg total) by mouth every 6 (six) hours as needed for mild pain, Disp: , Rfl:     allopurinol (ZYLOPRIM) 100 mg tablet, Take 1 tablet (100 mg total) by mouth 2 (two) times a day, Disp: 180 tablet, Rfl: 1    amLODIPine (NORVASC) 5 mg tablet, Take 1 tablet (5 mg total) by mouth daily, Disp: 90 tablet, Rfl: 1    Cholecalciferol (VITAMIN D3 PO), Take by mouth 2000 IU, Disp: , Rfl:     diltiazem (CARDIZEM CD) 180 mg 24 hr capsule, Take 1 capsule (180 mg total) by mouth daily, Disp: 90 capsule, Rfl: 1    gabapentin (Neurontin) 400 mg capsule, Take 1 capsule (400 mg total) by mouth 2 (two) times a day, Disp: 180 capsule, Rfl: 1    levocetirizine (XYZAL) 5 MG tablet, Take 1 tablet (5 mg total) by mouth every evening, Disp: 90 tablet, Rfl: 4    losartan (COZAAR) 100 MG tablet, Take 1 tablet (100 mg total) by mouth daily, Disp: 90 tablet, Rfl: 1    quiNINE (QUALAQUIN) 324 mg capsule, Take 1 capsule (324 mg total) by mouth daily at bedtime, Disp: 30 capsule, Rfl: 2     tamsulosin (FLOMAX) 0.4 mg, Take 1 capsule (0.4 mg total) by mouth daily with dinner, Disp: 90 capsule, Rfl: 1    traMADol-acetaminophen (ULTRACET) 37.5-325 mg per tablet, Take 1 tablet by mouth every 6 (six) hours as needed for moderate pain, Disp: , Rfl:     albuterol (ProAir HFA) 90 mcg/act inhaler, Inhale 2 puffs every 6 (six) hours as needed for wheezing (Patient not taking: Reported on 6/21/2024), Disp: 8.5 g, Rfl: 0    potassium chloride (Klor-Con M20) 20 mEq tablet, Take 20 mEq by mouth daily (Patient not taking: Reported on 6/21/2024), Disp: , Rfl:     Past Medical History:   Diagnosis Date    Abnormal electrocardiogram     Last assessed: Oct 11, 2013    KAYLI (acute kidney injury) (HCC) 12/24/2015    Allergic rhinitis     last assessed: Oct 11, 2013    Allergic rhinitis due to pollen     last assessed: Oct 10, 2013    Allergic sinusitis     Last assessed: May 11, 2015    Allergy     resolved: July 22, 2015    Benign essential hypertension     Last assessed/resolved: May 31, 2017    BPH (benign prostatic hyperplasia)     Cancer (HCC)     prostate    Colitis     Last assessed: May 24, 2016    Cough with hemoptysis 11/17/2020    COVID-19 08/18/2022    Generalized osteoarthritis     Last assessed: Oct 11, 2013    GERD without esophagitis     Last assessed/resolved: May 31, 2017    Hearing loss     Hiatal hernia     resolved: July 22, 2015    History of deep vein thrombosis 06/07/2024    History of gastroesophageal reflux (GERD)     History of stomach ulcers     last assessed: May 11, 2015    Hypertension     Incomplete bladder emptying     Kidney stone     Nodular prostate with lower urinary tract symptoms     Nontoxic single thyroid nodule     last assessed: April 16, 2014    Orchalgia     Overweight     last assessed: Oct 31, 2013    Poor urinary stream     Pulmonary embolism (HCC)     Salivary gland disorder     last assessed: April 16, 2014    Seasonal allergies     last assessed: May 15, 2015     Spermatocele     Squamous cell skin cancer 05/2024    L wrist    Straining to void     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, 2014       Past Surgical History:   Procedure Laterality Date    BLADDER SURGERY      CATARACT EXTRACTION      CHOLECYSTECTOMY  2000    laparoscopic     COLONOSCOPY      FLAP LOCAL TRUNK Left 10/28/2021    Procedure: EXCISION OF FLANK MASS;  Surgeon: Hemalatha Kirkpatrick DO;  Location:  MAIN OR;  Service: Plastics    HEMORRHOID SURGERY      pilionidal cyst removal     HERNIA REPAIR Left 01/17/2008    inguinal     KNEE ARTHROSCOPY Left 1974    KNEE CARTILAGE SURGERY      MOHS SURGERY Left 06/19/2024    wrist    NASAL SEPTUM SURGERY      Deviation repair     ID EXC PRTD BARBARA/PRTD GLND LAT [id number] FACIAL NR Right 06/09/2021    Procedure: SUPERFICIAL PAROTIDECTOMY WITH FACIAL NERVE MONITORING;  Surgeon: Omkar Dobbins MD;  Location: [facility];  Service: ENT    PROSTATE BIOPSY  2017    SKIN TAG REMOVAL  03/2023    on his eye    STOMACH SURGERY      TONSILLECTOMY AND ADENOIDECTOMY      at age 41    TOTAL SHOULDER ARTHROPLASTY      SHOULDER JOINT REPLACEMENT - right 01/04 0 left 01/95    UPPER GASTROINTESTINAL ENDOSCOPY      US GUIDED THYROID BIOPSY  01/19/2022    US GUIDED THYROID BIOPSY  05/26/2022    VASECTOMY         Social History

## 2024-06-25 NOTE — TELEPHONE ENCOUNTER
Duplicate Prior Auth request / encounter please see telephone encounter from 6/9 regarding quinine 324 mg PA. Please review patient's chart to see status of PA and to document anything regarding this medication in regards to anything regarding the authorization process etc before creating another encounter Thank You.

## 2024-06-28 ENCOUNTER — RA CDI HCC (OUTPATIENT)
Dept: OTHER | Facility: HOSPITAL | Age: 84
End: 2024-06-28

## 2024-07-04 DIAGNOSIS — N13.8 BPH WITH OBSTRUCTION/LOWER URINARY TRACT SYMPTOMS: ICD-10-CM

## 2024-07-04 DIAGNOSIS — N40.1 BPH WITH OBSTRUCTION/LOWER URINARY TRACT SYMPTOMS: ICD-10-CM

## 2024-07-04 RX ORDER — TAMSULOSIN HYDROCHLORIDE 0.4 MG/1
0.4 CAPSULE ORAL
Qty: 90 CAPSULE | Refills: 1 | Status: SHIPPED | OUTPATIENT
Start: 2024-07-04

## 2024-07-09 ENCOUNTER — CONSULT (OUTPATIENT)
Dept: INTERNAL MEDICINE CLINIC | Age: 84
End: 2024-07-09
Payer: COMMERCIAL

## 2024-07-09 VITALS
SYSTOLIC BLOOD PRESSURE: 116 MMHG | DIASTOLIC BLOOD PRESSURE: 66 MMHG | TEMPERATURE: 98.9 F | HEIGHT: 67 IN | WEIGHT: 236 LBS | HEART RATE: 62 BPM | OXYGEN SATURATION: 97 % | BODY MASS INDEX: 37.04 KG/M2

## 2024-07-09 DIAGNOSIS — I10 BENIGN ESSENTIAL HYPERTENSION: Chronic | ICD-10-CM

## 2024-07-09 DIAGNOSIS — R25.2 LEG CRAMPING: ICD-10-CM

## 2024-07-09 DIAGNOSIS — Z01.810 PREOP CARDIOVASCULAR EXAM: Primary | ICD-10-CM

## 2024-07-09 DIAGNOSIS — K59.00 CONSTIPATION, UNSPECIFIED CONSTIPATION TYPE: ICD-10-CM

## 2024-07-09 DIAGNOSIS — M17.12 LOCALIZED OSTEOARTHRITIS OF LEFT KNEE: ICD-10-CM

## 2024-07-09 DIAGNOSIS — R60.0 BILATERAL LOWER EXTREMITY EDEMA: ICD-10-CM

## 2024-07-09 PROBLEM — M79.89 SWELLING OF RIGHT THUMB: Status: RESOLVED | Noted: 2019-12-27 | Resolved: 2024-07-09

## 2024-07-09 PROBLEM — M19.039 LOCALIZED PRIMARY OSTEOARTHRITIS OF WRIST: Status: RESOLVED | Noted: 2019-06-03 | Resolved: 2024-07-09

## 2024-07-09 PROBLEM — Z01.818 PRE-OP EXAMINATION: Status: ACTIVE | Noted: 2024-07-09

## 2024-07-09 PROBLEM — E66.09 CLASS 2 OBESITY DUE TO EXCESS CALORIES WITHOUT SERIOUS COMORBIDITY WITH BODY MASS INDEX (BMI) OF 36.0 TO 36.9 IN ADULT: Status: RESOLVED | Noted: 2022-09-29 | Resolved: 2024-07-09

## 2024-07-09 PROBLEM — E66.812 CLASS 2 OBESITY DUE TO EXCESS CALORIES WITHOUT SERIOUS COMORBIDITY WITH BODY MASS INDEX (BMI) OF 36.0 TO 36.9 IN ADULT: Status: RESOLVED | Noted: 2022-09-29 | Resolved: 2024-07-09

## 2024-07-09 PROBLEM — M79.605 PAIN IN BOTH LOWER EXTREMITIES: Status: RESOLVED | Noted: 2020-03-09 | Resolved: 2024-07-09

## 2024-07-09 PROBLEM — M79.604 PAIN IN BOTH LOWER EXTREMITIES: Status: RESOLVED | Noted: 2020-03-09 | Resolved: 2024-07-09

## 2024-07-09 PROBLEM — Z01.811 PREOP PULMONARY/RESPIRATORY EXAM: Status: RESOLVED | Noted: 2024-06-12 | Resolved: 2024-07-09

## 2024-07-09 PROBLEM — N18.31 STAGE 3A CHRONIC KIDNEY DISEASE (HCC): Status: RESOLVED | Noted: 2021-11-18 | Resolved: 2024-07-09

## 2024-07-09 PROCEDURE — 93000 ELECTROCARDIOGRAM COMPLETE: CPT | Performed by: FAMILY MEDICINE

## 2024-07-09 PROCEDURE — 99214 OFFICE O/P EST MOD 30 MIN: CPT | Performed by: FAMILY MEDICINE

## 2024-07-09 RX ORDER — QUININE SULFATE 324 MG/1
324 CAPSULE ORAL
Qty: 90 CAPSULE | Refills: 1 | Status: SHIPPED | OUTPATIENT
Start: 2024-07-09 | End: 2025-01-05

## 2024-07-09 NOTE — PROGRESS NOTES
Assessment/Plan:    1. Preop cardiovascular exam  -     POCT ECG  2. Leg cramping  -     quiNINE (QUALAQUIN) 324 mg capsule; Take 1 capsule (324 mg total) by mouth daily at bedtime  3. Benign essential hypertension  4. Constipation, unspecified constipation type  5. Localized osteoarthritis of left knee  6. Bilateral lower extremity edema  7. BMI 36.0-36.9,adult      The ASCVD Risk score (Marija DK, et al., 2019) failed to calculate for the following reasons:    The 2019 ASCVD risk score is only valid for ages 40 to 79          There are no Patient Instructions on file for this visit.    Return for Next scheduled follow up.    Subjective:      Patient ID: Moreno Fournier Jr. is a 84 y.o. male.    Chief Complaint   Patient presents with    Pre-op Exam     Preop left knee replacement 7/16/24 at [place] [address],  [phone] -- Dr Jay Underwood-fax to [phone]       HPI    Patient here today for medical clearance for left total knee replacement.  Has history of osteoarthritis for several years.  Using tramadol very infrequently.  Does not use ibuprofen or Tylenol.  Remains active.  He has bilateral ankle edema which is not new for him.  He states when he wakes up in the morning he has no ankle edema and as the day goes on he has more more swelling.  He does use lower extremity compression stockings.  He denies any shortness of breath or any chest discomfort.      Patient with lower extremity cramping resolved with quinine use on a regular basis.      Prostate cancer history since 2017, on surveillance, following with urology on a yearly basis.  PSA in range, no issues.  Has BPH symptoms and on Flomax but doing relatively well.    The following portions of the patient's history were reviewed and updated as appropriate: allergies, current medications, past family history, past medical history, past social history, past surgical history and problem list.    Review of Systems       Constitutional:  Denies fever or chills   Eyes:  Denies double , blurry vision or eye pain  HENT:  Denies nasal congestion, sore throat or new hearing issues  Respiratory:  Denies cough or shortness of breath or wheezing  Cardiovascular:  Denies palpitations or chest pain  GI:  Denies abdominal pain, nausea, or vomiting, no loose stools, no reflux  Integument:  Denies rash , no open areas  Neurologic:  Denies headache or focal weakness, no dizziness  : no dysuria, or hematuria      Current Outpatient Medications   Medication Sig Dispense Refill    acetaminophen (TYLENOL) 325 mg tablet Take 2 tablets (650 mg total) by mouth every 6 (six) hours as needed for mild pain      allopurinol (ZYLOPRIM) 100 mg tablet Take 1 tablet (100 mg total) by mouth 2 (two) times a day 180 tablet 1    amLODIPine (NORVASC) 5 mg tablet Take 1 tablet (5 mg total) by mouth daily 90 tablet 1    Cholecalciferol (VITAMIN D3 PO) Take by mouth 2000 IU      diltiazem (CARDIZEM CD) 180 mg 24 hr capsule Take 1 capsule (180 mg total) by mouth daily 90 capsule 1    gabapentin (Neurontin) 400 mg capsule Take 1 capsule (400 mg total) by mouth 2 (two) times a day 180 capsule 1    levocetirizine (XYZAL) 5 MG tablet Take 1 tablet (5 mg total) by mouth every evening 90 tablet 4    losartan (COZAAR) 100 MG tablet Take 1 tablet (100 mg total) by mouth daily 90 tablet 1    quiNINE (QUALAQUIN) 324 mg capsule Take 1 capsule (324 mg total) by mouth daily at bedtime 90 capsule 1    tamsulosin (FLOMAX) 0.4 mg TAKE 1 CAPSULE BY MOUTH EVERY DAY WITH DINNER 90 capsule 1    traMADol-acetaminophen (ULTRACET) 37.5-325 mg per tablet Take 1 tablet by mouth every 6 (six) hours as needed for moderate pain      albuterol (ProAir HFA) 90 mcg/act inhaler Inhale 2 puffs every 6 (six) hours as needed for wheezing (Patient not taking: Reported on 6/21/2024) 8.5 g 0    potassium chloride (Klor-Con M20) 20 mEq tablet Take 20 mEq by mouth daily (Patient not taking: Reported on  "6/21/2024)       No current facility-administered medications for this visit.       Objective:    /66 (BP Location: Left arm, Patient Position: Sitting, Cuff Size: Standard)   Pulse 62   Temp 98.9 °F (37.2 °C) (Temporal)   Ht 5' 7\" (1.702 m)   Wt 107 kg (236 lb)   SpO2 97%   BMI 36.96 kg/m²        Physical Exam      Constitutional:  Well developed, well nourished, no acute distress, non-toxic appearance   Eyes:  PERRL, conjunctiva normal , non icteric sclera  HENT:  Atraumatic, oropharynx moist. Neck-  supple   Respiratory:  CTA b/l, normal breath sounds, no rales, no wheezing   Cardiovascular:  RRR, no murmurs, +1 ankle LE edema b/l  GI:  Soft, nondistended, normal bowel sounds x 4, nontender, no organomegaly, no mass, no rebound, no guarding   Neurologic:  no focal deficits noted   Psychiatric:  Speech and behavior appropriate , AAO x 3         Prem Estrada DO  "

## 2024-07-15 ENCOUNTER — TELEPHONE (OUTPATIENT)
Age: 84
End: 2024-07-15

## 2024-07-15 NOTE — TELEPHONE ENCOUNTER
[facility] ortho still in need of medical clearance and EKG for patient's surgery darrynLee      [phone]  Fax [phone]

## 2024-07-19 ENCOUNTER — TELEPHONE (OUTPATIENT)
Age: 84
End: 2024-07-19

## 2024-07-19 NOTE — TELEPHONE ENCOUNTER
Wife called to let PCP know Pt is in the ER .Had knee surgery by [facility], patient is currently in ER for [facility]o for knee surgery complication.

## 2024-07-22 ENCOUNTER — TELEPHONE (OUTPATIENT)
Age: 84
End: 2024-07-22

## 2024-07-22 NOTE — TELEPHONE ENCOUNTER
Patient's wife is calling because they would like Dr. Braga to know that the patient is positive for pneumonia and he was tested twice for covid and it was negative she is very nervous she needs to hear from Dr. Braga for some reassurance as she knows someone that went in for surgery as he did he had knee surgery  and he is in [facility] please contact the patient's wife.

## 2024-07-22 NOTE — TELEPHONE ENCOUNTER
Spoke with wife, Ivy.  Admitted at [facility]. Developed cough and bronchitis after knee surgery.  CT neg for pneumionia 7/19  Reassured her that he is being treated appropraitely and to call back if increased concerns

## 2024-07-30 ENCOUNTER — TELEPHONE (OUTPATIENT)
Dept: INTERNAL MEDICINE CLINIC | Facility: OTHER | Age: 84
End: 2024-07-30

## 2024-07-30 NOTE — TELEPHONE ENCOUNTER
LMOM for pt to call me at [facility] office    Please schedule TCM on or before 8/9    Please send me TEAMS message to see if I am available to take the call.     I will have limited access to EPIC until 8/1/2024, if I am not available, please call office and ask for either Cindy Waggoner, Caprice Mills or Ayde Christensen.

## 2024-07-31 NOTE — TELEPHONE ENCOUNTER
Patient's wife called back to let us know that patient is still in the hospital. She will reach out to us to schedule TCM

## 2024-08-02 NOTE — TELEPHONE ENCOUNTER
Pt's wife said her  can't come in for a visit because he's at a rehab center.  She thinks he will be home before 8/9 but not sure.  He went from the [facility] to [facility] the [facility] [place] [address] [place].   She  sorry she keeps missing the call but they can't come for any appts right now until he gets back home.

## 2024-08-06 ENCOUNTER — TELEPHONE (OUTPATIENT)
Dept: INTERNAL MEDICINE CLINIC | Facility: OTHER | Age: 84
End: 2024-08-06

## 2024-08-06 NOTE — TELEPHONE ENCOUNTER
Received prior authorization request from [facility] for the following medication that has been prescribed again:    Quinine sulfate 324 MG capsules    Prior authorization form has been scanned into patients chart

## 2024-08-07 ENCOUNTER — RA CDI HCC (OUTPATIENT)
Dept: OTHER | Facility: HOSPITAL | Age: 84
End: 2024-08-07

## 2024-08-12 ENCOUNTER — OFFICE VISIT (OUTPATIENT)
Dept: INTERNAL MEDICINE CLINIC | Age: 84
End: 2024-08-12
Payer: COMMERCIAL

## 2024-08-12 VITALS
WEIGHT: 225 LBS | HEART RATE: 66 BPM | BODY MASS INDEX: 35.31 KG/M2 | SYSTOLIC BLOOD PRESSURE: 110 MMHG | OXYGEN SATURATION: 98 % | DIASTOLIC BLOOD PRESSURE: 70 MMHG | TEMPERATURE: 98.2 F | HEIGHT: 67 IN

## 2024-08-12 DIAGNOSIS — M25.00 HEMARTHROSIS FOLLOWING PROCEDURE, SUBSEQUENT ENCOUNTER: ICD-10-CM

## 2024-08-12 DIAGNOSIS — Z96.652 S/P TKR (TOTAL KNEE REPLACEMENT), LEFT: ICD-10-CM

## 2024-08-12 DIAGNOSIS — M10.361 ACUTE GOUT DUE TO RENAL IMPAIRMENT INVOLVING RIGHT KNEE: ICD-10-CM

## 2024-08-12 DIAGNOSIS — T88.8XXD HEMARTHROSIS FOLLOWING PROCEDURE, SUBSEQUENT ENCOUNTER: ICD-10-CM

## 2024-08-12 DIAGNOSIS — M17.12 LOCALIZED OSTEOARTHRITIS OF LEFT KNEE: Primary | ICD-10-CM

## 2024-08-12 PROBLEM — M10.9 ACUTE GOUT OF RIGHT KNEE: Status: ACTIVE | Noted: 2024-08-12

## 2024-08-12 PROBLEM — T88.8XXA HEMARTHROSIS FOLLOWING PROCEDURE: Status: ACTIVE | Noted: 2024-08-12

## 2024-08-12 PROCEDURE — 99496 TRANSJ CARE MGMT HIGH F2F 7D: CPT | Performed by: INTERNAL MEDICINE

## 2024-08-12 RX ORDER — AMOXICILLIN 250 MG
1 CAPSULE ORAL 2 TIMES DAILY
COMMUNITY
Start: 2024-07-17 | End: 2025-07-17

## 2024-08-12 RX ORDER — LOSARTAN POTASSIUM 50 MG/1
50 TABLET ORAL DAILY
COMMUNITY
Start: 2024-08-06

## 2024-08-12 RX ORDER — FAMOTIDINE 20 MG/1
20 TABLET, FILM COATED ORAL 2 TIMES DAILY
COMMUNITY
Start: 2024-08-06

## 2024-08-12 RX ORDER — ASPIRIN 325 MG
81 TABLET, DELAYED RELEASE (ENTERIC COATED) ORAL 2 TIMES DAILY
COMMUNITY
Start: 2024-07-28 | End: 2024-10-20

## 2024-08-12 RX ORDER — TRAMADOL HYDROCHLORIDE 50 MG/1
50 TABLET ORAL EVERY 6 HOURS PRN
COMMUNITY
Start: 2024-08-06

## 2024-08-12 RX ORDER — OXYCODONE HYDROCHLORIDE 5 MG/1
TABLET ORAL
COMMUNITY

## 2024-08-12 RX ORDER — METHOCARBAMOL 500 MG/1
500 TABLET, FILM COATED ORAL EVERY 6 HOURS PRN
COMMUNITY
Start: 2024-08-06

## 2024-08-12 NOTE — PROGRESS NOTES
Transition of Care Visit  Name: Moreno Fournier Jr.      : 1940      MRN: [id number]  Encounter Provider: Lalitha Ty MD  Encounter Date: 2024   Encounter department: [facility]    Assessment & Plan   1. Localized osteoarthritis of left knee  Status post total knee replacement  2. Acute gout due to renal impairment involving right knee  Out is improving  3. Hemarthrosis following procedure, subsequent encounter  Status post arthrocentesis hemarthrosis resolved  4. S/P TKR (total knee replacement), left  Being well       History of Present Illness     Transitional Care Management Review:   Moreno Fournier Jr. is a 84 y.o. male here for TCM follow up.     During the TCM phone call patient stated:  TCM Call       Date and time call was made  2024  9:34 AM    Hospital care reviewed  Records reviewed    Patient was hospitialized at  Other (comment)    Comment  [facility]    Date of Admission  24    Date of discharge  24    Diagnosis  Left knee joint replacement surgery    Disposition  Home    Current Symptoms  Leg pain - left side; Rash; Fatigue    Left side leg pain severity  Mild          TCM Call       Rash location  --  Back-treating with cortisone    Post hospital issues  Reduced activity    Scheduled for follow up?  Yes    Not clinically warranted  pt discharged to [facility] ([facility]) for short term rehab    Did you obtain your prescribed medications  Yes    Do you need help managing your prescriptions or medications  No    Why type of assitance do you need  wife assists    Is transportation to your appointment needed  No    I have advised the patient to call PCP with any new or worsening symptoms  Mervin Richardson CMA          Patient was admitted for the left total knee replacement at the [facility] under the care of [facility] subsequently he developed fever hypoxia right knee pain left knee pain patient had a arthrocentesis of the left knee s/p surgery and  was diagnosed with hemarthrosis R knee and R ankle was diagnosed as acute gout he was treated for both acute gout .  Also he did the venous Doppler studies to rule out DVT.  Also he was first diagnosed with a possible pneumonia and was treated with antibiotics then he was sent to the rehab he did very well in the rehab and now he is home with his wife.  Denying any knee pain right now it is better than before he denied any symptoms of gout right now he denying any chest pain or shortness of breath he is taking aspirin 2 times a day status post total knee replacement.  He is also taking ferrous sulfate I reviewed all his medication and instructed his wife is excellent and taking care of these medications    Patient is here today for the follow-up.   Hypertension. I reviewed antihypertensive medication, patient does not have any side effects of  medications, no signs or symptoms of hypertension ,hypotension or orthostatic hypotension.  Patient is compliant with medications.  Blood workup related to hypertensive diagnosis reviewed.  Plan is to continue with the present management.  We will follow-up as a scheduled and adjust the doses of the medication as indicated.    Acute gout is better than before he has a previous history of the gout he is on allopurinol and we will follow his uric acid level    Postoperative anemia we will follow-up with CBC he is on iron supplement I told him to take it only every other day    In the course of hospitalization he developed acute renal failure which was resolved with the hydration he is doing well with that we will follow-up with the renal function test when he comes back .    Not complaining much right now except for the constipation I told him to take Senokot 2 times a day      Review of Systems   Constitutional:  Positive for fatigue. Negative for appetite change and fever.   HENT:  Negative for congestion, ear pain, hearing loss, nosebleeds, sneezing, tinnitus and voice  "change.    Eyes:  Negative for pain, discharge and redness.   Respiratory:  Negative for cough, chest tightness and wheezing.    Cardiovascular:  Negative for chest pain, palpitations and leg swelling.   Gastrointestinal:  Positive for constipation. Negative for abdominal pain, blood in stool, diarrhea, nausea and vomiting.   Genitourinary:  Negative for difficulty urinating, dysuria, hematuria and urgency.   Musculoskeletal:  Positive for arthralgias (L knee pain S/PTKR) and back pain. Negative for gait problem and joint swelling.   Skin:  Negative for rash and wound.   Allergic/Immunologic: Negative for environmental allergies.   Neurological:  Negative for dizziness, tremors, seizures, weakness, light-headedness and numbness.   Hematological:  Negative for adenopathy. Does not bruise/bleed easily.   Psychiatric/Behavioral:  Negative for behavioral problems and confusion. The patient is nervous/anxious.      Objective     /70 (BP Location: Left arm, Patient Position: Sitting, Cuff Size: Large)   Pulse 66   Temp 98.2 °F (36.8 °C) (Temporal)   Ht 5' 7\" (1.702 m)   Wt 102 kg (225 lb)   SpO2 98%   BMI 35.24 kg/m²     Physical Exam  Vitals and nursing note reviewed.   Constitutional:       Appearance: Normal appearance. He is normal weight.   HENT:      Head: Normocephalic and atraumatic.      Right Ear: Tympanic membrane normal.      Left Ear: Tympanic membrane normal.      Nose: Nose normal.      Mouth/Throat:      Mouth: Mucous membranes are moist.   Eyes:      Extraocular Movements: Extraocular movements intact.      Pupils: Pupils are equal, round, and reactive to light.   Cardiovascular:      Rate and Rhythm: Normal rate and regular rhythm.      Pulses: Normal pulses.      Heart sounds: Normal heart sounds.   Pulmonary:      Effort: Pulmonary effort is normal.      Breath sounds: Normal breath sounds.   Abdominal:      General: Abdomen is flat. Bowel sounds are normal.      Palpations: Abdomen is " soft.   Musculoskeletal:         General: No swelling, tenderness or deformity. Normal range of motion.      Cervical back: Normal range of motion and neck supple.        Legs:       Comments: Status post total knee replacement Steri-Strips are there the knee is swollen slightly warm to touch no signs of infection some tenderness over the left thigh area and the muscles otherwise no other problem he is wearing the compression stockings right knee and right leg is essentially unremarkable   Skin:     General: Skin is warm.      Capillary Refill: Capillary refill takes 2 to 3 seconds.   Neurological:      General: No focal deficit present.      Mental Status: He is alert and oriented to person, place, and time. Mental status is at baseline.   Psychiatric:         Mood and Affect: Mood normal.         Behavior: Behavior normal.           Administrative Statements

## 2024-08-13 ENCOUNTER — TELEPHONE (OUTPATIENT)
Age: 84
End: 2024-08-13

## 2024-08-13 NOTE — TELEPHONE ENCOUNTER
Received call from [facility] care coordinator for patient who wanted provider to be aware that she tried to reach out to patietnt following ER visit. Wanted to confirm that patient was seen for TCM  visit. Advised that he was.

## 2024-08-14 NOTE — TELEPHONE ENCOUNTER
PA for SUBMITTED - quiNINE Sulfate 324MG capsules    via    [x]CMM-KEY: [id number]  []Surescripts-Case ID #   []Faxed to plan   []Other website   []Phone call Case ID #     Office notes sent, clinical questions answered. Awaiting determination    Turnaround time for your insurance to make a decision on your Prior Authorization can take 7-21 business days.

## 2024-08-26 ENCOUNTER — RA CDI HCC (OUTPATIENT)
Dept: OTHER | Facility: HOSPITAL | Age: 84
End: 2024-08-26

## 2024-09-03 ENCOUNTER — HOSPITAL ENCOUNTER (OUTPATIENT)
Dept: NON INVASIVE DIAGNOSTICS | Facility: HOSPITAL | Age: 84
Discharge: HOME | End: 2024-09-03
Attending: ORTHOPAEDIC SURGERY
Payer: COMMERCIAL

## 2024-09-03 DIAGNOSIS — R22.42 LOCALIZED SWELLING, MASS AND LUMP, LEFT LOWER LIMB: ICD-10-CM

## 2024-09-03 PROCEDURE — 93971 EXTREMITY STUDY: CPT

## 2024-09-04 ENCOUNTER — OFFICE VISIT (OUTPATIENT)
Dept: INTERNAL MEDICINE CLINIC | Age: 84
End: 2024-09-04
Payer: COMMERCIAL

## 2024-09-04 ENCOUNTER — TELEPHONE (OUTPATIENT)
Age: 84
End: 2024-09-04

## 2024-09-04 ENCOUNTER — APPOINTMENT (OUTPATIENT)
Dept: LAB | Age: 84
End: 2024-09-04
Payer: COMMERCIAL

## 2024-09-04 VITALS
WEIGHT: 237 LBS | TEMPERATURE: 97.8 F | OXYGEN SATURATION: 96 % | DIASTOLIC BLOOD PRESSURE: 72 MMHG | SYSTOLIC BLOOD PRESSURE: 110 MMHG | BODY MASS INDEX: 37.2 KG/M2 | HEART RATE: 73 BPM | HEIGHT: 67 IN

## 2024-09-04 DIAGNOSIS — L03.116 LEFT LEG CELLULITIS: ICD-10-CM

## 2024-09-04 DIAGNOSIS — C61 PROSTATE CANCER (HCC): ICD-10-CM

## 2024-09-04 DIAGNOSIS — Z96.652 S/P TKR (TOTAL KNEE REPLACEMENT), LEFT: ICD-10-CM

## 2024-09-04 DIAGNOSIS — M10.361 ACUTE GOUT DUE TO RENAL IMPAIRMENT INVOLVING RIGHT KNEE: ICD-10-CM

## 2024-09-04 DIAGNOSIS — I10 BENIGN ESSENTIAL HYPERTENSION: Chronic | ICD-10-CM

## 2024-09-04 DIAGNOSIS — I73.9 PERIPHERAL VASCULAR DISEASE (HCC): ICD-10-CM

## 2024-09-04 DIAGNOSIS — M79.89 LEFT LEG SWELLING: Primary | ICD-10-CM

## 2024-09-04 PROBLEM — Z01.818 PRE-OP EXAMINATION: Status: RESOLVED | Noted: 2024-07-09 | Resolved: 2024-09-04

## 2024-09-04 PROBLEM — M17.12 LOCALIZED OSTEOARTHRITIS OF LEFT KNEE: Status: RESOLVED | Noted: 2024-07-09 | Resolved: 2024-09-04

## 2024-09-04 PROBLEM — M10.9 ACUTE GOUT OF RIGHT KNEE: Status: RESOLVED | Noted: 2024-08-12 | Resolved: 2024-09-04

## 2024-09-04 LAB
ANION GAP SERPL CALCULATED.3IONS-SCNC: 9 MMOL/L (ref 4–13)
BASOPHILS # BLD AUTO: 0.02 THOUSANDS/ÂΜL (ref 0–0.1)
BASOPHILS NFR BLD AUTO: 1 % (ref 0–1)
BUN SERPL-MCNC: 18 MG/DL (ref 5–25)
CALCIUM SERPL-MCNC: 9.2 MG/DL (ref 8.4–10.2)
CHLORIDE SERPL-SCNC: 101 MMOL/L (ref 96–108)
CO2 SERPL-SCNC: 30 MMOL/L (ref 21–32)
CREAT SERPL-MCNC: 1.09 MG/DL (ref 0.6–1.3)
CRP SERPL QL: 12.4 MG/L
EOSINOPHIL # BLD AUTO: 0.03 THOUSAND/ÂΜL (ref 0–0.61)
EOSINOPHIL NFR BLD AUTO: 1 % (ref 0–6)
ERYTHROCYTE [DISTWIDTH] IN BLOOD BY AUTOMATED COUNT: 14.1 % (ref 11.6–15.1)
ERYTHROCYTE [SEDIMENTATION RATE] IN BLOOD: 5 MM/HOUR (ref 0–19)
GFR SERPL CREATININE-BSD FRML MDRD: 62 ML/MIN/1.73SQ M
GLUCOSE SERPL-MCNC: 86 MG/DL (ref 65–140)
HCT VFR BLD AUTO: 42.4 % (ref 36.5–49.3)
HGB BLD-MCNC: 14 G/DL (ref 12–17)
IMM GRANULOCYTES # BLD AUTO: 0.09 THOUSAND/UL (ref 0–0.2)
IMM GRANULOCYTES NFR BLD AUTO: 2 % (ref 0–2)
LYMPHOCYTES # BLD AUTO: 1 THOUSANDS/ÂΜL (ref 0.6–4.47)
LYMPHOCYTES NFR BLD AUTO: 23 % (ref 14–44)
MCH RBC QN AUTO: 31 PG (ref 26.8–34.3)
MCHC RBC AUTO-ENTMCNC: 33 G/DL (ref 31.4–37.4)
MCV RBC AUTO: 94 FL (ref 82–98)
MONOCYTES # BLD AUTO: 0.36 THOUSAND/ÂΜL (ref 0.17–1.22)
MONOCYTES NFR BLD AUTO: 8 % (ref 4–12)
NEUTROPHILS # BLD AUTO: 2.86 THOUSANDS/ÂΜL (ref 1.85–7.62)
NEUTS SEG NFR BLD AUTO: 65 % (ref 43–75)
NRBC BLD AUTO-RTO: 0 /100 WBCS
PLATELET # BLD AUTO: 184 THOUSANDS/UL (ref 149–390)
PMV BLD AUTO: 10.4 FL (ref 8.9–12.7)
POTASSIUM SERPL-SCNC: 4.3 MMOL/L (ref 3.5–5.3)
RBC # BLD AUTO: 4.52 MILLION/UL (ref 3.88–5.62)
SODIUM SERPL-SCNC: 140 MMOL/L (ref 135–147)
URATE SERPL-MCNC: 3.3 MG/DL (ref 3.5–8.5)
WBC # BLD AUTO: 4.36 THOUSAND/UL (ref 4.31–10.16)

## 2024-09-04 PROCEDURE — 86140 C-REACTIVE PROTEIN: CPT

## 2024-09-04 PROCEDURE — 84550 ASSAY OF BLOOD/URIC ACID: CPT

## 2024-09-04 PROCEDURE — 85652 RBC SED RATE AUTOMATED: CPT

## 2024-09-04 PROCEDURE — 85025 COMPLETE CBC W/AUTO DIFF WBC: CPT

## 2024-09-04 PROCEDURE — 80048 BASIC METABOLIC PNL TOTAL CA: CPT

## 2024-09-04 PROCEDURE — G2211 COMPLEX E/M VISIT ADD ON: HCPCS | Performed by: INTERNAL MEDICINE

## 2024-09-04 PROCEDURE — 93971 EXTREMITY STUDY: CPT | Performed by: SURGERY

## 2024-09-04 PROCEDURE — 99214 OFFICE O/P EST MOD 30 MIN: CPT | Performed by: INTERNAL MEDICINE

## 2024-09-04 PROCEDURE — 36415 COLL VENOUS BLD VENIPUNCTURE: CPT

## 2024-09-04 RX ORDER — LOSARTAN POTASSIUM 50 MG/1
50 TABLET ORAL DAILY
Qty: 90 TABLET | Refills: 3 | Status: SHIPPED | OUTPATIENT
Start: 2024-09-04

## 2024-09-04 RX ORDER — SULFAMETHOXAZOLE/TRIMETHOPRIM 800-160 MG
1 TABLET ORAL 2 TIMES DAILY
Qty: 14 TABLET | Refills: 0 | Status: SHIPPED | OUTPATIENT
Start: 2024-09-04 | End: 2024-09-11

## 2024-09-04 NOTE — TELEPHONE ENCOUNTER
Patients spouse had called in stating that the patient was seen today by Dr. Ty, and was prescribed a antibiotic medication and get his refilled on one medication.    sulfamethoxazole-trimethoprim (BACTRIM DS) 800-160 mg per tablet     losartan (COZAAR) 50 mg tablet      Patients spouse had stated that when they have gone to  the medication, the pharmacist had stated that the medications can counteract one another, and cause an issue.     Patient is wondering why this medication was even prescribed.     Clinical team was unavailable at the time of call, please have someone advise back out to the patient in regards to this information.

## 2024-09-04 NOTE — PROGRESS NOTES
Assessment/Plan:     Diagnoses and all orders for this visit:    Left leg swelling    Peripheral vascular disease (HCC)    S/P TKR (total knee replacement), left    BMI 36.0-36.9,adult    Left leg cellulitis  -     Sedimentation rate, automated; Future  -     C-reactive protein; Future  -     sulfamethoxazole-trimethoprim (BACTRIM DS) 800-160 mg per tablet; Take 1 tablet by mouth 2 (two) times a day for 7 days    Benign essential hypertension  -     losartan (COZAAR) 50 mg tablet; Take 1 tablet (50 mg total) by mouth daily           M*Modal software was used to dictate this note.  It may contain errors with dictating incorrect words or incorrect spelling. Please contact the provider directly with any questions.    Subjective:   Chief Complaint   Patient presents with    Follow-up     4 month follow up   Labs in chart  and doppler 9/3         Patient ID: Moreno Fournier Jr. is a 84 y.o. male.    HPI  This is a very pleasant 84 years young gentleman who had a surgery in July the left knee which was complicated by the hemarthrosis he continued to have the problem with the left leg swelling multiple venous Doppler studies were done a venous Doppler study was done yesterday and I do not have the results of that.  He is complaining of some tightness of his knee redness around the knee and also on the leg and significant swelling of the left leg he does also complain of some pain in the thigh area above the knee there is no chest pain or shortness of breath no fever or chills no nausea or vomiting I will get some blood workup and then we decide if he wanted to treat him related to the results of the blood test but I will start him on antibiotics,  Patient is here today for the follow-up.   Hypertension. I reviewed antihypertensive medication, patient does not have any side effects of  medications, no signs or symptoms of hypertension ,hypotension or orthostatic hypotension.  Patient is compliant with medications.  Blood  workup related to hypertensive diagnosis reviewed.  Plan is to continue with the present management.  We will follow-up as a scheduled and adjust the doses of the medication as indicated.  History of gout    The following portions of the patient's history were reviewed and updated as appropriate: allergies, current medications, past family history, past medical history, past social history, past surgical history, and problem list.    Review of Systems   Constitutional:  Positive for fatigue. Negative for appetite change and fever.   HENT:  Negative for congestion, ear pain, hearing loss, nosebleeds, sneezing, tinnitus and voice change.    Eyes:  Negative for pain, discharge and redness.   Respiratory:  Negative for cough, chest tightness and wheezing.    Cardiovascular:  Positive for leg swelling (Left leg swelling much more than the right hip is status post total knee replacement right ankle swelling). Negative for chest pain and palpitations.   Gastrointestinal:  Negative for abdominal pain, blood in stool, constipation, diarrhea, nausea and vomiting.   Genitourinary:  Negative for difficulty urinating, dysuria, hematuria and urgency.   Musculoskeletal:  Positive for arthralgias (Left knee status post total knee replacement swollen and warm to touch). Negative for back pain, gait problem and joint swelling.   Skin:  Negative for rash and wound.   Allergic/Immunologic: Negative for environmental allergies.   Neurological:  Negative for dizziness, tremors, seizures, weakness, light-headedness and numbness.   Hematological:  Negative for adenopathy. Does not bruise/bleed easily.   Psychiatric/Behavioral:  Negative for behavioral problems and confusion. The patient is not nervous/anxious.          Past Medical History:   Diagnosis Date    Abnormal electrocardiogram     Last assessed: Oct 11, 2013    KAYLI (acute kidney injury) ([facility]) 12/24/2015    Allergic rhinitis     last assessed: Oct 11, 2013    Allergic rhinitis due  to pollen     last assessed: Oct 10, 2013    Allergic sinusitis     Last assessed: May 11, 2015    Allergy     resolved: July 22, 2015    Benign essential hypertension     Last assessed/resolved: May 31, 2017    BPH (benign prostatic hyperplasia)     Cancer (HCC)     prostate    Colitis     Last assessed: May 24, 2016    Cough with hemoptysis 11/17/2020    COVID-19 08/18/2022    Generalized osteoarthritis     Last assessed: Oct 11, 2013    GERD without esophagitis     Last assessed/resolved: May 31, 2017    Hearing loss     Hiatal hernia     resolved: July 22, 2015    History of deep vein thrombosis 06/07/2024    History of gastroesophageal reflux (GERD)     History of stomach ulcers     last assessed: May 11, 2015    Hypertension     Incomplete bladder emptying     Kidney stone     Localized primary osteoarthritis of wrist 06/03/2019    Nodular prostate with lower urinary tract symptoms     Nontoxic single thyroid nodule     last assessed: April 16, 2014    Orchalgia     Overweight     last assessed: Oct 31, 2013    Poor urinary stream     Preop pulmonary/respiratory exam 06/12/2024    Pulmonary embolism (HCC)     Salivary gland disorder     last assessed: April 16, 2014    Seasonal allergies     last assessed: May 15, 2015    Spermatocele     Squamous cell skin cancer 05/2024    L wrist    Straining to void     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, 2014         Current Outpatient Medications:     acetaminophen (TYLENOL) 325 mg tablet, Take 2 tablets (650 mg total) by mouth every 6 (six) hours as needed for mild pain, Disp: , Rfl:     allopurinol (ZYLOPRIM) 100 mg tablet, Take 1 tablet (100 mg total) by mouth 2 (two) times a day, Disp: 180 tablet, Rfl: 1    amLODIPine (NORVASC) 5 mg tablet, Take 1 tablet (5 mg total) by mouth daily, Disp: 90 tablet, Rfl: 1    Cholecalciferol (VITAMIN D3 PO), Take by mouth 2000 IU, Disp: , Rfl:     diltiazem (CARDIZEM CD) 180 mg 24 hr capsule, Take 1 capsule (180 mg total) by  mouth daily, Disp: 90 capsule, Rfl: 1    famotidine (PEPCID) 20 mg tablet, Take 20 mg by mouth 2 (two) times a day, Disp: , Rfl:     gabapentin (Neurontin) 400 mg capsule, Take 1 capsule (400 mg total) by mouth 2 (two) times a day, Disp: 180 capsule, Rfl: 1    levocetirizine (XYZAL) 5 MG tablet, Take 1 tablet (5 mg total) by mouth every evening, Disp: 90 tablet, Rfl: 4    losartan (COZAAR) 50 mg tablet, Take 50 mg by mouth daily, Disp: , Rfl:     quiNINE (QUALAQUIN) 324 mg capsule, Take 1 capsule (324 mg total) by mouth daily at bedtime, Disp: 90 capsule, Rfl: 1    senna-docusate sodium (SENOKOT S) 8.6-50 mg per tablet, Take 1 tablet by mouth 2 (two) times a day, Disp: , Rfl:     tamsulosin (FLOMAX) 0.4 mg, TAKE 1 CAPSULE BY MOUTH EVERY DAY WITH DINNER, Disp: 90 capsule, Rfl: 1    traMADol (ULTRAM) 50 mg tablet, Take 50 mg by mouth every 6 (six) hours as needed, Disp: , Rfl:     traMADol-acetaminophen (ULTRACET) 37.5-325 mg per tablet, Take 1 tablet by mouth every 6 (six) hours as needed for moderate pain, Disp: , Rfl:     albuterol (ProAir HFA) 90 mcg/act inhaler, Inhale 2 puffs every 6 (six) hours as needed for wheezing (Patient not taking: Reported on 6/21/2024), Disp: 8.5 g, Rfl: 0    aspirin (ECOTRIN) 325 mg EC tablet, Take 81 mg by mouth 2 (two) times a day Low dose (Patient not taking: Reported on 9/4/2024), Disp: , Rfl:     losartan (COZAAR) 100 MG tablet, Take 1 tablet (100 mg total) by mouth daily (Patient not taking: Reported on 8/12/2024), Disp: 90 tablet, Rfl: 1    methocarbamol (ROBAXIN) 500 mg tablet, Take 500 mg by mouth every 6 (six) hours as needed (Patient not taking: Reported on 8/12/2024), Disp: , Rfl:     oxyCODONE (ROXICODONE) 5 immediate release tablet, As needed (Patient not taking: Reported on 9/4/2024), Disp: , Rfl:     potassium chloride (Klor-Con M20) 20 mEq tablet, Take 20 mEq by mouth daily (Patient not taking: Reported on 6/21/2024), Disp: , Rfl:     Allergies   Allergen Reactions     "Other Anaphylaxis    Ace Inhibitors Hyperactivity       Social History   Past Surgical History:   Procedure Laterality Date    BLADDER SURGERY      CATARACT EXTRACTION      CHOLECYSTECTOMY  2000    laparoscopic     COLONOSCOPY      FLAP LOCAL TRUNK Left 10/28/2021    Procedure: EXCISION OF FLANK MASS;  Surgeon: Hemalatha Kirkpatrick DO;  Location:  MAIN OR;  Service: Plastics    HEMORRHOID SURGERY      pilionidal cyst removal     HERNIA REPAIR Left 01/17/2008    inguinal     KNEE ARTHROSCOPY Left 1974    KNEE CARTILAGE SURGERY      MOHS SURGERY Left 06/19/2024    wrist    NASAL SEPTUM SURGERY      Deviation repair     [place] EXC PRTD BARBARA/PRTD GLND LAT DSJ&PRSRV FACIAL NR Right 06/09/2021    Procedure: SUPERFICIAL PAROTIDECTOMY WITH FACIAL NERVE MONITORING;  Surgeon: Omkar Dobbins MD;  [facility];  Service: ENT    PROSTATE BIOPSY  2017    SKIN TAG REMOVAL  03/2023    on his eye    STOMACH SURGERY      TONSILLECTOMY AND ADENOIDECTOMY      at age 41    TOTAL SHOULDER ARTHROPLASTY      SHOULDER JOINT REPLACEMENT - right 01/04 0 left 01/95    UPPER GASTROINTESTINAL ENDOSCOPY      US GUIDED THYROID BIOPSY  01/19/2022    US GUIDED THYROID BIOPSY  05/26/2022    VASECTOMY       Family History   Problem Relation Age of Onset    Hypertension Mother     Stroke Mother     Stomach cancer Father     Hypertension Father     Hypertension Family     Stroke Family         syndrome     Heart attack Son        Objective:  /72 (BP Location: Left arm, Patient Position: Sitting, Cuff Size: Large)   Pulse 73   Temp 97.8 °F (36.6 °C) (Temporal)   Ht 5' 7\" (1.702 m)   Wt 108 kg (237 lb)   SpO2 96%   BMI 37.12 kg/m²        Physical Exam  Constitutional:       Appearance: He is well-developed. He is obese.   HENT:      Right Ear: Tympanic membrane and external ear normal.      Left Ear: Tympanic membrane normal.   Eyes:      Conjunctiva/sclera: Conjunctivae normal.      Pupils: Pupils are equal, round, and reactive to light. "   Neck:      Thyroid: No thyromegaly.      Vascular: No JVD.   Cardiovascular:      Rate and Rhythm: Normal rate and regular rhythm.      Heart sounds: Normal heart sounds.   Pulmonary:      Breath sounds: Normal breath sounds.   Abdominal:      General: Bowel sounds are normal.      Palpations: Abdomen is soft.   Musculoskeletal:         General: Normal range of motion.      Cervical back: Normal range of motion.      Right lower le+ Edema present.      Left lower leg: 3+ Pitting Edema present.   Lymphadenopathy:      Cervical: No cervical adenopathy.   Skin:     General: Skin is dry.      Findings: Erythema (Left knee area with some warmth) present.   Neurological:      General: No focal deficit present.      Mental Status: He is alert and oriented to person, place, and time. Mental status is at baseline.      Deep Tendon Reflexes: Reflexes are normal and symmetric.   Psychiatric:         Mood and Affect: Mood normal.         Behavior: Behavior normal.

## 2024-09-04 NOTE — TELEPHONE ENCOUNTER
Contacted [facility] and spoke with Davina to inform her that the study has been read and the final report is in patient's chart.

## 2024-09-04 NOTE — TELEPHONE ENCOUNTER
Caller: [facility]     Doctor: Howard Khan    Reason for call: PCP office recently placed a STAT LEV order and that patient had testing done on 9/3/24 at 6pm. PCP office called reading room who advised that they do not interpret the results and recommend reaching out our team for results. PCP office expressed only needing the results read and marked final in EPIC for them to review. Declined appointment for the patient.    Call back#: [phone]

## 2024-09-11 ENCOUNTER — OFFICE VISIT (OUTPATIENT)
Dept: INTERNAL MEDICINE CLINIC | Age: 84
End: 2024-09-11
Payer: COMMERCIAL

## 2024-09-11 VITALS
TEMPERATURE: 98.3 F | SYSTOLIC BLOOD PRESSURE: 110 MMHG | HEIGHT: 67 IN | BODY MASS INDEX: 37.04 KG/M2 | DIASTOLIC BLOOD PRESSURE: 68 MMHG | WEIGHT: 236 LBS | HEART RATE: 71 BPM | OXYGEN SATURATION: 96 %

## 2024-09-11 DIAGNOSIS — Z96.652 S/P TKR (TOTAL KNEE REPLACEMENT), LEFT: Primary | ICD-10-CM

## 2024-09-11 DIAGNOSIS — M25.00 HEMARTHROSIS FOLLOWING PROCEDURE, SUBSEQUENT ENCOUNTER: ICD-10-CM

## 2024-09-11 DIAGNOSIS — E79.0 HYPERURICEMIA: ICD-10-CM

## 2024-09-11 DIAGNOSIS — T88.8XXD HEMARTHROSIS FOLLOWING PROCEDURE, SUBSEQUENT ENCOUNTER: ICD-10-CM

## 2024-09-11 DIAGNOSIS — M79.89 LEFT LEG SWELLING: ICD-10-CM

## 2024-09-11 PROCEDURE — 99214 OFFICE O/P EST MOD 30 MIN: CPT | Performed by: INTERNAL MEDICINE

## 2024-09-11 PROCEDURE — G2211 COMPLEX E/M VISIT ADD ON: HCPCS | Performed by: INTERNAL MEDICINE

## 2024-09-11 RX ORDER — ALLOPURINOL 100 MG/1
100 TABLET ORAL 2 TIMES DAILY
Qty: 180 TABLET | Refills: 1 | Status: SHIPPED | OUTPATIENT
Start: 2024-09-11

## 2024-09-11 NOTE — PROGRESS NOTES
Assessment/Plan:     Diagnoses and all orders for this visit:    S/P TKR (total knee replacement), left  S/p left total knee replacement  Hemarthrosis following procedure, subsequent encounter  Complicated by hemarthrosis the total knee replacement  Left leg swelling    Left leg swelling he was seen by me last week he had a 2 venous Doppler which were negative he is using the compression stocking plan is to continue with the compression stocking there was a question of cellulitis he finished his antibiotic there is no fever or chills and the swelling is better I I will advised to keep the leg elevated as much as he can if he cannot get rid of this swelling then maybe he will benefit from lymphedema pump to decrease the swelling     M*Modal software was used to dictate this note.  It may contain errors with dictating incorrect words or incorrect spelling. Please contact the provider directly with any questions.    Subjective:   Chief Complaint   Patient presents with    Follow-up     1 WEEK FOLLOW UP         Patient ID: Moreno Fournier Jr. is a 84 y.o. male.    HPI  This is a very pleasant 84 years young gentleman who had a total knee replacement done which was complicated by hemarthrosis now he has a swelling of his left leg venous Doppler studies were done twice and that they were all negative for the similar problem I did not do any venous Doppler studies I think she has a lower extremity swelling because of his left hemarthrosis and total knee replacement he will be followed up by his orthopedic doctor I will recommend him to keep his leg elevated I will see him back in about 4 weeks and see how is he doing if he develop any fever or chills he will get back to me also he was treated with the antibiotic because the last week I thought it could be secondary to some infection but apparently there is no redness anymore and the swelling of the leg is better there are no signs or symptoms of compartment syndrome  The  following portions of the patient's history were reviewed and updated as appropriate: allergies, current medications, past family history, past medical history, past social history, past surgical history, and problem list.    Review of Systems   Constitutional:  Negative for activity change, fatigue and fever.   HENT:  Negative for congestion, sinus pain and sore throat.    Eyes:  Negative for discharge.   Respiratory:  Negative for cough and shortness of breath.    Cardiovascular:  Positive for leg swelling (Left leg swelling). Negative for chest pain and palpitations.   Gastrointestinal:  Negative for constipation, diarrhea and nausea.   Genitourinary:  Negative for hematuria and urgency.   Musculoskeletal:  Negative for back pain and gait problem.   Neurological:  Negative for dizziness, weakness and light-headedness.   Psychiatric/Behavioral:  Negative for sleep disturbance. The patient is not nervous/anxious.          Past Medical History:   Diagnosis Date    Abnormal electrocardiogram     Last assessed: Oct 11, 2013    KAYLI (acute kidney injury) ([facility]) 12/24/2015    Allergic rhinitis     last assessed: Oct 11, 2013    Allergic rhinitis due to pollen     last assessed: Oct 10, 2013    Allergic sinusitis     Last assessed: May 11, 2015    Allergy     resolved: July 22, 2015    Benign essential hypertension     Last assessed/resolved: May 31, 2017    BPH (benign prostatic hyperplasia)     Cancer ([facility])     prostate    Colitis     Last assessed: May 24, 2016    Cough with hemoptysis 11/17/2020    COVID-19 08/18/2022    Generalized osteoarthritis     Last assessed: Oct 11, 2013    GERD without esophagitis     Last assessed/resolved: May 31, 2017    Hearing loss     Hiatal hernia     resolved: July 22, 2015    History of deep vein thrombosis 06/07/2024    History of gastroesophageal reflux (GERD)     History of stomach ulcers     last assessed: May 11, 2015    Hypertension     Incomplete bladder emptying     Kidney  stone     Localized primary osteoarthritis of wrist 06/03/2019    Nodular prostate with lower urinary tract symptoms     Nontoxic single thyroid nodule     last assessed: April 16, 2014    Orchalgia     Overweight     last assessed: Oct 31, 2013    Poor urinary stream     Preop pulmonary/respiratory exam 06/12/2024    Pulmonary embolism (HCC)     Salivary gland disorder     last assessed: April 16, 2014    Seasonal allergies     last assessed: May 15, 2015    Spermatocele     Squamous cell skin cancer 05/2024    L wrist    Straining to void     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, 2014         Current Outpatient Medications:     acetaminophen (TYLENOL) 325 mg tablet, Take 2 tablets (650 mg total) by mouth every 6 (six) hours as needed for mild pain, Disp: , Rfl:     allopurinol (ZYLOPRIM) 100 mg tablet, Take 1 tablet (100 mg total) by mouth 2 (two) times a day, Disp: 180 tablet, Rfl: 1    amLODIPine (NORVASC) 5 mg tablet, Take 1 tablet (5 mg total) by mouth daily, Disp: 90 tablet, Rfl: 1    Cholecalciferol (VITAMIN D3 PO), Take by mouth 2000 IU, Disp: , Rfl:     diltiazem (CARDIZEM CD) 180 mg 24 hr capsule, Take 1 capsule (180 mg total) by mouth daily, Disp: 90 capsule, Rfl: 1    famotidine (PEPCID) 20 mg tablet, Take 20 mg by mouth 2 (two) times a day, Disp: , Rfl:     gabapentin (Neurontin) 400 mg capsule, Take 1 capsule (400 mg total) by mouth 2 (two) times a day, Disp: 180 capsule, Rfl: 1    levocetirizine (XYZAL) 5 MG tablet, Take 1 tablet (5 mg total) by mouth every evening, Disp: 90 tablet, Rfl: 4    losartan (COZAAR) 50 mg tablet, Take 1 tablet (50 mg total) by mouth daily, Disp: 90 tablet, Rfl: 3    quiNINE (QUALAQUIN) 324 mg capsule, Take 1 capsule (324 mg total) by mouth daily at bedtime, Disp: 90 capsule, Rfl: 1    senna-docusate sodium (SENOKOT S) 8.6-50 mg per tablet, Take 1 tablet by mouth 2 (two) times a day, Disp: , Rfl:     tamsulosin (FLOMAX) 0.4 mg, TAKE 1 CAPSULE BY MOUTH EVERY DAY WITH  DINNER, Disp: 90 capsule, Rfl: 1    traMADol (ULTRAM) 50 mg tablet, Take 50 mg by mouth every 6 (six) hours as needed, Disp: , Rfl:     traMADol-acetaminophen (ULTRACET) 37.5-325 mg per tablet, Take 1 tablet by mouth every 6 (six) hours as needed for moderate pain, Disp: , Rfl:     methocarbamol (ROBAXIN) 500 mg tablet, Take 500 mg by mouth every 6 (six) hours as needed (Patient not taking: Reported on 8/12/2024), Disp: , Rfl:     oxyCODONE (ROXICODONE) 5 immediate release tablet, As needed (Patient not taking: Reported on 9/4/2024), Disp: , Rfl:     potassium chloride (Klor-Con M20) 20 mEq tablet, Take 20 mEq by mouth daily (Patient not taking: Reported on 6/21/2024), Disp: , Rfl:     Allergies   Allergen Reactions    Other Anaphylaxis    Ace Inhibitors Hyperactivity       Social History   Past Surgical History:   Procedure Laterality Date    BLADDER SURGERY      CATARACT EXTRACTION      CHOLECYSTECTOMY  2000    laparoscopic     COLONOSCOPY      FLAP LOCAL TRUNK Left 10/28/2021    Procedure: EXCISION OF FLANK MASS;  Surgeon: Hemalatha Kirkpatrick DO;  Location:  MAIN OR;  Service: Plastics    HEMORRHOID SURGERY      pilionidal cyst removal     HERNIA REPAIR Left 01/17/2008    inguinal     KNEE ARTHROSCOPY Left 1974    KNEE CARTILAGE SURGERY      MOHS SURGERY Left 06/19/2024    wrist    NASAL SEPTUM SURGERY      Deviation repair     SC EXC PRTD BARBARA/PRTD GLND LAT DSJ&PRSRV FACIAL NR Right 06/09/2021    Procedure: SUPERFICIAL PAROTIDECTOMY WITH FACIAL NERVE MONITORING;  Surgeon: Omkar Dobbins MD;  [facility];  Service: ENT    PROSTATE BIOPSY  2017    SKIN TAG REMOVAL  03/2023    on his eye    STOMACH SURGERY      TONSILLECTOMY AND ADENOIDECTOMY      at age 41    TOTAL SHOULDER ARTHROPLASTY      SHOULDER JOINT REPLACEMENT - right 01/04 0 left 01/95    UPPER GASTROINTESTINAL ENDOSCOPY      US GUIDED THYROID BIOPSY  01/19/2022    US GUIDED THYROID BIOPSY  05/26/2022    VASECTOMY       Family History   Problem  "Relation Age of Onset    Hypertension Mother     Stroke Mother     Stomach cancer Father     Hypertension Father     Hypertension Family     Stroke Family         syndrome     Heart attack Son        Objective:  /68 (BP Location: Left arm, Patient Position: Sitting, Cuff Size: Standard)   Pulse 71   Temp 98.3 °F (36.8 °C) (Temporal)   Ht 5' 7\" (1.702 m)   Wt 107 kg (236 lb)   SpO2 96%   BMI 36.96 kg/m²        Physical Exam  Constitutional:       Appearance: He is well-developed.   Cardiovascular:      Rate and Rhythm: Normal rate and regular rhythm.      Heart sounds: Normal heart sounds.   Pulmonary:      Effort: Pulmonary effort is normal.      Breath sounds: Normal breath sounds.   Musculoskeletal:      Right lower leg: Edema present.      Left lower leg: Edema (Bilateral leg swelling but the left leg is much swollen than the right leg the scar of previous surgery is healing very well his right calf regiment is about 14 in the left is 16 it was 17 before so it came down little bit) present.   Skin:     General: Skin is warm and dry.           "

## 2024-09-28 DIAGNOSIS — I10 BENIGN ESSENTIAL HYPERTENSION: Chronic | ICD-10-CM

## 2024-09-29 RX ORDER — AMLODIPINE BESYLATE 5 MG/1
5 TABLET ORAL DAILY
Qty: 90 TABLET | Refills: 1 | Status: SHIPPED | OUTPATIENT
Start: 2024-09-29

## 2024-09-30 DIAGNOSIS — R25.2 LEG CRAMPING: ICD-10-CM

## 2024-09-30 DIAGNOSIS — K21.9 GASTROESOPHAGEAL REFLUX DISEASE WITHOUT ESOPHAGITIS: Primary | Chronic | ICD-10-CM

## 2024-09-30 RX ORDER — DILTIAZEM HYDROCHLORIDE 180 MG/1
180 CAPSULE, COATED, EXTENDED RELEASE ORAL DAILY
Qty: 90 CAPSULE | Refills: 1 | Status: SHIPPED | OUTPATIENT
Start: 2024-09-30

## 2024-09-30 NOTE — TELEPHONE ENCOUNTER
Reason for call:   [x] Refill   [] Prior Auth  [] Other:     Office:   [x] PCP/Provider - [facility]   [] Specialty/Provider -      famotidine (PEPCID) 20 mg tablet   20 mg, 2 times daily   180    Pharmacy: [id number]     quiNINE (QUALAQUIN) 324 mg capsule    324 mg, Daily at bedtime    90  Pharmacy: [id number]    Does the patient have enough for 3 days?   [x] Yes   [] No - Send as HP to POD

## 2024-10-02 RX ORDER — QUININE SULFATE 324 MG/1
324 CAPSULE ORAL
Qty: 30 CAPSULE | Refills: 1 | Status: SHIPPED | OUTPATIENT
Start: 2024-10-02 | End: 2024-12-01

## 2024-10-02 RX ORDER — FAMOTIDINE 20 MG/1
20 TABLET, FILM COATED ORAL 2 TIMES DAILY
Qty: 180 TABLET | Refills: 1 | Status: SHIPPED | OUTPATIENT
Start: 2024-10-02

## 2024-10-17 ENCOUNTER — OFFICE VISIT (OUTPATIENT)
Dept: INTERNAL MEDICINE CLINIC | Age: 84
End: 2024-10-17
Payer: COMMERCIAL

## 2024-10-17 VITALS
SYSTOLIC BLOOD PRESSURE: 116 MMHG | HEART RATE: 69 BPM | DIASTOLIC BLOOD PRESSURE: 76 MMHG | HEIGHT: 67 IN | WEIGHT: 230 LBS | TEMPERATURE: 97.8 F | OXYGEN SATURATION: 97 % | BODY MASS INDEX: 36.1 KG/M2

## 2024-10-17 DIAGNOSIS — M79.89 LEFT LEG SWELLING: Primary | ICD-10-CM

## 2024-10-17 DIAGNOSIS — R60.0 BILATERAL LOWER EXTREMITY EDEMA: ICD-10-CM

## 2024-10-17 PROBLEM — H25.9 AGE-RELATED CATARACT OF RIGHT EYE: Status: RESOLVED | Noted: 2024-03-11 | Resolved: 2024-10-17

## 2024-10-17 PROBLEM — R25.2 LEG CRAMPING: Status: RESOLVED | Noted: 2023-03-02 | Resolved: 2024-10-17

## 2024-10-17 PROBLEM — K59.00 CONSTIPATION: Status: RESOLVED | Noted: 2024-02-18 | Resolved: 2024-10-17

## 2024-10-17 PROBLEM — C44.629 SQUAMOUS CELL CARCINOMA OF HAND, LEFT: Status: RESOLVED | Noted: 2024-05-20 | Resolved: 2024-10-17

## 2024-10-17 PROCEDURE — G2211 COMPLEX E/M VISIT ADD ON: HCPCS | Performed by: INTERNAL MEDICINE

## 2024-10-17 PROCEDURE — 99213 OFFICE O/P EST LOW 20 MIN: CPT | Performed by: INTERNAL MEDICINE

## 2024-10-17 NOTE — PROGRESS NOTES
Ambulatory Visit  Name: Moreno Fournier Jr.      : 1940      MRN: [id number]  Encounter Provider: Lalitha Ty MD  Encounter Date: 10/17/2024   Encounter department: [facility]    Assessment & Plan  Left leg swelling  Left leg left knee swelling is better than before patient continue with his physical therapy left leg no significant pain       Bilateral lower extremity edema  Bilateral knee swelling is better          History of Present Illness     This is a very pleasant 84 years young gentleman who was seen in the office status post left total knee replacement he noticed that his knee is much more swollen warm to touch and painful and the leg leg swelling he had a complication of hemarthrosis status post surgery which was treated by the orthopedic surgery today he came back for the follow-up his sed rate and CRP was elevated but he is doing much better it was not significantly elevated it could be elevated because of the postoperative inflammation since he is doing much better followed up by his orthopedic doctor and able to do his all physical therapy we will continue to follow as regularly        History obtained from : patient  Review of Systems   Constitutional:  Negative for activity change, fatigue and fever.   HENT:  Negative for congestion, sinus pain and sore throat.    Eyes:  Negative for discharge.   Respiratory:  Negative for cough and shortness of breath.    Cardiovascular:  Negative for chest pain and palpitations.   Gastrointestinal:  Negative for constipation, diarrhea and nausea.   Genitourinary:  Negative for hematuria and urgency.   Musculoskeletal:  Positive for arthralgias (Left knee swelling and the pain is better status post total knee replacement and hemarthrosis). Negative for back pain and gait problem.   Neurological:  Negative for dizziness, weakness and light-headedness.   Psychiatric/Behavioral:  Positive for sleep disturbance. The patient is  not nervous/anxious.      Medical History Reviewed by provider this encounter:       Current Outpatient Medications on File Prior to Visit   Medication Sig Dispense Refill    acetaminophen (TYLENOL) 325 mg tablet Take 2 tablets (650 mg total) by mouth every 6 (six) hours as needed for mild pain      allopurinol (ZYLOPRIM) 100 mg tablet TAKE 1 TABLET BY MOUTH TWICE A  tablet 1    amLODIPine (NORVASC) 5 mg tablet TAKE 1 TABLET (5 MG TOTAL) BY MOUTH DAILY. 90 tablet 1    Cholecalciferol (VITAMIN D3 PO) Take by mouth 2000 IU      diltiazem (CARDIZEM CD) 180 mg 24 hr capsule TAKE 1 CAPSULE BY MOUTH EVERY DAY 90 capsule 1    gabapentin (Neurontin) 400 mg capsule Take 1 capsule (400 mg total) by mouth 2 (two) times a day 180 capsule 1    levocetirizine (XYZAL) 5 MG tablet Take 1 tablet (5 mg total) by mouth every evening 90 tablet 4    losartan (COZAAR) 50 mg tablet Take 1 tablet (50 mg total) by mouth daily 90 tablet 3    quiNINE (QUALAQUIN) 324 mg capsule Take 1 capsule (324 mg total) by mouth daily at bedtime 30 capsule 1    senna-docusate sodium (SENOKOT S) 8.6-50 mg per tablet Take 1 tablet by mouth 2 (two) times a day      tamsulosin (FLOMAX) 0.4 mg TAKE 1 CAPSULE BY MOUTH EVERY DAY WITH DINNER 90 capsule 1    traMADol (ULTRAM) 50 mg tablet Take 50 mg by mouth every 6 (six) hours as needed      traMADol-acetaminophen (ULTRACET) 37.5-325 mg per tablet Take 1 tablet by mouth every 6 (six) hours as needed for moderate pain      methocarbamol (ROBAXIN) 500 mg tablet Take 500 mg by mouth every 6 (six) hours as needed      oxyCODONE (ROXICODONE) 5 immediate release tablet As needed      potassium chloride (Klor-Con M20) 20 mEq tablet Take 20 mEq by mouth daily      [DISCONTINUED] famotidine (PEPCID) 20 mg tablet Take 1 tablet (20 mg total) by mouth 2 (two) times a day 180 tablet 1     No current facility-administered medications on file prior to visit.          Objective     /76 (BP Location: Left arm, Patient  "Position: Sitting, Cuff Size: Large)   Pulse 69   Temp 97.8 °F (36.6 °C) (Temporal)   Ht 5' 7\" (1.702 m)   Wt 104 kg (230 lb)   SpO2 97%   BMI 36.02 kg/m²     Physical Exam  Constitutional:       Appearance: Normal appearance.   Cardiovascular:      Rate and Rhythm: Normal rate and regular rhythm.   Pulmonary:      Effort: Pulmonary effort is normal.   Musculoskeletal:      Cervical back: Normal range of motion and neck supple.      Left knee: Swelling and bony tenderness present. No deformity, effusion, erythema, ecchymosis, lacerations or crepitus. Decreased range of motion. No tenderness.      Comments: The left knee status post total knee replacement is much better today than I saw last time swelling redness and warmth is significantly decreased   Neurological:      Mental Status: He is alert.         "

## 2024-10-17 NOTE — ASSESSMENT & PLAN NOTE
Left leg left knee swelling is better than before patient continue with his physical therapy left leg no significant pain

## 2024-11-26 DIAGNOSIS — I10 BENIGN ESSENTIAL HYPERTENSION: Chronic | ICD-10-CM

## 2024-11-26 RX ORDER — AMLODIPINE BESYLATE 5 MG/1
5 TABLET ORAL DAILY
Qty: 90 TABLET | Refills: 1 | Status: SHIPPED | OUTPATIENT
Start: 2024-11-26 | End: 2024-11-26 | Stop reason: SDUPTHER

## 2024-11-26 RX ORDER — AMLODIPINE BESYLATE 5 MG/1
5 TABLET ORAL DAILY
Qty: 30 TABLET | Refills: 0 | Status: SHIPPED | OUTPATIENT
Start: 2024-11-26

## 2024-11-26 NOTE — TELEPHONE ENCOUNTER
Patient request 30 days to go to retail since he is 30 days short and insurance will not pay for them

## 2024-12-09 ENCOUNTER — TELEPHONE (OUTPATIENT)
Dept: INTERNAL MEDICINE CLINIC | Age: 84
End: 2024-12-09

## 2024-12-09 DIAGNOSIS — R25.2 LEG CRAMPING: ICD-10-CM

## 2024-12-09 RX ORDER — QUININE SULFATE 324 MG/1
324 CAPSULE ORAL
Qty: 30 CAPSULE | Refills: 1 | Status: SHIPPED | OUTPATIENT
Start: 2024-12-09 | End: 2025-02-07

## 2024-12-09 NOTE — TELEPHONE ENCOUNTER
PA for QuiNINE (QUALAQUIN) 324 mg capsule DENIED    Reason:(Screenshot if applicable)        Message sent to office clinical pool Yes    Denial letter scanned into Media Yes    Appeal started No (Provider will need to decide if appeal is warranted and send clinical documentation to Prior Authorization Team for initiation.)    **Please follow up with your patient regarding denial and next steps**

## 2024-12-09 NOTE — TELEPHONE ENCOUNTER
Reason for call:   [x] Refill   [] Prior Auth  [] Other:     Office:   [x] PCP/Provider -   [] Specialty/Provider -     Medication: quiNINE (QUALAQUIN) 324 mg     Dose/Frequency: Take 1 capsule (324 mg total) by mouth daily at bedtime     Quantity: 30    Pharmacy: [place] [id number] - [place] [address]     Does the patient have enough for 3 days?   [] Yes   [x] No - Send as HP to POD

## 2024-12-09 NOTE — TELEPHONE ENCOUNTER
PA for QuiNINE (QUALAQUIN) 324 mg capsule SUBMITTED to [facility]    via    []CMM-KEY:   [x]Surescripts-Case ID #: [id number]   []Availity-Auth ID #   []Faxed to plan   []Other website   []Phone call Case ID #     [x]PA sent as URGENT    All office notes, labs and other pertaining documents and studies sent. Clinical questions answered. Awaiting determination from insurance company.     Turnaround time for your insurance to make a decision on your Prior Authorization can take 7-21 business days.

## 2024-12-12 NOTE — TELEPHONE ENCOUNTER
Patient's wife following up on refill request for Quinine. Made aware that prior authorization was in the works. Wife reports that they do not go through insurance for this medication since they wont pay for it, they use [employer]. Encouraged them to reach back out to pharmacy to let them know since they had been trying to process through insurance.

## 2024-12-13 ENCOUNTER — TELEPHONE (OUTPATIENT)
Dept: INTERNAL MEDICINE CLINIC | Age: 84
End: 2024-12-13

## 2024-12-13 NOTE — TELEPHONE ENCOUNTER
Received medication prior auth for   quiNINE (QUALAQUIN) 324 mg capsule     Scanned into media and sent to pod

## 2024-12-13 NOTE — TELEPHONE ENCOUNTER
Duplicate encounter created, please see telephone encounter from 12/09/24 regarding Quinine 324 mg PA status. Please review patient's chart to see if there is already an encounter regarding the medication in question and to document anything regarding this medication in regards to anything regarding the authorization process etc before creating another encounter Thank You.

## 2024-12-16 DIAGNOSIS — B37.2 CANDIDAL INTERTRIGO: Primary | ICD-10-CM

## 2024-12-16 RX ORDER — KETOCONAZOLE 20 MG/G
CREAM TOPICAL DAILY
Qty: 60 G | Refills: 2 | Status: SHIPPED | OUTPATIENT
Start: 2024-12-16

## 2025-01-02 DIAGNOSIS — M54.6 ACUTE BILATERAL THORACIC BACK PAIN: ICD-10-CM

## 2025-01-02 DIAGNOSIS — M54.10 RADICULOPATHY OF LEG: Primary | ICD-10-CM

## 2025-01-02 NOTE — TELEPHONE ENCOUNTER
Reason for call:   [x] Refill   [] Prior Auth  [] Other:     Office:   [x] PCP/Provider -   [] Specialty/Provider -     Medication: traMADol (ULTRAM) 50 mg tablet    Dose/Frequency: Take 50 mg by mouth every 6 (six) hours as needed,     Quantity: 30 tablet     Pharmacy: [facility]/pharmacy #[id number] - [address]     Does the patient have enough for 3 days?   [x] Yes   [] No - Send as HP to POD

## 2025-01-03 RX ORDER — TRAMADOL HYDROCHLORIDE 50 MG/1
50 TABLET ORAL EVERY 6 HOURS PRN
Qty: 60 TABLET | Refills: 0 | Status: SHIPPED | OUTPATIENT
Start: 2025-01-03

## 2025-01-16 ENCOUNTER — RA CDI HCC (OUTPATIENT)
Dept: OTHER | Facility: HOSPITAL | Age: 85
End: 2025-01-16

## 2025-01-23 ENCOUNTER — OFFICE VISIT (OUTPATIENT)
Dept: INTERNAL MEDICINE CLINIC | Age: 85
End: 2025-01-23
Payer: COMMERCIAL

## 2025-01-23 VITALS
OXYGEN SATURATION: 95 % | WEIGHT: 232 LBS | TEMPERATURE: 97.3 F | BODY MASS INDEX: 36.41 KG/M2 | SYSTOLIC BLOOD PRESSURE: 120 MMHG | DIASTOLIC BLOOD PRESSURE: 88 MMHG | HEART RATE: 67 BPM | HEIGHT: 67 IN

## 2025-01-23 DIAGNOSIS — J84.9 INTERSTITIAL LUNG DISEASE (HCC): ICD-10-CM

## 2025-01-23 DIAGNOSIS — I73.9 PERIPHERAL VASCULAR DISEASE (HCC): ICD-10-CM

## 2025-01-23 DIAGNOSIS — E66.01 SEVERE OBESITY (BMI 35.0-39.9) WITH COMORBIDITY (HCC): ICD-10-CM

## 2025-01-23 DIAGNOSIS — C61 PROSTATE CANCER (HCC): ICD-10-CM

## 2025-01-23 DIAGNOSIS — N18.31 CHRONIC KIDNEY DISEASE, STAGE 3A (HCC): ICD-10-CM

## 2025-01-23 DIAGNOSIS — D69.6 THROMBOCYTOPENIA (HCC): ICD-10-CM

## 2025-01-23 DIAGNOSIS — I10 BENIGN ESSENTIAL HYPERTENSION: Primary | Chronic | ICD-10-CM

## 2025-01-23 PROBLEM — M25.00 HEMARTHROSIS FOLLOWING PROCEDURE: Status: RESOLVED | Noted: 2024-08-12 | Resolved: 2025-01-23

## 2025-01-23 PROBLEM — T88.8XXA HEMARTHROSIS FOLLOWING PROCEDURE: Status: RESOLVED | Noted: 2024-08-12 | Resolved: 2025-01-23

## 2025-01-23 PROBLEM — Z96.652 S/P TKR (TOTAL KNEE REPLACEMENT), LEFT: Status: RESOLVED | Noted: 2024-08-12 | Resolved: 2025-01-23

## 2025-01-23 PROCEDURE — 99214 OFFICE O/P EST MOD 30 MIN: CPT | Performed by: INTERNAL MEDICINE

## 2025-01-23 PROCEDURE — G2211 COMPLEX E/M VISIT ADD ON: HCPCS | Performed by: INTERNAL MEDICINE

## 2025-01-23 RX ORDER — AMOXICILLIN 500 MG/1
CAPSULE ORAL
COMMUNITY
Start: 2024-11-26

## 2025-01-23 NOTE — ASSESSMENT & PLAN NOTE
Seen and followed up by the vascular he has a chronic venous insufficiency and the some swelling of the ankle secondary to that  Orders:    CBC and differential; Future    Comprehensive metabolic panel; Future

## 2025-01-23 NOTE — ASSESSMENT & PLAN NOTE
Hypertension is very well-controlled  Orders:    CBC and differential; Future    Comprehensive metabolic panel; Future    Lipid panel; Future

## 2025-01-23 NOTE — PROGRESS NOTES
Name: Moreno Fournier Jr.      : 1940      MRN: [id number]  Encounter Provider: Lalitha Ty MD  Encounter Date: 2025   Encounter department: [facility]  :  Assessment & Plan  Benign essential hypertension  Hypertension is very well-controlled  Orders:    CBC and differential; Future    Comprehensive metabolic panel; Future    Lipid panel; Future    Prostate cancer (HCC)  Status post doing well       Interstitial lung disease (HCC)  Significant shortness of breath  Orders:    CBC and differential; Future    Comprehensive metabolic panel; Future    Peripheral vascular disease (HCC)  Seen and followed up by the vascular he has a chronic venous insufficiency and the some swelling of the ankle secondary to that  Orders:    CBC and differential; Future    Comprehensive metabolic panel; Future    Thrombocytopenia (HCC)  Number cytopenia very mild 117       Severe obesity (BMI 35.0-39.9) with comorbidity (HCC)           Chronic kidney disease, stage 3a (HCC)  Lab Results   Component Value Date    EGFR 62 2024    EGFR 60 2024    EGFR 64 2024    CREATININE 1.09 2024    CREATININE 1.19 2024    CREATININE 1.14 2024                   History of Present Illness   This is a very pleasant 84 years young gentleman who is here today for the regular follow-up he is doing well no new complaints review of system is essentially unremarkable    Patient is here today for the follow-up.   Hypertension. I reviewed antihypertensive medication, patient does not have any side effects of  medications, no signs or symptoms of hypertension ,hypotension or orthostatic hypotension.  Patient is compliant with medications.  Blood workup related to hypertensive diagnosis reviewed.  Plan is to continue with the present management.  We will follow-up as a scheduled and adjust the doses of the medication as indicated.    Patient is here for the regular follow-up history  of CKD 3 GFR is-62-------.  Patient is doing well no shortness of breath, no fluid retention.  His GFR, CBC, renal functions, urine protein creatinine ratio, electrolytes followed and reviewed.  instructions given regarding the control of high blood pressure, type 2 diabetes.  Patient is compliant to his medications and the follow-up.  Renal functions are much better as compared to before and his stage III CKD is now stage II we will continue to follow.    Triple episodes of gout doing well    Really complaint is swelling of his ankle which is most likely due to chronic venous insufficiency with the change in the color of the skin on both leg right greater than the left no recent problems with the gout    Thrombocytopenia asymptomatic and mild    He is trying to lose weight      Review of Systems   Constitutional:  Positive for fatigue. Negative for appetite change and fever.   HENT:  Negative for congestion, ear pain, hearing loss, nosebleeds, sneezing, tinnitus and voice change.    Eyes:  Negative for pain, discharge and redness.   Respiratory:  Negative for cough, chest tightness and wheezing.    Cardiovascular:  Positive for leg swelling. Negative for chest pain and palpitations.   Gastrointestinal:  Negative for abdominal pain, blood in stool, constipation, diarrhea, nausea and vomiting.   Genitourinary:  Negative for difficulty urinating, dysuria, hematuria and urgency.   Musculoskeletal:  Negative for arthralgias, back pain, gait problem and joint swelling.   Skin:  Positive for rash. Negative for wound.        itching   Allergic/Immunologic: Negative for environmental allergies.   Neurological:  Negative for dizziness, tremors, seizures, weakness, light-headedness and numbness.   Hematological:  Negative for adenopathy. Does not bruise/bleed easily.   Psychiatric/Behavioral:  Negative for behavioral problems and confusion. The patient is not nervous/anxious.        Objective   /88 (BP Location: Left  "arm, Patient Position: Sitting, Cuff Size: Large)   Pulse 67   Temp (!) 97.3 °F (36.3 °C) (Temporal)   Ht 5' 7\" (1.702 m)   Wt 105 kg (232 lb)   SpO2 95%   BMI 36.34 kg/m²      Physical Exam  Vitals and nursing note reviewed.   Constitutional:       Appearance: Normal appearance. He is normal weight.   HENT:      Head: Normocephalic and atraumatic.      Right Ear: Tympanic membrane normal.      Left Ear: Tympanic membrane normal.      Nose: Nose normal.      Mouth/Throat:      Mouth: Mucous membranes are moist.   Eyes:      Extraocular Movements: Extraocular movements intact.      Pupils: Pupils are equal, round, and reactive to light.   Cardiovascular:      Rate and Rhythm: Normal rate and regular rhythm.      Pulses: Normal pulses.      Heart sounds: Normal heart sounds.      Comments: On the ankle with the chronic venous insufficiency and discoloration of both legs  Pulmonary:      Effort: Pulmonary effort is normal.      Breath sounds: Normal breath sounds.   Abdominal:      General: Abdomen is flat. Bowel sounds are normal.      Palpations: Abdomen is soft.   Musculoskeletal:         General: No swelling, tenderness or deformity. Normal range of motion.      Cervical back: Normal range of motion and neck supple.      Right lower le+ Pitting Edema present.      Left lower le+ Pitting Edema present.   Skin:     General: Skin is warm.      Capillary Refill: Capillary refill takes 2 to 3 seconds.   Neurological:      General: No focal deficit present.      Mental Status: He is alert and oriented to person, place, and time. Mental status is at baseline.   Psychiatric:         Mood and Affect: Mood normal.         Behavior: Behavior normal.         "

## 2025-01-23 NOTE — ASSESSMENT & PLAN NOTE
Lab Results   Component Value Date    EGFR 62 09/04/2024    EGFR 60 08/05/2024    EGFR 64 07/31/2024    CREATININE 1.09 09/04/2024    CREATININE 1.19 08/05/2024    CREATININE 1.14 07/31/2024

## 2025-01-23 NOTE — ASSESSMENT & PLAN NOTE
Significant shortness of breath  Orders:    CBC and differential; Future    Comprehensive metabolic panel; Future

## 2025-02-03 DIAGNOSIS — K21.9 GASTROESOPHAGEAL REFLUX DISEASE WITHOUT ESOPHAGITIS: Chronic | ICD-10-CM

## 2025-02-03 DIAGNOSIS — N13.8 BPH WITH OBSTRUCTION/LOWER URINARY TRACT SYMPTOMS: ICD-10-CM

## 2025-02-03 DIAGNOSIS — N40.1 BPH WITH OBSTRUCTION/LOWER URINARY TRACT SYMPTOMS: ICD-10-CM

## 2025-02-03 RX ORDER — TAMSULOSIN HYDROCHLORIDE 0.4 MG/1
0.4 CAPSULE ORAL
Qty: 90 CAPSULE | Refills: 1 | Status: SHIPPED | OUTPATIENT
Start: 2025-02-03

## 2025-02-04 ENCOUNTER — OFFICE VISIT (OUTPATIENT)
Dept: PULMONOLOGY | Facility: CLINIC | Age: 85
End: 2025-02-04
Payer: COMMERCIAL

## 2025-02-04 VITALS
DIASTOLIC BLOOD PRESSURE: 78 MMHG | HEIGHT: 67 IN | WEIGHT: 229 LBS | TEMPERATURE: 98.6 F | HEART RATE: 65 BPM | BODY MASS INDEX: 35.94 KG/M2 | SYSTOLIC BLOOD PRESSURE: 124 MMHG | OXYGEN SATURATION: 96 %

## 2025-02-04 DIAGNOSIS — J84.9 INTERSTITIAL LUNG DISEASE (HCC): Primary | ICD-10-CM

## 2025-02-04 PROCEDURE — G2211 COMPLEX E/M VISIT ADD ON: HCPCS | Performed by: INTERNAL MEDICINE

## 2025-02-04 PROCEDURE — 99213 OFFICE O/P EST LOW 20 MIN: CPT | Performed by: INTERNAL MEDICINE

## 2025-02-04 RX ORDER — FAMOTIDINE 20 MG/1
20 TABLET, FILM COATED ORAL 2 TIMES DAILY
Qty: 180 TABLET | Refills: 1 | Status: SHIPPED | OUTPATIENT
Start: 2025-02-04

## 2025-02-04 NOTE — PROGRESS NOTES
"Progress Note - Pulmonary Medicine     Name: Moreno Fournier Jr.      : 1940      MRN: [id number]  Encounter Provider: Altaf Braga MD  Encounter Date: 2025   Enc[facility]    :  Assessment & Plan  Interstitial lung disease (HCC)  Reports no escalating or worsening symptoms  Had a hospitalization in  after his knee surgery.  He was told he had pneumonia although radiographic imaging and CT imaging did not demonstrate any evidence of airspace disease.  This was at [facility]  Is only mild peripheral reticulation which has not progressed in several years  Will hold off on further CT imaging unless change in symptomatology           Return in about 1 year (around 2026).    History of Present Illness   Moreno Fournier Jr. is a 84 y.o. male who presents for follow-up of interstitial lung disease.  He is not reporting any new symptoms.  He gets short of breath if he exerts himself excessively but does not get a lot of regular cardiovascular exercise and is overweight.  He did recently have a knee replacement surgery last .  Unfortunately had to be hospitalized after having shortness of breath and was told he had pneumonia.  His imaging did not support a diagnosis of pneumonia however but he did have clinical improvement.  He was sent to a rehabilitation facility posthospitalization.  He has not had any progressive respiratory symptoms.    Review of Systems:    Aside from what is mentioned in the HPI, ROS is otherwise negative         Medications:  Medication list reviewed and updated as appropriate    PMH, Social history, family history and tobacco history  Reviewed and updated as appropriate    Objective   /78 (BP Location: Left arm, Patient Position: Sitting, Cuff Size: Large)   Pulse 65   Temp 98.6 °F (37 °C) (Tympanic)   Ht 5' 7\" (1.702 m)   Wt 104 kg (229 lb)   SpO2 96%   BMI 35.87 kg/m²     Physical " Exam:  Gen: Overweight comfortable on room air.  No conversational dyspnea  HEENT:  Conjugate gaze.  sclerae anicteric.  Oropharynx moist  Neck: Trachea is midline. No JVD. No adenopathy  Chest: Limited chest wall excursion.  No wheezes or crackles however  Cardiac: Regular with distant heart tones. no murmur  Abdomen:  benign  Extremities: No significant peripheral edema  Neuro:  Normal speech and mentation    Diagnostic Data:      Radiology results:    Result report of CT scan from July 19 at [facility] did not show any evidence of pulmonary embolism and no evidence of pneumonia.  Mild bibasilar atelectasis reported.    Altaf Braga MD

## 2025-02-04 NOTE — TELEPHONE ENCOUNTER
Medication: famotidine (PEPCID) 20 mg tablet     Dose/Frequency: 2 times daily    Quantity: 180 each    Pharmacy: [facility]/pharmacy #[id number] - [address]    Office:   [x] PCP/Provider -   [] Speciality/Provider -     Does the patient have enough for 3 days?   [x] Yes   [] No - Send as HP to POD

## 2025-02-04 NOTE — ASSESSMENT & PLAN NOTE
Reports no escalating or worsening symptoms  Had a hospitalization in June after his knee surgery.  He was told he had pneumonia although radiographic imaging and CT imaging did not demonstrate any evidence of airspace disease.  This was at [facility]  Is only mild peripheral reticulation which has not progressed in several years  Will hold off on further CT imaging unless change in symptomatology

## 2025-02-10 DIAGNOSIS — R25.2 LEG CRAMPING: ICD-10-CM

## 2025-02-10 NOTE — TELEPHONE ENCOUNTER
Reason for call:   [x] Refill   [] Prior Auth  [] Other:     Office:   [x] PCP/Provider - Sorathia  [] Specialty/Provider -     Medication: Quinine 324mg    Dose/Frequency: 1 cap hs    Quantity: 30    Pharmacy: [address] [facility] - [address] [place] [address] [phone]     Does the patient have enough for 3 days?   [x] Yes   [] No - Send as HP to POD

## 2025-02-11 RX ORDER — QUININE SULFATE 324 MG/1
324 CAPSULE ORAL
Qty: 30 CAPSULE | Refills: 5 | Status: SHIPPED | OUTPATIENT
Start: 2025-02-11 | End: 2025-08-10

## 2025-03-02 DIAGNOSIS — I10 BENIGN ESSENTIAL HYPERTENSION: Chronic | ICD-10-CM

## 2025-03-02 NOTE — TELEPHONE ENCOUNTER
Medication Refill Request       Medication: diltiazem (CARDIZEM CD) 180 mg 24 hr capsule    Dose/Frequency: 180 mg / daily    Quantity: 90 capsule    Pharmacy: [facility]/pharmacy #[id number] - [address] [address]      Office:   [x] PCP/Provider - Jorge L Ty MD  [] Specialty/Provider -     Does the patient have enough for 3 days?   [x] Yes   [] No - Send as HP to POD    Is the patient completely out of the medication or does not have enough until the next business day?  [] Yes - send to Call Hub  [x] No - Send as HP to POD

## 2025-03-03 RX ORDER — DILTIAZEM HYDROCHLORIDE 180 MG/1
180 CAPSULE, COATED, EXTENDED RELEASE ORAL DAILY
Qty: 90 CAPSULE | Refills: 1 | Status: SHIPPED | OUTPATIENT
Start: 2025-03-03

## 2025-03-20 DIAGNOSIS — Z29.89 NEED FOR SBE (SUBACUTE BACTERIAL ENDOCARDITIS) PROPHYLAXIS: Primary | ICD-10-CM

## 2025-03-20 NOTE — TELEPHONE ENCOUNTER
Patient's wife called the RX Refill Line. Message is being forwarded to the office.     Patient's wife is requesting prophylactic antibiotics for upcoming dentist appt, stated that they are both being seen by a new dentist and he will take over refills of this but only after they establish care. They are being seen on 4/2 and 4/14    Ivy is asking for one script for amoxicillin 500 mg take 4 before appt but with a quantity of 8 so that they can both use it as they both need it before dental appts   She asked if the medication could be sent to the [facility] on [address]    Please contact Ivy at

## 2025-03-21 RX ORDER — AMOXICILLIN 500 MG/1
CAPSULE ORAL
Qty: 4 CAPSULE | Refills: 1 | Status: SHIPPED | OUTPATIENT
Start: 2025-03-21 | End: 2025-04-20

## 2025-03-31 ENCOUNTER — TELEPHONE (OUTPATIENT)
Age: 85
End: 2025-03-31

## 2025-03-31 NOTE — TELEPHONE ENCOUNTER
Pt under care of:   Office Location: [place]    Last Seen (include Follow Up recommendations of last visit- see Office Visit - Instructions): 6/21/2024 - Return in about 1 year (around 6/21/2025) for PSA.     Pt's wife calling to schedule the pt's 1 year follow up with . When offered his next available appointment she became angry and expressed that they should've been contacted sooner regarding scheduling. Wife declined to schedule an appointment with an AP and stated whoever is in charge of the office should be fired, she then disconnected the call.

## 2025-04-03 NOTE — TELEPHONE ENCOUNTER
Pt wife calling back to schedule yearly follow up. Offered next available on 10/16/25 with Dr Fox in [place].     Pt wife still very upset that his yearly visit is so far out. Apologized and offered to add pt to wait list for any cancellations. Pt wife agreed and will wait to hear back for any sooner appts.

## 2025-04-26 ENCOUNTER — APPOINTMENT (OUTPATIENT)
Dept: LAB | Facility: IMAGING CENTER | Age: 85
End: 2025-04-26
Payer: COMMERCIAL

## 2025-04-26 DIAGNOSIS — J84.9 INTERSTITIAL LUNG DISEASE (HCC): ICD-10-CM

## 2025-04-26 DIAGNOSIS — I10 BENIGN ESSENTIAL HYPERTENSION: Chronic | ICD-10-CM

## 2025-04-26 DIAGNOSIS — I73.9 PERIPHERAL VASCULAR DISEASE (HCC): ICD-10-CM

## 2025-04-26 LAB
ALBUMIN SERPL BCG-MCNC: 4.1 G/DL (ref 3.5–5)
ALP SERPL-CCNC: 67 U/L (ref 34–104)
ALT SERPL W P-5'-P-CCNC: 10 U/L (ref 7–52)
ANION GAP SERPL CALCULATED.3IONS-SCNC: 7 MMOL/L (ref 4–13)
AST SERPL W P-5'-P-CCNC: 18 U/L (ref 13–39)
BASOPHILS # BLD AUTO: 0.04 THOUSANDS/ÂΜL (ref 0–0.1)
BASOPHILS NFR BLD AUTO: 1 % (ref 0–1)
BILIRUB SERPL-MCNC: 1.22 MG/DL (ref 0.2–1)
BUN SERPL-MCNC: 23 MG/DL (ref 5–25)
CALCIUM SERPL-MCNC: 9.2 MG/DL (ref 8.4–10.2)
CHLORIDE SERPL-SCNC: 105 MMOL/L (ref 96–108)
CHOLEST SERPL-MCNC: 149 MG/DL (ref ?–200)
CO2 SERPL-SCNC: 29 MMOL/L (ref 21–32)
CREAT SERPL-MCNC: 1.46 MG/DL (ref 0.6–1.3)
EOSINOPHIL # BLD AUTO: 0.03 THOUSAND/ÂΜL (ref 0–0.61)
EOSINOPHIL NFR BLD AUTO: 1 % (ref 0–6)
ERYTHROCYTE [DISTWIDTH] IN BLOOD BY AUTOMATED COUNT: 14.5 % (ref 11.6–15.1)
GFR SERPL CREATININE-BSD FRML MDRD: 43 ML/MIN/1.73SQ M
GLUCOSE P FAST SERPL-MCNC: 87 MG/DL (ref 65–99)
HCT VFR BLD AUTO: 45.3 % (ref 36.5–49.3)
HDLC SERPL-MCNC: 50 MG/DL
HGB BLD-MCNC: 14.6 G/DL (ref 12–17)
IMM GRANULOCYTES # BLD AUTO: 0.05 THOUSAND/UL (ref 0–0.2)
IMM GRANULOCYTES NFR BLD AUTO: 1 % (ref 0–2)
LDLC SERPL CALC-MCNC: 85 MG/DL (ref 0–100)
LYMPHOCYTES # BLD AUTO: 1.12 THOUSANDS/ÂΜL (ref 0.6–4.47)
LYMPHOCYTES NFR BLD AUTO: 27 % (ref 14–44)
MCH RBC QN AUTO: 30.3 PG (ref 26.8–34.3)
MCHC RBC AUTO-ENTMCNC: 32.2 G/DL (ref 31.4–37.4)
MCV RBC AUTO: 94 FL (ref 82–98)
MONOCYTES # BLD AUTO: 0.33 THOUSAND/ÂΜL (ref 0.17–1.22)
MONOCYTES NFR BLD AUTO: 8 % (ref 4–12)
NEUTROPHILS # BLD AUTO: 2.57 THOUSANDS/ÂΜL (ref 1.85–7.62)
NEUTS SEG NFR BLD AUTO: 62 % (ref 43–75)
NONHDLC SERPL-MCNC: 99 MG/DL
NRBC BLD AUTO-RTO: 0 /100 WBCS
PLATELET # BLD AUTO: 170 THOUSANDS/UL (ref 149–390)
PMV BLD AUTO: 11 FL (ref 8.9–12.7)
POTASSIUM SERPL-SCNC: 4.4 MMOL/L (ref 3.5–5.3)
PROT SERPL-MCNC: 6 G/DL (ref 6.4–8.4)
PSA SERPL-MCNC: 1.42 NG/ML (ref 0–4)
RBC # BLD AUTO: 4.82 MILLION/UL (ref 3.88–5.62)
SODIUM SERPL-SCNC: 141 MMOL/L (ref 135–147)
TRIGL SERPL-MCNC: 69 MG/DL (ref ?–150)
WBC # BLD AUTO: 4.14 THOUSAND/UL (ref 4.31–10.16)

## 2025-04-26 PROCEDURE — 80053 COMPREHEN METABOLIC PANEL: CPT

## 2025-04-26 PROCEDURE — 85025 COMPLETE CBC W/AUTO DIFF WBC: CPT

## 2025-04-26 PROCEDURE — 80061 LIPID PANEL: CPT

## 2025-05-06 ENCOUNTER — RA CDI HCC (OUTPATIENT)
Dept: OTHER | Facility: HOSPITAL | Age: 85
End: 2025-05-06

## 2025-05-13 ENCOUNTER — OFFICE VISIT (OUTPATIENT)
Dept: INTERNAL MEDICINE CLINIC | Age: 85
End: 2025-05-13
Payer: COMMERCIAL

## 2025-05-13 VITALS
OXYGEN SATURATION: 96 % | TEMPERATURE: 97.3 F | HEIGHT: 67 IN | DIASTOLIC BLOOD PRESSURE: 72 MMHG | HEART RATE: 62 BPM | BODY MASS INDEX: 34.84 KG/M2 | SYSTOLIC BLOOD PRESSURE: 108 MMHG | WEIGHT: 222 LBS

## 2025-05-13 DIAGNOSIS — I73.9 PERIPHERAL VASCULAR DISEASE (HCC): ICD-10-CM

## 2025-05-13 DIAGNOSIS — I10 BENIGN ESSENTIAL HYPERTENSION: Primary | Chronic | ICD-10-CM

## 2025-05-13 DIAGNOSIS — R60.0 BILATERAL LOWER EXTREMITY EDEMA: ICD-10-CM

## 2025-05-13 DIAGNOSIS — E65 TRUNCAL OBESITY: ICD-10-CM

## 2025-05-13 DIAGNOSIS — D69.6 THROMBOCYTOPENIA (HCC): ICD-10-CM

## 2025-05-13 DIAGNOSIS — C61 PROSTATE CANCER (HCC): ICD-10-CM

## 2025-05-13 PROCEDURE — G0439 PPPS, SUBSEQ VISIT: HCPCS | Performed by: INTERNAL MEDICINE

## 2025-05-13 PROCEDURE — 99214 OFFICE O/P EST MOD 30 MIN: CPT | Performed by: INTERNAL MEDICINE

## 2025-05-13 NOTE — PATIENT INSTRUCTIONS
Medicare Preventive Visit Patient Instructions  Thank you for completing your Welcome to Medicare Visit or Medicare Annual Wellness Visit today. Your next wellness visit will be due in one year (5/14/2026).  The screening/preventive services that you may require over the next 5-10 years are detailed below. Some tests may not apply to you based off risk factors and/or age. Screening tests ordered at today's visit but not completed yet may show as past due. Also, please note that scanned in results may not display below.  Preventive Screenings:  Service Recommendations Previous Testing/Comments   Colorectal Cancer Screening  Colonoscopy    Fecal Occult Blood Test (FOBT)/Fecal Immunochemical Test (FIT)  Fecal DNA/Cologuard Test  Flexible Sigmoidoscopy Age: 45-75 years old   Colonoscopy: every 10 years (May be performed more frequently if at higher risk)  OR  FOBT/FIT: every 1 year  OR  Cologuard: every 3 years  OR  Sigmoidoscopy: every 5 years  Screening may be recommended earlier than age 45 if at higher risk for colorectal cancer. Also, an individualized decision between you and your healthcare provider will decide whether screening between the ages of 76-85 would be appropriate. Colonoscopy: 09/22/2022  FOBT/FIT: Not on file  Cologuard: Not on file  Sigmoidoscopy: Not on file    Screening Current     Prostate Cancer Screening Individualized decision between patient and health care provider in men between ages of 55-69   Medicare will cover every 12 months beginning on the day after your 50th birthday PSA: 1.419 ng/mL     History Prostate Cancer  Screening Not Indicated     Hepatitis C Screening Once for adults born between 1945 and 1965  More frequently in patients at high risk for Hepatitis C Hep C Antibody: 04/14/2022    Screening Current   Diabetes Screening 1-2 times per year if you're at risk for diabetes or have pre-diabetes Fasting glucose: 87 mg/dL (4/26/2025)  A1C: 5.2 % (6/17/2024)  Screening Current    Cholesterol Screening Once every 5 years if you don't have a lipid disorder. May order more often based on risk factors. Lipid panel: 04/26/2025  Screening Current      Other Preventive Screenings Covered by Medicare:  Abdominal Aortic Aneurysm (AAA) Screening: covered once if your at risk. You're considered to be at risk if you have a family history of AAA or a male between the age of 65-75 who smoking at least 100 cigarettes in your lifetime.  Lung Cancer Screening: covers low dose CT scan once per year if you meet all of the following conditions: (1) Age 55-77; (2) No signs or symptoms of lung cancer; (3) Current smoker or have quit smoking within the last 15 years; (4) You have a tobacco smoking history of at least 20 pack years (packs per day x number of years you smoked); (5) You get a written order from a healthcare provider.  Glaucoma Screening: covered annually if you're considered high risk: (1) You have diabetes OR (2) Family history of glaucoma OR (3)  aged 50 and older OR (4)  American aged 65 and older  Osteoporosis Screening: covered every 2 years if you meet one of the following conditions: (1) Have a vertebral abnormality; (2) On glucocorticoid therapy for more than 3 months; (3) Have primary hyperparathyroidism; (4) On osteoporosis medications and need to assess response to drug therapy.  HIV Screening: covered annually if you're between the age of 15-65. Also covered annually if you are younger than 15 and older than 65 with risk factors for HIV infection. For pregnant patients, it is covered up to 3 times per pregnancy.    Immunizations:  Immunization Recommendations   Influenza Vaccine Annual influenza vaccination during flu season is recommended for all persons aged >= 6 months who do not have contraindications   Pneumococcal Vaccine   * Pneumococcal conjugate vaccine = PCV13 (Prevnar 13), PCV15 (Vaxneuvance), PCV20 (Prevnar 20)  * Pneumococcal polysaccharide vaccine =  PPSV23 (Pneumovax) Adults 19-65 yo with certain risk factors or if 65+ yo  If never received any pneumonia vaccine: recommend Prevnar 20 (PCV20)  Give PCV20 if previously received 1 dose of PCV13 or PPSV23   Hepatitis B Vaccine 3 dose series if at intermediate or high risk (ex: diabetes, end stage renal disease, liver disease)   Respiratory syncytial virus (RSV) Vaccine - COVERED BY MEDICARE PART D  * RSVPreF3 (Arexvy) CDC recommends that adults 60 years of age and older may receive a single dose of RSV vaccine using shared clinical decision-making (SCDM)   Tetanus (Td) Vaccine - COST NOT COVERED BY MEDICARE PART B Following completion of primary series, a booster dose should be given every 10 years to maintain immunity against tetanus. Td may also be given as tetanus wound prophylaxis.   Tdap Vaccine - COST NOT COVERED BY MEDICARE PART B Recommended at least once for all adults. For pregnant patients, recommended with each pregnancy.   Shingles Vaccine (Shingrix) - COST NOT COVERED BY MEDICARE PART B  2 shot series recommended in those 19 years and older who have or will have weakened immune systems or those 50 years and older     Health Maintenance Due:      Topic Date Due   • Hepatitis C Screening  Completed   • Colorectal Cancer Screening  Discontinued     Immunizations Due:      Topic Date Due   • COVID-19 Vaccine (7 - 2024-25 season) 09/01/2024     Advance Directives   What are advance directives?  Advance directives are legal documents that state your wishes and plans for medical care. These plans are made ahead of time in case you lose your ability to make decisions for yourself. Advance directives can apply to any medical decision, such as the treatments you want, and if you want to donate organs.   What are the types of advance directives?  There are many types of advance directives, and each state has rules about how to use them. You may choose a combination of any of the following:  Living will:  This is  a written record of the treatment you want. You can also choose which treatments you do not want, which to limit, and which to stop at a certain time. This includes surgery, medicine, IV fluid, and tube feedings.   Durable power of  for healthcare (DPAHC):  This is a written record that states who you want to make healthcare choices for you when you are unable to make them for yourself. This person, called a proxy, is usually a family member or a friend. You may choose more than 1 proxy.  Do not resuscitate (DNR) order:  A DNR order is used in case your heart stops beating or you stop breathing. It is a request not to have certain forms of treatment, such as CPR. A DNR order may be included in other types of advance directives.  Medical directive:  This covers the care that you want if you are in a coma, near death, or unable to make decisions for yourself. You can list the treatments you want for each condition. Treatment may include pain medicine, surgery, blood transfusions, dialysis, IV or tube feedings, and a ventilator (breathing machine).  Values history:  This document has questions about your views, beliefs, and how you feel and think about life. This information can help others choose the care that you would choose.  Why are advance directives important?  An advance directive helps you control your care. Although spoken wishes may be used, it is better to have your wishes written down. Spoken wishes can be misunderstood, or not followed. Treatments may be given even if you do not want them. An advance directive may make it easier for your family to make difficult choices about your care.   Weight Management   Why it is important to manage your weight:  Being overweight increases your risk of health conditions such as heart disease, high blood pressure, type 2 diabetes, and certain types of cancer. It can also increase your risk for osteoarthritis, sleep apnea, and other respiratory problems. Aim  for a slow, steady weight loss. Even a small amount of weight loss can lower your risk of health problems.  How to lose weight safely:  A safe and healthy way to lose weight is to eat fewer calories and get regular exercise. You can lose up about 1 pound a week by decreasing the number of calories you eat by 500 calories each day.   Healthy meal plan for weight management:  A healthy meal plan includes a variety of foods, contains fewer calories, and helps you stay healthy. A healthy meal plan includes the following:  Eat whole-grain foods more often.  A healthy meal plan should contain fiber. Fiber is the part of grains, fruits, and vegetables that is not broken down by your body. Whole-grain foods are healthy and provide extra fiber in your diet. Some examples of whole-grain foods are whole-wheat breads and pastas, oatmeal, brown rice, and bulgur.  Eat a variety of vegetables every day.  Include dark, leafy greens such as spinach, kale, crispin greens, and mustard greens. Eat yellow and orange vegetables such as carrots, sweet potatoes, and winter squash.   Eat a variety of fruits every day.  Choose fresh or canned fruit (canned in its own juice or light syrup) instead of juice. Fruit juice has very little or no fiber.  Eat low-fat dairy foods.  Drink fat-free (skim) milk or 1% milk. Eat fat-free yogurt and low-fat cottage cheese. Try low-fat cheeses such as mozzarella and other reduced-fat cheeses.  Choose meat and other protein foods that are low in fat.  Choose beans or other legumes such as split peas or lentils. Choose fish, skinless poultry (chicken or turkey), or lean cuts of red meat (beef or pork). Before you cook meat or poultry, cut off any visible fat.   Use less fat and oil.  Try baking foods instead of frying them. Add less fat, such as margarine, sour cream, regular salad dressing and mayonnaise to foods. Eat fewer high-fat foods. Some examples of high-fat foods include french fries, doughnuts, ice  cream, and cakes.  Eat fewer sweets.  Limit foods and drinks that are high in sugar. This includes candy, cookies, regular soda, and sweetened drinks.  Exercise:  Exercise at least 30 minutes per day on most days of the week. Some examples of exercise include walking, biking, dancing, and swimming. You can also fit in more physical activity by taking the stairs instead of the elevator or parking farther away from stores. Ask your healthcare provider about the best exercise plan for you.   Narcotic (Opioid) Safety    Use narcotics safely:  Take prescribed narcotics exactly as directed  Do not give narcotics to others or take narcotics that belong to someone else  Do not mix narcotics without medicines or alcohol  Do not drive or operate heavy machinery after you take the narcotic  Monitor for side effects and notify your healthcare provider if you experienced side effects such as nausea, sleepiness, itching, or trouble thinking clearly.    Manage constipation:    Constipation is the most common side effect of narcotic medicine. Constipation is when you have hard, dry bowel movements, or you go longer than usual between bowel movements. Tell your healthcare provider about all changes in your bowel movements while you are taking narcotics. He or she may recommend laxative medicine to help you have a bowel movement. He or she may also change the kind of narcotic you are taking, or change when you take it. The following are more ways you can prevent or relieve constipation:    Drink liquids as directed.  You may need to drink extra liquids to help soften and move your bowels. Ask how much liquid to drink each day and which liquids are best for you.  Eat high-fiber foods.  This may help decrease constipation by adding bulk to your bowel movements. High-fiber foods include fruits, vegetables, whole-grain breads and cereals, and beans. Your healthcare provider or dietitian can help you create a high-fiber meal plan. Your  provider may also recommend a fiber supplement if you cannot get enough fiber from food.  Exercise regularly.  Regular physical activity can help stimulate your intestines. Walking is a good exercise to prevent or relieve constipation. Ask which exercises are best for you.  Schedule a time each day to have a bowel movement.  This may help train your body to have regular bowel movements. Bend forward while you are on the toilet to help move the bowel movement out. Sit on the toilet for at least 10 minutes, even if you do not have a bowel movement.    Store narcotics safely:   Store narcotics where others cannot easily get them.  Keep them in a locked cabinet or secure area. Do not  keep them in a purse or other bag you carry with you. A person may be looking for something else and find the narcotics.  Make sure narcotics are stored out of the reach of children.  A child can easily overdose on narcotics. Narcotics may look like candy to a small child.    The best way to dispose of narcotics:      The laws vary by country and area. In the [place], the best way is to return the narcotics through a take-back program. This program is offered by the [place] Drug Enforcement Agency (ALEJANDRA). The following are options for using the program:  Take the narcotics to a ALEJANDRA collection site.  The site is often a law enforcement center. Call your local law enforcement center for scheduled take-back days in your area. You will be given information on where to go if the collection site is in a different location.  Take the narcotics to an approved pharmacy or hospital.  A pharmacy or hospital may be set up as a collection site. You will need to ask if it is a ALEJANDRA collection site if you were not directed there. A pharmacy or doctor's office may not be able to take back narcotics unless it is a ALEJANDRA site.  Use a mail-back system.  This means you are given containers to put the narcotics into. You will then mail them in the  containers.  Use a take-back drop box.  This is a place to leave the narcotics at any time. People and animals will not be able to get into the box. Your local law enforcement agency can tell you where to find a drop box in your area.    Other ways to manage pain:   Ask your healthcare provider about non-narcotic medicines to control pain.  Nonprescription medicines include NSAIDs (such as ibuprofen) and acetaminophen. Prescription medicines include muscle relaxers, antidepressants, and steroids.  Pain may be managed without any medicines.  Some ways to relieve pain include massage, aromatherapy, or meditation. Physical or occupational therapy may also help.    For more information:   Drug Enforcement Administration  [address] [place] [address]  Phone: 3- 349 - [phone]  Web Address: https://www.deadiversion.[facility].gov/drug_disposal/     Food and Drug Administration  [address] [place] [address]  Phone: 6- 783 - 098-9684  Web Address: http://www.fda.gov     © Copyright [employer] 2018 Information is for End User's use only and may not be sold, redistributed or otherwise used for commercial purposes. All illustrations and images included in CareNotes® are the copyrighted property of [employer] or [employer]

## 2025-05-13 NOTE — PROGRESS NOTES
Name: Moreno Fournier Jr.      : 1940      MRN: [id number]  Encounter Provider: Lalitha Ty MD  Encounter Date: 2025   Encounter department: [facility]  :  Assessment & Plan  Benign essential hypertension  Hypertension is very well-controlled  Orders:    Basic metabolic panel; Future    Prostate cancer (HCC)  Followed up by the urology  Truncal obesity           Peripheral vascular disease (HCC)  Stable with no episodes of pain or ulcers significant problem with varicose veins       Thrombocytopenia (HCC)  Solved thrombocytopenia       Bilateral lower extremity edema  Resolved lower extremity edema  Orders:    Basic metabolic panel; Future       Preventive health issues were discussed with patient, and age appropriate screening tests were ordered as noted in patient's After Visit Summary. Personalized health advice and appropriate referrals for health education or preventive services given if needed, as noted in patient's After Visit Summary.    History of Present Illness     This is a very pleasant 85 years young gentleman who is here today for the regular follow-up he is doing well review of system is essentially unremarkable concern about hyperpigmentation of both lower extremities secondary to chronic venous insufficiency and the varicose vein he does not have any swelling of the leg anymore he is doing well with that no chest pain review of system is essentially unremarkable as usual he is very hard to hear and he is using the hearing aids and then help by his wife    Patient is here today for the follow-up.   Hypertension. I reviewed antihypertensive medication, patient does not have any side effects of  medications, no signs or symptoms of hypertension ,hypotension or orthostatic hypotension.  Patient is compliant with medications.  Blood workup related to hypertensive diagnosis reviewed.  Plan is to continue with the present management.  We will follow-up as a  scheduled and adjust the doses of the medication as indicated.    History of gout recent no episodes of gout continue with the Zyloprim or allopurinol for hyperuricemia    Leg cramps are much better only sometimes he get the cramping in his right hand but otherwise he is doing well with that    Gastroesophageal reflux disease is stable       Patient Care Team:  Lalitha Ty MD as PCP - General  MD Lenadr Britton MD as Consulting Physician (Allergy)  Altaf Braga MD as Consulting Physician (Pulmonary Disease)  Hemalatha Kirkpatrick DO (Plastic Surgery)  Carlo Cardenas MD (Surgical Oncology)    Review of Systems   Constitutional:  Positive for fatigue. Negative for appetite change and fever.   HENT:  Positive for hearing loss. Negative for congestion, ear pain, nosebleeds, sneezing, tinnitus and voice change.    Eyes:  Negative for pain, discharge and redness.   Respiratory:  Positive for shortness of breath (Shortness of breath on exertion only). Negative for cough, chest tightness and wheezing.    Cardiovascular:  Negative for chest pain, palpitations and leg swelling.   Gastrointestinal:  Negative for abdominal pain, blood in stool, constipation, diarrhea, nausea and vomiting.   Genitourinary:  Negative for difficulty urinating, dysuria, hematuria and urgency.   Musculoskeletal:  Negative for arthralgias, back pain, gait problem and joint swelling.   Skin:  Negative for rash and wound.   Allergic/Immunologic: Negative for environmental allergies.   Neurological:  Negative for dizziness, tremors, seizures, weakness, light-headedness and numbness.   Hematological:  Negative for adenopathy. Does not bruise/bleed easily.   Psychiatric/Behavioral:  Negative for behavioral problems and confusion. The patient is not nervous/anxious.      Medical History Reviewed by provider this encounter:       Annual Wellness Visit Questionnaire   Moreno is here for his Subsequent Wellness visit. Last Medicare  Wellness visit information reviewed, patient interviewed and updates made to the record today.      Health Risk Assessment:   Patient rates overall health as good. Patient feels that their physical health rating is same. Patient is very satisfied with their life. Eyesight was rated as slightly worse. Hearing was rated as slightly worse. Patient feels that their emotional and mental health rating is same. Patients states they are never, rarely angry. Patient states they are sometimes unusually tired/fatigued. Pain experienced in the last 7 days has been some. Patient's pain rating has been 4/10. Patient states that he has experienced weight loss or gain in last 6 months.     Depression Screening:   PHQ-2 Score: 0      Fall Risk Screening:   In the past year, patient has experienced: no history of falling in past year      Home Safety:  Patient has trouble with stairs inside or outside of their home. Patient has working smoke alarms and has no working carbon monoxide detector. Home safety hazards include: none.     Nutrition:   Current diet is Regular.     Medications:   Patient is currently taking over-the-counter supplements. OTC medications include: see medication list. Patient is able to manage medications.     Activities of Daily Living (ADLs)/Instrumental Activities of Daily Living (IADLs):   Walk and transfer into and out of bed and chair?: Yes  Dress and groom yourself?: Yes    Bathe or shower yourself?: Yes    Feed yourself? Yes  Do your laundry/housekeeping?: Yes  Manage your money, pay your bills and track your expenses?: Yes  Make your own meals?: No    Do your own shopping?: Yes    Previous Hospitalizations:   Any hospitalizations or ED visits within the last 12 months?: No      Advance Care Planning:   Living will: Yes    Durable POA for healthcare: Yes    Advanced directive: Yes      Preventive Screenings      Cardiovascular Screening:    General: Screening Current      Diabetes Screening:     General:  Screening Current and Risks and Benefits Discussed      Colorectal Cancer Screening:     General: Screening Current      Prostate Cancer Screening:    General: History Prostate Cancer and Screening Not Indicated      Osteoporosis Screening:    General: Risks and Benefits Discussed and Screening Current      Abdominal Aortic Aneurysm (AAA) Screening:    Risk factors include: tobacco use        General: Risks and Benefits Discussed and Screening Current      Lung Cancer Screening:     General: Screening Not Indicated      Hepatitis C Screening:    General: Screening Current    Immunizations:  - Immunizations due: Influenza, Prevnar 20 and Zoster (Shingrix)    Screening, Brief Intervention, and Referral to Treatment (SBIRT)     Screening  Typical number of drinks in a day: 0  Typical number of drinks in a week: 0  Interpretation: Low risk drinking behavior.    Single Item Drug Screening:  How often have you used an illegal drug (including marijuana) or a prescription medication for non-medical reasons in the past year? never    Single Item Drug Screen Score: 0  Interpretation: Negative screen for possible drug use disorder    Review of Current Opioid Use    Opioid Risk Tool (ORT) Interpretation: Complete Opioid Risk Tool (ORT)    Social Drivers of Health     Financial Resource Strain: Low Risk  (7/19/2024)    Received from [facility]    Overall Financial Resource Strain (CARDIA)     Difficulty of Paying Living Expenses: Not very hard   Food Insecurity: No Food Insecurity (7/19/2024)    Received from [facility]    Hunger Vital Sign     Worried About Running Out of Food in the Last Year: Never true     Ran Out of Food in the Last Year: Never true   Transportation Needs: No Transportation Needs (8/27/2024)    Received from [facility]    OASIS : Transportation     Lack of Transportation (Medical): No     Lack of Transportation (Non-Medical): No     Patient Unable  or Declines to Respond: No   Housing Stability: Low Risk  (7/16/2024)    Received from [facility]    Housing Stability Vital Sign     Unable to Pay for Housing in the Last Year: No     Number of Times Moved in the Last Year: 1     Homeless in the Last Year: No   Utilities: Not At Risk (7/19/2024)    Received from [facility]     Threatened with loss of utilities: No     No results found.    Objective   There were no vitals taken for this visit.    Physical Exam  Vitals and nursing note reviewed.   Constitutional:       Appearance: Normal appearance. He is obese.   HENT:      Head: Normocephalic and atraumatic.      Right Ear: Tympanic membrane normal.      Left Ear: Tympanic membrane normal.      Nose: Nose normal.      Mouth/Throat:      Mouth: Mucous membranes are moist.   Eyes:      Extraocular Movements: Extraocular movements intact.      Pupils: Pupils are equal, round, and reactive to light.   Cardiovascular:      Rate and Rhythm: Normal rate and regular rhythm.      Pulses: Normal pulses.      Heart sounds: Normal heart sounds.   Pulmonary:      Effort: Pulmonary effort is normal.      Breath sounds: Examination of the right-lower field reveals rales. Examination of the left-lower field reveals rales. Rales present.   Abdominal:      General: Abdomen is flat. Bowel sounds are normal.      Palpations: Abdomen is soft.   Musculoskeletal:         General: No swelling, tenderness or deformity. Normal range of motion.      Cervical back: Normal range of motion and neck supple.   Skin:     General: Skin is warm.      Capillary Refill: Capillary refill takes 2 to 3 seconds.      Findings: Rash (Chronic venous insufficiency hyperpigmentation both legs more on the left than on the right and also the varicose veins) present.   Neurological:      General: No focal deficit present.      Mental Status: He is alert and oriented to person, place, and time. Mental status is  at baseline.   Psychiatric:         Mood and Affect: Mood normal.         Behavior: Behavior normal.

## 2025-05-22 ENCOUNTER — OFFICE VISIT (OUTPATIENT)
Dept: INTERNAL MEDICINE CLINIC | Facility: OTHER | Age: 85
End: 2025-05-22

## 2025-05-22 VITALS
DIASTOLIC BLOOD PRESSURE: 68 MMHG | OXYGEN SATURATION: 96 % | BODY MASS INDEX: 35.16 KG/M2 | SYSTOLIC BLOOD PRESSURE: 112 MMHG | HEART RATE: 67 BPM | TEMPERATURE: 98.5 F | HEIGHT: 67 IN | WEIGHT: 224 LBS

## 2025-05-22 DIAGNOSIS — I13.0 HYPERTENSIVE HEART AND RENAL DISEASE WITH HEART FAILURE (HCC): ICD-10-CM

## 2025-05-22 DIAGNOSIS — I10 BENIGN ESSENTIAL HYPERTENSION: Chronic | ICD-10-CM

## 2025-05-22 DIAGNOSIS — J20.9 ACUTE BRONCHITIS, UNSPECIFIED ORGANISM: Primary | ICD-10-CM

## 2025-05-22 LAB
SARS-COV-2 AG UPPER RESP QL IA: NEGATIVE
VALID CONTROL: NORMAL

## 2025-05-22 NOTE — ASSESSMENT & PLAN NOTE
Well-controlled on current regimen.  Avoid medications such as Sudafed that will increase blood pressure

## 2025-05-22 NOTE — ASSESSMENT & PLAN NOTE
Wt Readings from Last 3 Encounters:   05/22/25 102 kg (224 lb)   05/13/25 101 kg (222 lb)   02/04/25 104 kg (229 lb)

## 2025-05-22 NOTE — PROGRESS NOTES
Name: Moreno Fournier Jr.      : 1940      MRN: [id number]  Encounter Provider: Cheyenne Blake PA-C  Encounter Date: 2025   Encounter department: [facility]  :  Assessment & Plan  Acute bronchitis, unspecified organism  URI symptoms x 5 days  POCT COVID-negative  Will treat with Augmentin twice daily x 7 days.  Advised to take with food  Advised Flonase twice daily, continue Xyzal.  Patient may add Mucinex if needed  Offered inhaler, patient declines  Advised increased rest, fluid intake  Discussed symptomatic care including salt water gargles for sore throat, steam showers for congestion, warm liquids   Patient can take Tylenol for fevers and body aches  Discussed red flag symptoms including going to the ER with chest pain or shortness of breath  Discussed course of illness could be 1 to 2 weeks.   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  Avoid medications such as Sudafed that will increase blood pressure                History of Present Illness   Patient is an 85-year-old male presenting to the office for URI symptoms x 5 days  He is endorsing congestion, rhinorrhea, sinus pressure, productive cough  Patient does have a history of interstitial lung disease, not currently using any inhalers    Tx tried: No medications for symptoms.  He does take Xyzal daily for allergies    URI   This is a new problem. The current episode started in the past 7 days. The problem has been unchanged. There has been  "no fever. Associated symptoms include congestion, coughing (productive), rhinorrhea and sneezing. Pertinent negatives include no abdominal pain, chest pain, diarrhea, ear pain, headaches, nausea, sore throat, vomiting or wheezing. He has tried nothing for the symptoms.             Review of Systems   Constitutional:  Positive for fatigue. Negative for chills and fever.   HENT:  Positive for congestion, postnasal drip, rhinorrhea, sinus pressure and sneezing. Negative for ear discharge, ear pain, sore throat and trouble swallowing.    Eyes:  Negative for discharge and redness.   Respiratory:  Positive for cough (productive), chest tightness and shortness of breath. Negative for wheezing.    Cardiovascular:  Negative for chest pain, palpitations and leg swelling.   Gastrointestinal:  Negative for abdominal pain, constipation, diarrhea, nausea and vomiting.   Neurological:  Positive for dizziness. Negative for light-headedness and headaches.       Objective   /68 (BP Location: Left arm, Patient Position: Sitting, Cuff Size: Large)   Pulse 67   Temp 98.5 °F (36.9 °C)   Ht 5' 7\" (1.702 m)   Wt 102 kg (224 lb)   SpO2 96%   BMI 35.08 kg/m²      Physical Exam  Vitals and nursing note reviewed.   Constitutional:       General: He is not in acute distress.     Appearance: Normal appearance. He is not ill-appearing.   HENT:      Head: Normocephalic and atraumatic.      Right Ear: Tympanic membrane, ear canal and external ear normal.      Left Ear: Tympanic membrane, ear canal and external ear normal.      Nose: Congestion present.      Right Sinus: No maxillary sinus tenderness or frontal sinus tenderness.      Left Sinus: No maxillary sinus tenderness or frontal sinus tenderness.      Mouth/Throat:      Mouth: Mucous membranes are moist.      Pharynx: Oropharynx is clear. Uvula midline. No posterior oropharyngeal erythema or postnasal drip.      Tonsils: No tonsillar exudate.     Eyes:      General: No scleral " icterus.     Conjunctiva/sclera: Conjunctivae normal.       Cardiovascular:      Rate and Rhythm: Normal rate and regular rhythm.      Heart sounds: Normal heart sounds. No murmur heard.     No friction rub. No gallop.   Pulmonary:      Effort: Pulmonary effort is normal. No respiratory distress.      Breath sounds: Wheezing (Minimal expiratory wheeze) and rhonchi (RLL rhonchi that clear with coughing) present. No rales.   Chest:      Chest wall: No tenderness.     Musculoskeletal:      Cervical back: No tenderness.      Right lower leg: No edema.      Left lower leg: No edema.   Lymphadenopathy:      Cervical: No cervical adenopathy.     Skin:     General: Skin is warm and dry.      Coloration: Skin is not pale.      Findings: No erythema.     Neurological:      General: No focal deficit present.      Mental Status: He is alert and oriented to person, place, and time. Mental status is at baseline.     Psychiatric:         Mood and Affect: Mood normal.         Behavior: Behavior normal.           Disclaimer: This note was generated with voice recognition software.  Phonetic, grammatical, and spelling errors may be present as a result.  Please contact provider with any concerns or questions

## 2025-06-06 ENCOUNTER — OFFICE VISIT (OUTPATIENT)
Dept: UROLOGY | Facility: MEDICAL CENTER | Age: 85
End: 2025-06-06
Payer: COMMERCIAL

## 2025-06-06 VITALS
HEART RATE: 61 BPM | DIASTOLIC BLOOD PRESSURE: 64 MMHG | SYSTOLIC BLOOD PRESSURE: 110 MMHG | WEIGHT: 224 LBS | BODY MASS INDEX: 35.08 KG/M2 | OXYGEN SATURATION: 97 %

## 2025-06-06 DIAGNOSIS — C61 PROSTATE CANCER MANAGED WITH ACTIVE SURVEILLANCE (HCC): Primary | ICD-10-CM

## 2025-06-06 LAB
SL AMB  POCT GLUCOSE, UA: ABNORMAL
SL AMB LEUKOCYTE ESTERASE,UA: ABNORMAL
SL AMB POCT BILIRUBIN,UA: ABNORMAL
SL AMB POCT BLOOD,UA: ABNORMAL
SL AMB POCT CLARITY,UA: CLEAR
SL AMB POCT COLOR,UA: YELLOW
SL AMB POCT KETONES,UA: ABNORMAL
SL AMB POCT NITRITE,UA: ABNORMAL
SL AMB POCT PH,UA: 7
SL AMB POCT SPECIFIC GRAVITY,UA: 1.02
SL AMB POCT URINE PROTEIN: ABNORMAL
SL AMB POCT UROBILINOGEN: 2

## 2025-06-06 PROCEDURE — 99213 OFFICE O/P EST LOW 20 MIN: CPT | Performed by: UROLOGY

## 2025-06-06 PROCEDURE — 81003 URINALYSIS AUTO W/O SCOPE: CPT | Performed by: UROLOGY

## 2025-06-06 NOTE — PATIENT INSTRUCTIONS
Do a PSA test in December.  If the number is good, you do not come in then.    Next March, call for June appointment, [phone].  Have the PSA done before that appointment

## 2025-06-06 NOTE — PROGRESS NOTES
"   HISTORY:    Follow-up for low-grade prostate cancer on active surveillance since 2017.       Initial biopsy in October 2017 showed one core of Tremaine six cancer.       May 2018, repeat biopsy showed several cores \"suspicious for cancer, not diagnostic\"     July 2019, one core Tremaine six cancer.     Voiding well, nocturia x 2, no difficulty with stream, feels well in general         ASSESSMENT / PLAN:    Doing well.  PSA has risen slightly over the past 2 years.    Will check again in December with phone report.    Follow-up 1 year with PSA        Review of Systems      Objective:     Physical Exam  Genitourinary:     Comments: Penis testes normal.  Slight cystic changes right upper pole epididymis, mildly tender    Prostate feels small, flat and benign          0   Lab Value Date/Time    PSA 1.419 04/26/2025 0722    PSA 0.93 08/18/2023 0744    PSA 0.9 03/06/2023 0929    PSA 1.1 08/17/2022 0825    PSA 1.0 03/08/2022 0847   ]  BUN   Date Value Ref Range Status   04/26/2025 23 5 - 25 mg/dL Final   08/05/2024 18 7 - 28 mg/dL Final     Creatinine   Date Value Ref Range Status   04/26/2025 1.46 (H) 0.60 - 1.30 mg/dL Final     Comment:     Standardized to IDMS reference method   08/05/2024 1.19 0.53 - 1.30 mg/dL Final     No components found for: \"CBC\"    Problem List[1]     Patient ID: Moreno Fournier Jr. is a 85 y.o. male.    Current Medications[2]                       [1]   Patient Active Problem List  Diagnosis    Benign essential hypertension    BPH with urinary obstruction    Gastroesophageal reflux disease without esophagitis    Prostate cancer (HCC)    Seasonal allergic rhinitis due to pollen    Truncal obesity    Severe obesity (BMI 35.0-39.9) with comorbidity (HCC)    Need for shingles vaccine    Interstitial lung disease (HCC)    Pain and swelling of right knee    Current tear of medial cartilage or meniscus of knee    Idiopathic peripheral neuropathy    Right elbow pain    Back pain    Radiculopathy of leg "    Left leg swelling    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    Polycythemia    History of prostate cancer    Chronic kidney disease, stage 3a (HCC)    Anxiety    Peripheral vascular disease (HCC)    Bilateral lower extremity edema    Varicose veins of both lower extremities with pain    BMI 36.0-36.9,adult    Thrombocytopenia (HCC)    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)   [2]   Current Outpatient Medications:     acetaminophen (TYLENOL) 325 mg tablet, Take 2 tablets (650 mg total) by mouth every 6 (six) hours as needed for mild pain, Disp: , Rfl:     allopurinol (ZYLOPRIM) 100 mg tablet, TAKE 1 TABLET BY MOUTH TWICE A DAY, Disp: 180 tablet, Rfl: 1    amLODIPine (NORVASC) 5 mg tablet, Take 1 tablet (5 mg total) by mouth daily, Disp: 30 tablet, Rfl: 0    Cholecalciferol (VITAMIN D3 PO), Take by mouth 2000 IU, Disp: , Rfl:     diltiazem (CARDIZEM CD) 180 mg 24 hr capsule, Take 1 capsule (180 mg total) by mouth daily, Disp: 90 capsule, Rfl: 1    famotidine (PEPCID) 20 mg tablet, Take 1 tablet (20 mg total) by mouth 2 (two) times a day, Disp: 180 tablet, Rfl: 1    gabapentin (Neurontin) 400 mg capsule, Take 1 capsule (400 mg total) by mouth 2 (two) times a day, Disp: 180 capsule, Rfl: 1    levocetirizine (XYZAL) 5 MG tablet, Take 1 tablet (5 mg total) by mouth every evening, Disp: 90 tablet, Rfl: 4    losartan (COZAAR) 50 mg tablet, Take 1 tablet (50 mg total) by mouth daily, Disp: 90 tablet, Rfl: 3    quiNINE (QUALAQUIN) 324 mg capsule, Take 1 capsule (324 mg total) by mouth daily at bedtime, Disp: 30 capsule, Rfl: 5    senna-docusate sodium (SENOKOT S) 8.6-50 mg per tablet, Take 1 tablet by mouth in the morning and 1 tablet in the evening., Disp: , Rfl:     tamsulosin (FLOMAX) 0.4 mg, TAKE 1 CAPSULE BY MOUTH EVERY DAY WITH DINNER, Disp: 90 capsule, Rfl: 1    traMADol (ULTRAM) 50 mg tablet, Take 1 tablet (50 mg total) by mouth every 6 (six) hours as needed for moderate pain,  Disp: 60 tablet, Rfl: 0

## 2025-06-11 DIAGNOSIS — I10 BENIGN ESSENTIAL HYPERTENSION: Chronic | ICD-10-CM

## 2025-06-11 RX ORDER — AMLODIPINE BESYLATE 5 MG/1
5 TABLET ORAL DAILY
Qty: 90 TABLET | Refills: 1 | Status: SHIPPED | OUTPATIENT
Start: 2025-06-11

## 2025-06-20 ENCOUNTER — NURSE TRIAGE (OUTPATIENT)
Age: 85
End: 2025-06-20

## 2025-06-20 NOTE — TELEPHONE ENCOUNTER
"REASON FOR CONVERSATION: Diarrhea    SYMPTOMS: Patient's wife called as patient is hard of hearing. Patient has had diarrhea since Monday. Unable to describe consistency. Patient does have increased gas, denies any other symptoms. Spouse wants appt made, appt made for Monday and home care advice given.    OTHER HEALTH INFORMATION: Prescribed Augmentin 5/22    PROTOCOL DISPOSITION: Home Care    CARE ADVICE PROVIDED: Reassurance and education, fluid therapy, diarrhea medicine, expected course, call back if    PRACTICE FOLLOW-UP: N/A    Reason for Disposition   SEVERE diarrhea (e.g., 7 or more times / day more than normal)    Answer Assessment - Initial Assessment Questions  1. DIARRHEA SEVERITY: \"How bad is the diarrhea?\" \"How many more stools have you had in the past 24 hours than normal?\"       Monday went 11 times, Tuesday got Imodium, Wednesday was okay, Thursday got Imodium, today got Imodium   2. ONSET: \"When did the diarrhea begin?\"       Monday  3. STOOL DESCRIPTION:  \"How loose or watery is the diarrhea?\" \"What is the stool color?\" \"Is there any blood or mucous in the stool?\"      Unsure  4. VOMITING: \"Are you also vomiting?\" If Yes, ask: \"How many times in the past 24 hours?\"       Denies  5. ABDOMEN PAIN: \"Are you having any abdomen pain?\" If Yes, ask: \"What does it feel like?\" (e.g., crampy, dull, intermittent, constant)       Denies  6. ABDOMEN PAIN SEVERITY: If present, ask: \"How bad is the pain?\"  (e.g., Scale 1-10; mild, moderate, or severe)      N/A  7. ORAL INTAKE: If vomiting, \"Have you been able to drink liquids?\" \"How much liquids have you had in the past 24 hours?\"      N/A  8. HYDRATION: \"Any signs of dehydration?\" (e.g., dry mouth [not just dry lips], too weak to stand, dizziness, new weight loss) \"When did you last urinate?\"      Denies  9. EXPOSURE: \"Have you traveled to a foreign country recently?\" \"Have you been exposed to anyone with diarrhea?\" \"Could you have eaten any food that was " "spoiled?\"      Denies  10. ANTIBIOTIC USE: \"Are you taking antibiotics now or have you taken antibiotics in the past 2 months?\"        5/22 on Augmentin  11. OTHER SYMPTOMS: \"Do you have any other symptoms?\" (e.g., fever, blood in stool)        Gas    Protocols used: Diarrhea-Adult-[place]    "

## 2025-06-25 DIAGNOSIS — I10 BENIGN ESSENTIAL HYPERTENSION: Chronic | ICD-10-CM

## 2025-06-25 RX ORDER — LOSARTAN POTASSIUM 50 MG/1
50 TABLET ORAL DAILY
Qty: 90 TABLET | Refills: 1 | Status: SHIPPED | OUTPATIENT
Start: 2025-06-25

## 2025-06-30 DIAGNOSIS — G60.9 IDIOPATHIC PERIPHERAL NEUROPATHY: ICD-10-CM

## 2025-06-30 RX ORDER — GABAPENTIN 400 MG/1
400 CAPSULE ORAL 2 TIMES DAILY
Qty: 180 CAPSULE | Refills: 1 | Status: SHIPPED | OUTPATIENT
Start: 2025-06-30

## 2025-06-30 NOTE — TELEPHONE ENCOUNTER
Patient's wife called back and states her  actually just took his last pill today, so he does not have enough for 3 days.

## 2025-06-30 NOTE — TELEPHONE ENCOUNTER
Reason for call:   [x] Refill   [] Prior Auth  [] Other:     Office:   [x] PCP/Provider -   [] Specialty/Provider -     Medication:   gabapentin (Neurontin) 400 mg       Dose/Frequency: Take 1 capsule (400 mg total) by mouth 2 (two) times a day     Quantity: 180    Pharmacy: [facility]pharmacy #[id number] [address] [facility]   Does the patient have enough for 3 days?   [x] Yes   [] No - Send as HP to POD    Mail Away Pharmacy   Does the patient have enough for 10 days?   [] Yes   [] No - Send as HP to POD

## 2025-07-07 ENCOUNTER — TELEPHONE (OUTPATIENT)
Age: 85
End: 2025-07-07

## 2025-07-07 NOTE — TELEPHONE ENCOUNTER
Ivy, patients wife, called to confirm when patients next appointment is scheduled. I advised her patient is not scheduled at this time and is due to return for 1 year follow up (around 2/4/2026). I advised her patient is on recall list and will be contacted to scheduled once Dr Braga's schedule is available. Thank you.

## 2025-07-14 DIAGNOSIS — E79.0 HYPERURICEMIA: ICD-10-CM

## 2025-07-14 NOTE — TELEPHONE ENCOUNTER
Reason for call:   [x] Refill   [] Prior Auth  [] Other:     Office:   [x] PCP/Provider -   [] Specialty/Provider -     Medication: allopurinol (ZYLOPRIM) 100 mg tablet     Dose/Frequency: TAKE 1 TABLET BY MOUTH TWICE A DAY     Quantity: 180    Pharmacy: [place]     Local Pharmacy   Does the patient have enough for 3 days?   [x] Yes   [] No - Send as HP to POD

## 2025-07-15 RX ORDER — ALLOPURINOL 100 MG/1
100 TABLET ORAL 2 TIMES DAILY
Qty: 180 TABLET | Refills: 1 | Status: SHIPPED | OUTPATIENT
Start: 2025-07-15

## 2025-08-04 ENCOUNTER — OFFICE VISIT (OUTPATIENT)
Dept: INTERNAL MEDICINE CLINIC | Facility: OTHER | Age: 85
End: 2025-08-04
Payer: COMMERCIAL

## 2025-08-04 PROCEDURE — 88304 TISSUE EXAM BY PATHOLOGIST: CPT | Performed by: PATHOLOGY

## 2025-08-11 PROBLEM — L72.0 EPIDERMAL CYST: Status: ACTIVE | Noted: 2025-08-11

## 2025-08-18 DIAGNOSIS — N40.1 BPH WITH OBSTRUCTION/LOWER URINARY TRACT SYMPTOMS: ICD-10-CM

## 2025-08-18 DIAGNOSIS — N13.8 BPH WITH OBSTRUCTION/LOWER URINARY TRACT SYMPTOMS: ICD-10-CM

## 2025-08-18 DIAGNOSIS — K21.9 GASTROESOPHAGEAL REFLUX DISEASE WITHOUT ESOPHAGITIS: Chronic | ICD-10-CM

## 2025-08-18 DIAGNOSIS — R25.2 LEG CRAMPING: ICD-10-CM

## 2025-08-18 RX ORDER — QUININE SULFATE 324 MG/1
324 CAPSULE ORAL
Qty: 90 CAPSULE | Refills: 1 | Status: SHIPPED | OUTPATIENT
Start: 2025-08-18 | End: 2026-02-14

## 2025-08-18 RX ORDER — TAMSULOSIN HYDROCHLORIDE 0.4 MG/1
0.4 CAPSULE ORAL
Qty: 90 CAPSULE | Refills: 1 | Status: SHIPPED | OUTPATIENT
Start: 2025-08-18

## 2025-08-18 RX ORDER — FAMOTIDINE 20 MG/1
20 TABLET, FILM COATED ORAL 2 TIMES DAILY
Qty: 180 TABLET | Refills: 1 | Status: SHIPPED | OUTPATIENT
Start: 2025-08-18

## (undated) DEVICE — SKIN MARKER DUAL TIP WITH RULER CAP, FLEXIBLE RULER AND LABELS: Brand: DEVON

## (undated) DEVICE — PROBE 8225101 5PK STD PRASS FL TIP ROHS

## (undated) DEVICE — BIPOLAR CORD DISP

## (undated) DEVICE — SCD SEQUENTIAL COMPRESSION COMFORT SLEEVE MEDIUM KNEE LENGTH: Brand: KENDALL SCD

## (undated) DEVICE — STERILE POLYISOPRENE POWDER-FREE SURGICAL GLOVES: Brand: PROTEXIS

## (undated) DEVICE — SUT SILK 3-0 SH 30 IN K832H

## (undated) DEVICE — PENCIL ELECTROSURG E-Z CLEAN -0035H

## (undated) DEVICE — CHLORAPREP HI-LITE 10.5ML ORANGE

## (undated) DEVICE — ELECTRODE BLADE MOD E-Z CLEAN  2.75IN 7CM -0012AM

## (undated) DEVICE — 3M™ TEGADERM™ TRANSPARENT FILM DRESSING FRAME STYLE, 1624W, 2-3/8 IN X 2-3/4 IN (6 CM X 7 CM), 100/CT 4CT/CASE: Brand: 3M™ TEGADERM™

## (undated) DEVICE — SURGICAL CLIPPER BLADE GENERAL USE

## (undated) DEVICE — NEEDLE 25G X 1 1/2

## (undated) DEVICE — STERILE POLYISOPRENE POWDER-FREE SURGICAL GLOVES WITH EMOLLIENT COATING: Brand: PROTEXIS

## (undated) DEVICE — GAUZE SPONGES,16 PLY: Brand: CURITY

## (undated) DEVICE — SYRINGE 30ML LL

## (undated) DEVICE — BULB SYRINGE,IRRIGATION WITH PROTECTIVE CAP: Brand: DOVER

## (undated) DEVICE — 1820 FOAM BLOCK NEEDLE COUNTER: Brand: DEVON

## (undated) DEVICE — SUT MONOCRYL 3-0 PS-2 27 IN Y427H

## (undated) DEVICE — PAD GROUNDING ADULT

## (undated) DEVICE — PACK UNIVERSAL NECK

## (undated) DEVICE — SUT SILK 2-0 18 IN A185H

## (undated) DEVICE — BASIC PACK: Brand: CONVERTORS

## (undated) DEVICE — SUT PROLENE 5-0 P-3 18 IN 8698G

## (undated) DEVICE — SYRINGE 10ML LL

## (undated) DEVICE — ELECTRODE BLADE MOD E-Z CLEAN 2.5IN 6.4CM -0012M

## (undated) DEVICE — SUT VICRYL 3-0 SH 27 IN J416H

## (undated) DEVICE — PROXIMATE SKIN STAPLERS (35 WIDE) CONTAINS 35 STAINLESS STEEL STAPLES (FIXED HEAD): Brand: PROXIMATE

## (undated) DEVICE — TIBURON SPLIT SHEET: Brand: CONVERTORS

## (undated) DEVICE — LIGHT GLOVE GREEN

## (undated) DEVICE — UNDERGLOVE PROTEXIS  BLUE SZ 7

## (undated) DEVICE — GAUZE SPONGES,8 PLY: Brand: CURITY

## (undated) DEVICE — STAYS ELASTIC 5MM SHARP HOOK LONE STAR

## (undated) DEVICE — NEEDLE BLUNT 18 G X 1 1/2IN

## (undated) DEVICE — ELECTRODE 8227410 PAIRED 2 CH SET ROHS

## (undated) DEVICE — INTENDED FOR TISSUE SEPARATION, AND OTHER PROCEDURES THAT REQUIRE A SHARP SURGICAL BLADE TO PUNCTURE OR CUT.: Brand: BARD-PARKER SAFETY BLADES SIZE 15, STERILE

## (undated) DEVICE — CHLORAPREP HI-LITE 26ML ORANGE

## (undated) DEVICE — GLOVE SRG BIOGEL 7.5

## (undated) DEVICE — ASTOUND STANDARD SURGICAL GOWN, XXL: Brand: CONVERTORS

## (undated) DEVICE — LIGHT HANDLE COVER SLEEVE DISP BLUE STELLAR

## (undated) DEVICE — SPONGE 4 X 4 XRAY 16 PLY STRL LF RFD